# Patient Record
Sex: FEMALE | Race: WHITE | NOT HISPANIC OR LATINO | Employment: OTHER | URBAN - METROPOLITAN AREA
[De-identification: names, ages, dates, MRNs, and addresses within clinical notes are randomized per-mention and may not be internally consistent; named-entity substitution may affect disease eponyms.]

---

## 2019-01-28 ENCOUNTER — TRANSFERRED RECORDS (OUTPATIENT)
Dept: HEALTH INFORMATION MANAGEMENT | Facility: CLINIC | Age: 74
End: 2019-01-28

## 2019-01-28 LAB
CREAT SERPL-MCNC: 1.58 MG/DL (ref 0.6–1.3)
GLUCOSE SERPL-MCNC: 101 MG/DL (ref 70–99)
POTASSIUM SERPL-SCNC: 4.2 MMOL/L (ref 3.5–5.1)

## 2019-02-02 ENCOUNTER — APPOINTMENT (OUTPATIENT)
Dept: NUCLEAR MEDICINE | Facility: CLINIC | Age: 74
DRG: 202 | End: 2019-02-02
Payer: MEDICARE

## 2019-02-02 ENCOUNTER — HOSPITAL ENCOUNTER (INPATIENT)
Facility: CLINIC | Age: 74
LOS: 2 days | Discharge: HOME | DRG: 202 | End: 2019-02-04
Attending: NURSE PRACTITIONER | Admitting: INTERNAL MEDICINE
Payer: MEDICARE

## 2019-02-02 ENCOUNTER — APPOINTMENT (OUTPATIENT)
Dept: ULTRASOUND IMAGING | Facility: CLINIC | Age: 74
DRG: 202 | End: 2019-02-02
Payer: MEDICARE

## 2019-02-02 ENCOUNTER — APPOINTMENT (OUTPATIENT)
Dept: GENERAL RADIOLOGY | Facility: CLINIC | Age: 74
DRG: 202 | End: 2019-02-02
Payer: MEDICARE

## 2019-02-02 ENCOUNTER — TRANSFERRED RECORDS (OUTPATIENT)
Dept: HEALTH INFORMATION MANAGEMENT | Facility: CLINIC | Age: 74
End: 2019-02-02

## 2019-02-02 DIAGNOSIS — N17.9 AKI (ACUTE KIDNEY INJURY) (H): ICD-10-CM

## 2019-02-02 DIAGNOSIS — R06.02 SOB (SHORTNESS OF BREATH): ICD-10-CM

## 2019-02-02 DIAGNOSIS — J96.01 ACUTE RESPIRATORY FAILURE WITH HYPOXIA (H): Primary | ICD-10-CM

## 2019-02-02 PROBLEM — I26.99 PULMONARY EMBOLISM (H): Status: ACTIVE | Noted: 2019-02-02

## 2019-02-02 LAB
ANION GAP SERPL CALCULATED.3IONS-SCNC: 9 MMOL/L (ref 3–14)
BUN SERPL-MCNC: 36 MG/DL (ref 7–30)
CALCIUM SERPL-MCNC: 9.2 MG/DL (ref 8.5–10.1)
CHLORIDE SERPL-SCNC: 100 MMOL/L (ref 94–109)
CO2 SERPL-SCNC: 31 MMOL/L (ref 20–32)
CREAT SERPL-MCNC: 2.65 MG/DL (ref 0.52–1.04)
D DIMER PPP FEU-MCNC: 1.2 UG/ML FEU (ref 0–0.5)
ERYTHROCYTE [DISTWIDTH] IN BLOOD BY AUTOMATED COUNT: 14.5 % (ref 10–15)
GFR SERPL CREATININE-BSD FRML MDRD: 17 ML/MIN/{1.73_M2}
GLUCOSE SERPL-MCNC: 105 MG/DL (ref 70–99)
HCT VFR BLD AUTO: 43.2 % (ref 35–47)
HGB BLD-MCNC: 14.2 G/DL (ref 11.7–15.7)
MCH RBC QN AUTO: 32.3 PG (ref 26.5–33)
MCHC RBC AUTO-ENTMCNC: 32.9 G/DL (ref 31.5–36.5)
MCV RBC AUTO: 98 FL (ref 78–100)
NT-PROBNP SERPL-MCNC: 97 PG/ML (ref 0–900)
PLATELET # BLD AUTO: 191 10E9/L (ref 150–450)
POTASSIUM SERPL-SCNC: 3.2 MMOL/L (ref 3.4–5.3)
RBC # BLD AUTO: 4.4 10E12/L (ref 3.8–5.2)
SODIUM SERPL-SCNC: 140 MMOL/L (ref 133–144)
TROPONIN I SERPL-MCNC: <0.015 UG/L (ref 0–0.04)
WBC # BLD AUTO: 7.3 10E9/L (ref 4–11)

## 2019-02-02 PROCEDURE — 99223 1ST HOSP IP/OBS HIGH 75: CPT | Mod: AI | Performed by: INTERNAL MEDICINE

## 2019-02-02 PROCEDURE — 96374 THER/PROPH/DIAG INJ IV PUSH: CPT

## 2019-02-02 PROCEDURE — 25000125 ZZHC RX 250

## 2019-02-02 PROCEDURE — 34300033 ZZH RX 343: Performed by: NURSE PRACTITIONER

## 2019-02-02 PROCEDURE — 80048 BASIC METABOLIC PNL TOTAL CA: CPT | Performed by: NURSE PRACTITIONER

## 2019-02-02 PROCEDURE — A9567 TECHNETIUM TC-99M AEROSOL: HCPCS | Performed by: NURSE PRACTITIONER

## 2019-02-02 PROCEDURE — 25000128 H RX IP 250 OP 636: Performed by: NURSE PRACTITIONER

## 2019-02-02 PROCEDURE — 93970 EXTREMITY STUDY: CPT

## 2019-02-02 PROCEDURE — 71046 X-RAY EXAM CHEST 2 VIEWS: CPT

## 2019-02-02 PROCEDURE — 12000000 ZZH R&B MED SURG/OB

## 2019-02-02 PROCEDURE — 83880 ASSAY OF NATRIURETIC PEPTIDE: CPT | Performed by: NURSE PRACTITIONER

## 2019-02-02 PROCEDURE — 85379 FIBRIN DEGRADATION QUANT: CPT | Performed by: NURSE PRACTITIONER

## 2019-02-02 PROCEDURE — 99285 EMERGENCY DEPT VISIT HI MDM: CPT | Mod: 25

## 2019-02-02 PROCEDURE — 27210210 NM LUNG SCAN VENTILATION AND PERFUSION

## 2019-02-02 PROCEDURE — 93005 ELECTROCARDIOGRAM TRACING: CPT

## 2019-02-02 PROCEDURE — 25000128 H RX IP 250 OP 636: Performed by: INTERNAL MEDICINE

## 2019-02-02 PROCEDURE — 85027 COMPLETE CBC AUTOMATED: CPT | Performed by: NURSE PRACTITIONER

## 2019-02-02 PROCEDURE — A9270 NON-COVERED ITEM OR SERVICE: HCPCS | Mod: GY | Performed by: INTERNAL MEDICINE

## 2019-02-02 PROCEDURE — A9540 TC99M MAA: HCPCS | Performed by: NURSE PRACTITIONER

## 2019-02-02 PROCEDURE — 25000132 ZZH RX MED GY IP 250 OP 250 PS 637: Mod: GY | Performed by: INTERNAL MEDICINE

## 2019-02-02 PROCEDURE — 94640 AIRWAY INHALATION TREATMENT: CPT

## 2019-02-02 PROCEDURE — 84484 ASSAY OF TROPONIN QUANT: CPT | Performed by: NURSE PRACTITIONER

## 2019-02-02 RX ORDER — NALOXONE HYDROCHLORIDE 0.4 MG/ML
.1-.4 INJECTION, SOLUTION INTRAMUSCULAR; INTRAVENOUS; SUBCUTANEOUS
Status: DISCONTINUED | OUTPATIENT
Start: 2019-02-02 | End: 2019-02-04 | Stop reason: HOSPADM

## 2019-02-02 RX ORDER — AMOXICILLIN 250 MG
2 CAPSULE ORAL 2 TIMES DAILY PRN
Status: DISCONTINUED | OUTPATIENT
Start: 2019-02-02 | End: 2019-02-04 | Stop reason: HOSPADM

## 2019-02-02 RX ORDER — POTASSIUM CHLORIDE 7.45 MG/ML
10 INJECTION INTRAVENOUS
Status: DISCONTINUED | OUTPATIENT
Start: 2019-02-02 | End: 2019-02-04 | Stop reason: HOSPADM

## 2019-02-02 RX ORDER — NAPROXEN SODIUM 220 MG
220 TABLET ORAL EVERY MORNING
Status: ON HOLD | COMMUNITY
End: 2019-02-04

## 2019-02-02 RX ORDER — POTASSIUM CL/LIDO/0.9 % NACL 10MEQ/0.1L
10 INTRAVENOUS SOLUTION, PIGGYBACK (ML) INTRAVENOUS
Status: DISCONTINUED | OUTPATIENT
Start: 2019-02-02 | End: 2019-02-04 | Stop reason: HOSPADM

## 2019-02-02 RX ORDER — METOLAZONE 2.5 MG/1
2.5 TABLET ORAL EVERY OTHER DAY
Status: ON HOLD | COMMUNITY
End: 2019-02-04

## 2019-02-02 RX ORDER — IPRATROPIUM BROMIDE AND ALBUTEROL SULFATE 2.5; .5 MG/3ML; MG/3ML
SOLUTION RESPIRATORY (INHALATION)
Status: COMPLETED
Start: 2019-02-02 | End: 2019-02-02

## 2019-02-02 RX ORDER — AMLODIPINE BESYLATE 10 MG/1
10 TABLET ORAL EVERY EVENING
Status: ON HOLD | COMMUNITY
End: 2019-07-06

## 2019-02-02 RX ORDER — METOPROLOL TARTRATE 50 MG
50 TABLET ORAL 2 TIMES DAILY
Status: DISCONTINUED | OUTPATIENT
Start: 2019-02-02 | End: 2019-02-04 | Stop reason: HOSPADM

## 2019-02-02 RX ORDER — AMLODIPINE BESYLATE 10 MG/1
10 TABLET ORAL EVERY EVENING
Status: DISCONTINUED | OUTPATIENT
Start: 2019-02-02 | End: 2019-02-04 | Stop reason: HOSPADM

## 2019-02-02 RX ORDER — SODIUM CHLORIDE 9 MG/ML
INJECTION, SOLUTION INTRAVENOUS CONTINUOUS
Status: DISCONTINUED | OUTPATIENT
Start: 2019-02-02 | End: 2019-02-03

## 2019-02-02 RX ORDER — PREDNISONE 5 MG/1
5 TABLET ORAL EVERY MORNING
Status: DISCONTINUED | OUTPATIENT
Start: 2019-02-03 | End: 2019-02-04 | Stop reason: HOSPADM

## 2019-02-02 RX ORDER — LOSARTAN POTASSIUM 100 MG/1
100 TABLET ORAL DAILY
Status: ON HOLD | COMMUNITY
End: 2019-02-04

## 2019-02-02 RX ORDER — POTASSIUM CHLORIDE 29.8 MG/ML
20 INJECTION INTRAVENOUS
Status: DISCONTINUED | OUTPATIENT
Start: 2019-02-02 | End: 2019-02-04 | Stop reason: HOSPADM

## 2019-02-02 RX ORDER — AMOXICILLIN 250 MG
1 CAPSULE ORAL 2 TIMES DAILY PRN
Status: DISCONTINUED | OUTPATIENT
Start: 2019-02-02 | End: 2019-02-04 | Stop reason: HOSPADM

## 2019-02-02 RX ORDER — RIBOFLAVIN (VITAMIN B2) 100 MG
500 TABLET ORAL EVERY MORNING
COMMUNITY
End: 2020-01-01 | Stop reason: DRUGHIGH

## 2019-02-02 RX ORDER — UBIDECARENONE 75 MG
CAPSULE ORAL
COMMUNITY
End: 2019-02-02

## 2019-02-02 RX ORDER — BISACODYL 10 MG
10 SUPPOSITORY, RECTAL RECTAL DAILY PRN
Status: DISCONTINUED | OUTPATIENT
Start: 2019-02-02 | End: 2019-02-04 | Stop reason: HOSPADM

## 2019-02-02 RX ORDER — HYDRALAZINE HYDROCHLORIDE 20 MG/ML
10 INJECTION INTRAMUSCULAR; INTRAVENOUS EVERY 4 HOURS PRN
Status: DISCONTINUED | OUTPATIENT
Start: 2019-02-02 | End: 2019-02-04 | Stop reason: HOSPADM

## 2019-02-02 RX ORDER — POTASSIUM CHLORIDE 1.5 G/1.58G
20-40 POWDER, FOR SOLUTION ORAL
Status: DISCONTINUED | OUTPATIENT
Start: 2019-02-02 | End: 2019-02-04 | Stop reason: HOSPADM

## 2019-02-02 RX ORDER — PROCHLORPERAZINE 25 MG
12.5 SUPPOSITORY, RECTAL RECTAL EVERY 12 HOURS PRN
Status: DISCONTINUED | OUTPATIENT
Start: 2019-02-02 | End: 2019-02-04 | Stop reason: HOSPADM

## 2019-02-02 RX ORDER — POTASSIUM CHLORIDE 1500 MG/1
20-40 TABLET, EXTENDED RELEASE ORAL
Status: DISCONTINUED | OUTPATIENT
Start: 2019-02-02 | End: 2019-02-04 | Stop reason: HOSPADM

## 2019-02-02 RX ORDER — POTASSIUM GLUCONATE 595(99)MG
1 TABLET, EXTENDED RELEASE ORAL EVERY EVENING
COMMUNITY
End: 2020-01-01

## 2019-02-02 RX ORDER — ALBUTEROL SULFATE 90 UG/1
2 AEROSOL, METERED RESPIRATORY (INHALATION) EVERY 6 HOURS PRN
COMMUNITY

## 2019-02-02 RX ORDER — ALBUTEROL SULFATE 0.83 MG/ML
2.5 SOLUTION RESPIRATORY (INHALATION) EVERY 4 HOURS PRN
Status: DISCONTINUED | OUTPATIENT
Start: 2019-02-02 | End: 2019-02-04 | Stop reason: HOSPADM

## 2019-02-02 RX ORDER — SIMVASTATIN 20 MG
20 TABLET ORAL AT BEDTIME
Status: DISCONTINUED | OUTPATIENT
Start: 2019-02-02 | End: 2019-02-04 | Stop reason: HOSPADM

## 2019-02-02 RX ORDER — TRAZODONE HYDROCHLORIDE 100 MG/1
100 TABLET ORAL AT BEDTIME
Status: DISCONTINUED | OUTPATIENT
Start: 2019-02-02 | End: 2019-02-04 | Stop reason: HOSPADM

## 2019-02-02 RX ORDER — SIMVASTATIN 20 MG
20 TABLET ORAL AT BEDTIME
COMMUNITY

## 2019-02-02 RX ORDER — ONDANSETRON 4 MG/1
4 TABLET, ORALLY DISINTEGRATING ORAL EVERY 6 HOURS PRN
Status: DISCONTINUED | OUTPATIENT
Start: 2019-02-02 | End: 2019-02-04 | Stop reason: HOSPADM

## 2019-02-02 RX ORDER — PROCHLORPERAZINE MALEATE 5 MG
5 TABLET ORAL EVERY 6 HOURS PRN
Status: DISCONTINUED | OUTPATIENT
Start: 2019-02-02 | End: 2019-02-04 | Stop reason: HOSPADM

## 2019-02-02 RX ORDER — ONDANSETRON 2 MG/ML
4 INJECTION INTRAMUSCULAR; INTRAVENOUS EVERY 6 HOURS PRN
Status: DISCONTINUED | OUTPATIENT
Start: 2019-02-02 | End: 2019-02-04 | Stop reason: HOSPADM

## 2019-02-02 RX ORDER — ALBUTEROL SULFATE 90 UG/1
2 AEROSOL, METERED RESPIRATORY (INHALATION) EVERY 6 HOURS PRN
Status: DISCONTINUED | OUTPATIENT
Start: 2019-02-02 | End: 2019-02-04 | Stop reason: HOSPADM

## 2019-02-02 RX ORDER — FUROSEMIDE 20 MG
60 TABLET ORAL 2 TIMES DAILY
Status: ON HOLD | COMMUNITY
End: 2019-02-04

## 2019-02-02 RX ADMIN — METOPROLOL TARTRATE 50 MG: 50 TABLET ORAL at 20:19

## 2019-02-02 RX ADMIN — Medication 3.3 MILLICURIE: at 16:02

## 2019-02-02 RX ADMIN — TRAZODONE HYDROCHLORIDE 100 MG: 100 TABLET ORAL at 22:19

## 2019-02-02 RX ADMIN — IPRATROPIUM BROMIDE AND ALBUTEROL SULFATE 3 ML: .5; 3 SOLUTION RESPIRATORY (INHALATION) at 16:53

## 2019-02-02 RX ADMIN — POTASSIUM CHLORIDE 20 MEQ: 1500 TABLET, EXTENDED RELEASE ORAL at 22:20

## 2019-02-02 RX ADMIN — SIMVASTATIN 20 MG: 20 TABLET, FILM COATED ORAL at 22:19

## 2019-02-02 RX ADMIN — ALBUTEROL SULFATE 2 PUFF: 90 AEROSOL, METERED RESPIRATORY (INHALATION) at 22:46

## 2019-02-02 RX ADMIN — SODIUM CHLORIDE: 9 INJECTION, SOLUTION INTRAVENOUS at 19:54

## 2019-02-02 RX ADMIN — Medication 6000 UNITS: at 18:15

## 2019-02-02 RX ADMIN — KIT FOR THE PREPARATION OF TECHNETIUM TC 99M PENTETATE 70 MILLICURIE: 20 INJECTION, POWDER, LYOPHILIZED, FOR SOLUTION INTRAVENOUS; RESPIRATORY (INHALATION) at 16:01

## 2019-02-02 RX ADMIN — POTASSIUM CHLORIDE 40 MEQ: 1500 TABLET, EXTENDED RELEASE ORAL at 19:58

## 2019-02-02 RX ADMIN — HEPARIN SODIUM 18 UNITS/KG/HR: 10000 INJECTION, SOLUTION INTRAVENOUS at 19:44

## 2019-02-02 RX ADMIN — ASPIRIN 325 MG: 325 TABLET, DELAYED RELEASE ORAL at 20:19

## 2019-02-02 RX ADMIN — HEPARIN SODIUM 1350 UNITS/HR: 10000 INJECTION, SOLUTION INTRAVENOUS at 18:16

## 2019-02-02 ASSESSMENT — ENCOUNTER SYMPTOMS
SHORTNESS OF BREATH: 1
ABDOMINAL PAIN: 0
FEVER: 0
CHILLS: 0
WEAKNESS: 1
BACK PAIN: 1
COUGH: 1
MYALGIAS: 1
NAUSEA: 0

## 2019-02-02 ASSESSMENT — ACTIVITIES OF DAILY LIVING (ADL)
TRANSFERRING: 0-->INDEPENDENT
COGNITION: 0 - NO COGNITION ISSUES REPORTED
RETIRED_COMMUNICATION: 0-->UNDERSTANDS/COMMUNICATES WITHOUT DIFFICULTY
ADLS_ACUITY_SCORE: 21
SWALLOWING: 0-->SWALLOWS FOODS/LIQUIDS WITHOUT DIFFICULTY
BATHING: 0-->INDEPENDENT
AMBULATION: 1-->ASSISTIVE EQUIPMENT
DRESS: 0-->INDEPENDENT
RETIRED_EATING: 0-->INDEPENDENT
FALL_HISTORY_WITHIN_LAST_SIX_MONTHS: NO
TOILETING: 0-->INDEPENDENT

## 2019-02-02 ASSESSMENT — MIFFLIN-ST. JEOR: SCORE: 1517.4

## 2019-02-02 NOTE — PHARMACY-ADMISSION MEDICATION HISTORY
Admission medication history interview status for this patient is complete. See Psychiatric admission navigator for allergy information, prior to admission medications and immunization status.     Medication history interview source(s):Patient and Family  Medication history resources (including written lists, pill bottles, clinic record):cellphone list    Changes made to PTA medication list:  Added: metolazone, incruse ellipita, b complex vitamin, aleve PM, potassium gluconate, prednisone, vitamin C, calcium with D, plavix, MVI, lasix    Deleted: ditropan  Changed: ventolin MDI, ASA, lopressor, aleve    Actions taken by pharmacist (provider contacted, etc):None     Additional medication history information:None    Medication reconciliation/reorder completed by provider prior to medication history? No    For patients on insulin therapy: no (Yes/No)   Lantus/levemir/NPH/Mix 70/30 dose: ___ in AM/PM or twice daily   Sliding scale Novolog Y/N   If Yes, do you have a baseline novolog pre-meal dose: ______units with meals   Patients eat three meals a day: Y/N ---  How many episodes of hypoglycemia (low blood glucose) do you have weekly: ---   How many missed doses do you have a week: ---  How many times do you check your blood glucose per day: ---  Any Barriers to therapy: cost of medications/comfortable with giving injections (if applicable)/ comfortable and confident with current diabetes regimen ---      Prior to Admission medications    Medication Sig Last Dose Taking? Auth Provider   albuterol (PROAIR HFA/PROVENTIL HFA/VENTOLIN HFA) 108 (90 Base) MCG/ACT inhaler Inhale 2 puffs into the lungs every 6 hours as needed  2/1/2019 at Unknown time Yes Reported, Patient   amLODIPine (NORVASC) 10 MG tablet Take 10 mg by mouth every evening  2/1/2019 at Unknown time Yes Reported, Patient   aspirin (ASA) 325 MG EC tablet Take 325 mg by mouth every evening  2/1/2019 at Unknown time Yes Reported, Patient   B Complex-C (VITAMIN B COMPLEX  W/VITAMIN C) TABS tablet Take 1 tablet by mouth daily 2/2/2019 at am Yes Unknown, Entered By History   Calcium-Vitamin D 600-200 MG-UNIT TABS Take 1 tablet by mouth every evening  2/1/2019 at Unknown time Yes Reported, Patient   Clopidogrel Bisulfate (PLAVIX PO) Take 75 mg by mouth every morning  2/2/2019 at am Yes Reported, Patient   FLUOXETINE HCL PO Take 20 mg by mouth daily 2/2/2019 at am Yes Reported, Patient   furosemide (LASIX) 20 MG tablet Take 60 mg by mouth 2 times daily 2/2/2019 at am Yes Unknown, Entered By History   losartan (COZAAR) 100 MG tablet Take 100 mg by mouth daily 2/2/2019 at am Yes Reported, Patient   metolazone (ZAROXOLYN) 2.5 MG tablet Take 2.5 mg by mouth every other day 2/2/2019 at am Yes Unknown, Entered By History   METOPROLOL TARTRATE PO Take 50 mg by mouth 2 times daily  2/2/2019 at am Yes Reported, Patient   Multiple Vitamins-Minerals (MULTIVITAL PO) Take 1 tablet by mouth every morning  2/2/2019 at am Yes Reported, Patient   Naproxen Sod-Diphenhydramine (ALEVE PM) 220-25 MG TABS Take 2 tablets by mouth At Bedtime 2/1/2019 at Unknown time Yes Unknown, Entered By History   naproxen sodium (ANAPROX) 220 MG tablet Take 220 mg by mouth every morning  2/2/2019 at am Yes Reported, Patient   Potassium Gluconate 595 MG TBCR Take 1 tablet by mouth every evening  2/1/2019 at Unknown time Yes Reported, Patient   PREDNISONE PO Take 5 mg by mouth every morning  2/2/2019 at am Yes Reported, Patient   simvastatin (ZOCOR) 20 MG tablet Take 20 mg by mouth At Bedtime 2/1/2019 at Unknown time Yes Reported, Patient   TRAZODONE HCL PO Take 100 mg by mouth At Bedtime 2/1/2019 at Unknown time Yes Reported, Patient   umeclidinium (INCRUSE ELLIPTA) 62.5 MCG/INH inhaler Inhale 1 puff into the lungs daily 2/1/2019 at Unknown time Yes Unknown, Entered By History   vitamin C (ASCORBIC ACID) 100 MG tablet Take 500 mg by mouth every morning  2/2/2019 at am Yes Reported, Patient

## 2019-02-02 NOTE — ED NOTES
[facility]  ED Nurse Handoff Report    Ladonna Sheriff is a 73 year old female   ED Chief complaint: Shortness of Breath  . ED Diagnosis:   Final diagnoses:   SOB (shortness of breath)   GARRY (acute kidney injury) (H)     Allergies:   Allergies   Allergen Reactions     Prevacid [Lansoprazole]        Code Status: Full Code  Activity level - Baseline/Home:  Independent. Activity Level - Current:   Stand with Assist. Lift room needed: No. Bariatric: No   Needed: No   Isolation: No. Infection: Not Applicable.     Vital Signs:   Vitals:    02/02/19 1315 02/02/19 1330 02/02/19 1400 02/02/19 1515   BP: 102/69 102/67 (!) 117/92 103/61   Pulse: 65 63  65   Resp:       Temp:       TempSrc:       SpO2: 94% 91% 92% 93%   Weight:       Height:           Cardiac Rhythm:  ,    SR  Pain level:    Patient confused: No. Patient Falls Risk: Yes.   Elimination Status: has not voided yet   Patient Report / Focused Assessment:    Respiratory Respiratory - Respiratory WDL: cough; all (Pt comes in with increasing SOB with activity and pain across shoulders. Pt recently dx with CHF.) Rhythm/Pattern, Respiratory: shortness of breath     Tests Performed / Abnormal Results:    Lung vent and perf (NM)   Final Result   IMPRESSION: Indeterminate probability for pulmonary embolus. Numerous   bilateral matched defects.          MICHELE FONTANA MD      XR Chest 2 Views   Final Result   IMPRESSION: No acute cardiopulmonary disease.      PARISA HUERTA MD        Labs Ordered and Resulted from Time of ED Arrival Up to the Time of Departure from the ED   D DIMER QUANTITATIVE - Abnormal; Notable for the following components:       Result Value    D Dimer 1.2 (*)     All other components within normal limits   BASIC METABOLIC PANEL - Abnormal; Notable for the following components:    Potassium 3.2 (*)     Glucose 105 (*)     Urea Nitrogen 36 (*)     Creatinine 2.65 (*)     GFR Estimate 17 (*)     GFR Estimate If Black 20 (*)     All  other components within normal limits   NT PROBNP INPATIENT   CBC WITH PLATELETS   TROPONIN I   MAY SALINE LOCK IV   PLATELETS MONITORED PER HEPARIN TREATMENT PROTOCOL (FOR MEANINGFUL USE   MEASURE WEIGHT   NOTIFY PHYSICIAN   NOTIFY PHYSICIAN       Treatments provided: PIV, LABS, XRAY, VQSCAN, HEPARIN, BP, PULSE OX AND CARDIAC MONITORING  Family Comments: AT BEDSIDE  OBS brochure/video discussed/provided to patient:  No  ED Medications:   Medications   heparin infusion 25,000 units in 0.45% NaCl 250 mL (0 Units/hr Intravenous ED Infusing on Admission/transfer 2/2/19 1823)   technetium pertechnetate with albumin (Tc99m MAA) radioisotope injection 3 millicurie (3.3 millicuries Intravenous Given 2/2/19 1602)   technetium pentetate Tc99m (DTPA) inhaled radioisotope 65 millicurie (70 millicuries Inhalation Given 2/2/19 1601)   ipratropium - albuterol 0.5 mg/2.5 mg/3 mL (DUONEB) 0.5-2.5 (3) MG/3ML neb solution (3 mLs  Given 2/2/19 1653)   heparin Loading Dose bolus dose from infusion pump 6,100 Units (6,000 Units Intravenous Given 2/2/19 1815)     Drips infusing:  No  For the majority of the shift, the patient's behavior Green. Interventions performed were NONE.  Severe Sepsis OR Septic Shock Diagnosis Present: No    ED Nurse Name/Phone Number: Nikkie Antonio RN [phone]  5:37 PM    RECEIVING UNIT ED HANDOFF REVIEW    Above ED Nurse Handoff Report was reviewed: Yes  Reviewed by: Cynthia Gleason on February 2, 2019 at 6:08 PM

## 2019-02-02 NOTE — ED PROVIDER NOTES
History     Chief Complaint:  Shortness of breath     HPI   Ladonna Sheriff is a 73 year old female who presents with shortness of breath. The patient reports that a couple of days ago she started to experience shortness of breath and discomfort in shoulders. She reports that she experiences weakness when she attempts to exert herself. She also has had a cough. She also notes that five days ago her cardiologist told her that she has CHF. The patient is on Plavix for a stroke that she experienced in 2005.The patient denies any chest pain, recent travel, or recent injuries.    Allergies:  Lansoprazole      Medications:    Plavix  Fluoxetine  Hyzaar  Metoprolol  Oxybutynin  Prednisone  Trazodone     Past Medical History:    Cerebral infarction   Hypertension   Lupus   Optic neuritis   Sjogren's syndrome   Uncomplicated asthma     Past Surgical History:    Cholecystectomy  Hysterectomy  Tubal Ligation    Family History:    History reviewed. No pertinent family history.     Social History:  Smoking Status: Former Smoker  Alcohol Use: Yes  Patient presents with .  Marital Status:          Review of Systems   Constitutional: Negative for chills and fever.   Respiratory: Positive for cough and shortness of breath.    Cardiovascular: Negative for chest pain and leg swelling.   Gastrointestinal: Negative for abdominal pain and nausea.   Musculoskeletal: Positive for back pain (shoulder area) and myalgias.   Neurological: Positive for weakness.   All other systems reviewed and are negative.      Physical Exam   First Vitals:  Patient Vitals for the past 24 hrs:   BP Temp Temp src Pulse Heart Rate Resp SpO2 Height Weight   02/02/19 1515 103/61 -- -- 65 -- -- 93 % -- --   02/02/19 1400 (!) 117/92 -- -- -- -- -- 92 % -- --   02/02/19 1330 102/67 -- -- 63 -- -- 91 % -- --   02/02/19 1315 102/69 -- -- 65 -- -- 94 % -- --   02/02/19 1310 107/67 -- -- 67 -- -- 92 % -- --   02/02/19 1309 107/67 -- -- 67 -- -- 92 % -- --  "  02/02/19 1258 -- -- -- -- -- -- -- 1.702 m (5' 7\") 98 kg (216 lb)   02/02/19 1244 111/76 97.9  F (36.6  C) Oral 68 68 15 94 % -- --     Physical Exam  General: Alert, No obvious discomfort, well kept  Eyes: PERRL, conjunctivae pink no scleral icterus or conjunctival injection  ENT:   Moist mucus membranes, posterior oropharynx clear without erythema or exudates, No lymphadenopathy, Normal voice  Resp:  Lungs clear to auscultation bilaterally, no crackles/rubs/wheezes. Good air movement  CV:  Normal rate and rhythm, no murmurs/rubs/gallops  GI:  Abdomen soft and non-distended.  Normoactive BS.  No tenderness, guarding or rebound, No masses  Skin:  Warm, dry.  No rashes or petechiae  Musculoskeletal: No peripheral edema or calf tenderness, Normal gross ROM   Neuro: Alert and oriented to person/place/time, normal sensation  Psychiatric: Normal affect, cooperative, good eye contact    Emergency Department Course   ECG:  ECG (13:17:14):  Rate 64 bpm. IL interval 170. QRS duration 152. QT/QTc 492/507. P-R-T axes 64 -48 88. Normal sinus rhythm. Left axis deviation. Left bundle branch block. Abnormal ECG. Interpreted at 1328 by Aren Carson APRN.    Imaging:  Radiographic findings were communicated with the patient who voiced understanding of the findings.  CT Chest Pulmonary Embolism w Contrast  No acute cardiopulmonary disease.  As read by Radiology.    Lung vent and perf (NM)  Indeterminate probability for pulmonary embolus. Numerous  bilateral matched defects.  As read by Radiology.    Laboratory:  BNP: 97  CBC: WNL (WBC 7.3, HGB 14.2, )  D Dimer Quantitative: 1.2 (H)  BMP: Potassium 3.2 (L), Glucose 105 (H), BUN 36 (H), GFR 17 (L) o/w  WNL (Creatinine 2.65)  Troponin l: <0.015    Interventions:  1601 Technetium pertechnetate with albumin 3.3 millicurie injection  1602 Technetium penetrate 70 millicurie inhalation  1653 Duoneb 3 mL Nebulization    Emergency Department Course:  Past medical records, " nursing notes, and vitals reviewed.  1244: I performed an exam of the patient and obtained history, as documented above.    IV inserted and blood drawn.    The patient was sent for a CT scan and nuclear medicine lung scan while in the emergency department, findings above.     1718: I discussed the case with Dr. Michael regarding the patient.    Findings and plan explained to the Patient who consents to admission.     1658: Discussed the patient with Dr. Michael, who will admit the patient to a observation bed for further monitoring, evaluation, and treatment.     Impression & Plan      Medical Decision Making:  Ladonna Sheriff is a 73 year old female who presents today for evaluation of shortness of breath.  She stated over the last couple of days she has had increasing shortness of breath and noticed pain in her back.  She states that last Monday she was diagnosed with CHF and started on a second thiazide diuretic as well as had her Lasix increased.  Today she presented to [facility] urgent care and was sent here for further evaluation.  Her examination shows no physical signs of CHF.  X-ray and BNP are also negative for CHF.  Her laboratory studies are significant for an elevated d-dimer at 1.2.  Also has significant change in creatinine since Monday.  Previous creatinine was 1.5 today it is 2.6 and GFR is down to 17.  This appears to even be a change from this morning's visit to the urgent care.  She has a negative troponin and nonacute EKG.  Due to her kidney function I did obtain a VQ scan which showed indeterminate probability for pulmonary embolus.  Patient is on Plavix and due to her kidney function I cannot start Lovenox nor perform CT PE study therefore she is started on heparin drip as I can't rule out PE at this time.  I believe that her kidney function elevation is most likely related to dehydration due to the increase in her diuretics.  Again I have seen no evidence of CHF on her studies.  Her previous  BNP when diagnosed with CHF was 68.  Plan will be to admit patient for hydration and probable PE study.  She does not have any signs or symptoms of DVT I doubt that is the cause of her d-dimer at this time.  I did speak to the hospitalist who will admit patient to his service.      Diagnosis:    ICD-10-CM    1. SOB (shortness of breath) R06.02    2. GARRY (acute kidney injury) (H) N17.9        Disposition:  Admitted to observation    Discharge Medications:     Medication List      There are no discharge medications for this visit.         Lisa Flores  2/2/2019   [facility] EMERGENCY DEPARTMENT  I, Lisa Flores, am serving as a scribe at 12:44 PM on 2/2/2019 to document services personally performed by Aren Carson APRN based on my observations and the provider's statements to me.        Aren Carson APRN CNP  02/02/19 [id number]

## 2019-02-03 ENCOUNTER — APPOINTMENT (OUTPATIENT)
Dept: CARDIOLOGY | Facility: CLINIC | Age: 74
DRG: 202 | End: 2019-02-03
Payer: MEDICARE

## 2019-02-03 LAB
ANION GAP SERPL CALCULATED.3IONS-SCNC: 7 MMOL/L (ref 3–14)
BUN SERPL-MCNC: 34 MG/DL (ref 7–30)
CALCIUM SERPL-MCNC: 8.4 MG/DL (ref 8.5–10.1)
CHLORIDE SERPL-SCNC: 107 MMOL/L (ref 94–109)
CO2 SERPL-SCNC: 29 MMOL/L (ref 20–32)
CREAT SERPL-MCNC: 1.75 MG/DL (ref 0.52–1.04)
GFR SERPL CREATININE-BSD FRML MDRD: 28 ML/MIN/{1.73_M2}
GLUCOSE SERPL-MCNC: 86 MG/DL (ref 70–99)
LMWH PPP CHRO-ACNC: 0.54 IU/ML
LMWH PPP CHRO-ACNC: 0.73 IU/ML
LMWH PPP CHRO-ACNC: 1.17 IU/ML
POTASSIUM SERPL-SCNC: 3.5 MMOL/L (ref 3.4–5.3)
POTASSIUM SERPL-SCNC: 3.8 MMOL/L (ref 3.4–5.3)
SODIUM SERPL-SCNC: 143 MMOL/L (ref 133–144)

## 2019-02-03 PROCEDURE — 93306 TTE W/DOPPLER COMPLETE: CPT | Mod: 26 | Performed by: INTERNAL MEDICINE

## 2019-02-03 PROCEDURE — 85520 HEPARIN ASSAY: CPT | Performed by: INTERNAL MEDICINE

## 2019-02-03 PROCEDURE — 25000132 ZZH RX MED GY IP 250 OP 250 PS 637: Mod: GY | Performed by: INTERNAL MEDICINE

## 2019-02-03 PROCEDURE — 36415 COLL VENOUS BLD VENIPUNCTURE: CPT | Performed by: INTERNAL MEDICINE

## 2019-02-03 PROCEDURE — 85520 HEPARIN ASSAY: CPT | Performed by: HOSPITALIST

## 2019-02-03 PROCEDURE — 40000264 ECHOCARDIOGRAM COMPLETE

## 2019-02-03 PROCEDURE — 94640 AIRWAY INHALATION TREATMENT: CPT | Mod: 76

## 2019-02-03 PROCEDURE — A9270 NON-COVERED ITEM OR SERVICE: HCPCS | Mod: GY | Performed by: INTERNAL MEDICINE

## 2019-02-03 PROCEDURE — 94640 AIRWAY INHALATION TREATMENT: CPT

## 2019-02-03 PROCEDURE — 25000128 H RX IP 250 OP 636: Performed by: INTERNAL MEDICINE

## 2019-02-03 PROCEDURE — 36415 COLL VENOUS BLD VENIPUNCTURE: CPT | Performed by: HOSPITALIST

## 2019-02-03 PROCEDURE — 99233 SBSQ HOSP IP/OBS HIGH 50: CPT | Performed by: HOSPITALIST

## 2019-02-03 PROCEDURE — 12000000 ZZH R&B MED SURG/OB

## 2019-02-03 PROCEDURE — 25500064 ZZH RX 255 OP 636: Performed by: INTERNAL MEDICINE

## 2019-02-03 PROCEDURE — 25000125 ZZHC RX 250: Performed by: INTERNAL MEDICINE

## 2019-02-03 PROCEDURE — 40000274 ZZH STATISTIC RCP CONSULT EA 30 MIN

## 2019-02-03 PROCEDURE — 80048 BASIC METABOLIC PNL TOTAL CA: CPT | Performed by: INTERNAL MEDICINE

## 2019-02-03 PROCEDURE — 84132 ASSAY OF SERUM POTASSIUM: CPT | Performed by: INTERNAL MEDICINE

## 2019-02-03 PROCEDURE — 40000275 ZZH STATISTIC RCP TIME EA 10 MIN

## 2019-02-03 RX ADMIN — PREDNISONE 5 MG: 5 TABLET ORAL at 08:49

## 2019-02-03 RX ADMIN — TRAZODONE HYDROCHLORIDE 100 MG: 100 TABLET ORAL at 22:42

## 2019-02-03 RX ADMIN — ASPIRIN 325 MG: 325 TABLET, DELAYED RELEASE ORAL at 21:07

## 2019-02-03 RX ADMIN — AMLODIPINE BESYLATE 10 MG: 10 TABLET ORAL at 21:07

## 2019-02-03 RX ADMIN — HEPARIN SODIUM 1200 UNITS/HR: 10000 INJECTION, SOLUTION INTRAVENOUS at 03:22

## 2019-02-03 RX ADMIN — FLUOXETINE 20 MG: 20 CAPSULE ORAL at 08:49

## 2019-02-03 RX ADMIN — HEPARIN SODIUM 1100 UNITS/HR: 10000 INJECTION, SOLUTION INTRAVENOUS at 11:31

## 2019-02-03 RX ADMIN — ALBUTEROL SULFATE 2 PUFF: 90 AEROSOL, METERED RESPIRATORY (INHALATION) at 15:15

## 2019-02-03 RX ADMIN — METOPROLOL TARTRATE 50 MG: 50 TABLET ORAL at 08:49

## 2019-02-03 RX ADMIN — METOPROLOL TARTRATE 50 MG: 50 TABLET ORAL at 21:07

## 2019-02-03 RX ADMIN — SIMVASTATIN 20 MG: 20 TABLET, FILM COATED ORAL at 22:42

## 2019-02-03 RX ADMIN — HUMAN ALBUMIN MICROSPHERES AND PERFLUTREN 3 ML: 10; .22 INJECTION, SOLUTION INTRAVENOUS at 09:00

## 2019-02-03 RX ADMIN — ALBUTEROL SULFATE 2 PUFF: 90 AEROSOL, METERED RESPIRATORY (INHALATION) at 20:25

## 2019-02-03 RX ADMIN — ALBUTEROL SULFATE 2 PUFF: 90 AEROSOL, METERED RESPIRATORY (INHALATION) at 06:55

## 2019-02-03 ASSESSMENT — ACTIVITIES OF DAILY LIVING (ADL)
ADLS_ACUITY_SCORE: 17
ADLS_ACUITY_SCORE: 17
ADLS_ACUITY_SCORE: 16
ADLS_ACUITY_SCORE: 16
ADLS_ACUITY_SCORE: 17
ADLS_ACUITY_SCORE: 16

## 2019-02-03 ASSESSMENT — MIFFLIN-ST. JEOR: SCORE: 1515.59

## 2019-02-03 NOTE — PLAN OF CARE
Vitals stable. 02 96% on room air. LS clear on all bilateral lung fields. Denied pain during this shift. A&OX4. Assist of 1 with gait belt for transferring. 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 Voids spontaneously without difficulty. K 3.2, replaced. 2grams Na diet. Heparin infusing for possible PE. NS infusing at 75ML/hr.

## 2019-02-03 NOTE — PLAN OF CARE
Vss afebrile. 94% ra  02: weaned off 02.   Tele:sr with bbb and prolong qt  LS: clear  GI: bs+ tolerating 2gm na  : voiding without problem  Skin: bruised  Activity: up with sba of 1.  Pain: denies pain  Plan: hep gtt infusing at 1100units/hr, hep 10a level at 1730. Echo completed this morning. Pt has limited vision unchanged.

## 2019-02-03 NOTE — H&P
[facility]  History and Physical   Hospitalist Service    Rick Michael MD    Ladonna Sheriff MRN# [id number]   YOB: 1945 Age: 73 year old      Date of Admission:  2/2/2019           Assessment and Plan:   Ladonna Sheriff is an 83-year-old female with history of previous stroke, TIA, lupus, Sjogren's syndrome, temporal arteritis, optic neuritis with vision impairment, chronic daily low dosed prednisone (5 mg daily), hypertension, hypercholesterolemia, diastolic and possibly systolic congestive heart failure, recently diagnosed asthma and/or COPD, previous hysterectomy, and previous tubal ligation.  She presented to the emergency department today for evaluation of shortness of breath.  She describes 8 months of progressive shortness of breath.  She had some evaluation last spring through [facility] which included echocardiogram and nuclear stress test.  Echocardiogram last April showed ejection fraction of 45%, moderate left ventricular hypertrophy, and stage II diastolic congestive heart failure.  Note that echocardiogram from October 2017 showed ejection fraction of 55-60%.  Nuclear stress test in May of last year showed ejection fraction 53% but no signs of ischemia.  She has been managed with Lasix 40 mg by mouth twice daily.  Her shortness of breath has worsened in the last several weeks.  A week or so ago she saw Dr. Karol Currie of [place] [facility].  She was diagnosed with asthma or COPD with pulmonary function tests.  She had been using her albuterol inhaler several times daily.  She was prescribed an Incruse Ellipta inhaler which she has been taking for a week or so now.  She saw cardiology 3 days ago.  She was diagnosed with worsened congestive heart failure.  It does not appear that chest x-ray, BNP, her echocardiogram was obtained.  Her Lasix dose was increased from 40 mg twice daily to 60 mg twice daily.  She was also started on metolazone 2.5 mg every other  day (she has taken two doses of this on Thursday and this morning).  She has continued to have shortness of breath.  She has continued to use her rescue inhaler several times a day with some temporary relief.  Earlier today she had tightness in her shoulders with worsened shortness of breath.  She came to the emergency department for evaluation.  She denies weight gain or peripheral edema.  She denies chest pain.  She has had a little bit of cough but no fever. She had duoneb here with improvement of shortness of breath and resolution of tightness in shoulders. Emergency department evaluation showed hypoxia with oxygen saturations room air.  Other vital signs were unremarkable.  Basic metabolic panel showed potassium 3.2, BUN of 36, and creatinine of 2.65 (her baseline creatinine seems to be more like 1.4).  CBC was unremarkable. Troponin was undetectable.  BNP was normal at 97.  D-dimer was elevated at 1.2.  EKG showed left bundle branch block but no acute ischemia.  Chest x-ray showed no acute process.  VQ scan was obtained and showed several mismatches and was interpreted as intermediate probability for pulmonary embolism.  Jessica was started on heparin drip for possible pulmonary embolism.  I was asked to admit her for further evaluation and treatment.    Problem list:    1.  Acute on chronic shortness of breath with acute, hypoxic respiratory failure.  Her chronic shortness of breath seems to be multifactorial and probably due to COPD and/or asthma which has been untreated until recently and diastolic congestive heart failure.  With findings today, chronic pulmonary embolism should also be considered.  The cause of acute shortness of breath is not clear.  Certainly, pulmonary embolism needs to be considered.  Her risk factor for this seems to be connective tissue and autoimmune disorder. Consider also acute exacerbation of COPD and/or asthma which has been untreated until recently.  Acute exacerbation of  congestive heart failure seems less likely with normal BNP and chest x-ray showing no signs of pulmonary edema.  Ladonna will be admitted as an inpatient.  She will be given oxygen as needed.  I will continue her recently started Incruse Ellipta inhaler.  I will have albuterol nebs available every 4 hours as needed.  With acute renal failure (see below) I will hold several of her congestive heart failure medications (Lasix, metolazone, and Cozaar).  I will continue her prior to admission metoprolol.  I will continue heparin drip for now.  I will obtain bilateral lower extremity Doppler ultrasounds to evaluate for DVT.  If DVT is found this would corroborate diagnosis of PE and make indication for continued anticoagulation a bit more clear.  I am also going to obtain echocardiogram to assess cardiac function and evaluate for signs of right-sided heart strain.  Similarly, signs of right-sided heart strain might corroborate diagnosis of PE.  Marsha will be gently hydrated as discussed below.  If diagnosis remains elusive and her renal function improves CT scan of chest with PE protocol could be considered.    2.  Acute renal failure.  I suspect that this is due to dehydration from diuresis.  Hold prior to admission Lasix, metolazone, and Cozaar.  Hydrate gently with normal saline at 75 cc/h.  Repeat basic metabolic panel for the next 2 mornings, at least.    3.  COPD and/or asthma.  At the time of my exam there does not appear to be an acute exacerbation.  I will continue her recently started Incruse Ellipta inhaler.  I will have albuterol nebs available every 4 hours as needed.  If she develops more significant wheezing I would consider increasing her steroid (prior to admission she has been taking 5 mg of prednisone daily for temporal arteritis and lupus).    4.  Chronic diastolic and systolic congestive heart failure.  Previous echocardiogram showed ejection fraction of 45% and stage II diastolic congestive heart  failure.  She does not appear to have decompensated heart failure at this time.  In the setting of progressive shortness of breath I will obtain echocardiogram tomorrow.  If there is worsening of congestive heart failure consider cardiology consult (Ladonna has not had coronary angiography).      5.  Hypertension.  Continue prior to admission metoprolol and amlodipine.  I am holding prior to admission Cozaar, Lasix, metolazone.  As needed IV hydralazine will be available for use as needed.    6.  History of lupus, Sjogren's syndrome, and temporal arteritis.  She takes prednisone 5 mg daily.  This will be continued.    7.  Previous TIA and stroke.  With heparin drip I will hold prior to admission Plavix.  Continue prior to admission aspirin.    8.  Hypercholesterolemia.  Continue prior to admission Zocor.    Full code  Heparin will cover DVT prophylaxis  Disposition: Admit as inpatient has at least 2 nights of hospitalization are expected.           Code Status:   Full Code         Primary Care Physician:   Joceline Ty [phone]         Chief Complaint:   Shortness of breath and bilateral posterior shoulder tightness    History is obtained from aLdonna, her son, Aren Carson PA, and the medical record.         History of Present Illness:   Ladonna Sheriff is an 83-year-old female with history of previous stroke, TIA, lupus, Sjogren's syndrome, temporal arteritis, optic neuritis with vision impairment, chronic daily low dosed prednisone (5 mg daily), hypertension, hypercholesterolemia, diastolic and possibly systolic congestive heart failure, recently diagnosed asthma and/or COPD, previous hysterectomy, and previous tubal ligation.  She presented to the emergency department today for evaluation of shortness of breath.  She describes 8 months of progressive shortness of breath.  She had some evaluation last spring through [facility] which included echocardiogram and nuclear stress test.   Echocardiogram last April showed ejection fraction of 45%, moderate left ventricular hypertrophy, and stage II diastolic congestive heart failure.  Note that echocardiogram from October 2017 showed ejection fraction of 55-60%.  Nuclear stress test in May of last year showed ejection fraction 53% but no signs of ischemia.  She has been managed with Lasix 40 mg by mouth twice daily.  Her shortness of breath has worsened in the last several weeks.  A week or so ago she saw Dr. Karol Currie of [place] [facility].  She was diagnosed with asthma or COPD with pulmonary function tests.  She had been using her albuterol inhaler several times daily.  She was prescribed an Incruse Ellipta inhaler which she has been taking for a week or so now.  She saw cardiology 3 days ago.  She was diagnosed with worsened congestive heart failure.  It does not appear that chest x-ray, BNP, her echocardiogram was obtained.  Her Lasix dose was increased from 40 mg twice daily to 60 mg twice daily.  She was also started on metolazone 2.5 mg every other day (she has taken two doses of this on Thursday and this morning).  She has continued to have shortness of breath.  She has continued to use her rescue inhaler several times a day with some temporary relief.  Earlier today she had tightness in her shoulders with worsened shortness of breath.  She came to the emergency department for evaluation.  She denies weight gain or peripheral edema.  She denies chest pain.  She has had a little bit of cough but no fever. She had duoneb here with improvement of shortness of breath and resolution of tightness in shoulders. Emergency department evaluation showed hypoxia with oxygen saturations room air.  Other vital signs were unremarkable.  Basic metabolic panel showed potassium 3.2, BUN of 36, and creatinine of 2.65 (her baseline creatinine seems to be more like 1.4).  CBC was unremarkable. Troponin was undetectable.  BNP was normal at 97.  D-dimer was elevated  at 1.2.  EKG showed left bundle branch block but no acute ischemia.  Chest x-ray showed no acute process.  VQ scan was obtained and showed several mismatches and was interpreted as intermediate probability for pulmonary embolism.  Jessica was started on heparin drip for possible pulmonary embolism.  I was asked to admit her for further evaluation and treatment.           Past Medical History:     Patient Active Problem List   Diagnosis     Optic neuritis     Acute respiratory failure with hypoxia (H)     Pulmonary embolism (H)      Past Medical History:   Diagnosis Date     Cerebral infarction (H)      CHF (congestive heart failure) (H)      Hypertension      Lupus      Lupus      Optic neuritis      Optic neuritis      Sjogren's syndrome (H)      Sjogren's syndrome (H)      Temporal arteritis (H)      TIA (transient ischemic attack)      Uncomplicated asthma              Past Surgical History:     Past Surgical History:   Procedure Laterality Date     CHOLECYSTECTOMY       GYN SURGERY      hysterectomy     GYN SURGERY      tubal ligation            Home Medications:     Prior to Admission medications    Medication Sig Last Dose Taking? Auth Provider   albuterol (PROAIR HFA/PROVENTIL HFA/VENTOLIN HFA) 108 (90 Base) MCG/ACT inhaler Inhale 2 puffs into the lungs every 6 hours as needed  2/1/2019 at Unknown time Yes Reported, Patient   amLODIPine (NORVASC) 10 MG tablet Take 10 mg by mouth every evening  2/1/2019 at Unknown time Yes Reported, Patient   aspirin (ASA) 325 MG EC tablet Take 325 mg by mouth every evening  2/1/2019 at Unknown time Yes Reported, Patient   B Complex-C (VITAMIN B COMPLEX W/VITAMIN C) TABS tablet Take 1 tablet by mouth daily 2/2/2019 at am Yes Unknown, Entered By History   Calcium-Vitamin D 600-200 MG-UNIT TABS Take 1 tablet by mouth every evening  2/1/2019 at Unknown time Yes Reported, Patient   Clopidogrel Bisulfate (PLAVIX PO) Take 75 mg by mouth every morning  2/2/2019 at am Yes Reported,  Patient   FLUOXETINE HCL PO Take 20 mg by mouth daily 2/2/2019 at am Yes Reported, Patient   furosemide (LASIX) 20 MG tablet Take 60 mg by mouth 2 times daily 2/2/2019 at am Yes Unknown, Entered By History   losartan (COZAAR) 100 MG tablet Take 100 mg by mouth daily 2/2/2019 at am Yes Reported, Patient   metolazone (ZAROXOLYN) 2.5 MG tablet Take 2.5 mg by mouth every other day 2/2/2019 at am Yes Unknown, Entered By History   METOPROLOL TARTRATE PO Take 50 mg by mouth 2 times daily  2/2/2019 at am Yes Reported, Patient   Multiple Vitamins-Minerals (MULTIVITAL PO) Take 1 tablet by mouth every morning  2/2/2019 at am Yes Reported, Patient   Naproxen Sod-Diphenhydramine (ALEVE PM) 220-25 MG TABS Take 2 tablets by mouth At Bedtime 2/1/2019 at Unknown time Yes Unknown, Entered By History   naproxen sodium (ANAPROX) 220 MG tablet Take 220 mg by mouth every morning  2/2/2019 at am Yes Reported, Patient   Potassium Gluconate 595 MG TBCR Take 1 tablet by mouth every evening  2/1/2019 at Unknown time Yes Reported, Patient   PREDNISONE PO Take 5 mg by mouth every morning  2/2/2019 at am Yes Reported, Patient   simvastatin (ZOCOR) 20 MG tablet Take 20 mg by mouth At Bedtime 2/1/2019 at Unknown time Yes Reported, Patient   TRAZODONE HCL PO Take 100 mg by mouth At Bedtime 2/1/2019 at Unknown time Yes Reported, Patient   umeclidinium (INCRUSE ELLIPTA) 62.5 MCG/INH inhaler Inhale 1 puff into the lungs daily 2/1/2019 at Unknown time Yes Unknown, Entered By History   vitamin C (ASCORBIC ACID) 100 MG tablet Take 500 mg by mouth every morning  2/2/2019 at am Yes Reported, Patient            Allergies:     Allergies   Allergen Reactions     Prevacid [Lansoprazole]             Social History:     Social History     Tobacco Use     Smoking status: Former Smoker   Substance Use Topics     Alcohol use: Yes     Alcohol/week: 4.2 oz     Types: 7 Shots of liquor per week             Family History:   No thrombotic disease           Review of  "Systems:   The 10 point Review of Systems is negative other than as noted in the HPI.           Physical Exam:   Blood pressure 103/61, pulse 65, temperature 97.9  F (36.6  C), temperature source Oral, resp. rate 15, height 1.702 m (5' 7\"), weight 98 kg (216 lb), SpO2 93 %.  216 lbs 0 oz      GENERAL: Pleasant and cooperative. No acute distress.  EYES: Pupils equal and round. No scleral erythema or icterus.  Vision impaired.  ENT: External ears are normal without deformity. Posterior oropharynx is without erythem, swelling, or exudate.  NECK: Supple. No masses or swelling. No tenderness. Thyroid is normal without mass or tenderness.  CHEST: Clear to auscultation. Normal breath sounds. No retractions.   CV: Regular rate and rhythm. No JVD. Pulses normal.  ABDOMEN: Bowel sounds present. No tenderness. No masses or hernia.  EXTREMETIES: No clubbing, cyanosis, or ischemia.  No peripheral edema.  SKIN: Warm and dry to touch. No wounds or rashes.  NEUROLOGIC: Strength and sensation are normal. Deep tendon reflexes are normal. Cranial nerves are normal.             Data:   All new lab and imaging data was reviewed.     Results for orders placed or performed during the hospital encounter of 02/02/19 (from the past 24 hour(s))   BNP   Result Value Ref Range    N-Terminal Pro BNP Inpatient 97 0 - 900 pg/mL   CBC (platelets, no diff)   Result Value Ref Range    WBC 7.3 4.0 - 11.0 10e9/L    RBC Count 4.40 3.8 - 5.2 10e12/L    Hemoglobin 14.2 11.7 - 15.7 g/dL    Hematocrit 43.2 35.0 - 47.0 %    MCV 98 78 - 100 fl    MCH 32.3 26.5 - 33.0 pg    MCHC 32.9 31.5 - 36.5 g/dL    RDW 14.5 10.0 - 15.0 %    Platelet Count 191 150 - 450 10e9/L   D dimer quantitative   Result Value Ref Range    D Dimer 1.2 (H) 0.0 - 0.50 ug/ml FEU   Basic metabolic panel   Result Value Ref Range    Sodium 140 133 - 144 mmol/L    Potassium 3.2 (L) 3.4 - 5.3 mmol/L    Chloride 100 94 - 109 mmol/L    Carbon Dioxide 31 20 - 32 mmol/L    Anion Gap 9 3 - 14 " mmol/L    Glucose 105 (H) 70 - 99 mg/dL    Urea Nitrogen 36 (H) 7 - 30 mg/dL    Creatinine 2.65 (H) 0.52 - 1.04 mg/dL    GFR Estimate 17 (L) >60 mL/min/[1.73_m2]    GFR Estimate If Black 20 (L) >60 mL/min/[1.73_m2]    Calcium 9.2 8.5 - 10.1 mg/dL   Troponin I   Result Value Ref Range    Troponin I ES <0.015 0.000 - 0.045 ug/L   EKG 12-lead, tracing only   Result Value Ref Range    Interpretation ECG Click View Image link to view waveform and result    XR Chest 2 Views    Narrative    CHEST TWO VIEWS  2/2/2019 2:44 PM     HISTORY: Shortness of breath.    COMPARISON: None.      Impression    IMPRESSION: No acute cardiopulmonary disease.    PARISA HUERTA MD   Lung vent and perf (NM)    Narrative    NUCLEAR MEDICINE LUNG SCAN VENTILATION AND PERFUSION  2/2/2019 4:09 PM      HISTORY: Pulmonary embolism suspected, intermediate prob, positive  D-dimer. Shortness of breath.    TECHNIQUE: 3.3 mCi Tc 99m MAA and 70 mCi DTPA aerosol  COMPARISON: 2/2/2019 chest x-ray    FINDINGS: Numerous bilateral matched ventilation and perfusion  defects. There are no unmatched perfusion defects and no focal chest  x-ray abnormality. Exam is indeterminate probability for pulmonary  embolus.      Impression    IMPRESSION: Indeterminate probability for pulmonary embolus. Numerous  bilateral matched defects.       MICHELE FONTANA MD

## 2019-02-03 NOTE — PROVIDER NOTIFICATION
02/03/19 0216   Significant Event   Significant Event Other (see comments)  (HepXA of 1.17)   Lab called with the above value. Bedside RN, Liss Delatorre, verbally informed of result by this RN. Liss contacted PharmD who is to enter new orders.  Kati Walter, BSN, RN  Medical/Telemetry - 3

## 2019-02-03 NOTE — PROVIDER NOTIFICATION
Spoke with pharmacist regarding hep10a 1.17, advised to stop drip for 1 hour and restart at new rate.

## 2019-02-03 NOTE — PROVIDER NOTIFICATION
Pt had nose bleed. Stopped with in 3 minutes. Appears to be not much blood/ FYI page to Dr Wall. Heparin infusing at 1100 units /hr.

## 2019-02-03 NOTE — PROGRESS NOTES
[facility]    Hospitalist Progress Note  Name: Ladonna Sheriff    MRN: [id number]  Provider:  Tanmay Wall DO MPH  Date of Service: 02/03/2019    Summary of Stay: Ladonna Sheriff is an 83-year-old female with history of previous stroke, TIA, lupus, Sjogren's syndrome, temporal arteritis, optic neuritis with vision impairment, chronic daily low dosed prednisone (5 mg daily), hypertension, hypercholesterolemia, diastolic and possibly systolic congestive heart failure, recently diagnosed asthma and/or COPD, previous hysterectomy, and previous tubal ligation.  She presented to the emergency department for evaluation of shortness of breath.  She describes 8 months of progressive shortness of breath.  She had some evaluation last spring through [facility] which included echocardiogram and nuclear stress test.  Echocardiogram last April showed ejection fraction of 45%, moderate left ventricular hypertrophy, and stage II diastolic congestive heart failure.  Note that echocardiogram from October 2017 showed ejection fraction of 55-60%.  Nuclear stress test in May of last year showed ejection fraction 53% but no signs of ischemia.  She has been managed with Lasix 40 mg by mouth twice daily.  Her shortness of breath has worsened in the last several weeks.  A week or so ago she saw Dr. Karol Currie of [place] [facility].  She was diagnosed with asthma or COPD with pulmonary function tests.  She had been using her albuterol inhaler several times daily.  She was prescribed an Incruse Ellipta inhaler which she has been taking for a week or so now.  She saw cardiology 3 days ago.  She was diagnosed with worsened congestive heart failure.  It does not appear that chest x-ray, BNP, her echocardiogram was obtained.  Her Lasix dose was increased from 40 mg twice daily to 60 mg twice daily.  She was also started on metolazone 2.5 mg every other day (she has taken two doses of this on Thursday and this morning).  She  has continued to have shortness of breath.  She has continued to use her rescue inhaler several times a day with some temporary relief.  Earlier today she had tightness in her shoulders with worsened shortness of breath.  She came to the emergency department for evaluation.  She denies weight gain or peripheral edema.  She denies chest pain.  She has had a little bit of cough but no fever. She had duoneb here with improvement of shortness of breath and resolution of tightness in shoulders. Emergency department evaluation showed hypoxia with oxygen saturations room air.  Other vital signs were unremarkable.  Basic metabolic panel showed potassium 3.2, BUN of 36, and creatinine of 2.65 (her baseline creatinine seems to be more like 1.4).  CBC was unremarkable. Troponin was undetectable.  BNP was normal at 97.  D-dimer was elevated at 1.2.  EKG showed left bundle branch block but no acute ischemia.  Chest x-ray showed no acute process.  VQ scan was obtained and showed several mismatches and was interpreted as intermediate probability for pulmonary embolism.  Jessica was started on heparin drip for possible pulmonary embolism.       Problem list:     1.  Acute on chronic shortness of breath with acute, hypoxic respiratory failure.  Somewhat unclear process.  Need to consider obstructive airway disease given recent findings and PFTs.  Continue her newly started long-acting inhaler.  Decompensated congestive heart failure seems unlikely given no pulmonary edema on chest x-ray and normal BNP.  Given intermediate probability on VQ scan pulmonary embolism seems certainly possible and will empirically anticoagulate with IV heparin for the time being.  Risk factors would be her connective tissue diseases.  Hesitant to perform CT with contrast given renal failure.  Lower extremity ultrasounds are negative.  Check echocardiogram to evaluate for possible congestive heart failure as well as check for RV strain which could be  related to PE.  Regardless, her shortness of breath is much better today and she is not hypoxic.  Could also have interstitial lung disease is related to her autoimmune diseases but no abnormality seen on chest x-ray.  Could perform a noncontrast CT at some point to evaluate further for this.     2.  Acute renal failure.  I suspect that this is due to dehydration from diuresis.  Hold prior to admission Lasix, metolazone, and Cozaar.  Will stop IV fluids given improved renal function and good oral intake.  Repeat basic metabolic panel tomorrow.     3.  COPD and/or asthma.  At the time of my exam there does not appear to be an acute exacerbation.  I will continue her recently started inhaler.  I will have albuterol nebs available every 4 hours as needed.  If she develops more significant wheezing I would consider increasing her steroid (prior to admission she has been taking 5 mg of prednisone daily for temporal arteritis and lupus).     4.  Chronic diastolic and systolic congestive heart failure.  Previous echocardiogram showed ejection fraction of 45% and stage II diastolic congestive heart failure.  She does not appear to have decompensated heart failure at this time.  In the setting of progressive shortness of breath I will obtain echocardiogram.  If there is worsening of congestive heart failure consider cardiology consult (Ladonna has not had coronary angiography).       5.  Hypertension.  Continue prior to admission metoprolol and amlodipine.  I am holding prior to admission Cozaar, Lasix, metolazone given her renal function.  As needed IV hydralazine will be available for use as needed.  Pressure is reasonable.     6.  History of lupus, Sjogren's syndrome, and temporal arteritis.  She takes prednisone 5 mg daily.  This will be continued.  I recommended she follow-up with rheumatology as an outpatient.     7.  Previous TIA and stroke.  With heparin drip I will hold prior to admission Plavix for now.  Continue  prior to admission aspirin.     8.  Hypercholesterolemia.  Continue prior to admission Zocor.    DVT Prophylaxis: Heparin drip  Code Status: Full Code  Disposition: Expected discharge in 1-2 days to home. Goals prior to discharge include monitor respiratory status, follow-up TTE, determine AC plans.   Incidental Findings: None.  Family updated today: No     Interval History   Assumed care from previous hospitalist. The history was fully reviewed.  The patient reports doing well. No chest pain or shortness of breath. No nausea, vomiting, diarrhea, constipation. No fevers. No other specific complaints identified.     -Data reviewed today: I personally reviewed all new labs and imaging results over the last 24 hours.     Physical Exam   Temp: 97.3  F (36.3  C) Temp src: Oral BP: 104/55 Pulse: 72 Heart Rate: 70 Resp: 16 SpO2: 94 % O2 Device: None (Room air) Oxygen Delivery: 2 LPM  Vitals:    02/02/19 1258 02/03/19 0418   Weight: 98 kg (216 lb) 97.8 kg (215 lb 9.6 oz)     Vital Signs with Ranges  Temp:  [96.3  F (35.7  C)-97.9  F (36.6  C)] 97.3  F (36.3  C)  Pulse:  [63-75] 72  Heart Rate:  [58-70] 70  Resp:  [15-16] 16  BP: ()/(52-92) 104/55  SpO2:  [91 %-96 %] 94 %  I/O last 3 completed shifts:  In: 266 [I.V.:266]  Out: 100 [Urine:100]    GENERAL: No apparent distress. Awake, alert, and fully oriented.  HEENT: Normocephalic, atraumatic. Extraocular movements intact.  CARDIOVASCULAR: Regular rate and rhythm with SANTOS 3/6 LUSB. No S3.  PULMONARY: Clear bilaterally.  GASTROINTESTINAL: Soft, non-tender, non-distended. Bowel sounds normoactive.   EXTREMITIES: No cyanosis or clubbing. No edema.  NEUROLOGICAL: CN 2-12 grossly intact, no focal neurological deficits.  DERMATOLOGICAL: No rash, ulcer, bruising, nor jaundice.     Medications     HEParin 1,200 Units/hr (02/03/19 0322)     - MEDICATION INSTRUCTIONS -         amLODIPine  10 mg Oral QPM     aspirin  325 mg Oral QPM     FLUoxetine  20 mg Oral Daily     metoprolol  tartrate  50 mg Oral BID     predniSONE  5 mg Oral QAM     simvastatin  20 mg Oral At Bedtime     traZODone  100 mg Oral At Bedtime     umeclidinium  1 puff Inhalation QPM     Data     Laboratory:  Recent Labs   Lab 02/02/19  1308   WBC 7.3   HGB 14.2   HCT 43.2   MCV 98        Recent Labs   Lab 02/03/19  0621 02/03/19  0135 02/02/19  1308     --  140   POTASSIUM 3.5 3.8 3.2*   CHLORIDE 107  --  100   CO2 29  --  31   ANIONGAP 7  --  9   GLC 86  --  105*   BUN 34*  --  36*   CR 1.75*  --  2.65*   GFRESTIMATED 28*  --  17*   GFRESTBLACK 33*  --  20*   GANESH 8.4*  --  9.2     No results for input(s): CULT in the last 168 hours.    Imaging:  Recent Results (from the past 24 hour(s))   XR Chest 2 Views    Narrative    CHEST TWO VIEWS  2/2/2019 2:44 PM     HISTORY: Shortness of breath.    COMPARISON: None.      Impression    IMPRESSION: No acute cardiopulmonary disease.    PARISA HUERTA MD   Lung vent and perf (NM)    Narrative    NUCLEAR MEDICINE LUNG SCAN VENTILATION AND PERFUSION  2/2/2019 4:09 PM      HISTORY: Pulmonary embolism suspected, intermediate prob, positive  D-dimer. Shortness of breath.    TECHNIQUE: 3.3 mCi Tc 99m MAA and 70 mCi DTPA aerosol  COMPARISON: 2/2/2019 chest x-ray    FINDINGS: Numerous bilateral matched ventilation and perfusion  defects. There are no unmatched perfusion defects and no focal chest  x-ray abnormality. Exam is indeterminate probability for pulmonary  embolus.      Impression    IMPRESSION: Indeterminate probability for pulmonary embolus. Numerous  bilateral matched defects.       MICHELE FONTANA MD   US Lower Extremity Venous Duplex Bilateral    Narrative    ULTRASOUND VENOUS BILATERAL LOWER EXTREMITIES WITH DOPPLER   2/2/2019  11:15 PM     HISTORY: Possible pulmonary embolus.    COMPARISON: None.    TECHNIQUE: Spectral waveform and color Doppler evaluation were  performed.    FINDINGS: Normal compressibility of bilateral common femoral, femoral,  popliteal, posterior  tibial, peroneal and greater saphenous veins.  Unremarkable Doppler waveform evaluation of the bilateral common  femoral, femoral and popliteal veins.      Impression    IMPRESSION: No evidence of thrombus in the major veins of bilateral  lower extremities.    MD Tanmay LBOOM DO [facility] Hospitalist  [address] [place] [address]  Pager: [phone]  02/03/2019

## 2019-02-03 NOTE — PLAN OF CARE
Please see flowsheets for detailed vital signs and assessments.   Neuro: A&O  Vital Signs: stable  Pain: denies  O2: mid 90s 2L NC  Tele: SR BBB  Lungs: WDL  GI: WDL  : voiding w/o difficulty    Skin: bruised  Activity: assist 1  IVF: NS 75 ml/hour, heparin drip 12 ml/hour  Notable labs: Cr 2.65, K 3.5  Consults: na  Plan: heparin gtt, echo this AM  Discharge: >2 days    Heart Failure Care Pathway  GOALS TO BE MET BEFORE DISCHARGE:    1. Decrease congestion and/or edema with diuretic therapy to achieve near      optimal volume status.            Dyspnea improved:  No, please explain: pt DAMON             Edema improved:     Yes        Net I/O and Weights since admission:          01/04 1500 - 02/03 1459  In: 266 [I.V.:266]  Out: 100 [Urine:100]  Net: 166            Vitals:    02/02/19 1258 02/03/19 0418   Weight: 98 kg (216 lb) 97.8 kg (215 lb 9.6 oz)       2.  O2 sats > 92% on RA or at prior home O2 therapy level.          Current oxygenation status:       SpO2: 96 %         O2 Device: Nasal cannula,  Oxygen Delivery: 2 LPM         Able to wean O2 this shift to keep sats > 92%:  No, please explain: pt on 2L NC to maintain sats        Does patient use Home O2?      3.  Tolerates ambulation and mobility near baseline: Yes        How many times did the patient ambulate with nursing staff this shift? 2    Please review the Heart Failure Care Pathway for additional HF goal outcomes.    iLss Delatorre RN  2/3/2019

## 2019-02-04 ENCOUNTER — APPOINTMENT (OUTPATIENT)
Dept: CT IMAGING | Facility: CLINIC | Age: 74
DRG: 202 | End: 2019-02-04
Payer: MEDICARE

## 2019-02-04 VITALS
SYSTOLIC BLOOD PRESSURE: 134 MMHG | TEMPERATURE: 98.8 F | DIASTOLIC BLOOD PRESSURE: 70 MMHG | RESPIRATION RATE: 18 BRPM | BODY MASS INDEX: 33.78 KG/M2 | HEIGHT: 67 IN | OXYGEN SATURATION: 92 % | HEART RATE: 73 BPM | WEIGHT: 215.2 LBS

## 2019-02-04 LAB
ANION GAP SERPL CALCULATED.3IONS-SCNC: 3 MMOL/L (ref 3–14)
BUN SERPL-MCNC: 21 MG/DL (ref 7–30)
CALCIUM SERPL-MCNC: 8.5 MG/DL (ref 8.5–10.1)
CHLORIDE SERPL-SCNC: 109 MMOL/L (ref 94–109)
CO2 SERPL-SCNC: 30 MMOL/L (ref 20–32)
CREAT SERPL-MCNC: 1.13 MG/DL (ref 0.52–1.04)
GFR SERPL CREATININE-BSD FRML MDRD: 48 ML/MIN/{1.73_M2}
GLUCOSE SERPL-MCNC: 86 MG/DL (ref 70–99)
INTERPRETATION ECG - MUSE: NORMAL
LMWH PPP CHRO-ACNC: 0.63 IU/ML
POTASSIUM SERPL-SCNC: 4 MMOL/L (ref 3.4–5.3)
SODIUM SERPL-SCNC: 142 MMOL/L (ref 133–144)

## 2019-02-04 PROCEDURE — 25000128 H RX IP 250 OP 636: Performed by: INTERNAL MEDICINE

## 2019-02-04 PROCEDURE — 99239 HOSP IP/OBS DSCHRG MGMT >30: CPT | Performed by: HOSPITALIST

## 2019-02-04 PROCEDURE — 36415 COLL VENOUS BLD VENIPUNCTURE: CPT | Performed by: INTERNAL MEDICINE

## 2019-02-04 PROCEDURE — 85520 HEPARIN ASSAY: CPT | Performed by: INTERNAL MEDICINE

## 2019-02-04 PROCEDURE — 80048 BASIC METABOLIC PNL TOTAL CA: CPT | Performed by: INTERNAL MEDICINE

## 2019-02-04 PROCEDURE — 71260 CT THORAX DX C+: CPT

## 2019-02-04 PROCEDURE — 40000275 ZZH STATISTIC RCP TIME EA 10 MIN

## 2019-02-04 PROCEDURE — 25000125 ZZHC RX 250: Performed by: INTERNAL MEDICINE

## 2019-02-04 PROCEDURE — A9270 NON-COVERED ITEM OR SERVICE: HCPCS | Mod: GY | Performed by: INTERNAL MEDICINE

## 2019-02-04 PROCEDURE — 25000128 H RX IP 250 OP 636: Performed by: HOSPITALIST

## 2019-02-04 PROCEDURE — 94640 AIRWAY INHALATION TREATMENT: CPT

## 2019-02-04 PROCEDURE — 25000132 ZZH RX MED GY IP 250 OP 250 PS 637: Mod: GY | Performed by: INTERNAL MEDICINE

## 2019-02-04 RX ORDER — IOPAMIDOL 755 MG/ML
500 INJECTION, SOLUTION INTRAVASCULAR ONCE
Status: COMPLETED | OUTPATIENT
Start: 2019-02-04 | End: 2019-02-04

## 2019-02-04 RX ORDER — FUROSEMIDE 20 MG
40 TABLET ORAL 2 TIMES DAILY
Qty: 120 TABLET | Refills: 0 | Status: SHIPPED | OUTPATIENT
Start: 2019-02-04 | End: 2019-06-19

## 2019-02-04 RX ORDER — SODIUM CHLORIDE 9 MG/ML
INJECTION, SOLUTION INTRAVENOUS CONTINUOUS
Status: DISCONTINUED | OUTPATIENT
Start: 2019-02-04 | End: 2019-02-04

## 2019-02-04 RX ADMIN — METOPROLOL TARTRATE 50 MG: 50 TABLET ORAL at 08:03

## 2019-02-04 RX ADMIN — SODIUM CHLORIDE: 9 INJECTION, SOLUTION INTRAVENOUS at 09:46

## 2019-02-04 RX ADMIN — FLUOXETINE 20 MG: 20 CAPSULE ORAL at 08:03

## 2019-02-04 RX ADMIN — ALBUTEROL SULFATE 2 PUFF: 90 AEROSOL, METERED RESPIRATORY (INHALATION) at 11:16

## 2019-02-04 RX ADMIN — SODIUM CHLORIDE 90 ML: 9 INJECTION, SOLUTION INTRAVENOUS at 10:17

## 2019-02-04 RX ADMIN — IOPAMIDOL 76 ML: 755 INJECTION, SOLUTION INTRAVENOUS at 10:27

## 2019-02-04 RX ADMIN — SODIUM CHLORIDE 90 ML: 9 INJECTION, SOLUTION INTRAVENOUS at 10:25

## 2019-02-04 RX ADMIN — PREDNISONE 5 MG: 5 TABLET ORAL at 08:03

## 2019-02-04 ASSESSMENT — ACTIVITIES OF DAILY LIVING (ADL)
ADLS_ACUITY_SCORE: 16

## 2019-02-04 ASSESSMENT — MIFFLIN-ST. JEOR: SCORE: 1513.77

## 2019-02-04 NOTE — PLAN OF CARE
Tele- SR with BBB.  Hep 11mL/hr.  Pt denies pain and states SOB is much better.  VSS.  Anticipated discharge 1-2 days.

## 2019-02-04 NOTE — DISCHARGE SUMMARY
Hospitalist Discharge Summary  [facility]    Ladonna Sheriff MRN# [id number]   YOB: 1945 Age: 73 year old     Date of Admission:  2/2/2019  Date of Discharge:  2/4/2019  Admitting Physician:  Rick Michael MD  Discharge Physician:  Tanmay Wall  Discharging Service:  Hospitalist     Primary Provider: Joceline Ty          Discharge Diagnosis:   Acute on chronic shortness of breath, suspect poorly managed reactive airway disease  Acute kidney injury, resolved  Chronic kidney disease, stage II  Recent diagnosis of COPD/asthma  Mild to moderate aortic stenosis  Hypertension  Cerebrovascular disease with history of TIA and stroke  Hyperlipidemia  History of lupus, Sjogren's syndrome, and temporal arteritis             Discharge Disposition:   Discharged to home           Allergies:   Allergies   Allergen Reactions     Prevacid [Lansoprazole]               Discharge Medications:   Current Discharge Medication List      CONTINUE these medications which have CHANGED    Details   furosemide (LASIX) 20 MG tablet Take 2 tablets (40 mg) by mouth 2 times daily  Qty: 120 tablet, Refills: 0    Associated Diagnoses: Acute respiratory failure with hypoxia (H)         CONTINUE these medications which have NOT CHANGED    Details   albuterol (PROAIR HFA/PROVENTIL HFA/VENTOLIN HFA) 108 (90 Base) MCG/ACT inhaler Inhale 2 puffs into the lungs every 6 hours as needed       amLODIPine (NORVASC) 10 MG tablet Take 10 mg by mouth every evening       aspirin (ASA) 325 MG EC tablet Take 325 mg by mouth every evening       B Complex-C (VITAMIN B COMPLEX W/VITAMIN C) TABS tablet Take 1 tablet by mouth daily      Calcium-Vitamin D 600-200 MG-UNIT TABS Take 1 tablet by mouth every evening       Clopidogrel Bisulfate (PLAVIX PO) Take 75 mg by mouth every morning       FLUOXETINE HCL PO Take 20 mg by mouth daily      METOPROLOL TARTRATE PO Take 50 mg by mouth 2 times daily       Multiple Vitamins-Minerals  "(MULTIVITAL PO) Take 1 tablet by mouth every morning       Potassium Gluconate 595 MG TBCR Take 1 tablet by mouth every evening       PREDNISONE PO Take 5 mg by mouth every morning       simvastatin (ZOCOR) 20 MG tablet Take 20 mg by mouth At Bedtime      TRAZODONE HCL PO Take 100 mg by mouth At Bedtime      umeclidinium (INCRUSE ELLIPTA) 62.5 MCG/INH inhaler Inhale 1 puff into the lungs daily      vitamin C (ASCORBIC ACID) 100 MG tablet Take 500 mg by mouth every morning          STOP taking these medications       losartan (COZAAR) 100 MG tablet Comments:   Reason for Stopping:         metolazone (ZAROXOLYN) 2.5 MG tablet Comments:   Reason for Stopping:         Naproxen Sod-Diphenhydramine (ALEVE PM) 220-25 MG TABS Comments:   Reason for Stopping:         naproxen sodium (ANAPROX) 220 MG tablet Comments:   Reason for Stopping:                      Condition on Discharge:   Discharge condition: Stable   Discharge vitals: Blood pressure 128/70, pulse 73, temperature 98.8  F (37.1  C), temperature source Oral, resp. rate 18, height 1.702 m (5' 7\"), weight 97.6 kg (215 lb 3.2 oz), SpO2 92 %.   Code status on discharge: Full Code      BASIC PHYSICAL EXAMINATION:  GENERAL: No apparent distress.  CARDIOVASCULAR: Regular rate and rhythm without murmurs.  PULMONARY: Clear to auscultation bilaterally.   GASTROINTESTINAL: Abdomen soft, non-tender.  EXTREMITIES: No edema, pulses intact.  NEUROLOGIC: No focal deficits.            History of Illness:   See detailed admission note for full details.               Procedures excluding imaging which is summarized below:   Please see details in the electronic medical record.           Consultations:   PHARMACY TO DOSE HEPARIN  PHARMACY LIAISON FOR MEDICATION COVERAGE CONSULT          Significant Results:   Results for orders placed or performed during the hospital encounter of 02/02/19   Lung vent and perf (NM)    Narrative    NUCLEAR MEDICINE LUNG SCAN VENTILATION AND PERFUSION  " 2/2/2019 4:09 PM      HISTORY: Pulmonary embolism suspected, intermediate prob, positive  D-dimer. Shortness of breath.    TECHNIQUE: 3.3 mCi Tc 99m MAA and 70 mCi DTPA aerosol  COMPARISON: 2/2/2019 chest x-ray    FINDINGS: Numerous bilateral matched ventilation and perfusion  defects. There are no unmatched perfusion defects and no focal chest  x-ray abnormality. Exam is indeterminate probability for pulmonary  embolus.      Impression    IMPRESSION: Indeterminate probability for pulmonary embolus. Numerous  bilateral matched defects.       MICHELE FONTANA MD   XR Chest 2 Views    Narrative    CHEST TWO VIEWS  2/2/2019 2:44 PM     HISTORY: Shortness of breath.    COMPARISON: None.      Impression    IMPRESSION: No acute cardiopulmonary disease.    PARISA HUERTA MD   US Lower Extremity Venous Duplex Bilateral    Narrative    ULTRASOUND VENOUS BILATERAL LOWER EXTREMITIES WITH DOPPLER   2/2/2019  11:15 PM     HISTORY: Possible pulmonary embolus.    COMPARISON: None.    TECHNIQUE: Spectral waveform and color Doppler evaluation were  performed.    FINDINGS: Normal compressibility of bilateral common femoral, femoral,  popliteal, posterior tibial, peroneal and greater saphenous veins.  Unremarkable Doppler waveform evaluation of the bilateral common  femoral, femoral and popliteal veins.      Impression    IMPRESSION: No evidence of thrombus in the major veins of bilateral  lower extremities.    MICHAEL HONG MD   CT Chest Pulmonary Embolism w Contrast    Narrative    CT CHEST PULMONARY EMBOLISM WITH CONTRAST  2/4/2019 10:30 AM     HISTORY:  Dyspnea, chronic, initial exam.    TECHNIQUE:  76 mL Isovue-370. Radiation dose for this scan was reduced  using automated exposure control, adjustment of the mA and/or kV  according to patient size, or iterative reconstruction technique.    COMPARISON: None.    FINDINGS:  No evidence of pulmonary embolism. No acute thoracic aortic  abnormality. No enlarged thoracic or axillary lymph  nodes. No  pneumothorax. No pleural or pericardial effusion.    There is a left upper lobe pulmonary nodule that measures 7 mm (series  8, image 60). There is a right middle lobe pulmonary nodule that  measures 4 mm (series 8, image 127). There is a 3 mm nodule in the  right lower lobe (series 8, image 199). Other pulmonary nodules  measuring 2 mm or less are present in both lungs, best visualized on  maximum intensity projection images. Visualized bones and upper  abdomen unremarkable.      Impression    IMPRESSION:  1. No evidence of acute chest abnormality. Specifically, no evidence  of pulmonary embolism.  2. Scattered bilateral noncalcified pulmonary nodules, largest of  which measures 7 mm.  Recommendations for an incidental lung nodule = or > 6mm to 8mm:    Low risk patients: Initial follow-up CT at 6-12 months, then  consider CT at 18-24 months if no change.    High risk patients: Initial follow-up CT at 6-12 months, then CT at  18-24 months if no change.    *Low Risk: Minimal or absent history of smoking or other known risk  factors.  *Nonsolid (ground-glass) or partly solid nodules may require longer  follow-up to exclude indolent adenocarcinoma.  *Recommendations based on Guidelines for the Management of Incidental  Pulmonary Nodules Detected at CT: From the Fleischner Society 2017,  Radiology 2017.    EZIO GAMBINO MD       Transthoracic Echocardiogram Results:  No results found for this or any previous visit (from the past 4320 hour(s)).             Pending Results:   Unresulted Labs Ordered in the Past 30 Days of this Admission     No orders found from 12/4/2018 to 2/3/2019.                      Discharge Instructions and Follow-Up:   Discharge instructions and follow-up:   Discharge Procedure Orders   Follow-up and recommended labs and tests    Order Comments: Follow up with primary care provider, Joceline Ty, within 7 days to evaluate medication change and for hospital follow- up.  The  following labs/tests are recommended: BMP.  I would check BP daily.  Also check weight daily in the morning and records these values to shows your PCP.  Hold your losartan for now as blood pressure is on the lower side.  This may need to be reinstated pending her blood pressure values moving forward.  Please eliminate metolazone from your medication regimen.  I would decrease her Lasix dose to 40 mg twice daily.  Continue to follow-up with cardiology and more importantly pulmonology.  Continue the inhalers that were recently prescribed.  Incidental pulmonary nodules were seen on the CT scan, these will require surveillance.  This can be coordinated through your primary care physician or pulmonology.     Activity   Order Comments: Your activity upon discharge: activity as tolerated     Order Specific Question Answer Comments   Is discharge order? Yes      Full Code     Order Specific Question Answer Comments   Code status determined by: Discussion with patient/legal decision maker      Diet   Order Comments: Follow this diet upon discharge: Orders Placed This Encounter      Combination Diet Regular Diet Adult; 2 gm NA Diet     Order Specific Question Answer Comments   Is discharge order? Yes              Hospital Course:   Ladonna Sheriff is an 83-year-old female with history of previous stroke, TIA, lupus, Sjogren's syndrome, temporal arteritis, optic neuritis with vision impairment, chronic daily low dosed prednisone (5 mg daily), hypertension, hypercholesterolemia, diastolic and possibly systolic congestive heart failure, recently diagnosed asthma and/or COPD, previous hysterectomy, and previous tubal ligation.  She presented to the emergency department for evaluation of shortness of breath.      She describes 8 months of progressive shortness of breath.  She had some evaluation last spring through [facility] which included echocardiogram and nuclear stress test.  Echocardiogram last April showed  ejection fraction of 45%, moderate left ventricular hypertrophy, and stage II diastolic congestive heart failure.  Note that echocardiogram from October 2017 showed ejection fraction of 55-60%.  Nuclear stress test in May of last year showed ejection fraction 53% but no signs of ischemia.  She has been managed with Lasix 40 mg by mouth twice daily.  Her shortness of breath has worsened in the last several weeks.  A week or so ago she saw Dr. Karol Currie of [place] [facility].  She was diagnosed with asthma or COPD with pulmonary function tests.  She had been using her albuterol inhaler several times daily.  She was prescribed a Incurse Ellipta inhaler which she has been taking for a week or so now.  She saw cardiology 3 days ago.  She was diagnosed with worsened congestive heart failure.  It does not appear that chest x-ray, BNP, her echocardiogram was obtained.  Her Lasix dose was increased from 40 mg twice daily to 60 mg twice daily.  She was also started on metolazone 2.5 mg every other day (she has taken two doses of this on Thursday and this morning).  She has continued to have shortness of breath.  She has continued to use her rescue inhaler several times a day with some temporary relief.      She came to the emergency department for evaluation.  She denies weight gain or peripheral edema.  She denies chest pain.  She has had a little bit of cough but no fever. She had duoneb here with improvement of shortness of breath and resolution of tightness in shoulders. Emergency department evaluation showed hypoxia with oxygen saturations room air.  Other vital signs were unremarkable.  Basic metabolic panel showed potassium 3.2, BUN of 36, and creatinine of 2.65 (her baseline creatinine seems to be more like 1.4).  CBC was unremarkable. Troponin was undetectable.  BNP was normal at 97.  D-dimer was elevated at 1.2.  EKG showed left bundle branch block but no acute ischemia.  Chest x-ray showed no acute process.  VQ scan  was obtained and showed several mismatches and was interpreted as intermediate probability for pulmonary embolism.  Jessica was started on heparin drip for possible pulmonary embolism and admitted.    Bilateral lower extremity venous ultrasounds were negative for thrombosis.  She was given IV fluids and her renal function normalized.  CT scan with contrast was negative for pulmonary embolism today but did show some incidental pulmonary nodules which will require surveillance follow-up imaging.  An echocardiogram was performed showing mild to moderate aortic stenosis but preserved ejection fraction and no right heart enlargement or strain.  Her heparin has been discontinued as a result.  She reports significant improvement in her breathing.  I do not suspect that she has decompensated congestive heart failure.  I recommended elimination of the metolazone and decrease back to 40 mg twice daily of her Lasix given that it likely caused acute kidney injury.  I recommended that she hold her losartan for now until follow-up with her PCP.  She agreed to check her weight and blood pressure daily.  I recommended close follow-up with pulmonology to further address her long-standing complaints of shortness of breath as well as to organize surveillance imaging of these pulmonary nodules.  I have also recommended she follow-up with rheumatology with regards to her lupus, Sjogren's syndrome, temporal arteritis, and any other potential connective tissue diseases.  Patient feels well today and appears suitable for discharge.    The patient was seen, examined, and counseled on this day. The hospitalization and plan of care was reviewed with the patient extensively. All questions were addressed and the patient agreed to follow-up as noted above.      Total time spent in face to face contact with the patient and coordinating discharge was:  45 Minutes    Tanmay Wall DO, MPH  [facility]  [address] [place]  [address]  Pager: [phone]  02/04/2019

## 2019-02-04 NOTE — DISCHARGE SUMMARY
AVS reviewed with pt. All questions answered. Pt denies any further questions or concerns. PIV removed, no complications. Telemetry monitor removed. All belongings returned. Pt escorted to front door by [facility] staff.

## 2019-02-04 NOTE — PLAN OF CARE
A&OX4. Vitals stable. 02 92% on room air. LS clear on posterior and anterior lungs bilaterally. Denied Pain during this shift. Assist of 1 with gait belt for transferring. Voids spontaneously without difficulties. BM X1. 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 Heparin level 0.54. Continue hep 1100 units /hr. Heparin 10a. Hep goal 0.3-0.7. Low salt diet. I S education given, repeated demonstration X6 @ 1200 ML. Tolerated the procedure well. Cough and deep breathing performed. Continue POC and monitoring.

## 2019-02-04 NOTE — PROGRESS NOTES
"                                                            [facility]    Date:2/3/19    Admission Dx: acute hypoxic respiratory failure. Probably pulmonary emoblism    Pulmonary History: Asthma, COPD, CHF    Home Nebulizer/MDI Use: Albuterol MDI Q6 prn, Incruse as stated by pt, currently using her home med, not documented previously by pharmacy.    Home Oxygen: none    Acuity Level (RCAT flow sheet): 4    Aerosol Therapy initiated: Albuterol Q4 prn, Albuterol Q6 prn    Pulmonary Hygiene initiated: coughing techniques    Volume Expansion initiated: incentive spirometry    Current Oxygen Requirements: RA    Current SpO2:94    Re-evaluation date: 2/6/19    Patient Education: Will instruct and educate pt on the benefits and indication of nebulizer's as well as the importance of deep breathing and coughing techniques.    Madeleine Correa, RT on 2/3/2019 at 9:49 PM      See \"RT Assessments\" flow sheet for patient assessment scoring and Acuity Level Details.             "

## 2019-06-19 ENCOUNTER — APPOINTMENT (OUTPATIENT)
Dept: MRI IMAGING | Facility: CLINIC | Age: 74
End: 2019-06-19
Attending: EMERGENCY MEDICINE
Payer: MEDICARE

## 2019-06-19 ENCOUNTER — HOSPITAL ENCOUNTER (EMERGENCY)
Facility: CLINIC | Age: 74
Discharge: ANOTHER HOSPITAL | End: 2019-06-20
Attending: EMERGENCY MEDICINE | Admitting: EMERGENCY MEDICINE
Payer: MEDICARE

## 2019-06-19 VITALS
OXYGEN SATURATION: 95 % | SYSTOLIC BLOOD PRESSURE: 117 MMHG | TEMPERATURE: 98 F | HEART RATE: 63 BPM | RESPIRATION RATE: 18 BRPM | DIASTOLIC BLOOD PRESSURE: 70 MMHG

## 2019-06-19 DIAGNOSIS — G37.3 TRANSVERSE MYELITIS (H): ICD-10-CM

## 2019-06-19 DIAGNOSIS — R20.0 NUMBNESS: ICD-10-CM

## 2019-06-19 DIAGNOSIS — M62.81 GENERALIZED MUSCLE WEAKNESS: ICD-10-CM

## 2019-06-19 DIAGNOSIS — N39.0 URINARY TRACT INFECTION WITHOUT HEMATURIA, SITE UNSPECIFIED: ICD-10-CM

## 2019-06-19 LAB
ALBUMIN UR-MCNC: NEGATIVE MG/DL
ANION GAP SERPL CALCULATED.3IONS-SCNC: 7 MMOL/L (ref 3–14)
APPEARANCE UR: CLEAR
BACTERIA #/AREA URNS HPF: ABNORMAL /HPF
BASOPHILS # BLD AUTO: 0 10E9/L (ref 0–0.2)
BASOPHILS NFR BLD AUTO: 0.2 %
BILIRUB UR QL STRIP: NEGATIVE
BUN SERPL-MCNC: 18 MG/DL (ref 7–30)
CALCIUM SERPL-MCNC: 9.3 MG/DL (ref 8.5–10.1)
CHLORIDE SERPL-SCNC: 103 MMOL/L (ref 94–109)
CO2 SERPL-SCNC: 28 MMOL/L (ref 20–32)
COLOR UR AUTO: YELLOW
CREAT SERPL-MCNC: 0.98 MG/DL (ref 0.52–1.04)
DIFFERENTIAL METHOD BLD: ABNORMAL
EOSINOPHIL # BLD AUTO: 0.1 10E9/L (ref 0–0.7)
EOSINOPHIL NFR BLD AUTO: 1.5 %
ERYTHROCYTE [DISTWIDTH] IN BLOOD BY AUTOMATED COUNT: 13.2 % (ref 10–15)
GFR SERPL CREATININE-BSD FRML MDRD: 57 ML/MIN/{1.73_M2}
GLUCOSE SERPL-MCNC: 85 MG/DL (ref 70–99)
GLUCOSE UR STRIP-MCNC: NEGATIVE MG/DL
HCT VFR BLD AUTO: 43.8 % (ref 35–47)
HGB BLD-MCNC: 14.1 G/DL (ref 11.7–15.7)
HGB UR QL STRIP: NEGATIVE
IMM GRANULOCYTES # BLD: 0 10E9/L (ref 0–0.4)
IMM GRANULOCYTES NFR BLD: 0.3 %
KETONES UR STRIP-MCNC: NEGATIVE MG/DL
LEUKOCYTE ESTERASE UR QL STRIP: ABNORMAL
LYMPHOCYTES # BLD AUTO: 1.4 10E9/L (ref 0.8–5.3)
LYMPHOCYTES NFR BLD AUTO: 24 %
MAGNESIUM SERPL-MCNC: 2 MG/DL (ref 1.6–2.3)
MCH RBC QN AUTO: 32.6 PG (ref 26.5–33)
MCHC RBC AUTO-ENTMCNC: 32.2 G/DL (ref 31.5–36.5)
MCV RBC AUTO: 101 FL (ref 78–100)
MONOCYTES # BLD AUTO: 0.5 10E9/L (ref 0–1.3)
MONOCYTES NFR BLD AUTO: 8.2 %
MUCOUS THREADS #/AREA URNS LPF: PRESENT /LPF
NEUTROPHILS # BLD AUTO: 3.9 10E9/L (ref 1.6–8.3)
NEUTROPHILS NFR BLD AUTO: 65.8 %
NITRATE UR QL: POSITIVE
NRBC # BLD AUTO: 0 10*3/UL
NRBC BLD AUTO-RTO: 0 /100
PH UR STRIP: 6 PH (ref 5–7)
PLATELET # BLD AUTO: 192 10E9/L (ref 150–450)
POTASSIUM SERPL-SCNC: 3.8 MMOL/L (ref 3.4–5.3)
RBC # BLD AUTO: 4.33 10E12/L (ref 3.8–5.2)
RBC #/AREA URNS AUTO: 1 /HPF (ref 0–2)
SODIUM SERPL-SCNC: 138 MMOL/L (ref 133–144)
SOURCE: ABNORMAL
SP GR UR STRIP: 1.01 (ref 1–1.03)
UROBILINOGEN UR STRIP-MCNC: NORMAL MG/DL (ref 0–2)
WBC # BLD AUTO: 6 10E9/L (ref 4–11)
WBC #/AREA URNS AUTO: 19 /HPF (ref 0–5)

## 2019-06-19 PROCEDURE — 25500064 ZZH RX 255 OP 636: Performed by: EMERGENCY MEDICINE

## 2019-06-19 PROCEDURE — 99285 EMERGENCY DEPT VISIT HI MDM: CPT | Mod: 25

## 2019-06-19 PROCEDURE — 87186 SC STD MICRODIL/AGAR DIL: CPT | Performed by: EMERGENCY MEDICINE

## 2019-06-19 PROCEDURE — A9585 GADOBUTROL INJECTION: HCPCS | Performed by: EMERGENCY MEDICINE

## 2019-06-19 PROCEDURE — 96365 THER/PROPH/DIAG IV INF INIT: CPT | Mod: 59

## 2019-06-19 PROCEDURE — 25000128 H RX IP 250 OP 636: Performed by: EMERGENCY MEDICINE

## 2019-06-19 PROCEDURE — 80048 BASIC METABOLIC PNL TOTAL CA: CPT | Performed by: EMERGENCY MEDICINE

## 2019-06-19 PROCEDURE — 25800030 ZZH RX IP 258 OP 636: Performed by: EMERGENCY MEDICINE

## 2019-06-19 PROCEDURE — 83735 ASSAY OF MAGNESIUM: CPT | Performed by: EMERGENCY MEDICINE

## 2019-06-19 PROCEDURE — 96368 THER/DIAG CONCURRENT INF: CPT

## 2019-06-19 PROCEDURE — 81001 URINALYSIS AUTO W/SCOPE: CPT | Performed by: EMERGENCY MEDICINE

## 2019-06-19 PROCEDURE — 87088 URINE BACTERIA CULTURE: CPT | Performed by: EMERGENCY MEDICINE

## 2019-06-19 PROCEDURE — 87086 URINE CULTURE/COLONY COUNT: CPT | Performed by: EMERGENCY MEDICINE

## 2019-06-19 PROCEDURE — 72157 MRI CHEST SPINE W/O & W/DYE: CPT

## 2019-06-19 PROCEDURE — 85025 COMPLETE CBC W/AUTO DIFF WBC: CPT | Performed by: EMERGENCY MEDICINE

## 2019-06-19 PROCEDURE — 96366 THER/PROPH/DIAG IV INF ADDON: CPT

## 2019-06-19 PROCEDURE — 72156 MRI NECK SPINE W/O & W/DYE: CPT

## 2019-06-19 RX ORDER — GADOBUTROL 604.72 MG/ML
10 INJECTION INTRAVENOUS ONCE
Status: COMPLETED | OUTPATIENT
Start: 2019-06-19 | End: 2019-06-19

## 2019-06-19 RX ORDER — FUROSEMIDE 20 MG
20 TABLET ORAL DAILY
COMMUNITY

## 2019-06-19 RX ORDER — CEFTRIAXONE 1 G/1
1 INJECTION, POWDER, FOR SOLUTION INTRAMUSCULAR; INTRAVENOUS ONCE
Status: COMPLETED | OUTPATIENT
Start: 2019-06-19 | End: 2019-06-19

## 2019-06-19 RX ADMIN — SODIUM CHLORIDE 1000 MG: 9 INJECTION, SOLUTION INTRAVENOUS at 22:28

## 2019-06-19 RX ADMIN — CEFTRIAXONE SODIUM 1 G: 1 INJECTION, POWDER, FOR SOLUTION INTRAMUSCULAR; INTRAVENOUS at 22:10

## 2019-06-19 RX ADMIN — GADOBUTROL 10 ML: 604.72 INJECTION INTRAVENOUS at 20:43

## 2019-06-19 ASSESSMENT — ENCOUNTER SYMPTOMS
FEVER: 0
ROS GI COMMENTS: DENIES BOWEL INCONTINENCE
BACK PAIN: 1
HEADACHES: 0
CHILLS: 0
NECK PAIN: 0
WEAKNESS: 1
NUMBNESS: 1
SHORTNESS OF BREATH: 0

## 2019-06-19 NOTE — ED PROVIDER NOTES
History     Chief Complaint:  Numbness    HPI   Ladonna Sheriff is a 74 year old female with a history of lupus and Sjogren's syndrome with subsequent optic neuritis with right-sided vision loss who presents with numbness. Starting around 6/3/19, the patient states she has been dealing with some bilateral numbness from her trunk to her toes. She reports it first started with her right leg, but has progressively worsened and has seen both her primary care physician as well as neurologist Dr. Robbi La of [facility] yesterday during which she required assistance with ambulation, so they scheduled thoracic and lumbar spine MRIs for 6/28. However, today, the patient reports her right foot has been very heavy and she has to concentrate in order to pick it up and walk appropriately, so she called her primary care physician who recommended she come to the ED. Here, the patient states she has also been experiencing low thoracic midline back pain since the onset of her symptoms earlier this month. She describes the sensation around her trunk as though a vice is wrapped around her ribcage. The patient's son states he has noticed difficulty with her motor skills in her lower extremities over the past few days, though they are most pronounced today. The patient denies any overt loss of bowel or bladder, but notes she does not experience the urge to have a bowel movement until immediately before she has to go secondary to the numbness in her bottom. She denies headaches, new vision changes, neck pain, chest pain, shortness of breath, fevers, chills, or other acute symptoms.    Allergies:  Pravastatin  Prevacid    Medications:    Albuterol inhaler  Amlodipine  Aspirin 325 mg  Plavix  Fluoxetine  Furosemide  Metoprolol  Prednisone  Simvastatin  Trazodone  Incruse ellipta inhaler  Losartan  Metolazone    Past Medical History:    Congestive heart failure  Hypertension  Lupus  Optic neuritis  Sjogren's  syndrome  Transient ischemic attack  Uncomplicated asthma  Pulmonary embolism    Past Surgical History:    Cholecystectomy  Hysterectomy  Tubal ligation    Family History:    History reviewed. No pertinent family history.     Social History:  Smoking status: Former smoker  Alcohol use: Yes  Drug use: No  PCP: Joceline Ty  Presents to the ED with her sons  Marital Status:  [5]     Review of Systems   Constitutional: Negative for chills and fever.   Eyes: Negative for visual disturbance.   Respiratory: Negative for shortness of breath.    Cardiovascular: Negative for chest pain.   Gastrointestinal:        Denies bowel incontinence   Genitourinary: Negative for enuresis.   Musculoskeletal: Positive for back pain. Negative for neck pain.   Neurological: Positive for weakness and numbness. Negative for headaches.   All other systems reviewed and are negative.    Physical Exam     Patient Vitals for the past 24 hrs:   BP Temp Pulse Heart Rate Resp SpO2   06/19/19 2115 117/70 -- -- -- -- --   06/19/19 1930 144/79 -- 63 -- -- 95 %   06/19/19 1900 142/67 -- 64 -- -- 93 %   06/19/19 1804 130/90 98  F (36.7  C) 70 70 18 97 %     Physical Exam  General: Resting on the bed.  Head: No obvious trauma to head.  Ears, Nose, Throat:  External ears normal.  Nose normal.  No pharyngeal erythema, swelling or exudate.  Midline uvula.    Eyes:  Conjunctivae clear.  Pupils are equal, round, and reactive.   Neck: Normal range of motion.  Neck supple. no nuchal rigditiy  CV: Regular rate and rhythm.  No murmurs.      Respiratory: Effort normal and breath sounds normal.  No wheezing or crackles.   Gastrointestinal: Soft.  No distension. There is no tenderness.    Neuro: Alert. CN II-XII grossly intact, no pronator drift, normal finger-nose-finger.  Gross muscle strength intact of the proximal and distal bilateral upper and lower extremities except marginal weakness in RLE with plantar flexion and hip extension (hip secondary to  chronic hip pain). Right-sided upward Babinski.  Left-sided downward Babinski.  Poor rectal tone. normal speech.  Sensation intact to light touch in all 4 extremities, reports a tingling sensation from the lower rib cage downward bilaterally with associated change in light touch.  2+ patellar and Achilles reflexes.  Right sided foot drop with gait.  Skin: Skin is warm and dry.  No rash noted.     Emergency Department Course   Imaging:  Radiographic findings were communicated with the patient and family who voiced understanding of the findings.    MR Cervical Spine w/o & w contrast  1. No spinal cord abnormality to indicate transverse myelitis.  2. Degenerative changes.   As read by Radiology.    MR Thoracic Spine w/o & w contrast  Focal gadolinium enhancing lesion from T4 through T5 in  the spinal cord on the right side with edema extending from T3-T4 down  to lower T5. This is most likely transverse myelitis, with  differential diagnosis including a spinal cord astrocytoma.  As read by Radiology.    Laboratory:  CBC: WNL (WBC 6.0, HGB 14.1, )  BMP: GFR 57 (L), o/w WNL (Creatinine 0.98)  Magnesium: 2.0  UA: Positive nitrites, moderate leukocyte esterase, WBC 19 (H), moderate bacteria, mucous present, o/w negative  Urine culture: In process    Interventions:  2210: 1 g Ceftriaxone IV  2228: 1000 mg Solu-medrol IV    Emergency Department Course:  Past medical records, nursing notes, and vitals reviewed.  1825: I performed an exam of the patient and obtained history, as documented above.  IV inserted and blood drawn.    1851: I spoke to Dr. Rocky Merchant on-call for neurology who recommended ordering a cervical spine MRI and thoracic spine MRI, findings above.    2132: I spoke to Dr. Rocky Merchant on-call for neurology regarding her imaging findings.     2139: I rechecked the patient. Findings and plan explained to the patient who consents to admission.     2153: I discussed the patient's case with Dr. Arevalo  of the hospitalist service, who recommended transferring the patient to the [facility] as she may need more neurological care than what is available here.    2305: I spoke to Dr. Olivarez of the hospitalist service at the [facility] who recommended contacting Dr. Vasquez Macias of the neurology service to accept the patient.    2315: Patient will be transferred to the [facility] via EMS. Discussed the case with Dr. Vasquez Macias as above, who will admit the patient to a monitored bed for further monitoring, evaluation, and treatment.     2320: I rechecked the patient and answered her family's questions prior to transfer.    Impression & Plan    Medical Decision Makin-year-old female with history of optic neuritis, Sjogren's disease, lupus presents with numbness.  Vital signs are reassuring.  Broad differential was pursued including but not limited to spinal cord pathology, transverse myelitis, tumor, mass, stroke, intracranial hemorrhage, etc.  Overall patient is well-appearing nontoxic.  She describes is very clear viselike sensation from her lower ribs downward.  She endorses numbness bilaterally.  She denies any upper extremity injuries or numbness or weakness.  No headache or vision changes, vision at baseline.  Patient has baseline visual deficits from her optic neuritis.  Clinically her symptomatology does not appear consistent with a stroke.  Most concerned about a possible transverse myelitis.  Discussed with neurology who recommends cervical and thoracic MRI.  Thoracic MRI does show edema and changes consistent with transverse myelitis.  Cervical MRI was otherwise unremarkable.  CBC shows no leukocytosis or anemia.  BMP shows no acute electrolyte metabolic or renal dysfunction.  Magnesium levels normal.  UA does look consistent with a possible UTI therefore antibiotics were administered with IV ceftriaxone.  Discussed again with neurology following MRI results and  recommended 1000 mg of Solu-Medrol.  This was ordered in the emergency department.  Clinically suspect autoimmune related transverse myelitis.  Patient has no fevers or chills, no evidence of meningitis.  No indication for emergent LP at this time.  MRI findings seem most consistent with patient's symptoms.  Discussed with hospitalist at [facility] who felt that patient may need more advanced neurology services.  Patient was transferred to the [facility].  Discussed with neurology at the [facility] who felt this was an appropriate transfer and accepted the transfer.  Patient was transferred to the emergency [place] in stable condition.    Critical Care time:  none    Diagnosis:    ICD-10-CM   1. Transverse myelitis (H) G37.3   2. Numbness R20.0   3. Urinary tract infection without hematuria, site unspecified N39.0   4. Generalized muscle weakness M62.81     Disposition: Transferred and admitted to the [facility]    Florence Prather  6/19/2019   [facility] EMERGENCY DEPARTMENT    Florence THORNTON, am serving as a scribe at 6:25 PM on 6/19/2019 to document services personally performed by Bernie Boykin MD based on my observations and the provider's statements to me.      Bernie Boykin MD  06/20/19 0011

## 2019-06-20 ENCOUNTER — APPOINTMENT (OUTPATIENT)
Dept: MRI IMAGING | Facility: CLINIC | Age: 74
DRG: 059 | End: 2019-06-20
Attending: STUDENT IN AN ORGANIZED HEALTH CARE EDUCATION/TRAINING PROGRAM
Payer: MEDICARE

## 2019-06-20 ENCOUNTER — HOSPITAL ENCOUNTER (INPATIENT)
Facility: CLINIC | Age: 74
LOS: 16 days | Discharge: NURSING, REHAB OR LONG-TERM CARE | DRG: 059 | End: 2019-07-06
Attending: PSYCHIATRY & NEUROLOGY | Admitting: PSYCHIATRY & NEUROLOGY
Payer: MEDICARE

## 2019-06-20 ENCOUNTER — APPOINTMENT (OUTPATIENT)
Dept: PHYSICAL THERAPY | Facility: CLINIC | Age: 74
DRG: 059 | End: 2019-06-20
Attending: INTERNAL MEDICINE
Payer: MEDICARE

## 2019-06-20 DIAGNOSIS — G37.3 TRANSVERSE MYELITIS (H): ICD-10-CM

## 2019-06-20 DIAGNOSIS — G36.0 NEUROMYELITIS OPTICA (H): Primary | ICD-10-CM

## 2019-06-20 LAB
ALBUMIN SERPL-MCNC: 3.3 G/DL (ref 3.4–5)
ALBUMIN UR-MCNC: NEGATIVE MG/DL
ALP SERPL-CCNC: 71 U/L (ref 40–150)
ALT SERPL W P-5'-P-CCNC: 40 U/L (ref 0–50)
ANION GAP SERPL CALCULATED.3IONS-SCNC: 6 MMOL/L (ref 3–14)
APPEARANCE CSF: CLEAR
APPEARANCE UR: CLEAR
AST SERPL W P-5'-P-CCNC: 22 U/L (ref 0–45)
BILIRUB SERPL-MCNC: 0.4 MG/DL (ref 0.2–1.3)
BILIRUB UR QL STRIP: NEGATIVE
BUN SERPL-MCNC: 14 MG/DL (ref 7–30)
CALCIUM SERPL-MCNC: 8.7 MG/DL (ref 8.5–10.1)
CHLORIDE SERPL-SCNC: 109 MMOL/L (ref 94–109)
CO2 SERPL-SCNC: 26 MMOL/L (ref 20–32)
COLOR CSF: COLORLESS
COLOR UR AUTO: ABNORMAL
CREAT SERPL-MCNC: 0.82 MG/DL (ref 0.52–1.04)
CRP SERPL-MCNC: <2.9 MG/L (ref 0–8)
ERYTHROCYTE [DISTWIDTH] IN BLOOD BY AUTOMATED COUNT: 13.3 % (ref 10–15)
ERYTHROCYTE [SEDIMENTATION RATE] IN BLOOD BY WESTERGREN METHOD: 17 MM/H (ref 0–30)
EV RNA SPEC QL NAA+PROBE: NEGATIVE
GFR SERPL CREATININE-BSD FRML MDRD: 70 ML/MIN/{1.73_M2}
GLUCOSE BLDC GLUCOMTR-MCNC: 109 MG/DL (ref 70–99)
GLUCOSE BLDC GLUCOMTR-MCNC: 141 MG/DL (ref 70–99)
GLUCOSE BLDC GLUCOMTR-MCNC: 146 MG/DL (ref 70–99)
GLUCOSE BLDC GLUCOMTR-MCNC: 196 MG/DL (ref 70–99)
GLUCOSE CSF-MCNC: 72 MG/DL (ref 40–70)
GLUCOSE SERPL-MCNC: 165 MG/DL (ref 70–99)
GLUCOSE UR STRIP-MCNC: NEGATIVE MG/DL
GRAM STN SPEC: NORMAL
HBA1C MFR BLD: 5.1 % (ref 0–5.6)
HCT VFR BLD AUTO: 42.9 % (ref 35–47)
HGB BLD-MCNC: 13.6 G/DL (ref 11.7–15.7)
HGB UR QL STRIP: NEGATIVE
INTERPRETATION ECG - MUSE: NORMAL
KETONES UR STRIP-MCNC: NEGATIVE MG/DL
LEUKOCYTE ESTERASE UR QL STRIP: ABNORMAL
Lab: NORMAL
MAGNESIUM SERPL-MCNC: 2 MG/DL (ref 1.6–2.3)
MCH RBC QN AUTO: 31.9 PG (ref 26.5–33)
MCHC RBC AUTO-ENTMCNC: 31.7 G/DL (ref 31.5–36.5)
MCV RBC AUTO: 101 FL (ref 78–100)
MRSA DNA SPEC QL NAA+PROBE: NEGATIVE
NITRATE UR QL: POSITIVE
PH UR STRIP: 6.5 PH (ref 5–7)
PLATELET # BLD AUTO: 173 10E9/L (ref 150–450)
POTASSIUM SERPL-SCNC: 3.7 MMOL/L (ref 3.4–5.3)
PROCALCITONIN SERPL-MCNC: <0.05 NG/ML
PROT CSF-MCNC: 63 MG/DL (ref 15–60)
PROT SERPL-MCNC: 8 G/DL (ref 6.8–8.8)
RBC # BLD AUTO: 4.26 10E12/L (ref 3.8–5.2)
RBC # CSF MANUAL: 296 /UL (ref 0–2)
RBC #/AREA URNS AUTO: <1 /HPF (ref 0–2)
SODIUM SERPL-SCNC: 140 MMOL/L (ref 133–144)
SOURCE: ABNORMAL
SP GR UR STRIP: 1.01 (ref 1–1.03)
SPECIMEN SOURCE: NORMAL
TUBE # CSF: 4 #
UROBILINOGEN UR STRIP-MCNC: NORMAL MG/DL (ref 0–2)
WBC # BLD AUTO: 5.4 10E9/L (ref 4–11)
WBC # CSF MANUAL: 0 /UL (ref 0–5)
WBC #/AREA URNS AUTO: 8 /HPF (ref 0–5)

## 2019-06-20 PROCEDURE — 86038 ANTINUCLEAR ANTIBODIES: CPT | Performed by: STUDENT IN AN ORGANIZED HEALTH CARE EDUCATION/TRAINING PROGRAM

## 2019-06-20 PROCEDURE — 84145 PROCALCITONIN (PCT): CPT | Performed by: INTERNAL MEDICINE

## 2019-06-20 PROCEDURE — 83036 HEMOGLOBIN GLYCOSYLATED A1C: CPT | Performed by: INTERNAL MEDICINE

## 2019-06-20 PROCEDURE — A9270 NON-COVERED ITEM OR SERVICE: HCPCS | Mod: GY | Performed by: INTERNAL MEDICINE

## 2019-06-20 PROCEDURE — 87798 DETECT AGENT NOS DNA AMP: CPT | Performed by: STUDENT IN AN ORGANIZED HEALTH CARE EDUCATION/TRAINING PROGRAM

## 2019-06-20 PROCEDURE — 84157 ASSAY OF PROTEIN OTHER: CPT | Performed by: STUDENT IN AN ORGANIZED HEALTH CARE EDUCATION/TRAINING PROGRAM

## 2019-06-20 PROCEDURE — 82042 OTHER SOURCE ALBUMIN QUAN EA: CPT | Performed by: STUDENT IN AN ORGANIZED HEALTH CARE EDUCATION/TRAINING PROGRAM

## 2019-06-20 PROCEDURE — 93010 ELECTROCARDIOGRAM REPORT: CPT | Performed by: INTERNAL MEDICINE

## 2019-06-20 PROCEDURE — 36415 COLL VENOUS BLD VENIPUNCTURE: CPT | Performed by: PSYCHIATRY & NEUROLOGY

## 2019-06-20 PROCEDURE — 80053 COMPREHEN METABOLIC PANEL: CPT | Performed by: INTERNAL MEDICINE

## 2019-06-20 PROCEDURE — 87498 ENTEROVIRUS PROBE&REVRS TRNS: CPT | Performed by: STUDENT IN AN ORGANIZED HEALTH CARE EDUCATION/TRAINING PROGRAM

## 2019-06-20 PROCEDURE — 25000131 ZZH RX MED GY IP 250 OP 636 PS 637: Mod: GY | Performed by: INTERNAL MEDICINE

## 2019-06-20 PROCEDURE — 25000128 H RX IP 250 OP 636: Performed by: STUDENT IN AN ORGANIZED HEALTH CARE EDUCATION/TRAINING PROGRAM

## 2019-06-20 PROCEDURE — 97162 PT EVAL MOD COMPLEX 30 MIN: CPT | Mod: GP

## 2019-06-20 PROCEDURE — 81001 URINALYSIS AUTO W/SCOPE: CPT | Performed by: INTERNAL MEDICINE

## 2019-06-20 PROCEDURE — 82042 OTHER SOURCE ALBUMIN QUAN EA: CPT | Performed by: PSYCHIATRY & NEUROLOGY

## 2019-06-20 PROCEDURE — 25000132 ZZH RX MED GY IP 250 OP 250 PS 637: Mod: GY | Performed by: STUDENT IN AN ORGANIZED HEALTH CARE EDUCATION/TRAINING PROGRAM

## 2019-06-20 PROCEDURE — 82945 GLUCOSE OTHER FLUID: CPT | Performed by: STUDENT IN AN ORGANIZED HEALTH CARE EDUCATION/TRAINING PROGRAM

## 2019-06-20 PROCEDURE — 82784 ASSAY IGA/IGD/IGG/IGM EACH: CPT | Performed by: STUDENT IN AN ORGANIZED HEALTH CARE EDUCATION/TRAINING PROGRAM

## 2019-06-20 PROCEDURE — 83916 OLIGOCLONAL BANDS: CPT | Performed by: STUDENT IN AN ORGANIZED HEALTH CARE EDUCATION/TRAINING PROGRAM

## 2019-06-20 PROCEDURE — 86160 COMPLEMENT ANTIGEN: CPT | Performed by: STUDENT IN AN ORGANIZED HEALTH CARE EDUCATION/TRAINING PROGRAM

## 2019-06-20 PROCEDURE — 82040 ASSAY OF SERUM ALBUMIN: CPT | Performed by: STUDENT IN AN ORGANIZED HEALTH CARE EDUCATION/TRAINING PROGRAM

## 2019-06-20 PROCEDURE — 97110 THERAPEUTIC EXERCISES: CPT | Mod: GP

## 2019-06-20 PROCEDURE — 87088 URINE BACTERIA CULTURE: CPT | Performed by: PSYCHIATRY & NEUROLOGY

## 2019-06-20 PROCEDURE — 86788 WEST NILE VIRUS AB IGM: CPT | Performed by: STUDENT IN AN ORGANIZED HEALTH CARE EDUCATION/TRAINING PROGRAM

## 2019-06-20 PROCEDURE — 86789 WEST NILE VIRUS ANTIBODY: CPT | Performed by: STUDENT IN AN ORGANIZED HEALTH CARE EDUCATION/TRAINING PROGRAM

## 2019-06-20 PROCEDURE — 87086 URINE CULTURE/COLONY COUNT: CPT | Performed by: PSYCHIATRY & NEUROLOGY

## 2019-06-20 PROCEDURE — 85652 RBC SED RATE AUTOMATED: CPT | Performed by: STUDENT IN AN ORGANIZED HEALTH CARE EDUCATION/TRAINING PROGRAM

## 2019-06-20 PROCEDURE — 82040 ASSAY OF SERUM ALBUMIN: CPT | Performed by: PSYCHIATRY & NEUROLOGY

## 2019-06-20 PROCEDURE — 83916 OLIGOCLONAL BANDS: CPT | Performed by: PSYCHIATRY & NEUROLOGY

## 2019-06-20 PROCEDURE — 87641 MR-STAPH DNA AMP PROBE: CPT | Performed by: INTERNAL MEDICINE

## 2019-06-20 PROCEDURE — 97530 THERAPEUTIC ACTIVITIES: CPT | Mod: GP

## 2019-06-20 PROCEDURE — 009U3ZX DRAINAGE OF SPINAL CANAL, PERCUTANEOUS APPROACH, DIAGNOSTIC: ICD-10-PCS | Performed by: PSYCHIATRY & NEUROLOGY

## 2019-06-20 PROCEDURE — 25000128 H RX IP 250 OP 636: Performed by: PSYCHIATRY & NEUROLOGY

## 2019-06-20 PROCEDURE — 12000001 ZZH R&B MED SURG/OB UMMC

## 2019-06-20 PROCEDURE — 36415 COLL VENOUS BLD VENIPUNCTURE: CPT | Performed by: STUDENT IN AN ORGANIZED HEALTH CARE EDUCATION/TRAINING PROGRAM

## 2019-06-20 PROCEDURE — 87070 CULTURE OTHR SPECIMN AEROBIC: CPT | Performed by: STUDENT IN AN ORGANIZED HEALTH CARE EDUCATION/TRAINING PROGRAM

## 2019-06-20 PROCEDURE — 25500064 ZZH RX 255 OP 636: Performed by: PSYCHIATRY & NEUROLOGY

## 2019-06-20 PROCEDURE — 85027 COMPLETE CBC AUTOMATED: CPT | Performed by: INTERNAL MEDICINE

## 2019-06-20 PROCEDURE — 82784 ASSAY IGA/IGD/IGG/IGM EACH: CPT | Performed by: PSYCHIATRY & NEUROLOGY

## 2019-06-20 PROCEDURE — 87640 STAPH A DNA AMP PROBE: CPT | Performed by: INTERNAL MEDICINE

## 2019-06-20 PROCEDURE — 36415 COLL VENOUS BLD VENIPUNCTURE: CPT | Performed by: INTERNAL MEDICINE

## 2019-06-20 PROCEDURE — 87205 SMEAR GRAM STAIN: CPT | Performed by: STUDENT IN AN ORGANIZED HEALTH CARE EDUCATION/TRAINING PROGRAM

## 2019-06-20 PROCEDURE — 25000132 ZZH RX MED GY IP 250 OP 250 PS 637: Mod: GY | Performed by: INTERNAL MEDICINE

## 2019-06-20 PROCEDURE — 25800030 ZZH RX IP 258 OP 636: Performed by: STUDENT IN AN ORGANIZED HEALTH CARE EDUCATION/TRAINING PROGRAM

## 2019-06-20 PROCEDURE — 87015 SPECIMEN INFECT AGNT CONCNTJ: CPT | Performed by: STUDENT IN AN ORGANIZED HEALTH CARE EDUCATION/TRAINING PROGRAM

## 2019-06-20 PROCEDURE — 83735 ASSAY OF MAGNESIUM: CPT | Performed by: INTERNAL MEDICINE

## 2019-06-20 PROCEDURE — 86140 C-REACTIVE PROTEIN: CPT | Performed by: INTERNAL MEDICINE

## 2019-06-20 PROCEDURE — 89050 BODY FLUID CELL COUNT: CPT | Performed by: STUDENT IN AN ORGANIZED HEALTH CARE EDUCATION/TRAINING PROGRAM

## 2019-06-20 PROCEDURE — 82164 ANGIOTENSIN I ENZYME TEST: CPT | Performed by: STUDENT IN AN ORGANIZED HEALTH CARE EDUCATION/TRAINING PROGRAM

## 2019-06-20 PROCEDURE — A9585 GADOBUTROL INJECTION: HCPCS | Performed by: PSYCHIATRY & NEUROLOGY

## 2019-06-20 PROCEDURE — 70553 MRI BRAIN STEM W/O & W/DYE: CPT

## 2019-06-20 PROCEDURE — 40000141 ZZH STATISTIC PERIPHERAL IV START W/O US GUIDANCE

## 2019-06-20 PROCEDURE — 87186 SC STD MICRODIL/AGAR DIL: CPT | Performed by: PSYCHIATRY & NEUROLOGY

## 2019-06-20 PROCEDURE — 00000146 ZZHCL STATISTIC GLUCOSE BY METER IP

## 2019-06-20 RX ORDER — POTASSIUM CHLORIDE 29.8 MG/ML
20 INJECTION INTRAVENOUS
Status: DISCONTINUED | OUTPATIENT
Start: 2019-06-20 | End: 2019-07-06 | Stop reason: HOSPADM

## 2019-06-20 RX ORDER — POTASSIUM CHLORIDE 7.45 MG/ML
10 INJECTION INTRAVENOUS
Status: DISCONTINUED | OUTPATIENT
Start: 2019-06-20 | End: 2019-07-06 | Stop reason: HOSPADM

## 2019-06-20 RX ORDER — NICOTINE POLACRILEX 4 MG
15-30 LOZENGE BUCCAL
Status: DISCONTINUED | OUTPATIENT
Start: 2019-06-20 | End: 2019-06-23

## 2019-06-20 RX ORDER — AMLODIPINE BESYLATE 10 MG/1
10 TABLET ORAL EVERY EVENING
Status: DISCONTINUED | OUTPATIENT
Start: 2019-06-20 | End: 2019-07-01

## 2019-06-20 RX ORDER — LORAZEPAM 2 MG/ML
1 INJECTION INTRAMUSCULAR ONCE
Status: DISCONTINUED | OUTPATIENT
Start: 2019-06-20 | End: 2019-06-20

## 2019-06-20 RX ORDER — GADOBUTROL 604.72 MG/ML
10 INJECTION INTRAVENOUS ONCE
Status: COMPLETED | OUTPATIENT
Start: 2019-06-20 | End: 2019-06-20

## 2019-06-20 RX ORDER — HYDROMORPHONE HYDROCHLORIDE 1 MG/ML
0.3 INJECTION, SOLUTION INTRAMUSCULAR; INTRAVENOUS; SUBCUTANEOUS ONCE
Status: COMPLETED | OUTPATIENT
Start: 2019-06-20 | End: 2019-06-20

## 2019-06-20 RX ORDER — GADOBUTROL 604.72 MG/ML
7.5 INJECTION INTRAVENOUS ONCE
Status: DISCONTINUED | OUTPATIENT
Start: 2019-06-20 | End: 2019-06-21

## 2019-06-20 RX ORDER — CLOPIDOGREL BISULFATE 75 MG/1
75 TABLET ORAL EVERY MORNING
Status: DISCONTINUED | OUTPATIENT
Start: 2019-06-20 | End: 2019-07-06 | Stop reason: HOSPADM

## 2019-06-20 RX ORDER — POTASSIUM CHLORIDE 750 MG/1
20-40 TABLET, EXTENDED RELEASE ORAL
Status: DISCONTINUED | OUTPATIENT
Start: 2019-06-20 | End: 2019-07-06 | Stop reason: HOSPADM

## 2019-06-20 RX ORDER — TRAZODONE HYDROCHLORIDE 100 MG/1
100 TABLET ORAL AT BEDTIME
Status: DISCONTINUED | OUTPATIENT
Start: 2019-06-20 | End: 2019-07-06 | Stop reason: HOSPADM

## 2019-06-20 RX ORDER — ACETAMINOPHEN 325 MG/1
650 TABLET ORAL EVERY 4 HOURS PRN
Status: DISCONTINUED | OUTPATIENT
Start: 2019-06-20 | End: 2019-07-06 | Stop reason: HOSPADM

## 2019-06-20 RX ORDER — POLYETHYLENE GLYCOL 3350 17 G/17G
17 POWDER, FOR SOLUTION ORAL DAILY PRN
Status: DISCONTINUED | OUTPATIENT
Start: 2019-06-20 | End: 2019-07-06 | Stop reason: HOSPADM

## 2019-06-20 RX ORDER — ACETAMINOPHEN 650 MG/1
650 SUPPOSITORY RECTAL EVERY 4 HOURS PRN
Status: DISCONTINUED | OUTPATIENT
Start: 2019-06-20 | End: 2019-07-06 | Stop reason: HOSPADM

## 2019-06-20 RX ORDER — DEXTROSE MONOHYDRATE 25 G/50ML
25-50 INJECTION, SOLUTION INTRAVENOUS
Status: DISCONTINUED | OUTPATIENT
Start: 2019-06-20 | End: 2019-06-23

## 2019-06-20 RX ORDER — AMOXICILLIN 250 MG
1 CAPSULE ORAL 2 TIMES DAILY PRN
Status: DISCONTINUED | OUTPATIENT
Start: 2019-06-20 | End: 2019-07-06 | Stop reason: HOSPADM

## 2019-06-20 RX ORDER — FUROSEMIDE 20 MG
20 TABLET ORAL 3 TIMES DAILY
Status: DISCONTINUED | OUTPATIENT
Start: 2019-06-20 | End: 2019-07-06 | Stop reason: HOSPADM

## 2019-06-20 RX ORDER — AMOXICILLIN 250 MG
2 CAPSULE ORAL 2 TIMES DAILY PRN
Status: DISCONTINUED | OUTPATIENT
Start: 2019-06-20 | End: 2019-07-06 | Stop reason: HOSPADM

## 2019-06-20 RX ORDER — SIMVASTATIN 20 MG
20 TABLET ORAL AT BEDTIME
Status: DISCONTINUED | OUTPATIENT
Start: 2019-06-20 | End: 2019-07-06 | Stop reason: HOSPADM

## 2019-06-20 RX ORDER — MAGNESIUM SULFATE HEPTAHYDRATE 40 MG/ML
4 INJECTION, SOLUTION INTRAVENOUS EVERY 4 HOURS PRN
Status: DISCONTINUED | OUTPATIENT
Start: 2019-06-20 | End: 2019-07-06 | Stop reason: HOSPADM

## 2019-06-20 RX ORDER — PREDNISONE 5 MG/1
5 TABLET ORAL EVERY MORNING
Status: DISCONTINUED | OUTPATIENT
Start: 2019-06-20 | End: 2019-06-21

## 2019-06-20 RX ORDER — POTASSIUM CL/LIDO/0.9 % NACL 10MEQ/0.1L
10 INTRAVENOUS SOLUTION, PIGGYBACK (ML) INTRAVENOUS
Status: DISCONTINUED | OUTPATIENT
Start: 2019-06-20 | End: 2019-07-06 | Stop reason: HOSPADM

## 2019-06-20 RX ORDER — POTASSIUM CHLORIDE 1.5 G/1.58G
20-40 POWDER, FOR SOLUTION ORAL
Status: DISCONTINUED | OUTPATIENT
Start: 2019-06-20 | End: 2019-07-06 | Stop reason: HOSPADM

## 2019-06-20 RX ORDER — ONDANSETRON 2 MG/ML
4 INJECTION INTRAMUSCULAR; INTRAVENOUS EVERY 6 HOURS PRN
Status: DISCONTINUED | OUTPATIENT
Start: 2019-06-20 | End: 2019-07-06 | Stop reason: HOSPADM

## 2019-06-20 RX ORDER — METOPROLOL TARTRATE 50 MG
50 TABLET ORAL 2 TIMES DAILY
Status: DISCONTINUED | OUTPATIENT
Start: 2019-06-20 | End: 2019-07-06 | Stop reason: HOSPADM

## 2019-06-20 RX ORDER — ALBUTEROL SULFATE 90 UG/1
2 AEROSOL, METERED RESPIRATORY (INHALATION) EVERY 4 HOURS PRN
Status: DISCONTINUED | OUTPATIENT
Start: 2019-06-20 | End: 2019-07-06 | Stop reason: HOSPADM

## 2019-06-20 RX ORDER — NALOXONE HYDROCHLORIDE 0.4 MG/ML
.1-.4 INJECTION, SOLUTION INTRAMUSCULAR; INTRAVENOUS; SUBCUTANEOUS
Status: DISCONTINUED | OUTPATIENT
Start: 2019-06-20 | End: 2019-07-06 | Stop reason: HOSPADM

## 2019-06-20 RX ORDER — ONDANSETRON 4 MG/1
4 TABLET, ORALLY DISINTEGRATING ORAL EVERY 6 HOURS PRN
Status: DISCONTINUED | OUTPATIENT
Start: 2019-06-20 | End: 2019-07-06 | Stop reason: HOSPADM

## 2019-06-20 RX ADMIN — POTASSIUM CHLORIDE 20 MEQ: 10 TABLET, EXTENDED RELEASE ORAL at 13:09

## 2019-06-20 RX ADMIN — FLUOXETINE 20 MG: 20 CAPSULE ORAL at 08:24

## 2019-06-20 RX ADMIN — Medication 10 MG: at 21:35

## 2019-06-20 RX ADMIN — AMLODIPINE BESYLATE 10 MG: 10 TABLET ORAL at 19:21

## 2019-06-20 RX ADMIN — SENNOSIDES AND DOCUSATE SODIUM 2 TABLET: 8.6; 5 TABLET ORAL at 08:30

## 2019-06-20 RX ADMIN — INSULIN ASPART 2 UNITS: 100 INJECTION, SOLUTION INTRAVENOUS; SUBCUTANEOUS at 12:32

## 2019-06-20 RX ADMIN — GADOBUTROL 10 ML: 604.72 INJECTION INTRAVENOUS at 12:08

## 2019-06-20 RX ADMIN — POLYETHYLENE GLYCOL 3350 17 G: 17 POWDER, FOR SOLUTION ORAL at 08:30

## 2019-06-20 RX ADMIN — SIMVASTATIN 20 MG: 20 TABLET, FILM COATED ORAL at 21:35

## 2019-06-20 RX ADMIN — INSULIN ASPART 1 UNITS: 100 INJECTION, SOLUTION INTRAVENOUS; SUBCUTANEOUS at 08:46

## 2019-06-20 RX ADMIN — FUROSEMIDE 20 MG: 20 TABLET ORAL at 19:21

## 2019-06-20 RX ADMIN — PREDNISONE 5 MG: 5 TABLET ORAL at 08:24

## 2019-06-20 RX ADMIN — RANITIDINE 150 MG: 150 TABLET ORAL at 19:21

## 2019-06-20 RX ADMIN — METOPROLOL TARTRATE 50 MG: 50 TABLET ORAL at 19:21

## 2019-06-20 RX ADMIN — TRAZODONE HYDROCHLORIDE 100 MG: 100 TABLET ORAL at 21:35

## 2019-06-20 RX ADMIN — CLOPIDOGREL BISULFATE 75 MG: 75 TABLET, FILM COATED ORAL at 08:24

## 2019-06-20 RX ADMIN — RANITIDINE 150 MG: 150 TABLET ORAL at 08:24

## 2019-06-20 RX ADMIN — HYDROMORPHONE HYDROCHLORIDE 0.3 MG: 1 INJECTION, SOLUTION INTRAMUSCULAR; INTRAVENOUS; SUBCUTANEOUS at 17:30

## 2019-06-20 RX ADMIN — FUROSEMIDE 20 MG: 20 TABLET ORAL at 15:46

## 2019-06-20 RX ADMIN — FUROSEMIDE 20 MG: 20 TABLET ORAL at 08:24

## 2019-06-20 RX ADMIN — UMECLIDINIUM 1 PUFF: 62.5 AEROSOL, POWDER ORAL at 08:23

## 2019-06-20 RX ADMIN — ASPIRIN 325 MG: 325 TABLET, DELAYED RELEASE ORAL at 19:21

## 2019-06-20 RX ADMIN — SODIUM CHLORIDE 1000 MG: 9 INJECTION, SOLUTION INTRAVENOUS at 21:20

## 2019-06-20 RX ADMIN — METOPROLOL TARTRATE 50 MG: 50 TABLET ORAL at 08:24

## 2019-06-20 ASSESSMENT — ACTIVITIES OF DAILY LIVING (ADL)
ADLS_ACUITY_SCORE: 20
ADLS_ACUITY_SCORE: 19
ADLS_ACUITY_SCORE: 17
WHICH_OF_THE_ABOVE_FUNCTIONAL_RISKS_HAD_A_RECENT_ONSET_OR_CHANGE?: AMBULATION;TRANSFERRING
ADLS_ACUITY_SCORE: 17
ADLS_ACUITY_SCORE: 16

## 2019-06-20 NOTE — ED NOTES
[facility]  ED Nurse Handoff Report    Ladonna Sheriff is a 74 year old female   ED Chief complaint: Numbness  . ED Diagnosis:   Final diagnoses:   Transverse myelitis (H)   Numbness   Urinary tract infection without hematuria, site unspecified   Generalized muscle weakness     Allergies:   Allergies   Allergen Reactions     Prevacid [Lansoprazole]        Code Status: Full Code  Activity level - Baseline/Home:  Independent. Activity Level - Current:   Independent. Lift room needed: No. Bariatric: No   Needed: No   Isolation: No. Infection: Not Applicable.     Vital Signs:   Vitals:    06/19/19 1804 06/19/19 1900 06/19/19 1930 06/19/19 2115   BP: 130/90 142/67 144/79 117/70   Pulse: 70 64 63    Resp: 18      Temp: 98  F (36.7  C)      SpO2: 97% 93% 95%        Cardiac Rhythm:  ,      Pain level:    Patient confused: No. Patient Falls Risk: Yes.   Elimination Status: Has voided   Patient Report - Initial Complaint: Ladonna Sheriff is a 74 year old female with a history of lupus and Sjogren's syndrome with subsequent optic neuritis with right-sided vision loss who presents with numbness. Starting around 6/3/19, the patient states she has been dealing with some bilateral numbness from her trunk to her toes. She reports it first started with her right leg, but has progressively worsened and has seen both her primary care physician as well as neurologist Dr. Robbi La of [facility] yesterday during which she required assistance with ambulation, so they scheduled thoracic and lumbar spine MRIs for 6/28. However, today, the patient reports her right foot has been very heavy and she has to concentrate in order to pick it up and walk appropriately, so she called her primary care physician who recommended she come to the ED. Here, the patient states she has also been experiencing low thoracic midline back pain since the onset of her symptoms earlier this month. She describes the sensation around  her trunk as though a vice is wrapped around her ribcage. The patient's son states he has noticed difficulty with her motor skills in her lower extremities over the past few days, though they are most pronounced today. The patient denies any overt loss of bowel or bladder, but notes she does not experience the urge to have a bowel movement until immediately before she has to go. She denies headaches, new vision changes, neck pain, chest pain, shortness of breath, fevers, chills, or other acute symptoms.   . Focused Assessment: BLE numbness  Tests Performed:   MR Thoracic Spine w/o & w Contrast   Final Result   IMPRESSION:  Focal gadolinium enhancing lesion from T4 through T5 in   the spinal cord on the right side with edema extending from T3-T4 down   to lower T5. This is most likely transverse myelitis, with   differential diagnosis including a spinal cord astrocytoma.      JALEN BLANK MD      MR Cervical Spine w/o & w Contrast   Final Result   IMPRESSION:     1. No spinal cord abnormality to indicate transverse myelitis.   2. Degenerative changes.        JALEN BLANK MD        *. Abnormal Results:   Labs Ordered and Resulted from Time of ED Arrival Up to the Time of Departure from the ED   CBC WITH PLATELETS DIFFERENTIAL - Abnormal; Notable for the following components:       Result Value     (*)     All other components within normal limits   BASIC METABOLIC PANEL - Abnormal; Notable for the following components:    GFR Estimate 57 (*)     All other components within normal limits   ROUTINE UA WITH MICROSCOPIC - Abnormal; Notable for the following components:    Nitrite Urine Positive (*)     Leukocyte Esterase Urine Moderate (*)     WBC Urine 19 (*)     Bacteria Urine Moderate (*)     Mucous Urine Present (*)     All other components within normal limits   MAGNESIUM   URINE CULTURE AEROBIC BACTERIAL     .   Treatments provided: see EMAR  Family Comments: na  OBS brochure/video discussed/provided to  patient:  Yes  ED Medications:   Medications   methylPREDNISolone sodium succinate (solu-MEDROL) 1,000 mg in sodium chloride 0.9 % 250 mL intermittent infusion (has no administration in time range)   cefTRIAXone (ROCEPHIN) 1 g vial to attach to  mL bag for ADULTS or NS 50 mL bag for PEDS (has no administration in time range)   gadobutrol (GADAVIST) injection 10 mL (10 mLs Intravenous Given 6/19/19 2043)     Drips infusing:  No  For the majority of the shift, the patient's behavior Green. Interventions performed were NA.     Severe Sepsis OR Septic Shock Diagnosis Present: No      ED Nurse Name/Phone Number: Aramis Barry,   9:54 PM

## 2019-06-20 NOTE — CONSULTS
RHEUMATOLOGY CONSULT NOTE - FELLOW    Ladonna Sheriff MRN# [id number]   Age: 74 year old YOB: 1945     Date of Admission:  6/20/2019  Reason for consult: Complex rheum history, now presenting with transverse myelitis     Assessment and Plan:   Discussion: Mr.s Sheriff is a 74 year old female with a slightly unclear history of discoid lupus, optic neuritis, possible Sjogren's and temporal arteritis who presents with progressive neurologic deficits and loss of proprioception on exam and has been found to have imaging finding concerning for transverse myelitis and demyelinating process.      We are consulted due to concern that these findings are sequelae of her prior rheumatologic diagnosis.  On review of the case it is not entirely clear what workup proceeded her prior diagnosis.  She reports a history of discoid lupus which is generally a limited cutaneous subtype, although it can progress to SLE.  Her diagnosis of temporal arteritis is unusual without biopsy and it sounds like she may have suggested it to her former rheumatologist herself.  We do not see any evidence for Sjogren's on exam and this would be a mild case per her described history.  However we do note that transverse myelitis can be seen with Sjogren's.  No evidence to suggest behcet's from history or exam.      With her history of optic neuritis and now transverse myelitis would consider a broad ddx, although we have lower suspicion for rheumatologic process.  Particular concern for chronic relapsing inflammatory optic neuropathy [CRION]/ NMO spectrum disorder.  Could also consider SLE, vasculitis, and sarcoidosis (although ct chest from this year without hilar adenopathy).  Infection and neoplasm seems less likely given chronic course of her optic neuritis, but cannot rule out superimposed process.  Would initiate rheum workup as below and appreciate planned LP per neurology.       Problem list:  # Possible demyelinating process, concern  for transverse myelitis   # hx? sjogren's, optic neuritis, temporal arteritis, discoid lupus    # UTI  # Macrocytosis     Recommendations:  -- Please obtain, CRP/ESR, SUSAN, C3/4, CRP/ESR, LINWOOD panel, antiphospholipid antibodies, ds-DNA  -- Could consider B12/MMA, RPR, AQP4 AB testing  -- LP per neurology   -- Thank you for the interesting consult, rheumatology will continue to follow     The patient was seen and staffed with Dr. Taylor.     Bernabe [facility]  Internal Medicine, PGY-1      I saw this patient with the medical resident. I agree with the findings and recommendations.    Go Taylor M.D.  Staff Rheumatologist,  Health  Pager [phone]           Chief Complaint:   Leg weakness and sensory changes         History of Present Illness:     The patient presents worsening lower extremity weakness and sensory changes.  She has had trouble walking for the past month and has been using a cane.  She has also noted progressive sensory changes that started in the legs and spread to her upper abdomen, as well worsening incontinence to bowel and bladder.      Prior history:  1983: Diagnosed with discoid lupus by skin Bx.  Skin lesions at that time were worse with sun exposure.  She may have been treated with plaquenil for this (she knows she was on it for year)     1988 (roughly): began having episodes of visual loss that resolved, and she now suspects were due to optic neuritis.  These episodes included eye pain, and her visual deficits started in the periphery and progressed centrally.      1993: Optic neuritis diagnosed for the first time, treated with steroids.  She was told she did not have MS at that time.    Late 90s: Clinical diagnosis of temporal arteritis.  Severe left temple headaches.  Does not remember jaw pain.  Sister in law sent her an article about this and she brought it up with her rheumatologist at the time.  No Bx done.  Has been on 5mg prednisone daily since.  This was prescribed by  "her Rhuematologist in [place].  Dr. Daryl Parikh    ROS: notable for mild chronic dry mouth and eyes (uses eye drops frequently and generally has cup of water with her.  She no longer has significant headaches.  She denies parotid swelling.  Oral/mucosal ulcerations, lymphadenopathy, fevers/chills, myalgia/arthralgias, rash (with exception of small crusting lesions (one on forehead and the other on her posterior right neck).  No abd pain.  Changes in bowel/bladder function as above.  Chronic mild lower ext edema.      Prior smoker.  No personal history of cancer  FH as below  Daily EtOH use  Has had blood clot in the past  No history of miscarriage     She does report a recent URI in April that took several weeks to resolve.    Interestingly her son has recently been diagnosed with \"inflamation of the spine\" after being evaluated for left leg weakness (he is currently being treated with steroids for this).      Labs/imaging:   Normal WBC  Mild macrocytosis  UA w/ nitrites/LE, UC ecoli    MRI:   Brain:  greater than 20 foci of T2-hyperintensity  within the cerebral white matter which is suspicious for  demyelination. Enhancing focus within the left parieto-occipital  region is suspicious for active demyelination in the absence of known  malignancy. No less likely, the differential for white matter lesions  includes sequela of infectious or inflammatory insults, and  vasculopathy     Spine:  Focal gadolinium enhancing lesion from T4 through T5 in  the spinal cord on the right side with edema extending from T3-T4 down  to lower T5. This is most likely transverse myelitis, with  differential diagnosis including a spinal cord astrocytoma.            Past Medical History:     Past Medical History:   Diagnosis Date     Cerebral infarction (H)      CHF (congestive heart failure) (H)      Hypertension      Lupus      Lupus      Optic neuritis      Optic neuritis      Sjogren's syndrome (H)      Sjogren's syndrome (H)      " Temporal arteritis (H)      TIA (transient ischemic attack)      Uncomplicated asthma           Past Surgical History:     Past Surgical History:   Procedure Laterality Date     CHOLECYSTECTOMY       GYN SURGERY      hysterectomy     GYN SURGERY      tubal ligation          Social History:     Social History     Socioeconomic History     Marital status:      Spouse name: Not on file     Number of children: Not on file     Years of education: Not on file     Highest education level: Not on file   Occupational History     Not on file   Social Needs     Financial resource strain: Not on file     Food insecurity:     Worry: Not on file     Inability: Not on file     Transportation needs:     Medical: Not on file     Non-medical: Not on file   Tobacco Use     Smoking status: Former Smoker   Substance and Sexual Activity     Alcohol use: Yes     Alcohol/week: 4.2 oz     Types: 7 Shots of liquor per week     Drug use: No     Sexual activity: Not on file   Lifestyle     Physical activity:     Days per week: Not on file     Minutes per session: Not on file     Stress: Not on file   Relationships     Social connections:     Talks on phone: Not on file     Gets together: Not on file     Attends Jewish service: Not on file     Active member of club or organization: Not on file     Attends meetings of clubs or organizations: Not on file     Relationship status: Not on file     Intimate partner violence:     Fear of current or ex partner: Not on file     Emotionally abused: Not on file     Physically abused: Not on file     Forced sexual activity: Not on file   Other Topics Concern     Not on file   Social History Narrative     Not on file          Family History:   No family history on file.          Immunizations:     Most Recent Immunizations   Administered Date(s) Administered     Influenza (High Dose) 3 valent vaccine 10/21/2015     Influenza Vaccine, 3 YRS +, IM (QUADRIVALENT W/PRESERVATIVES) 10/17/2016     Pneumo  Conj 13-V (2010&after) 07/08/2014     Pneumococcal 23 valent 07/07/2017     TDAP Vaccine (Adacel) 04/25/2013     Zoster vaccine, live 04/25/2013             Allergies:     Allergies   Allergen Reactions     Prevacid [Lansoprazole] Rash             Medications:     Medications Prior to Admission   Medication Sig Dispense Refill Last Dose     albuterol (PROAIR HFA/PROVENTIL HFA/VENTOLIN HFA) 108 (90 Base) MCG/ACT inhaler Inhale 2 puffs into the lungs every 6 hours as needed    6/19/2019 at Unknown time     amLODIPine (NORVASC) 10 MG tablet Take 10 mg by mouth every evening    6/18/2019 at pm     aspirin (ASA) 325 MG EC tablet Take 325 mg by mouth every evening    6/18/2019 at Unknown time     B Complex-C (VITAMIN B COMPLEX W/VITAMIN C) TABS tablet Take 1 tablet by mouth daily   6/19/2019 at am     Calcium-Vitamin D 600-200 MG-UNIT TABS Take 1 tablet by mouth every evening    6/18/2019 at pm     Clopidogrel Bisulfate (PLAVIX PO) Take 75 mg by mouth every morning    6/19/2019 at am     FLUOXETINE HCL PO Take 20 mg by mouth daily   6/19/2019 at am     furosemide (LASIX) 20 MG tablet Take 20 mg by mouth 3 times daily   6/19/2019 at am     METOPROLOL TARTRATE PO Take 50 mg by mouth 2 times daily    6/19/2019 at am     Multiple Vitamins-Minerals (MULTIVITAL PO) Take 1 tablet by mouth every morning    6/19/2019 at am     Potassium Gluconate 595 MG TBCR Take 1 tablet by mouth every evening    6/18/2019 at pm     PREDNISONE PO Take 5 mg by mouth every morning    6/19/2019 at am     simvastatin (ZOCOR) 20 MG tablet Take 20 mg by mouth At Bedtime   6/18/2019 at pm     TRAZODONE HCL PO Take 100 mg by mouth At Bedtime   6/18/2019 at pm     umeclidinium (INCRUSE ELLIPTA) 62.5 MCG/INH inhaler Inhale 1 puff into the lungs daily   6/19/2019 at am     vitamin C (ASCORBIC ACID) 100 MG tablet Take 500 mg by mouth every morning    6/19/2019 at am       Current Facility-Administered Medications   Medication     acetaminophen (TYLENOL)  tablet 650 mg    Or     acetaminophen (TYLENOL) solution 650 mg     acetaminophen (TYLENOL) Suppository 650 mg     albuterol (PROAIR HFA/PROVENTIL HFA/VENTOLIN HFA) 108 (90 Base) MCG/ACT inhaler 2 puff     amLODIPine (NORVASC) tablet 10 mg     aspirin (ASA) EC tablet 325 mg     clopidogrel (PLAVIX) tablet 75 mg     glucose gel 15-30 g    Or     dextrose 50 % injection 25-50 mL    Or     glucagon injection 1 mg     enoxaparin (LOVENOX) injection 40 mg     FLUoxetine (PROzac) capsule 20 mg     furosemide (LASIX) tablet 20 mg     gadobutrol (GADAVIST) injection 7.5 mL     insulin aspart (NovoLOG) inj (RAPID ACTING)     insulin aspart (NovoLOG) inj (RAPID ACTING)     magnesium sulfate 2 g in NS intermittent infusion ([facility]ar[facility] or [facility] Cmpd)     magnesium sulfate 4 g in 100 mL sterile water (premade)     metoprolol tartrate (LOPRESSOR) tablet 50 mg     naloxone (NARCAN) injection 0.1-0.4 mg     ondansetron (ZOFRAN-ODT) ODT tab 4 mg    Or     ondansetron (ZOFRAN) injection 4 mg     polyethylene glycol (MIRALAX/GLYCOLAX) Packet 17 g     potassium chloride (KLOR-CON) Packet 20-40 mEq     potassium chloride 10 mEq in 100 mL intermittent infusion with 10 mg lidocaine     potassium chloride 10 mEq in 100 mL sterile water intermittent infusion (premix)     potassium chloride 20 mEq in 50 mL intermittent infusion     potassium chloride ER (K-DUR/KLOR-CON M) CR tablet 20-40 mEq     predniSONE (DELTASONE) tablet 5 mg     ranitidine (ZANTAC) tablet 150 mg     senna-docusate (SENOKOT-S/PERICOLACE) 8.6-50 MG per tablet 1 tablet    Or     senna-docusate (SENOKOT-S/PERICOLACE) 8.6-50 MG per tablet 2 tablet     simvastatin (ZOCOR) tablet 20 mg     traZODone (DESYREL) tablet 100 mg     umeclidinium (INCRUSE ELLIPTA) 62.5 MCG/INH inhaler 1 puff            Review of Systems:   Complete 12 point ROS completed and negative unless mentioned in HPI.          Physical Exam:   /84   Pulse 85   Temp 98.4  F (36.9  C) (Oral)   Resp 20    Wt 97 kg (213 lb 13.5 oz)   SpO2 98%   BMI 33.49 kg/m      General: Pleasant elderly female in NAD  HEENT: Dysconjugate at rest, conjugate gave with fixation, no nystagmus, PERRL, no alex gun pupillary defect.  No oral lesions, no parotid swelling.  Normal salivary pooling.    Lymph: No LAD  CV: RR, NMRG  Lung: Normal work of breathing on RA  Abdomen: Soft, NT, ND  Neuro: Alert and oriented. Dysconjugate at rest, but EOMI, no other cranial nerve deficits appreciated.  Strength intact and symmetrical throughout.  Sensation intact to light touch throughout, proprioception absent at the feet.  Brisk  Patellar reflexes.  No clonus.  No fasciculations.   Skin/Hair: 1cm raised crusting lesion mid forehead and right posterior neck   Ext: WWP, pitting edema at the ankles          Data:   Labs and imaging reviewed.     Bernabe Loya MD  Internal Medicine

## 2019-06-20 NOTE — PROGRESS NOTES
SPIRITUAL HEALTH SERVICES  SPIRITUAL ASSESSMENT Progress Note  [facility] 6A     REFERRAL SOURCE: patient    Ladonna and her middle son and I talked about her belief in God and heaven and fear about her future health. We suspended our talk because of a medical appointment.    PLAN: Follow as on my floor.    MAURICIO Lara   Intern  Pager [phone]

## 2019-06-20 NOTE — H&P
[facility]  Neurology History and Physical    Patient Name: Ladonna Sheriff  MRN: [id number]  Date of Admission: 6/20/2019  Date of Service: June 20, 2019  Primary care provider: Joceline Ty    Chief Complaint: numbness below the chest    HPI:  Ladonna Sheriff is a 74 year old female w/ history of previous stroke, TIA, lupus, Sjogren's syndrome, temporal arteritis, optic neuritis with vision impairment in right eye, chronic daily low dosed prednisone (5 mg daily), hypertension, hypercholesterolemia, diastolic and possibly systolic congestive heart failure, recently diagnosed asthma and/or COPD, previous hysterectomy, and previous tubal ligation.    Starting around 6/3/19, the patient states she has been dealing with some bilateral numbness from her trunk to her toes. She reports it first started with her right leg, but has progressively worsened and has seen both her primary care physician as well as neurologist Dr. Robbi La of [facility], [place] 6/18 during which she required assistance with ambulation, so they scheduled thoracic and lumbar spine MRIs for 6/28.     However, 6/19, the patient reports her right foot has been very heavy and she has to concentrate in order to pick it up and walk appropriately, so she called her primary care physician who recommended she come to the ED. There, the patient stated she has also been experiencing low thoracic midline back pain since the onset of her symptoms earlier this month. She describes the sensation around her trunk as though a vice is wrapped around her ribcage. The patient also notes he has noticed difficulty with her ADL skills in requiring LE over the past few days, though they are most pronounced 6/19. The patient denies any overt loss of bowel or bladder, but notes she does not experience the urge to have a bowel movement until immediately before she has to go secondary to the numbness in her bottom. She denies headaches, new  vision changes, neck pain, chest pain, shortness of breath, fevers, chills, or other acute symptoms.    Of note however patient had a URI which was significant enough that patient sought out pulmonology assistance for a 2-3 week cough/cold.  This self-resolved prior to the start of these neuorlogic symptoms.    History is obtained from Ladonna  and the medical record.  Assessment and Plan:  74 year old female w/ history of previous stroke, TIA, lupus, Sjogren's syndrome, temporal arteritis, optic neuritis with vision impairment in right eye, chronic daily low dosed prednisone (5 mg daily), hypertension, hypercholesterolemia, diastolic and possibly systolic congestive heart failure, recently diagnosed asthma and/or COPD, previous hysterectomy, and previous tubal ligation.  Presenting out of concern for TM based on OSH MRI at level of T5.     Overall patient is well-appearing nontoxic.  She describes is very clear viselike sensation from her lower ribs downward.  She endorses numbness bilaterally.  She denies any upper extremity injuries or numbness or weakness.  No headache or vision changes, vision at baseline.  Patient has baseline visual deficits from her optic neuritis dating back to 1993 related to sjogren/lupus last flare 2018 summer in left eye.  Clinically her symptomatology does not appear consistent with a stroke.  Most concerned about a possible transverse myelitis.  Discussed with neurology who recommends cervical and thoracic MRI.  Thoracic MRI does show edema and changes consistent with transverse myelitis.  Cervical MRI was otherwise unremarkable.  CBC shows no leukocytosis or anemia.  BMP shows no acute electrolyte metabolic or renal dysfunction.  Magnesium levels normal.  UA does look consistent with a possible UTI therefore antibiotics were administered with IV ceftriaxone x 1.   1000 mg of Solu-Medrol was given at OSH.     Clinically suspect  transverse myelitis.  Patient has no fevers or chills, no  evidence of meningitis.  No indication for emergent LP at this time although this can be further considered.  Of note however patient had a URI which was significant enough that patient sought out pulmonology assistance for a 2-3 week cough/cold.  This self-resolved prior to the start of these neuorlogic symptoms.    Patient's rheum history should be taken into differential and also possibility of neoplasm    ACUTE PROBLEMS  #Numbness T5 level subjective>objective  #No loss of bowel/bladder function but risk  #Concerning MRI findings for TM at level matching patient's reported numbness  #Prior URI, resolved  -Daily solumedrol 500mg BID (to be ordered and timed by day team, given 1g once this evening)  -Consider LP in AM  -Consider other antibodies given history of ON  -Consider rheumatology consult  -PT/OT (ordered)  -PMR (not yet ordered)      #Concern for UTI  -Repeat UA and follow, ceftriaxone x1 given at OSH    CHRONIC PROBLEMS    #COPD and/or asthma.    At the time of my exam there does not appear to be an acute exacerbation.  I will continue her recently started Incruse Ellipta inhaler.  I will have albuterol nebs available every 4 hours as needed.  If she develops more significant wheezing I would consider increasing her steroid (prior to admission she has been taking 5 mg of prednisone daily for temporal arteritis and lupus).     #Chronic diastolic and systolic congestive heart failure.    -Previous echocardiogram showed ejection fraction of 45% and stage II diastolic congestive heart failure.  She does not appear to have decompensated heart failure at this time.  In the setting of progressive shortness of breath I would have low threshold to work this up with  Echocardiogram     #Hypertension.    Continue prior to admission metoprolol and amlodipine,  Lasix.       #History of lupus, Sjogren's syndrome, and temporal arteritis.    She takes prednisone 5 mg daily.  This will be continued.     #Previous TIA and  stroke.    -asa and plavix continued    #Hypercholesterolemia.    Continue prior to admission Zocor.    Activity: 2x assist  Diet: adat  IVF: TKO  DVT ppx: lovenox 30  Bowel ppx: ranitidine  Lines: PIV  Code: Full  Dispo: It's not clear that this patient requires ICU status, can be transferred by day team    Patient was seen and discussed with Dr. Orellana.    Gianluca Frederick MD/PhD  [facility] Neurology  [phone]      ROS:  A 10-point ROS was performed, negative except as per HPI.    Physical Examination:  Vitals: Temp: 98.1  F (36.7  C) Temp src: Oral BP: (!) 160/92   Heart Rate: 77 Resp: 16 SpO2: 96 % O2 Device: None (Room air)    GENERAL: Pleasant and cooperative. No acute distress.  EYES: Pupils equal and round. No scleral erythema or icterus.  Vision impaired.  ENT: External ears are normal without deformity. Posterior oropharynx is without erythem, swelling, or exudate.  NECK: Supple. No masses or swelling. No tenderness. Thyroid is normal without mass or tenderness.  CHEST: Clear to auscultation. Normal breath sounds. No retractions.   CV: Regular rate and rhythm. No JVD. Pulses normal.  ABDOMEN: Bowel sounds present. No tenderness. No masses or hernia.  EXTREMETIES: No clubbing, cyanosis, or ischemia.  No peripheral edema.  SKIN: Warm and dry to touch. No wounds or rashes.  .Neuro:   Mental status: alert and oriented to person, place, and time; language is fluent with intact comprehension and naming   Cranial nerves: right pupil 4mm sluggishly reactive to light left pupil 3-4mm and briskly reactive, right eye exotropia, diminished right eye visual acuity, fundi within normal limits, no facial asymmetry or ptosis, facial sensation intact and symmetric, hearing intact to conversation, tongue and uvula are midline, no hypophonia, no dysarthria  .  Alert. CN II-XII grossly intact, no pronator drift, normal finger-nose-finger.  Gross muscle strength intact of the proximal and distal bilateral  upper and lower extremities except marginal weakness in RLE with plantar flexion and hip extension (hip secondary to chronic hip pain). Upward toe right side    Left-sided downward toe.  Poor rectal tone.     Sensation intact to light touch in all 4 extremities, reports a tingling sensation from the lower rib cage downward bilaterally with associated change in light touch however not pinprick or temperature or propioception.  2+ patellar and 1+ bilateral Achilles reflexes.  Right sided foot drop with gait.    Investigations:  Recent Results (from the past 24 hour(s))   MR Cervical Spine w/o & w Contrast    Narrative    MRI CERVICAL SPINE WITHOUT AND WITH CONTRAST 6/19/2019 9:01 PM     HISTORY: Bilateral lower back pain and leg numbness since 6/3/2019.  History of lupus and Sjogren's syndrome with subsequent optic neuritis  and right-sided vision loss. Right foot feels heavy, difficulty  picking it up.    TECHNIQUE: Multiplanar, multisequence MRI of the cervical spine  without and with 10 mL Gadavist.    COMPARISON: None.    FINDINGS: The cervical spinal cord and visualized portions of the  thoracic spinal cord appear normal.  The visualized portions of the  brainstem and cerebellum appear normal.   No abnormal gadolinium  enhancement is seen in the thecal sac or spinal cord.    Alignment: Normal.    Craniocervical Junction and C1-C2:  Normal.    C2-C3:  Normal disc, facet joints, spinal canal and neural foramina.    C3-C4:  Normal disc, facet joints, spinal canal and neural foramina.    C4-C5:  Mild degenerative disc disease, mild bilateral degenerative  facet arthropathy, otherwise normal.    C5-C6:  Mild degenerative disc disease, otherwise normal.    C6-C7:  Mild degenerative disc disease, otherwise normal.    C7-T1: Normal disc, facet joints, spinal canal and neural foramina.    T1-T2:  Normal disc, facet joints, spinal canal and neural foramina.     T2-T3:  Normal disc, facet joints, spinal canal and neural  foramina.    Paraspinous Soft Tissues:  Normal as visualized.    Bone Marrow:  Normal signal intensity.      Impression    IMPRESSION:    1. No spinal cord abnormality to indicate transverse myelitis.  2. Degenerative changes.      JALEN BLANK MD   MR Thoracic Spine w/o & w Contrast    Narrative    MRI THORACIC SPINE WITHOUT AND WITH CONTRAST  6/19/2019 9:01 PM     HISTORY: Mid-back/T-spine pain since 6/3/2019. Concern for transverse  myelitis, numbness rib cage down.    TECHNIQUE: Multiplanar multisequence MRI of the thoracic spine without  and with 10 mL Gadavist.    COMPARISON: None.    FINDINGS: There is an expansile lesion in the spinal cord from T4  through mid T5 showing slight T1 hypointensity, T2 hyperintensity and  diffuse gadolinium enhancement along the right side of the cord over a  3.0 cm length by 0.5 x 0.3 cm. Above and below this there is mild  spinal cord edema. No other similar lesions are seen elsewhere.     Schmorl's nodes are seen in the superior endplates of T2 and T3. The  other discs appear normal.  All the neural foramina and facet joints  appear normal.  The paraspinous soft tissues appear normal.      Impression    IMPRESSION:  Focal gadolinium enhancing lesion from T4 through T5 in  the spinal cord on the right side with edema extending from T3-T4 down  to lower T5. This is most likely transverse myelitis, with  differential diagnosis including a spinal cord astrocytoma.    JALEN BLANK MD       Results for orders placed or performed during the hospital encounter of 06/19/19 (from the past 24 hour(s))   CBC with platelets differential   Result Value Ref Range    WBC 6.0 4.0 - 11.0 10e9/L    RBC Count 4.33 3.8 - 5.2 10e12/L    Hemoglobin 14.1 11.7 - 15.7 g/dL    Hematocrit 43.8 35.0 - 47.0 %     (H) 78 - 100 fl    MCH 32.6 26.5 - 33.0 pg    MCHC 32.2 31.5 - 36.5 g/dL    RDW 13.2 10.0 - 15.0 %    Platelet Count 192 150 - 450 10e9/L    Diff Method Automated Method     %  Neutrophils 65.8 %    % Lymphocytes 24.0 %    % Monocytes 8.2 %    % Eosinophils 1.5 %    % Basophils 0.2 %    % Immature Granulocytes 0.3 %    Nucleated RBCs 0 0 /100    Absolute Neutrophil 3.9 1.6 - 8.3 10e9/L    Absolute Lymphocytes 1.4 0.8 - 5.3 10e9/L    Absolute Monocytes 0.5 0.0 - 1.3 10e9/L    Absolute Eosinophils 0.1 0.0 - 0.7 10e9/L    Absolute Basophils 0.0 0.0 - 0.2 10e9/L    Abs Immature Granulocytes 0.0 0 - 0.4 10e9/L    Absolute Nucleated RBC 0.0    Basic metabolic panel   Result Value Ref Range    Sodium 138 133 - 144 mmol/L    Potassium 3.8 3.4 - 5.3 mmol/L    Chloride 103 94 - 109 mmol/L    Carbon Dioxide 28 20 - 32 mmol/L    Anion Gap 7 3 - 14 mmol/L    Glucose 85 70 - 99 mg/dL    Urea Nitrogen 18 7 - 30 mg/dL    Creatinine 0.98 0.52 - 1.04 mg/dL    GFR Estimate 57 (L) >60 mL/min/[1.73_m2]    GFR Estimate If Black 66 >60 mL/min/[1.73_m2]    Calcium 9.3 8.5 - 10.1 mg/dL   Magnesium   Result Value Ref Range    Magnesium 2.0 1.6 - 2.3 mg/dL   UA with Microscopic   Result Value Ref Range    Color Urine Yellow     Appearance Urine Clear     Glucose Urine Negative NEG^Negative mg/dL    Bilirubin Urine Negative NEG^Negative    Ketones Urine Negative NEG^Negative mg/dL    Specific Gravity Urine 1.013 1.003 - 1.035    Blood Urine Negative NEG^Negative    pH Urine 6.0 5.0 - 7.0 pH    Protein Albumin Urine Negative NEG^Negative mg/dL    Urobilinogen mg/dL Normal 0.0 - 2.0 mg/dL    Nitrite Urine Positive (A) NEG^Negative    Leukocyte Esterase Urine Moderate (A) NEG^Negative    Source Midstream Urine     WBC Urine 19 (H) 0 - 5 /HPF    RBC Urine 1 0 - 2 /HPF    Bacteria Urine Moderate (A) NEG^Negative /HPF    Mucous Urine Present (A) NEG^Negative /LPF   Urine Culture   Result Value Ref Range    Specimen Description Midstream Urine     Special Requests Specimen received in preservative     Culture Micro PENDING    MR Cervical Spine w/o & w Contrast    Narrative    MRI CERVICAL SPINE WITHOUT AND WITH CONTRAST  6/19/2019 9:01 PM     HISTORY: Bilateral lower back pain and leg numbness since 6/3/2019.  History of lupus and Sjogren's syndrome with subsequent optic neuritis  and right-sided vision loss. Right foot feels heavy, difficulty  picking it up.    TECHNIQUE: Multiplanar, multisequence MRI of the cervical spine  without and with 10 mL Gadavist.    COMPARISON: None.    FINDINGS: The cervical spinal cord and visualized portions of the  thoracic spinal cord appear normal.  The visualized portions of the  brainstem and cerebellum appear normal.   No abnormal gadolinium  enhancement is seen in the thecal sac or spinal cord.    Alignment: Normal.    Craniocervical Junction and C1-C2:  Normal.    C2-C3:  Normal disc, facet joints, spinal canal and neural foramina.    C3-C4:  Normal disc, facet joints, spinal canal and neural foramina.    C4-C5:  Mild degenerative disc disease, mild bilateral degenerative  facet arthropathy, otherwise normal.    C5-C6:  Mild degenerative disc disease, otherwise normal.    C6-C7:  Mild degenerative disc disease, otherwise normal.    C7-T1: Normal disc, facet joints, spinal canal and neural foramina.    T1-T2:  Normal disc, facet joints, spinal canal and neural foramina.     T2-T3:  Normal disc, facet joints, spinal canal and neural foramina.    Paraspinous Soft Tissues:  Normal as visualized.    Bone Marrow:  Normal signal intensity.      Impression    IMPRESSION:    1. No spinal cord abnormality to indicate transverse myelitis.  2. Degenerative changes.      JALEN BLANK MD   MR Thoracic Spine w/o & w Contrast    Narrative    MRI THORACIC SPINE WITHOUT AND WITH CONTRAST  6/19/2019 9:01 PM     HISTORY: Mid-back/T-spine pain since 6/3/2019. Concern for transverse  myelitis, numbness rib cage down.    TECHNIQUE: Multiplanar multisequence MRI of the thoracic spine without  and with 10 mL Gadavist.    COMPARISON: None.    FINDINGS: There is an expansile lesion in the spinal cord from T4  through  mid T5 showing slight T1 hypointensity, T2 hyperintensity and  diffuse gadolinium enhancement along the right side of the cord over a  3.0 cm length by 0.5 x 0.3 cm. Above and below this there is mild  spinal cord edema. No other similar lesions are seen elsewhere.     Schmorl's nodes are seen in the superior endplates of T2 and T3. The  other discs appear normal.  All the neural foramina and facet joints  appear normal.  The paraspinous soft tissues appear normal.      Impression    IMPRESSION:  Focal gadolinium enhancing lesion from T4 through T5 in  the spinal cord on the right side with edema extending from T3-T4 down  to lower T5. This is most likely transverse myelitis, with  differential diagnosis including a spinal cord astrocytoma.    JALEN BLANK MD       PMH:  Patient Active Problem List   Diagnosis     Optic neuritis     Acute respiratory failure with hypoxia (H)     Pulmonary embolism (H)       PSH:  Past Surgical History:   Procedure Laterality Date     CHOLECYSTECTOMY       GYN SURGERY      hysterectomy     GYN SURGERY      tubal ligation       Allergies:  Allergies   Allergen Reactions     Prevacid [Lansoprazole] Rash       Medications:    Current Facility-Administered Medications:      glucose gel 15-30 g, 15-30 g, Oral, Q15 Min PRN **OR** dextrose 50 % injection 25-50 mL, 25-50 mL, Intravenous, Q15 Min PRN **OR** glucagon injection 1 mg, 1 mg, Subcutaneous, Q15 Min PRN, Gianluca Frederick MD     insulin aspart (NovoLOG) inj (RAPID ACTING), 1-7 Units, Subcutaneous, TID AC, Gianluca Frederick MD     insulin aspart (NovoLOG) inj (RAPID ACTING), 1-5 Units, Subcutaneous, At Bedtime, Gianluca Frederick MD     magnesium sulfate 2 g in NS intermittent infusion (PharMEDium or FV Cmpd), 2 g, Intravenous, Daily PRN, Gianluca Frederick MD     magnesium sulfate 4 g in 100 mL sterile water (premade), 4 g, Intravenous, Q4H PRN, Gianluca Frederick MD     potassium chloride (KLOR-CON) Packet 20-40  mEq, 20-40 mEq, Oral or Feeding Tube, Q2H PRN, Gianluca Frederick MD     potassium chloride 10 mEq in 100 mL intermittent infusion with 10 mg lidocaine, 10 mEq, Intravenous, Q1H PRN, Gianluca Frederick MD     potassium chloride 10 mEq in 100 mL sterile water intermittent infusion (premix), 10 mEq, Intravenous, Q1H PRN, Gianluca Frederick MD     potassium chloride 20 mEq in 50 mL intermittent infusion, 20 mEq, Intravenous, Q1H PRN, Gianluca Frederick MD     potassium chloride ER (K-DUR/KLOR-CON M) CR tablet 20-40 mEq, 20-40 mEq, Oral, Q2H PRN, Gianluca Frederick MD     ranitidine (ZANTAC) tablet 150 mg, 150 mg, Oral, BID, Gianluca Frederick MD    Social History:  Social History     Tobacco Use     Smoking status: Former Smoker   Substance Use Topics     Alcohol use: Yes     Alcohol/week: 4.2 oz     Types: 7 Shots of liquor per week       Family History:  No neurologic diseases reported    This note was written with the assistance of the Dragon voice-dictation technology software. The final document, although reviewed, may contain errors. For corrections, please contact the office.

## 2019-06-20 NOTE — PLAN OF CARE
Pt arrived from  @ approx 1330, admitted for numbness from breast-line down to feet, work-up for possible transverse myelitis. 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 PIV SL, regular diet, grier catheter in place and needs to be pulled, up AO2 w/GB/walker and pivot, had BM this morning per 4A RN, denies pain, has mepilex on coccyx for preventative. Plan for LP later this evening, Rheumatology consulted for hx for Lupus. Continue to monitor per MD orders.

## 2019-06-20 NOTE — PLAN OF CARE
PT 4A: Up with CGA to min A x 2 with FWW and gait belt. Transfer towards patient's left side as able. PT following daily.     Discharge Planner PT   Patient plan for discharge: Home - open to rehab if needed pending insurance coverage; pt concerned about financial support  Current status: Evaluation complete and treatment indicated. Engaged pt in bed mobility at [facility] with HOB elevated, sit <> stand from elevated EOB with FWW at CGA x 2, stepping transfer EOB > recliner with FWW at CGA to min A with poor LE sequencing, and seated B LE strengthening exercises to be completed IND.   Barriers to return to prior living situation: medical status, stairs (16) to enter home, lives alone (son lives on floor above her), tub shower  Recommendations for discharge: ARU  Rationale for recommendations: Pt is well below her IND baseline for mobility. She has strong family support, is highly motivated, and will tolerate at least 3 hours of therapy/day. She has needs for at least 2 disciplines as well. She is limited by impairments to strength, balance, posture, coordination, and endurance.        Entered by: Alysia Monae 06/20/2019 11:29 AM

## 2019-06-20 NOTE — PROGRESS NOTES
"  [facility] Neurology     Patient Name:  Ladonna Sheriff  MRN:  [id number]    :  1945  Date of Service:  2019  Primary care provider:  Joceline Ty      Summary:   Ladonna Sheriff is a 74 year old female w/ history of previous stroke, TIA, discoid lupus, Sjogren's syndrome, temporal arteritis, optic neuritis with vision impairment in right eye, chronic daily low dosed prednisone (5 mg daily), hypertension, hypercholesterolemia, diastolic and possibly systolic congestive heart failure, recently diagnosed asthma and/or COPD, previous hysterectomy, and previous tubal ligation. She presented  with 2.5 weeks of  progressive ascending numbness up to around T7/T8 and R foot weakness.     Additional Pertintent History:   Ms. Sheriff reports she first began experiencing R foot numbness on 6/3 which progressively spread to her L extremity and ascended up to her abdomen (~T5 when she points). Started to progressively have difficulty walking, but was able to ambulate with occasional use of a cane or walker and was able to stand from sitting. She reports at first it was difficult to walk d/t mild weakness of RLE and balance problems, but yesterday for the first time she was unable to walk at all without assistance d/t \"dragging\" of her R foot. She was able to stand up from sitting yesterday. Her more severe difficulty with gait is what brought her to the ED yesterday. Additionally endorses a tingling sensation from T5 downwards. Denies any symptoms in UE, LE pain, LLE weakness, difficulty swallowing or speaking, new vision changes, bowel or bladder incontinence, fevers, chills, or illness this month. Does endorse constipation and difficulty feeling her bowel movements until right before defecation, but these symptoms have been going on for years and she does not think it has changed within the past month, except that she now as numbness around her buttocks and " "perineum. She reports she did have a \"cold\" at the end of April that lasted ~3 weeks; she was never seen by a health care professional for this. Also reports her son began experiencing similar sx (LLE ascending numbness and weakness up to his mid-back) in December 2018 and was recently prescribed a large dose of a daily steroid. She is unsure if he was ill within the months leading up to his symptoms and reports he is otherwise healthy. The only thing they have in common is that they live in the same apartment building (diff apt rooms) and both have two cats.     She first was diagnosed with Lupus in 1983 (living outside of [facility]) d/t sx of fatigue and has never had a lupus flare she is aware of. She was on Plaquenil for a few years after her diagnosis, but stopped taking this >15 years ago (unsure of exact date). She has not seen a Rheumatologist in >15 years (unsure of date). Reports she thinks she was also diagnosed with Sjogrens, temporal arteritis, and optic neuritis in the early 1990s; symptomatic in eyes, but no hx of specific Sjogrens symptoms that she is aware of.     Subjective:   Today, she reports she has not had any headaches or back pain since her LP yesterday. She feels like her RLE feels different this AM than the day prior; it feels \"more numb\" from her toes up to her hip. She notes when she tried to use the restroom this morning she felt like her R leg was dragging more and that she felt a \"popping pressure\" in her hip when she sat down, but no pain. Her numbness/weakness otherwise feels similar to yesterday. Denies any new bowel/bladder concerns, constipation, UE sensory or motor symptoms, or vision changes.     ROS  A 10-point ROS was performed as per HPI, all other negative.     PMH  Past Medical History:   Diagnosis Date     Cerebral infarction (H)      CHF (congestive heart failure) (H)      Hypertension      Lupus (H)      Lupus (H)      Optic neuritis      Optic neuritis      Sjogren's " syndrome (H)      Sjogren's syndrome (H)      Temporal arteritis (H)      TIA (transient ischemic attack)      Uncomplicated asthma      Past Surgical History:   Procedure Laterality Date     CHOLECYSTECTOMY       GYN SURGERY      hysterectomy     GYN SURGERY      tubal ligation     Medications   Medications Prior to Admission   Medication Sig Dispense Refill Last Dose     albuterol (PROAIR HFA/PROVENTIL HFA/VENTOLIN HFA) 108 (90 Base) MCG/ACT inhaler Inhale 2 puffs into the lungs every 6 hours as needed    6/19/2019 at Unknown time     amLODIPine (NORVASC) 10 MG tablet Take 10 mg by mouth every evening    6/18/2019 at pm     aspirin (ASA) 325 MG EC tablet Take 325 mg by mouth every evening    6/18/2019 at Unknown time     B Complex-C (VITAMIN B COMPLEX W/VITAMIN C) TABS tablet Take 1 tablet by mouth daily   6/19/2019 at am     Calcium-Vitamin D 600-200 MG-UNIT TABS Take 1 tablet by mouth every evening    6/18/2019 at pm     Clopidogrel Bisulfate (PLAVIX PO) Take 75 mg by mouth every morning    6/19/2019 at am     FLUOXETINE HCL PO Take 20 mg by mouth daily   6/19/2019 at am     furosemide (LASIX) 20 MG tablet Take 20 mg by mouth 3 times daily   6/19/2019 at am     METOPROLOL TARTRATE PO Take 50 mg by mouth 2 times daily    6/19/2019 at am     Multiple Vitamins-Minerals (MULTIVITAL PO) Take 1 tablet by mouth every morning    6/19/2019 at am     Potassium Gluconate 595 MG TBCR Take 1 tablet by mouth every evening    6/18/2019 at pm     PREDNISONE PO Take 5 mg by mouth every morning    6/19/2019 at am     simvastatin (ZOCOR) 20 MG tablet Take 20 mg by mouth At Bedtime   6/18/2019 at pm     TRAZODONE HCL PO Take 100 mg by mouth At Bedtime   6/18/2019 at pm     umeclidinium (INCRUSE ELLIPTA) 62.5 MCG/INH inhaler Inhale 1 puff into the lungs daily   6/19/2019 at am     vitamin C (ASCORBIC ACID) 100 MG tablet Take 500 mg by mouth every morning    6/19/2019 at am     Allergies  Allergies   Allergen Reactions     Prevacid  [Lansoprazole] Rash     Social History  Social History     Tobacco Use     Smoking status: Former Smoker   Substance Use Topics     Alcohol use: Yes     Alcohol/week: 4.2 oz     Types: 7 Shots of liquor per week     Family History    Family History   Problem Relation Age of Onset     Asthma Mother      Lupus Sister      Bone Cancer Brother      Kidney Cancer Sister      Physical Examination   Vitals: /63 (BP Location: Right arm)   Pulse 85   Temp 97.2  F (36.2  C) (Oral)   Resp 16   Wt 97 kg (213 lb 13.5 oz)   SpO2 95%   BMI 33.49 kg/m     General: Adult patient, lying in bed, NAD  HEENT: NC/AT, no icterus, op pink and moist, R eye exotropia  Chest: non-labored on RA  Abdomen: S/NT/ND   Extremities: Warm, no edema  Skin: No rash or lesion   Psych: Mood pleasant, affect congruent, talkative   Neuro:  Mental Status: Fully alert, attentive, and oriented to person, place, and time. Speech clear and fluent with intact comprehension and repetition.   Cranial Nerves: Vision absent in R eye, crescent-shaped visual field defect in L eye in LUQ otherwise normal. R eye sluggishly reactive to light, L eye briskly reactive to light, no apd. Dysconjugate gaze (R eye abducted @ midline gaze), but EOMI with normal smooth pursuit, able to bury sclera in all cardinal directions but diminished ability for convergence in R eye. Facial sensation intact/symmetric. Facial movements symmetric. Hearing not formally tested but intact to conversation. No dysarthria. Shoulder shrug strong bilaterally. Tongue protrusion midline.  Motor: No abnormal movements. Normal tone throughout. Strength 5/5 in b/l biceps, triceps, wrist extension, wrist flexion, finger extension, plantar flexion.  5 strength L hip flexion, 5- in R hip flexion.5 L knee flexion/extension, 5- R knee flexion/extension. 5- strength R dorsiflexion, 5 L dorsiflexion. 5- R dorsiflexion of R  Hallux, 5/5 in R  Reflexes: 2+ and symmetric in brachioradialis, biceps,  "triceps, patella (R>L). B/l abductor cross reactivity, R>L. 1+ achilles bilaterally. Toes up-going bilaterally, but R>L.   Sensory: Intact/symmetric to light touch in UE. Symmetric but diminished light tough (endorses \"numbness\") in bilateral LE up to ~T7/T8 (same on sides and ventral abdomen). Proprioception absent in R hallux, but present in L hallux and bilateral ankles.    Coordination: FNF & HS intact without dysmetria, but some difficulties with movement of RLE d/t hx of prior knee injury + proprioception.  Station/Gait: Deferred     Investigations   MRI Cervical Spine 6/19/19  \"FINDINGS: The cervical spinal cord and visualized portions of the  thoracic spinal cord appear normal.  The visualized portions of the  brainstem and cerebellum appear normal.   No abnormal gadolinium  enhancement is seen in the thecal sac or spinal cord.     Alignment: Normal.  Craniocervical Junction and C1-C2:  Normal.  C2-C3:  Normal disc, facet joints, spinal canal and neural foramina.  C3-C4:  Normal disc, facet joints, spinal canal and neural foramina.  C4-C5:  Mild degenerative disc disease, mild bilateral degenerative  facet arthropathy, otherwise normal.  C5-C6:  Mild degenerative disc disease, otherwise normal.  C6-C7:  Mild degenerative disc disease, otherwise normal.  C7-T1: Normal disc, facet joints, spinal canal and neural foramina.  T1-T2:  Normal disc, facet joints, spinal canal and neural foramina.   T2-T3:  Normal disc, facet joints, spinal canal and neural foramina.  Paraspinous Soft Tissues:  Normal as visualized.  Bone Marrow:  Normal signal intensity.                                              IMPRESSION:    1. No spinal cord abnormality to indicate transverse myelitis.  2. Degenerative changes. \"    MRI Thoracic Spine 6/19/19  \"FINDINGS: There is an expansile lesion in the spinal cord from T4 through mid T5 showing slight T1 hypointensity, T2 hyperintensity and diffuse gadolinium enhancement along the right " "side of the cord over a 3.0 cm length by 0.5 x 0.3 cm. Above and below this there is mild spinal cord edema. No other similar lesions are seen elsewhere.      Schmorl's nodes are seen in the superior endplates of T2 and T3. The other discs appear normal.  All the neural foramina and facet joints appear normal.  The paraspinous soft tissues appear normal.                                                       IMPRESSION:    Focal gadolinium enhancing lesion from T4 through T5 in the spinal cord on the right side with edema extending from T3-T4 down to lower T5. This is most likely transverse myelitis, with differential diagnosis including a spinal cord astrocytoma.\"    MRI Brain w/o and w/ contrast 6/20/19  \"Findings: The images demonstrate no mass lesions, midline shift, or abnormal intraaxial or extraaxial fluid collections. There are, however, greater than 20 foci of T2-hyperintensity within the cerebral white matter that raise the question of underlying demyelinating disease. Specifically, there are lesions within the periventricular,  subcortical and pericallosal white matter as well as within the cerebellar hemispheres. The T1-weighted images do not show any areas of severe hypointensity. There is no significant cerebral atrophy. Following the administration of intravenous contrast, there is a single focus of punctate contrast enhancement within the left parieto-occipital region with associated surrounding T2 hyperintense signal (series 12, image 13)..      Ventricles are proportionate to the cerebral sulci. Weighted images reveal no abnormal reduced diffusion. Susceptibility weighted imaging reveals no intracranial hemorrhage. Flow voids within the major intracranial vessels are present. Paranasal sinuses and mastoid air cells are relatively clear. There is left-sided pseudophakia..                                                                  Impression:   The study demonstrates greater than 20 foci of " "T2-hyperintensity within the cerebral white matter which is suspicious for demyelination. Enhancing focus within the left parieto-occipital region is suspicious for active demyelination in the absence of known malignancy. No less likely, the differential for white matter lesions includes sequela of infectious or inflammatory insults, and vasculopathy \"     Ref. Range 6/20/2019 05:08   Sodium Latest Ref Range: 133 - 144 mmol/L 140   Potassium Latest Ref Range: 3.4 - 5.3 mmol/L 3.7   Chloride Latest Ref Range: 94 - 109 mmol/L 109   Carbon Dioxide Latest Ref Range: 20 - 32 mmol/L 26   Urea Nitrogen Latest Ref Range: 7 - 30 mg/dL 14   Creatinine Latest Ref Range: 0.52 - 1.04 mg/dL 0.82   GFR Estimate Latest Ref Range: >60 mL/min/1.73_m2 70   GFR Estimate If Black Latest Ref Range: >60 mL/min/1.73_m2 81   Calcium Latest Ref Range: 8.5 - 10.1 mg/dL 8.7   Anion Gap Latest Ref Range: 3 - 14 mmol/L 6   Magnesium Latest Ref Range: 1.6 - 2.3 mg/dL 2.0   Albumin Latest Ref Range: 3.4 - 5.0 g/dL 3.3 (L)   Protein Total Latest Ref Range: 6.8 - 8.8 g/dL 8.0   Bilirubin Total Latest Ref Range: 0.2 - 1.3 mg/dL 0.4   Alkaline Phosphatase Latest Ref Range: 40 - 150 U/L 71   ALT Latest Ref Range: 0 - 50 U/L 40   AST Latest Ref Range: 0 - 45 U/L 22   Hemoglobin A1C Latest Ref Range: 0 - 5.6 % 5.1   CRP Inflammation Latest Ref Range: 0.0 - 8.0 mg/L <2.9   Procalcitonin Latest Units: ng/ml <0.05     SUSAN Ab IgG by IFA w/ Reflex: pending   C3: 110  C4: 16  ESR: 17 (ref range 0-30)  Angiotensin converting enzyme: pending     B12: 396  Vit D: 40     Ref. Range 6/20/2019 05:08   WBC Latest Ref Range: 4.0 - 11.0 10e9/L 5.4   Hemoglobin Latest Ref Range: 11.7 - 15.7 g/dL 13.6   Hematocrit Latest Ref Range: 35.0 - 47.0 % 42.9   Platelet Count Latest Ref Range: 150 - 450 10e9/L 173   RBC Count Latest Ref Range: 3.8 - 5.2 10e12/L 4.26   MCV Latest Ref Range: 78 - 100 fl 101 (H)   MCH Latest Ref Range: 26.5 - 33.0 pg 31.9   MCHC Latest Ref Range: " 31.5 - 36.5 g/dL 31.7   RDW Latest Ref Range: 10.0 - 15.0 % 13.3      Ref. Range 6/20/2019 03:43   Color Urine Unknown Light Yellow   Appearance Urine Unknown Clear   Glucose Urine Latest Ref Range: NEG^Negative mg/dL Negative   Bilirubin Urine Latest Ref Range: NEG^Negative  Negative   Ketones Urine Latest Ref Range: NEG^Negative mg/dL Negative   Specific Gravity Urine Latest Ref Range: 1.003 - 1.035  1.012   pH Urine Latest Ref Range: 5.0 - 7.0 pH 6.5   Protein Albumin Urine Latest Ref Range: NEG^Negative mg/dL Negative   Urobilinogen mg/dL Latest Ref Range: 0.0 - 2.0 mg/dL Normal   Nitrite Urine Latest Ref Range: NEG^Negative  Positive (A)   Blood Urine Latest Ref Range: NEG^Negative  Negative   Leukocyte Esterase Urine Latest Ref Range: NEG^Negative  Small (A)   Source Unknown Catheterized Urine   WBC Urine Latest Ref Range: 0 - 5 /HPF 8 (H)   RBC Urine Latest Ref Range: 0 - 2 /HPF <1     Culture Micro Abnormal  06/20/2019  3:43    10,000 to 50,000 colonies/mL Escherichia coli   Susceptibility testing in progress      WNV: pending     6/20/19 CSF  Gram stain: No organisms or WBCs seen  Culture aerobic: pending   Enterovirus PCR: negative  Varicella zoster: negative    WBC 0   Glucose 72  Protein 63  Oligoclonal banding: pending     [place] Send-Out, CNS demyelinating disease evaluation, LSMISC, ARMISC or MMMISC: pending      Impression  Ladonna Sheriff is a 74 year old female w/ history of previous stroke, TIA, discoid lupus, Sjogren's syndrome, temporal arteritis, optic neuritis with vision impairment in right eye, chronic daily low dosed prednisone (5 mg daily), hypertension, hypercholesterolemia, diastolic and possibly systolic congestive heart failure, recently diagnosed asthma and/or COPD, previous hysterectomy, and previous tubal ligation. She presented 6/19 with 2.5 weeks of progressive ascending numbness up to around T7/T8 and R foot weakness.     Physical exam with diminished sensation in BLE up  "to around T7/T8, but not completely absent. Proprioception impaired in R hallux with bilateral positive Babinski, b/l abductor cross reactivity (R>L), and diminished reflexes in bilateral achilles. Cervical MRI unremarkable, but thoracic MRI w/ T4-T5 enhancing lesions w/ surrounding edema and head MRI with multiple T2 hyperintensities & one small enhancing focus. Her symptoms and workup so far are concerning for a demyelinating disorder. 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 Etiology also possibly of other autoimmune origin, either related to her prior autoimmune disease or disparate - Although lupus/sjogren's-associated TM is rare and she has discoid subtype of lupus. LP was obtained in the evening of 6/21/19 with many studies still pending. So far, CSF with elevated protein, glucose, and RBCs, but absent WBCs. Enterovirus negative, but other infectious workup still pending. Other immunological workup also pending. C3, C4, ESR, and CRP wnl, with SUSAN pending. Today since she is reporting subjective RLE progression of numbness, it is in her best interest to initiate PLEX to slow progression.     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    - Initiate nitrofurantion 100 mg BID x5 days    CHRONIC PROBLEMS     #COPD and/or asthma.    - Continue her recently started Incruse Ellipta inhaler  - Albuterol nebs available q4h prn     #Chronic diastolic and systolic congestive heart failure.    Previous echocardiogram showed ejection fraction of 45% and stage II diastolic congestive heart failure. She does not appear to have decompensated heart failure at this time.  In the setting of progressive shortness of breath would have low threshold to work this up with echocardiogram.  #Hypertension.    - Continue PTA metoprolol, amlodipine, Lasix.    - daily weights     #History of discoid lupus, Sjogren's syndrome, and temporal arteritis.    - solumedrol 1g daily, continue PTA prednisone 5 mg daily     #Previous TIA and stroke.    - Continue PTA ASA and plavix      #Hypercholesterolemia.    - Continue PTA Zocor     Activity: Up with assist  Diet: ADA,: reg   DVT ppx: lovenox currently being held   Bowel ppx: ranitidine  Lines: PIV  Code: Full  Dispo: Pending definitive diagnosis and treatment plan      Thank you for involving Neurology in the care of Ladonna Sheriff.  Please do not hesitate to call with questions/concerns (consult pager [phone]).      Zenobia Bañuelos, MS4     Patient was seen and discussed with Dr. Macias.    Resident/Fellow Attestation   I, Juan Luis Cho, was present with the medical student who participated in the service and in the documentation of the note.  I have verified the history and personally performed the physical exam and medical decision making.  I agree with the assessment and plan of care as documented in the note.      Juan Luis Cho MD  PGY2  Date of Service (when I saw the patient): 06/21/19

## 2019-06-20 NOTE — PLAN OF CARE
"Transfer  Transferred to: 6A  Via:wheelchair  Reason for transfer:Pt appropriate for 6A   Family: Aware of transfer  Belongings:Sent with pt  Chart:Sent with pt  Medications: Meds from bin sent with pt  Report called to: JOHN Kincaid @ 1300, pt up at 1330 via wheelchair.     D/I/A:    HEENT:  Glasses at bedside. 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   Neuro/Mental: A&O. Afebrile. No c/o pain. WNL neuro except for strength and pain perception in trunk under nipple line and B/L LE.50% able to discriminate in RLE to sharp/dull touch and 75% on LLE. Numbness and tingling B/L LE, but R>L. Pt describes it as \"feeling like a blanket overtop\" during sharp/dull sensation testing. Able to move +2 assist with staff/walker to commode/wheelchair.   Resp: LS clear. Oxygen: RA.   CV:Sinus in 80s.Rare ectopy. BP WNL. See FS.Pulses: +2 radial/+2 pedal pulses.   Lines: PIV: 18, saline locked  GI/: BS+, Flatus+, BM+, soft. Palmer intact & draining for possible retention/neurogenic bladder.   Skin: Intact. Preventative sacral foam dressing to sacral-coccyx area.   Other/Labs: Up to chair x 1. Family at bedside.     P: Continue to monitor. Contact Neurocrit with changes  "

## 2019-06-20 NOTE — PROCEDURES
Procedure/Surgery Information   [facility] [place]     Procedure Note  Date of Service (when I performed the procedure): 06/20/2019    Procedure: Lumbar puncture    Indication:  Sedation is required to allow for Lumbar Puncture    Consent obtained from patient after discussing the risks, benefits and alternatives.    PO Intake:  Appropriately NPO for procedure    Objective:  Vitals were reviewed  Physical Exam    I have reviewed the lab findings, diagnostic data, medications, and the plan for procedure. I have determined this patient to be an appropriate candidate for the planned procedure and have reassessed the patient IMMEDIATELY PRIOR to ocedure.  Prior to the start of the procedure and with procedural staff participation, I verbally confirmed the patient s identity using two indicators, relevant allergies, that the procedure was appropriate and matched the consent or emergent situation, and that the correct equipment/implants were available. Immediately prior to starting the procedure I conducted the Time Out with the procedural staff and re-confirmed the patient s name, procedure, and site/side. (The Joint Commission universal protocol was followed.)  Yes    Lumbar puncture  Date/Time: 6/20/2019 6:11 PM  Performed by: Vasquez Macias MD  Authorized by: Vasquez Macias MD   Consent: Verbal consent obtained. Written consent obtained.  Consent given by: patient  Patient understanding: patient states understanding of the procedure being performed  Patient consent: the patient's understanding of the procedure matches consent given  Procedure consent: procedure consent matches procedure scheduled  Relevant documents: relevant documents present and verified  Test results: test results available and properly labeled  Site marked: the operative site was marked  Imaging studies: imaging studies available  Required items: required blood products, implants, devices, and  "special equipment available  Patient identity confirmed: verbally with patient, arm band and hospital-assigned identification number  Time out: Immediately prior to procedure a \"time out\" was called to verify the correct patient, procedure, equipment, support staff and site/side marked as required.  Indications: evaluation for infection (tranverse myeliytis)  Anesthesia: local infiltration and see MAR for details    Anesthesia:  Local Anesthetic: lidocaine 1% without epinephrine  Anesthetic total: 4 mL    Sedation:  Patient sedated: no    Preparation: Patient was prepped and draped in the usual sterile fashion.  Lumbar space: L4-L5 interspace  Patient's position: left lateral decubitus  Needle gauge: 18  Needle type: spinal needle - Quincke tip  Needle length: 2.5 in  Number of attempts: 1  Opening pressure: 20 cm H2O  Fluid appearance: clear  Tubes of fluid: 4  Total volume: 15 ml  Post-procedure: site cleaned, pressure dressing applied and adhesive bandage applied  Patient tolerance: Patient tolerated the procedure well with no immediate complications            Sedatives: Diluadid        Patient tolerance: Patient tolerated the procedure well with no immediate complications.    Time of sedation in minutes:  20 minutes from beginning to end of physician one to one monitoring.    Performed by: Vasquez Macias  Authorized by: Vasquez Macias  "

## 2019-06-20 NOTE — PHARMACY-ADMISSION MEDICATION HISTORY
Admission medication history interview status for this patient is complete. See [facility] admission navigator for allergy information, prior to admission medications and immunization status.     Medication history interview source(s):Patient  Medication history resources (including written lists, pill bottles, clinic record):Patient own list  Primary pharmacy:[facility] mail order and [facility]    Changes made to PTA medication list:  Added: Furosemide  Deleted: -  Changed: -    Actions taken by pharmacist (provider contacted, etc):None     Additional medication history information:None    Medication reconciliation/reorder completed by provider prior to medication history? No    Do you take OTC medications (eg tylenol, ibuprofen, fish oil, eye/ear drops, etc)? Y    Prior to Admission medications    Medication Sig Last Dose Taking? Auth Provider   albuterol (PROAIR HFA/PROVENTIL HFA/VENTOLIN HFA) 108 (90 Base) MCG/ACT inhaler Inhale 2 puffs into the lungs every 6 hours as needed  6/19/2019 at Unknown time Yes Reported, Patient   amLODIPine (NORVASC) 10 MG tablet Take 10 mg by mouth every evening  6/18/2019 at pm Yes Reported, Patient   aspirin (ASA) 325 MG EC tablet Take 325 mg by mouth every evening  6/18/2019 at Unknown time Yes Reported, Patient   B Complex-C (VITAMIN B COMPLEX W/VITAMIN C) TABS tablet Take 1 tablet by mouth daily 6/19/2019 at am Yes Unknown, Entered By History   Calcium-Vitamin D 600-200 MG-UNIT TABS Take 1 tablet by mouth every evening  6/18/2019 at pm Yes Reported, Patient   Clopidogrel Bisulfate (PLAVIX PO) Take 75 mg by mouth every morning  6/19/2019 at am Yes Reported, Patient   FLUOXETINE HCL PO Take 20 mg by mouth daily 6/19/2019 at am Yes Reported, Patient   furosemide (LASIX) 20 MG tablet Take 20 mg by mouth 3 times daily 6/19/2019 at am Yes Unknown, Entered By History   METOPROLOL TARTRATE PO Take 50 mg by mouth 2 times daily  6/19/2019 at am Yes Reported, Patient   Multiple  Vitamins-Minerals (MULTIVITAL PO) Take 1 tablet by mouth every morning  6/19/2019 at am Yes Reported, Patient   Potassium Gluconate 595 MG TBCR Take 1 tablet by mouth every evening  6/18/2019 at pm Yes Reported, Patient   PREDNISONE PO Take 5 mg by mouth every morning  6/19/2019 at am Yes Reported, Patient   simvastatin (ZOCOR) 20 MG tablet Take 20 mg by mouth At Bedtime 6/18/2019 at pm Yes Reported, Patient   TRAZODONE HCL PO Take 100 mg by mouth At Bedtime 6/18/2019 at pm Yes Reported, Patient   umeclidinium (INCRUSE ELLIPTA) 62.5 MCG/INH inhaler Inhale 1 puff into the lungs daily 6/19/2019 at am Yes Unknown, Entered By History   vitamin C (ASCORBIC ACID) 100 MG tablet Take 500 mg by mouth every morning  6/19/2019 at am Yes Reported, Patient

## 2019-06-20 NOTE — PLAN OF CARE
D/I: Patient admitted to unit 4A Surgical/Neuro ICU with below the chest numbness & tingling and BLE weakness and possible transverse myelitis  /74   Pulse 85   Temp 98.3  F (36.8  C) (Oral)   Resp 16   Wt 97 kg (213 lb 13.5 oz)   SpO2 92%   BMI 33.49 kg/m    Neuro- A & O x4.  BUEs no neuro deficit present.  Follow commands. ZHENG.  Numbness and tingling below chest.  BLE weakness, R weaker than L. Patient denies headaches, new vision changes (history of optic neuritis of R eye), and absent sensation in LEs.   CV- HR 80s, sinus rhythm with LBBB,  SBP <160.    Respiratory- room air, O2 sats >90%  GI-Clear liquid diet  - grier placed to prevent retention.  Good UOP.   Skin- Intact  Pain- Denies  See flowsheets for further interventions and assessments.   A: Stable  P: Continue to monitor pt closely. Notify MD of significant changes. Continue daily solumedrol and possible LP today.  Transfer to  when bed is available.

## 2019-06-21 ENCOUNTER — APPOINTMENT (OUTPATIENT)
Dept: OCCUPATIONAL THERAPY | Facility: CLINIC | Age: 74
DRG: 059 | End: 2019-06-21
Attending: INTERNAL MEDICINE
Payer: MEDICARE

## 2019-06-21 ENCOUNTER — APPOINTMENT (OUTPATIENT)
Dept: INTERVENTIONAL RADIOLOGY/VASCULAR | Facility: CLINIC | Age: 74
DRG: 059 | End: 2019-06-21
Attending: NURSE PRACTITIONER
Payer: MEDICARE

## 2019-06-21 LAB
ANA SER QL IF: NEGATIVE
BACTERIA SPEC CULT: ABNORMAL
BACTERIA SPEC CULT: ABNORMAL
C3 SERPL-MCNC: 110 MG/DL (ref 76–169)
C4 SERPL-MCNC: 16 MG/DL (ref 15–50)
DEPRECATED CALCIDIOL+CALCIFEROL SERPL-MC: 40 UG/L (ref 20–75)
GLUCOSE BLDC GLUCOMTR-MCNC: 107 MG/DL (ref 70–99)
GLUCOSE BLDC GLUCOMTR-MCNC: 146 MG/DL (ref 70–99)
GLUCOSE BLDC GLUCOMTR-MCNC: 146 MG/DL (ref 70–99)
GLUCOSE BLDC GLUCOMTR-MCNC: 155 MG/DL (ref 70–99)
GLUCOSE BLDC GLUCOMTR-MCNC: 156 MG/DL (ref 70–99)
GLUCOSE BLDC GLUCOMTR-MCNC: 161 MG/DL (ref 70–99)
Lab: ABNORMAL
Lab: ABNORMAL
MISCELLANEOUS TEST: NORMAL
SPECIMEN SOURCE: ABNORMAL
SPECIMEN SOURCE: ABNORMAL
SPECIMEN TYPE: NORMAL
VARICELLA ZOSTER DNA PCR COMMENT: NORMAL
VIT B12 SERPL-MCNC: 396 PG/ML (ref 193–986)
VZV DNA SPEC QL NAA+PROBE: NORMAL

## 2019-06-21 PROCEDURE — 76937 US GUIDE VASCULAR ACCESS: CPT

## 2019-06-21 PROCEDURE — 25000128 H RX IP 250 OP 636: Performed by: PHYSICIAN ASSISTANT

## 2019-06-21 PROCEDURE — C1769 GUIDE WIRE: HCPCS

## 2019-06-21 PROCEDURE — 25000132 ZZH RX MED GY IP 250 OP 250 PS 637: Mod: GY | Performed by: INTERNAL MEDICINE

## 2019-06-21 PROCEDURE — 36556 INSERT NON-TUNNEL CV CATH: CPT

## 2019-06-21 PROCEDURE — 25000132 ZZH RX MED GY IP 250 OP 250 PS 637: Mod: GY | Performed by: STUDENT IN AN ORGANIZED HEALTH CARE EDUCATION/TRAINING PROGRAM

## 2019-06-21 PROCEDURE — 86255 FLUORESCENT ANTIBODY SCREEN: CPT

## 2019-06-21 PROCEDURE — 25800030 ZZH RX IP 258 OP 636: Performed by: STUDENT IN AN ORGANIZED HEALTH CARE EDUCATION/TRAINING PROGRAM

## 2019-06-21 PROCEDURE — 25000131 ZZH RX MED GY IP 250 OP 636 PS 637: Mod: GY | Performed by: INTERNAL MEDICINE

## 2019-06-21 PROCEDURE — 86235 NUCLEAR ANTIGEN ANTIBODY: CPT | Performed by: STUDENT IN AN ORGANIZED HEALTH CARE EDUCATION/TRAINING PROGRAM

## 2019-06-21 PROCEDURE — 27210738 ZZH ACCESSORY CR2

## 2019-06-21 PROCEDURE — 00000167 ZZHCL STATISTIC INR NC: Performed by: STUDENT IN AN ORGANIZED HEALTH CARE EDUCATION/TRAINING PROGRAM

## 2019-06-21 PROCEDURE — 00000146 ZZHCL STATISTIC GLUCOSE BY METER IP

## 2019-06-21 PROCEDURE — 85730 THROMBOPLASTIN TIME PARTIAL: CPT | Performed by: STUDENT IN AN ORGANIZED HEALTH CARE EDUCATION/TRAINING PROGRAM

## 2019-06-21 PROCEDURE — 97165 OT EVAL LOW COMPLEX 30 MIN: CPT | Mod: GO

## 2019-06-21 PROCEDURE — 00000401 ZZHCL STATISTIC THROMBIN TIME NC: Performed by: STUDENT IN AN ORGANIZED HEALTH CARE EDUCATION/TRAINING PROGRAM

## 2019-06-21 PROCEDURE — 36415 COLL VENOUS BLD VENIPUNCTURE: CPT | Performed by: STUDENT IN AN ORGANIZED HEALTH CARE EDUCATION/TRAINING PROGRAM

## 2019-06-21 PROCEDURE — 02H633Z INSERTION OF INFUSION DEVICE INTO RIGHT ATRIUM, PERCUTANEOUS APPROACH: ICD-10-PCS | Performed by: PHYSICIAN ASSISTANT

## 2019-06-21 PROCEDURE — 27210904 ZZH KIT CR6

## 2019-06-21 PROCEDURE — 82607 VITAMIN B-12: CPT | Performed by: STUDENT IN AN ORGANIZED HEALTH CARE EDUCATION/TRAINING PROGRAM

## 2019-06-21 PROCEDURE — 85613 RUSSELL VIPER VENOM DILUTED: CPT | Performed by: STUDENT IN AN ORGANIZED HEALTH CARE EDUCATION/TRAINING PROGRAM

## 2019-06-21 PROCEDURE — 86225 DNA ANTIBODY NATIVE: CPT | Performed by: STUDENT IN AN ORGANIZED HEALTH CARE EDUCATION/TRAINING PROGRAM

## 2019-06-21 PROCEDURE — 83921 ORGANIC ACID SINGLE QUANT: CPT | Performed by: STUDENT IN AN ORGANIZED HEALTH CARE EDUCATION/TRAINING PROGRAM

## 2019-06-21 PROCEDURE — 86255 FLUORESCENT ANTIBODY SCREEN: CPT | Performed by: PSYCHIATRY & NEUROLOGY

## 2019-06-21 PROCEDURE — 27210732 ZZH ACCESSORY CR1

## 2019-06-21 PROCEDURE — C1752 CATH,HEMODIALYSIS,SHORT-TERM: HCPCS

## 2019-06-21 PROCEDURE — 36415 COLL VENOUS BLD VENIPUNCTURE: CPT | Performed by: PSYCHIATRY & NEUROLOGY

## 2019-06-21 PROCEDURE — 86256 FLUORESCENT ANTIBODY TITER: CPT | Performed by: PSYCHIATRY & NEUROLOGY

## 2019-06-21 PROCEDURE — 12000001 ZZH R&B MED SURG/OB UMMC

## 2019-06-21 PROCEDURE — 27210908 ZZH NEEDLE CR4

## 2019-06-21 PROCEDURE — 82306 VITAMIN D 25 HYDROXY: CPT | Performed by: STUDENT IN AN ORGANIZED HEALTH CARE EDUCATION/TRAINING PROGRAM

## 2019-06-21 PROCEDURE — A9270 NON-COVERED ITEM OR SERVICE: HCPCS | Mod: GY | Performed by: INTERNAL MEDICINE

## 2019-06-21 PROCEDURE — 25000125 ZZHC RX 250: Performed by: PHYSICIAN ASSISTANT

## 2019-06-21 PROCEDURE — 97535 SELF CARE MNGMENT TRAINING: CPT | Mod: GO

## 2019-06-21 PROCEDURE — 84999 UNLISTED CHEMISTRY PROCEDURE: CPT | Performed by: PSYCHIATRY & NEUROLOGY

## 2019-06-21 PROCEDURE — 84999 UNLISTED CHEMISTRY PROCEDURE: CPT

## 2019-06-21 PROCEDURE — 25000128 H RX IP 250 OP 636: Performed by: STUDENT IN AN ORGANIZED HEALTH CARE EDUCATION/TRAINING PROGRAM

## 2019-06-21 RX ORDER — HEPARIN SODIUM (PORCINE) LOCK FLUSH IV SOLN 100 UNIT/ML 100 UNIT/ML
3 SOLUTION INTRAVENOUS
Status: DISCONTINUED | OUTPATIENT
Start: 2019-06-21 | End: 2019-06-21 | Stop reason: HOSPADM

## 2019-06-21 RX ORDER — LIDOCAINE HYDROCHLORIDE 10 MG/ML
1-30 INJECTION, SOLUTION EPIDURAL; INFILTRATION; INTRACAUDAL; PERINEURAL
Status: COMPLETED | OUTPATIENT
Start: 2019-06-21 | End: 2019-06-21

## 2019-06-21 RX ORDER — HEPARIN SODIUM (PORCINE) LOCK FLUSH IV SOLN 100 UNIT/ML 100 UNIT/ML
3 SOLUTION INTRAVENOUS
Status: DISCONTINUED | OUTPATIENT
Start: 2019-06-21 | End: 2019-07-06 | Stop reason: HOSPADM

## 2019-06-21 RX ORDER — PREDNISONE 5 MG/1
5 TABLET ORAL EVERY MORNING
COMMUNITY
End: 2020-01-01

## 2019-06-21 RX ORDER — CLOPIDOGREL BISULFATE 75 MG/1
75 TABLET ORAL EVERY MORNING
Status: ON HOLD | COMMUNITY
End: 2020-01-01

## 2019-06-21 RX ORDER — TRAZODONE HYDROCHLORIDE 100 MG/1
100 TABLET ORAL AT BEDTIME
COMMUNITY

## 2019-06-21 RX ORDER — METOPROLOL TARTRATE 50 MG
50 TABLET ORAL 2 TIMES DAILY
Status: ON HOLD | COMMUNITY
End: 2020-01-01

## 2019-06-21 RX ORDER — DIPHENHYDRAMINE HYDROCHLORIDE 50 MG/ML
50 INJECTION INTRAMUSCULAR; INTRAVENOUS
Status: CANCELLED | OUTPATIENT
Start: 2019-06-21

## 2019-06-21 RX ORDER — FLUOXETINE 40 MG/1
40 CAPSULE ORAL DAILY
COMMUNITY

## 2019-06-21 RX ORDER — NITROFURANTOIN 25; 75 MG/1; MG/1
100 CAPSULE ORAL EVERY 12 HOURS SCHEDULED
Status: COMPLETED | OUTPATIENT
Start: 2019-06-21 | End: 2019-06-25

## 2019-06-21 RX ORDER — HEPARIN SODIUM (PORCINE) LOCK FLUSH IV SOLN 100 UNIT/ML 100 UNIT/ML
3 SOLUTION INTRAVENOUS EVERY 24 HOURS
Status: DISCONTINUED | OUTPATIENT
Start: 2019-06-21 | End: 2019-06-21 | Stop reason: HOSPADM

## 2019-06-21 RX ORDER — HEPARIN SODIUM (PORCINE) LOCK FLUSH IV SOLN 100 UNIT/ML 100 UNIT/ML
3 SOLUTION INTRAVENOUS EVERY 24 HOURS
Status: DISCONTINUED | OUTPATIENT
Start: 2019-06-21 | End: 2019-07-06 | Stop reason: HOSPADM

## 2019-06-21 RX ADMIN — INSULIN ASPART 1 UNITS: 100 INJECTION, SOLUTION INTRAVENOUS; SUBCUTANEOUS at 09:58

## 2019-06-21 RX ADMIN — INSULIN ASPART 1 UNITS: 100 INJECTION, SOLUTION INTRAVENOUS; SUBCUTANEOUS at 12:28

## 2019-06-21 RX ADMIN — ENOXAPARIN SODIUM 40 MG: 40 INJECTION SUBCUTANEOUS at 20:13

## 2019-06-21 RX ADMIN — RANITIDINE 150 MG: 150 TABLET ORAL at 20:13

## 2019-06-21 RX ADMIN — CLOPIDOGREL BISULFATE 75 MG: 75 TABLET, FILM COATED ORAL at 09:10

## 2019-06-21 RX ADMIN — FUROSEMIDE 20 MG: 20 TABLET ORAL at 15:38

## 2019-06-21 RX ADMIN — NITROFURANTOIN (MONOHYDRATE/MACROCRYSTALS) 100 MG: 75; 25 CAPSULE ORAL at 12:28

## 2019-06-21 RX ADMIN — NITROFURANTOIN (MONOHYDRATE/MACROCRYSTALS) 100 MG: 75; 25 CAPSULE ORAL at 21:35

## 2019-06-21 RX ADMIN — PREDNISONE 5 MG: 5 TABLET ORAL at 09:10

## 2019-06-21 RX ADMIN — METOPROLOL TARTRATE 50 MG: 50 TABLET ORAL at 20:13

## 2019-06-21 RX ADMIN — Medication 10 MG: at 21:35

## 2019-06-21 RX ADMIN — ALBUTEROL SULFATE 2 PUFF: 90 AEROSOL, METERED RESPIRATORY (INHALATION) at 09:14

## 2019-06-21 RX ADMIN — AMLODIPINE BESYLATE 10 MG: 10 TABLET ORAL at 20:13

## 2019-06-21 RX ADMIN — HEPARIN 2 ML: 100 SYRINGE at 14:49

## 2019-06-21 RX ADMIN — TRAZODONE HYDROCHLORIDE 100 MG: 100 TABLET ORAL at 21:35

## 2019-06-21 RX ADMIN — LIDOCAINE HYDROCHLORIDE 4 ML: 10 INJECTION, SOLUTION EPIDURAL; INFILTRATION; INTRACAUDAL; PERINEURAL at 14:48

## 2019-06-21 RX ADMIN — HEPARIN 2 ML: 100 SYRINGE at 14:50

## 2019-06-21 RX ADMIN — SODIUM CHLORIDE 1000 MG: 9 INJECTION, SOLUTION INTRAVENOUS at 15:38

## 2019-06-21 RX ADMIN — ASPIRIN 325 MG: 325 TABLET, DELAYED RELEASE ORAL at 20:13

## 2019-06-21 RX ADMIN — UMECLIDINIUM 1 PUFF: 62.5 AEROSOL, POWDER ORAL at 09:59

## 2019-06-21 RX ADMIN — ALBUTEROL SULFATE 2 PUFF: 90 AEROSOL, METERED RESPIRATORY (INHALATION) at 00:58

## 2019-06-21 RX ADMIN — FLUOXETINE 20 MG: 20 CAPSULE ORAL at 09:10

## 2019-06-21 RX ADMIN — FUROSEMIDE 20 MG: 20 TABLET ORAL at 09:10

## 2019-06-21 RX ADMIN — FUROSEMIDE 20 MG: 20 TABLET ORAL at 21:35

## 2019-06-21 RX ADMIN — METOPROLOL TARTRATE 50 MG: 50 TABLET ORAL at 09:10

## 2019-06-21 RX ADMIN — SIMVASTATIN 20 MG: 20 TABLET, FILM COATED ORAL at 21:35

## 2019-06-21 ASSESSMENT — ACTIVITIES OF DAILY LIVING (ADL)
ADLS_ACUITY_SCORE: 15
ADLS_ACUITY_SCORE: 15
ADLS_ACUITY_SCORE: 17
PREVIOUS_RESPONSIBILITIES: MEAL PREP;HOUSEKEEPING;LAUNDRY
ADLS_ACUITY_SCORE: 15
ADLS_ACUITY_SCORE: 17
ADLS_ACUITY_SCORE: 17

## 2019-06-21 NOTE — PROGRESS NOTES
06/20/19 1000   Quick Adds   Type of Visit Initial PT Evaluation      Language English   Living Environment   Lives With alone   Living Arrangements apartment   Home Accessibility stairs to enter home   Number of Stairs, Main Entrance other (see comments)  (16)   Stair Railings, Main Entrance railings on both sides of stairs   Transportation Anticipated family or friend will provide   Living Environment Comment Pt lives in 2nd floor apartment alone with no elevator access. One of her son's lives on the third floor and checks on patient daily. She has a tub shower with no grab bars. [employer] kitchen. Pt blind -- R eye totally blind, L peripheral gone. Pt does not drive. She has a standard flat queen bed. She has two cats.    Self-Care   Usual Activity Tolerance good   Current Activity Tolerance moderate   Regular Exercise No   Equipment Currently Used at Home cane, straight   Activity/Exercise/Self-Care Comment Pt is usually IND for ADLs and IADLs with exception of driving. She denies regular exercise.    Functional Level Prior   Ambulation 0-->independent   Transferring 0-->independent   Toileting 0-->independent   Bathing 0-->independent   Communication 0-->understands/communicates without difficulty   Swallowing 0-->swallows foods/liquids without difficulty   Cognition 0 - no cognition issues reported   Fall history within last six months no   Which of the above functional risks had a recent onset or change? ambulation;transferring;toileting;bathing   Prior Functional Level Comment Pt leaves apartment 1-2x/week. Pt is usually IND in all ADLs and IADLs. Has groceries delivered. Access to [employer]. She is usually IND for mobility but has started to use a cane and holding onto surfaces in her home to ambulate due to LE weakness and poor coordination.    General Information   Onset of Illness/Injury or Date of Surgery - Date 06/20/19  (date of PT orders)   Referring Physician Gianluca Frederick MD    Patient/Family Goals Statement To return home   Pertinent History of Current Problem (include personal factors and/or comorbidities that impact the POC) Ladonna Sheriff is a 74 year old female with a history of lupus and Sjogren's syndrome with subsequent optic neuritis with right-sided vision loss who presents with numbness. Starting around 6/3/19, the patient states she has been dealing with some bilateral numbness from her trunk to her toes.  - per ED note   Precautions/Limitations fall precautions   General Observations Pt sitting up in bed and agreeable to therapy. RN ok'd session and present intermittently.    General Info Comments Activity: up with assist   Cognitive Status Examination   Orientation orientation to person, place and time   Level of Consciousness alert   Follows Commands and Answers Questions 100% of the time;able to follow multistep instructions   Personal Safety and Judgment intact   Memory intact   Pain Assessment   Patient Currently in Pain   (none during time of eval)   Integumentary/Edema   Integumentary/Edema Comments Mild B LE edema - non pitting   Posture    Posture Forward head position;Protracted shoulders   Range of Motion (ROM)   ROM Comment B UE/LE WFL   Strength   Strength Comments Hip flex 4/5 B, knee ext 4/5 B, knee flex 4/5 B, ankle DF 3+/5 on R and 4/5 on L.    Bed Mobility   Bed Mobility Comments CGA with HOB elevated and use of railing.    Transfer Skills   Transfer Comments Sit <> stand at min A with FWW   Gait   Gait Comments Pt taking small steps EOB > recliner with FWW at CGA to min A with poor sequencing of limbs and poor balance   Balance   Balance Comments Sitting static normal. Sitting dynamic fair. Standing static fair. Standing dynamic poor.    Sensory Examination   Sensory Perception Comments Pt reporting diminished sensation from chest and below.    Coordination   Coordination Comments Pt with poor proprioceptive sense of LEs   Muscle Tone   Muscle Tone no  "deficits were identified   General Therapy Interventions   Planned Therapy Interventions ADL retraining;balance training;bed mobility training;gait training;neuromuscular re-education;strengthening;transfer training;risk factor education;home program guidelines;progressive activity/exercise   Clinical Impression   Criteria for Skilled Therapeutic Intervention yes, treatment indicated   PT Diagnosis Impaired functional mobility   Influenced by the following impairments Impaired strength, balance, posture, activity tolerance, coordination   Functional limitations due to impairments Impaired mobility, limiting return to community at PLOF   Clinical Presentation Evolving/Changing   Clinical Presentation Rationale PMhx, current medical management, PLOF, current mobility, home set up, social support   Clinical Decision Making (Complexity) Moderate complexity   Therapy Frequency Daily   Predicted Duration of Therapy Intervention (days/wks) 1.5 weeks   Anticipated Equipment Needs at Discharge tub bench;front wheeled walker   Anticipated Discharge Disposition Acute Rehabilitation Facility   Risk & Benefits of therapy have been explained Yes   Patient, Family & other staff in agreement with plan of care Yes   [facility] TM \"6 Clicks\"   2016, Trustees of [facility], under license to [employer].  All rights reserved.   6 Clicks Short Forms Basic Mobility Inpatient Short Form   [facility]  \"6 Clicks\" V.2 Basic Mobility Inpatient Short Form   1. Turning from your back to your side while in a flat bed without using bedrails? 4 - None   2. Moving from lying on your back to sitting on the side of a flat bed without using bedrails? 3 - A Little   3. Moving to and from a bed to a chair (including a wheelchair)? 3 - A Little   4. Standing up from a chair using your arms (e.g., wheelchair, or bedside chair)? 3 - A Little   5. To walk in hospital room? 3 - A Little   6. Climbing 3-5 steps with a " railing? 2 - A Lot   Basic Mobility Raw Score (Score out of 24.Lower scores equate to lower levels of function) 18   Total Evaluation Time   Total Evaluation Time (Minutes) 8

## 2019-06-21 NOTE — H&P
[facility]  Neurology History and Physical     Patient Name: Ladonna Sheriff  MRN: [id number]  Date of Admission: 6/20/2019  Date of Service: June 20, 2019  Primary care provider: Joceline Ty     Chief Complaint: numbness below the chest     HPI:  Ldaonna Sheriff is a 74 year old female w/ history of previous stroke, TIA, lupus, Sjogren's syndrome, temporal arteritis, optic neuritis with vision impairment in right eye, chronic daily low dosed prednisone (5 mg daily), hypertension, hypercholesterolemia, diastolic and possibly systolic congestive heart failure, recently diagnosed asthma and/or COPD, previous hysterectomy, and previous tubal ligation.     Starting around 6/3/19, the patient states she has been dealing with some bilateral numbness from her trunk to her toes. She reports it first started with her right leg, but has progressively worsened and has seen both her primary care physician as well as neurologist Dr. Robbi La of [facility] 6/18 during which she required assistance with ambulation, so they scheduled thoracic and lumbar spine MRIs for 6/28.      However, 6/19, the patient reports her right foot has been very heavy and she has to concentrate in order to pick it up and walk appropriately, so she called her primary care physician who recommended she come to the ED. There, the patient stated she has also been experiencing low thoracic midline back pain since the onset of her symptoms earlier this month. She describes the sensation around her trunk as though a vice is wrapped around her ribcage. The patient also notes he has noticed difficulty with her ADL skills in requiring LE over the past few days, though they are most pronounced 6/19. The patient denies any overt loss of bowel or bladder, but notes she does not experience the urge to have a bowel movement until immediately before she has to go secondary to the numbness in her bottom. She denies headaches,  new vision changes, neck pain, chest pain, shortness of breath, fevers, chills, or other acute symptoms.     Of note however patient had a URI which was significant enough that patient sought out pulmonology assistance for a 2-3 week cough/cold.  This self-resolved prior to the start of these neuorlogic symptoms.     History is obtained from Ladonna  and the medical record.  Assessment and Plan:  74 year old female w/ history of previous stroke, TIA, lupus, Sjogren's syndrome, temporal arteritis, optic neuritis with vision impairment in right eye, chronic daily low dosed prednisone (5 mg daily), hypertension, hypercholesterolemia, diastolic and possibly systolic congestive heart failure, recently diagnosed asthma and/or COPD, previous hysterectomy, and previous tubal ligation.  Presenting out of concern for TM based on OSH MRI at level of T5.      Overall patient is well-appearing nontoxic.  She describes is very clear viselike sensation from her lower ribs downward.  She endorses numbness bilaterally.  She denies any upper extremity injuries or numbness or weakness.  No headache or vision changes, vision at baseline.  Patient has baseline visual deficits from her optic neuritis dating back to 1993 related to sjogren/lupus last flare 2018 summer in left eye.  Clinically her symptomatology does not appear consistent with a stroke.  Most concerned about a possible transverse myelitis.  Discussed with neurology who recommends cervical and thoracic MRI.  Thoracic MRI does show edema and changes consistent with transverse myelitis.  Cervical MRI was otherwise unremarkable.  CBC shows no leukocytosis or anemia.  BMP shows no acute electrolyte metabolic or renal dysfunction.  Magnesium levels normal.  UA does look consistent with a possible UTI therefore antibiotics were administered with IV ceftriaxone x 1.   1000 mg of Solu-Medrol was given at OSH.     Clinically suspect  transverse myelitis.  Patient has no fevers or  chills, no evidence of meningitis.  No indication for emergent LP at this time although this can be further considered.  Of note however patient had a URI which was significant enough that patient sought out pulmonology assistance for a 2-3 week cough/cold.  This self-resolved prior to the start of these neuorlogic symptoms.     Patient's rheum history should be taken into differential and also possibility of neoplasm     ACUTE PROBLEMS  #Numbness T5 level subjective>objective  #No loss of bowel/bladder function but risk  #Concerning MRI findings for TM at level matching patient's reported numbness  #Prior URI, resolved  -Daily solumedrol 500mg BID (to be ordered and timed by day team, given 1g once this evening)  -Consider LP in AM  -Consider other antibodies given history of ON  -Consider rheumatology consult  -PT/OT (ordered)  -PMR (not yet ordered)        #Concern for UTI  -Repeat UA and follow, ceftriaxone x1 given at OSH     CHRONIC PROBLEMS     #COPD and/or asthma.    At the time of my exam there does not appear to be an acute exacerbation.  I will continue her recently started Incruse Ellipta inhaler.  I will have albuterol nebs available every 4 hours as needed.  If she develops more significant wheezing I would consider increasing her steroid (prior to admission she has been taking 5 mg of prednisone daily for temporal arteritis and lupus).     #Chronic diastolic and systolic congestive heart failure.    -Previous echocardiogram showed ejection fraction of 45% and stage II diastolic congestive heart failure.  She does not appear to have decompensated heart failure at this time.  In the setting of progressive shortness of breath I would have low threshold to work this up with  Echocardiogram     #Hypertension.    Continue prior to admission metoprolol and amlodipine,  Lasix.       #History of lupus, Sjogren's syndrome, and temporal arteritis.    She takes prednisone 5 mg daily.  This will be  continued.     #Previous TIA and stroke.    -asa and plavix continued     #Hypercholesterolemia.    Continue prior to admission Zocor.     Activity: 2x assist  Diet: adat  IVF: TKO  DVT ppx: lovenox 30  Bowel ppx: ranitidine  Lines: PIV  Code: Full  Dispo: It's not clear that this patient requires ICU status, can be transferred by day team     Patient was seen and discussed with Dr. Orellana.     Gianluca Frederick MD/PhD  [facility] Neurology  [phone]        ROS:  A 10-point ROS was performed, negative except as per HPI.     Physical Examination:  Vitals: Temp: 98.1  F (36.7  C) Temp src: Oral BP: (!) 160/92   Heart Rate: 77 Resp: 16 SpO2: 96 % O2 Device: None (Room air)    GENERAL: Pleasant and cooperative. No acute distress.  EYES: Pupils equal and round. No scleral erythema or icterus.  Vision impaired.  ENT: External ears are normal without deformity. Posterior oropharynx is without erythem, swelling, or exudate.  NECK: Supple. No masses or swelling. No tenderness. Thyroid is normal without mass or tenderness.  CHEST: Clear to auscultation. Normal breath sounds. No retractions.   CV: Regular rate and rhythm. No JVD. Pulses normal.  ABDOMEN: Bowel sounds present. No tenderness. No masses or hernia.  EXTREMETIES: No clubbing, cyanosis, or ischemia.  No peripheral edema.  SKIN: Warm and dry to touch. No wounds or rashes.  .Neuro:              Mental status: alert and oriented to person, place, and time; language is fluent with intact comprehension and naming              Cranial nerves: right pupil 4mm sluggishly reactive to light left pupil 3-4mm and briskly reactive, right eye exotropia, diminished right eye visual acuity, fundi within normal limits, no facial asymmetry or ptosis, facial sensation intact and symmetric, hearing intact to conversation, tongue and uvula are midline, no hypophonia, no dysarthria  .  Alert. CN II-XII grossly intact, no pronator drift, normal finger-nose-finger.   Gross muscle strength intact of the proximal and distal bilateral upper and lower extremities except marginal weakness in RLE with plantar flexion and hip extension (hip secondary to chronic hip pain). Upward toe right side    Left-sided downward toe.  Poor rectal tone.     Sensation intact to light touch in all 4 extremities, reports a tingling sensation from the lower rib cage downward bilaterally with associated change in light touch however not pinprick or temperature or propioception.  2+ patellar and 1+ bilateral Achilles reflexes.  Right sided foot drop with gait.     Investigations:      Recent Results (from the past 24 hour(s))   MR Cervical Spine w/o & w Contrast     Narrative     MRI CERVICAL SPINE WITHOUT AND WITH CONTRAST 6/19/2019 9:01 PM      HISTORY: Bilateral lower back pain and leg numbness since 6/3/2019.  History of lupus and Sjogren's syndrome with subsequent optic neuritis  and right-sided vision loss. Right foot feels heavy, difficulty  picking it up.     TECHNIQUE: Multiplanar, multisequence MRI of the cervical spine  without and with 10 mL Gadavist.     COMPARISON: None.     FINDINGS: The cervical spinal cord and visualized portions of the  thoracic spinal cord appear normal.  The visualized portions of the  brainstem and cerebellum appear normal.   No abnormal gadolinium  enhancement is seen in the thecal sac or spinal cord.     Alignment: Normal.     Craniocervical Junction and C1-C2:  Normal.     C2-C3:  Normal disc, facet joints, spinal canal and neural foramina.     C3-C4:  Normal disc, facet joints, spinal canal and neural foramina.     C4-C5:  Mild degenerative disc disease, mild bilateral degenerative  facet arthropathy, otherwise normal.     C5-C6:  Mild degenerative disc disease, otherwise normal.     C6-C7:  Mild degenerative disc disease, otherwise normal.     C7-T1: Normal disc, facet joints, spinal canal and neural foramina.     T1-T2:  Normal disc, facet joints, spinal canal  and neural foramina.      T2-T3:  Normal disc, facet joints, spinal canal and neural foramina.     Paraspinous Soft Tissues:  Normal as visualized.     Bone Marrow:  Normal signal intensity.        Impression     IMPRESSION:    1. No spinal cord abnormality to indicate transverse myelitis.  2. Degenerative changes.       JALEN BLANK MD   MR Thoracic Spine w/o & w Contrast     Narrative     MRI THORACIC SPINE WITHOUT AND WITH CONTRAST  6/19/2019 9:01 PM      HISTORY: Mid-back/T-spine pain since 6/3/2019. Concern for transverse  myelitis, numbness rib cage down.     TECHNIQUE: Multiplanar multisequence MRI of the thoracic spine without  and with 10 mL Gadavist.     COMPARISON: None.     FINDINGS: There is an expansile lesion in the spinal cord from T4  through mid T5 showing slight T1 hypointensity, T2 hyperintensity and  diffuse gadolinium enhancement along the right side of the cord over a  3.0 cm length by 0.5 x 0.3 cm. Above and below this there is mild  spinal cord edema. No other similar lesions are seen elsewhere.      Schmorl's nodes are seen in the superior endplates of T2 and T3. The  other discs appear normal.  All the neural foramina and facet joints  appear normal.  The paraspinous soft tissues appear normal.        Impression     IMPRESSION:  Focal gadolinium enhancing lesion from T4 through T5 in  the spinal cord on the right side with edema extending from T3-T4 down  to lower T5. This is most likely transverse myelitis, with  differential diagnosis including a spinal cord astrocytoma.     JALEN BLANK MD               Results for orders placed or performed during the hospital encounter of 06/19/19 (from the past 24 hour(s))   CBC with platelets differential   Result Value Ref Range     WBC 6.0 4.0 - 11.0 10e9/L     RBC Count 4.33 3.8 - 5.2 10e12/L     Hemoglobin 14.1 11.7 - 15.7 g/dL     Hematocrit 43.8 35.0 - 47.0 %      (H) 78 - 100 fl     MCH 32.6 26.5 - 33.0 pg     MCHC 32.2 31.5 - 36.5  g/dL     RDW 13.2 10.0 - 15.0 %     Platelet Count 192 150 - 450 10e9/L     Diff Method Automated Method       % Neutrophils 65.8 %     % Lymphocytes 24.0 %     % Monocytes 8.2 %     % Eosinophils 1.5 %     % Basophils 0.2 %     % Immature Granulocytes 0.3 %     Nucleated RBCs 0 0 /100     Absolute Neutrophil 3.9 1.6 - 8.3 10e9/L     Absolute Lymphocytes 1.4 0.8 - 5.3 10e9/L     Absolute Monocytes 0.5 0.0 - 1.3 10e9/L     Absolute Eosinophils 0.1 0.0 - 0.7 10e9/L     Absolute Basophils 0.0 0.0 - 0.2 10e9/L     Abs Immature Granulocytes 0.0 0 - 0.4 10e9/L     Absolute Nucleated RBC 0.0     Basic metabolic panel   Result Value Ref Range     Sodium 138 133 - 144 mmol/L     Potassium 3.8 3.4 - 5.3 mmol/L     Chloride 103 94 - 109 mmol/L     Carbon Dioxide 28 20 - 32 mmol/L     Anion Gap 7 3 - 14 mmol/L     Glucose 85 70 - 99 mg/dL     Urea Nitrogen 18 7 - 30 mg/dL     Creatinine 0.98 0.52 - 1.04 mg/dL     GFR Estimate 57 (L) >60 mL/min/[1.73_m2]     GFR Estimate If Black 66 >60 mL/min/[1.73_m2]     Calcium 9.3 8.5 - 10.1 mg/dL   Magnesium   Result Value Ref Range     Magnesium 2.0 1.6 - 2.3 mg/dL   UA with Microscopic   Result Value Ref Range     Color Urine Yellow       Appearance Urine Clear       Glucose Urine Negative NEG^Negative mg/dL     Bilirubin Urine Negative NEG^Negative     Ketones Urine Negative NEG^Negative mg/dL     Specific Gravity Urine 1.013 1.003 - 1.035     Blood Urine Negative NEG^Negative     pH Urine 6.0 5.0 - 7.0 pH     Protein Albumin Urine Negative NEG^Negative mg/dL     Urobilinogen mg/dL Normal 0.0 - 2.0 mg/dL     Nitrite Urine Positive (A) NEG^Negative     Leukocyte Esterase Urine Moderate (A) NEG^Negative     Source Midstream Urine       WBC Urine 19 (H) 0 - 5 /HPF     RBC Urine 1 0 - 2 /HPF     Bacteria Urine Moderate (A) NEG^Negative /HPF     Mucous Urine Present (A) NEG^Negative /LPF   Urine Culture   Result Value Ref Range     Specimen Description Midstream Urine       Special Requests  Specimen received in preservative       Culture Micro PENDING     MR Cervical Spine w/o & w Contrast     Narrative     MRI CERVICAL SPINE WITHOUT AND WITH CONTRAST 6/19/2019 9:01 PM      HISTORY: Bilateral lower back pain and leg numbness since 6/3/2019.  History of lupus and Sjogren's syndrome with subsequent optic neuritis  and right-sided vision loss. Right foot feels heavy, difficulty  picking it up.     TECHNIQUE: Multiplanar, multisequence MRI of the cervical spine  without and with 10 mL Gadavist.     COMPARISON: None.     FINDINGS: The cervical spinal cord and visualized portions of the  thoracic spinal cord appear normal.  The visualized portions of the  brainstem and cerebellum appear normal.   No abnormal gadolinium  enhancement is seen in the thecal sac or spinal cord.     Alignment: Normal.     Craniocervical Junction and C1-C2:  Normal.     C2-C3:  Normal disc, facet joints, spinal canal and neural foramina.     C3-C4:  Normal disc, facet joints, spinal canal and neural foramina.     C4-C5:  Mild degenerative disc disease, mild bilateral degenerative  facet arthropathy, otherwise normal.     C5-C6:  Mild degenerative disc disease, otherwise normal.     C6-C7:  Mild degenerative disc disease, otherwise normal.     C7-T1: Normal disc, facet joints, spinal canal and neural foramina.     T1-T2:  Normal disc, facet joints, spinal canal and neural foramina.      T2-T3:  Normal disc, facet joints, spinal canal and neural foramina.     Paraspinous Soft Tissues:  Normal as visualized.     Bone Marrow:  Normal signal intensity.        Impression     IMPRESSION:    1. No spinal cord abnormality to indicate transverse myelitis.  2. Degenerative changes.       JALEN BLANK MD   MR Thoracic Spine w/o & w Contrast     Narrative     MRI THORACIC SPINE WITHOUT AND WITH CONTRAST  6/19/2019 9:01 PM      HISTORY: Mid-back/T-spine pain since 6/3/2019. Concern for transverse  myelitis, numbness rib cage  down.     TECHNIQUE: Multiplanar multisequence MRI of the thoracic spine without  and with 10 mL Gadavist.     COMPARISON: None.     FINDINGS: There is an expansile lesion in the spinal cord from T4  through mid T5 showing slight T1 hypointensity, T2 hyperintensity and  diffuse gadolinium enhancement along the right side of the cord over a  3.0 cm length by 0.5 x 0.3 cm. Above and below this there is mild  spinal cord edema. No other similar lesions are seen elsewhere.      Schmorl's nodes are seen in the superior endplates of T2 and T3. The  other discs appear normal.  All the neural foramina and facet joints  appear normal.  The paraspinous soft tissues appear normal.        Impression     IMPRESSION:  Focal gadolinium enhancing lesion from T4 through T5 in  the spinal cord on the right side with edema extending from T3-T4 down  to lower T5. This is most likely transverse myelitis, with  differential diagnosis including a spinal cord astrocytoma.     JALEN BLANK MD         PMH:  Patient Active Problem List   Diagnosis     Optic neuritis     Acute respiratory failure with hypoxia (H)     Pulmonary embolism (H)         PSH:        Past Surgical History:   Procedure Laterality Date     CHOLECYSTECTOMY         GYN SURGERY         hysterectomy     GYN SURGERY         tubal ligation         Allergies:       Allergies   Allergen Reactions     Prevacid [Lansoprazole] Rash         Medications:     Current Facility-Administered Medications:      glucose gel 15-30 g, 15-30 g, Oral, Q15 Min PRN **OR** dextrose 50 % injection 25-50 mL, 25-50 mL, Intravenous, Q15 Min PRN **OR** glucagon injection 1 mg, 1 mg, Subcutaneous, Q15 Min PRN, Gianluca Frederick MD     insulin aspart (NovoLOG) inj (RAPID ACTING), 1-7 Units, Subcutaneous, TID AC, Gianluca Frederick MD     insulin aspart (NovoLOG) inj (RAPID ACTING), 1-5 Units, Subcutaneous, At Bedtime, Gianluca Frederick MD     magnesium sulfate 2 g in NS intermittent  infusion (PharMEDium or FV Cmpd), 2 g, Intravenous, Daily PRN, Gianluca Frederick MD     magnesium sulfate 4 g in 100 mL sterile water (premade), 4 g, Intravenous, Q4H PRN, Gianluca Frederick MD     potassium chloride (KLOR-CON) Packet 20-40 mEq, 20-40 mEq, Oral or Feeding Tube, Q2H PRN, Gianluca Frederick MD     potassium chloride 10 mEq in 100 mL intermittent infusion with 10 mg lidocaine, 10 mEq, Intravenous, Q1H PRN, Gianluca Frederick MD     potassium chloride 10 mEq in 100 mL sterile water intermittent infusion (premix), 10 mEq, Intravenous, Q1H PRN, Gianluca Frederick MD     potassium chloride 20 mEq in 50 mL intermittent infusion, 20 mEq, Intravenous, Q1H PRN, Gianluca Frederick MD     potassium chloride ER (K-DUR/KLOR-CON M) CR tablet 20-40 mEq, 20-40 mEq, Oral, Q2H PRN, Gianluca Frederick MD     ranitidine (ZANTAC) tablet 150 mg, 150 mg, Oral, BID, Gianluca Frederick MD     Social History:  Social History            Tobacco Use     Smoking status: Former Smoker   Substance Use Topics     Alcohol use: Yes       Alcohol/week: 4.2 oz       Types: 7 Shots of liquor per week        Family History:  No neurologic diseases reported     This note was written with the assistance of the Dragon voice-dictation technology software. The final document, although reviewed, may contain errors. For corrections, please contact the office.          Addendum:  75yo F w/ hx of previous stroke, TIA, discoid lupus, sjogren's syndrome, reported temporal arteritis, optic neuritis w/ vision impairment in R eye on chronic low dose prednisone who presents as transfer  For b/l numbness @ T5 and MRI concerning transverse myelopathy at the corresponding level. MRI performed this morning, showing multiple T2 hyperintensities, one enhancing tiny focus. Broad differential given pmh. Given her historical diagnoses of multiple autoimmune disorders, rheumatology was consulted, appreciate recommendations /  guidance.Concerning specifically for demyelinating disorder; although meets Reece's criteria for MS, the lesions and the patient's age would be atypical.  Of most concern would be an NMO-MAG related disorder that would be more consistent with her clinical picture. Plan to follow up LP that was performed this afternoon and continue high dose steroids.    Patient seen and discussed with Dr. Macias.    Juan Luis Cho MD on 6/20/2019 at 8:39 PM

## 2019-06-21 NOTE — IR NOTE
Patient Name: Ladonna Sheriff  Medical Record Number: [id number]  Today's Date: 6/21/2019    Procedure: non-tunneled central venous catheter placement for PLEX  Proceduralist: Krishna oMlina,     Procedure start time: 1435  Puncture time: 1437  Procedure end time:     Report given to: JOHN Luna 6A  : n/a    Other Notes: Pt arrived to IR room 5 from 6A. Consent reviewed. Pt denies any questions or concerns regarding procedure. Pt positioned supine and monitored per protocol. Pt tolerated procedure without any noted complications. Line heparinized. Returned to 6A.

## 2019-06-21 NOTE — PLAN OF CARE
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 PIV SL, regular diet, voiding spont, no BM this shift but passing gas and active bowel sounds, up AO2 w/GB and pivot. Pt on Macrobid for UTI, pt left unit @ 1400 for central line placement in preparation for PLEX treatment. Pt denies pain, no BA/CA on d/t pt not demonstrating impulsivity/compliant with safety needs. Denies pain, better spirits than yesterday afternoon, no family present but pt on phone speaking with sons and sister. Continue to monitor per MD orders.
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
## 2019-06-21 NOTE — CONSULTS
Patient is on IR schedule 6/21/2019 for a image guided placement of large bore, double lumen, non-tunneled CVC.   No NPO required.  Consent will be done prior to procedure.     Please contact the IR charge RN at [phone] for estimated time of procedure.     Case discussed with Dr. Hameed from IR and Dr. Cho. Pt requires non-tunneled CVC for PLEX due to progressive ascending numbness up to T7/T8 in the setting of lupus, sjogren's, temporal arteritis and optic neuritis. Plan for PLEX today after line placement with IR.     Emily Sullivan, BERNARDO, APRN  Interventional Radiology  Pager: [phone]

## 2019-06-21 NOTE — PLAN OF CARE
Status: Transverse myelitis, progressive numbness.   Vitals: VSS  Neuros: Numbness from about T5 level and below. Tingling in BLE as well. R eye with optic neuritis, blind at baseline, dysconjugate as well.   IV: PIV infusing methylprednisolone currently.   Resp/trach: WNL  Diet: Reg. Good appetite  Bowel status: BM today.  : Palmer removed.   Skin: Bruising from Ivs to upper extremities.  Pain: Denied pain.  Activity: Up with 2, Gb, walker, pivot to commode.  Social: Family members in room for most of evening.  Plan: Lumbar puncture completed today. Continue to monitor for worsening symptoms and follow POC.

## 2019-06-21 NOTE — CONSULTS
Transfusion Medicine Consultation    Ladonna Sheriff [id number]   YOB: 1945 Age: 74 year old   Date of Consult: 6/21/2019      Reason for consult: Therapeutic Plasma Exchange (TPE)            Assessment and Plan:   74 year old female is seen for consultation for initiation of TPE.  The plan is to exchange every other day for a total of  5 procedures.  The patient is scheduled for placement of a catheter that will be used for the procedure.    Please do not start or continue ace-inhibitors throughout the duration of a therapeutic plasma exchange series.     Please notify the apheresis physician of any upcoming procedures, surgeries, or biopsies as therapeutic plasma exchange will affect coagulation factors. We will use albumin for the initial procedures, and monitor fibrinogen and coag metrics, with consideration to switch to some portion of plasma if needed for coagulation.            Chief Complaint:   Transfusion medicine consultation.         History of Present Illness:   Ladonna Sheriff is a 74 year old female who is seen for consultation for Therapeutic Plasma Exchange for progressive ascending numbness and weakness.  She notes initial onset of right foot numbness a few weeks ago, with progressive difficulty in walking. She was admitted 6/19/19 for further management. Due to progression of symptoms, a consult to transfusion medicine for a series of therapeutic plasma exchange was made.     She has a past medical history of lupus, Sjogren's, and optic neuritis with partial vision impairment.    She currently notes she feels numbness up to her mid back, but is otherwise currently well. She denies any numbness or tingling in her upper extremities or chest.    The patient denies any back pain that would prevent her from tolerating the procedure and she has no allergies to latex. The procedure, risks/benefits were discussed with the patient and all of her questions were addressed at that time.              Past Medical History:     Past Medical History:   Diagnosis Date     Cerebral infarction (H)      CHF (congestive heart failure) (H)      Hypertension      Lupus (H)      Lupus (H)      Optic neuritis      Optic neuritis      Sjogren's syndrome (H)      Sjogren's syndrome (H)      Temporal arteritis (H)      TIA (transient ischemic attack)      Uncomplicated asthma              Past Surgical History:     Past Surgical History:   Procedure Laterality Date     CHOLECYSTECTOMY       GYN SURGERY      hysterectomy     GYN SURGERY      tubal ligation              Social History:     Social History     Tobacco Use     Smoking status: Former Smoker   Substance Use Topics     Alcohol use: Yes     Alcohol/week: 4.2 oz     Types: 7 Shots of liquor per week            Allergies:     Allergies   Allergen Reactions     Prevacid [Lansoprazole] Rash             Medications:     Current Facility-Administered Medications   Medication     acetaminophen (TYLENOL) tablet 650 mg    Or     acetaminophen (TYLENOL) solution 650 mg     acetaminophen (TYLENOL) Suppository 650 mg     albuterol (PROAIR HFA/PROVENTIL HFA/VENTOLIN HFA) 108 (90 Base) MCG/ACT inhaler 2 puff     amLODIPine (NORVASC) tablet 10 mg     aspirin (ASA) EC tablet 325 mg     clopidogrel (PLAVIX) tablet 75 mg     glucose gel 15-30 g    Or     dextrose 50 % injection 25-50 mL    Or     glucagon injection 1 mg     FLUoxetine (PROzac) capsule 20 mg     furosemide (LASIX) tablet 20 mg     gadobutrol (GADAVIST) injection 7.5 mL     insulin aspart (NovoLOG) inj (RAPID ACTING)     insulin aspart (NovoLOG) inj (RAPID ACTING)     magnesium sulfate 2 g in NS intermittent infusion ([facility]ar[facility]ium or [facility] Cmpd)     magnesium sulfate 4 g in 100 mL sterile water (premade)     melatonin tablet 10 mg     methylPREDNISolone sodium succinate (solu-MEDROL) 1,000 mg in sodium chloride 0.9 % 250 mL intermittent infusion     metoprolol tartrate (LOPRESSOR) tablet 50 mg     naloxone (NARCAN) injection  0.1-0.4 mg     nitroFURantoin macrocrystal-monohydrate (MACROBID) capsule 100 mg     ondansetron (ZOFRAN-ODT) ODT tab 4 mg    Or     ondansetron (ZOFRAN) injection 4 mg     polyethylene glycol (MIRALAX/GLYCOLAX) Packet 17 g     potassium chloride (KLOR-CON) Packet 20-40 mEq     potassium chloride 10 mEq in 100 mL intermittent infusion with 10 mg lidocaine     potassium chloride 10 mEq in 100 mL sterile water intermittent infusion (premix)     potassium chloride 20 mEq in 50 mL intermittent infusion     potassium chloride ER (K-DUR/KLOR-CON M) CR tablet 20-40 mEq     ranitidine (ZANTAC) tablet 150 mg     senna-docusate (SENOKOT-S/PERICOLACE) 8.6-50 MG per tablet 1 tablet    Or     senna-docusate (SENOKOT-S/PERICOLACE) 8.6-50 MG per tablet 2 tablet     simvastatin (ZOCOR) tablet 20 mg     traZODone (DESYREL) tablet 100 mg     umeclidinium (INCRUSE ELLIPTA) 62.5 MCG/INH inhaler 1 puff            Vital Signs:   /63 (BP Location: Right arm)   Pulse 85   Temp 97.2  F (36.2  C) (Oral)   Resp 16   Wt 97 kg (213 lb 13.5 oz)   SpO2 95%   BMI 33.49 kg/m                Data:     Results for orders placed or performed during the hospital encounter of 06/20/19 (from the past 24 hour(s))   Complement C3   Result Value Ref Range    Complement C3 110 76 - 169 mg/dL   Complement C4   Result Value Ref Range    Complement C4 16 15 - 50 mg/dL   Erythrocyte sedimentation rate auto   Result Value Ref Range    Sed Rate 17 0 - 30 mm/h   Glucose by meter   Result Value Ref Range    Glucose 109 (H) 70 - 99 mg/dL   Glucose CSF:   Result Value Ref Range    Glucose CSF 72 (H) 40 - 70 mg/dL   Protein total CSF:   Result Value Ref Range    Protein Total CSF 63 (H) 15 - 60 mg/dL   Cell count with differential CSF:   Result Value Ref Range    WBC CSF 0 0 - 5 /uL    RBC  (H) 0 - 2 /uL    Tube Number 4 #    Color CSF Colorless CLRL^Colorless    Appearance CSF Clear CLER^Clear   Gram stain CSF Tube 2   Result Value Ref Range     "Specimen Description Cerebrospinal fluid     Special Requests TUBE 2     Gram Stain No organisms seen     Gram Stain No WBC's seen     Gram Stain       Quantification of host cells and microbiological organisms was done on a cytocentrifuged   preparation.     CSF Culture Aerobic Bacterial Tube 2   Result Value Ref Range    Specimen Description Cerebrospinal fluid     Special Requests TUBE 2     Culture Micro Culture negative monitoring continues    Enterovirus PCR CSF   Result Value Ref Range    Specimen Description Cerebrospinal fluid     Enterovirus CSF PCR Negative NEG^Negative   Lumbar puncture    Narrative    Vasquez Macias MD     6/20/2019  6:15 PM  Lumbar puncture  Date/Time: 6/20/2019 6:11 PM  Performed by: Vasquez Macias MD  Authorized by: Vasquez Macias MD   Consent: Verbal consent obtained. Written consent obtained.  Consent given by: patient  Patient understanding: patient states understanding of the procedure being   performed  Patient consent: the patient's understanding of the procedure matches   consent given  Procedure consent: procedure consent matches procedure scheduled  Relevant documents: relevant documents present and verified  Test results: test results available and properly labeled  Site marked: the operative site was marked  Imaging studies: imaging studies available  Required items: required blood products, implants, devices, and special   equipment available  Patient identity confirmed: verbally with patient, arm band and   hospital-assigned identification number  Time out: Immediately prior to procedure a \"time out\" was called to verify   the correct patient, procedure, equipment, support staff and site/side   marked as required.  Indications: evaluation for infection (tranverse myeliytis)  Anesthesia: local infiltration and see MAR for details    Anesthesia:  Local Anesthetic: lidocaine 1% without epinephrine  Anesthetic total: 4 mL    Sedation:  Patient " sedated: no    Preparation: Patient was prepped and draped in the usual sterile fashion.  Lumbar space: L4-L5 interspace  Patient's position: left lateral decubitus  Needle gauge: 18  Needle type: spinal needle - Quincke tip  Needle length: 2.5 in  Number of attempts: 1  Opening pressure: 20 cm H2O  Fluid appearance: clear  Tubes of fluid: 4  Total volume: 15 ml  Post-procedure: site cleaned, pressure dressing applied and adhesive   bandage applied  Patient tolerance: Patient tolerated the procedure well with no immediate   complications     Glucose by meter   Result Value Ref Range    Glucose 146 (H) 70 - 99 mg/dL   Glucose by meter   Result Value Ref Range    Glucose 155 (H) 70 - 99 mg/dL   [facility] Send out. Test ID: [id number] Reporting Name: CNS Demyelinating Disease Interp, S: Laboratory Miscellaneous Order   Result Value Ref Range    Miscellaneous Test         Specimen Received, Reordered and sent to Performing laboratory - Report to follow upon   completion.     [facility] Miscellaneous Test   Result Value Ref Range    Result PENDING     Test Name CNS Demyelinating Disease Evaluation     Send Outs Misc Test Code [email or web address]     Send Outs Misc Test Specimen Serum    Glucose by meter   Result Value Ref Range    Glucose 156 (H) 70 - 99 mg/dL   Glucose by meter   Result Value Ref Range    Glucose 146 (H) 70 - 99 mg/dL   Interventional Radiology Adult/Peds IP Consult: Patient to be seen: Routine within 24 hours; Call back #: [phone]; needing nontunneled catheter for PLEX therapy; Requesting provider? Attending physician; Name: Emily Drew, KYLE CNP     6/21/2019 11:53 AM  Patient is on IR schedule 6/21/2019 for a image guided placement   of large bore, double lumen, non-tunneled CVC.   No NPO required.  Consent will be done prior to procedure.     Please contact the IR charge RN at [phone] for estimated time of   procedure.     Case discussed with Dr. Hameed from IR and Dr. Cho. Pt    requires non-tunneled CVC for PLEX due to progressive ascending   numbness up to T7/T8 in the setting of lupus, sjogren's, temporal   arteritis and optic neuritis. Plan for PLEX today after line   placement with ELSA Sullivan, DNP, APRN  Interventional Radiology  Pager: [phone]     Glucose by meter   Result Value Ref Range    Glucose 146 (H) 70 - 99 mg/dL         Sridhar Hood DO  Blood Bank and Transfusion Medicine Fellow  Transfusion Medicine Service  Pager [phone]  Attending Attestation    I have reviewed, discussed, & agree with the Fellow's Note    Briefly, this is 74 year old female with h/o stroke, TIA, discoid lupus, Sjogren's syndrome, temporal arteritis, on prednisone, optic neuritis with impairment of the R eye, hypertension, hypercholesterolemia,CHF , & recently diagnosed asthma and/or COPD.   She presented 6/19 with 2.5 weeks of  progressive ascending numbness up to around T7/T8 and R foot weakness   Differential Dx at this juncture is felt by neuro to still be broad including:  Neuromyelitis optica spectrum disorders/ anti-myelin oligodendrocyte glycoprotein antibodies (NMOSD/MOG spectrum).  Transverese Myelitis  MRI of Spinal cord:impression was most likely transverse myelitis,  H/o discoid subtype of lupus, but lupus/Sjogren's-associated TM is rare. LP performed  with many studies still pending.    3. Also Vasculitis, sarcoidosis, MS, infection, neoplasm or possibly related to her prior autoimmune disease.   While they continue her work-up  we were consulted to begin  PLEX's to slow progression(subjective RLE progression of numbness). 1st procedure scheduled for tomorrow morning    Dallas Sargent MD   Division of Transfusion Medicine   Department [place] [address]   Pager: [phone] or [phone]

## 2019-06-21 NOTE — PROGRESS NOTES
Social Work: Assessment with Discharge Plan    Patient Name:  Olga Sheriff  :  1945  Age:  74 year old  MRN:  [id number]  Risk/Complexity Score:  Filed Complexity Screen Score: 5  Completed assessment with:  Pt, chart review    Presenting Information   Reason for Referral:  Discharge plan  Date of Intake:  2019  Referral Source:  Physician  Decision Maker:  Patient  Alternate Decision Maker:  Pt's eldest son Sridhar  Health Care Directive:  Will bring in copy  Living Situation:  Apartment. She lives in her own apartment up a flight of 16 stairs. Her son Sridhar lives 1 floor above her and is able to help out with her needs.  Previous Functional Status:  Independent  Patient and family understanding of hospitalization:  Restorative  Cultural/Language/Spiritual Considerations:  Sabianist, appreciative of  involvement.  Adjustment to Illness:  Appropriate    Physical Health  Reason for Admission:    1. Transverse myelitis (H)      Services Needed/Recommended:  ARU    Mental Health/Chemical Dependency  Diagnosis:  Pt reports none  Support/Services in Place:  N/A  Services Needed/Recommended:  N/A    Support System  Significant relationship at present time:  Yon Waller  Family of origin is available for support:  Yes  Other support available:  Pt's 3 sons are her greatest support and she feels that they take good care of her.  Gaps in support system:  None  Patient is caregiver to:  None     Provider Information   Primary Care Physician:  Joceline Ty   [phone]   [facility]  [address] [facility]     :  N/A    Financial   Income Source:  Not discussed  Financial Concerns:  None  Insurance:    Payor/Plan Subscriber Name Rel Member # Group #   MEDICARE - MEDICARE OLGA SHERIFF  [id number]       ATTN CLAIMS, [address]   COMMERCIAL - [facility] OLGA SHERIFF  [id number]       [facility] CLAIM DIV, [address]       Discharge Plan   Patient  and family discharge goal:  Pt open to rehab  Provided education on discharge plan:  YES  Patient agreeable to discharge plan:  YES  A list of Medicare Certified Facilities was provided to the patient and/or family to encourage patient choice. Patient's choices for facility are:  Pt asks to be close to [place]. SW explained that ARU choice is limited and she will most likely be in the [facility]. Pt says she does not want [facility] or [facility]. She has the list to review and did not want to make a choice today. She's hoping for input from her children.  Will NH provide Skilled rehabilitation or complex medical:  YES  General information regarding anticipated insurance coverage and possible out of pocket cost was discussed. Patient and patient's family are aware patient may incur the cost of transportation to the facility, pending insurance payment: YES  Barriers to discharge:  Medical readiness, bed availability    Discharge Recommendations   Anticipated Disposition:  Facility:  TBD  Transportation Needs:  Other:  TBD  Name of Transportation Company and Phone:  N/A    Additional comments   Pt presents as A+Ox3, pleasant and cooperative, affect and speech WNL, linear. She lists a strong support system and is open to going to rehab to increase strength. Pt's understanding is that she will be in the hospital for at least several more days and she will consider facility preferences with input from family. SW will continue to remain available for discharge planning, other resources and support PRN.    CALDERON Concepcion, [facility]   Pager: [phone]

## 2019-06-21 NOTE — PLAN OF CARE
OT/6A:  Discharge Planner OT   Patient plan for discharge: Open to rehab if indicated, though concerned about insurance coverage.   Current status: OT evaluation completed and treatment initiated. Patient requiring Min Ax2 to complete SPT from bedside commode. Max A clothing management. Poor coordination and diminished proprioception in RLE, with need for verbal cues and education on compensatory strategies. SBA to don socks using figure four technique, though effortful. Tolerating sitting at EOB for ~20 minutes with SBA during extended education regarding role of OT and care planning/goals. Briefly introduced to energy conservation strategies.   Barriers to return to prior living situation: Medical Status, LE Strength/Coordination/Proprioception, Baseline Vision Deficits, Endurance, Lives Alone, 16 stairs to apartment  Recommendations for discharge: Currently ARU vs TCU, pending medical course.   Rationale for recommendations: Patient previously independent with ADL tasks and light IADL tasks. Has excellent family support from sons and is motivated to participate. Anticipate patient would be able to tolerate 3 hours of therapy per day.        Entered by: Tahmina Aguilera 06/21/2019 12:40 PM

## 2019-06-21 NOTE — PHARMACY-ADMISSION MEDICATION HISTORY
Admission medication history interview status for the 6/20/2019 admission is complete. See Epic admission navigator for allergy information, pharmacy, prior to admission medications and immunization status.     Medication history interview sources:  medication history note by [place] RPh on 6/19Suzy    Changes made to PTA medication list (reason)  Added: None  Deleted: None  Changed:   -Reentered Plavix order to show dose strength  -Reentered Prozac order to show dose strength  -Reentered metoprolol order to show dose strength  -Reentered prednisone order to show dose strength  -Reentered trazodone order to show dose strength    Additional medication history information (including reliability of information, actions taken by pharmacist): None      Prior to Admission medications    Medication Sig Last Dose Taking? Auth Provider   albuterol (PROAIR HFA/PROVENTIL HFA/VENTOLIN HFA) 108 (90 Base) MCG/ACT inhaler Inhale 2 puffs into the lungs every 6 hours as needed  6/21/2019 at Unknown time Yes Reported, Patient   amLODIPine (NORVASC) 10 MG tablet Take 10 mg by mouth every evening  6/21/2019 at Unknown time Yes Reported, Patient   aspirin (ASA) 325 MG EC tablet Take 325 mg by mouth every evening  6/21/2019 at Unknown time Yes Reported, Patient   B Complex-C (VITAMIN B COMPLEX W/VITAMIN C) TABS tablet Take 1 tablet by mouth daily 6/21/2019 at Unknown time Yes Unknown, Entered By History   Calcium-Vitamin D 600-200 MG-UNIT TABS Take 1 tablet by mouth every evening  6/21/2019 at Unknown time Yes Reported, Patient   clopidogrel (PLAVIX) 75 MG tablet Take 75 mg by mouth every morning 6/21/2019 at Unknown time Yes Unknown, Entered By History   FLUoxetine (PROZAC) 20 MG capsule Take 20 mg by mouth daily 6/21/2019 at Unknown time Yes Unknown, Entered By History   furosemide (LASIX) 20 MG tablet Take 20 mg by mouth 3 times daily 6/21/2019 at Unknown time Yes Unknown, Entered By History   metoprolol tartrate  (LOPRESSOR) 50 MG tablet Take 50 mg by mouth 2 times daily 6/21/2019 at Unknown time Yes Unknown, Entered By History   Multiple Vitamins-Minerals (MULTIVITAL PO) Take 1 tablet by mouth every morning  6/21/2019 at Unknown time Yes Reported, Patient   Potassium Gluconate 595 MG TBCR Take 1 tablet by mouth every evening  6/20/2019 at Unknown time Yes Reported, Patient   predniSONE (DELTASONE) 5 MG tablet Take 5 mg by mouth every morning 6/21/2019 at Unknown time Yes Unknown, Entered By History   simvastatin (ZOCOR) 20 MG tablet Take 20 mg by mouth At Bedtime 6/20/2019 at Unknown time Yes Reported, Patient   traZODone (DESYREL) 100 MG tablet Take 100 mg by mouth At Bedtime 6/20/2019 at Unknown time Yes Unknown, Entered By History   umeclidinium (INCRUSE ELLIPTA) 62.5 MCG/INH inhaler Inhale 1 puff into the lungs daily 6/21/2019 at Unknown time Yes Unknown, Entered By History   vitamin C (ASCORBIC ACID) 100 MG tablet Take 500 mg by mouth every morning  6/21/2019 at Unknown time Yes Reported, Patient         Medication history completed by:     Jaylen Mendoza, PharmD, BCPS  June 21, 2019

## 2019-06-21 NOTE — PROCEDURES
Interventional Radiology Brief Post Procedure Note    Procedure: IR CVC NON TUNNEL PLACEMENT    Proceduralist: Krishna Molina PA-C    Assistant: None    Time Out: Prior to the start of the procedure and with procedural staff participation, I verbally confirmed the patient s identity using two indicators, relevant allergies, that the procedure was appropriate and matched the consent or emergent situation, and that the correct equipment/implants were available. Immediately prior to starting the procedure I conducted the Time Out with the procedural staff and re-confirmed the patient s name, procedure, and site/side. (The Joint Commission universal protocol was followed.)  Yes    Medications   Medication Event Details Admin User Admin Time   lidocaine (PF) (XYLOCAINE) 1 % injection 1-30 mL Medication Given Dose: 4 mL; Route: Subcutaneous Jami Vincent RN 6/21/2019  2:48 PM   heparin 100 UNIT/ML injection 3 mL Medication Given Dose: 2 mL; Route: Intracatheter; Scheduled Time:  2:45 PM Jami Vincent RN 6/21/2019  2:49 PM   heparin 100 UNIT/ML injection 3 mL Medication Given Dose: 2 mL; Route: Intracatheter Jami Vincent RN 6/21/2019  2:50 PM       Sedation: None. Local Anesthestic used    Findings: Completed image-guided placement of 12 Estonian, 16 cm double lumen non-tunneled central venous catheter via right IJ. Aspirates and flushes freely, heparin locked and ready for immediate use. Tip in high right atrium.    Estimated Blood Loss: Minimal    Fluoroscopy Time: 0.5 minute(s)    SPECIMENS: None    Complications: 1. None     Condition: Stable    Plan: Follow-up per primary team.     Comments: See dictated procedure note for full details.    Krishna Molina PA-C

## 2019-06-21 NOTE — PROGRESS NOTES
"SPIRITUAL HEALTH SERVICES  [facility] 6A  ON-CALL VISIT     REFERRAL SOURCE: Brief on-call visit with pt per referral from unit  JACK Lara, who had attempted visit yesterday.     Pt very pleasant, said \"yesterday when the  came by I was pretty down, having a pity party, but today I'm doing better.\" Pt shared regarding her recent symptoms and plan of care at this time.      PLAN: will inform unit  of this visit and pt's desire for continued  support.     [place]) Farzana Leo M.Div., [facility]  Staff   Pager [phone]                                                                                          "

## 2019-06-22 ENCOUNTER — APPOINTMENT (OUTPATIENT)
Dept: OCCUPATIONAL THERAPY | Facility: CLINIC | Age: 74
DRG: 059 | End: 2019-06-22
Attending: PSYCHIATRY & NEUROLOGY
Payer: MEDICARE

## 2019-06-22 LAB
ABO + RH BLD: NORMAL
ABO + RH BLD: NORMAL
ACE SERPL-CCNC: 24 U/L (ref 9–67)
BASOPHILS # BLD AUTO: 0 10E9/L (ref 0–0.2)
BASOPHILS NFR BLD AUTO: 0 %
BLD GP AB SCN SERPL QL: NORMAL
BLD PROD DISPENSED VOL BPU: 1250 ML
BLD PROD TYP BPU: NORMAL
BLD UNIT ID BPU: 0
BLOOD BANK CMNT PATIENT-IMP: NORMAL
BLOOD PRODUCT CODE: NORMAL
BPU ID: NORMAL
CA-I SERPL ISE-MCNC: 4.4 MG/DL (ref 4.4–5.2)
CARDIOLIPIN ANTIBODY IGG: <1.6 GPL-U/ML (ref 0–19.9)
CARDIOLIPIN ANTIBODY IGM: 0.3 MPL-U/ML (ref 0–19.9)
DIFFERENTIAL METHOD BLD: ABNORMAL
ENA RNP IGG SER IA-ACNC: <0.2 AI (ref 0–0.9)
ENA SCL70 IGG SER IA-ACNC: 1 AI (ref 0–0.9)
ENA SM IGG SER-ACNC: 0.4 AI (ref 0–0.9)
ENA SS-A IGG SER IA-ACNC: >8 AI (ref 0–0.9)
ENA SS-B IGG SER IA-ACNC: >8 AI (ref 0–0.9)
EOSINOPHIL # BLD AUTO: 0 10E9/L (ref 0–0.7)
EOSINOPHIL NFR BLD AUTO: 0 %
ERYTHROCYTE [DISTWIDTH] IN BLOOD BY AUTOMATED COUNT: 13.5 % (ref 10–15)
FIBRINOGEN PPP-MCNC: 372 MG/DL (ref 200–420)
GLUCOSE BLDC GLUCOMTR-MCNC: 102 MG/DL (ref 70–99)
GLUCOSE BLDC GLUCOMTR-MCNC: 108 MG/DL (ref 70–99)
GLUCOSE BLDC GLUCOMTR-MCNC: 121 MG/DL (ref 70–99)
GLUCOSE BLDC GLUCOMTR-MCNC: 137 MG/DL (ref 70–99)
GLUCOSE BLDC GLUCOMTR-MCNC: 198 MG/DL (ref 70–99)
HCT VFR BLD AUTO: 41.7 % (ref 35–47)
HGB BLD-MCNC: 13.5 G/DL (ref 11.7–15.7)
IMM GRANULOCYTES # BLD: 0 10E9/L (ref 0–0.4)
IMM GRANULOCYTES NFR BLD: 0.4 %
INR PPP: 1.13 (ref 0.86–1.14)
LYMPHOCYTES # BLD AUTO: 0.5 10E9/L (ref 0.8–5.3)
LYMPHOCYTES NFR BLD AUTO: 5.1 %
MCH RBC QN AUTO: 32.2 PG (ref 26.5–33)
MCHC RBC AUTO-ENTMCNC: 32.4 G/DL (ref 31.5–36.5)
MCV RBC AUTO: 100 FL (ref 78–100)
MONOCYTES # BLD AUTO: 0.3 10E9/L (ref 0–1.3)
MONOCYTES NFR BLD AUTO: 3 %
NEUTROPHILS # BLD AUTO: 8.6 10E9/L (ref 1.6–8.3)
NEUTROPHILS NFR BLD AUTO: 91.5 %
NRBC # BLD AUTO: 0 10*3/UL
NRBC BLD AUTO-RTO: 0 /100
NUM BPU REQUESTED: 4
PLATELET # BLD AUTO: 168 10E9/L (ref 150–450)
RBC # BLD AUTO: 4.19 10E12/L (ref 3.8–5.2)
SPECIMEN EXP DATE BLD: NORMAL
TRANSFUSION STATUS PATIENT QL: NORMAL
WBC # BLD AUTO: 9.4 10E9/L (ref 4–11)

## 2019-06-22 PROCEDURE — 25000128 H RX IP 250 OP 636: Performed by: PATHOLOGY

## 2019-06-22 PROCEDURE — 85384 FIBRINOGEN ACTIVITY: CPT | Performed by: PATHOLOGY

## 2019-06-22 PROCEDURE — 86900 BLOOD TYPING SEROLOGIC ABO: CPT | Performed by: PATHOLOGY

## 2019-06-22 PROCEDURE — 86146 BETA-2 GLYCOPROTEIN ANTIBODY: CPT | Performed by: STUDENT IN AN ORGANIZED HEALTH CARE EDUCATION/TRAINING PROGRAM

## 2019-06-22 PROCEDURE — 86850 RBC ANTIBODY SCREEN: CPT | Performed by: PATHOLOGY

## 2019-06-22 PROCEDURE — 36514 APHERESIS PLASMA: CPT

## 2019-06-22 PROCEDURE — 97535 SELF CARE MNGMENT TRAINING: CPT | Mod: GO

## 2019-06-22 PROCEDURE — 36415 COLL VENOUS BLD VENIPUNCTURE: CPT | Performed by: STUDENT IN AN ORGANIZED HEALTH CARE EDUCATION/TRAINING PROGRAM

## 2019-06-22 PROCEDURE — 25000132 ZZH RX MED GY IP 250 OP 250 PS 637: Mod: GY | Performed by: STUDENT IN AN ORGANIZED HEALTH CARE EDUCATION/TRAINING PROGRAM

## 2019-06-22 PROCEDURE — 85025 COMPLETE CBC W/AUTO DIFF WBC: CPT | Performed by: PATHOLOGY

## 2019-06-22 PROCEDURE — 40000344 ZZHCL STATISTIC THAWING COMPONENT: Performed by: PATHOLOGY

## 2019-06-22 PROCEDURE — 00000146 ZZHCL STATISTIC GLUCOSE BY METER IP

## 2019-06-22 PROCEDURE — 25000125 ZZHC RX 250: Performed by: PATHOLOGY

## 2019-06-22 PROCEDURE — P9041 ALBUMIN (HUMAN),5%, 50ML: HCPCS | Performed by: PATHOLOGY

## 2019-06-22 PROCEDURE — 40000556 ZZH STATISTIC PERIPHERAL IV START W US GUIDANCE

## 2019-06-22 PROCEDURE — 25800030 ZZH RX IP 258 OP 636: Performed by: STUDENT IN AN ORGANIZED HEALTH CARE EDUCATION/TRAINING PROGRAM

## 2019-06-22 PROCEDURE — 86147 CARDIOLIPIN ANTIBODY EA IG: CPT | Performed by: STUDENT IN AN ORGANIZED HEALTH CARE EDUCATION/TRAINING PROGRAM

## 2019-06-22 PROCEDURE — 82330 ASSAY OF CALCIUM: CPT | Performed by: PATHOLOGY

## 2019-06-22 PROCEDURE — 25000128 H RX IP 250 OP 636: Performed by: STUDENT IN AN ORGANIZED HEALTH CARE EDUCATION/TRAINING PROGRAM

## 2019-06-22 PROCEDURE — 86901 BLOOD TYPING SEROLOGIC RH(D): CPT | Performed by: PATHOLOGY

## 2019-06-22 PROCEDURE — 25000132 ZZH RX MED GY IP 250 OP 250 PS 637: Mod: GY | Performed by: INTERNAL MEDICINE

## 2019-06-22 PROCEDURE — 85610 PROTHROMBIN TIME: CPT | Performed by: PATHOLOGY

## 2019-06-22 PROCEDURE — 12000001 ZZH R&B MED SURG/OB UMMC

## 2019-06-22 PROCEDURE — P9059 PLASMA, FRZ BETWEEN 8-24HOUR: HCPCS | Performed by: PATHOLOGY

## 2019-06-22 RX ORDER — CALCIUM GLUCONATE 100 MG/ML
AMPUL (ML) INTRAVENOUS
Status: COMPLETED | OUTPATIENT
Start: 2019-06-22 | End: 2019-06-22

## 2019-06-22 RX ORDER — ALBUMIN HUMAN 25 %
2750 INTRAVENOUS SOLUTION INTRAVENOUS
Status: DISCONTINUED | OUTPATIENT
Start: 2019-06-22 | End: 2019-06-22

## 2019-06-22 RX ORDER — HEPARIN SODIUM (PORCINE) LOCK FLUSH IV SOLN 100 UNIT/ML 100 UNIT/ML
3 SOLUTION INTRAVENOUS ONCE
Status: COMPLETED | OUTPATIENT
Start: 2019-06-22 | End: 2019-06-22

## 2019-06-22 RX ORDER — DIPHENHYDRAMINE HYDROCHLORIDE 50 MG/ML
50 INJECTION INTRAMUSCULAR; INTRAVENOUS
Status: CANCELLED | OUTPATIENT
Start: 2019-06-22

## 2019-06-22 RX ORDER — ALBUMIN HUMAN 25 %
1500 INTRAVENOUS SOLUTION INTRAVENOUS
Status: COMPLETED | OUTPATIENT
Start: 2019-06-22 | End: 2019-06-22

## 2019-06-22 RX ADMIN — RANITIDINE 150 MG: 150 TABLET ORAL at 08:28

## 2019-06-22 RX ADMIN — ENOXAPARIN SODIUM 40 MG: 40 INJECTION SUBCUTANEOUS at 19:29

## 2019-06-22 RX ADMIN — METOPROLOL TARTRATE 50 MG: 50 TABLET ORAL at 19:27

## 2019-06-22 RX ADMIN — TRAZODONE HYDROCHLORIDE 100 MG: 100 TABLET ORAL at 21:24

## 2019-06-22 RX ADMIN — NITROFURANTOIN (MONOHYDRATE/MACROCRYSTALS) 100 MG: 75; 25 CAPSULE ORAL at 08:29

## 2019-06-22 RX ADMIN — ANTICOAGULANT CITRATE DEXTROSE SOLUTION FORMULA A 687 ML: 12.25; 11; 3.65 SOLUTION INTRAVENOUS at 09:52

## 2019-06-22 RX ADMIN — CLOPIDOGREL BISULFATE 75 MG: 75 TABLET, FILM COATED ORAL at 08:29

## 2019-06-22 RX ADMIN — METOPROLOL TARTRATE 50 MG: 50 TABLET ORAL at 08:29

## 2019-06-22 RX ADMIN — Medication 1 TABLET: at 13:30

## 2019-06-22 RX ADMIN — FLUOXETINE 20 MG: 20 CAPSULE ORAL at 08:28

## 2019-06-22 RX ADMIN — ALBUMIN HUMAN 1500 ML: 0.05 INJECTION, SOLUTION INTRAVENOUS at 09:52

## 2019-06-22 RX ADMIN — Medication 3 ML: at 11:30

## 2019-06-22 RX ADMIN — Medication 1 TABLET: at 18:14

## 2019-06-22 RX ADMIN — RANITIDINE 150 MG: 150 TABLET ORAL at 19:27

## 2019-06-22 RX ADMIN — SIMVASTATIN 20 MG: 20 TABLET, FILM COATED ORAL at 21:24

## 2019-06-22 RX ADMIN — AMLODIPINE BESYLATE 10 MG: 10 TABLET ORAL at 19:27

## 2019-06-22 RX ADMIN — FUROSEMIDE 20 MG: 20 TABLET ORAL at 19:27

## 2019-06-22 RX ADMIN — NITROFURANTOIN (MONOHYDRATE/MACROCRYSTALS) 100 MG: 75; 25 CAPSULE ORAL at 19:27

## 2019-06-22 RX ADMIN — UMECLIDINIUM 1 PUFF: 62.5 AEROSOL, POWDER ORAL at 08:32

## 2019-06-22 RX ADMIN — ASPIRIN 325 MG: 325 TABLET, DELAYED RELEASE ORAL at 19:27

## 2019-06-22 RX ADMIN — Medication 10 MG: at 21:24

## 2019-06-22 RX ADMIN — ALBUTEROL SULFATE 2 PUFF: 90 AEROSOL, METERED RESPIRATORY (INHALATION) at 08:31

## 2019-06-22 RX ADMIN — CALCIUM GLUCONATE 3 G: 94 INJECTION, SOLUTION INTRAVENOUS at 09:52

## 2019-06-22 RX ADMIN — FUROSEMIDE 20 MG: 20 TABLET ORAL at 13:30

## 2019-06-22 RX ADMIN — SODIUM CHLORIDE 1000 MG: 9 INJECTION, SOLUTION INTRAVENOUS at 15:06

## 2019-06-22 RX ADMIN — FUROSEMIDE 20 MG: 20 TABLET ORAL at 08:28

## 2019-06-22 ASSESSMENT — ACTIVITIES OF DAILY LIVING (ADL)
ADLS_ACUITY_SCORE: 15

## 2019-06-22 NOTE — PLAN OF CARE
Status: Pt on 6a for transverse myelitis workup.   Vitals: /70 (BP Location: Right arm)   Pulse 60   Temp 97.5  F (36.4  C) (Oral)   Resp 16   Wt 97 kg (213 lb 13.5 oz)   SpO2 98%   BMI 33.49 kg/m    Neuros: A&Ox4. BLE 4/5, L stronger than R. Numbness from beneath breasts to feet, R field cut, R dysconjugate gaze. R pupil sluggish  IV: PIV SL. R internal jugular Hep Locked.   Resp/trach: On RA.   Diet: Reg diet, tolerating well.   Bowel status: No BM this shift.   : Voids spontaneously at bedside commode.   Skin: Bruising.   Pain: Denies.   Activity: Up w/ 2/GB/pivot.   Social: Family visited this shift.   Plan: PLEX 1/5 at 0800 tomorrow. Continue to monitor and follow POC.

## 2019-06-22 NOTE — PROGRESS NOTES
[facility] Neurology     Patient Name:  Ladonna Sheriff  MRN:  [id number]    :  1945  Date of Service:  2019  Primary care provider:  Joceline Ty      Summary:   Ladonna Sheriff is a 74 year old female w/ history of previous stroke, TIA, discoid lupus, Sjogren's syndrome, temporal arteritis, optic neuritis with vision impairment in right eye, chronic daily low dosed prednisone (5 mg daily), hypertension, hypercholesterolemia, diastolic and possibly systolic congestive heart failure, recently diagnosed asthma and/or COPD, previous hysterectomy, and previous tubal ligation. She presented  with 2.5 weeks of  progressive ascending numbness up to around T7/T8 and R foot weakness.     Interval:   PLEX catheter placed yesterday. Tolerating first round of PLEX. No significant change in symptoms.    ROS  A 10-point ROS was performed as per HPI, all other negative.   Physical Examination   Vitals: /70 (BP Location: Right arm)   Pulse 60   Temp 97.5  F (36.4  C) (Oral)   Resp 16   Wt 97 kg (213 lb 13.5 oz)   SpO2 98%   BMI 33.49 kg/m    General: Adult patient, lying in bed, NAD  HEENT: NC/AT, no icterus, op pink and moist, R eye exotropia  Chest: non-labored on RA  Abdomen: S/NT/ND   Extremities: Warm, no edema  Skin: No rash or lesion   Psych: Mood pleasant, affect congruent, talkative   Neuro:  Mental Status: Fully alert, attentive, and oriented to person, place, and time. Speech clear and fluent with intact comprehension and repetition.   Cranial Nerves: Vision absent in R eye, crescent-shaped visual field defect in L peripheral eye field. R eye sluggishly reactive to light, L eye briskly reactive to light, no apd. Dysconjugate gaze (R eye abducted @ midline gaze), R eye remains abducted with upward + downward gaze, able to bury both sclera w/ lateral gaze, diminished ability for convergence in R eye. Facial sensation intact/symmetric. Facial  "movements symmetric. Hearing not formally tested but intact to conversation. No dysarthria. Shoulder shrug strong bilaterally. Tongue protrusion midline.  Motor: No abnormal movements. Normal tone throughout. Strength 5/5 in b/l biceps, triceps, wrist extension, wrist flexion, finger extension, plantar flexion.          RIGHT LEFT   Deltoids 5 5   Biceps 5 5   Triceps 5 5   Wrist extension 5 5   Wrist Flexion 5 5   Finger Extension 5 5   Finger Flexion 5 5    5 5   Hip flexion 5- 5   Knee Flexion 4+ 5   Knee extension 5- 5   Dorsiflexion 5- 5   Plantar flexion 5, asym/weaker than R 5     Reflexes: 2+ and symmetric in brachioradialis, biceps, triceps, patella (R>L. Subtle B/l abductor cross reactivity, R>L. 1+ achilles on L, trace on R. Toes up-going bilaterally, but R>L. Negative baum.  Sensory: Intact/symmetric to light touch, pinprick, vibratory sense (20s b/l) in BUE. Diminished light tough (endorses \"numbness\") in bilateral LE (R more numb than L) up to ~T5/T6 (same on sides and ventral abdomen / not length dependent on thorax). Decreased pinprick sensation up to same level (describes \"dull sharp, not normal\"). Proprioception absent in R hallux, but present in L hallux and bilateral ankles + thumb.  Vibratory sensation at hallux-ankle-knee is 3-5-10 on R, 5-8-12 on L.  Coordination: FNF & HS intact without dysmetria, but difficulties with movement of RLE d/t hx of prior knee injury + proprioception/weakness.  Station/Gait: Deferred     Investigations   MRI Cervical Spine 6/19/19                                            IMPRESSION:    1. No spinal cord abnormality to indicate transverse myelitis.  2. Degenerative changes. \"    MRI Thoracic Spine 6/19/19  IMPRESSION:    Focal gadolinium enhancing lesion from T4 through T5 in the spinal cord on the right side with edema extending from T3-T4 down to lower T5. This is most likely transverse myelitis, with differential diagnosis including a spinal cord " "astrocytoma.\"            Vitamin D - 40  Vitamin B12 -396    CSF  - entero negative  - VZV negative  - Protein 63  - Glucose 72  - ACE 24  - SUSAN negative  - ESR 17 / CRP <2.9  - C4 16  - C3 110  - Hgb A1c 5.1                                                   MRI Brain w/o and w/ contrast 6/20/19                                                                  Impression:   The study demonstrates greater than 20 foci of T2-hyperintensity within the cerebral white matter which is suspicious for demyelination. Enhancing focus within the left parieto-occipital region is suspicious for active demyelination in the absence of known malignancy. No less likely, the differential for white matter lesions includes sequela of infectious or inflammatory insults, and vasculopathy \"    SUSAN Ab IgG by IFA w/ Reflex: pending   C3: 110  C4: 16  ESR: 17 (ref range 0-30)  Angiotensin converting enzyme: pending     B12: 396  Vit D: 40    WNV: pending     6/20/19 CSF  Gram stain: No organisms or WBCs seen  Culture aerobic: pending   Enterovirus PCR: negative  Varicella zoster: negative    WBC 0   Glucose 72  Protein 63  Oligoclonal banding: pending     [place] Send-Out, CNS demyelinating disease evaluation, LSMISC, ARMISC or MMMISC: pending      Impression  Ladonna Sheriff is a 74 year old female w/ history of previous stroke, TIA, discoid lupus, Sjogren's syndrome, temporal arteritis, optic neuritis with vision impairment in right eye, chronic daily low dosed prednisone (5 mg daily), hypertension, hypercholesterolemia, diastolic and possibly systolic congestive heart failure, recently diagnosed asthma and/or COPD, previous hysterectomy, and previous tubal ligation. She presented 6/19 with 2.5 weeks of progressive ascending numbness up to around T7/T8 and R foot weakness.     Physical exam with diminished sensation in BLE up to around T7/T8, but not completely absent. Proprioception impaired in R hallux with bilateral positive Babinski, " b/l abductor cross reactivity (R>L), and diminished reflexes in bilateral achilles. Subtle weakness distally in RLE. Cervical MRI unremarkable, but thoracic MRI w/ T4-T5 enhancing lesions w/ surrounding edema and head MRI with multiple T2 hyperintensities & one small enhancing focus. Her symptoms and workup so far are concerning for a demyelinating disorder. Differential includes autoimmune/MS, vasculitis, sarcoidosis, viral infection, rheumatological manifestation and neoplasm, but at this point, patient's presentation is most likely related to NMOSD/MOG spectrum.  Lupus/sjogren's-associated TM is rare and she has discoid subtype of lupus. LP was obtained in the evening of 6/21/19 with many studies still pending. So far, CSF with elevated protein, glucose, and RBCs, but absent WBCs. Enterovirus negative, but other infectious workup still pending. Other immunological workup also pending. C3, C4, ESR, and CRP wnl, with SUSAN pending. Initiated PLEX 2/22/19 due to high suspicion of NMO/MOG with stable-worsening symptoms.      PLAN:    ACUTE PROBLEMS    #T4-5 focal enhancing lesion, transverse myelopathyProgressive b/l numbness up to T5-6 &  RLE weakness x3 weeks  #Remote Hx discoid lupus, sjogren's, temporal arteritis, & optic neuritis   Highest on the differential given LP results, imaging and patient demographic would be NMO/MOG d/o.   - PLEX, 1/5 completed 2/22/19  [ ] Full CSF infectious/immunological workup pending   [ ] Antiphospholipid Ab, ds-DNA   - SSA/SSB IgG + Scleroderma ?Ab Scl-70 Positive  - Continue 1000 mg methylprednisolone daily x 5   - Vit D-Ca, ranitidine  - Rheumatology following, appreciate recs    #E coli UTI, asymptomatic   Received 1g Ceftriaxone on 6/19 with repeat UA and cx on 6/20 with 10,000-50,000 organisms of E coli  - Initiate nitrofurantion 100 mg BID x5 days    #Mild macrocytosis: B12 low normal  - MMA pending    CHRONIC PROBLEMS     #COPD and/or asthma.    - Continue her recently  started Incruse Ellipta inhaler  - Albuterol nebs available q4h prn     #Chronic diastolic and systolic congestive heart failure.    Previous echocardiogram showed ejection fraction of 45% and stage II diastolic congestive heart failure. She does not appear to have decompensated heart failure at this time.  In the setting of progressive shortness of breath would have low threshold to work this up with echocardiogram.  #Hypertension.    - Continue PTA metoprolol, amlodipine, Lasix.    - hold on ACE-I in setting of PLEX  - daily weights   - should have Cardiology follow up at discharge    #History of discoid lupus, Sjogren's syndrome, and temporal arteritis.    - solumedrol 1g daily, continue PTA prednisone 5 mg daily     #Previous TIA and stroke.    - Continue PTA ASA and plavix      #Hypercholesterolemia.    - Continue PTA Zocor     Activity: Up with assist  Diet: ADA,: reg   DVT ppx: lovenox    Bowel ppx: ranitidine  Lines: PIV  Code: Full  Dispo: Pending definitive diagnosis and treatment plan/coimpletion of PLEX  - PT + OT consulted, appreciate recs       Juan Luis Cho MD  PGY2

## 2019-06-22 NOTE — PLAN OF CARE
Discharge Planner OT   Patient plan for discharge: rehab  Current status: Min/mod A with FWW for 5ft ambulation in room and pivot to commode. Poor/limited control of RLE with pivot transfer, improved with forward ambulation but continues to be limited by lack of awareness/sensation to R LE.   Barriers to return to prior living situation: weakness, level of assist with transfers/ADLS  Recommendations for discharge: ARU  Rationale for recommendations: Pt significantly below baseline, motivated to work with therapies, able to tolerate 3+ hours per day and has multidisciplinary needs.        Entered by: Madhavi Aggarwal 06/22/2019 2:57 PM

## 2019-06-22 NOTE — PLAN OF CARE
Status: Pt on 6a for work up r/t progressive ascending numbness.  Vitals: /69   Pulse 60   Temp 96.9  F (36.1  C) (Oral)   Resp 16   Wt 97 kg (213 lb 13.5 oz)   SpO2 97%   BMI 33.49 kg/m    Neuros: A/Ox4, R eye vision loss baseline with R field cut, R dysconjugate gaze, R pupil sluggish. Numbness from bottom of ribcage to feet bilaterally. RLE 3/5 LLE 4/5  IV: PIV SL, RIJ hep locked  Resp/trach: Ls clear. RA  Diet: Regular diet  Bowel status: No BM this shift, BS+  : Voiding spontaneously via bedside commode   Skin: Bruising but intact, mepilex on coccyx   Pain: Denies  Activity: Ax2 GB walker-pivot   Plan: PLEX treatment 1/5 today at 0800. Continue to monitor and assess.

## 2019-06-22 NOTE — PROCEDURES
Laboratory Medicine and Pathology  Transfusion Medicine - Apheresis Procedure Note    Ladonna Sheriff MRN# [id number]   YOB: 1945 Age: 74 year old   Date of Procedure: 2019    Procedure:PLEX  Reason for Procedure:Possible Autoimmune Neuropathy         Assessment and Plan:   Ladonna Sheriff is a 74 year old female  with h/o stroke, TIA, discoid lupus, Sjogren's syndrome, temporal arteritis, on prednisone, optic neuritis with impairment of the R eye, hypertension, hypercholesterolemia,CHF , & recently diagnosed asthma and/or COPD.     She presented  with 2.5 weeks of  progressive ascending numbness up to around T7/T8 and R foot weakness     Differential Dx at this juncture is felt by neuro to still be broad includin. Neuromyelitis optica spectrum disorders/ anti-myelin oligodendrocyte glycoprotein antibodies (NMOSD/MOG spectrum).  2. Transverese Myelitis  1. MRI of Spinal cord:impression was most likely transverse myelitis,  2. H/o discoid subtype of lupus, but lupus/Sjogren's-associated TM is rare. LP performed  with many studies still pending.    3.  Also Vasculitis, sarcoidosis, MS, infection, neoplasm or possibly related to her prior autoimmune disease.   While they continue her work-up  we were consulted to begin  PLEX's to slow progression(subjective RLE progression of numbness).     She had her 1st procedure ( of scheduled 5 every other day) today and tolerated it well. Next one scheduled for     Please do not start or continue ace-inhibitors throughout the duration of a therapeutic plasma exchange series. Please notify the apheresis physician of any upcoming procedures, surgeries, or biopsies as therapeutic plasma exchange will affect coagulation factors.     Attestation:   NORBERTO Sargent was available by pager during the entire procedure. I have reviewed the chart and discussed the patient and current procedure with the apheresis nursing staff.          Past Medical History:     Past Medical History:   Diagnosis Date     Cerebral infarction (H)      CHF (congestive heart failure) (H)      Hypertension      Lupus (H)      Lupus (H)      Optic neuritis      Optic neuritis      Sjogren's syndrome (H)      Sjogren's syndrome (H)      Temporal arteritis (H)      TIA (transient ischemic attack)      Uncomplicated asthma              Past Surgical History:     Past Surgical History:   Procedure Laterality Date     CHOLECYSTECTOMY       GYN SURGERY      hysterectomy     GYN SURGERY      tubal ligation     IR CVC NON TUNNEL PLACEMENT  6/21/2019              Social History:     Social History     Tobacco Use     Smoking status: Former Smoker   Substance Use Topics     Alcohol use: Yes     Alcohol/week: 4.2 oz     Types: 7 Shots of liquor per week            Allergies:     Allergies   Allergen Reactions     Prevacid [Lansoprazole] Rash             Medications:     Current Facility-Administered Medications   Medication     acetaminophen (TYLENOL) tablet 650 mg    Or     acetaminophen (TYLENOL) solution 650 mg     acetaminophen (TYLENOL) Suppository 650 mg     albuterol (PROAIR HFA/PROVENTIL HFA/VENTOLIN HFA) 108 (90 Base) MCG/ACT inhaler 2 puff     amLODIPine (NORVASC) tablet 10 mg     aspirin (ASA) EC tablet 325 mg     calcium carbonate 600 mg-vitamin D 400 units (CALTRATE) per tablet 1 tablet     clopidogrel (PLAVIX) tablet 75 mg     glucose gel 15-30 g    Or     dextrose 50 % injection 25-50 mL    Or     glucagon injection 1 mg     enoxaparin (LOVENOX) injection 40 mg     FLUoxetine (PROzac) capsule 20 mg     furosemide (LASIX) tablet 20 mg     heparin 100 UNIT/ML injection 3 mL     heparin 100 UNIT/ML injection 3 mL     insulin aspart (NovoLOG) inj (RAPID ACTING)     insulin aspart (NovoLOG) inj (RAPID ACTING)     magnesium sulfate 2 g in NS intermittent infusion (PharMEDium or FV Cmpd)     magnesium sulfate 4 g in 100 mL sterile water (premade)     melatonin tablet 10 mg      methylPREDNISolone sodium succinate (solu-MEDROL) 1,000 mg in sodium chloride 0.9 % 250 mL intermittent infusion     metoprolol tartrate (LOPRESSOR) tablet 50 mg     naloxone (NARCAN) injection 0.1-0.4 mg     nitroFURantoin macrocrystal-monohydrate (MACROBID) capsule 100 mg     ondansetron (ZOFRAN-ODT) ODT tab 4 mg    Or     ondansetron (ZOFRAN) injection 4 mg     polyethylene glycol (MIRALAX/GLYCOLAX) Packet 17 g     potassium chloride (KLOR-CON) Packet 20-40 mEq     potassium chloride 10 mEq in 100 mL intermittent infusion with 10 mg lidocaine     potassium chloride 10 mEq in 100 mL sterile water intermittent infusion (premix)     potassium chloride 20 mEq in 50 mL intermittent infusion     potassium chloride ER (K-DUR/KLOR-CON M) CR tablet 20-40 mEq     ranitidine (ZANTAC) tablet 150 mg     senna-docusate (SENOKOT-S/PERICOLACE) 8.6-50 MG per tablet 1 tablet    Or     senna-docusate (SENOKOT-S/PERICOLACE) 8.6-50 MG per tablet 2 tablet     simvastatin (ZOCOR) tablet 20 mg     sodium chloride (PF) 0.9% PF flush 10 mL     sodium chloride (PF) 0.9% PF flush 10 mL     traZODone (DESYREL) tablet 100 mg     umeclidinium (INCRUSE ELLIPTA) 62.5 MCG/INH inhaler 1 puff                Abbreviated Physical Exam:   /71   Pulse 52   Temp 96.1  F (35.6  C) (Oral)   Resp 18   Wt 97 kg (213 lb 13.5 oz)   SpO2 98%   BMI 33.49 kg/m    Patient Alert & Oriented and in No Acute Distress         Laboratory Data:     INR  Recent Labs   Lab 06/22/19  0952   INR 1.13       Fibrinogen   Date Value Ref Range Status   06/22/2019 372 200 - 420 mg/dL Final       Results for orders placed or performed during the hospital encounter of 06/20/19 (from the past 24 hour(s))   IR CVC Non Tunnel Placement    Narrative    PRE-PROCEDURE DIAGNOSIS: Transverse myelitis    POST-PROCEDURE DIAGNOSIS: Same    PROCEDURE: Non-tunneled central venous catheter placement    Impression    IMPRESSION: Completed image-guided placement of 12 Fr. 16 cm  double  lumen non tunneled central venous catheter via right IJ. Aspirates and  flushes freely, heparin locked and ready for immediate use. No  immediate complication.    ----------    CLINICAL HISTORY: Patient requires central venous access for therapy.  A non-tunneled central venous catheter placement is requested.    PERFORMED BY: Krishna Molina PA-C    CONSENT: Written informed consent was obtained and is documented in  the patient record.    MEDICATIONS:No intravenous sedation was administered. A 10:1 volume  mixture of 1% lidocaine without epinephrine buffered with 8.4%  bicarbonate solution was used for local anesthesia. The catheter was  heparin locked upon completion of placement.    NURSING: The patient was placed on continuous vital signs monitoring.  Vital signs were monitored by nursing staff under my supervision.    FLUOROSCOPY TIME: 0.5 minutes    DESCRIPTION: The neck and upper chest were prepped and draped in the  usual sterile fashion.      Under ultrasound guidance, the internal jugular vein was identified  and the overlying skin was anesthetized. Skin dermatotomy was made.  With ultrasound guidance, internal jugular venipuncture was made with  a 19 gauge thin-wall needle. A permanent copy of the image documenting  a patent vein was entered is in the patient record.    Needle was exchanged over guidewire for a 5 Zimbabwean dilator under  fluoroscopic guidance. Length to right atrium was measured with a  guidewire. Guidewire was removed and a 0.035 Bentson wire was advanced  under fluoroscopy into the inferior vena cava. A dual lumen  non-tunneled central venous access Mahurkar brand catheter was  selected and flushed with normal saline. The 5 Zimbabwean dilator was then  removed, and serial dilatation with 8, then 10 Zimbabwean dilators was  performed over the wire. The Mahurkar catheter was then advanced over  the wire, and under fluoroscopic visualization, its tip was placed at  the right atrium. Both  catheter lumens adequately aspirated and  flushed. Each lumen was heparin locked. A 2-0 nylon catheter retaining  suture and sterile dressing was then applied.    COMPLICATIONS: No immediate concerns, the patient remained stable  throughout the procedure and tolerated it well.    ESTIMATED BLOOD LOSS: Minimal    SPECIMENS: None    DENNIS HOLLINGSWORTH PA-C   Glucose by meter   Result Value Ref Range    Glucose 107 (H) 70 - 99 mg/dL   Glucose by meter   Result Value Ref Range    Glucose 161 (H) 70 - 99 mg/dL   Glucose by meter   Result Value Ref Range    Glucose 121 (H) 70 - 99 mg/dL   Cardiolipin Phoebe IgG and IgM   Result Value Ref Range    Cardiolipin Antibody IgG <1.6 0.0 - 19.9 GPL-U/mL    Cardiolipin Antibody IgM 0.3 0.0 - 19.9 MPL-U/mL   ABO/Rh type and screen   Result Value Ref Range    ABO O     RH(D) Pos     Antibody Screen Neg     Test Valid Only At          [facility]    Specimen Expires 06/25/2019    Glucose by meter   Result Value Ref Range    Glucose 137 (H) 70 - 99 mg/dL   Plasma prepare order mLs   Result Value Ref Range    Blood Component Type Plasma     Units Ordered 4     Transfuse mLs ordered 1,250 mL   Blood component   Result Value Ref Range    Unit Number [id number]     Blood Component Type Plasma, Thawed     Division Number 00     Status of Unit Released to care unit 06/22/2019 1022     Blood Product Code P1625C46     Unit Status ISS    Blood component   Result Value Ref Range    Unit Number [id number]     Blood Component Type Plasma, Thawed     Division Number 00     Status of Unit Released to care unit 06/22/2019 1022     Blood Product Code N8186T02     Unit Status ISS    Blood component   Result Value Ref Range    Unit Number [id number]     Blood Component Type Plasma, Thawed     Division Number 00     Status of Unit Released to care unit 06/22/2019 1022     Blood Product Code E7177F27     Unit Status ISS    Blood component   Result Value Ref  Range    Unit Number [id number]     Blood Component Type Plasma, Thawed     Division Number 00     Status of Unit Released to care unit 06/22/2019 1022     Blood Product Code H5401S98     Unit Status ISS    CBC with platelets differential   Result Value Ref Range    WBC 9.4 4.0 - 11.0 10e9/L    RBC Count 4.19 3.8 - 5.2 10e12/L    Hemoglobin 13.5 11.7 - 15.7 g/dL    Hematocrit 41.7 35.0 - 47.0 %     78 - 100 fl    MCH 32.2 26.5 - 33.0 pg    MCHC 32.4 31.5 - 36.5 g/dL    RDW 13.5 10.0 - 15.0 %    Platelet Count 168 150 - 450 10e9/L    Diff Method Automated Method     % Neutrophils 91.5 %    % Lymphocytes 5.1 %    % Monocytes 3.0 %    % Eosinophils 0.0 %    % Basophils 0.0 %    % Immature Granulocytes 0.4 %    Nucleated RBCs 0 0 /100    Absolute Neutrophil 8.6 (H) 1.6 - 8.3 10e9/L    Absolute Lymphocytes 0.5 (L) 0.8 - 5.3 10e9/L    Absolute Monocytes 0.3 0.0 - 1.3 10e9/L    Absolute Eosinophils 0.0 0.0 - 0.7 10e9/L    Absolute Basophils 0.0 0.0 - 0.2 10e9/L    Abs Immature Granulocytes 0.0 0 - 0.4 10e9/L    Absolute Nucleated RBC 0.0    INR   Result Value Ref Range    INR 1.13 0.86 - 1.14   Fibrinogen activity   Result Value Ref Range    Fibrinogen 372 200 - 420 mg/dL   Calcium ionized   Result Value Ref Range    Calcium Ionized 4.4 4.4 - 5.2 mg/dL   Glucose by meter   Result Value Ref Range    Glucose 108 (H) 70 - 99 mg/dL            Procedure Summary:   A single volume plasma exchange was performed with 2/3 Albumin & last 1/3 was Plasma in light of recent procedure The Non-Tunneled RIJ CVC was used for access. ACD-A was for anticoagulation. To offset the effects of the citrate, calcium gluconate was given in the return line. The patient's vital signs were stable throughout. The patient tolerated the procedure well.    Attestation:   NORBERTO Sargent was available by pager during the entire procedure. I have reviewed the chart and discussed the patient and current procedure with the apheresis nursing  staff.    Dallas Sargent MD   Division of Transfusion Medicine   Department [place] [address]   Pager: [phone] or [phone]

## 2019-06-22 NOTE — PLAN OF CARE
Status: Pt on 6A for workup r/t progressive ascending numbness 1/5 PLEX completed today.   Vitals: VSS on RA  Neuros: Neuro's unchanged.  A/Ox4, R eye vision loss baseline with R field cut, R dysconjugate gaze, R pupil sluggish. Numbness from bottom of ribcage to feet bilaterally. RLE -4/5 LLE 4/5  IV: PIV SL, RIJ hep locked  Resp/trach: LS clear, RA  Diet: Regular diet. Needs assistance with ordering.   Bowel status: No BM this shift, BS+  : Voiding spontaneously via bedside commode   Skin: Bruising throughout, Mepilex on coccyx  Pain: Denies   Activity: Up with heave assist of 2/GB/walker-pivot  Social: Family visited this morning  Plan: PLEX treatment completed today 1/5. Next PLEX 6/24/19. Continue to monitor and follow POC.

## 2019-06-22 NOTE — DISCHARGE INSTRUCTIONS
Apheresis Blood Donor Center Post Instructions  You may feel tired after your procedure today.   Please call your doctor if you have:  bleeding that doesn t stop, fever, pain where a needle or tube (catheter) was placed, seizures, trouble breathing, red urine, nausea or vomiting, other health concerns.     If your symptoms are severe, call 911.  You have a Central Venous Catheter:  Notify your doctor if you have had a fever, chills, shaking  or redness, warmth, swelling, drainage at the exit-site.  This could be a sign of infection.    The Apheresis/Blood Donor Center is open Monday-Friday 7:30 a.m. to 5 p.m.  The phone number is [phone].  A Transfusion Medicine physician can be reached after 5:00 p.m. weekdays and on weekends /Holidays by calling [phone], and asking for the physician on call.      Plasma exchange:  You received albumin as part of your treatment (this is the most common), some of your clotting factors have been removed.  You body will replace these factors, but you could be at a slight risk for bleeding.  Please inform us if you have had any bleeding or a recent invasive procedure, such as a biopsy or surgery.    Certain medications that lower your blood pressure (ace inhibitors) such as Lisinopril are contraindicated while you are receiving plasma exchange.  Please inform us if you have started taking this medication during your plasma exchange series.

## 2019-06-23 LAB
ALB CSF/SERPL: 6.6 RATIO (ref 0–9)
ALBUMIN CSF-MCNC: 23 MG/DL (ref 0–35)
ALBUMIN SERPL-MCNC: 3480 MG/DL (ref 3500–5200)
CREAT SERPL-MCNC: 0.86 MG/DL (ref 0.52–1.04)
GFR SERPL CREATININE-BSD FRML MDRD: 67 ML/MIN/{1.73_M2}
GLUCOSE BLDC GLUCOMTR-MCNC: 111 MG/DL (ref 70–99)
GLUCOSE BLDC GLUCOMTR-MCNC: 144 MG/DL (ref 70–99)
GLUCOSE BLDC GLUCOMTR-MCNC: 178 MG/DL (ref 70–99)
GLUCOSE BLDC GLUCOMTR-MCNC: 222 MG/DL (ref 70–99)
IGG CSF-MCNC: 15.3 MG/DL (ref 0–6)
IGG SERPL-MCNC: 1680 MG/DL (ref 768–1632)
IGG SYNTH RATE SER+CSF CALC-MRATE: 45.3 MG/D
IGG/ALB CLEAR SER+CSF-RTO: 1.38 RATIO (ref 0.28–0.66)
IGG/ALB CSF: 0.67 RATIO (ref 0.09–0.25)
OLIGOCLONAL BANDS CSF ELPH-IMP: ABNORMAL
OLIGOCLONAL BANDS CSF ELPH-IMP: POSITIVE
OLIGOCLONAL BANDS CSF IEF: 12 BANDS (ref 0–1)
PLATELET # BLD AUTO: 172 10E9/L (ref 150–450)

## 2019-06-23 PROCEDURE — 25800030 ZZH RX IP 258 OP 636: Performed by: STUDENT IN AN ORGANIZED HEALTH CARE EDUCATION/TRAINING PROGRAM

## 2019-06-23 PROCEDURE — 25000128 H RX IP 250 OP 636: Performed by: STUDENT IN AN ORGANIZED HEALTH CARE EDUCATION/TRAINING PROGRAM

## 2019-06-23 PROCEDURE — 36415 COLL VENOUS BLD VENIPUNCTURE: CPT | Performed by: INTERNAL MEDICINE

## 2019-06-23 PROCEDURE — 25000132 ZZH RX MED GY IP 250 OP 250 PS 637: Mod: GY | Performed by: INTERNAL MEDICINE

## 2019-06-23 PROCEDURE — 85049 AUTOMATED PLATELET COUNT: CPT | Performed by: INTERNAL MEDICINE

## 2019-06-23 PROCEDURE — 00000146 ZZHCL STATISTIC GLUCOSE BY METER IP

## 2019-06-23 PROCEDURE — 82565 ASSAY OF CREATININE: CPT | Performed by: INTERNAL MEDICINE

## 2019-06-23 PROCEDURE — 12000001 ZZH R&B MED SURG/OB UMMC

## 2019-06-23 PROCEDURE — 25000132 ZZH RX MED GY IP 250 OP 250 PS 637: Mod: GY | Performed by: STUDENT IN AN ORGANIZED HEALTH CARE EDUCATION/TRAINING PROGRAM

## 2019-06-23 PROCEDURE — 25000128 H RX IP 250 OP 636: Performed by: PHYSICIAN ASSISTANT

## 2019-06-23 RX ORDER — NICOTINE POLACRILEX 4 MG
15-30 LOZENGE BUCCAL
Status: DISCONTINUED | OUTPATIENT
Start: 2019-06-23 | End: 2019-06-23

## 2019-06-23 RX ORDER — DEXTROSE MONOHYDRATE 25 G/50ML
25-50 INJECTION, SOLUTION INTRAVENOUS
Status: DISCONTINUED | OUTPATIENT
Start: 2019-06-23 | End: 2019-07-06 | Stop reason: HOSPADM

## 2019-06-23 RX ORDER — NICOTINE POLACRILEX 4 MG
15-30 LOZENGE BUCCAL
Status: DISCONTINUED | OUTPATIENT
Start: 2019-06-23 | End: 2019-07-06 | Stop reason: HOSPADM

## 2019-06-23 RX ORDER — DEXTROSE MONOHYDRATE 25 G/50ML
25-50 INJECTION, SOLUTION INTRAVENOUS
Status: DISCONTINUED | OUTPATIENT
Start: 2019-06-23 | End: 2019-06-23

## 2019-06-23 RX ADMIN — NITROFURANTOIN (MONOHYDRATE/MACROCRYSTALS) 100 MG: 75; 25 CAPSULE ORAL at 19:00

## 2019-06-23 RX ADMIN — FUROSEMIDE 20 MG: 20 TABLET ORAL at 19:00

## 2019-06-23 RX ADMIN — UMECLIDINIUM 1 PUFF: 62.5 AEROSOL, POWDER ORAL at 08:36

## 2019-06-23 RX ADMIN — NITROFURANTOIN (MONOHYDRATE/MACROCRYSTALS) 100 MG: 75; 25 CAPSULE ORAL at 08:34

## 2019-06-23 RX ADMIN — CLOPIDOGREL BISULFATE 75 MG: 75 TABLET, FILM COATED ORAL at 08:34

## 2019-06-23 RX ADMIN — RANITIDINE 150 MG: 150 TABLET ORAL at 08:34

## 2019-06-23 RX ADMIN — ASPIRIN 325 MG: 325 TABLET, DELAYED RELEASE ORAL at 19:00

## 2019-06-23 RX ADMIN — Medication 1 TABLET: at 08:34

## 2019-06-23 RX ADMIN — ENOXAPARIN SODIUM 40 MG: 40 INJECTION SUBCUTANEOUS at 19:00

## 2019-06-23 RX ADMIN — AMLODIPINE BESYLATE 10 MG: 10 TABLET ORAL at 19:00

## 2019-06-23 RX ADMIN — Medication 10 MG: at 22:16

## 2019-06-23 RX ADMIN — FUROSEMIDE 20 MG: 20 TABLET ORAL at 13:34

## 2019-06-23 RX ADMIN — FLUOXETINE 20 MG: 20 CAPSULE ORAL at 08:33

## 2019-06-23 RX ADMIN — SODIUM CHLORIDE 1000 MG: 9 INJECTION, SOLUTION INTRAVENOUS at 12:12

## 2019-06-23 RX ADMIN — Medication 3 ML: at 15:47

## 2019-06-23 RX ADMIN — POLYETHYLENE GLYCOL 3350 17 G: 17 POWDER, FOR SOLUTION ORAL at 08:53

## 2019-06-23 RX ADMIN — Medication 1 TABLET: at 17:41

## 2019-06-23 RX ADMIN — METOPROLOL TARTRATE 50 MG: 50 TABLET ORAL at 08:35

## 2019-06-23 RX ADMIN — SENNOSIDES AND DOCUSATE SODIUM 2 TABLET: 8.6; 5 TABLET ORAL at 15:47

## 2019-06-23 RX ADMIN — SIMVASTATIN 20 MG: 20 TABLET, FILM COATED ORAL at 22:16

## 2019-06-23 RX ADMIN — FUROSEMIDE 20 MG: 20 TABLET ORAL at 08:34

## 2019-06-23 RX ADMIN — TRAZODONE HYDROCHLORIDE 100 MG: 100 TABLET ORAL at 22:16

## 2019-06-23 RX ADMIN — METOPROLOL TARTRATE 50 MG: 50 TABLET ORAL at 19:00

## 2019-06-23 RX ADMIN — INSULIN ASPART 1 UNITS: 100 INJECTION, SOLUTION INTRAVENOUS; SUBCUTANEOUS at 08:39

## 2019-06-23 ASSESSMENT — ACTIVITIES OF DAILY LIVING (ADL)
ADLS_ACUITY_SCORE: 15

## 2019-06-23 NOTE — PLAN OF CARE
Status: Pt on 6a for progressive LE weakness, possible transverse myelitis. PLEX 1/5 completed today  Vitals: /76 (BP Location: Right arm)   Pulse 56   Temp 96.8  F (36  C) (Oral)   Resp 16   Wt 97 kg (213 lb 13.5 oz)   SpO2 98%   BMI 33.49 kg/m    Neuros: A&Ox4. BLE 4/5, L stronger than R. Numbness from beneath breasts to feet, R field cut, R dysconjugate gaze. R pupil sluggish  IV: PIV SL. R internal jugular Hep Locked by PLEX nurse.  Resp/trach: On RA.   Diet: Reg diet, tolerating well.   Bowel status: No BM this shift.   : Voids spontaneously at bedside commode.   Skin: Bruising.   Pain: Denies.   Activity: Up w/ 2/GB/pivot.   Social: Family visited this shift.   Plan: PLEX 2/5 on 6/24. Continue to monitor and follow POC.

## 2019-06-23 NOTE — PROGRESS NOTES
[facility] Neurology     Patient Name:  Ladonna Sheriff  MRN:  [id number]    :  1945  Date of Service:  2019  Primary care provider:  Joceline Ty      Summary:   Ladonna Sheriff is a 74 year old female w/ history of previous stroke, TIA, discoid lupus, Sjogren's syndrome, temporal arteritis, optic neuritis with vision impairment in right eye, chronic daily low dosed prednisone (5 mg daily), hypertension, hypercholesterolemia, diastolic and possibly systolic congestive heart failure, recently diagnosed asthma and/or COPD, previous hysterectomy, and previous tubal ligation. She presented  with 2.5 weeks of  progressive ascending numbness up to around T7/T8 and R foot weakness.     Subjective:   Tolerated PLEX 1/5 well yesterday. No new complaints. Thinks numbness is neither better nor worse.    Physical Examination   Vitals: /75 (BP Location: Right arm)   Pulse 56   Temp 97  F (36.1  C) (Oral)   Resp 16   Wt 97 kg (213 lb 13.5 oz)   SpO2 96%   BMI 33.49 kg/m    General: Adult patient, lying in bed, NAD  HEENT: NC/AT, no icterus, op pink and moist, R eye exotropia  Chest: non-labored on RA  Abdomen: S/NT/ND   Extremities: Warm, no edema  Skin: No rash or lesion   Psych: Mood pleasant, affect congruent, talkative   Neuro:  Mental Status: Fully alert, attentive, and oriented to person, place, and time. Speech clear and fluent with intact comprehension and repetition.   Cranial Nerves: Vision absent in R eye, crescent-shaped visual field defect in L eye in LUQ otherwise normal. R eye sluggishly reactive to light, L eye briskly reactive to light, no apd. Dysconjugate gaze (R eye abducted @ midline gaze), but EOMI with normal smooth pursuit, able to bury sclera in all cardinal directions but diminished ability for convergence in R eye. Facial sensation intact/symmetric. Facial movements symmetric. Hearing not formally tested but intact to  "conversation. No dysarthria. Shoulder shrug strong bilaterally. Tongue protrusion midline.  Motor: No abnormal movements. Normal tone throughout. Strength 5/5 in b/l biceps, triceps, wrist extension, wrist flexion, finger extension, plantar flexion.  5 strength L hip flexion, 5- in R hip flexion. 5 L knee flexion/extension, 5- R knee flexion/extension. 5- strength R dorsiflexion, 5 L dorsiflexion. 5- R dorsiflexion of R  Hallux, 5/5 in R  Reflexes: 2+ and symmetric in brachioradialis, biceps, triceps, patella (R>L). B/l abductor cross reactivity, R>L. 1+ achilles bilaterally. Toes up-going bilaterally, but R>L.   Sensory: Intact/symmetric to light touch in UE. Symmetric but diminished light tough (endorses \"numbness\") in bilateral LE up to ~T7/T8 (same on sides and ventral abdomen). Proprioception absent in R hallux, but present in L hallux and bilateral ankles.    Coordination: FNF without ataxia or dysmetria  Station/Gait: Not tested    Investigations   MRI Cervical Spine 6/19/19  IMPRESSION:    1. No spinal cord abnormality to indicate transverse myelitis.  2. Degenerative changes. \"    MRI Thoracic Spine 6/19/19                                  IMPRESSION:    Focal gadolinium enhancing lesion from T4 through T5 in the spinal cord on the right side with edema extending from T3-T4 down to lower T5. This is most likely transverse myelitis, with differential diagnosis including a spinal cord astrocytoma.\"    MRI Brain w/o and w/ contrast 6/20/19                                                Impression:   The study demonstrates greater than 20 foci of T2-hyperintensity within the cerebral white matter which is suspicious for demyelination. Enhancing focus within the left parieto-occipital region is suspicious for active demyelination in the absence of known malignancy. No less likely, the differential for white matter lesions includes sequela of infectious or inflammatory insults, and vasculopathy \"    SUSAN Ab IgG by " IFA w/ Reflex: negative  C3: 110  C4: 16  ESR: 17 (0-30)  Angiotensin converting enzyme: 24 (9-67)  DsDNA: pending  [facility] panel:  RNP negative    Macias 0.4 (0-0.9)    SSA >8.0 (0-0.9)    SSB >8.0 (0-0.9)    Scleroderma Ab Scl-70 1.0 (0-0.9)  B12: 396  MMA pending  Vit D: 40  Lupus anticoagulant panel pending  Anticardiolipin Ab negative  Beta 2 glycoprotein Abs pending    Culture Micro Abnormal  06/20/2019  3:43    10,000 to 50,000 colonies/mL Escherichia coli   Susceptibility testing in progress      6/20/19 CSF  Gram stain: No organisms or WBCs seen  Culture aerobic: negative, monitoring continues  Enterovirus PCR: negative  VZV: negative  WNV: pending    WBC 0   Glucose 72  Protein 63  Oligoclonal banding: pending   [place] Send-Out, CNS demyelinating disease evaluation: pending    Assessment  Ladonna Sheriff is a 74 year old female w/ history of previous stroke, TIA, discoid lupus, Sjogren's syndrome, temporal arteritis, optic neuritis with residual vision impairment in the R eye, chronic daily low dose prednisone (5 mg), hypertension, hypercholesterolemia, diastolic and possibly systolic congestive heart failure, recently diagnosed asthma and/or COPD, previous hysterectomy, and previous tubal ligation. She presented 6/19 with 2.5 weeks of progressive ascending numbness up to around T7/T8 and R foot weakness.     Physical exam with diminished sensation in BLE up to around T7/T8, but not completely absent. Proprioception impaired in R hallux with bilateral positive Babinski, b/l abductor cross reactivity (R>L), and diminished reflexes in bilateral achilles. Cervical MRI unremarkable, but thoracic MRI w/ T4-T5 enhancing lesions w/ surrounding edema and head MRI with multiple T2 hyperintensities & one small enhancing focus. Her symptoms and workup so far are concerning for a demyelinating disorder. Differential is still broad including autoimmune, vasculitis, sarcoidosis, MS, infection, and neoplasm, but at  this point is most likely NMOSD/MOG spectrum. Etiology also possibly of other autoimmune origin, either related to her prior autoimmune disease or disparate - Although lupus/sjogren's-associated TM is rare and she has discoid subtype of lupus, and Sjogren's is not particularly active. CSF obtained 6/21 demonstrated elevated protein, glucose, and RBCs, but absent WBCs. Enterovirus, VZV, gram stain and preliminary aerobic culture negative, but other infectious workup still pending. Other immunologic workup also pending. C3, C4, ESR, CRP, SUSAN wnl. Since she was reporting subjective RLE progression of numbness, it was in her best interest to initiate PLEX to arrest/slow progression.     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 Abs pending.  #Hx discoid lupus, sjogren's, temporal arteritis, & optic neuritis   - Continue PLEX x5 runs (, 24, 26, 28, 30)  - Continue 1000 mg methylprednisolone daily x5 days (, 23, 24, 25), working on shifting administration to AM  - Full CSF infectious/immunological workup pending  - Holding lovenox   - Continue PTA 5 mg prednisone daily   - Rheumatology following     #E coli UTI, asymptomatic   Received 1g Ceftriaxone on 6/19 with repeat UA and cx on 6/20 with 10,000-50,000 organisms of E coli; 6/20 susceptibility testing still in progress.    - Nitrofurantoin 100 mg BID x5 days    CHRONIC PROBLEMS     #COPD and/or asthma.    - Continue her recently started Incruse Ellipta inhaler  - Albuterol nebs available q4h prn     #Chronic diastolic and systolic congestive heart failure.    Previous echocardiogram showed ejection fraction of 45% and stage II diastolic congestive heart failure. She does not appear to have decompensated heart failure at this time.  In the setting of progressive shortness of breath would have low threshold to work this up  with echocardiogram.  #Hypertension.    - Continue PTA metoprolol, amlodipine, Lasix.    - daily weights     #History of discoid lupus, Sjogren's syndrome, and temporal arteritis.    - solumedrol 1g daily x5 days, when finished, continue PTA prednisone 5 mg daily     #Previous TIA and stroke.    - Continue PTA  mg and plavix      #Hypercholesterolemia.    - Continue PTA Zocor     Activity: Up with assist  Diet: Regular adult diet   DVT ppx: lovenox currently being held   Bowel ppx: ranitidine  Lines: PIV  Code: Full  Dispo: Pending further diagnostic testing and completion of therapy plan, PT/OT guided.    The patient was seen and discussed with the attending, Dr. Macias.    Liss Vaughn MD  Neurology PGY-3  [phone]

## 2019-06-23 NOTE — PLAN OF CARE
Status: Pt on 6a for work up r/t progressive ascending numbness.  Vitals: /72 (BP Location: Right arm)   Pulse 56   Temp 96.1  F (35.6  C) (Oral)   Resp 16   Wt 97 kg (213 lb 13.5 oz)   SpO2 97%   BMI 33.49 kg/m    Neuros: A/Ox4, R eye blind baseline with R field cut, R dysconjugate gaze, R pupil sluggish. Numbness from underneath breasts to feet. Lowers 4/5 Uppers 5/5  IV: PIV SL, RIJ hep locked  Resp/trach: LS clear. RA  Diet: Regular diet  Bowel status: No BM this shift, BS+  : Voiding spontaneously via bedside commode     Skin: Bruising throughout, Mepilex on coccyx   Pain: Denies  Activity: Ax2 GB walker  Plan: Next PLEX (2/5) treatment on Monday 6/24. Continue to monitor and assess.

## 2019-06-23 NOTE — PLAN OF CARE
Status: Pt on 6a for progressive LE weakness, possible transverse myelitis. Next  (2/5) PLEX treatment Monday 6/24   Vitals: VSS on RA  Neuros: A&Ox4. LLE 4+/5 RLE 4-/5, L stronger than R. Numbness from beneath breasts to feet, R field cut, R dysconjugate gaze. R pupil sluggish. Pt stated right eye was more blurry than normal this AM. 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 Upper 5/5  IV: PIV SL. R internal jugular Hep Locked  Resp/trach: LS clear, RA  Diet: Regular diet. Fair intake today.   Bowel status: No BM this shift. PRN Miralax given   : Voids spontaneously at bedside commode  Skin: Generalized bruising throughout. Mepilex on coccyx.   Pain: Denies pain  Activity: Up with 2/GB/pivot. Up in chair for majority of shift.   Social: Family visited this shift. . Pt was emotional today.   Plan: PLEX 2/5 on 6/24. Blood glucose/Insulin orders changed see active orders. No glucose check at 1400 d/t patient not eating lunch.  Continue to monitor and follow POC.

## 2019-06-24 ENCOUNTER — APPOINTMENT (OUTPATIENT)
Dept: OCCUPATIONAL THERAPY | Facility: CLINIC | Age: 74
DRG: 059 | End: 2019-06-24
Attending: PSYCHIATRY & NEUROLOGY
Payer: MEDICARE

## 2019-06-24 LAB
BASOPHILS # BLD AUTO: 0 10E9/L (ref 0–0.2)
BASOPHILS NFR BLD AUTO: 0.1 %
DIFFERENTIAL METHOD BLD: ABNORMAL
EOSINOPHIL # BLD AUTO: 0 10E9/L (ref 0–0.7)
EOSINOPHIL NFR BLD AUTO: 0 %
ERYTHROCYTE [DISTWIDTH] IN BLOOD BY AUTOMATED COUNT: 13.1 % (ref 10–15)
FIBRINOGEN PPP-MCNC: 205 MG/DL (ref 200–420)
GLUCOSE BLDC GLUCOMTR-MCNC: 130 MG/DL (ref 70–99)
GLUCOSE BLDC GLUCOMTR-MCNC: 136 MG/DL (ref 70–99)
GLUCOSE BLDC GLUCOMTR-MCNC: 161 MG/DL (ref 70–99)
GLUCOSE BLDC GLUCOMTR-MCNC: 172 MG/DL (ref 70–99)
GLUCOSE BLDC GLUCOMTR-MCNC: 89 MG/DL (ref 70–99)
HCT VFR BLD AUTO: 41.8 % (ref 35–47)
HGB BLD-MCNC: 13.5 G/DL (ref 11.7–15.7)
IMM GRANULOCYTES # BLD: 0.1 10E9/L (ref 0–0.4)
IMM GRANULOCYTES NFR BLD: 1 %
INR PPP: 1.18 (ref 0.86–1.14)
LYMPHOCYTES # BLD AUTO: 0.5 10E9/L (ref 0.8–5.3)
LYMPHOCYTES NFR BLD AUTO: 6.7 %
MCH RBC QN AUTO: 32.1 PG (ref 26.5–33)
MCHC RBC AUTO-ENTMCNC: 32.3 G/DL (ref 31.5–36.5)
MCV RBC AUTO: 100 FL (ref 78–100)
MONOCYTES # BLD AUTO: 0.4 10E9/L (ref 0–1.3)
MONOCYTES NFR BLD AUTO: 4.7 %
NEUTROPHILS # BLD AUTO: 6.9 10E9/L (ref 1.6–8.3)
NEUTROPHILS NFR BLD AUTO: 87.5 %
NRBC # BLD AUTO: 0 10*3/UL
NRBC BLD AUTO-RTO: 0 /100
PLATELET # BLD AUTO: 158 10E9/L (ref 150–450)
RBC # BLD AUTO: 4.2 10E12/L (ref 3.8–5.2)
WBC # BLD AUTO: 7.9 10E9/L (ref 4–11)

## 2019-06-24 PROCEDURE — 12000001 ZZH R&B MED SURG/OB UMMC

## 2019-06-24 PROCEDURE — 85610 PROTHROMBIN TIME: CPT | Performed by: PATHOLOGY

## 2019-06-24 PROCEDURE — 97530 THERAPEUTIC ACTIVITIES: CPT | Mod: GO

## 2019-06-24 PROCEDURE — 85025 COMPLETE CBC W/AUTO DIFF WBC: CPT | Performed by: PATHOLOGY

## 2019-06-24 PROCEDURE — 00000146 ZZHCL STATISTIC GLUCOSE BY METER IP

## 2019-06-24 PROCEDURE — 85384 FIBRINOGEN ACTIVITY: CPT | Performed by: PATHOLOGY

## 2019-06-24 PROCEDURE — 25000132 ZZH RX MED GY IP 250 OP 250 PS 637: Mod: GY | Performed by: INTERNAL MEDICINE

## 2019-06-24 PROCEDURE — 25000128 H RX IP 250 OP 636: Performed by: STUDENT IN AN ORGANIZED HEALTH CARE EDUCATION/TRAINING PROGRAM

## 2019-06-24 PROCEDURE — 25000125 ZZHC RX 250: Performed by: PATHOLOGY

## 2019-06-24 PROCEDURE — 97110 THERAPEUTIC EXERCISES: CPT | Mod: GO

## 2019-06-24 PROCEDURE — 25800030 ZZH RX IP 258 OP 636: Performed by: STUDENT IN AN ORGANIZED HEALTH CARE EDUCATION/TRAINING PROGRAM

## 2019-06-24 PROCEDURE — 97535 SELF CARE MNGMENT TRAINING: CPT | Mod: GO

## 2019-06-24 PROCEDURE — 25000128 H RX IP 250 OP 636: Performed by: PATHOLOGY

## 2019-06-24 PROCEDURE — P9041 ALBUMIN (HUMAN),5%, 50ML: HCPCS | Performed by: PATHOLOGY

## 2019-06-24 PROCEDURE — 36514 APHERESIS PLASMA: CPT

## 2019-06-24 PROCEDURE — 25000132 ZZH RX MED GY IP 250 OP 250 PS 637: Mod: GY | Performed by: STUDENT IN AN ORGANIZED HEALTH CARE EDUCATION/TRAINING PROGRAM

## 2019-06-24 RX ORDER — HEPARIN SODIUM (PORCINE) LOCK FLUSH IV SOLN 100 UNIT/ML 100 UNIT/ML
3 SOLUTION INTRAVENOUS ONCE
Status: COMPLETED | OUTPATIENT
Start: 2019-06-24 | End: 2019-06-24

## 2019-06-24 RX ORDER — ALBUMIN HUMAN 25 %
3000 INTRAVENOUS SOLUTION INTRAVENOUS
Status: COMPLETED | OUTPATIENT
Start: 2019-06-24 | End: 2019-06-24

## 2019-06-24 RX ORDER — CALCIUM GLUCONATE 100 MG/ML
AMPUL (ML) INTRAVENOUS
Status: COMPLETED | OUTPATIENT
Start: 2019-06-24 | End: 2019-06-24

## 2019-06-24 RX ADMIN — RANITIDINE 150 MG: 150 TABLET ORAL at 19:46

## 2019-06-24 RX ADMIN — ENOXAPARIN SODIUM 40 MG: 40 INJECTION SUBCUTANEOUS at 19:46

## 2019-06-24 RX ADMIN — Medication 1 TABLET: at 18:17

## 2019-06-24 RX ADMIN — NITROFURANTOIN (MONOHYDRATE/MACROCRYSTALS) 100 MG: 75; 25 CAPSULE ORAL at 19:46

## 2019-06-24 RX ADMIN — AMLODIPINE BESYLATE 10 MG: 10 TABLET ORAL at 19:46

## 2019-06-24 RX ADMIN — Medication 1 TABLET: at 07:50

## 2019-06-24 RX ADMIN — METOPROLOL TARTRATE 50 MG: 50 TABLET ORAL at 07:49

## 2019-06-24 RX ADMIN — FLUOXETINE 20 MG: 20 CAPSULE ORAL at 07:49

## 2019-06-24 RX ADMIN — FUROSEMIDE 20 MG: 20 TABLET ORAL at 07:50

## 2019-06-24 RX ADMIN — POLYETHYLENE GLYCOL 3350 17 G: 17 POWDER, FOR SOLUTION ORAL at 07:50

## 2019-06-24 RX ADMIN — NITROFURANTOIN (MONOHYDRATE/MACROCRYSTALS) 100 MG: 75; 25 CAPSULE ORAL at 07:50

## 2019-06-24 RX ADMIN — SENNOSIDES AND DOCUSATE SODIUM 2 TABLET: 8.6; 5 TABLET ORAL at 16:43

## 2019-06-24 RX ADMIN — Medication 3 ML: at 11:00

## 2019-06-24 RX ADMIN — Medication 10 MG: at 22:37

## 2019-06-24 RX ADMIN — CALCIUM GLUCONATE 3 G: 94 INJECTION, SOLUTION INTRAVENOUS at 09:18

## 2019-06-24 RX ADMIN — ALBUMIN HUMAN 3000 ML: 0.05 INJECTION, SOLUTION INTRAVENOUS at 09:18

## 2019-06-24 RX ADMIN — RANITIDINE 150 MG: 150 TABLET ORAL at 07:50

## 2019-06-24 RX ADMIN — ASPIRIN 325 MG: 325 TABLET, DELAYED RELEASE ORAL at 19:46

## 2019-06-24 RX ADMIN — ANTICOAGULANT CITRATE DEXTROSE SOLUTION FORMULA A 755 ML: 12.25; 11; 3.65 SOLUTION INTRAVENOUS at 09:18

## 2019-06-24 RX ADMIN — TRAZODONE HYDROCHLORIDE 100 MG: 100 TABLET ORAL at 22:37

## 2019-06-24 RX ADMIN — FUROSEMIDE 20 MG: 20 TABLET ORAL at 19:46

## 2019-06-24 RX ADMIN — CLOPIDOGREL BISULFATE 75 MG: 75 TABLET, FILM COATED ORAL at 07:49

## 2019-06-24 RX ADMIN — UMECLIDINIUM 1 PUFF: 62.5 AEROSOL, POWDER ORAL at 07:50

## 2019-06-24 RX ADMIN — METOPROLOL TARTRATE 50 MG: 50 TABLET ORAL at 19:46

## 2019-06-24 RX ADMIN — SODIUM CHLORIDE 1000 MG: 9 INJECTION, SOLUTION INTRAVENOUS at 13:27

## 2019-06-24 RX ADMIN — SIMVASTATIN 20 MG: 20 TABLET, FILM COATED ORAL at 22:37

## 2019-06-24 RX ADMIN — FUROSEMIDE 20 MG: 20 TABLET ORAL at 13:20

## 2019-06-24 ASSESSMENT — ACTIVITIES OF DAILY LIVING (ADL)
ADLS_ACUITY_SCORE: 15

## 2019-06-24 NOTE — PLAN OF CARE
Status: Pt on 6a for progressive LE weakness, possible transverse myelitis.   Vitals: /75 (BP Location: Right arm)   Pulse 56   Temp 97.3  F (36.3  C) (Oral)   Resp 16   Wt 92.7 kg (204 lb 4.8 oz)   SpO2 96%   BMI 32.00 kg/m    Neuros: A&Ox4. BLE 4/5, L stronger than R. Numbness from beneath breasts to feet, L field cut, R dysconjugate gaze. R pupil sluggish. Blind R eye, sees shadows/light. Blurry vision L eye.   IV: PIV SL. R internal jugular Hep Locked.  Resp/trach: On RA.   Diet: Reg diet, tolerating well.   Bowel status: No BM this shift. Bowel meds given.   : Voids spontaneously at bedside commode.   Skin: Bruising.   Pain: Denies.   Activity: Up w/ 2/GB/pivot.   Social: No visitors this shift.   Plan: PLEX 2/5 on 6/24. Continue to monitor and follow POC.

## 2019-06-24 NOTE — PLAN OF CARE
OT 6A  Discharge Planner OT   Patient plan for discharge: ARU  Current status: SBA supine to EOB with HOB elevated. CGA/min A sit<>stand x6 reps throughout session with FWW. Pt took ~5 steps to bedside chair with FWW and min A. Pt completed g/h while standing with FWW and min A. Pt completed 2 bouts of marching in place with FWW and min A. Pt completed UE ROM/strengthening exercises while standing with FWW and min A  Barriers to return to prior living situation: fatigue, deconditioning, acute medical needs  Recommendations for discharge: ARU  Rationale for recommendations: Pt is below baseline and would benefit from continued skilled therapy to increase activity tolerance and independence with ADLs. Pt is motivated, has a good support system and is making good progress with therapy. Pt is able to tolerate 3 hours of therapy per day.        Entered by: Alida Baltazar 06/24/2019 3:37 PM

## 2019-06-24 NOTE — PLAN OF CARE
Status: Pt on 6a for work up r/t progressive ascending numbness; possible transverse myelitis   Vitals:  /55 (BP Location: Right arm)   Pulse 56   Temp 98.5  F (36.9  C) (Oral)   Resp 14   Wt 92.7 kg (204 lb 4.8 oz)   SpO2 95%   BMI 32.00 kg/m    Neuros: A/Ox4, R eye blind baseline, R dysconjugate gaze, R pupil sluggish. Numbness from underneath breasts to feet. Lowers 4/5 Uppers 5/5  IV: PIV SL, RIJ hep locked  Resp/trach: LS clear. RA  Diet: Regular diet  Bowel status: No BM this shift, constipation, please give Miralax this morning   : Voiding spontaneously via bedside commode     Skin: Bruising throughout  Pain: Denies  Activity: Ax2 GB walker  Plan:  PLEX (2/5) today at 0800. Continue to monitor and assess.

## 2019-06-24 NOTE — PLAN OF CARE
6A PT - Cancel. Upon attempt in AM, pt receiving PLEX treatment. Will attempt to see in the PM after treatment if pt is agreeable and PT's schedule permits.

## 2019-06-24 NOTE — PROGRESS NOTES
RHEUMATOLOGY PROGRESS NOTE     Ladonna Sheriff MRN# [id number]   Age: 74 year old YOB: 1945     Date of Admission: 6/20/2019  Date of initial consult: 6/20/2019    Assessment and Plan:   Summary: Ladonna Sheriff is a 74 year old female with PMHx of DLE, optic neuritis, Sjogren's who presents with progressive neurologic deficits and imaging consistent with transverse myelitis and demyelinating process. Patient has finished a 5-day pulse of methylpredisolone and has completed 2 of 5 treatments of PLEX. Patient has remained stable.    Interval discussion:   Patient described a lip biopsy that confirmed Sjogren's, however this records has been unavailable. Repeat serological testing with elevated anti SSA and SSB. However, SUSAN was normal, which is very unusual in what very clearly appears to be Sjogren's. We suspect a possible false negative SUSAN by IFA and recommend to assess via an alternative enzyme immunoassay.. 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 CNS demyelinating labs are pending. If however, these labs are unremarkable, the thought of a primary Sjogren's causing CNS myelitis is possible and would alter treatment course. It is however reassuring that the patient has remained stable and perhaps with even a slight improvement in proprioception loss noted on initial exam. This would argue against SLE or perhaps even Sjogren's as the primary cause. A further paraneoplastic workup may also be warranted if above workup remains unremarkable.    Problem list:  # Possible demyelinating process 2/2 ? MOG-opathy vs NMOSD  # Discoid Lupus Erythematosus  # Sjogren's Syndrome  # UTI  # Macrocytosis     Recommendations:  -- Obtain Enzyme Immunoassay to assess for SUSAN ([place] you [place] [facility] [place]  -- Agree with continuing current course of corticoids and PLEX as directed by primary team for diagnosis of MOG-positive vs NMOSD-associated transverse  myelitis.  -- Rheumatology will continue to follow.    The patient was seen and staffed with Dr. Go Taylor.     [facility]  Internal Medicine  P:[phone]    I saw this patient with the medical resident. I agree with the findings and recommendations. SSA and SSB reactivity in the absence of SUSAN is difficult to understand. However, in the context of ongoing sicca symptoms and reported positive lip biopsy, LINWOOD panel results are compatible with a diagnosis of Sjogren's syndrome. If NMOSD or MOG-associated pathology are not supported by serologies, and if patient does not improve, would consider cytotoxic therapy for myelitis associated with autoimmune exocrinopathy.    Go Taylor M.D.  Staff Rheumatologist,  Health  Pager [phone]      Subjective/24 hour events:   Patient with day 5 of 5 high-dose steroids. Also completed her 2nd PLEX and she tolerating it well. She overall feels about the same, but her spirits are improved.            Medications:     Current Facility-Administered Medications   Medication     acetaminophen (TYLENOL) tablet 650 mg    Or     acetaminophen (TYLENOL) solution 650 mg     acetaminophen (TYLENOL) Suppository 650 mg     albuterol (PROAIR HFA/PROVENTIL HFA/VENTOLIN HFA) 108 (90 Base) MCG/ACT inhaler 2 puff     amLODIPine (NORVASC) tablet 10 mg     aspirin (ASA) EC tablet 325 mg     calcium carbonate 600 mg-vitamin D 400 units (CALTRATE) per tablet 1 tablet     clopidogrel (PLAVIX) tablet 75 mg     glucose gel 15-30 g    Or     dextrose 50 % injection 25-50 mL     enoxaparin (LOVENOX) injection 40 mg     FLUoxetine (PROzac) capsule 20 mg     furosemide (LASIX) tablet 20 mg     glucagon injection 1 mg     heparin 100 UNIT/ML injection 3 mL     heparin 100 UNIT/ML injection 3 mL     insulin aspart (NovoLOG) inj (RAPID ACTING)     insulin aspart (NovoLOG) inj (RAPID ACTING)     magnesium sulfate 2 g in NS intermittent infusion (PharMEDium or [facility] Cmpd)     magnesium sulfate 4 g in  100 mL sterile water (premade)     melatonin tablet 10 mg     methylPREDNISolone sodium succinate (solu-MEDROL) 1,000 mg in sodium chloride 0.9 % 250 mL intermittent infusion     metoprolol tartrate (LOPRESSOR) tablet 50 mg     naloxone (NARCAN) injection 0.1-0.4 mg     nitroFURantoin macrocrystal-monohydrate (MACROBID) capsule 100 mg     ondansetron (ZOFRAN-ODT) ODT tab 4 mg    Or     ondansetron (ZOFRAN) injection 4 mg     polyethylene glycol (MIRALAX/GLYCOLAX) Packet 17 g     potassium chloride (KLOR-CON) Packet 20-40 mEq     potassium chloride 10 mEq in 100 mL intermittent infusion with 10 mg lidocaine     potassium chloride 10 mEq in 100 mL sterile water intermittent infusion (premix)     potassium chloride 20 mEq in 50 mL intermittent infusion     potassium chloride ER (K-DUR/KLOR-CON M) CR tablet 20-40 mEq     ranitidine (ZANTAC) tablet 150 mg     senna-docusate (SENOKOT-S/PERICOLACE) 8.6-50 MG per tablet 1 tablet    Or     senna-docusate (SENOKOT-S/PERICOLACE) 8.6-50 MG per tablet 2 tablet     simvastatin (ZOCOR) tablet 20 mg     sodium chloride (PF) 0.9% PF flush 10 mL     sodium chloride (PF) 0.9% PF flush 10 mL     traZODone (DESYREL) tablet 100 mg     umeclidinium (INCRUSE ELLIPTA) 62.5 MCG/INH inhaler 1 puff            Review of Systems:   Complete 8 point ROS completed and negative unless mentioned in subjective.          Physical Exam:   /70 (BP Location: Right arm)   Pulse 54   Temp 97.1  F (36.2  C) (Oral)   Resp 16   Wt 92.7 kg (204 lb 4.8 oz)   SpO2 96%   BMI 32.00 kg/m      General: Pleasant elderly female in NAD, sitting in chair  HEENT: Dysconjugate at rest, conjugate gave with fixation, no nystagmus, PERRL. Slighly decreased anterior salivary pooling.    CV: RR, No m/r/g.  Lung: Normal work of breathing on RA  Abdomen: Soft, NT, ND  Neuro: Alert and oriented. Sensation intact to light touch throughout, proprioception absent on the R foot, but now improved on the L hallux.    Ext:  WWP          Data:   Labs and imaging reviewed.

## 2019-06-24 NOTE — PROGRESS NOTES
[facility], [place]   Neurology Daily Note  6/24/2019    Summary: Ladonna Sheriff is a 75yo female with PMH notable for previous stroke/TIA, discoid lupus, Sjogren's syndrome, temporal arteritis, optic neuritis with vision impairment in right eye, chronic daily prednisone (5 mg daily), CHF, recently diagnosed obstructive lung disease who presented 6/19 with 2-3 weeks of progressive numbness at mid-chest region and below.     Subjective: No change in symptoms since first PLEX run on 6/22. Looking forward to second PLEX run later this morning. No new complaints.    Objective:    Vitals: /68   Pulse 56   Temp 96.8  F (36  C) (Oral)   Resp 14   Wt 92.7 kg (204 lb 4.8 oz)   SpO2 98%   BMI 32.00 kg/m    General: adult female patient, lying in bed, NAD  HEENT: NC/AT, no icterus  Chest: breathing appears nonlabored (on RA)  Extremities: warm, no edema noted  Skin: no rash or lesions noted  Psych: mood pleasant, affect congruent  Neuro:  Mental status: awake, alert, attentive  Language: language is fluent and coherent  Cranial nerves: vision absent R eye, peripheral upper visual field defect L eye, disconjugate gaze (R eye abducted at baseline) with EOM otherwise intact, facial sensation intact, face symmetric, no dysarthria  Motor: normal tone, no abnormal movements, 5/5 strength in 4/4 extremities  Reflexes: 2+ and symmetric biceps/patella, 1+ and symmetric achilles, no clonus, toes up-going bilaterally  Sensory: intact to light touch, decreased vibratory sense/proprioception bilateral great toes (R>L) but intact at ankle level  Coordination: FTN and HS without ataxia or dysmetria, rapid alternating movements intact  Gait: deferred at this time    Pertinent Investigations:      MRI Cervical Spine 6/19/19  Impression:    1. No spinal cord abnormality to indicate transverse myelitis.  2. Degenerative changes.     MRI Thoracic Spine 6/19/19                                  Impression:     Focal gadolinium enhancing lesion from T4 through T5 in the spinal cord on the right side with edema extending from T3-T4 down to lower T5. This is most likely transverse myelitis, with differential diagnosis including a spinal cord astrocytoma.     MRI Brain w/o and w/ contrast 19           Impression:   The study demonstrates greater than 20 foci of T2-hyperintensity within the cerebral white matter which is suspicious for demyelination. Enhancing focus within the left parieto-occipital region is suspicious for active demyelination in the absence of known malignancy. No less likely, the differential for white matter lesions includes sequela of infectious or inflammatory insults, and vasculopathy.     LABS  SUSAN Ab IgG by IFA w/ Reflex: negative  C3: 110  C4: 16  ESR: 17 (0-30)  Angiotensin converting enzyme: 24 (9-67)  DsDNA: pending  LINWOOD panel: RNP negative    Macias 0.4 (0-0.9)    SSA >8.0 (0-0.9)    SSB >8.0 (0-0.9)    Scleroderma Ab Scl-70 1.0 (0-0.9)  B12: 396  MMA: pending  Vit D: 40  Lupus anticoagulant panel: pending  Anticardiolipin Ab: negative  Beta 2 glycoprotein Abs: pending    19 CSF  Gram stain: no organisms or WBCs seen  Culture aerobic: NGTD  Enterovirus PCR: negative  VZV: negative  WNV: pending  RBC: 296  WBC: 0   Glucose: 72  Protein: 63  Oligoclonal bandin oligoclonal bands, 15.3 oligoclonal IgG   [facility] send-out, CNS demyelinating disease evaluation: pending    Assessment:   73yo female with PMH notable for previous stroke/TIA, discoid lupus, Sjogren's syndrome, temporal arteritis, optic neuritis with vision impairment in right eye, chronic daily prednisone (5 mg daily), CHF, recently diagnosed obstructive lung disease who presented  with 2-3 weeks of progressive numbness at mid-chest region and below. Cervical MRI  unremarkable, but thoracic MRI with T4-T5 enhancing lesion w/ surrounding edema and head MRI with multiple T2 hyperintensities and one small enhancing focus.  CSF obtained 6/21 demonstrated elevated protein, glucose, and RBCs, but absent WBCs. CSF infectious workup negative thus far. Oligoclonal bands positive. SSA/SSB/Scl-70 positive, other AI labs negative or pending. Demyelinating panel pending. History and workup thus far are concerning for a demyelinating disorder. Differential is still broad including autoimmune, neoplasm, infection, vasculitis. Given patient's significant AI history, highest suspicion is for autoimmune inflammatory process (including NMOSD/MOG spectrum). Will continue with PLEX therapy at this time and assess for response. If no response with PLEX and demyelinating panel comes back normal, could consider a more extensive neoplastic workup at that time but low suspicion given time course of symptoms and ~25 yr hx of intermittent optic neuritis.    Plan:     ACUTE PROBLEMS    Progressive bilateral weakness/numbness  Hx of discoid lupus, Sjogren's, temporal arteritis, optic neuritis  -Continue with PLEX x5 runs (6/22, 6/24, 6/26, 6/28, 6/30)  -Continue with methylprednisolone 1000mg x5 days (6/21-6/25)--> when finished, continue PTA prednisone 5 mg daily  -Follow pending labs  -Continue with PT  -Rheum following    E coli UTI, asymptomatic  -Continue with Nitrofurantoin 100mg BID x5 days (final dose will be 6/26)    CHRONIC PROBLEMS    Obstructive lung disease    -Continue PTA Incruse Ellipta inhaler  -Albuterol nebs q4h prn     CHF    Previous echocardiogram showed ejection fraction of 45% and stage II diastolic congestive heart failure. She does not appear to have decompensated heart failure at this time. In the setting of progressive shortness of breath would have low threshold to work this up with echocardiogram.    Hx of discoid lupus, Sjogren's syndrome, temporal arteritis, optic neuritis  -Methylprednisolone 1000mg x5 days --> when finished, continue PTA prednisone 5 mg daily    Hx of stroke/TIA  -Continue PTA ASA and  clopidogrel    Hypertension   -Continue PTA metoprolol, amlodipine, lasix  -Daily weights      Hypercholesterolemia  -Continue PTA statin    Activity: up with assist  Diet: regular adult diet   DVT ppx: lovenox currently being held   Bowel ppx: ranitidine, senna prn  Lines: PIV, non-tunneled RIJ CVC  Code: full  Dispo: pending further diagnostic testing and completion of therapy plan      Patient seen and discussed with Dr. Spangler, attending.    Carmenza Donato MD  Internal Medicine, PGY-1

## 2019-06-24 NOTE — PLAN OF CARE
Status: Pt on 6A with progressive lower extremity weakness, possible transverse myelitis.  Vitals: VSS.  Neuros: A&O x4. R blindness baseline, R dysconjugate gaze with sluggish pupil, slight droop of left eyelid. Pt reports numbness and tingling from under breasts down to toes. LLE stronger than RLE, RLE 3-4/5.  IV: PIV SL, RIJ hep locked. Pt received PLEX at 9 am.   Resp/trach: Clear LS.  Diet: Pt tolerating regular diet. Help with ordering meals.   Bowel status: Faint bowel sounds. No BM this shift. Gave miralax at 0750 and senna at 1643.   : Voiding spontaneously via bedside commode.  Skin: General bruising throughout. Mepilex on coccyx.  Pain: Pt denies pain.   Activity: Up with assist of 2; pivot, GB and walker.  Social: No visitors this shift.  Plan: PLEX 3/5 on 6/26. Continue to monitor and follow POC.

## 2019-06-24 NOTE — PROCEDURES
Laboratory Medicine and Pathology  Transfusion Medicine - Apheresis Procedure Note    Ladonna Sheriff MRN# [id number]   YOB: 1945 Age: 74 year old   Date of Procedure: 2019    Procedure:PLEX  Reason for Procedure:Possible Autoimmune Neuropathy         Assessment and Plan:   Ladonna Sheriff is a 74 year old female  with h/o stroke, TIA, discoid lupus, Sjogren's syndrome, temporal arteritis, on prednisone, optic neuritis with impairment of the R eye, hypertension, hypercholesterolemia,CHF , & recently diagnosed asthma and/or COPD.     She presented  with 2.5 weeks of  progressive ascending numbness up to around T7/T8 and R foot weakness     Differential Dx at this juncture is felt by neuro to still be broad includin. Neuromyelitis optica spectrum disorders/ anti-myelin oligodendrocyte glycoprotein antibodies (NMOSD/MOG spectrum).  2. Transverese Myelitis  1. MRI of Spinal cord:impression was most likely transverse myelitis,  2. H/o discoid subtype of lupus, but lupus/Sjogren's-associated TM is rare. LP performed  with many studies still pending.    3.  Also Vasculitis, sarcoidosis, MS, infection, neoplasm or possibly related to her prior autoimmune disease.   While they continue her work-up  we were consulted to begin  PLEX's to slow progression(subjective RLE progression of numbness).     She had her 2nd procedure ( of scheduled 5 every other day) today and tolerated it well. Next one scheduled for .    Please do not start or continue ace-inhibitors throughout the duration of a therapeutic plasma exchange series. Please notify the apheresis physician of any upcoming procedures, surgeries, or biopsies as therapeutic plasma exchange will affect coagulation factors.           Past Medical History:     Past Medical History:   Diagnosis Date     Cerebral infarction (H)      CHF (congestive heart failure) (H)      Hypertension      Lupus (H)      Lupus (H)       Optic neuritis      Optic neuritis      Sjogren's syndrome (H)      Sjogren's syndrome (H)      Temporal arteritis (H)      TIA (transient ischemic attack)      Uncomplicated asthma              Past Surgical History:     Past Surgical History:   Procedure Laterality Date     CHOLECYSTECTOMY       GYN SURGERY      hysterectomy     GYN SURGERY      tubal ligation     IR CVC NON TUNNEL PLACEMENT  6/21/2019              Social History:     Social History     Tobacco Use     Smoking status: Former Smoker   Substance Use Topics     Alcohol use: Yes     Alcohol/week: 4.2 oz     Types: 7 Shots of liquor per week            Allergies:     Allergies   Allergen Reactions     Prevacid [Lansoprazole] Rash             Medications:     Current Facility-Administered Medications   Medication     acetaminophen (TYLENOL) tablet 650 mg    Or     acetaminophen (TYLENOL) solution 650 mg     acetaminophen (TYLENOL) Suppository 650 mg     albuterol (PROAIR HFA/PROVENTIL HFA/VENTOLIN HFA) 108 (90 Base) MCG/ACT inhaler 2 puff     amLODIPine (NORVASC) tablet 10 mg     aspirin (ASA) EC tablet 325 mg     calcium carbonate 600 mg-vitamin D 400 units (CALTRATE) per tablet 1 tablet     clopidogrel (PLAVIX) tablet 75 mg     glucose gel 15-30 g    Or     dextrose 50 % injection 25-50 mL     enoxaparin (LOVENOX) injection 40 mg     FLUoxetine (PROzac) capsule 20 mg     furosemide (LASIX) tablet 20 mg     glucagon injection 1 mg     heparin 100 UNIT/ML injection 3 mL     heparin 100 UNIT/ML injection 3 mL     insulin aspart (NovoLOG) inj (RAPID ACTING)     insulin aspart (NovoLOG) inj (RAPID ACTING)     magnesium sulfate 2 g in NS intermittent infusion (PharMEDium or [facility] Cmpd)     magnesium sulfate 4 g in 100 mL sterile water (premade)     melatonin tablet 10 mg     methylPREDNISolone sodium succinate (solu-MEDROL) 1,000 mg in sodium chloride 0.9 % 250 mL intermittent infusion     metoprolol tartrate (LOPRESSOR) tablet 50 mg     naloxone (NARCAN)  injection 0.1-0.4 mg     nitroFURantoin macrocrystal-monohydrate (MACROBID) capsule 100 mg     ondansetron (ZOFRAN-ODT) ODT tab 4 mg    Or     ondansetron (ZOFRAN) injection 4 mg     polyethylene glycol (MIRALAX/GLYCOLAX) Packet 17 g     potassium chloride (KLOR-CON) Packet 20-40 mEq     potassium chloride 10 mEq in 100 mL intermittent infusion with 10 mg lidocaine     potassium chloride 10 mEq in 100 mL sterile water intermittent infusion (premix)     potassium chloride 20 mEq in 50 mL intermittent infusion     potassium chloride ER (K-DUR/KLOR-CON M) CR tablet 20-40 mEq     ranitidine (ZANTAC) tablet 150 mg     senna-docusate (SENOKOT-S/PERICOLACE) 8.6-50 MG per tablet 1 tablet    Or     senna-docusate (SENOKOT-S/PERICOLACE) 8.6-50 MG per tablet 2 tablet     simvastatin (ZOCOR) tablet 20 mg     sodium chloride (PF) 0.9% PF flush 10 mL     sodium chloride (PF) 0.9% PF flush 10 mL     traZODone (DESYREL) tablet 100 mg     umeclidinium (INCRUSE ELLIPTA) 62.5 MCG/INH inhaler 1 puff                Abbreviated Physical Exam:   /68   Pulse 54   Temp 96.6  F (35.9  C) (Oral)   Resp 20   Wt 92.7 kg (204 lb 4.8 oz)   SpO2 98%   BMI 32.00 kg/m    Patient Alert & Oriented and in No Acute Distress         Laboratory Data:     INR  Recent Labs   Lab 06/24/19  0918 06/22/19  0952   INR 1.18* 1.13       Fibrinogen   Date Value Ref Range Status   06/24/2019 205 200 - 420 mg/dL Final       Results for orders placed or performed during the hospital encounter of 06/20/19 (from the past 24 hour(s))   Glucose by meter   Result Value Ref Range    Glucose 144 (H) 70 - 99 mg/dL   Glucose by meter   Result Value Ref Range    Glucose 222 (H) 70 - 99 mg/dL   Glucose by meter   Result Value Ref Range    Glucose 136 (H) 70 - 99 mg/dL   INR   Result Value Ref Range    INR 1.18 (H) 0.86 - 1.14   Fibrinogen activity   Result Value Ref Range    Fibrinogen 205 200 - 420 mg/dL   CBC with platelets differential   Result Value Ref Range     WBC 7.9 4.0 - 11.0 10e9/L    RBC Count 4.20 3.8 - 5.2 10e12/L    Hemoglobin 13.5 11.7 - 15.7 g/dL    Hematocrit 41.8 35.0 - 47.0 %     78 - 100 fl    MCH 32.1 26.5 - 33.0 pg    MCHC 32.3 31.5 - 36.5 g/dL    RDW 13.1 10.0 - 15.0 %    Platelet Count 158 150 - 450 10e9/L    Diff Method Automated Method     % Neutrophils 87.5 %    % Lymphocytes 6.7 %    % Monocytes 4.7 %    % Eosinophils 0.0 %    % Basophils 0.1 %    % Immature Granulocytes 1.0 %    Nucleated RBCs 0 0 /100    Absolute Neutrophil 6.9 1.6 - 8.3 10e9/L    Absolute Lymphocytes 0.5 (L) 0.8 - 5.3 10e9/L    Absolute Monocytes 0.4 0.0 - 1.3 10e9/L    Absolute Eosinophils 0.0 0.0 - 0.7 10e9/L    Absolute Basophils 0.0 0.0 - 0.2 10e9/L    Abs Immature Granulocytes 0.1 0 - 0.4 10e9/L    Absolute Nucleated RBC 0.0    Glucose by meter   Result Value Ref Range    Glucose 172 (H) 70 - 99 mg/dL   Glucose by meter   Result Value Ref Range    Glucose 89 70 - 99 mg/dL            Procedure Summary:   A single volume plasma exchange was performed with  Albumin.The Non-Tunneled RIJ CVC was used for access. ACD-A was for anticoagulation. To offset the effects of the citrate, calcium gluconate was given in the return line. The patient's vital signs were stable throughout. The patient tolerated the procedure well.    Attestation: The patient was directly seen and evaluated by Kymberly shah M.D..    Kymberly Gordon M.D.    Transfusion Medicine  Laboratory Medicine & Pathology  Pager: [phone]

## 2019-06-24 NOTE — PROGRESS NOTES
Social Work Services Progress Note    Hospital Day: 5  Date of Initial Social Work Evaluation:  6/21/19  Collaborated with:  Patient    Data:  Pt is receiving plex treatments until through 6/30/19.  ARU is recommended upon discharge from acute hospitalization.      Intervention:  SW met with pt to obtain her order of preference for acute rehab locations.  Per discussion, pt preferences include (listed in order of preference): [facility], [facility] and [facility] at [place].    Assessment:  Pt is agreeable to acute rehab placement.    Plan:    Anticipated Disposition:  Acute Rehab placement    Barriers to d/c plan:  Pt is receiving plex treatments.    Follow Up:  SW will make referrals to ARU's on 6/27/19.  SW will continue to follow for discharge planning.    CAMELIA Marcial  Social Work, 6A  Phone:  [phone]  Pager:  [phone]  6/24/2019

## 2019-06-25 ENCOUNTER — APPOINTMENT (OUTPATIENT)
Dept: PHYSICAL THERAPY | Facility: CLINIC | Age: 74
DRG: 059 | End: 2019-06-25
Attending: PSYCHIATRY & NEUROLOGY
Payer: MEDICARE

## 2019-06-25 ENCOUNTER — APPOINTMENT (OUTPATIENT)
Dept: OCCUPATIONAL THERAPY | Facility: CLINIC | Age: 74
DRG: 059 | End: 2019-06-25
Attending: PSYCHIATRY & NEUROLOGY
Payer: MEDICARE

## 2019-06-25 LAB
BACTERIA SPEC CULT: NO GROWTH
GLUCOSE BLDC GLUCOMTR-MCNC: 108 MG/DL (ref 70–99)
GLUCOSE BLDC GLUCOMTR-MCNC: 135 MG/DL (ref 70–99)
GLUCOSE BLDC GLUCOMTR-MCNC: 212 MG/DL (ref 70–99)
GLUCOSE BLDC GLUCOMTR-MCNC: 218 MG/DL (ref 70–99)
LA PPP-IMP: NEGATIVE
Lab: NORMAL
METHYLMALONATE SERPL-SCNC: 0.24 UMOL/L (ref 0–0.4)
MISCELLANEOUS TEST: NORMAL
SPECIMEN SOURCE: NORMAL

## 2019-06-25 PROCEDURE — 97530 THERAPEUTIC ACTIVITIES: CPT | Mod: GO

## 2019-06-25 PROCEDURE — 86039 ANTINUCLEAR ANTIBODIES (ANA): CPT | Performed by: PSYCHIATRY & NEUROLOGY

## 2019-06-25 PROCEDURE — 84999 UNLISTED CHEMISTRY PROCEDURE: CPT | Performed by: PSYCHIATRY & NEUROLOGY

## 2019-06-25 PROCEDURE — 25000132 ZZH RX MED GY IP 250 OP 250 PS 637: Mod: GY | Performed by: INTERNAL MEDICINE

## 2019-06-25 PROCEDURE — 86481 TB AG RESPONSE T-CELL SUSP: CPT | Performed by: STUDENT IN AN ORGANIZED HEALTH CARE EDUCATION/TRAINING PROGRAM

## 2019-06-25 PROCEDURE — 12000001 ZZH R&B MED SURG/OB UMMC

## 2019-06-25 PROCEDURE — 25000128 H RX IP 250 OP 636: Performed by: STUDENT IN AN ORGANIZED HEALTH CARE EDUCATION/TRAINING PROGRAM

## 2019-06-25 PROCEDURE — 36415 COLL VENOUS BLD VENIPUNCTURE: CPT | Performed by: STUDENT IN AN ORGANIZED HEALTH CARE EDUCATION/TRAINING PROGRAM

## 2019-06-25 PROCEDURE — 00000146 ZZHCL STATISTIC GLUCOSE BY METER IP

## 2019-06-25 PROCEDURE — 36415 COLL VENOUS BLD VENIPUNCTURE: CPT | Performed by: INTERNAL MEDICINE

## 2019-06-25 PROCEDURE — 97535 SELF CARE MNGMENT TRAINING: CPT | Mod: GO

## 2019-06-25 PROCEDURE — 97112 NEUROMUSCULAR REEDUCATION: CPT | Mod: GP

## 2019-06-25 PROCEDURE — 25000132 ZZH RX MED GY IP 250 OP 250 PS 637: Mod: GY | Performed by: STUDENT IN AN ORGANIZED HEALTH CARE EDUCATION/TRAINING PROGRAM

## 2019-06-25 PROCEDURE — 25800030 ZZH RX IP 258 OP 636: Performed by: STUDENT IN AN ORGANIZED HEALTH CARE EDUCATION/TRAINING PROGRAM

## 2019-06-25 PROCEDURE — 25000128 H RX IP 250 OP 636: Performed by: PHYSICIAN ASSISTANT

## 2019-06-25 PROCEDURE — 84999 UNLISTED CHEMISTRY PROCEDURE: CPT | Performed by: INTERNAL MEDICINE

## 2019-06-25 PROCEDURE — 86038 ANTINUCLEAR ANTIBODIES: CPT | Performed by: INTERNAL MEDICINE

## 2019-06-25 PROCEDURE — 97116 GAIT TRAINING THERAPY: CPT | Mod: GP

## 2019-06-25 PROCEDURE — 97110 THERAPEUTIC EXERCISES: CPT | Mod: GO

## 2019-06-25 PROCEDURE — 97530 THERAPEUTIC ACTIVITIES: CPT | Mod: GP

## 2019-06-25 RX ORDER — DIPHENHYDRAMINE HYDROCHLORIDE 50 MG/ML
50 INJECTION INTRAMUSCULAR; INTRAVENOUS
Status: CANCELLED | OUTPATIENT
Start: 2019-06-25

## 2019-06-25 RX ADMIN — Medication 1 TABLET: at 17:19

## 2019-06-25 RX ADMIN — ASPIRIN 325 MG: 325 TABLET, DELAYED RELEASE ORAL at 20:36

## 2019-06-25 RX ADMIN — UMECLIDINIUM 1 PUFF: 62.5 AEROSOL, POWDER ORAL at 07:54

## 2019-06-25 RX ADMIN — NITROFURANTOIN (MONOHYDRATE/MACROCRYSTALS) 100 MG: 75; 25 CAPSULE ORAL at 07:54

## 2019-06-25 RX ADMIN — FUROSEMIDE 20 MG: 20 TABLET ORAL at 20:36

## 2019-06-25 RX ADMIN — RANITIDINE 150 MG: 150 TABLET ORAL at 07:54

## 2019-06-25 RX ADMIN — Medication 3 ML: at 15:32

## 2019-06-25 RX ADMIN — RANITIDINE 150 MG: 150 TABLET ORAL at 20:36

## 2019-06-25 RX ADMIN — METOPROLOL TARTRATE 50 MG: 50 TABLET ORAL at 07:54

## 2019-06-25 RX ADMIN — FUROSEMIDE 20 MG: 20 TABLET ORAL at 13:42

## 2019-06-25 RX ADMIN — SIMVASTATIN 20 MG: 20 TABLET, FILM COATED ORAL at 22:25

## 2019-06-25 RX ADMIN — TRAZODONE HYDROCHLORIDE 100 MG: 100 TABLET ORAL at 22:25

## 2019-06-25 RX ADMIN — Medication 10 MG: at 22:25

## 2019-06-25 RX ADMIN — Medication 1 TABLET: at 07:54

## 2019-06-25 RX ADMIN — ENOXAPARIN SODIUM 40 MG: 40 INJECTION SUBCUTANEOUS at 20:36

## 2019-06-25 RX ADMIN — SODIUM CHLORIDE 1000 MG: 9 INJECTION, SOLUTION INTRAVENOUS at 13:42

## 2019-06-25 RX ADMIN — NITROFURANTOIN (MONOHYDRATE/MACROCRYSTALS) 100 MG: 75; 25 CAPSULE ORAL at 20:35

## 2019-06-25 RX ADMIN — FLUOXETINE 20 MG: 20 CAPSULE ORAL at 07:54

## 2019-06-25 RX ADMIN — CLOPIDOGREL BISULFATE 75 MG: 75 TABLET, FILM COATED ORAL at 07:54

## 2019-06-25 RX ADMIN — AMLODIPINE BESYLATE 10 MG: 10 TABLET ORAL at 20:36

## 2019-06-25 RX ADMIN — POLYETHYLENE GLYCOL 3350 17 G: 17 POWDER, FOR SOLUTION ORAL at 07:58

## 2019-06-25 RX ADMIN — FUROSEMIDE 20 MG: 20 TABLET ORAL at 07:54

## 2019-06-25 RX ADMIN — METOPROLOL TARTRATE 50 MG: 50 TABLET ORAL at 20:36

## 2019-06-25 RX ADMIN — Medication 3 ML: at 15:31

## 2019-06-25 ASSESSMENT — ACTIVITIES OF DAILY LIVING (ADL)
ADLS_ACUITY_SCORE: 15

## 2019-06-25 NOTE — PLAN OF CARE
Status: Pt on 6A with progressive lower extremity weakness, possible transverse myelitis.  Vitals: VSS.  Neuros: A&O x4. R blindness baseline, R dysconjugate gaze with sluggish pupil, slight droop of left eyelid. Pt reports numbness and tingling from under breasts down to toes. LLE stronger than RLE, RLE 4/5.  IV: PIV SL, RIJ hep locked. Pt received IV methylprednisolone 5/5.  Resp/trach: Clear LS.  Diet: Pt tolerating regular diet. Help with ordering meals.   Bowel status: BS+. 2 BMs this shift. Gave miralax at 0758.  : Voiding spontaneously via bedside commode.   Skin: General bruising throughout. Mepilex on coccyx.  Pain: Pt denies pain.   Activity: Up with assist of 2; gregory, JD and walker.  Social: No visitors this shift.  Plan: PLEX 3/5 on 6/26. Continue to monitor and follow POC.

## 2019-06-25 NOTE — PLAN OF CARE
Status: Pt on 6A with progressive ascending numbness, possible transverse myelitis.  Vitals: VSS on RA.  Neuros: Pt is A&Ox4. R eye blind (baseline from optic neuritis). R pupil sluggish. R eye dysconjugate. Slight L eyelid droop/ptosis. Numbness and tingling from under breasts down to toes. RLE slightly weaker and ataxic  IV: PIV SL, RIJ hep locked.   Resp/trach: Clear LS.  Diet: Pt tolerating regular diet. Help with ordering meals.   Bowel status: Given PRN Miralax this AM. BM x3 this shift  Skin: General bruising throughout. Preventative mepilex on coccyx.  Pain: Pt denies pain.   Activity: Up with assist of 1-2, GB and walker. Pivots to commode but did ambulate in halls with PT and ambulated to bathroom with OT  Social: No visitors this shift.  Plan: Finished 5/5 of Methylprednisone. 3/5 PLEX  on 6/26. Continue to monitor and follow POC.

## 2019-06-25 NOTE — PROGRESS NOTES
[facility], [place]   Neurology Daily Note  6/25/2019    Summary: Ladonna Sheriff is a 75 yo female with PMH notable for previous stroke/TIA, discoid lupus, Sjogren's syndrome, temporal arteritis, optic neuritis with vision impairment in right eye, chronic daily prednisone (5 mg daily), CHF, recently diagnosed obstructive lung disease who presented 6/19 with 2-3 weeks of progressive numbness at mid-chest region and below.     Subjective: No change in symptoms since first PLEX run on 6/22. Looking forward to third PLEX run as well as final steroid dose. Complains of constipation, has not had BM in 3 days. Patient remains optimistic, motivated. Goal with OT is to be able to walk to the bathroom.     Objective:    Vitals: /68 (BP Location: Right arm)   Pulse 58   Temp 95.5  F (35.3  C) (Axillary)   Resp 16   Wt 98.7 kg (217 lb 9.5 oz)   SpO2 98%   BMI 34.08 kg/m    General: adult female patient, lying in bed, NAD  HEENT: NC/AT, no icterus  Chest: breathing nonlabored on RA  Extremities: warm, no edema noted  Skin: no rash or lesions noted  Psych: mood pleasant, affect congruent  Neuro:  Mental status: awake, alert, attentive  Language: fluent and coherent  Cranial nerves: vision absent R eye, peripheral visual field defect L eye, disconjugate gaze (R eye abducted at baseline) with EOM otherwise intact, facial sensation intact, face symmetric, no dysarthria  Motor: normal tone, no abnormal movements, 5/5 strength in 4/4 extremities  Reflexes: 2+ and symmetric biceps/patella, 1+ and symmetric achilles, no clonus, toes up-going bilaterally  Sensory: intact to light touch, absent vibratory sense/proprioception bilateral great toes (R>L) but intact at ankle level  Coordination: FNF without ataxia or dysmetria, rapid alternating movements intact  Gait: deferred at this time    Pertinent Investigations:      MRI Cervical Spine 6/19/19  Impression:    1. No spinal cord abnormality to  indicate transverse myelitis.  2. Degenerative changes.     MRI Thoracic Spine 19                                  Impression:    Focal gadolinium enhancing lesion from T4 through T5 in the spinal cord on the right side with edema extending from T3-T4 down to lower T5. This is most likely transverse myelitis, with differential diagnosis including a spinal cord astrocytoma.     MRI Brain w/o and w/ contrast 19           Impression:   The study demonstrates greater than 20 foci of T2-hyperintensity within the cerebral white matter which is suspicious for demyelination. Enhancing focus within the left parieto-occipital region is suspicious for active demyelination in the absence of known malignancy. No less likely, the differential for white matter lesions includes sequela of infectious or inflammatory insults, and vasculopathy.     LABS  SUSAN Ab IgG by IFA w/ Reflex: negative  C3: 110  C4: 16  ESR: 17 (0-30)  Angiotensin converting enzyme: 24 (9-67)  DsDNA: pending  LINWOOD panel: RNP negative    Macias 0.4 (0-0.9)    SSA >8.0 (0-0.9)    SSB >8.0 (0-0.9)    Scleroderma Ab Scl-70 1.0 (0-0.9)  B12: 396  MMA: pending  Vit D: 40  Lupus anticoagulant panel: pending  Anticardiolipin Ab: negative  Beta 2 glycoprotein Abs: pending    19 CSF  Gram stain: no organisms or WBCs seen  Culture aerobic: NGTD  Enterovirus PCR: negative  VZV: negative  WNV: pending  RBC: 296  WBC: 0   Glucose: 72  Protein: 63  Oligoclonal bandin oligoclonal bands, 15.3 oligoclonal IgG   [place] send-out, CNS demyelinating disease evaluation: pending    Assessment:   75yo female with PMH notable for previous stroke/TIA, discoid lupus, Sjogren's syndrome, temporal arteritis, optic neuritis with vision impairment in right eye, chronic daily prednisone (5 mg daily), CHF, recently diagnosed obstructive lung disease who presented  with 2-3 weeks of progressive numbness at mid-chest region and below. Cervical MRI  unremarkable, but  thoracic MRI with T4-T5 enhancing lesion w/ surrounding edema and head MRI with multiple T2 hyperintensities and one small enhancing focus. CSF obtained 6/21 demonstrated elevated protein, glucose, and RBCs, but absent WBCs. CSF infectious workup negative thus far. Oligoclonal bands positive. SSA/SSB/Scl-70 positive, other AI labs negative or pending. Demyelinating panel pending. History and workup thus far are concerning for a demyelinating disorder. Differential is still broad including autoimmune, neoplasm, infection, vasculitis. Given patient's significant AI history, highest suspicion is for autoimmune inflammatory process (including NMOSD/MOG spectrum). Will continue with PLEX therapy at this time and assess for response. If no response with PLEX and demyelinating panel comes back normal, could consider a more extensive neoplastic workup at that time but low suspicion given time course of symptoms and ~25 yr hx of intermittent optic neuritis.      Plan:     ACUTE PROBLEMS    Progressive bilateral weakness/numbness  Hx of discoid lupus, Sjogren's, temporal arteritis, optic neuritis  -Continue with PLEX x5 runs (6/22, 6/24, 6/26, 6/28, 6/30)  -Continue with methylprednisolone 1000mg x5 days (6/21-6/25)--> when finished, continue PTA prednisone 5 mg daily  -Follow pending labs  -Continue with PT/OT  -Rheum following  -Quantiferon Gold, HBV surface Ag and core Ab (in case immunosuppressive therapy is indicated based on antibody results, still pending)    E coli UTI, asymptomatic  -Continue with Nitrofurantoin 100mg BID x5 days (final dose will be 6/26)    CHRONIC PROBLEMS    Obstructive lung disease    -Continue PTA Incruse Ellipta inhaler  -Albuterol nebs q4h prn     CHF    Previous echocardiogram showed ejection fraction of 45% and stage II diastolic congestive heart failure. She does not appear to have decompensated heart failure at this time. In the setting of progressive shortness of breath would have low  threshold to work this up with echocardiogram.    Hx of discoid lupus, Sjogren's syndrome, temporal arteritis, optic neuritis  -Methylprednisolone 1000mg x 5 days (day 5/5 on 6/25)--> when finished, continue PTA prednisone 5 mg daily    Hx of stroke/TIA  -Continue PTA ASA and clopidogrel    Hypertension   -Continue PTA metoprolol, amlodipine, lasix  -Daily weights      Hypercholesterolemia  -Continue PTA statin    Activity: up with assist  Diet: regular adult diet   DVT ppx: lovenox currently being held   Bowel ppx: ranitidine, senna prn  Lines: PIV, non-tunneled RIJ CVC  Code: full  Dispo: pending further diagnostic testing and completion of therapy plan      Patient seen and discussed with attending Dr. Spangler.    Alee Ndiaye MD  Neurology PGY2  Pager: [phone]

## 2019-06-25 NOTE — PLAN OF CARE
Status: Pt on 6A with progressive BLE weakness, possibly transverse myelitis  VS: /64 (BP Location: Right arm)   Pulse 58   Temp 97.1  F (36.2  C) (Oral)   Resp 16   Wt 98.7 kg (217 lb 9.5 oz)   SpO2 98%   BMI 34.08 kg/m    Neuros: Numbness and tingling from just below breast to toes. RLE 4/5, otherwise strengths 5/5 throughout. Baseline blindness in R eye with dysconjugate gaze and sluggish pupil, and decreased vision in L eye (both 2/2 optic neuritis dx PTA)  GI: Tolerates regular diet. Continues to c/o constipation, would like additional bowel interventions this morning.   : Voiding spontaneously via bedside commode.   IV: PIV SL. R internal jugular HL.   Activity: Up with A2, gb, walker to pivot to commode.   Pain: Denies pain this shift.   Respiratory: LS clear, equal throughout.   Drains/lines/skin: General bruising throughout. Preventative mepilex to coccyx.   Labs/Tests: BGs WNL. Routine labs to be drawn this AM.   New this shift: No notable events this shift. Pt slept well this shift.   Social: No visitors at bedside this shift.   Plan of care: Plan for last dose of IV methypred today, and PLEX 3/5 Wednesday. Continue to monitor and follow current POC.

## 2019-06-25 NOTE — PLAN OF CARE
Discharge Planner PT  6A  Patient plan for discharge: ARU  Current status: Pt highly motivated. Progressed gait endurance and pattern with ~50 ft x 2 of gait with standard (non wheels) walker + close WC follow, CGA - min A, cues for walker navigation/safety. Decreased stance time through R LE, step to walking pattern with R hyperextension. At times pt demos too large of a step length, too narrow of a SWETHA, and does not place all four pegs of walker on ground which causes increased balance unsteadiness. Progressed transfers with 1 x EOB > recliner, and 4 x STSs from EOB with 1-2 B UE support, mod A with 1 UE support to stand and min A for B UE support. Cues for SWETHA. Promoted increased standing balance with 4 x 30 second bouts of  standing with no UE support - HH. Limited by fatigue and LE weakness.    Nursing: A x 1-2, walker + GB, WC follow    Barriers to return to prior living situation: medical status, level of A, current mobility status, balance, weakness  Recommendations for discharge: ARU  Rationale for recommendations: Pt would be a great candidate for an ARU as she is highly motivated, has mulit-disciplinary needs, and would tolerate 3 hours of extensive therapy.         Entered by: Jennifer Carrion 06/25/2019 11:12 AM

## 2019-06-25 NOTE — PLAN OF CARE
OT 6A  Discharge Planner OT   Patient plan for discharge: ARU  Current status: Min A sit<>stand x6 reps throughout session with FWW. Pt ambulated to and from the bathroom with FWW and CGA/to occasional min A. Min A for a toilet transfer. Mod A for greg cares. CGA for g/h while standing at the sink. Pt completed dynamic reaching while standing with FWW and MIn A/to occasional mod A required  Barriers to return to prior living situation: fatigue, weakness, deconditioning, acute medical needs  Recommendations for discharge: ARU  Rationale for recommendations: pt is below baseline and would benefit from continued skilled therapy to increase activity tolerance and independence with ADLs. Pt is motivated, has a good support system and is making good progress with therapy. Pt is able to tolerate 3 hours of therapy per day       Entered by: Alida Baltazar 06/25/2019 2:48 PM

## 2019-06-26 ENCOUNTER — APPOINTMENT (OUTPATIENT)
Dept: PHYSICAL THERAPY | Facility: CLINIC | Age: 74
DRG: 059 | End: 2019-06-26
Attending: PSYCHIATRY & NEUROLOGY
Payer: MEDICARE

## 2019-06-26 LAB
B2 GLYCOPROT1 IGG SERPL IA-ACNC: 1.3 U/ML
B2 GLYCOPROT1 IGM SERPL IA-ACNC: 1.8 U/ML
BASOPHILS # BLD AUTO: 0 10E9/L (ref 0–0.2)
BASOPHILS NFR BLD AUTO: 0.2 %
CREAT SERPL-MCNC: 0.77 MG/DL (ref 0.52–1.04)
DIFFERENTIAL METHOD BLD: ABNORMAL
DSDNA AB SER-ACNC: 1 IU/ML
EOSINOPHIL # BLD AUTO: 0 10E9/L (ref 0–0.7)
EOSINOPHIL NFR BLD AUTO: 0 %
ERYTHROCYTE [DISTWIDTH] IN BLOOD BY AUTOMATED COUNT: 13.3 % (ref 10–15)
FIBRINOGEN PPP-MCNC: 163 MG/DL (ref 200–420)
GAMMA INTERFERON BACKGROUND BLD IA-ACNC: 0.03 IU/ML
GFR SERPL CREATININE-BSD FRML MDRD: 76 ML/MIN/{1.73_M2}
GLUCOSE BLDC GLUCOMTR-MCNC: 109 MG/DL (ref 70–99)
GLUCOSE BLDC GLUCOMTR-MCNC: 122 MG/DL (ref 70–99)
GLUCOSE BLDC GLUCOMTR-MCNC: 132 MG/DL (ref 70–99)
GLUCOSE BLDC GLUCOMTR-MCNC: 133 MG/DL (ref 70–99)
GLUCOSE BLDC GLUCOMTR-MCNC: 84 MG/DL (ref 70–99)
HBV CORE AB SERPL QL IA: NONREACTIVE
HBV SURFACE AG SERPL QL IA: NONREACTIVE
HCT VFR BLD AUTO: 42.2 % (ref 35–47)
HGB BLD-MCNC: 13.8 G/DL (ref 11.7–15.7)
IMM GRANULOCYTES # BLD: 0.1 10E9/L (ref 0–0.4)
IMM GRANULOCYTES NFR BLD: 0.6 %
INR PPP: 1.18 (ref 0.86–1.14)
LYMPHOCYTES # BLD AUTO: 1 10E9/L (ref 0.8–5.3)
LYMPHOCYTES NFR BLD AUTO: 7.8 %
M TB IFN-G BLD-IMP: NEGATIVE
M TB IFN-G CD4+ BCKGRND COR BLD-ACNC: >10 IU/ML
MCH RBC QN AUTO: 32.2 PG (ref 26.5–33)
MCHC RBC AUTO-ENTMCNC: 32.7 G/DL (ref 31.5–36.5)
MCV RBC AUTO: 98 FL (ref 78–100)
MITOGEN IGNF BCKGRD COR BLD-ACNC: 0 IU/ML
MITOGEN IGNF BCKGRD COR BLD-ACNC: 0 IU/ML
MONOCYTES # BLD AUTO: 1.1 10E9/L (ref 0–1.3)
MONOCYTES NFR BLD AUTO: 8.4 %
NEUTROPHILS # BLD AUTO: 11 10E9/L (ref 1.6–8.3)
NEUTROPHILS NFR BLD AUTO: 83 %
NRBC # BLD AUTO: 0 10*3/UL
NRBC BLD AUTO-RTO: 0 /100
PLATELET # BLD AUTO: 174 10E9/L (ref 150–450)
RBC # BLD AUTO: 4.29 10E12/L (ref 3.8–5.2)
RESULT: ABNORMAL
SEND OUTS MISC TEST CODE: ABNORMAL
SEND OUTS MISC TEST SPECIMEN: ABNORMAL
TEST NAME: ABNORMAL
WBC # BLD AUTO: 13.3 10E9/L (ref 4–11)
WNV IGG CSF-ACNC: 0.21 IV
WNV IGM CSF-ACNC: 0 IV

## 2019-06-26 PROCEDURE — 25000128 H RX IP 250 OP 636: Performed by: STUDENT IN AN ORGANIZED HEALTH CARE EDUCATION/TRAINING PROGRAM

## 2019-06-26 PROCEDURE — 86704 HEP B CORE ANTIBODY TOTAL: CPT | Performed by: INTERNAL MEDICINE

## 2019-06-26 PROCEDURE — 82565 ASSAY OF CREATININE: CPT | Performed by: INTERNAL MEDICINE

## 2019-06-26 PROCEDURE — 25000132 ZZH RX MED GY IP 250 OP 250 PS 637: Mod: GY | Performed by: STUDENT IN AN ORGANIZED HEALTH CARE EDUCATION/TRAINING PROGRAM

## 2019-06-26 PROCEDURE — 36415 COLL VENOUS BLD VENIPUNCTURE: CPT | Performed by: INTERNAL MEDICINE

## 2019-06-26 PROCEDURE — 25000132 ZZH RX MED GY IP 250 OP 250 PS 637: Mod: GY | Performed by: INTERNAL MEDICINE

## 2019-06-26 PROCEDURE — 85384 FIBRINOGEN ACTIVITY: CPT | Performed by: STUDENT IN AN ORGANIZED HEALTH CARE EDUCATION/TRAINING PROGRAM

## 2019-06-26 PROCEDURE — G0499 HEPB SCREEN HIGH RISK INDIV: HCPCS | Performed by: INTERNAL MEDICINE

## 2019-06-26 PROCEDURE — 85025 COMPLETE CBC W/AUTO DIFF WBC: CPT | Performed by: STUDENT IN AN ORGANIZED HEALTH CARE EDUCATION/TRAINING PROGRAM

## 2019-06-26 PROCEDURE — 12000001 ZZH R&B MED SURG/OB UMMC

## 2019-06-26 PROCEDURE — 00000146 ZZHCL STATISTIC GLUCOSE BY METER IP

## 2019-06-26 PROCEDURE — 97530 THERAPEUTIC ACTIVITIES: CPT | Mod: GP

## 2019-06-26 PROCEDURE — 36514 APHERESIS PLASMA: CPT

## 2019-06-26 PROCEDURE — P9041 ALBUMIN (HUMAN),5%, 50ML: HCPCS | Performed by: STUDENT IN AN ORGANIZED HEALTH CARE EDUCATION/TRAINING PROGRAM

## 2019-06-26 PROCEDURE — 25000131 ZZH RX MED GY IP 250 OP 636 PS 637: Mod: GY | Performed by: STUDENT IN AN ORGANIZED HEALTH CARE EDUCATION/TRAINING PROGRAM

## 2019-06-26 PROCEDURE — 97116 GAIT TRAINING THERAPY: CPT | Mod: GP

## 2019-06-26 PROCEDURE — 25000125 ZZHC RX 250: Performed by: STUDENT IN AN ORGANIZED HEALTH CARE EDUCATION/TRAINING PROGRAM

## 2019-06-26 PROCEDURE — 85610 PROTHROMBIN TIME: CPT | Performed by: STUDENT IN AN ORGANIZED HEALTH CARE EDUCATION/TRAINING PROGRAM

## 2019-06-26 RX ORDER — HEPARIN SODIUM (PORCINE) LOCK FLUSH IV SOLN 100 UNIT/ML 100 UNIT/ML
3 SOLUTION INTRAVENOUS ONCE
Status: COMPLETED | OUTPATIENT
Start: 2019-06-26 | End: 2019-06-26

## 2019-06-26 RX ORDER — ALBUMIN HUMAN 25 %
3000 INTRAVENOUS SOLUTION INTRAVENOUS
Status: COMPLETED | OUTPATIENT
Start: 2019-06-26 | End: 2019-06-26

## 2019-06-26 RX ORDER — PREDNISONE 5 MG/1
5 TABLET ORAL EVERY MORNING
Status: DISCONTINUED | OUTPATIENT
Start: 2019-06-26 | End: 2019-07-06 | Stop reason: HOSPADM

## 2019-06-26 RX ORDER — CALCIUM GLUCONATE 100 MG/ML
AMPUL (ML) INTRAVENOUS
Status: COMPLETED | OUTPATIENT
Start: 2019-06-26 | End: 2019-06-26

## 2019-06-26 RX ORDER — GABAPENTIN 300 MG/1
300 CAPSULE ORAL AT BEDTIME
Status: DISCONTINUED | OUTPATIENT
Start: 2019-06-26 | End: 2019-06-28

## 2019-06-26 RX ADMIN — RANITIDINE 150 MG: 150 TABLET ORAL at 19:47

## 2019-06-26 RX ADMIN — CALCIUM GLUCONATE 3 G: 98 INJECTION, SOLUTION INTRAVENOUS at 13:10

## 2019-06-26 RX ADMIN — Medication 1 TABLET: at 19:47

## 2019-06-26 RX ADMIN — ALBUMIN HUMAN 3000 ML: 0.05 INJECTION, SOLUTION INTRAVENOUS at 13:10

## 2019-06-26 RX ADMIN — Medication 10 MG: at 21:19

## 2019-06-26 RX ADMIN — FUROSEMIDE 20 MG: 20 TABLET ORAL at 15:16

## 2019-06-26 RX ADMIN — Medication 3 ML: at 14:54

## 2019-06-26 RX ADMIN — ASPIRIN 325 MG: 325 TABLET, DELAYED RELEASE ORAL at 19:48

## 2019-06-26 RX ADMIN — UMECLIDINIUM 1 PUFF: 62.5 AEROSOL, POWDER ORAL at 08:21

## 2019-06-26 RX ADMIN — FUROSEMIDE 20 MG: 20 TABLET ORAL at 19:48

## 2019-06-26 RX ADMIN — METOPROLOL TARTRATE 50 MG: 50 TABLET ORAL at 19:47

## 2019-06-26 RX ADMIN — AMLODIPINE BESYLATE 10 MG: 10 TABLET ORAL at 19:48

## 2019-06-26 RX ADMIN — RANITIDINE 150 MG: 150 TABLET ORAL at 08:21

## 2019-06-26 RX ADMIN — TRAZODONE HYDROCHLORIDE 100 MG: 100 TABLET ORAL at 21:19

## 2019-06-26 RX ADMIN — PREDNISONE 5 MG: 5 TABLET ORAL at 12:27

## 2019-06-26 RX ADMIN — ANTICOAGULANT CITRATE DEXTROSE SOLUTION FORMULA A 740 ML: 12.25; 11; 3.65 SOLUTION INTRAVENOUS at 13:10

## 2019-06-26 RX ADMIN — Medication 1 TABLET: at 08:21

## 2019-06-26 RX ADMIN — METOPROLOL TARTRATE 50 MG: 50 TABLET ORAL at 08:21

## 2019-06-26 RX ADMIN — FUROSEMIDE 20 MG: 20 TABLET ORAL at 08:21

## 2019-06-26 RX ADMIN — SIMVASTATIN 20 MG: 20 TABLET, FILM COATED ORAL at 21:19

## 2019-06-26 RX ADMIN — GABAPENTIN 300 MG: 300 CAPSULE ORAL at 21:19

## 2019-06-26 RX ADMIN — CLOPIDOGREL BISULFATE 75 MG: 75 TABLET, FILM COATED ORAL at 08:21

## 2019-06-26 RX ADMIN — ENOXAPARIN SODIUM 40 MG: 40 INJECTION SUBCUTANEOUS at 19:48

## 2019-06-26 RX ADMIN — FLUOXETINE 20 MG: 20 CAPSULE ORAL at 08:21

## 2019-06-26 ASSESSMENT — ACTIVITIES OF DAILY LIVING (ADL)
ADLS_ACUITY_SCORE: 15
ADLS_ACUITY_SCORE: 15
ADLS_ACUITY_SCORE: 16
ADLS_ACUITY_SCORE: 15

## 2019-06-26 NOTE — PROGRESS NOTES
[facility], [place]   Neurology Daily Note  6/25/2019    Summary: Ladonna Sheriff is a 75 yo female with PMH notable for previous stroke/TIA, discoid lupus, Sjogren's syndrome, temporal arteritis, optic neuritis with vision impairment in right eye, chronic daily prednisone (5 mg daily), CHF, recently diagnosed obstructive lung disease who presented 6/19 with 2-3 weeks of progressive numbness at mid-chest region and below.     Subjective: Walked down hallway yesterday with OT. No change in symptoms, still feels weak in RLE and numb below mid-chest. Patient is hopeful that symptoms will improve after third PLEX run.     Objective:    Vitals: /69 (BP Location: Left arm)   Pulse 58   Temp 96  F (35.6  C) (Oral)   Resp 20   Wt 98.7 kg (217 lb 9.5 oz)   SpO2 97%   BMI 34.08 kg/m    General: adult female patient, lying in bed, NAD  HEENT: NC/AT, no icterus  Chest: breathing nonlabored on RA  Extremities: warm, no edema noted  Skin: no rash or lesions noted  Psych: mood pleasant, affect congruent  Neuro:  Mental status: awake, alert, attentive  Language: fluent and coherent  Cranial nerves: vision absent R eye, peripheral visual field defect L eye, disconjugate gaze (R eye abducted at baseline) with EOM otherwise intact, facial sensation intact, face symmetric, no dysarthria  Motor: normal tone, no abnormal movements, strength 5/5 throughout LLE, 4-/5 in RLE flexors, 4/5 in RLE extensors.  Reflexes: 2+ and symmetric biceps/patella, 1+ and symmetric achilles, no clonus, toes up-going bilaterally  Sensory: intact to light touch, absent vibratory sense/proprioception bilateral great toes (R>L) but intact at ankle level  Coordination: FNF without ataxia or dysmetria, rapid alternating movements intact  Gait: deferred at this time    Pertinent Investigations:      MRI Cervical Spine 6/19/19  Impression:    1. No spinal cord abnormality to indicate transverse myelitis.  2. Degenerative  changes.     MRI Thoracic Spine 19                                  Impression:    Focal gadolinium enhancing lesion from T4 through T5 in the spinal cord on the right side with edema extending from T3-T4 down to lower T5. This is most likely transverse myelitis, with differential diagnosis including a spinal cord astrocytoma.     MRI Brain w/o and w/ contrast 19           Impression:   The study demonstrates greater than 20 foci of T2-hyperintensity within the cerebral white matter which is suspicious for demyelination. Enhancing focus within the left parieto-occipital region is suspicious for active demyelination in the absence of known malignancy. No less likely, the differential for white matter lesions includes sequela of infectious or inflammatory insults, and vasculopathy.    LABS  SUSAN Ab IgG by IFA w/ Reflex: negative  C3: 110  C4: 16  ESR: 17 (0-30)  Angiotensin converting enzyme: 24 (9-67)  DsDNA: pending  [facility] panel: RNP negative    [place] 0.4 (0-0.9)    SSA >8.0 (0-0.9)    SSB >8.0 (0-0.9)    Scleroderma Ab Scl-70 1.0 (0-0.9)  B12: 396  MMA: pending  Vit D: 40  Lupus anticoagulant panel: pending  Anticardiolipin Ab: negative  Beta 2 glycoprotein Abs: pending  Quantiferon Gold: pending  HB surface Ag: negative  HB core Ab: negative    19 CSF  Gram stain: no organisms or WBCs seen  Culture aerobic: NGTD  Enterovirus PCR: negative  VZV: negative  WNV: pending  RBC: 296  WBC: 0   Glucose: 72  Protein: 63  Oligoclonal bandin oligoclonal bands, 15.3 oligoclonal IgG   [place] send-out, CNS demyelinating disease evaluation: pending    Assessment:   75yo female with PMH notable for previous stroke/TIA, discoid lupus, Sjogren's syndrome, temporal arteritis, optic neuritis with vision impairment in right eye, chronic daily prednisone (5 mg daily), CHF, recently diagnosed obstructive lung disease who presented  with 2-3 weeks of progressive numbness at mid-chest region and below. Cervical MRI   unremarkable, but thoracic MRI with T4-T5 enhancing lesion w/ surrounding edema and head MRI with multiple T2 hyperintensities and one small enhancing focus. CSF obtained 6/21 demonstrated elevated protein, glucose, and RBCs, but absent WBCs. CSF infectious workup negative thus far. Oligoclonal bands positive. SSA/SSB/Scl-70 positive, other AI labs negative or pending. Demyelinating panel pending. History and workup thus far are concerning for a demyelinating disorder. Differential is still broad including autoimmune, neoplasm, infection, vasculitis. Given patient's significant AI history, highest suspicion is for autoimmune inflammatory process (including NMOSD/MOG spectrum). Will continue with PLEX therapy at this time and assess for response. If no response with PLEX and demyelinating panel comes back normal, could consider a more extensive neoplastic workup at that time but low suspicion given time course of symptoms and ~25 yr hx of intermittent optic neuritis.    Plan:     ACUTE PROBLEMS    Progressive bilateral weakness/numbness  Hx of discoid lupus, Sjogren's, temporal arteritis, optic neuritis  -Continue with PLEX x5 runs (6/22, 6/24, 6/26, 6/28, 6/30)  -s/p methylprednisolone 1000mg x5 days (6/21-6/25)  -Follow pending labs  -Continue with PT/OT  -Rheum following  -Quantiferon Gold, HBV surface Ag and core Ab (in case immunosuppressive therapy is indicated based on antibody results, still pending)    E coli UTI, asymptomatic  -Continue with Nitrofurantoin 100mg BID x5 days (final dose will be 6/26)    CHRONIC PROBLEMS    Obstructive lung disease    -Continue PTA Incruse Ellipta inhaler  -Albuterol nebs q4h prn     CHF    Previous echocardiogram showed ejection fraction of 45% and stage II diastolic congestive heart failure. She does not appear to have decompensated heart failure at this time. In the setting of progressive shortness of breath would have low threshold to work this up  with echocardiogram.    Hx of discoid lupus, Sjogren's syndrome, temporal arteritis, optic neuritis  -completed 5-day course of methylprednisolone 1000mg  -resume PTA prednisone 5 mg daily    Hx of stroke/TIA  -Continue PTA ASA and clopidogrel    Hypertension   -Continue PTA metoprolol, amlodipine, lasix  -Daily weights      Hypercholesterolemia  -Continue PTA statin    Activity: up with assist  Diet: regular adult diet   DVT ppx: lovenox currently being held   Bowel ppx: ranitidine, senna prn  Lines: PIV, non-tunneled RIJ CVC  Code: full  Dispo: ARU early next week, SW to make referrals      Patient seen and discussed with attending Dr. Spangler.    Alee Ndiaye MD  Neurology PGY2  Pager: [phone]

## 2019-06-26 NOTE — PLAN OF CARE
Discharge Planner PT   Patient plan for discharge: not discussed   Current status: Pt performs numerous STSs with FWW and min A, requiring 2 attempts to achieve upright. Performed pre gait drills, weight shifting and stepping in place. Proceeded to ambulate 2 x ~15' with FWW and min A. Experienced LOB x 1 requiring inc A to prevent fall. Difficulty with clearance of R LE when ambulating but focus on heel strike to dec toe drag. Performed sit>supine with SBA.   Barriers to return to prior living situation: medical needs, current mobility, level of A   Recommendations for discharge: ARU   Rationale for recommendations: Pt is below baseline, would benefit from ongoing therapies, has interdisciplinary needs, is motivated to work with therapies and anticipate she would tolerate extended therapy times.        Entered by: Stephanie Brewer 06/26/2019 12:28 PM

## 2019-06-26 NOTE — PLAN OF CARE
Status: Admitted for progressive BLE weakness, possibly transverse myelitis  VS: VSS on RA  Neuros: A&Ox4. RLE 4/5 w/absent proprioception, otherwise 5/5 TO. Blind in R eye w/dysconjugate gaze and sluggish pupil, blurry vision in L eye at baseline (Hx of optic neuritis) Numbness/tingling from below breast to toes  GI: Tolerating regular diet. BM x1 today  : Voiding w/out difficulty  IV: PIV SL. R apheresis cather Hep Locked  Activity: Ax2 w/gb and walker; Pivoting to commode, walked to BR x1. Worked w/PT. Up in chair most of AM  Pain: Denies  Skin: Protective mepilex on coccyx  Labs/Tests: PLEX completed today  Social: No visitors this shift  Plan of care: Next PLEX treatment scheduled for Friday 6/28. Continue to encourage activity as tolerated

## 2019-06-26 NOTE — PROCEDURES
Laboratory Medicine and Pathology  Transfusion Medicine - Apheresis Procedure Note    Ladonna Sheriff MRN# [id number]   YOB: 1945 Age: 74 year old   Date of Procedure: 2019    Procedure:PLEX  Reason for Procedure:Possible Autoimmune Neuropathy         Assessment and Plan:   Ladonna Sheriff is a 74 year old female  with h/o stroke, TIA, discoid lupus, Sjogren's syndrome, temporal arteritis, on prednisone, optic neuritis with impairment of the R eye, hypertension, hypercholesterolemia,CHF , & recently diagnosed asthma and/or COPD.     She presented  with 2.5 weeks of  progressive ascending numbness up to around T7/T8 and R foot weakness     Differential Dx at this juncture is felt by neuro to still be broad includin. Neuromyelitis optica spectrum disorders/ anti-myelin oligodendrocyte glycoprotein antibodies (NMOSD/MOG spectrum).  2. Transverese Myelitis  1. MRI of Spinal cord:impression was most likely transverse myelitis,  2. H/o discoid subtype of lupus, but lupus/Sjogren's-associated TM is rare. LP performed  with many studies still pending.    3.  Also Vasculitis, sarcoidosis, MS, infection, neoplasm or possibly related to her prior autoimmune disease.   While they continue her work-up  we were consulted to begin  PLEX's to slow progression(subjective RLE progression of numbness).     She had her 3rd procedure ( of scheduled 5 every other day) today and tolerated it well. Next one scheduled for .    Please do not start or continue ace-inhibitors throughout the duration of a therapeutic plasma exchange series. Please notify the apheresis physician of any upcoming procedures, surgeries, or biopsies as therapeutic plasma exchange will affect coagulation factors.           Past Medical History:     Past Medical History:   Diagnosis Date     Cerebral infarction (H)      CHF (congestive heart failure) (H)      Hypertension      Lupus (H)      Lupus (H)       Optic neuritis      Optic neuritis      Sjogren's syndrome (H)      Sjogren's syndrome (H)      Temporal arteritis (H)      TIA (transient ischemic attack)      Uncomplicated asthma              Past Surgical History:     Past Surgical History:   Procedure Laterality Date     CHOLECYSTECTOMY       GYN SURGERY      hysterectomy     GYN SURGERY      tubal ligation     IR CVC NON TUNNEL PLACEMENT  6/21/2019              Social History:     Social History     Tobacco Use     Smoking status: Former Smoker   Substance Use Topics     Alcohol use: Yes     Alcohol/week: 4.2 oz     Types: 7 Shots of liquor per week            Allergies:     Allergies   Allergen Reactions     Prevacid [Lansoprazole] Rash             Medications:     Current Facility-Administered Medications   Medication     acetaminophen (TYLENOL) tablet 650 mg    Or     acetaminophen (TYLENOL) solution 650 mg     acetaminophen (TYLENOL) Suppository 650 mg     albuterol (PROAIR HFA/PROVENTIL HFA/VENTOLIN HFA) 108 (90 Base) MCG/ACT inhaler 2 puff     amLODIPine (NORVASC) tablet 10 mg     aspirin (ASA) EC tablet 325 mg     calcium carbonate 600 mg-vitamin D 400 units (CALTRATE) per tablet 1 tablet     clopidogrel (PLAVIX) tablet 75 mg     glucose gel 15-30 g    Or     dextrose 50 % injection 25-50 mL     enoxaparin (LOVENOX) injection 40 mg     FLUoxetine (PROzac) capsule 20 mg     furosemide (LASIX) tablet 20 mg     glucagon injection 1 mg     heparin 100 UNIT/ML injection 3 mL     heparin 100 UNIT/ML injection 3 mL     insulin aspart (NovoLOG) inj (RAPID ACTING)     insulin aspart (NovoLOG) inj (RAPID ACTING)     magnesium sulfate 2 g in NS intermittent infusion (PharMEDium or FV Cmpd)     magnesium sulfate 4 g in 100 mL sterile water (premade)     melatonin tablet 10 mg     metoprolol tartrate (LOPRESSOR) tablet 50 mg     naloxone (NARCAN) injection 0.1-0.4 mg     ondansetron (ZOFRAN-ODT) ODT tab 4 mg    Or     ondansetron (ZOFRAN) injection 4 mg      polyethylene glycol (MIRALAX/GLYCOLAX) Packet 17 g     potassium chloride (KLOR-CON) Packet 20-40 mEq     potassium chloride 10 mEq in 100 mL intermittent infusion with 10 mg lidocaine     potassium chloride 10 mEq in 100 mL sterile water intermittent infusion (premix)     potassium chloride 20 mEq in 50 mL intermittent infusion     potassium chloride ER (K-DUR/KLOR-CON M) CR tablet 20-40 mEq     predniSONE (DELTASONE) tablet 5 mg     ranitidine (ZANTAC) tablet 150 mg     senna-docusate (SENOKOT-S/PERICOLACE) 8.6-50 MG per tablet 1 tablet    Or     senna-docusate (SENOKOT-S/PERICOLACE) 8.6-50 MG per tablet 2 tablet     simvastatin (ZOCOR) tablet 20 mg     sodium chloride (PF) 0.9% PF flush 10 mL     sodium chloride (PF) 0.9% PF flush 10 mL     traZODone (DESYREL) tablet 100 mg     umeclidinium (INCRUSE ELLIPTA) 62.5 MCG/INH inhaler 1 puff                Abbreviated Physical Exam:   /74   Pulse 60   Temp 96.4  F (35.8  C) (Oral)   Resp 18   Wt 98.7 kg (217 lb 9.5 oz)   SpO2 97%   BMI 34.08 kg/m    Patient Alert & Oriented and in No Acute Distress         Laboratory Data:     INR  Recent Labs   Lab 06/26/19  1310 06/24/19  0918 06/22/19  0952   INR 1.18* 1.18* 1.13       Fibrinogen   Date Value Ref Range Status   06/26/2019 163 (L) 200 - 420 mg/dL Final       Results for orders placed or performed during the hospital encounter of 06/20/19 (from the past 24 hour(s))   Glucose by meter   Result Value Ref Range    Glucose 108 (H) 70 - 99 mg/dL   Glucose by meter   Result Value Ref Range    Glucose 212 (H) 70 - 99 mg/dL   Creatinine   Result Value Ref Range    Creatinine 0.77 0.52 - 1.04 mg/dL    GFR Estimate 76 >60 mL/min/[1.73_m2]    GFR Estimate If Black 88 >60 mL/min/[1.73_m2]   Hepatitis B core antibody   Result Value Ref Range    Hepatitis B Core Phoebe Nonreactive NR^Nonreactive   Hepatitis B surface antigen   Result Value Ref Range    Hep B Surface Agn Nonreactive NR^Nonreactive   Glucose by meter    Result Value Ref Range    Glucose 132 (H) 70 - 99 mg/dL   Glucose by meter   Result Value Ref Range    Glucose 133 (H) 70 - 99 mg/dL   CBC with platelets differential   Result Value Ref Range    WBC 13.3 (H) 4.0 - 11.0 10e9/L    RBC Count 4.29 3.8 - 5.2 10e12/L    Hemoglobin 13.8 11.7 - 15.7 g/dL    Hematocrit 42.2 35.0 - 47.0 %    MCV 98 78 - 100 fl    MCH 32.2 26.5 - 33.0 pg    MCHC 32.7 31.5 - 36.5 g/dL    RDW 13.3 10.0 - 15.0 %    Platelet Count 174 150 - 450 10e9/L    Diff Method Automated Method     % Neutrophils 83.0 %    % Lymphocytes 7.8 %    % Monocytes 8.4 %    % Eosinophils 0.0 %    % Basophils 0.2 %    % Immature Granulocytes 0.6 %    Nucleated RBCs 0 0 /100    Absolute Neutrophil 11.0 (H) 1.6 - 8.3 10e9/L    Absolute Lymphocytes 1.0 0.8 - 5.3 10e9/L    Absolute Monocytes 1.1 0.0 - 1.3 10e9/L    Absolute Eosinophils 0.0 0.0 - 0.7 10e9/L    Absolute Basophils 0.0 0.0 - 0.2 10e9/L    Abs Immature Granulocytes 0.1 0 - 0.4 10e9/L    Absolute Nucleated RBC 0.0    INR   Result Value Ref Range    INR 1.18 (H) 0.86 - 1.14   Fibrinogen activity   Result Value Ref Range    Fibrinogen 163 (L) 200 - 420 mg/dL            Procedure Summary:   A single volume plasma exchange was performed with  Albumin.The Non-Tunneled RIJ CVC was used for access. ACD-A was for anticoagulation. To offset the effects of the citrate, calcium gluconate was given in the return line. The patient's vital signs were stable throughout. The patient tolerated the procedure well.    Attestation: The patient was directly seen and evaluated by Kymberly shah M.D..    Kymberly Gordon M.D.    Transfusion Medicine  Laboratory Medicine & Pathology  Pager: [phone]

## 2019-06-26 NOTE — PLAN OF CARE
Status: Pt on 6A with progressive BLE weakness, possibly transverse myelitis  VS: /64 (BP Location: Right arm)   Pulse 58   Temp 97.1  F (36.2  C) (Oral)   Resp 16   Wt 98.7 kg (217 lb 9.5 oz)   SpO2 98%   BMI 34.08 kg/m    Neuros: Numbness and tingling from just below breast to toes. RLE 4/5, otherwise strengths 5/5 throughout. Baseline blindness in R eye with dysconjugate gaze and sluggish pupil, and decreased vision in L eye (both 2/2 optic neuritis dx PTA)  GI: Tolerates regular diet. LBM yesterday.  : Voiding spontaneously via bedside commode.   IV: PIV SL. R internal jugular HL.   Activity: Up with A2, gb, walker to pivot to commode.   Pain: Denies pain this shift.   Respiratory: LS clear, equal throughout.   Drains/lines/skin: General bruising throughout. Preventative mepilex to coccyx.   Labs/Tests: BGs WNL. Routine labs to be drawn this AM.   New this shift: No notable events this shift. Pt slept well.  Social: No visitors at bedside this shift.   Plan of care: Plan for PLEX 3/5 today. Continue to monitor and follow current POC.

## 2019-06-26 NOTE — PROGRESS NOTES
SPIRITUAL HEALTH SERVICES  SPIRITUAL ASSESSMENT Progress Note  [facility] 6A     PRIMARY FOCUS:     Emotional/spiritual/[employer] distress    REFERRAL SOURCE: Follow up visit    ILLNESS CIRCUMSTANCES:   Reviewed documentation. Reflective conversation shared with Ladonna which integrated elements of illness and family narratives.     DISTRESS:     Emotional/Spiritual/Existential Distress - Ladonna wants to have a conversation with Israel about her belief in God and afterlife.    SPIRITUAL/Buddhism COPING:     Mormonism/Vicki - Gnosticist     Spiritual Practice(s) - Prayer     Emotional/Relational/Existential Connections - Close relationship 3 sons    GOALS OF CARE:    Goals of Care - Talk with children about her spirituality.    Meaning/Sense-Making - Ladonna has a strong relationship with her 3 sons Bandar, Israel, and Sridhar. She wants to talk with Israel about her spirituality and what God means for her in her life.    PLAN: I will be on-call Sunday and she may contact me.    MAURICIO Lara   Intern  Pager [phone]

## 2019-06-27 ENCOUNTER — APPOINTMENT (OUTPATIENT)
Dept: OCCUPATIONAL THERAPY | Facility: CLINIC | Age: 74
DRG: 059 | End: 2019-06-27
Attending: PSYCHIATRY & NEUROLOGY
Payer: MEDICARE

## 2019-06-27 ENCOUNTER — APPOINTMENT (OUTPATIENT)
Dept: PHYSICAL THERAPY | Facility: CLINIC | Age: 74
DRG: 059 | End: 2019-06-27
Attending: PSYCHIATRY & NEUROLOGY
Payer: MEDICARE

## 2019-06-27 LAB
GLUCOSE BLDC GLUCOMTR-MCNC: 104 MG/DL (ref 70–99)
GLUCOSE BLDC GLUCOMTR-MCNC: 111 MG/DL (ref 70–99)
GLUCOSE BLDC GLUCOMTR-MCNC: 127 MG/DL (ref 70–99)
GLUCOSE BLDC GLUCOMTR-MCNC: 83 MG/DL (ref 70–99)
LACTATE BLD-SCNC: 0.9 MMOL/L (ref 0.7–2)

## 2019-06-27 PROCEDURE — 97530 THERAPEUTIC ACTIVITIES: CPT | Mod: GP

## 2019-06-27 PROCEDURE — 83605 ASSAY OF LACTIC ACID: CPT

## 2019-06-27 PROCEDURE — 00000146 ZZHCL STATISTIC GLUCOSE BY METER IP

## 2019-06-27 PROCEDURE — 97112 NEUROMUSCULAR REEDUCATION: CPT | Mod: GP

## 2019-06-27 PROCEDURE — 12000001 ZZH R&B MED SURG/OB UMMC

## 2019-06-27 PROCEDURE — 25000128 H RX IP 250 OP 636: Performed by: PHYSICIAN ASSISTANT

## 2019-06-27 PROCEDURE — 97535 SELF CARE MNGMENT TRAINING: CPT | Mod: GO | Performed by: OCCUPATIONAL THERAPIST

## 2019-06-27 PROCEDURE — 25000132 ZZH RX MED GY IP 250 OP 250 PS 637: Mod: GY | Performed by: INTERNAL MEDICINE

## 2019-06-27 PROCEDURE — 25000131 ZZH RX MED GY IP 250 OP 636 PS 637: Mod: GY | Performed by: STUDENT IN AN ORGANIZED HEALTH CARE EDUCATION/TRAINING PROGRAM

## 2019-06-27 PROCEDURE — 25000128 H RX IP 250 OP 636: Performed by: STUDENT IN AN ORGANIZED HEALTH CARE EDUCATION/TRAINING PROGRAM

## 2019-06-27 PROCEDURE — 25000132 ZZH RX MED GY IP 250 OP 250 PS 637: Mod: GY | Performed by: STUDENT IN AN ORGANIZED HEALTH CARE EDUCATION/TRAINING PROGRAM

## 2019-06-27 PROCEDURE — 40000141 ZZH STATISTIC PERIPHERAL IV START W/O US GUIDANCE

## 2019-06-27 PROCEDURE — 97116 GAIT TRAINING THERAPY: CPT | Mod: GP

## 2019-06-27 RX ORDER — DIPHENHYDRAMINE HYDROCHLORIDE 50 MG/ML
50 INJECTION INTRAMUSCULAR; INTRAVENOUS
Status: CANCELLED | OUTPATIENT
Start: 2019-06-27

## 2019-06-27 RX ADMIN — UMECLIDINIUM 1 PUFF: 62.5 AEROSOL, POWDER ORAL at 08:39

## 2019-06-27 RX ADMIN — SIMVASTATIN 20 MG: 20 TABLET, FILM COATED ORAL at 22:45

## 2019-06-27 RX ADMIN — FLUOXETINE 20 MG: 20 CAPSULE ORAL at 08:39

## 2019-06-27 RX ADMIN — PREDNISONE 5 MG: 5 TABLET ORAL at 08:39

## 2019-06-27 RX ADMIN — FUROSEMIDE 20 MG: 20 TABLET ORAL at 14:08

## 2019-06-27 RX ADMIN — METOPROLOL TARTRATE 50 MG: 50 TABLET ORAL at 08:39

## 2019-06-27 RX ADMIN — METOPROLOL TARTRATE 50 MG: 50 TABLET ORAL at 20:30

## 2019-06-27 RX ADMIN — Medication 1 TABLET: at 18:10

## 2019-06-27 RX ADMIN — CLOPIDOGREL BISULFATE 75 MG: 75 TABLET, FILM COATED ORAL at 08:39

## 2019-06-27 RX ADMIN — FUROSEMIDE 20 MG: 20 TABLET ORAL at 20:31

## 2019-06-27 RX ADMIN — ENOXAPARIN SODIUM 40 MG: 40 INJECTION SUBCUTANEOUS at 20:31

## 2019-06-27 RX ADMIN — TRAZODONE HYDROCHLORIDE 100 MG: 100 TABLET ORAL at 22:45

## 2019-06-27 RX ADMIN — Medication 3 ML: at 15:50

## 2019-06-27 RX ADMIN — ASPIRIN 325 MG: 325 TABLET, DELAYED RELEASE ORAL at 20:31

## 2019-06-27 RX ADMIN — Medication 1 TABLET: at 08:39

## 2019-06-27 RX ADMIN — GABAPENTIN 300 MG: 300 CAPSULE ORAL at 22:45

## 2019-06-27 RX ADMIN — Medication 10 MG: at 22:45

## 2019-06-27 RX ADMIN — AMLODIPINE BESYLATE 10 MG: 10 TABLET ORAL at 20:31

## 2019-06-27 RX ADMIN — RANITIDINE 150 MG: 150 TABLET ORAL at 20:30

## 2019-06-27 RX ADMIN — FUROSEMIDE 20 MG: 20 TABLET ORAL at 08:39

## 2019-06-27 RX ADMIN — RANITIDINE 150 MG: 150 TABLET ORAL at 08:39

## 2019-06-27 ASSESSMENT — ACTIVITIES OF DAILY LIVING (ADL)
ADLS_ACUITY_SCORE: 14
ADLS_ACUITY_SCORE: 16

## 2019-06-27 NOTE — PLAN OF CARE
Status: Admitted for progressive BLE weakness, possibly transverse myelitis.   VS: VSS on RA. Softer BP at end of shift but still within parameters. Murmur noted on cardiac assessment.   Neuros: A&Ox4. RLE 4/5 w/absent proprioception, otherwise 5/5 TO. Blind in R eye w/dysconjugate gaze and sluggish pupil, blurry vision in L eye at baseline (optic neuritis). Numbness/tingling from below breast to toes, bottom of feet very sensitive.   GI: Tolerating regular diet. BM x1 today.   : Voiding w/out difficulty.  IV: PIV SL. R apheresis catheter hep locked.   Activity: Ax2 w/gb and walker; pivoting to commode, walked to BR x1. Worked w/PT.   Pain: Denies  Skin: Protective Mepilex on coccyx, some bruising throughout.   Labs/Tests: PLEX completed today.   Social: No visitors this shift.   Plan of care: Next PLEX treatment scheduled for Friday 6/28. Continue to encourage activity as tolerated.

## 2019-06-27 NOTE — PLAN OF CARE
OT/6A:  Discharge Planner OT   Patient plan for discharge: rehab  Current status: Pt CGA ambulate into bathroom with walker to complete standing ADLs. Pt requiring extra time for rest, balance and advancement of RLE.   Barriers to return to prior living situation: medical status, balance, ADL I, mobility  Recommendations for discharge: ARU  Rationale for recommendations: Pt motivated and has good tolerance for 3hrs of therapy. Pt below baseline for ADL I, mobility, and transfers        Entered by: Juana Adkins 06/27/2019 11:59 AM

## 2019-06-27 NOTE — PLAN OF CARE
Status: Pt on 6a for progressive BLE numbness; possible transverse myelitis   Vitals:  VSS  Neuros: A/Ox4, R eye blind baseline, R dysconjugate gaze, R pupil sluggish. Numbness from underneath breasts to feet. RLE 4/5  IV: PIV SL, RIJ hep locked  Resp/trach: LS clear. RA  Diet: Regular diet  Bowel status: No BM this shift  : Voiding spontaneously via bedside commode     Skin: Some bruising throughout, mepilex on coccyx  Pain: Denies  Activity: Ax2 GB walker  Plan: Next PLEX treatment Friday 6/28. Continue to monitor and assess.

## 2019-06-27 NOTE — PLAN OF CARE
Status: Admitted for progressive BLE weakness, possibly transverse myelitis  VS: VSS on RA  Neuros: A&Ox4. RLE 4/5 w/absent proprioception, otherwise 5/5 TO. Blind in R eye w/dysconjugate gaze and sluggish pupil, blurry vision in L eye at baseline (Hx of optic neuritis) Numbness/tingling from below breast to toes, per pt, sensation improving  GI: Tolerating regular diet. BM x2 today, soft  : Voiding w/out difficulty  IV: PIV SL. R apheresis cather Hep Locked  Activity: Ax2 w/gb and walker; walking to BR. Worked w/PT/OT. Up in chair most of afternoon  Pain: Denies  Skin: Protective mepilex on coccyx  Social: Son at bedside, supportive  Plan of care: Next PLEX treatment scheduled for tomorrow (6/28). Continue to encourage activity as tolerated

## 2019-06-27 NOTE — PROGRESS NOTES
Social Work Services Progress Note    Hospital Day: 8  Date of Initial Social Work Evaluation:  6/21/19  Collaborated with:  [facility] Admissions Coordinator's, Dr. Ndiaye, patient    Data:  SW is following for discharge planning.  Pt is receiving plex treatments through 6/30/19.  Per Dr. Ndiaye, readiness for discharge would be anticipated on 7/1/19.  [facility] is recommended upon discharge from [facility].    Intervention:  KOBY phoned Admissions at [facility] (left message referring pt) and Emilee Santos at [facility] ([place]) and referred pt.  SW faxed assessment materials to both facility's.  SW met with pt and updated.  Pt states that she has changed her order of preference.  Pt states that her order of preference is as follows:  1.  [facility]  2.  [facility]  3.  [place].    Assessment:  Pt voices understanding of the recommended discharge plan and agreement with the recommended discharge plan.    Plan:    Anticipated Disposition:  Acute rehab placement    Barriers to d/c plan:  Pt is receiving plex treatments until 6/30/19.    Follow Up:  SW will continue to follow.    CAMELIA Marcial  Social Work, 6A  Phone:  [phone]  Pager:  [phone]  6/27/2019

## 2019-06-27 NOTE — PROGRESS NOTES
[facility], [place]   Neurology Daily Note  6/25/2019    Summary: Ladonna Sheriff is a 73 yo female with PMH notable for previous stroke/TIA, discoid lupus, Sjogren's syndrome, temporal arteritis, optic neuritis with vision impairment in right eye, chronic daily prednisone (5 mg daily), CHF, recently diagnosed obstructive lung disease who presented 6/19 with 2-3 weeks of progressive numbness at mid-chest region and below.     Subjective: Patient feels symptoms have improved slightly. She notes that she has always had some tingling in her feet (even before onset of current symptoms) that seems worse at night and sometimes interferes with sleep. The trial of low-dose gabapentin at bedtime last night notably relieved this. Agreeable to increasing dosage.     Objective:    Vitals: /77   Pulse 59   Temp 98  F (36.7  C) (Oral)   Resp 16   Wt 99 kg (218 lb 4.1 oz)   SpO2 98%   BMI 34.18 kg/m    General: adult female patient, lying in bed, NAD  HEENT: NC/AT, no icterus  Chest: breathing nonlabored on RA  Extremities: warm, no edema noted  Skin: no rash or lesions noted   Psych: mood pleasant, affect congruent  Neuro:   Mental status: awake, alert, attentive  Language: fluent and coherent  Cranial nerves: vision absent R eye, peripheral visual field defect L eye, disconjugate gaze (R eye abducted at baseline) with EOM otherwise intact, facial sensation intact, face symmetric, no dysarthria  Motor: normal tone, no abnormal movements, strength 5/5 throughout LLE, 4/5 in RLE flexors, 4+/5 in RLE extensors.  Reflexes: 2+ and symmetric biceps/patella, 1+ and symmetric achilles, no clonus, toes mute bilaterally.  Sensory: intact to light touch,  absent vibratory sense in RLE, minimal vibratory sense in LLE. Loss of proprioception bilateral great toes.  Coordination: FNF without ataxia or dysmetria, rapid alternating movements intact  Gait: deferred    Pertinent Investigations:      MRI Cervical  Spine 19  Impression:    1. No spinal cord abnormality to indicate transverse myelitis.  2. Degenerative changes.     MRI Thoracic Spine 19                                  Impression:    Focal gadolinium enhancing lesion from T4 through T5 in the spinal cord on the right side with edema extending from T3-T4 down to lower T5. This is most likely transverse myelitis, with differential diagnosis including a spinal cord astrocytoma.     MRI Brain w/o and w/ contrast 19           Impression:   The study demonstrates greater than 20 foci of T2-hyperintensity within the cerebral white matter which is suspicious for demyelination. Enhancing focus within the left parieto-occipital region is suspicious for active demyelination in the absence of known malignancy. No less likely, the differential for white matter lesions includes sequela of infectious or inflammatory insults, and vasculopathy.    LABS  SUSAN Ab IgG by IFA w/ Reflex: negative  C3: 110  C4: 16  ESR: 17 (0-30)  Angiotensin converting enzyme: 24 (9-67)  DsDNA: negative  LINWOOD panel: RNP negative    Macias 0.4 (0-0.9)    SSA >8.0 (0-0.9)    SSB >8.0 (0-0.9)    Scleroderma Ab Scl-70 1.0 (0-0.9)  B12: 396  MMA: negative  Vit D: 40  Lupus anticoagulant panel: negative  Anticardiolipin Ab: negative  Beta 2 glycoprotein Abs: negative  Quantiferon Gold: negative  HB surface Ag: negative  HB core Ab: negative    19 CSF  Gram stain: no organisms or WBCs seen  Culture aerobic: NGTD  Enterovirus PCR: negative  VZV: negative  WNV: negative  RBC: 296  WBC: 0   Glucose: 72  Protein: 63  Oligoclonal bandin oligoclonal bands, 15.3 oligoclonal IgG   Leighton send-out, CNS demyelinating disease evaluation: pending    Assessment:   75yo female with PMH notable for previous stroke/TIA, discoid lupus, Sjogren's syndrome, temporal arteritis, optic neuritis with vision impairment in right eye, chronic daily prednisone (5 mg daily), CHF, recently diagnosed obstructive  lung disease who presented 6/19 with 2-3 weeks of progressive numbness at mid-chest region and below. Cervical MRI 6/19 unremarkable, but thoracic MRI with T4-T5 enhancing lesion w/ surrounding edema and head MRI with multiple T2 hyperintensities and one small enhancing focus. CSF obtained 6/21 demonstrated elevated protein, glucose, and RBCs, but absent WBCs. CSF infectious workup negative thus far. Oligoclonal bands positive. SSA/SSB/Scl-70 positive, other AI labs negative or pending. Demyelinating panel pending. History and workup thus far are concerning for a demyelinating disorder. Differential is still broad including autoimmune, neoplasm, infection, vasculitis. Given patient's significant AI history, highest suspicion is for autoimmune inflammatory process (including NMOSD/MOG spectrum). Will continue with PLEX therapy at this time and assess for response. If no response with PLEX and demyelinating panel comes back normal, could consider a more extensive neoplastic workup at that time but low suspicion given time course of symptoms and ~25 yr hx of intermittent optic neuritis.    Plan:     ACUTE PROBLEMS    Progressive bilateral weakness/numbness  Hx of discoid lupus, Sjogren's, temporal arteritis, optic neuritis  - Continue with PLEX x5 runs (6/22, 6/24, 6/26, 6/28, 6/30)  - s/p methylprednisolone 1000mg x5 days (6/21-6/25)  - Follow pending labs  - Continue with PT/OT  - Rheum following  - gabapentin 300 mg PO TID    E coli UTI, asymptomatic  - Completed 5-day course of nitrofurantoin on 6/26    CHRONIC PROBLEMS    Obstructive lung disease    - Continue PTA Incruse Ellipta inhaler  - Albuterol nebs q4h prn     CHF    Previous echocardiogram showed ejection fraction of 45% and stage II diastolic congestive heart failure. She does not appear to have decompensated heart failure at this time. In the setting of progressive shortness of breath would have low threshold to work this up with echocardiogram.    Hx of  discoid lupus, Sjogren's syndrome, temporal arteritis, optic neuritis  - completed 5-day course of methylprednisolone 1000mg  - resume PTA prednisone 5 mg daily    Hx of stroke/TIA  - Continue PTA ASA and clopidogrel    Hypertension   - Continue PTA metoprolol, amlodipine, lasix  - Daily weights      Hypercholesterolemia  - Continue PTA statin    Activity: up with assist  Diet: regular adult diet   DVT ppx: Lovenox  Bowel ppx: ranitidine, senna prn  Lines: PIV, non-tunneled RIJ CVC  Code: full  Dispo: ARU early next week      Patient seen and discussed with attending Dr. Spangler.    Alee Ndiaye MD  Neurology PGY2  Pager: [phone]

## 2019-06-27 NOTE — PROGRESS NOTES
CLINICAL NUTRITION SERVICES - BRIEF NOTE      Reviewed nutrition risk factors due to LOS. Pt is tolerating diet, eating well per nursing documentation; 100 %PO per flowsheets. No nutrition issues identified at this time. RD will follow via rounds at this time, unless consulted.      Estela Pop RD, LD, [facility]  Neuro ICU  Pager: [phone]

## 2019-06-27 NOTE — PLAN OF CARE
Discharge Planner PT  6A  Patient plan for discharge: ARU  Current status: Maximiliano supine <> EOB. SBA-CGA when pt scoots to edge of surface for STSs/transfers with use of FWW otherwise, requires an additional attempt and min A. Assistance needed to don white tennis shoes that Son brought today. Progressed gait endurance and pattern for ~ 80 ft + ~ 50 ft with FWW up to Min A needed to correct LOBs 2/2 increased step length, decreased R foot clearance/heel strike, and decreased SWETHA (intermittently crosses R foot into a tandem walking pattern). WC follow provided by son.  Decreased balance, and poor gait pattern demo'd on turns with physical A needed for walker navigation. Engaged in standing toe tapping exercise with cones in front and to B sides, heavy B UE support on FWW - cued for slight R knee bent when performing exercise with L LE.     Nursing: A x 1-2 + GB with FWW for short distances    Barriers to return to prior living situation: medical status, current mob status, level of A/supervision, balance, weakness  Recommendations for discharge: ARU  Rationale for recommendations: Pt would be a great candidate for an ARU as she is highly motivated, has mulit-disciplinary needs, and would tolerate 3 hours of extensive therapy.       Entered by: Jennifer Carrion 06/27/2019 4:32 PM

## 2019-06-28 LAB
BASOPHILS # BLD AUTO: 0 10E9/L (ref 0–0.2)
BASOPHILS NFR BLD AUTO: 0.1 %
DIFFERENTIAL METHOD BLD: ABNORMAL
EOSINOPHIL # BLD AUTO: 0.2 10E9/L (ref 0–0.7)
EOSINOPHIL NFR BLD AUTO: 1.6 %
ERYTHROCYTE [DISTWIDTH] IN BLOOD BY AUTOMATED COUNT: 13.7 % (ref 10–15)
FIBRINOGEN PPP-MCNC: 263 MG/DL (ref 200–420)
GLUCOSE BLDC GLUCOMTR-MCNC: 108 MG/DL (ref 70–99)
GLUCOSE BLDC GLUCOMTR-MCNC: 75 MG/DL (ref 70–99)
GLUCOSE BLDC GLUCOMTR-MCNC: 88 MG/DL (ref 70–99)
GLUCOSE BLDC GLUCOMTR-MCNC: 97 MG/DL (ref 70–99)
GLUCOSE BLDC GLUCOMTR-MCNC: 99 MG/DL (ref 70–99)
HCT VFR BLD AUTO: 42.4 % (ref 35–47)
HGB BLD-MCNC: 13.5 G/DL (ref 11.7–15.7)
IMM GRANULOCYTES # BLD: 0.1 10E9/L (ref 0–0.4)
IMM GRANULOCYTES NFR BLD: 0.5 %
INR PPP: 1.08 (ref 0.86–1.14)
LYMPHOCYTES # BLD AUTO: 1.6 10E9/L (ref 0.8–5.3)
LYMPHOCYTES NFR BLD AUTO: 16.6 %
MCH RBC QN AUTO: 32.5 PG (ref 26.5–33)
MCHC RBC AUTO-ENTMCNC: 31.8 G/DL (ref 31.5–36.5)
MCV RBC AUTO: 102 FL (ref 78–100)
MONOCYTES # BLD AUTO: 0.6 10E9/L (ref 0–1.3)
MONOCYTES NFR BLD AUTO: 6.7 %
NEUTROPHILS # BLD AUTO: 7 10E9/L (ref 1.6–8.3)
NEUTROPHILS NFR BLD AUTO: 74.5 %
NRBC # BLD AUTO: 0 10*3/UL
NRBC BLD AUTO-RTO: 0 /100
PLATELET # BLD AUTO: 151 10E9/L (ref 150–450)
RBC # BLD AUTO: 4.16 10E12/L (ref 3.8–5.2)
WBC # BLD AUTO: 9.4 10E9/L (ref 4–11)

## 2019-06-28 PROCEDURE — 25000128 H RX IP 250 OP 636: Performed by: STUDENT IN AN ORGANIZED HEALTH CARE EDUCATION/TRAINING PROGRAM

## 2019-06-28 PROCEDURE — 25000125 ZZHC RX 250: Performed by: STUDENT IN AN ORGANIZED HEALTH CARE EDUCATION/TRAINING PROGRAM

## 2019-06-28 PROCEDURE — 12000001 ZZH R&B MED SURG/OB UMMC

## 2019-06-28 PROCEDURE — 36514 APHERESIS PLASMA: CPT

## 2019-06-28 PROCEDURE — 25000132 ZZH RX MED GY IP 250 OP 250 PS 637: Mod: GY | Performed by: INTERNAL MEDICINE

## 2019-06-28 PROCEDURE — 25000131 ZZH RX MED GY IP 250 OP 636 PS 637: Mod: GY | Performed by: STUDENT IN AN ORGANIZED HEALTH CARE EDUCATION/TRAINING PROGRAM

## 2019-06-28 PROCEDURE — 25000132 ZZH RX MED GY IP 250 OP 250 PS 637: Mod: GY | Performed by: STUDENT IN AN ORGANIZED HEALTH CARE EDUCATION/TRAINING PROGRAM

## 2019-06-28 PROCEDURE — 00000146 ZZHCL STATISTIC GLUCOSE BY METER IP

## 2019-06-28 PROCEDURE — P9041 ALBUMIN (HUMAN),5%, 50ML: HCPCS | Performed by: STUDENT IN AN ORGANIZED HEALTH CARE EDUCATION/TRAINING PROGRAM

## 2019-06-28 PROCEDURE — 85384 FIBRINOGEN ACTIVITY: CPT | Performed by: STUDENT IN AN ORGANIZED HEALTH CARE EDUCATION/TRAINING PROGRAM

## 2019-06-28 PROCEDURE — 85025 COMPLETE CBC W/AUTO DIFF WBC: CPT | Performed by: STUDENT IN AN ORGANIZED HEALTH CARE EDUCATION/TRAINING PROGRAM

## 2019-06-28 PROCEDURE — 85610 PROTHROMBIN TIME: CPT | Performed by: STUDENT IN AN ORGANIZED HEALTH CARE EDUCATION/TRAINING PROGRAM

## 2019-06-28 RX ORDER — DIPHENHYDRAMINE HYDROCHLORIDE 50 MG/ML
50 INJECTION INTRAMUSCULAR; INTRAVENOUS
Status: CANCELLED | OUTPATIENT
Start: 2019-06-28

## 2019-06-28 RX ORDER — HEPARIN SODIUM (PORCINE) LOCK FLUSH IV SOLN 100 UNIT/ML 100 UNIT/ML
3 SOLUTION INTRAVENOUS ONCE
Status: COMPLETED | OUTPATIENT
Start: 2019-06-28 | End: 2019-06-28

## 2019-06-28 RX ORDER — ALBUMIN HUMAN 25 %
3000 INTRAVENOUS SOLUTION INTRAVENOUS
Status: COMPLETED | OUTPATIENT
Start: 2019-06-28 | End: 2019-06-28

## 2019-06-28 RX ORDER — GABAPENTIN 300 MG/1
300 CAPSULE ORAL 3 TIMES DAILY
Status: DISCONTINUED | OUTPATIENT
Start: 2019-06-28 | End: 2019-06-29

## 2019-06-28 RX ORDER — CALCIUM GLUCONATE 100 MG/ML
AMPUL (ML) INTRAVENOUS
Status: COMPLETED | OUTPATIENT
Start: 2019-06-28 | End: 2019-06-28

## 2019-06-28 RX ADMIN — PREDNISONE 5 MG: 5 TABLET ORAL at 07:51

## 2019-06-28 RX ADMIN — Medication 3 ML: at 11:00

## 2019-06-28 RX ADMIN — FUROSEMIDE 20 MG: 20 TABLET ORAL at 13:39

## 2019-06-28 RX ADMIN — CALCIUM GLUCONATE 3 G: 98 INJECTION, SOLUTION INTRAVENOUS at 09:10

## 2019-06-28 RX ADMIN — ENOXAPARIN SODIUM 40 MG: 40 INJECTION SUBCUTANEOUS at 20:06

## 2019-06-28 RX ADMIN — Medication 10 MG: at 22:56

## 2019-06-28 RX ADMIN — ALBUMIN HUMAN 3000 ML: 0.05 INJECTION, SOLUTION INTRAVENOUS at 09:10

## 2019-06-28 RX ADMIN — FUROSEMIDE 20 MG: 20 TABLET ORAL at 20:06

## 2019-06-28 RX ADMIN — TRAZODONE HYDROCHLORIDE 100 MG: 100 TABLET ORAL at 22:56

## 2019-06-28 RX ADMIN — CLOPIDOGREL BISULFATE 75 MG: 75 TABLET, FILM COATED ORAL at 07:50

## 2019-06-28 RX ADMIN — SIMVASTATIN 20 MG: 20 TABLET, FILM COATED ORAL at 22:56

## 2019-06-28 RX ADMIN — FUROSEMIDE 20 MG: 20 TABLET ORAL at 07:50

## 2019-06-28 RX ADMIN — Medication 1 TABLET: at 07:49

## 2019-06-28 RX ADMIN — RANITIDINE 150 MG: 150 TABLET ORAL at 07:49

## 2019-06-28 RX ADMIN — RANITIDINE 150 MG: 150 TABLET ORAL at 20:06

## 2019-06-28 RX ADMIN — FLUOXETINE 20 MG: 20 CAPSULE ORAL at 07:51

## 2019-06-28 RX ADMIN — METOPROLOL TARTRATE 50 MG: 50 TABLET ORAL at 07:50

## 2019-06-28 RX ADMIN — GABAPENTIN 300 MG: 300 CAPSULE ORAL at 16:48

## 2019-06-28 RX ADMIN — ALBUTEROL SULFATE 2 PUFF: 90 AEROSOL, METERED RESPIRATORY (INHALATION) at 08:05

## 2019-06-28 RX ADMIN — ASPIRIN 325 MG: 325 TABLET, DELAYED RELEASE ORAL at 20:06

## 2019-06-28 RX ADMIN — Medication 1 TABLET: at 20:06

## 2019-06-28 RX ADMIN — GABAPENTIN 300 MG: 300 CAPSULE ORAL at 20:06

## 2019-06-28 RX ADMIN — METOPROLOL TARTRATE 50 MG: 50 TABLET ORAL at 20:06

## 2019-06-28 RX ADMIN — UMECLIDINIUM 1 PUFF: 62.5 AEROSOL, POWDER ORAL at 13:40

## 2019-06-28 RX ADMIN — ANTICOAGULANT CITRATE DEXTROSE SOLUTION FORMULA A 697 ML: 12.25; 11; 3.65 SOLUTION INTRAVENOUS at 09:10

## 2019-06-28 RX ADMIN — AMLODIPINE BESYLATE 10 MG: 10 TABLET ORAL at 20:06

## 2019-06-28 ASSESSMENT — ACTIVITIES OF DAILY LIVING (ADL)
ADLS_ACUITY_SCORE: 15
ADLS_ACUITY_SCORE: 16
ADLS_ACUITY_SCORE: 15
ADLS_ACUITY_SCORE: 16
ADLS_ACUITY_SCORE: 15
ADLS_ACUITY_SCORE: 14

## 2019-06-28 ASSESSMENT — VISUAL ACUITY: OU: BLURRED VISION

## 2019-06-28 NOTE — CONSULTS
PM&R Consultation.    Patient seen and evaluated. Full report to follow.    Briefly, Ladonna Sheriff is a 74 year old female  with h/o stroke, TIA, discoid lupus, Sjogren's syndrome, temporal arteritis, on prednisone, optic neuritis with impairment of the R eye, hypertension, hypercholesterolemia, CHF, & recently diagnosed asthma and/or COPD.      She presented  with 2.5 weeks of  progressive ascending numbness up to around T7/T8 and R foot weakness      Differential Dx at this juncture is felt by neuro to still be broad includin. Neuromyelitis optica spectrum disorders/ anti-myelin oligodendrocyte glycoprotein antibodies (NMOSD/MOG spectrum).  2. Transverese Myelitis  1. MRI of Spinal cord:impression was most likely transverse myelitis,  2. H/o discoid subtype of lupus, but lupus/Sjogren's-associated TM is rare. LP performed  with many studies still pending.    3.  Also Vasculitis, sarcoidosis, MS, infection, neoplasm or possibly related to her prior autoimmune disease.     While Neurology continues her work-up  Lab Medicine and Pathololgy was consulted to begin  PLEX's to slow progression(subjective RLE progression of numbness).     On examination the patient does have a transverse myelitis.      OT reports:  Patient plan for discharge: rehab  Current status: Pt CGA ambulate into bathroom with walker to complete standing ADLs. Pt requiring extra time for rest, balance and advancement of RLE.   Barriers to return to prior living situation: medical status, balance, ADL I, mobility  Recommendations for discharge: ARU  Rationale for recommendations: Pt motivated and has good tolerance for 3hrs of therapy. Pt below baseline for ADL I, mobility, and transfers     PT reports:  Barriers to return to prior living situation: medical status, current mob status, level of A/supervision, balance, weakness  Recommendations for discharge: ARU  Rationale for recommendations: Pt would be a great candidate for an ARU as she is  highly motivated, has mulit-disciplinary needs, and would tolerate 3 hours of extensive therapy.    Assessment/Recommendation:   Patient has sigificant deficits in mobility and ADLs due to a transverse myelitis. The cause for this is unknown - see differential diagnoses above.    The patieint is an excellent candidate fore for Acute Rehab, wherever she choses to go for rehabilitation. She wishes to have inpatient rehab close to her children. Her preferences are: [facility] in [facility], [facility] and the [facility] in that order.    SYDNEY Ly MD PhD

## 2019-06-28 NOTE — PROCEDURES
Laboratory Medicine and Pathology  Transfusion Medicine - Apheresis Procedure Note    Ladonna Sheriff MRN# [id number]   YOB: 1945 Age: 74 year old   Date of Procedure: 2019    Procedure:PLEX  Reason for Procedure:Possible Autoimmune Neuropathy         Assessment and Plan:   Ladonna Sheriff is a 74 year old female  with h/o stroke, TIA, discoid lupus, Sjogren's syndrome, temporal arteritis, on prednisone, optic neuritis with impairment of the R eye, hypertension, hypercholesterolemia,CHF , & recently diagnosed asthma and/or COPD.     She presented  with 2.5 weeks of  progressive ascending numbness up to around T7/T8 and R foot weakness     Differential Dx at this juncture is felt by neuro to still be broad includin. Neuromyelitis optica spectrum disorders/ anti-myelin oligodendrocyte glycoprotein antibodies (NMOSD/MOG spectrum).  2. Transverese Myelitis  1. MRI of Spinal cord:impression was most likely transverse myelitis,  2. H/o discoid subtype of lupus, but lupus/Sjogren's-associated TM is rare. LP performed  with many studies still pending.    3.  Also Vasculitis, sarcoidosis, MS, infection, neoplasm or possibly related to her prior autoimmune disease.   While they continue her work-up  we were consulted to begin  PLEX's to slow progression(subjective RLE progression of numbness).     She had her 4th procedure ( of scheduled 5 every other day) today and tolerated it well. Next one scheduled for  (2019).    Please do not start or continue ace-inhibitors throughout the duration of a therapeutic plasma exchange series. Please notify the apheresis physician of any upcoming procedures, surgeries, or biopsies as therapeutic plasma exchange will affect coagulation factors.           Past Medical History:     Past Medical History:   Diagnosis Date     Cerebral infarction (H)      CHF (congestive heart failure) (H)      Hypertension      Lupus (H)      Lupus (H)       Optic neuritis      Optic neuritis      Sjogren's syndrome (H)      Sjogren's syndrome (H)      Temporal arteritis (H)      TIA (transient ischemic attack)      Uncomplicated asthma              Past Surgical History:     Past Surgical History:   Procedure Laterality Date     CHOLECYSTECTOMY       GYN SURGERY      hysterectomy     GYN SURGERY      tubal ligation     IR CVC NON TUNNEL PLACEMENT  6/21/2019              Social History:     Social History     Tobacco Use     Smoking status: Former Smoker   Substance Use Topics     Alcohol use: Yes     Alcohol/week: 4.2 oz     Types: 7 Shots of liquor per week            Allergies:     Allergies   Allergen Reactions     Prevacid [Lansoprazole] Rash             Medications:     Current Facility-Administered Medications   Medication     acetaminophen (TYLENOL) tablet 650 mg    Or     acetaminophen (TYLENOL) solution 650 mg     acetaminophen (TYLENOL) Suppository 650 mg     albuterol (PROAIR HFA/PROVENTIL HFA/VENTOLIN HFA) 108 (90 Base) MCG/ACT inhaler 2 puff     amLODIPine (NORVASC) tablet 10 mg     aspirin (ASA) EC tablet 325 mg     calcium carbonate 600 mg-vitamin D 400 units (CALTRATE) per tablet 1 tablet     clopidogrel (PLAVIX) tablet 75 mg     glucose gel 15-30 g    Or     dextrose 50 % injection 25-50 mL     enoxaparin (LOVENOX) injection 40 mg     FLUoxetine (PROzac) capsule 20 mg     furosemide (LASIX) tablet 20 mg     gabapentin (NEURONTIN) capsule 300 mg     glucagon injection 1 mg     heparin 100 UNIT/ML injection 3 mL     heparin 100 UNIT/ML injection 3 mL     insulin aspart (NovoLOG) inj (RAPID ACTING)     insulin aspart (NovoLOG) inj (RAPID ACTING)     magnesium sulfate 2 g in NS intermittent infusion (PharMEDium or FV Cmpd)     magnesium sulfate 4 g in 100 mL sterile water (premade)     melatonin tablet 10 mg     metoprolol tartrate (LOPRESSOR) tablet 50 mg     naloxone (NARCAN) injection 0.1-0.4 mg     ondansetron (ZOFRAN-ODT) ODT tab 4 mg    Or      ondansetron (ZOFRAN) injection 4 mg     polyethylene glycol (MIRALAX/GLYCOLAX) Packet 17 g     potassium chloride (KLOR-CON) Packet 20-40 mEq     potassium chloride 10 mEq in 100 mL intermittent infusion with 10 mg lidocaine     potassium chloride 10 mEq in 100 mL sterile water intermittent infusion (premix)     potassium chloride 20 mEq in 50 mL intermittent infusion     potassium chloride ER (K-DUR/KLOR-CON M) CR tablet 20-40 mEq     predniSONE (DELTASONE) tablet 5 mg     ranitidine (ZANTAC) tablet 150 mg     senna-docusate (SENOKOT-S/PERICOLACE) 8.6-50 MG per tablet 1 tablet    Or     senna-docusate (SENOKOT-S/PERICOLACE) 8.6-50 MG per tablet 2 tablet     simvastatin (ZOCOR) tablet 20 mg     sodium chloride (PF) 0.9% PF flush 10 mL     sodium chloride (PF) 0.9% PF flush 10 mL     traZODone (DESYREL) tablet 100 mg     umeclidinium (INCRUSE ELLIPTA) 62.5 MCG/INH inhaler 1 puff                Abbreviated Physical Exam:   BP 99/57   Pulse 56   Temp 97  F (36.1  C) (Oral)   Resp 18   Wt 99 kg (218 lb 4.1 oz)   SpO2 96%   BMI 34.18 kg/m    Patient Alert & Oriented and in No Acute Distress         Laboratory Data:     INR  Recent Labs   Lab 06/28/19  0915 06/26/19  1310 06/24/19  0918 06/22/19  0952   INR 1.08 1.18* 1.18* 1.13       Fibrinogen   Date Value Ref Range Status   06/28/2019 263 200 - 420 mg/dL Final       Results for orders placed or performed during the hospital encounter of 06/20/19 (from the past 24 hour(s))   Glucose by meter   Result Value Ref Range    Glucose 111 (H) 70 - 99 mg/dL   Glucose by meter   Result Value Ref Range    Glucose 104 (H) 70 - 99 mg/dL   Lactic acid whole blood   Result Value Ref Range    Lactic Acid 0.9 0.7 - 2.0 mmol/L   Glucose by meter   Result Value Ref Range    Glucose 88 70 - 99 mg/dL   Glucose by meter   Result Value Ref Range    Glucose 97 70 - 99 mg/dL   CBC with platelets differential   Result Value Ref Range    WBC 9.4 4.0 - 11.0 10e9/L    RBC Count 4.16 3.8 - 5.2  10e12/L    Hemoglobin 13.5 11.7 - 15.7 g/dL    Hematocrit 42.4 35.0 - 47.0 %     (H) 78 - 100 fl    MCH 32.5 26.5 - 33.0 pg    MCHC 31.8 31.5 - 36.5 g/dL    RDW 13.7 10.0 - 15.0 %    Platelet Count 151 150 - 450 10e9/L    Diff Method Automated Method     % Neutrophils 74.5 %    % Lymphocytes 16.6 %    % Monocytes 6.7 %    % Eosinophils 1.6 %    % Basophils 0.1 %    % Immature Granulocytes 0.5 %    Nucleated RBCs 0 0 /100    Absolute Neutrophil 7.0 1.6 - 8.3 10e9/L    Absolute Lymphocytes 1.6 0.8 - 5.3 10e9/L    Absolute Monocytes 0.6 0.0 - 1.3 10e9/L    Absolute Eosinophils 0.2 0.0 - 0.7 10e9/L    Absolute Basophils 0.0 0.0 - 0.2 10e9/L    Abs Immature Granulocytes 0.1 0 - 0.4 10e9/L    Absolute Nucleated RBC 0.0    INR   Result Value Ref Range    INR 1.08 0.86 - 1.14   Fibrinogen activity   Result Value Ref Range    Fibrinogen 263 200 - 420 mg/dL            Procedure Summary:   A single volume plasma exchange was performed with  Albumin.The Non-Tunneled RIJ CVC was used for access. ACD-A was for anticoagulation. To offset the effects of the citrate, calcium gluconate was given in the return line. The patient's vital signs were stable throughout. The patient tolerated the procedure well.    Attestation: The patient was directly seen and evaluated by Kymberly shah M.D..    Kymberly Gordon M.D.    Transfusion Medicine  Laboratory Medicine & Pathology  Pager: [phone]

## 2019-06-28 NOTE — PLAN OF CARE
Status: Admitted for progressive BLE weakness, possibly transverse myelitis  VS: VSS on RA  Neuros: A&Ox4. RLE 4/5 w/absent proprioception, otherwise 5/5 TO. 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  GI: Tolerating regular diet.   : Voiding w/out difficultyft  IV: PIV SL. R apheresis cather Hep Locked  Activity: Ax2 w/gb and walker; walking to BR.  Skin: Protective mepilex on coccyx  Plan of care: Next PLEX treatment scheduled for tomorrow (6/28).

## 2019-06-28 NOTE — PROGRESS NOTES
Social Work Services Progress Note    Hospital Day: 9  Date of Initial Social Work Evaluation:  6/21/19  Collaborated with:  ARU Admissions Coordinator's    Data:  KOBY is following for discharge planning.  Discharge is anticipated on Monday.    Intervention:  KOBY received a call from  Admissions at [facility] (Milly).  Milly states that a clarification of pt's diagnosis would be helpful as it impacts the treatment plan and prognosis.  Milly states that they will re-eval on Monday to ensure that pt remains ARU appropriate.  Milly has requested a PMR eval.  KOBY received a call from Admissions (Ailyn) at [facility] also requesting a PMR eval.  KOBY text Dr. Spangler and requested PMR eval orders.     Assessment:  Pt is agreeable to rehab placement    Plan:    Anticipated Disposition:  Acute rehab placement    Barriers to d/c plan:  Pt is receiving plex treatments through 6/30/19    Follow Up:  KOBY will continue to follow.    CAMELIA Marcial  Social Work, 6A  Phone:  [phone]  Pager:  [phone]  6/28/2019

## 2019-06-28 NOTE — PROGRESS NOTES
[facility], [place]   Neurology Daily Note  6/28/2019    Summary: Ladonna Sheriff is a 75 yo female with PMH notable for previous stroke/TIA, discoid lupus, Sjogren's syndrome, temporal arteritis, optic neuritis with vision impairment in right eye, chronic daily prednisone (5 mg daily), CHF, recently diagnosed obstructive lung disease who presented 6/19 with 2-3 weeks of progressive numbness at mid-chest region and below.     Subjective: Patient feels slightly better today. Numbness over trunk is becoming resensitized. Continues to work with PT. Is hopeful that she'll continue to improve.     Objective:    Vitals: BP 98/60   Pulse 60   Temp 97.6  F (36.4  C) (Oral)   Resp 18   Wt 99 kg (218 lb 4.1 oz)   SpO2 96%   BMI 34.18 kg/m    General: adult female patient, lying in bed, NAD  HEENT: NC/AT, no icterus  Chest: breathing nonlabored on RA  Extremities: warm, no edema noted  Skin: no rash or lesions noted   Psych: mood pleasant, affect congruent  Neuro:   Mental status: awake, alert, attentive   Language: fluent and coherent  Cranial nerves: vision absent R eye, peripheral visual field defect L eye, disconjugate gaze (R eye abducted at baseline) with EOM otherwise intact, facial sensation intact, face symmetric, no dysarthria  Motor: normal tone, no abnormal movements, strength 5/5 throughout LLE, 4+/5 in RLE flexors, 4+/5 in RLE extensors.   Reflexes: 2+ and symmetric biceps/patella, 1+ and symmetric achilles, no clonus, toes mute bilaterally.  Sensory: intact to light touch, vibratory sense present but diminished in distal BLE. Loss of proprioception in right great toe.  Coordination: FNF without ataxia or dysmetria, rapid alternating movements intact  Gait: deferred    Pertinent Investigations:      MRI Cervical Spine 6/19/19  Impression:    1. No spinal cord abnormality to indicate transverse myelitis.  2. Degenerative changes.     MRI Thoracic Spine  19                                  Impression:    Focal gadolinium enhancing lesion from T4 through T5 in the spinal cord on the right side with edema extending from T3-T4 down to lower T5. This is most likely transverse myelitis, with differential diagnosis including a spinal cord astrocytoma.     MRI Brain w/o and w/ contrast 19           Impression:   The study demonstrates greater than 20 foci of T2-hyperintensity within the cerebral white matter which is suspicious for demyelination. Enhancing focus within the left parieto-occipital region is suspicious for active demyelination in the absence of known malignancy. No less likely, the differential for white matter lesions includes sequela of infectious or inflammatory insults, and vasculopathy.    LABS  SUSAN Ab IgG by IFA w/ Reflex: negative  C3: 110  C4: 16  ESR: 17 (0-30)  Angiotensin converting enzyme: 24 (9-67)  DsDNA: negative  LINWOOD panel: RNP negative    Macias 0.4 (0-0.9)    SSA >8.0 (0-0.9)    SSB >8.0 (0-0.9)    Scleroderma Ab Scl-70 1.0 (0-0.9)  B12: 396  MMA: negative  Vit D: 40  Lupus anticoagulant panel: negative  Anticardiolipin Ab: negative  Beta 2 glycoprotein Abs: negative  Quantiferon Gold: negative  HB surface Ag: negative  HB core Ab: negative    19 CSF  Gram stain: no organisms or WBCs seen  Culture aerobic: NGTD  Enterovirus PCR: negative  VZV: negative  WNV: negative  RBC: 296  WBC: 0   Glucose: 72  Protein: 63  Oligoclonal bandin oligoclonal bands, 15.3 oligoclonal IgG   [place] send-out, CNS demyelinating disease evaluation: pending    Assessment:   73yo female with PMH notable for previous stroke/TIA, discoid lupus, Sjogren's syndrome, temporal arteritis, optic neuritis with vision impairment in right eye, chronic daily prednisone (5 mg daily), CHF, recently diagnosed obstructive lung disease who presented  with 2-3 weeks of progressive numbness at mid-chest region and below. Cervical MRI  unremarkable, but  thoracic MRI with T4-T5 enhancing lesion w/ surrounding edema and head MRI with multiple T2 hyperintensities and one small enhancing focus. CSF obtained 6/21 demonstrated elevated protein, glucose, and RBCs, but absent WBCs. CSF infectious workup negative thus far. Oligoclonal bands positive. SSA/SSB/Scl-70 positive, other AI labs negative or pending. Demyelinating panel pending. History and workup thus far are concerning for a demyelinating disorder. Differential is still broad including autoimmune, neoplasm, infection, vasculitis. Given patient's significant AI history, highest suspicion is for autoimmune inflammatory process (including NMOSD/MOG spectrum). Will continue with PLEX therapy at this time and assess for response. If no response with PLEX and demyelinating panel comes back normal, could consider a more extensive neoplastic workup at that time but low suspicion given time course of symptoms and ~25 yr hx of intermittent optic neuritis.    Plan:     ACUTE PROBLEMS    Progressive bilateral weakness/numbness  Hx of discoid lupus, Sjogren's, temporal arteritis, optic neuritis  - Continue with PLEX x5 runs (6/22, 6/24, 6/26, 6/28, 6/30)  - completed 5-day course of high-dose methylprednisolone on 6/25  - AQP4 and MOG antibodies ([place] send-out labs) results expected 7/1 or 7/2  - Continue with PT/OT  - Rheum following  - gabapentin 300 mg PO TID    E coli UTI, asymptomatic  - Completed 5-day course of nitrofurantoin on 6/26    CHRONIC PROBLEMS    Obstructive lung disease    - Continue PTA Incruse Ellipta inhaler  - Albuterol nebs q4h prn     CHF    Previous echocardiogram showed ejection fraction of 45% and stage II diastolic congestive heart failure. She does not appear to have decompensated heart failure at this time. In the setting of progressive shortness of breath would have low threshold to work this up with echocardiogram.    Hx of discoid lupus, Sjogren's syndrome, temporal arteritis, optic  neuritis  - completed 5-day course of methylprednisolone 1000mg  - resume PTA prednisone 5 mg daily    Hx of stroke/TIA  - Continue PTA ASA and clopidogrel    Hypertension   - Continue PTA metoprolol, amlodipine, lasix  - Daily weights      Hypercholesterolemia  - Continue PTA statin    Activity: up with assist  Diet: regular adult diet   DVT ppx: Lovenox  Bowel ppx: ranitidine, senna prn  Lines: PIV, non-tunneled RIJ CVC  Code: full  Dispo: ARU early next week      Patient seen and discussed with attending Dr. Spangler.    Alee Ndiaye MD  Neurology PGY2  Pager: [phone]

## 2019-06-28 NOTE — PLAN OF CARE
Shift: 0700 - 1530  VS: Temp: 97  F (36.1  C) Temp src: Oral BP: 99/57(therapeutic plasma exchange complete at the bedside) Pulse: 56 Heart Rate: 66 Resp: 18 SpO2: 96 % O2 Device: None (Room air)    Pain: Denies pain.   Neuro: A&Ox4. Blind in right eye and blurry vision in left eye with field cut. N&T from below breasts to toes.   Cardiac:   Soft BP's OVSS.   Respiratory: Lung sounds clear on RA.   GI/Diet/Appetite: Regular diet with good appetite. LBM 6/27.   :  Voiding w/o difficulty.  LDA's: PIV to LUE and CVC to right internal jugular. CVC is for apheresis, heparin locked. PIV is SL.  Skin: Barrier cream to buttock.   Activity: Ax2 w/GB & Walker.   Tests/Procedures: Apheresis completed this AM.   Pertinent Labs/Lab Collection:      Plan: Continue w/POC.

## 2019-06-28 NOTE — PROGRESS NOTES
Rehab Admissions:  I met with Ladonna today in her room to discuss [facility] program, therapy requirements for that level of care and average length of stay (10-12 days). Ladonna discussed her prior level of functioning. She does not use a device in the home, but uses a cane outside the home as her vision is poor. She has two flights of stairs to get up to her apartment which is normally not difficult for her. Her son lives on the 3rd floor of her apartment and would be able to assist as needed, and also his partner would be able to provide support as needed for her as well.      Thank you for the referral, we will continue to follow this patient for post acute placement.     Determination of admission is based upon the patient's need for an intensive, interdisciplinary approach to rehabilitation, their ability to progress, their ability to tolerate intensive therapies, their need for daily physician supervision, their need for twenty four hour nursing assistance, and their ability and willingness to participate in such a program.    Sofía Spaulding CM  Rehab Liaison/  [facility]  6/28/2019    12:18 PM

## 2019-06-28 NOTE — PLAN OF CARE
Admitted for progressive BLE weakness, possibly transverse myelitis. VSS on RA ex/ slightly hypotensive, MD aware. A&Ox4. 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 eye blurry vision baseline, RLE 4/5 all other extremities 5/5, N/T from below breast to toes (slightly improved per pt.). Tolerating regular diet. Voids spontaneously without difficulty. No BM this shift. Up A2/GB/Walker. R. DL IJ, HL. L. PIV, SL. Had no c/o pain. Preventitive mepilex on coccyx. Plan for PLEX 4/5 today and ARU early next week. Continue to monitor and follow POC.

## 2019-06-28 NOTE — PROGRESS NOTES
Social Work Services Progress Note    Hospital Day: 9  Date of Initial Social Work Evaluation:  6/21/19  Collaborated with:  ARU Admissions Coordinators    Data:  SW is following for discharge planning.   OT/Physical Therapy are recommending ARU placement.    Intervention:  KOBY phoned Admissions at [facility] ([place]) to confirm receipt of referral faxed on 6/27/19.  [place] states that she did not receive the faxed referral. Thus, KOBY re-faxed the referral this am.  KOBY phoned Admissions at [facility] ([place]). [place] confirms receipt of 6/27/19 faxed referral. [place] requested 6/27/19 OT and Physical Therapy progress notes. KOBY faxed this requested information.    Assessment:  Pt is agreeable to rehab placement.    Plan:    Anticipated Disposition:  Acute rehab placement as recommended by OT/Physical Therapy    Barriers to d/c plan:  Pt receiving plex treatments until 6/30/19.    Follow Up:  KOBY will continue to follow.    CAMELIA Marcial  Social Work, 6A  Phone:  [phone]  Pager:  [phone]  6/28/2019

## 2019-06-28 NOTE — PROGRESS NOTES
Shift: 0700 - 1530  VS: Temp: 97.4  F Temp src: Oral BP: 124/58 Pulse: 56 Heart Rate: 60 Resp: 16 SpO2: 97 % O2 Device: None (Room air)     Pain: Denies pain.   Neuro: A&Ox4. Blind in right eye and blurry vision in left eye. Baseline for pt. N&T from below breasts to toes although this is improving with time. RLE 4/5. All others are 5/5.   Cardiac: Strong heart sounds. [place] detected - pt has had this her whole life.  Respiratory: Lung sounds clear on RA.   GI/Diet/Appetite: Regular diet with good appetite. LBM 6/27.   :  Voiding w/o difficulty. Voided once during shift.  IV: PIV SL. R apheresis catheter hep locked.   Skin: Barrier cream to buttock. Protective mepilex on coccyx.   Activity: Ax2 w/GB & walker. Wanted to sit in chair after using the restroom.  Tests/Procedures: Apheresis completed this AM. Pt tolerated it well and took nap after.       Plan: Continue w/ POC. Final PLEX treatment scheduled for 6/30.

## 2019-06-29 LAB
CREAT SERPL-MCNC: 0.85 MG/DL (ref 0.52–1.04)
GFR SERPL CREATININE-BSD FRML MDRD: 68 ML/MIN/{1.73_M2}
GLUCOSE BLDC GLUCOMTR-MCNC: 105 MG/DL (ref 70–99)
GLUCOSE BLDC GLUCOMTR-MCNC: 124 MG/DL (ref 70–99)
GLUCOSE BLDC GLUCOMTR-MCNC: 127 MG/DL (ref 70–99)
GLUCOSE BLDC GLUCOMTR-MCNC: 96 MG/DL (ref 70–99)
GLUCOSE BLDC GLUCOMTR-MCNC: 98 MG/DL (ref 70–99)

## 2019-06-29 PROCEDURE — 25000128 H RX IP 250 OP 636: Performed by: PHYSICIAN ASSISTANT

## 2019-06-29 PROCEDURE — 25000132 ZZH RX MED GY IP 250 OP 250 PS 637: Mod: GY | Performed by: INTERNAL MEDICINE

## 2019-06-29 PROCEDURE — 00000146 ZZHCL STATISTIC GLUCOSE BY METER IP

## 2019-06-29 PROCEDURE — 36415 COLL VENOUS BLD VENIPUNCTURE: CPT | Performed by: PSYCHIATRY & NEUROLOGY

## 2019-06-29 PROCEDURE — 82565 ASSAY OF CREATININE: CPT | Performed by: PSYCHIATRY & NEUROLOGY

## 2019-06-29 PROCEDURE — 25000132 ZZH RX MED GY IP 250 OP 250 PS 637: Mod: GY | Performed by: PSYCHIATRY & NEUROLOGY

## 2019-06-29 PROCEDURE — 25000128 H RX IP 250 OP 636: Performed by: STUDENT IN AN ORGANIZED HEALTH CARE EDUCATION/TRAINING PROGRAM

## 2019-06-29 PROCEDURE — 25000131 ZZH RX MED GY IP 250 OP 636 PS 637: Mod: GY | Performed by: STUDENT IN AN ORGANIZED HEALTH CARE EDUCATION/TRAINING PROGRAM

## 2019-06-29 PROCEDURE — 25000132 ZZH RX MED GY IP 250 OP 250 PS 637: Mod: GY | Performed by: STUDENT IN AN ORGANIZED HEALTH CARE EDUCATION/TRAINING PROGRAM

## 2019-06-29 PROCEDURE — 85049 AUTOMATED PLATELET COUNT: CPT | Performed by: PSYCHIATRY & NEUROLOGY

## 2019-06-29 PROCEDURE — 12000001 ZZH R&B MED SURG/OB UMMC

## 2019-06-29 RX ORDER — GABAPENTIN 300 MG/1
300 CAPSULE ORAL 2 TIMES DAILY
Status: DISCONTINUED | OUTPATIENT
Start: 2019-06-29 | End: 2019-07-06 | Stop reason: HOSPADM

## 2019-06-29 RX ORDER — GABAPENTIN 300 MG/1
600 CAPSULE ORAL AT BEDTIME
Status: DISCONTINUED | OUTPATIENT
Start: 2019-06-29 | End: 2019-07-06 | Stop reason: HOSPADM

## 2019-06-29 RX ADMIN — GABAPENTIN 300 MG: 300 CAPSULE ORAL at 14:25

## 2019-06-29 RX ADMIN — Medication 3 ML: at 17:12

## 2019-06-29 RX ADMIN — TRAZODONE HYDROCHLORIDE 100 MG: 100 TABLET ORAL at 22:12

## 2019-06-29 RX ADMIN — SIMVASTATIN 20 MG: 20 TABLET, FILM COATED ORAL at 22:12

## 2019-06-29 RX ADMIN — PREDNISONE 5 MG: 5 TABLET ORAL at 08:04

## 2019-06-29 RX ADMIN — METOPROLOL TARTRATE 50 MG: 50 TABLET ORAL at 08:04

## 2019-06-29 RX ADMIN — Medication 3 ML: at 17:11

## 2019-06-29 RX ADMIN — GABAPENTIN 600 MG: 300 CAPSULE ORAL at 22:12

## 2019-06-29 RX ADMIN — Medication 1 TABLET: at 08:03

## 2019-06-29 RX ADMIN — ENOXAPARIN SODIUM 40 MG: 40 INJECTION SUBCUTANEOUS at 20:47

## 2019-06-29 RX ADMIN — FUROSEMIDE 20 MG: 20 TABLET ORAL at 14:25

## 2019-06-29 RX ADMIN — ASPIRIN 325 MG: 325 TABLET, DELAYED RELEASE ORAL at 20:47

## 2019-06-29 RX ADMIN — RANITIDINE 150 MG: 150 TABLET ORAL at 08:03

## 2019-06-29 RX ADMIN — FUROSEMIDE 20 MG: 20 TABLET ORAL at 17:56

## 2019-06-29 RX ADMIN — FLUOXETINE 20 MG: 20 CAPSULE ORAL at 08:03

## 2019-06-29 RX ADMIN — Medication 1 TABLET: at 17:56

## 2019-06-29 RX ADMIN — CLOPIDOGREL BISULFATE 75 MG: 75 TABLET, FILM COATED ORAL at 08:03

## 2019-06-29 RX ADMIN — FUROSEMIDE 20 MG: 20 TABLET ORAL at 08:04

## 2019-06-29 RX ADMIN — RANITIDINE 150 MG: 150 TABLET ORAL at 20:47

## 2019-06-29 RX ADMIN — AMLODIPINE BESYLATE 10 MG: 10 TABLET ORAL at 20:47

## 2019-06-29 RX ADMIN — Medication 10 MG: at 22:12

## 2019-06-29 RX ADMIN — GABAPENTIN 300 MG: 300 CAPSULE ORAL at 08:03

## 2019-06-29 RX ADMIN — UMECLIDINIUM 1 PUFF: 62.5 AEROSOL, POWDER ORAL at 08:09

## 2019-06-29 RX ADMIN — METOPROLOL TARTRATE 50 MG: 50 TABLET ORAL at 20:47

## 2019-06-29 ASSESSMENT — ACTIVITIES OF DAILY LIVING (ADL)
ADLS_ACUITY_SCORE: 16
ADLS_ACUITY_SCORE: 15
ADLS_ACUITY_SCORE: 16

## 2019-06-29 NOTE — PLAN OF CARE
Status: Admitted for 2-3 weeks of progressive numbness at mid-chest region and below w/ BLE weakness, work up for transverse myelitis.   VS: VSS on RA  Neuros: Oriented x4. RLE 4/5 w/ absent proprioception, otherwise 5/5 TO. 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 Numbness from below breast to toes, sensation improving per pt  GI: Reg, tolerating well.   : Voiding spont.   IV: PIV SL. R apheresis catheter HL   Activity: Up with 1, GB, walker.  R foot weakness & drags if she does not look out when ambulating   Pain: Denies  Skin: Mepilex on coccyx for prevention.   Social: No family overnight.   Plan of care: Last PLEX treatment scheduled for Sunday. Anticipated discharge on Monday to rehab

## 2019-06-29 NOTE — PROGRESS NOTES
[facility], [place]   Neurology Daily Note  6/29/2019    Summary: Ladonna Sheriff is a 73 yo female with PMH notable for previous stroke/TIA, discoid lupus, Sjogren's syndrome, temporal arteritis, optic neuritis with vision impairment in right eye, chronic daily prednisone (5 mg daily), CHF, recently diagnosed obstructive lung disease who presented 6/19 with 2-3 weeks of progressive numbness at mid-chest region and below.     Subjective:     No overnight events. Patient feeling better today and is hopeful about going to rehab and improving. Seen by PMR yesterday, recommending ARU. Plex day 5 planned for tomorrow.    Objective:    Vitals: /52 (BP Location: Right arm)   Pulse 69   Temp 95.2  F (35.1  C) (Oral)   Resp 16   Wt 99 kg (218 lb 4.1 oz)   SpO2 97%   BMI 34.18 kg/m    General: adult female patient, lying in bed, NAD  HEENT: NC/AT, no icterus  Chest: breathing nonlabored on RA  Extremities: warm, no edema noted  Skin: no rash or lesions noted   Psych: mood pleasant, affect congruent  Neuro:   Mental status: awake, alert, attentive   Language: fluent and coherent  Cranial nerves: vision absent R eye, peripheral visual field defect L eye, disconjugate gaze (R eye abducted at baseline) with EOM otherwise intact, facial sensation intact, face symmetric, no dysarthria  Motor: normal tone, no abnormal movements, strength 5/5 throughout LLE, 4+/5 in RLE flexors, 4+/5 in RLE extensors.   Reflexes: 2+ and symmetric biceps/patella, 1+ and symmetric achilles, no clonus, toes mute bilaterally.  Sensory: intact to light touch, vibratory sense present but diminished in distal BLE. Loss of proprioception in right great toe.  Coordination: FNF without ataxia or dysmetria, rapid alternating movements intact  Gait: deferred    Pertinent Investigations:      MRI Cervical Spine 6/19/19  Impression:    1. No spinal cord abnormality to indicate transverse myelitis.  2. Degenerative  changes.     MRI Thoracic Spine 19                                  Impression:    Focal gadolinium enhancing lesion from T4 through T5 in the spinal cord on the right side with edema extending from T3-T4 down to lower T5. This is most likely transverse myelitis, with differential diagnosis including a spinal cord astrocytoma.     MRI Brain w/o and w/ contrast 19           Impression:   The study demonstrates greater than 20 foci of T2-hyperintensity within the cerebral white matter which is suspicious for demyelination. Enhancing focus within the left parieto-occipital region is suspicious for active demyelination in the absence of known malignancy. No less likely, the differential for white matter lesions includes sequela of infectious or inflammatory insults, and vasculopathy.    LABS  SUSAN Ab IgG by IFA w/ Reflex: negative  C3: 110  C4: 16  ESR: 17 (0-30)  Angiotensin converting enzyme: 24 (9-67)  DsDNA: negative  [facility]OD panel: RNP negative    Macias 0.4 (0-0.9)    SSA >8.0 (0-0.9)    SSB >8.0 (0-0.9)    Scleroderma Ab Scl-70 1.0 (0-0.9)  B12: 396  MMA: negative  Vit D: 40  Lupus anticoagulant panel: negative  Anticardiolipin Ab: negative  Beta 2 glycoprotein Abs: negative  Quantiferon Gold: negative  HB surface Ag: negative  HB core Ab: negative    19 CSF  Gram stain: no organisms or WBCs seen  Culture aerobic: NGTD  Enterovirus PCR: negative  VZV: negative  WNV: negative  RBC: 296  WBC: 0   Glucose: 72  Protein: 63  Oligoclonal bandin oligoclonal bands, 15.3 oligoclonal IgG   [place] send-out, CNS demyelinating disease evaluation: pending    Assessment:   73yo female with PMH notable for previous stroke/TIA, discoid lupus, Sjogren's syndrome, temporal arteritis, optic neuritis with vision impairment in right eye, chronic daily prednisone (5 mg daily), CHF, recently diagnosed obstructive lung disease who presented  with 2-3 weeks of progressive numbness at mid-chest region and below. Cervical  MRI 6/19 unremarkable, but thoracic MRI with T4-T5 enhancing lesion w/ surrounding edema and head MRI with multiple T2 hyperintensities and one small enhancing focus. CSF obtained 6/21 demonstrated elevated protein, glucose, and RBCs, but absent WBCs. CSF infectious workup negative thus far. Oligoclonal bands positive. SSA/SSB/Scl-70 positive, other AI labs negative or pending. Demyelinating panel pending. History and workup thus far are concerning for a demyelinating disorder. Differential is still broad including autoimmune, neoplasm, infection, vasculitis. Given patient's significant AI history, highest suspicion is for autoimmune inflammatory process (including NMOSD/MOG spectrum). Will continue with PLEX therapy at this time and assess for response. If no response with PLEX and demyelinating panel comes back normal, could consider a more extensive neoplastic workup at that time but low suspicion given time course of symptoms and ~25 yr hx of intermittent optic neuritis.    Plan:     ACUTE PROBLEMS    Progressive bilateral weakness/numbness  Hx of discoid lupus, Sjogren's, temporal arteritis, optic neuritis  - Continue with PLEX x5 runs (6/22, 6/24, 6/26, 6/28, 6/30)  - completed 5-day course of high-dose methylprednisolone on 6/25  - AQP4 and MOG antibodies ([place] send-out labs) results expected 7/1 or 7/2  - Continue with PT/OT  - Rheum following  - Changed gabapentin to 300 mg PO BID & 600 mg qhs    E coli UTI, asymptomatic  - Completed 5-day course of nitrofurantoin on 6/26    CHRONIC PROBLEMS    Obstructive lung disease    - Continue PTA Incruse Ellipta inhaler  - Albuterol nebs q4h prn     CHF    Previous echocardiogram showed ejection fraction of 45% and stage II diastolic congestive heart failure. She does not appear to have decompensated heart failure at this time. In the setting of progressive shortness of breath would have low threshold to work this up with echocardiogram.    Hx of discoid lupus,  Sjogren's syndrome, temporal arteritis, optic neuritis  - completed 5-day course of methylprednisolone 1000mg  - resume PTA prednisone 5 mg daily    Hx of stroke/TIA  - Continue PTA ASA and clopidogrel    Hypertension   - Continue PTA metoprolol, amlodipine, lasix  - Daily weights      Hypercholesterolemia  - Continue PTA statin    Activity: up with assist  Diet: regular adult diet   DVT ppx: Lovenox  Bowel ppx: ranitidine, senna prn  Lines: PIV, non-tunneled RIJ CVC  Code: full  Dispo: ARU early next week      Patient seen and discussed with attending Dr. Spangler.    Maryjane Damon MD  Neurology PGY2  Pager: [phone]

## 2019-06-29 NOTE — PLAN OF CARE
Alert and oriented x4.  PLEX 4/5 completed today.  BLE strength improving. Tolerating regular diet and oral fluids. Voiding and had bm.  Lasst PLEX Sunday.  Plan ARU.

## 2019-06-29 NOTE — PLAN OF CARE
Shift: 5075-2778    Status: Admitted for 2-3 weeks of progressive numbness at mid-chest region and below w/ BLE weakness. Likely transverse myelitis  VS: VSS on RA  Neuros: A&O x4. RLE 4/5 otherwise 5/5 for other extremities. 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 Numbness from below breast to toes, sensation improving.  GI: Regular diet. Ate 100% breakfast and lunch. Last BM 06/28/19  : Voiding w/o difficulty. Not aware if she voided this shift.  IV: PIV SL. R apheresis catheter has been Hep Locked.   Activity: Up w/ A1, GB, & walker. R foot weakness & drags if she does not look out or focus when ambulating   Pain: Denies  Skin: Protective mepilex on coccyx intact  Social: No family this shift  Plan of care: Last PLEX treatment scheduled for tomorrow 6/30. Anticipated discharge on Monday to rehab

## 2019-06-29 NOTE — PLAN OF CARE
Status: Admitted for 2-3 weeks of progressive numbness at mid-chest region and below w/ BLE weakness.  possibly transverse myelitis  VS: VSS on RA  Neuros: A&O x4. RLE 4/5 w/ absent proprioception, otherwise 5/5 TO. 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 Numbness from below breast to toes, sensation improving  GI: Regular diet. Last BM 06/28/19  : Voiding w/o difficulty  IV: PIV SL. R apheresis catheter has been Hep Locked.   Activity: Up w/ A1, GB, & walker. R foot weakness & drags if she does not look out when ambulating   Pain: Denies  Skin: Protective mepilex on coccyx intact  Social: No family overnight  Plan of care: Last PLEX treatment scheduled for Sunday. Anticipated discharge on Monday to rehab

## 2019-06-30 ENCOUNTER — APPOINTMENT (OUTPATIENT)
Dept: OCCUPATIONAL THERAPY | Facility: CLINIC | Age: 74
DRG: 059 | End: 2019-06-30
Attending: PSYCHIATRY & NEUROLOGY
Payer: MEDICARE

## 2019-06-30 LAB
BASOPHILS # BLD AUTO: 0 10E9/L (ref 0–0.2)
BASOPHILS NFR BLD AUTO: 0.1 %
DIFFERENTIAL METHOD BLD: ABNORMAL
EOSINOPHIL # BLD AUTO: 0.2 10E9/L (ref 0–0.7)
EOSINOPHIL NFR BLD AUTO: 1.8 %
ERYTHROCYTE [DISTWIDTH] IN BLOOD BY AUTOMATED COUNT: 14.1 % (ref 10–15)
FIBRINOGEN PPP-MCNC: 345 MG/DL (ref 200–420)
GLUCOSE BLDC GLUCOMTR-MCNC: 119 MG/DL (ref 70–99)
GLUCOSE BLDC GLUCOMTR-MCNC: 130 MG/DL (ref 70–99)
GLUCOSE BLDC GLUCOMTR-MCNC: 143 MG/DL (ref 70–99)
GLUCOSE BLDC GLUCOMTR-MCNC: 93 MG/DL (ref 70–99)
GLUCOSE BLDC GLUCOMTR-MCNC: 98 MG/DL (ref 70–99)
HCT VFR BLD AUTO: 37.4 % (ref 35–47)
HGB BLD-MCNC: 11.9 G/DL (ref 11.7–15.7)
IMM GRANULOCYTES # BLD: 0.1 10E9/L (ref 0–0.4)
IMM GRANULOCYTES NFR BLD: 0.6 %
INR PPP: 1.12 (ref 0.86–1.14)
LYMPHOCYTES # BLD AUTO: 1.5 10E9/L (ref 0.8–5.3)
LYMPHOCYTES NFR BLD AUTO: 15.6 %
MCH RBC QN AUTO: 32.2 PG (ref 26.5–33)
MCHC RBC AUTO-ENTMCNC: 31.8 G/DL (ref 31.5–36.5)
MCV RBC AUTO: 101 FL (ref 78–100)
MONOCYTES # BLD AUTO: 0.7 10E9/L (ref 0–1.3)
MONOCYTES NFR BLD AUTO: 6.6 %
NEUTROPHILS # BLD AUTO: 7.4 10E9/L (ref 1.6–8.3)
NEUTROPHILS NFR BLD AUTO: 75.3 %
NRBC # BLD AUTO: 0 10*3/UL
NRBC BLD AUTO-RTO: 0 /100
PLATELET # BLD AUTO: 141 10E9/L (ref 150–450)
RBC # BLD AUTO: 3.7 10E12/L (ref 3.8–5.2)
WBC # BLD AUTO: 9.9 10E9/L (ref 4–11)

## 2019-06-30 PROCEDURE — 25000125 ZZHC RX 250: Performed by: STUDENT IN AN ORGANIZED HEALTH CARE EDUCATION/TRAINING PROGRAM

## 2019-06-30 PROCEDURE — 85025 COMPLETE CBC W/AUTO DIFF WBC: CPT | Performed by: STUDENT IN AN ORGANIZED HEALTH CARE EDUCATION/TRAINING PROGRAM

## 2019-06-30 PROCEDURE — 25000131 ZZH RX MED GY IP 250 OP 636 PS 637: Mod: GY | Performed by: STUDENT IN AN ORGANIZED HEALTH CARE EDUCATION/TRAINING PROGRAM

## 2019-06-30 PROCEDURE — 25000132 ZZH RX MED GY IP 250 OP 250 PS 637: Mod: GY | Performed by: INTERNAL MEDICINE

## 2019-06-30 PROCEDURE — 85610 PROTHROMBIN TIME: CPT | Performed by: STUDENT IN AN ORGANIZED HEALTH CARE EDUCATION/TRAINING PROGRAM

## 2019-06-30 PROCEDURE — 25000132 ZZH RX MED GY IP 250 OP 250 PS 637: Mod: GY | Performed by: PSYCHIATRY & NEUROLOGY

## 2019-06-30 PROCEDURE — 85384 FIBRINOGEN ACTIVITY: CPT | Performed by: STUDENT IN AN ORGANIZED HEALTH CARE EDUCATION/TRAINING PROGRAM

## 2019-06-30 PROCEDURE — 12000001 ZZH R&B MED SURG/OB UMMC

## 2019-06-30 PROCEDURE — 25000132 ZZH RX MED GY IP 250 OP 250 PS 637: Mod: GY | Performed by: STUDENT IN AN ORGANIZED HEALTH CARE EDUCATION/TRAINING PROGRAM

## 2019-06-30 PROCEDURE — 25000128 H RX IP 250 OP 636: Performed by: STUDENT IN AN ORGANIZED HEALTH CARE EDUCATION/TRAINING PROGRAM

## 2019-06-30 PROCEDURE — P9041 ALBUMIN (HUMAN),5%, 50ML: HCPCS | Performed by: STUDENT IN AN ORGANIZED HEALTH CARE EDUCATION/TRAINING PROGRAM

## 2019-06-30 PROCEDURE — 97535 SELF CARE MNGMENT TRAINING: CPT | Mod: GO | Performed by: OCCUPATIONAL THERAPIST

## 2019-06-30 PROCEDURE — 00000146 ZZHCL STATISTIC GLUCOSE BY METER IP

## 2019-06-30 RX ORDER — HEPARIN SODIUM (PORCINE) LOCK FLUSH IV SOLN 100 UNIT/ML 100 UNIT/ML
3 SOLUTION INTRAVENOUS ONCE
Status: COMPLETED | OUTPATIENT
Start: 2019-06-30 | End: 2019-06-30

## 2019-06-30 RX ORDER — CALCIUM GLUCONATE 100 MG/ML
AMPUL (ML) INTRAVENOUS
Status: COMPLETED | OUTPATIENT
Start: 2019-06-30 | End: 2019-06-30

## 2019-06-30 RX ORDER — ALBUMIN HUMAN 25 %
3000 INTRAVENOUS SOLUTION INTRAVENOUS
Status: COMPLETED | OUTPATIENT
Start: 2019-06-30 | End: 2019-06-30

## 2019-06-30 RX ADMIN — ASPIRIN 325 MG: 325 TABLET, DELAYED RELEASE ORAL at 20:56

## 2019-06-30 RX ADMIN — ANTICOAGULANT CITRATE DEXTROSE SOLUTION FORMULA A 673 ML: 12.25; 11; 3.65 SOLUTION INTRAVENOUS at 09:15

## 2019-06-30 RX ADMIN — UMECLIDINIUM 1 PUFF: 62.5 AEROSOL, POWDER ORAL at 08:00

## 2019-06-30 RX ADMIN — CLOPIDOGREL BISULFATE 75 MG: 75 TABLET, FILM COATED ORAL at 07:56

## 2019-06-30 RX ADMIN — FLUOXETINE 20 MG: 20 CAPSULE ORAL at 07:57

## 2019-06-30 RX ADMIN — SIMVASTATIN 20 MG: 20 TABLET, FILM COATED ORAL at 22:42

## 2019-06-30 RX ADMIN — ENOXAPARIN SODIUM 40 MG: 40 INJECTION SUBCUTANEOUS at 20:55

## 2019-06-30 RX ADMIN — GABAPENTIN 600 MG: 300 CAPSULE ORAL at 22:42

## 2019-06-30 RX ADMIN — GABAPENTIN 300 MG: 300 CAPSULE ORAL at 07:56

## 2019-06-30 RX ADMIN — METOPROLOL TARTRATE 50 MG: 50 TABLET ORAL at 08:00

## 2019-06-30 RX ADMIN — RANITIDINE 150 MG: 150 TABLET ORAL at 07:55

## 2019-06-30 RX ADMIN — Medication 3 ML: at 10:35

## 2019-06-30 RX ADMIN — AMLODIPINE BESYLATE 10 MG: 10 TABLET ORAL at 20:55

## 2019-06-30 RX ADMIN — Medication 10 MG: at 22:42

## 2019-06-30 RX ADMIN — PREDNISONE 5 MG: 5 TABLET ORAL at 07:56

## 2019-06-30 RX ADMIN — Medication 1 TABLET: at 20:55

## 2019-06-30 RX ADMIN — RANITIDINE 150 MG: 150 TABLET ORAL at 20:56

## 2019-06-30 RX ADMIN — METOPROLOL TARTRATE 50 MG: 50 TABLET ORAL at 20:55

## 2019-06-30 RX ADMIN — Medication 1 TABLET: at 07:55

## 2019-06-30 RX ADMIN — TRAZODONE HYDROCHLORIDE 100 MG: 100 TABLET ORAL at 22:42

## 2019-06-30 RX ADMIN — GABAPENTIN 300 MG: 300 CAPSULE ORAL at 15:44

## 2019-06-30 RX ADMIN — ALBUMIN HUMAN 3000 ML: 0.05 INJECTION, SOLUTION INTRAVENOUS at 09:14

## 2019-06-30 RX ADMIN — CALCIUM GLUCONATE 3 G: 94 INJECTION, SOLUTION INTRAVENOUS at 09:15

## 2019-06-30 RX ADMIN — FUROSEMIDE 20 MG: 20 TABLET ORAL at 15:44

## 2019-06-30 ASSESSMENT — ACTIVITIES OF DAILY LIVING (ADL)
ADLS_ACUITY_SCORE: 16
ADLS_ACUITY_SCORE: 15
ADLS_ACUITY_SCORE: 16
ADLS_ACUITY_SCORE: 15
ADLS_ACUITY_SCORE: 16
ADLS_ACUITY_SCORE: 15

## 2019-06-30 NOTE — PLAN OF CARE
Status: Admitted for 2-3 weeks of progressive numbness at mid-chest region & below w/ BLE weakness, work up for transverse myelitis.   VS: VSS on RA  Neuros: Oriented x4. RLE 4/5 w/ absent proprioception, otherwise 5/5 TO. 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 Numbness from below breast to toes, sensation improving per pt  GI: Regular  : Voiding spont.   IV: PIV SL. R neck apheresis catheter HL   Activity: Up with A1, GB, & walker.  R foot weakness & drags if she does not look out when ambulating   Pain: Denies  Skin: Mepilex on coccyx for prevention intact  Social: No family overnight.   Plan of care: Last PLEX treatment today. Anticipated discharge on Monday to rehab

## 2019-06-30 NOTE — PLAN OF CARE
Discharge Planner OT   Patient plan for discharge: ARU  Current status: Pt motivated and great participant in therapy. Pt completes LB dressing long sitting in bed independently, bed mobility with SBA. Facilitated functional mobility and ADL tasks to challenge balance, strength and provide training for increased independence. Min A for ambulation and sit<>stands with fww. Min A with standing balance for standing g/h and UB ADLs at sink, SBA for LB ADLs when seated.   Barriers to return to prior living situation: Medical status, LE strength and sensation, falls risk, balance  Recommendations for discharge: ARU  Rationale for recommendations: Pt highly motivated and will benefit from intensive multidisciplinary therapy to increase independence with ADLs and functional mobility. Pt able to tolerate 3 hours of therapy/daily.        Entered by: Kiana Martinez 06/30/2019 2:25 PM

## 2019-06-30 NOTE — PROCEDURES
Laboratory Medicine and Pathology  Transfusion Medicine - Apheresis Procedure    Ladonna Sheriff MRN# [id number]   YOB: 1945 Age: 74 year old        Reason for consult: Transverse myelitis           Assessment and Plan:   The patient is a 74 year old female with transverse myelitis. She underwent therapeutic plasma exchange (TPE) #5 of planned 5. Tolerated the procedure well with only some mild citrate related symptoms.  This is the last scheduled procedure.  Patient has a non-tunneled catheter that will need to be removed, per arrangements by primary team. Platelet count lower today at 141x10^9/L from baseline of 192x10^9/L on 6/19/2019. Patient is on enoxaparin and heparin flushes. Recommend checking level tomorrow and if further decreased, obtain HIT testing.          Chief Complaint:   Numbness and weakness in lower extremities          History of Present Illness:   The patient is a 74 year old female with history of multiple autoimmune disorders on chronic steroids. On approximately 6/3/2019 started having some numbness in her lower extremities, first in right and then bilateral. This progressed to affecting her gate and lower extremity weakness. She also developed lack of sensation with bowel movements. MRI on 6/19/2019 demonstrated an enhancing lesion from T4-T5 with edema T3-T5 - consistent with transverse myelitis.  A series of TPE was requested and the first procedure was performed on 6/22/2019. Sh had a non-tunneled right internal jugular catheter placed on 6/21/2019. She has been tolerating the procedures well. Strength and numbness is improving in lower extremities, though right remains weaker then left. Is not incontinent of urine. Does not appreciate a bowel movement until it is about to happen, but is not incontinent. No new numbness or weakness above mid chest or in upper extremities. Has residual visual loss from previous optic neuritis. Is not having fever, cough,  shortness of breath, nausea, vomiting.         Past Medical History:     Past Medical History:   Diagnosis Date     Cerebral infarction (H)      CHF (congestive heart failure) (H)      Hypertension      Lupus (H)      Optic neuritis      Sjogren's syndrome (H)      Temporal arteritis (H)      TIA (transient ischemic attack)      Uncomplicated asthma           Past Surgical History:     Past Surgical History:   Procedure Laterality Date     CHOLECYSTECTOMY       GYN SURGERY      hysterectomy     GYN SURGERY      tubal ligation     IR CVC NON TUNNEL PLACEMENT  6/21/2019          Social History:   , 3 grown children and a son in-law, lives in an apartment, one son lives in the same building, does need to walk up stairs to get into apartment, has 2 cats           Allergies:     Allergies   Allergen Reactions     Prevacid [Lansoprazole] Rash           Medications:     Current Facility-Administered Medications   Medication     acetaminophen (TYLENOL) tablet 650 mg    Or     acetaminophen (TYLENOL) solution 650 mg     acetaminophen (TYLENOL) Suppository 650 mg     albuterol (PROAIR HFA/PROVENTIL HFA/VENTOLIN HFA) 108 (90 Base) MCG/ACT inhaler 2 puff     amLODIPine (NORVASC) tablet 10 mg     aspirin (ASA) EC tablet 325 mg     calcium carbonate 600 mg-vitamin D 400 units (CALTRATE) per tablet 1 tablet     clopidogrel (PLAVIX) tablet 75 mg     glucose gel 15-30 g    Or     dextrose 50 % injection 25-50 mL     enoxaparin (LOVENOX) injection 40 mg     FLUoxetine (PROzac) capsule 20 mg     furosemide (LASIX) tablet 20 mg     gabapentin (NEURONTIN) capsule 300 mg     gabapentin (NEURONTIN) capsule 600 mg     glucagon injection 1 mg     heparin 100 UNIT/ML injection 3 mL     heparin 100 UNIT/ML injection 3 mL     heparin 100 UNIT/ML injection 3 mL     heparin 100 UNIT/ML injection 3 mL     insulin aspart (NovoLOG) inj (RAPID ACTING)     insulin aspart (NovoLOG) inj (RAPID ACTING)     magnesium sulfate 2 g in NS intermittent  infusion (PharMEDium or FV Cmpd)     magnesium sulfate 4 g in 100 mL sterile water (premade)     melatonin tablet 10 mg     metoprolol tartrate (LOPRESSOR) tablet 50 mg     naloxone (NARCAN) injection 0.1-0.4 mg     ondansetron (ZOFRAN-ODT) ODT tab 4 mg    Or     ondansetron (ZOFRAN) injection 4 mg     polyethylene glycol (MIRALAX/GLYCOLAX) Packet 17 g     potassium chloride (KLOR-CON) Packet 20-40 mEq     potassium chloride 10 mEq in 100 mL intermittent infusion with 10 mg lidocaine     potassium chloride 10 mEq in 100 mL sterile water intermittent infusion (premix)     potassium chloride 20 mEq in 50 mL intermittent infusion     potassium chloride ER (K-DUR/KLOR-CON M) CR tablet 20-40 mEq     predniSONE (DELTASONE) tablet 5 mg     ranitidine (ZANTAC) tablet 150 mg     senna-docusate (SENOKOT-S/PERICOLACE) 8.6-50 MG per tablet 1 tablet    Or     senna-docusate (SENOKOT-S/PERICOLACE) 8.6-50 MG per tablet 2 tablet     simvastatin (ZOCOR) tablet 20 mg     sodium chloride (PF) 0.9% PF flush 10 mL     sodium chloride (PF) 0.9% PF flush 10 mL     sodium chloride (PF) 0.9% PF flush 10 mL     sodium chloride (PF) 0.9% PF flush 10 mL     traZODone (DESYREL) tablet 100 mg     umeclidinium (INCRUSE ELLIPTA) 62.5 MCG/INH inhaler 1 puff          Review of Systems:   See above         Exam:   /57 P 67 T 98 RR 16 O2 sat 94%  Sleeping, but easily awakens, no apparent distress  Breathing appears comfortable on room air  Right internal jugular catheter with clean dry dressing  Moves all extremities, strength 5-left, 4-5 on right lower extremities, answers/asks questions appropriately  No lower extremity edema            Data:     Results for orders placed or performed during the hospital encounter of 06/20/19 (from the past 24 hour(s))   Glucose by meter   Result Value Ref Range    Glucose 127 (H) 70 - 99 mg/dL   Glucose by meter   Result Value Ref Range    Glucose 105 (H) 70 - 99 mg/dL   Glucose by meter   Result Value Ref  Range    Glucose 124 (H) 70 - 99 mg/dL   Glucose by meter   Result Value Ref Range    Glucose 119 (H) 70 - 99 mg/dL   Glucose by meter   Result Value Ref Range    Glucose 98 70 - 99 mg/dL   CBC with platelets differential   Result Value Ref Range    WBC 9.9 4.0 - 11.0 10e9/L    RBC Count 3.70 (L) 3.8 - 5.2 10e12/L    Hemoglobin 11.9 11.7 - 15.7 g/dL    Hematocrit 37.4 35.0 - 47.0 %     (H) 78 - 100 fl    MCH 32.2 26.5 - 33.0 pg    MCHC 31.8 31.5 - 36.5 g/dL    RDW 14.1 10.0 - 15.0 %    Platelet Count 141 (L) 150 - 450 10e9/L    Diff Method Automated Method     % Neutrophils 75.3 %    % Lymphocytes 15.6 %    % Monocytes 6.6 %    % Eosinophils 1.8 %    % Basophils 0.1 %    % Immature Granulocytes 0.6 %    Nucleated RBCs 0 0 /100    Absolute Neutrophil 7.4 1.6 - 8.3 10e9/L    Absolute Lymphocytes 1.5 0.8 - 5.3 10e9/L    Absolute Monocytes 0.7 0.0 - 1.3 10e9/L    Absolute Eosinophils 0.2 0.0 - 0.7 10e9/L    Absolute Basophils 0.0 0.0 - 0.2 10e9/L    Abs Immature Granulocytes 0.1 0 - 0.4 10e9/L    Absolute Nucleated RBC 0.0    INR   Result Value Ref Range    INR 1.12 0.86 - 1.14   Fibrinogen activity   Result Value Ref Range    Fibrinogen 345 200 - 420 mg/dL          Procedure Summary:   A single plasma volume plasma exchange was performed with a Spectra Optia cell separator with 5% albumin for replacement fluid.  The right internal jugular catheter was used for access.  ACD-A was used for anticoagulation.  To offset the effects of the citrate, calcium gluconate was given in the return line.  She experienced mild paresthesias in left hand mid procedure that responded will to calcium gluconate. The patient's vital signs were stable throughout.  The patient tolerated the procedure well.    ATTESTATION STATEMENT:  This patient has been seen and evaluated by me directly, Mercedes Franks MD, PhD.    Mercedes Franks M.D., Ph.D.  Attending Physician  Division of Transfusion Medicine  Department of Laboratory  [place] [address]  Pager [phone]

## 2019-06-30 NOTE — PLAN OF CARE
Status: Admitted for 2-3 weeks of progressive numbness at mid-chest region & below w/ BLE weakness, work up for transverse myelitis. 5/5 PLEX completed today.   Vitals: VSS on RA  Neuros: Oriented x4. RLE 4/5 w/ absent proprioception, otherwise 5/5 TO. 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 Numbness from under breast to toes, sensation improving per pt  IV: PIV SL. R neck apheresis catheter HL   Resp/trach: LS clear, RA  Diet: Regular diet, tolerating well  Bowel status: BS+, No BM this shift  : Voiding spontaneous without difficulty   Skin: Mepilex on coccyx for prevention intact. General bruising throughout   Pain: Denies pain  Activity: Up walking to bathroom.  A little unsteady. Up with 1/GB/walker  Social: No visitors today  Plan: Completed last PLEX this shift. Anticipated discharge to rehab on Monday.

## 2019-06-30 NOTE — PROGRESS NOTES
"[facility], [place]   Neurology Progress Note  6/30/2019    Subjective:  No acute events overnight    Objective:    Vitals: BP 93/51 (BP Location: Right arm)   Pulse 69   Temp 98.3  F (36.8  C) (Oral)   Resp 16   Wt 99 kg (218 lb 4.1 oz)   SpO2 92%   BMI 34.18 kg/m    General: Lying in bed and in NAD  HEENT: NC/AT, no icterus  Chest:  No respiratory distress  Extremities: warm, no edema   Skin: no rash or lesions   Neuro:   Mental status: awake, alert, oriented to person, place, and time. No evidence of aphasia  Cranial nerves: vision absent R eye, peripheral visual field defect L eye, disconjugate gaze (R eye abducted at baseline) with EOM otherwise intact, facial sensation intact to light touch, facial movements symmetric, no dysarthria  Motor: no abnormal movements, strength 5/5 throughout.   Reflexes: no clonus, no baum  Sensory: intact to light touch, vibratory sense present but diminished in distal BLE (3-4 seconds at toe, ankle, and knee bilaterally) . Loss of proprioception in right great toe.  Coordination: FNF without ataxia or dysmetria  Gait: deferred     Pertinent Investigations:       MRI Cervical Spine 6/19/19  Impression:    \"1. No spinal cord abnormality to indicate transverse myelitis.  2. Degenerative changes.\"     MRI Thoracic Spine 6/19/19                                  Impression:    \"Focal gadolinium enhancing lesion from T4 through T5 in the spinal cord on the right side with edema extending from T3-T4 down to lower T5. This is most likely transverse myelitis, with differential diagnosis including a spinal cord astrocytoma.\"     MRI Brain w/o and w/ contrast 6/20/19           Impression:   \"The study demonstrates greater than 20 foci of T2-hyperintensity within the cerebral white matter which is suspicious for demyelination. Enhancing focus within the left parieto-occipital region is suspicious for active demyelination in the absence of known " "malignancy. No less likely, the differential for white matter lesions includes sequela of infectious or inflammatory insults, and vasculopathy.\"     LABS  SUSAN Ab IgG by IFA w/ Reflex: negative  C3: 110  C4: 16  ESR: 17 (0-30)  Angiotensin converting enzyme: 24 (9-67)  DsDNA: negative  LINWOOD panel:     RNP negative                        Macias 0.4 (0-0.9)                        SSA >8.0 (0-0.9)                        SSB >8.0 (0-0.9)                        Scleroderma Ab Scl-70 1.0 (0-0.9)  B12: 396  MMA: negative  Vit D: 40  Lupus anticoagulant panel: negative  Anticardiolipin Ab: negative  Beta 2 glycoprotein Abs: negative  Quantiferon Gold: negative  HB surface Ag: negative  HB core Ab: negative     19 CSF  Gram stain: no organisms or WBCs seen  Culture aerobic: NGTD  Enterovirus PCR: negative  VZV: negative  WNV: negative  RBC: 296  WBC: 0   Glucose: 72  Protein: 63  Oligoclonal bandin oligoclonal bands, 15.3 oligoclonal IgG   [place] send-out, CNS demyelinating disease evaluation: pending     Assessment:   75yo female with PMH notable for previous stroke/TIA, discoid lupus, Sjogren's syndrome, temporal arteritis, optic neuritis with vision impairment in right eye, chronic daily prednisone (5 mg daily), CHF, recently diagnosed obstructive lung disease who presented  with 2-3 weeks of progressive numbness at mid-chest region and below. Cervical MRI  unremarkable, but thoracic MRI with T4-T5 enhancing lesion w/ surrounding edema and head MRI with multiple T2 hyperintensities and one small enhancing focus. CSF obtained  demonstrated elevated protein, glucose, and RBCs, but absent WBCs. CSF infectious workup negative thus far. Oligoclonal bands positive. SSA/SSB/Scl-70 positive, other AI labs negative or pending. Demyelinating panel pending. History and workup thus far are concerning for a demyelinating disorder. Differential is still broad including autoimmune, neoplasm, infection, vasculitis. Given " patient's significant AI history, highest suspicion is for autoimmune inflammatory process (including NMOSD/MOG spectrum). Will continue with PLEX therapy and await results of demyelinating panel which should result early this week to help guide long term management.     Plan:      ACUTE PROBLEMS     #Thoracic myelopathy  #Progressive bilateral weakness/numbness  Hx of discoid lupus, Sjogren's, temporal arteritis, optic neuritis  - Continue with PLEX x5 runs (6/22, 6/24, 6/26, 6/28, 6/30)  - completed 5-day course of high-dose methylprednisolone on 6/25  - AQP4 and MOG antibodies ([place] send-out labs) results expected 7/1 or 7/2  - If AQP4 positive will start rituximab and if MOG positive will start mycophenolate  - Continue with PT/OT  - Rheum following  - Changed gabapentin to 300 mg PO BID & 600 mg qhs     E coli UTI, asymptomatic  - Completed 5-day course of nitrofurantoin on 6/26     CHRONIC PROBLEMS     Obstructive lung disease    - Continue PTA Incruse Ellipta inhaler  - Albuterol nebs q4h prn     Hx of discoid lupus, Sjogren's syndrome, temporal arteritis, optic neuritis  - completed 5-day course of methylprednisolone 1000mg  - resume PTA prednisone 5 mg daily     Hx of stroke/TIA  - Continue PTA ASA and clopidogrel     Hypertension   - Continue PTA metoprolol, amlodipine, lasix  - Daily weights      Hypercholesterolemia  - Continue PTA statin     Activity: up with assist  Diet: regular diet   DVT ppx: Lovenox  Bowel ppx: ranitidine, senna prn  Lines: PIV, non-tunneled RIJ CVC  Code: full  Dispo: ARU early next week     Patient seen and discussed with attending Dr. Spangler.    Tanmay Rivera MD  Neurology PGY4  [phone]

## 2019-06-30 NOTE — PLAN OF CARE
Neuros unchanged.  Up with one,gait belt and walker.  Drags right leg with walking.  Tolerating regular diet and oral fluids.  Voiding and had bm.  PLEX 5/5 tomorrow.  Plan ARU.

## 2019-07-01 ENCOUNTER — APPOINTMENT (OUTPATIENT)
Dept: PHYSICAL THERAPY | Facility: CLINIC | Age: 74
DRG: 059 | End: 2019-07-01
Attending: PSYCHIATRY & NEUROLOGY
Payer: MEDICARE

## 2019-07-01 ENCOUNTER — APPOINTMENT (OUTPATIENT)
Dept: OCCUPATIONAL THERAPY | Facility: CLINIC | Age: 74
DRG: 059 | End: 2019-07-01
Attending: PSYCHIATRY & NEUROLOGY
Payer: MEDICARE

## 2019-07-01 LAB — GLUCOSE BLDC GLUCOMTR-MCNC: 93 MG/DL (ref 70–99)

## 2019-07-01 PROCEDURE — 25000132 ZZH RX MED GY IP 250 OP 250 PS 637: Mod: GY | Performed by: INTERNAL MEDICINE

## 2019-07-01 PROCEDURE — 97112 NEUROMUSCULAR REEDUCATION: CPT | Mod: GP

## 2019-07-01 PROCEDURE — 97530 THERAPEUTIC ACTIVITIES: CPT | Mod: GP

## 2019-07-01 PROCEDURE — 25000128 H RX IP 250 OP 636: Performed by: STUDENT IN AN ORGANIZED HEALTH CARE EDUCATION/TRAINING PROGRAM

## 2019-07-01 PROCEDURE — 25000132 ZZH RX MED GY IP 250 OP 250 PS 637: Mod: GY | Performed by: STUDENT IN AN ORGANIZED HEALTH CARE EDUCATION/TRAINING PROGRAM

## 2019-07-01 PROCEDURE — 25000132 ZZH RX MED GY IP 250 OP 250 PS 637: Mod: GY | Performed by: PSYCHIATRY & NEUROLOGY

## 2019-07-01 PROCEDURE — 25000131 ZZH RX MED GY IP 250 OP 636 PS 637: Mod: GY | Performed by: STUDENT IN AN ORGANIZED HEALTH CARE EDUCATION/TRAINING PROGRAM

## 2019-07-01 PROCEDURE — 97116 GAIT TRAINING THERAPY: CPT | Mod: GP

## 2019-07-01 PROCEDURE — 97535 SELF CARE MNGMENT TRAINING: CPT | Mod: GO

## 2019-07-01 PROCEDURE — 12000001 ZZH R&B MED SURG/OB UMMC

## 2019-07-01 PROCEDURE — 36514 APHERESIS PLASMA: CPT

## 2019-07-01 PROCEDURE — 25000128 H RX IP 250 OP 636: Performed by: PHYSICIAN ASSISTANT

## 2019-07-01 PROCEDURE — 00000146 ZZHCL STATISTIC GLUCOSE BY METER IP

## 2019-07-01 PROCEDURE — 97110 THERAPEUTIC EXERCISES: CPT | Mod: GO

## 2019-07-01 RX ADMIN — RANITIDINE 150 MG: 150 TABLET ORAL at 08:45

## 2019-07-01 RX ADMIN — FLUOXETINE 20 MG: 20 CAPSULE ORAL at 08:45

## 2019-07-01 RX ADMIN — ASPIRIN 325 MG: 325 TABLET, DELAYED RELEASE ORAL at 21:41

## 2019-07-01 RX ADMIN — PREDNISONE 5 MG: 5 TABLET ORAL at 08:44

## 2019-07-01 RX ADMIN — METOPROLOL TARTRATE 50 MG: 50 TABLET ORAL at 21:42

## 2019-07-01 RX ADMIN — Medication 10 MG: at 21:41

## 2019-07-01 RX ADMIN — FUROSEMIDE 20 MG: 20 TABLET ORAL at 13:23

## 2019-07-01 RX ADMIN — Medication 3 ML: at 17:58

## 2019-07-01 RX ADMIN — GABAPENTIN 300 MG: 300 CAPSULE ORAL at 08:45

## 2019-07-01 RX ADMIN — Medication 1 TABLET: at 08:45

## 2019-07-01 RX ADMIN — CLOPIDOGREL BISULFATE 75 MG: 75 TABLET, FILM COATED ORAL at 08:44

## 2019-07-01 RX ADMIN — ENOXAPARIN SODIUM 40 MG: 40 INJECTION SUBCUTANEOUS at 21:42

## 2019-07-01 RX ADMIN — RANITIDINE 150 MG: 150 TABLET ORAL at 21:41

## 2019-07-01 RX ADMIN — GABAPENTIN 300 MG: 300 CAPSULE ORAL at 13:22

## 2019-07-01 RX ADMIN — Medication 1 TABLET: at 17:58

## 2019-07-01 RX ADMIN — SIMVASTATIN 20 MG: 20 TABLET, FILM COATED ORAL at 21:42

## 2019-07-01 RX ADMIN — UMECLIDINIUM 1 PUFF: 62.5 AEROSOL, POWDER ORAL at 08:44

## 2019-07-01 RX ADMIN — GABAPENTIN 600 MG: 300 CAPSULE ORAL at 21:41

## 2019-07-01 RX ADMIN — FUROSEMIDE 20 MG: 20 TABLET ORAL at 21:42

## 2019-07-01 RX ADMIN — METOPROLOL TARTRATE 50 MG: 50 TABLET ORAL at 13:23

## 2019-07-01 RX ADMIN — TRAZODONE HYDROCHLORIDE 100 MG: 100 TABLET ORAL at 21:41

## 2019-07-01 ASSESSMENT — ACTIVITIES OF DAILY LIVING (ADL)
ADLS_ACUITY_SCORE: 15

## 2019-07-01 NOTE — PLAN OF CARE
OT 6A  Discharge Planner OT   Patient plan for discharge: ARU  Current status: SBA supine<>EOB with HOB elevated. CGA/min A sit<>stand x4 reps throughout session with FWW. Pt ambulated to and from the bathroom with FWW and CGA. Pt completed g/h while standing with CGA. Pt had 1 LOB that required min A to correct. Pt completed UE ergometer while seated. Pt completing BUE ROM/strengthening exercises while standing with FWW  Barriers to return to prior living situation: fatigue, weakness, deconditioning, acute medical needs  Recommendations for discharge: ARU  Rationale for recommendations: Pt is below baseline and would benefit from continued skilled therapy to increase activity tolerance and independence with ADLs. Pt is motivated, has a good support system and is making good progress with therapy. Pt is able to tolerate 3 hours of therapy per day       Entered by: Alida Baltazar 07/01/2019 3:57 PM

## 2019-07-01 NOTE — PLAN OF CARE
Status: Admitted with 2-3 week of progressive numbness from chest down. It has improved and has numbness now from waist down since PLEX treatments.  Vitals: BP was 94/51 this AM. BP meds held and notified MD's about BP dropping into the 90's every day. MD's stated to give lasix and Metoprolol and they DC'd Amlodipine.  Neuros: R LE 4/5 others 5/5. Blind R eye. L eye vision cloudy. R dysconjugate gaze.  IV: R neck PLEX catheter and PIV SL'd.  Diet: Regular.  Bowel status: Smear BM. Barrier cream applied to redness of adriana cleft.  : Voiding spont.   Skin: Bruises  Pain: Denied  Activity: Up with one assist and walker  Plan: discharge to rehab soon.

## 2019-07-01 NOTE — PROGRESS NOTES
"[facility], [place]   Neurology Progress Note  7/1/2019    Subjective:  BP dropped to 94/51, improved after getting up and walking around. Patient feels \"better,\" numbness continues to resolve. Has continued walking to bathroom and with PT. Notes a sensation like stiffness in the right hip/buttock, wonders if more walking will help.     Objective:    Vitals: /55   Pulse 61   Temp 97.8  F (36.6  C) (Oral)   Resp 16   Wt 99 kg (218 lb 4.1 oz)   SpO2 93%   BMI 34.18 kg/m    General: Lying in bed and in NAD  HEENT: NC/AT, no icterus  Chest:  No respiratory distress  Extremities: warm, no edema   Skin: no rash or lesions   Neuro:   Mental status: awake, alert, oriented to person, place, and time. No evidence of aphasia  Cranial nerves: vision absent R eye, peripheral visual field defect L eye, disconjugate gaze (R eye abducted at baseline) with EOM otherwise intact, facial sensation intact to light touch, facial movements symmetric, no dysarthria  Motor: no abnormal movements, strength 5/5 throughout.   Reflexes: 1+/symmetric patellar, Achilles reflexes. No clonus, toes mute.   Sensory: intact to light touch, vibratory sense present but diminished in distal BLE. Loss of proprioception in right great toe.  Coordination: FNF without ataxia or dysmetria  Gait: deferred     Pertinent Investigations:       MRI Cervical Spine 6/19/19  Impression:    1. No spinal cord abnormality to indicate transverse myelitis.  2. Degenerative changes.     MRI Thoracic Spine 6/19/19                                  Impression:    Focal gadolinium enhancing lesion from T4 through T5 in the spinal cord on the right side with edema extending from T3-T4 down to lower T5. This is most likely transverse myelitis, with differential diagnosis including a spinal cord astrocytoma.     MRI Brain w/o and w/ contrast 6/20/19           Impression:   The study demonstrates greater than 20 foci of T2-hyperintensity " within the cerebral white matter which is suspicious for demyelination. Enhancing focus within the left parieto-occipital region is suspicious for active demyelination in the absence of known malignancy. No less likely, the differential for white matter lesions includes sequela of infectious or inflammatory insults, and vasculopathy.     LABS  SUSAN Ab IgG by IFA w/ Reflex: negative  C3: 110  C4: 16  ESR: 17 (0-30)  Angiotensin converting enzyme: 24 (9-67)  DsDNA: negative  LINWOOD panel:     RNP negative                        Macias 0.4 (0-0.9)                        SSA >8.0 (0-0.9)                        SSB >8.0 (0-0.9)                        Scleroderma Ab Scl-70 1.0 (0-0.9)  B12: 396  MMA: negative  Vit D: 40  Lupus anticoagulant panel: negative  Anticardiolipin Ab: negative  Beta 2 glycoprotein Abs: negative  Quantiferon Gold: negative  HB surface Ag: negative  HB core Ab: negative     19 CSF  Gram stain: no organisms or WBCs seen  Culture aerobic: NGTD  Enterovirus PCR: negative  VZV: negative  WNV: negative  RBC: 296  WBC: 0   Glucose: 72  Protein: 63  Oligoclonal bandin oligoclonal bands, 15.3 oligoclonal IgG   [place] send-out, CNS demyelinating disease evaluation: pending     Assessment:   73yo female with PMH notable for previous stroke/TIA, discoid lupus, Sjogren's syndrome, temporal arteritis, optic neuritis with vision impairment in right eye on chronic daily prednisone, CHF, recently diagnosed obstructive lung disease who presented  with 2-3 weeks of progressive numbness at mid-chest region and below. Cervical MRI  unremarkable, but thoracic MRI with T4-T5 enhancing lesion w/ surrounding edema and head MRI with multiple T2 hyperintensities and one small enhancing focus. CSF obtained  demonstrated elevated protein, glucose, and RBCs, but absent WBCs. CSF infectious workup negative thus far. Oligoclonal bands positive. SSA/SSB/Scl-70 positive, other AI labs negative or pending.  Demyelinating panel pending. History and workup thus far are concerning for a demyelinating disorder. Differential is still broad including autoimmune, neoplasm, infection, vasculitis. Given patient's significant AI history, highest suspicion is for autoimmune inflammatory process (including NMOSD/MOG spectrum). Will continue with PLEX therapy and await results of demyelinating panel which should result early this week to help guide long term management.     Plan:      #Thoracic myelopathy  #Progressive bilateral weakness/numbness  #Hx of discoid lupus, Sjogren's, temporal arteritis, optic neuritis  - Completed PLEX x 5 runs (6/22, 6/24, 6/26, 6/28, 6/30)  - completed 5-day course of high-dose methylprednisolone on 6/25  - AQP4 and MOG antibodies ([place] send-out labs); called [place] lab today to follow up, results expected by 7/3  - If AQP4 positive will start rituximab and if MOG positive will start mycophenolate  - Continue with PT/OT  - Rheum following  - Changed gabapentin to 300 mg PO BID & 600 mg qhs     #E coli UTI, asymptomatic  - Completed 5-day course of nitrofurantoin on 6/26     #Obstructive lung disease    - Continue PTA Incruse Ellipta inhaler  - Albuterol nebs q4h prn     #Hx of discoid lupus, Sjogren's syndrome, temporal arteritis, optic neuritis  - completed 5-day course of methylprednisolone 1000mg  - resumed PTA prednisone 5 mg PO daily     #Hx of stroke/TIA  - Continue PTA ASA and clopidogrel     #Hypertension  #CHF  Patient takes amlodipine, metoprolol, and Lasix PTA. Noted to be hypotensive, hold amlodipine preferentially (B-blocker and diuretic needed for heart failure).    - Continue PTA metoprolol and Lasix    - hold PTA amlodipine d/t low BP  - Daily weights      #Hypercholesterolemia  - Continue PTA statin     Activity: up with assist  Diet: regular diet   DVT ppx: Lovenox  Bowel ppx: ranitidine, senna prn  Lines: PIV, non-tunneled RIJ CVC  Code: FULL  Dispo: ARU, placement  pending     Patient seen and discussed with attending Dr. Alba.    Alee Ndiaye MD  Neurology PGY2  Pager: [phone]

## 2019-07-01 NOTE — PLAN OF CARE
Alert and oriented x4.  Right leg weak, drags foot slightly.  Last PLEX done this morning.  Plan Line removal tomorrow.  ARU placement.

## 2019-07-01 NOTE — PLAN OF CARE
Discharge Planner PT  6A  Patient plan for discharge: ARU  Current status: 4 x STSs from various surface, CGA-[place] with FWW. Ambulated ~125 ft x 2, FWW, CGA-[place], WC follow (though pt did not need) with turn x 2. Increased step length, decreased R foot clearance/heel strike, and decreased SWETHA (though improved awareness compared to previous session). 2 x pt unable to coordinate DF for R foot clearance where PT needed to correct with VCs and mod A. Promoted increased stability during dynamic activity with standing heel and toe tapping exercise with FWW, decreased dysmetria and B knee buckling.    Nursing: Ambulation to/from bathroom with FWW, A x 1.     Barriers to return to prior living situation: medical status, current mob status, level of A, balance, weakness  Recommendations for discharge: ARU  Rationale for recommendations: Pt would be a great candidate for an ARU as she is highly motivated, has mulit-disciplinary needs, and would tolerate 3 hours of extensive therapy.       Entered by: Jennifer Carrion 07/01/2019 10:37 AM

## 2019-07-01 NOTE — PLAN OF CARE
Status: Admitted for 2-3 weeks of progressive numbness at mid-chest region & below w/ BLE weakness, work up for transverse myelitis.   Vitals: VSS on RA  Neuros: Oriented x4. RLE 4/5 w/ absent proprioception, otherwise 5/5 TO. 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 Numbness from under waist to toes, sensation improving per pt  IV: PIV SL. Right neck apheresis catheter HL   Resp/trach: LS clear  Diet: Regular diet,  Bowel status: BS+, No BM this shift  : Voiding spontaneous without difficulty   Skin: Mepilex on coccyx for prevention intact. General bruising throughout   Pain: Denies pain  Activity: Up with 1/GB/walker  Social: No visitors   Plan: Anticipated discharge to day to rehab if bed is available.

## 2019-07-01 NOTE — PROGRESS NOTES
"Social Work Services Progress Note    Hospital Day: 12  Date of Initial Social Work Evaluation:  6/21/19  Collaborated with:  Admissions at [facility] (Ailyn), patient, Dr. Ndiaye    Data:   SW is following for discharge planning. Acute rehab placement is recommended.    Intervention:  KOBY spoke with Dr. Ndiaye.  Pt completed plex treatments on 6/30/19 and pt's line was removed today.  Per Dr. Ndiaye, MD's are awaiting antibody results and depending on the results, pt may need to be treated with Rituximab or cellcept.  Discharge is not anticipated today.  KOBY received a voice mail message from Admissions (Ailyn) at [facility].  Ailyn stated that pt is \"provisionally accepted by Dr. Phipps ([facility]).\"  SW phoned [facility] and left a message updating. KOBY faxed weekend progress notes to Ailyn.  SW updated pt.    Assessment:  Pt voices understanding of the discharge plan and agreement with the discharge plan.    Plan:    Anticipated Disposition:  Acute rehab placement    Barriers to d/c plan:  Awaiting antibody results    Follow Up:  KOBY will continue to follow.    CAMELIA Marcial  Social Work, 6A  Phone:  [phone]  Pager:  [phone]  7/1/2019        "

## 2019-07-02 ENCOUNTER — APPOINTMENT (OUTPATIENT)
Dept: INTERVENTIONAL RADIOLOGY/VASCULAR | Facility: CLINIC | Age: 74
DRG: 059 | End: 2019-07-02
Attending: NURSE PRACTITIONER
Payer: MEDICARE

## 2019-07-02 ENCOUNTER — APPOINTMENT (OUTPATIENT)
Dept: GENERAL RADIOLOGY | Facility: CLINIC | Age: 74
DRG: 059 | End: 2019-07-02
Attending: STUDENT IN AN ORGANIZED HEALTH CARE EDUCATION/TRAINING PROGRAM
Payer: MEDICARE

## 2019-07-02 LAB
CREAT SERPL-MCNC: 0.88 MG/DL (ref 0.52–1.04)
GFR SERPL CREATININE-BSD FRML MDRD: 65 ML/MIN/{1.73_M2}
PLATELET # BLD AUTO: 139 10E9/L (ref 150–450)

## 2019-07-02 PROCEDURE — 25000131 ZZH RX MED GY IP 250 OP 636 PS 637: Mod: GY | Performed by: STUDENT IN AN ORGANIZED HEALTH CARE EDUCATION/TRAINING PROGRAM

## 2019-07-02 PROCEDURE — 36415 COLL VENOUS BLD VENIPUNCTURE: CPT | Performed by: PSYCHIATRY & NEUROLOGY

## 2019-07-02 PROCEDURE — 82565 ASSAY OF CREATININE: CPT | Performed by: PSYCHIATRY & NEUROLOGY

## 2019-07-02 PROCEDURE — 85049 AUTOMATED PLATELET COUNT: CPT | Performed by: PSYCHIATRY & NEUROLOGY

## 2019-07-02 PROCEDURE — 73502 X-RAY EXAM HIP UNI 2-3 VIEWS: CPT

## 2019-07-02 PROCEDURE — 40000333 IR FOLLOW UP VISIT INPATIENT

## 2019-07-02 PROCEDURE — 25000128 H RX IP 250 OP 636: Performed by: STUDENT IN AN ORGANIZED HEALTH CARE EDUCATION/TRAINING PROGRAM

## 2019-07-02 PROCEDURE — 25000132 ZZH RX MED GY IP 250 OP 250 PS 637: Mod: GY | Performed by: PSYCHIATRY & NEUROLOGY

## 2019-07-02 PROCEDURE — 25000132 ZZH RX MED GY IP 250 OP 250 PS 637: Mod: GY | Performed by: INTERNAL MEDICINE

## 2019-07-02 PROCEDURE — 02PYX3Z REMOVAL OF INFUSION DEVICE FROM GREAT VESSEL, EXTERNAL APPROACH: ICD-10-PCS | Performed by: PHYSICIAN ASSISTANT

## 2019-07-02 PROCEDURE — 73560 X-RAY EXAM OF KNEE 1 OR 2: CPT | Mod: RT

## 2019-07-02 PROCEDURE — 12000001 ZZH R&B MED SURG/OB UMMC

## 2019-07-02 PROCEDURE — 25000132 ZZH RX MED GY IP 250 OP 250 PS 637: Mod: GY | Performed by: STUDENT IN AN ORGANIZED HEALTH CARE EDUCATION/TRAINING PROGRAM

## 2019-07-02 RX ORDER — TROLAMINE SALICYLATE 10 G/100G
CREAM TOPICAL 2 TIMES DAILY PRN
Status: DISCONTINUED | OUTPATIENT
Start: 2019-07-02 | End: 2019-07-06 | Stop reason: HOSPADM

## 2019-07-02 RX ADMIN — FUROSEMIDE 20 MG: 20 TABLET ORAL at 14:27

## 2019-07-02 RX ADMIN — CLOPIDOGREL BISULFATE 75 MG: 75 TABLET, FILM COATED ORAL at 07:55

## 2019-07-02 RX ADMIN — RANITIDINE 150 MG: 150 TABLET ORAL at 07:55

## 2019-07-02 RX ADMIN — Medication 10 MG: at 22:16

## 2019-07-02 RX ADMIN — FUROSEMIDE 20 MG: 20 TABLET ORAL at 20:01

## 2019-07-02 RX ADMIN — ENOXAPARIN SODIUM 40 MG: 40 INJECTION SUBCUTANEOUS at 20:01

## 2019-07-02 RX ADMIN — UMECLIDINIUM 1 PUFF: 62.5 AEROSOL, POWDER ORAL at 07:54

## 2019-07-02 RX ADMIN — GABAPENTIN 300 MG: 300 CAPSULE ORAL at 14:27

## 2019-07-02 RX ADMIN — TRAZODONE HYDROCHLORIDE 100 MG: 100 TABLET ORAL at 22:16

## 2019-07-02 RX ADMIN — FLUOXETINE 20 MG: 20 CAPSULE ORAL at 07:55

## 2019-07-02 RX ADMIN — Medication 1 TABLET: at 07:55

## 2019-07-02 RX ADMIN — PREDNISONE 5 MG: 5 TABLET ORAL at 07:55

## 2019-07-02 RX ADMIN — SIMVASTATIN 20 MG: 20 TABLET, FILM COATED ORAL at 22:17

## 2019-07-02 RX ADMIN — FUROSEMIDE 20 MG: 20 TABLET ORAL at 07:55

## 2019-07-02 RX ADMIN — RANITIDINE 150 MG: 150 TABLET ORAL at 20:01

## 2019-07-02 RX ADMIN — GABAPENTIN 300 MG: 300 CAPSULE ORAL at 07:55

## 2019-07-02 RX ADMIN — Medication 1 TABLET: at 18:24

## 2019-07-02 RX ADMIN — GABAPENTIN 600 MG: 300 CAPSULE ORAL at 22:17

## 2019-07-02 RX ADMIN — METOPROLOL TARTRATE 50 MG: 50 TABLET ORAL at 07:55

## 2019-07-02 RX ADMIN — METOPROLOL TARTRATE 50 MG: 50 TABLET ORAL at 20:01

## 2019-07-02 RX ADMIN — ASPIRIN 325 MG: 325 TABLET, DELAYED RELEASE ORAL at 20:01

## 2019-07-02 RX ADMIN — TROLAMINE SALICYLATE: 10 CREAM TOPICAL at 22:17

## 2019-07-02 ASSESSMENT — ACTIVITIES OF DAILY LIVING (ADL)
ADLS_ACUITY_SCORE: 17
ADLS_ACUITY_SCORE: 15
ADLS_ACUITY_SCORE: 17

## 2019-07-02 ASSESSMENT — VISUAL ACUITY: OU: BLURRED VISION

## 2019-07-02 NOTE — PLAN OF CARE
Patient fell last evening. VSS. Neuros unchanged; numbness to chest/abd area improving. R hip and R knee xray done today. R Derik removed by IR at bedside today. Voiding spnt. BM today. Up A1-2+gb+walker. CHO diet. Continue with POC.

## 2019-07-02 NOTE — PLAN OF CARE
"Status: Pt here with 2-3 weeks progressive numbness. PLEX completed 5/5 on Sunday.   Vitals: VSS on RA   Neuros: Numbness BLE, improving per pt. RLE 4/5. Blind R eye. L eye blurry vision. R dysconjugate gaze.   IV: PIV SL, aphersis catheter hep locked. Discussed with MD to get catheter out tomorrow in IR.   Resp: WDL   Diet: Mod CHO diet, tolerating well.   Bowel status: No BM this shift.   : Voiding spont.   Skin: Bruising throughout.   Pain: Denies.   Activity: Up with 1, GB, walker. Calls appropriately.   Social: No family at bedside this shift.   Plan: Plan for apheresis cathter to come out tomorrow. Rehab when medically ready.     Addendum: Pt fell approx 2300, she \"wanted to be independent and wipe herself\" after toileting and leaned a bit forward and slid from toilet. Pt states she does not feel she hurt anything but MD paged and will assess.     "

## 2019-07-02 NOTE — PROGRESS NOTES
Social Work Services Progress Note    Hospital Day: 13  Date of Initial Social Work Evaluation:  6/21/19  Collaborated with:  Dr. Ndiaye, [facility] Admissions Coordinator's    Data:  SW is following for discharge planning.  SW received a voice mail from Admissions (Ally) at [facility], indicating that pt looks ARU appropriate.      Intervention:  SW spoke with Dr. Ndiaye.  Per Dr. Ndiaye, MD's continue to await antibody results and depending on the results, pt may need to be treated with Rituximab or cellcept.  Dr. Ndiaye indicates that antibody results may not come back until tomorrow.  KOBY phoned Admissions at [facility] ([facility]ilyn) and at [facility] ([facility]ly) and left messages updating.    Assessment:  Pt is agreeable to acute rehab placement.  Both [facility] and [facility] acute rehab programs have indicated that pt is [facility] appropriate.  Acceptance to either facility will be dependent upon bed availability when discharge date is known.    Plan:    Anticipated Disposition:  Acute rehab placement    Barriers to d/c plan:  Awaiting antibody's    Follow Up:  KOBY will continue to follow.    CAMELIA Marcial  Social Work, 6A  Phone:  [phone]  Pager:  [phone]  7/2/2019

## 2019-07-02 NOTE — PLAN OF CARE
Pt. presented 6/19 with 2-3 weeks of progressive numbness at mid-chest region and below. VSS on RA. A&Ox4. Neuros include: Blind in R. eye with dysconjugate gaze, L. eye blurry vision baseline, RLE 4/5 all other extremities 5/5, N/T from below breast to toes (slightly improved in chest region per pt.). Tolerating mod CHO diet. Voids spontaneously without difficulty via bathroom/commode depending on LE strength. No BM this shift. Pt. had fall yesterday evening and slipped off of toilet so now up A2/GB/Walker for safety; noted to have increased difficulty moving RLE with transfer to commode. R. DL IJ, HL. L. PIV, SL. Had no c/o pain. Bruising present t/o. Discussed with MD to get apheresis catheter removed today in IR. Plans for ARU when medically ready. Continue to monitor and follow POC.

## 2019-07-02 NOTE — PROCEDURES
Non-tunneled central venous catheter placed 6/21/2019 for apheresis removed at bedside without difficulty.    Larry Boss PA-C  Interventional Radiology  [phone]     There are no preventive care reminders to display for this patient.

## 2019-07-02 NOTE — CONSULTS
Patient is on IR schedule 7/2/2019 for a removal of a right IJ non-tunneled CVC.    No NPO required.    Please contact the IR charge RN at [phone] for estimated time of procedure.     Case discussed with Dr. Velasco from IR and Dr. Ndiaye. Line was placed 6/21 for PLEX. It is no longer needed and team is requesting removal.    Emily Sullivan DNP, APRN  Interventional Radiology  Pager: [phone]

## 2019-07-02 NOTE — PROGRESS NOTES
Overnight event note    I was alerted to that patient fell to the floor from the toilet.  Prior to this event, she had appropriately paged for assistance to move from her bed to the bathroom.  The nursing assistant then gave Ms. Sheriff privacy and instructed the patient to alert her when she was ready to clean herself and get off of the toilet.  However the patient instead attempted to clean herself and in the process she slipped off of the right side of the toilet and landed onto her right hip and then called for help.  The nursing assistant found the patient on the floor with her left leg extended and her right leg with knee flexed seated on the floor.  The patient denies hitting her head and does not feel she injured any part of her body.  However, she does describe that she has quite severe loss of sensation below the waist at her baseline so it would be difficult to assess for a change in her musculoskeletal exam.    Ms Sheriff was assessed at bedside and she was not complaining of any new pain.  She had no scalp tenderness, no midline tenderness of her neck or back, full range of movement of her neck, no neck stiffness.  Full strength of her extremities, and no change to her cranial nerve exam (no vision out of right eye, disconjugate gaze).  Legs are equal in length without any abnormal rotation, there is no pain with range of movement of the ankles, knees, or hips.  There is no sense of instability of the joints with loading pressure or displacement.  No obvious bruises or abrasions.    Given that Ms. Sheriff cannot properly sense pain in her extremities even including her hips, I did feel it would be recommended to get an x-ray of her right hip and the right knee which bore most of the weight of her fall, to assess for any acute fracture that might warrant orthopedic intervention.    Chris Marcial PGY3   Neurology Resident

## 2019-07-02 NOTE — PLAN OF CARE
OT 6A. Cancel. Awaiting xray results this AM and pt eating lunch upon PM attempt. Unable to return later in the PM due to scheduling demands. Will reschedule per POC.

## 2019-07-02 NOTE — PROGRESS NOTES
[facility], [place]   Neurology Progress Note  7/2/2019    Subjective: Patient had a fall overnight from the toilet after shifting to her right side and then losing her balance. She fell onto her right side. She states that she did not hurt herself and feels well this morning. XR right hip and knee ordered to rule out acte injury, since patient does have ongoing sensory loss to the right side. She notes that well before onset of subacute progressive weakness/numbness, she has had longstanding numbness of right middle three toes which she has always attributed to poor circulation.    Objective:    Vitals: /53   Pulse 74   Temp 96.7  F (35.9  C) (Oral)   Resp 16   Wt 99 kg (218 lb 4.1 oz)   SpO2 97%   BMI 34.18 kg/m    General: Lying in bed and in NAD  HEENT: NC/AT, no icterus  Chest:  No respiratory distress  Extremities: warm, no edema   Skin: no rash or lesions   Neuro:   Mental status: awake, alert, oriented to person, place, and time. No evidence of aphasia  Cranial nerves: vision absent R eye, peripheral visual field defect L eye, disconjugate gaze (R eye abducted at baseline) with EOM otherwise intact, facial sensation intact to light touch, facial movements symmetric, no dysarthria  Motor: no abnormal movements, strength 5/5 throughout LLE, 4+/5 throughout RLE.  Reflexes: 1+/symmetric patellar, Achilles reflexes. No clonus, toes mute.   Sensory: intact to light touch, vibratory sense present but diminished in distal BLE. Loss of proprioception in right great toe.  Coordination: FNF without ataxia or dysmetria  Gait: deferred     Pertinent Investigations:       MRI Cervical Spine 6/19/19  Impression:    1. No spinal cord abnormality to indicate transverse myelitis.  2. Degenerative changes.     MRI Thoracic Spine 6/19/19                                  Impression:    Focal gadolinium enhancing lesion from T4 through T5 in the spinal cord on the right side with edema  extending from T3-T4 down to lower T5. This is most likely transverse myelitis, with differential diagnosis including a spinal cord astrocytoma.     MRI Brain w/o and w/ contrast 19           Impression:   The study demonstrates greater than 20 foci of T2-hyperintensity within the cerebral white matter which is suspicious for demyelination. Enhancing focus within the left parieto-occipital region is suspicious for active demyelination in the absence of known malignancy. No less likely, the differential for white matter lesions includes sequela of infectious or inflammatory insults, and vasculopathy.     LABS  SUSAN Ab IgG by IFA w/ Reflex: negative  C3: 110  C4: 16  ESR: 17 (0-30)  Angiotensin converting enzyme: 24 (9-67)  DsDNA: negative  LINWOOD panel:     RNP negative                        Macias 0.4 (0-0.9)                        SSA >8.0 (0-0.9)                        SSB >8.0 (0-0.9)                        Scleroderma Ab Scl-70 1.0 (0-0.9)  B12: 396  MMA: negative  Vit D: 40  Lupus anticoagulant panel: negative  Anticardiolipin Ab: negative  Beta 2 glycoprotein Abs: negative  Quantiferon Gold: negative  HB surface Ag: negative  HB core Ab: negative     19 CSF  Gram stain: no organisms or WBCs seen  Culture aerobic: NGTD  Enterovirus PCR: negative  VZV: negative  WNV: negative  RBC: 296  WBC: 0   Glucose: 72  Protein: 63  Oligoclonal bandin oligoclonal bands, 15.3 oligoclonal IgG   [facility] send-out, CNS demyelinating disease evaluation: pending     XR HIP RIGHT 2-3 VIEWS  2019                                                    IMPRESSION: No displaced fractures in the right hip on plain  radiographs. If there is high clinical suspicion for a nondisplaced  fracture or patient is unable to bear weight, MRI is more sensitive in  excluding a nondisplaced fracture.    XR KNEE RIGHT 1/2 VIEWS  2019  IMPRESSION: No acute bone abnormality in the right knee.      Assessment:   75yo female with PMH notable  for previous stroke/TIA, discoid lupus, Sjogren's syndrome, temporal arteritis, optic neuritis with vision impairment in right eye on chronic daily prednisone, CHF, recently diagnosed obstructive lung disease who presented 6/19 with 2-3 weeks of progressive numbness at mid-chest region and below. Cervical MRI 6/19 unremarkable, but thoracic MRI with T4-T5 enhancing lesion w/ surrounding edema and head MRI with multiple T2 hyperintensities and one small enhancing focus. CSF obtained 6/21 demonstrated elevated protein, glucose, and RBCs, but absent WBCs. CSF infectious workup negative thus far. Oligoclonal bands positive. SSA/SSB/Scl-70 positive, other AI labs negative or pending. Demyelinating panel pending. History and workup thus far are concerning for a demyelinating disorder. Differential is still broad including autoimmune, neoplasm, infection, vasculitis. Given patient's significant AI history, highest suspicion is for autoimmune inflammatory process (including NMOSD/MOG spectrum). PLEX therapy completed. Will await results of demyelinating panel which should result early this week to help guide long term management.     Plan:      #Thoracic myelopathy  #Progressive bilateral weakness/numbness  #Hx of discoid lupus, Sjogren's, temporal arteritis, optic neuritis  - Completed PLEX x 5 runs (6/22, 6/24, 6/26, 6/28, 6/30)  - completed 5-day course of high-dose methylprednisolone on 6/25  - AQP4 and MOG antibodies ([facility] send-out labs); called [facility] lab today to follow up, results expected by 7/3  - If AQP4 positive will start rituximab and if MOG positive will start mycophenolate  - Continue with PT/OT  - Rheum following  - Changed gabapentin to 300 mg PO BID & 600 mg qhs     #E coli UTI, asymptomatic  - Completed 5-day course of nitrofurantoin on 6/26     #Obstructive lung disease    - Continue PTA Incruse Ellipta inhaler  - Albuterol nebs q4h prn     #Hx of discoid lupus, Sjogren's syndrome, temporal arteritis,  optic neuritis  - completed 5-day course of methylprednisolone 1000mg  - resumed PTA prednisone 5 mg PO daily     #Hx of stroke/TIA  - Continue PTA ASA and clopidogrel     #Hypertension  #CHF  Patient takes amlodipine, metoprolol, and Lasix PTA. Noted to be hypotensive, hold amlodipine preferentially (B-blocker and diuretic needed for heart failure). Improved to 120-130s systolic after discontinuing amlodipine.   - Continue PTA metoprolol and Lasix    - hold PTA amlodipine d/t low BP  - Daily weights      #Hypercholesterolemia  - Continue PTA statin     Activity: up with assist  Diet: regular diet   DVT ppx: Lovenox  Bowel ppx: ranitidine, senna prn  Lines: PIV  Code: FULL  Dispo: ARU, placement pending     Patient seen and discussed with attending Dr. Alba.    Alee Ndiaye MD  Neurology PGY2  Pager: [phone]

## 2019-07-03 ENCOUNTER — APPOINTMENT (OUTPATIENT)
Dept: PHYSICAL THERAPY | Facility: CLINIC | Age: 74
DRG: 059 | End: 2019-07-03
Attending: PSYCHIATRY & NEUROLOGY
Payer: MEDICARE

## 2019-07-03 LAB
LAB SCANNED RESULT: ABNORMAL
RESULT: NORMAL
SEND OUTS MISC TEST CODE: NORMAL
SEND OUTS MISC TEST SPECIMEN: NORMAL
TEST NAME: NORMAL

## 2019-07-03 PROCEDURE — 25000132 ZZH RX MED GY IP 250 OP 250 PS 637: Mod: GY | Performed by: STUDENT IN AN ORGANIZED HEALTH CARE EDUCATION/TRAINING PROGRAM

## 2019-07-03 PROCEDURE — 97110 THERAPEUTIC EXERCISES: CPT | Mod: GP

## 2019-07-03 PROCEDURE — 97112 NEUROMUSCULAR REEDUCATION: CPT | Mod: GP

## 2019-07-03 PROCEDURE — 97530 THERAPEUTIC ACTIVITIES: CPT | Mod: GP

## 2019-07-03 PROCEDURE — 25000132 ZZH RX MED GY IP 250 OP 250 PS 637: Mod: GY | Performed by: PSYCHIATRY & NEUROLOGY

## 2019-07-03 PROCEDURE — 25000128 H RX IP 250 OP 636: Performed by: STUDENT IN AN ORGANIZED HEALTH CARE EDUCATION/TRAINING PROGRAM

## 2019-07-03 PROCEDURE — 25000132 ZZH RX MED GY IP 250 OP 250 PS 637: Mod: GY | Performed by: INTERNAL MEDICINE

## 2019-07-03 PROCEDURE — 12000001 ZZH R&B MED SURG/OB UMMC

## 2019-07-03 PROCEDURE — 25000131 ZZH RX MED GY IP 250 OP 636 PS 637: Mod: GY | Performed by: STUDENT IN AN ORGANIZED HEALTH CARE EDUCATION/TRAINING PROGRAM

## 2019-07-03 PROCEDURE — 97116 GAIT TRAINING THERAPY: CPT | Mod: GP

## 2019-07-03 RX ADMIN — TROLAMINE SALICYLATE: 10 CREAM TOPICAL at 23:25

## 2019-07-03 RX ADMIN — FUROSEMIDE 20 MG: 20 TABLET ORAL at 13:44

## 2019-07-03 RX ADMIN — SIMVASTATIN 20 MG: 20 TABLET, FILM COATED ORAL at 23:25

## 2019-07-03 RX ADMIN — METOPROLOL TARTRATE 50 MG: 50 TABLET ORAL at 08:22

## 2019-07-03 RX ADMIN — Medication 1 TABLET: at 08:22

## 2019-07-03 RX ADMIN — ENOXAPARIN SODIUM 40 MG: 40 INJECTION SUBCUTANEOUS at 20:20

## 2019-07-03 RX ADMIN — GABAPENTIN 300 MG: 300 CAPSULE ORAL at 13:44

## 2019-07-03 RX ADMIN — GABAPENTIN 600 MG: 300 CAPSULE ORAL at 23:25

## 2019-07-03 RX ADMIN — TROLAMINE SALICYLATE: 10 CREAM TOPICAL at 03:10

## 2019-07-03 RX ADMIN — CLOPIDOGREL BISULFATE 75 MG: 75 TABLET, FILM COATED ORAL at 08:22

## 2019-07-03 RX ADMIN — RANITIDINE 150 MG: 150 TABLET ORAL at 20:20

## 2019-07-03 RX ADMIN — Medication 1 TABLET: at 17:30

## 2019-07-03 RX ADMIN — Medication 10 MG: at 23:24

## 2019-07-03 RX ADMIN — FUROSEMIDE 20 MG: 20 TABLET ORAL at 08:22

## 2019-07-03 RX ADMIN — PREDNISONE 5 MG: 5 TABLET ORAL at 08:23

## 2019-07-03 RX ADMIN — FLUOXETINE 20 MG: 20 CAPSULE ORAL at 08:23

## 2019-07-03 RX ADMIN — FUROSEMIDE 20 MG: 20 TABLET ORAL at 20:20

## 2019-07-03 RX ADMIN — GABAPENTIN 300 MG: 300 CAPSULE ORAL at 08:22

## 2019-07-03 RX ADMIN — UMECLIDINIUM 1 PUFF: 62.5 AEROSOL, POWDER ORAL at 08:22

## 2019-07-03 RX ADMIN — RANITIDINE 150 MG: 150 TABLET ORAL at 08:22

## 2019-07-03 RX ADMIN — POLYETHYLENE GLYCOL 3350 17 G: 17 POWDER, FOR SOLUTION ORAL at 15:13

## 2019-07-03 RX ADMIN — TRAZODONE HYDROCHLORIDE 100 MG: 100 TABLET ORAL at 23:25

## 2019-07-03 RX ADMIN — METOPROLOL TARTRATE 50 MG: 50 TABLET ORAL at 20:20

## 2019-07-03 RX ADMIN — ASPIRIN 325 MG: 325 TABLET, DELAYED RELEASE ORAL at 20:20

## 2019-07-03 ASSESSMENT — ACTIVITIES OF DAILY LIVING (ADL)
ADLS_ACUITY_SCORE: 17
ADLS_ACUITY_SCORE: 16
ADLS_ACUITY_SCORE: 17
ADLS_ACUITY_SCORE: 17

## 2019-07-03 NOTE — PROGRESS NOTES
Social Work Services Progress Note    Hospital Day: 14  Date of Initial Social Work Evaluation:  6/21/19  Collaborated with:  Admissions at [facility] and Emilee Santos at [place] [facility].      Data:  SW is following for acute rehab placement.  Per Dr. Catherine, antibody results are still pending, thus, pt is not medically ready for discharge.      Intervention:  SW phoned [facility] Admissions and left a message updating. SW phoned Emilee Santos at [place] [facility] Admissions and updated Ally.    Assessment:  Pt is agreeable to acute rehab placement.    Plan:    Anticipated Disposition:  Acute rehab placement    Barriers to d/c plan:  Awaiting return of Antibody    Follow Up:  SW will continue to follow for discharge planning.    CAMELIA Marcial  Social Work, 6A  Phone:  [phone]  Pager:  [phone]  7/3/2019

## 2019-07-03 NOTE — PROGRESS NOTES
Care Coordinator Progress Note    Admission Date/Time:  2019  Attending MD:  Dr Pj Alba    Data  Informed by HITESH Arhcer Nurse Manager, pt had questions about insurance coverage for hospital stay.     Patient was admitted for:   Transverse myelitis     Coordination of Care and Referrals    Chart reviewed. Noted pt's insurance is Medicare & AARP. Pt's Medicare card shows she has Medicare Part A & A (card scanned under Media tab on 19). Inpatient Status.    This afternoon I introduced myself to pt. Pt alert, resting in bed. Pt inquiring about coverage for hospital stay and is worried how much she may have to pay. Pt said she only has Social Security income and about $10,000.   Reviewed with pt she has Medicare Part A & B and an AARP supplement. Informed pt per the Medicare web site, Medicare Part A provides hospital coverage. I printed off information from the Medicare web site on what Medicare Part A covers for an Inpatient hospital stay and gave to pt. Pt said her eyesight is not good and she will give to her son to look at.   Informed pt after she is discharged her insurance companies will be billed. At this time we don't have information for her on what her actual cost will be.   I gave pt the phone number for [employer]; for Lyubov Dowling, ANDREW Financial Counselor and the  Financial Counseling Dept. I said the Financial Counselor may be able to tell her in general more about coverage. Encouraged her to call if questions or concerns. Pt said she will give the information to her son.   [employer], phone:  [phone]    ANDREW Santos Financial Counselor  Phone:  [phone]  Financial Counseling Dept:  [phone]    Pt said her  , they lived in [place], [place]. She moved back to [place] to be closer to her family. Pt said when her  had heart surgery Medicare & AARP paid everything, she didn't see a bill.   Pt expressed appreciation for information.      Assessment  Pt on a limited income and limited financial resources; concerned about insurance coverage for hospital stay and out of pocket costs.     Plan  Pt or son to contact [employer] or  Financial Counselor if further questions.           Evangelina Suggs RN Care Coordinator  Unit [address], [facility]

## 2019-07-03 NOTE — PLAN OF CARE
Status: Pt with progressive numbness from chest down.   Vitals: VSS.  Neuros: R LE 4/5, 5/5 rest of extremities. Blind R eye with sluggish pupil. L eye vision cloudy, L field cut. R dysconjugate gaze. N/T from breasts down, R leg with absent proprioception.   IV: PIV SL'd.   Diet: Regular.  Bowel status: One BM this shift.   : Voiding spontaneously.   Skin: Bruises throughout, new one large bruise on L abdomen (provider notified)- pt receiving Lovenox. R neck PLEX catheter removed this morning, covered with dressing, CDI.   Pain: Denied this shift.    Activity: Up with one assist and walker.   Plan: Plan to discharge to ARU soon. Continue to monitor and follow POC.

## 2019-07-03 NOTE — PLAN OF CARE
Status: Patient admitted with progressive weakness over 2-3 weeks  Vitals: VSS  Neuros: A&Ox4, numbness and tingling from below breast line to toes (improving slightly per patient), R eye with dysconjugate gaze, blind in R eye  IV: PIV saline locked  Resp/trach: Lung sounds clear  Diet: Regular  Bowel status: Med BM after Miralax  : Voiding spontaneously  Skin: Intact  Pain: Denies  Activity: Up with A1, walker, gait belt  Social: Son at bedside this evening, supportive with cares  Plan: Plan for discharge to ARU, continue to monitor and follow POC

## 2019-07-03 NOTE — PLAN OF CARE
Pt here w/increased weakness from chest down, vss, neuros include: a/o x4, RLE 4/5, N/T from chest down. PIV SL, regular diet, up AO1 w/GB and walker, denies pain throughout shift. Pt voiding spont, had small BM this afternoon, worked w/therapies w/good tolerance and has been up in chair x2. Continue to monitor per MD orders.

## 2019-07-03 NOTE — PLAN OF CARE
/54 (BP Location: Left arm)   Pulse 74   Temp 97.2  F (36.2  C) (Oral)   Resp 16   Wt 99 kg (218 lb 4.1 oz)   SpO2 94%   BMI 34.18 kg/m    Status: Pt with progressive numbness from chest down.  VS: VSS  Neuro: R LE 4/5, 5/5 rest of extremities. Blind R eye with sluggish pupil. L eye vision cloudy, L field cut. R dysconjugate gaze at times. N/T from breasts down, R leg with absent proprioception.  Respiratory: LS CTA  GI: Regular, BM yesterday  : voids spontaneously without difficulty  IV: PIV SL  Skin: Bruises throughout, new one large bruise on L abdomen (provider notified)- pt receiving Lovenox. R neck PLEX catheter yesterday, covered with dressing, CDI.   Pain: denies  Activity: Up with one assist and walker. Pivot to commode x1 over night  Drains:  Social:   See flow sheets for further details and assessments.  Plan of Care: continue to monitor, notify MD of significant changes.

## 2019-07-03 NOTE — PLAN OF CARE
Discharge Planner PT   Patient plan for discharge: rehab   Current status: To promote improvement in functional strength, pt performed seated therex. She also performed seated and standing coordination drills for R LE - standing with FWW and CGA for support. Pt ambulated 3 x ~15-25' with FWW and CGA-min A. Displays R knee hyper extension, unsteady, slight steppage gait in order to obtain adequate foot clearance.   Barriers to return to prior living situation: medical needs, current mobility, level of A, falls risk   Recommendations for discharge: ARU   Rationale for recommendations: Pt would benefit from ongoing therapy to safely progress balance, coordination and functional strength in order to promote safe progress of overall functional mobility. She is motivated to work with therapy, she has interdisciplinary needs, anticipate she would tolerate extended therapy times and has supportive family.        Entered by: Stephanie Brewer 07/03/2019 9:52 AM

## 2019-07-03 NOTE — PROGRESS NOTES
"CLINICAL NUTRITION SERVICES - ASSESSMENT NOTE     Nutrition Prescription    RECOMMENDATIONS FOR MDs/PROVIDERS TO ORDER:  - Continued encouragement of adequate PO     Malnutrition Status:    - Unable to complete    Recommendations already ordered by Registered Dietitian (RD):  - none currently     Future/Additional Recommendations:  - monitor PO adequacy      REASON FOR ASSESSMENT  Ladonna Sheriff is a/an 74 year old female assessed by the dietitian for LOS    Medical History: Pt history of previous stroke, TIA, lupus, Sjogren's syndrome, temporal arteritis, optic neuritis with vision impairment in right eye, chronic daily low dosed prednisone (5 mg daily), hypertension, hypercholesterolemia, diastolic and possibly systolic congestive heart failure, recently diagnosed asthma and/or COPD.    NUTRITION HISTORY  Assess as able. Pt busy with cares x 2     CURRENT NUTRITION ORDERS  Diet: Regular  Intake/Tolerance: 100% PO per flowsheets    LABS  Labs reviewed    MEDICATIONS  Medications reviewed  Caltrate   Lasix  Prednisone   Zocor    ANTHROPOMETRICS  Height: 0 cm ([170cm[)   Ht Readings from Last 2 Encounters:   02/02/19 1.702 m (5' 7\")   07/17/16 1.702 m (5' 7\")   67\"   Most Recent Weight: (99kg)    IBW: 61 kg  BMI: Obesity Grade I BMI 30-34.9  Weight History:   Wt Readings from Last 9 Encounters:   02/04/19 97.6 kg (215 lb 3.2 oz)   07/19/16 96.4 kg (212 lb 8 oz)   07/17/16 90.7 kg (200 lb)     Dosing Weight: 71 kg (adjusted weight using current weight of 99 kg and IBW of 61 kg)     ASSESSED NUTRITION NEEDS  Estimated Energy Needs: 7330-8650 kcals/day (25 - 30 kcals/kg)  Justification: Maintenance  Estimated Protein Needs: 71- 85+ grams protein/day (1.0 - 1.2+ grams of pro/kg)  Justification: Maintenance  Estimated Fluid Needs: 3851-7004 mL/day (1 mL/kcal)   Justification: Maintenance and Per provider pending fluid status    PHYSICAL FINDINGS  See malnutrition section below.     MALNUTRITION  % Intake: Unable to assess; " currently tolerating 100% PO per flowsheets  % Weight Loss: None noted  Subcutaneous Fat Loss: Unable to assess  Muscle Loss: Unable to assess  Fluid Accumulation/Edema: Unable to assess  Malnutrition Diagnosis: Unable to determine due to unable to complete all parameters of NFPA. Pt with others cares x 2    NUTRITION DIAGNOSIS  Predicted inadequate nutrient intake related to hospital LOS and potential for menu fatigue     INTERVENTIONS  Implementation  Nutrition Education: Unable to complete due to pt with other cares    No nutrition interventions at this time. Will continue to monitor for adequate PO.      Goals  Patient to consume % of nutritionally adequate meal trays TID, or the equivalent with supplements/snacks.     Monitoring/Evaluation  Progress toward goals will be monitored and evaluated per protocol.     Estela Pop RD, LD, [facility]  Neuro ICU  Pager: [phone]

## 2019-07-04 LAB
ALBUMIN SERPL-MCNC: 3.5 G/DL (ref 3.4–5)
ALP SERPL-CCNC: 50 U/L (ref 40–150)
ALT SERPL W P-5'-P-CCNC: 41 U/L (ref 0–50)
ANION GAP SERPL CALCULATED.3IONS-SCNC: 8 MMOL/L (ref 3–14)
AST SERPL W P-5'-P-CCNC: 24 U/L (ref 0–45)
BASOPHILS # BLD AUTO: 0 10E9/L (ref 0–0.2)
BASOPHILS NFR BLD AUTO: 0.3 %
BILIRUB SERPL-MCNC: 0.5 MG/DL (ref 0.2–1.3)
BUN SERPL-MCNC: 15 MG/DL (ref 7–30)
CALCIUM SERPL-MCNC: 8.7 MG/DL (ref 8.5–10.1)
CHLORIDE SERPL-SCNC: 106 MMOL/L (ref 94–109)
CO2 SERPL-SCNC: 29 MMOL/L (ref 20–32)
CREAT SERPL-MCNC: 0.97 MG/DL (ref 0.52–1.04)
DIFFERENTIAL METHOD BLD: ABNORMAL
EOSINOPHIL # BLD AUTO: 0.1 10E9/L (ref 0–0.7)
EOSINOPHIL NFR BLD AUTO: 1.7 %
ERYTHROCYTE [DISTWIDTH] IN BLOOD BY AUTOMATED COUNT: 14.4 % (ref 10–15)
GFR SERPL CREATININE-BSD FRML MDRD: 58 ML/MIN/{1.73_M2}
GLUCOSE SERPL-MCNC: 99 MG/DL (ref 70–99)
HCT VFR BLD AUTO: 38.8 % (ref 35–47)
HGB BLD-MCNC: 11.9 G/DL (ref 11.7–15.7)
IMM GRANULOCYTES # BLD: 0 10E9/L (ref 0–0.4)
IMM GRANULOCYTES NFR BLD: 0.4 %
LYMPHOCYTES # BLD AUTO: 1.3 10E9/L (ref 0.8–5.3)
LYMPHOCYTES NFR BLD AUTO: 18.4 %
MCH RBC QN AUTO: 32.4 PG (ref 26.5–33)
MCHC RBC AUTO-ENTMCNC: 30.7 G/DL (ref 31.5–36.5)
MCV RBC AUTO: 106 FL (ref 78–100)
MONOCYTES # BLD AUTO: 0.8 10E9/L (ref 0–1.3)
MONOCYTES NFR BLD AUTO: 11 %
NEUTROPHILS # BLD AUTO: 4.8 10E9/L (ref 1.6–8.3)
NEUTROPHILS NFR BLD AUTO: 68.2 %
NRBC # BLD AUTO: 0 10*3/UL
NRBC BLD AUTO-RTO: 0 /100
PLATELET # BLD AUTO: 145 10E9/L (ref 150–450)
POTASSIUM SERPL-SCNC: 3.6 MMOL/L (ref 3.4–5.3)
PROT SERPL-MCNC: 6.2 G/DL (ref 6.8–8.8)
RBC # BLD AUTO: 3.67 10E12/L (ref 3.8–5.2)
SODIUM SERPL-SCNC: 143 MMOL/L (ref 133–144)
WBC # BLD AUTO: 7 10E9/L (ref 4–11)

## 2019-07-04 PROCEDURE — 25000132 ZZH RX MED GY IP 250 OP 250 PS 637: Mod: GY | Performed by: STUDENT IN AN ORGANIZED HEALTH CARE EDUCATION/TRAINING PROGRAM

## 2019-07-04 PROCEDURE — 25000128 H RX IP 250 OP 636: Performed by: PSYCHIATRY & NEUROLOGY

## 2019-07-04 PROCEDURE — 93005 ELECTROCARDIOGRAM TRACING: CPT

## 2019-07-04 PROCEDURE — 25000128 H RX IP 250 OP 636: Performed by: STUDENT IN AN ORGANIZED HEALTH CARE EDUCATION/TRAINING PROGRAM

## 2019-07-04 PROCEDURE — 36415 COLL VENOUS BLD VENIPUNCTURE: CPT | Performed by: PSYCHIATRY & NEUROLOGY

## 2019-07-04 PROCEDURE — 85025 COMPLETE CBC W/AUTO DIFF WBC: CPT | Performed by: PSYCHIATRY & NEUROLOGY

## 2019-07-04 PROCEDURE — 25800030 ZZH RX IP 258 OP 636: Performed by: PSYCHIATRY & NEUROLOGY

## 2019-07-04 PROCEDURE — 25000131 ZZH RX MED GY IP 250 OP 636 PS 637: Mod: GY | Performed by: STUDENT IN AN ORGANIZED HEALTH CARE EDUCATION/TRAINING PROGRAM

## 2019-07-04 PROCEDURE — 25000132 ZZH RX MED GY IP 250 OP 250 PS 637: Mod: GY | Performed by: INTERNAL MEDICINE

## 2019-07-04 PROCEDURE — 25000132 ZZH RX MED GY IP 250 OP 250 PS 637: Mod: GY | Performed by: PSYCHIATRY & NEUROLOGY

## 2019-07-04 PROCEDURE — 93010 ELECTROCARDIOGRAM REPORT: CPT | Performed by: INTERNAL MEDICINE

## 2019-07-04 PROCEDURE — 12000001 ZZH R&B MED SURG/OB UMMC

## 2019-07-04 PROCEDURE — 40000141 ZZH STATISTIC PERIPHERAL IV START W/O US GUIDANCE

## 2019-07-04 PROCEDURE — 80053 COMPREHEN METABOLIC PANEL: CPT | Performed by: PSYCHIATRY & NEUROLOGY

## 2019-07-04 RX ORDER — SODIUM CHLORIDE 9 MG/ML
INJECTION, SOLUTION INTRAVENOUS CONTINUOUS PRN
Status: DISCONTINUED | OUTPATIENT
Start: 2019-07-04 | End: 2019-07-05

## 2019-07-04 RX ORDER — ALBUTEROL SULFATE 0.83 MG/ML
2.5 SOLUTION RESPIRATORY (INHALATION)
Status: DISCONTINUED | OUTPATIENT
Start: 2019-07-04 | End: 2019-07-05

## 2019-07-04 RX ORDER — ALBUTEROL SULFATE 90 UG/1
2 AEROSOL, METERED RESPIRATORY (INHALATION)
Status: DISCONTINUED | OUTPATIENT
Start: 2019-07-04 | End: 2019-07-05

## 2019-07-04 RX ORDER — METHYLPREDNISOLONE SODIUM SUCCINATE 125 MG/2ML
125 INJECTION, POWDER, LYOPHILIZED, FOR SOLUTION INTRAMUSCULAR; INTRAVENOUS
Status: DISCONTINUED | OUTPATIENT
Start: 2019-07-04 | End: 2019-07-05

## 2019-07-04 RX ORDER — ACETAMINOPHEN 325 MG/1
650 TABLET ORAL ONCE
Status: COMPLETED | OUTPATIENT
Start: 2019-07-04 | End: 2019-07-04

## 2019-07-04 RX ORDER — DIPHENHYDRAMINE HCL 25 MG
50 CAPSULE ORAL ONCE
Status: COMPLETED | OUTPATIENT
Start: 2019-07-04 | End: 2019-07-04

## 2019-07-04 RX ORDER — DIPHENHYDRAMINE HYDROCHLORIDE 50 MG/ML
50 INJECTION INTRAMUSCULAR; INTRAVENOUS
Status: DISCONTINUED | OUTPATIENT
Start: 2019-07-04 | End: 2019-07-05

## 2019-07-04 RX ORDER — MEPERIDINE HYDROCHLORIDE 25 MG/ML
25 INJECTION INTRAMUSCULAR; INTRAVENOUS; SUBCUTANEOUS EVERY 30 MIN PRN
Status: DISCONTINUED | OUTPATIENT
Start: 2019-07-04 | End: 2019-07-05

## 2019-07-04 RX ADMIN — ASPIRIN 325 MG: 325 TABLET, DELAYED RELEASE ORAL at 21:41

## 2019-07-04 RX ADMIN — FUROSEMIDE 20 MG: 20 TABLET ORAL at 08:51

## 2019-07-04 RX ADMIN — RANITIDINE 150 MG: 150 TABLET ORAL at 08:51

## 2019-07-04 RX ADMIN — CLOPIDOGREL BISULFATE 75 MG: 75 TABLET, FILM COATED ORAL at 08:51

## 2019-07-04 RX ADMIN — METOPROLOL TARTRATE 50 MG: 50 TABLET ORAL at 21:41

## 2019-07-04 RX ADMIN — SIMVASTATIN 20 MG: 20 TABLET, FILM COATED ORAL at 21:42

## 2019-07-04 RX ADMIN — GABAPENTIN 300 MG: 300 CAPSULE ORAL at 08:51

## 2019-07-04 RX ADMIN — Medication 1 TABLET: at 21:41

## 2019-07-04 RX ADMIN — DIPHENHYDRAMINE HYDROCHLORIDE 50 MG: 25 CAPSULE ORAL at 13:30

## 2019-07-04 RX ADMIN — PREDNISONE 5 MG: 5 TABLET ORAL at 08:51

## 2019-07-04 RX ADMIN — TROLAMINE SALICYLATE: 10 CREAM TOPICAL at 12:32

## 2019-07-04 RX ADMIN — FUROSEMIDE 20 MG: 20 TABLET ORAL at 13:30

## 2019-07-04 RX ADMIN — ACETAMINOPHEN 650 MG: 325 TABLET, FILM COATED ORAL at 13:30

## 2019-07-04 RX ADMIN — TRAZODONE HYDROCHLORIDE 100 MG: 100 TABLET ORAL at 21:42

## 2019-07-04 RX ADMIN — ALBUTEROL SULFATE 2 PUFF: 90 AEROSOL, METERED RESPIRATORY (INHALATION) at 16:31

## 2019-07-04 RX ADMIN — ENOXAPARIN SODIUM 40 MG: 40 INJECTION SUBCUTANEOUS at 21:42

## 2019-07-04 RX ADMIN — GABAPENTIN 600 MG: 300 CAPSULE ORAL at 21:41

## 2019-07-04 RX ADMIN — GABAPENTIN 300 MG: 300 CAPSULE ORAL at 13:30

## 2019-07-04 RX ADMIN — RITUXIMAB 1000 MG: 10 INJECTION, SOLUTION INTRAVENOUS at 14:11

## 2019-07-04 RX ADMIN — TROLAMINE SALICYLATE: 10 CREAM TOPICAL at 21:43

## 2019-07-04 RX ADMIN — UMECLIDINIUM 1 PUFF: 62.5 AEROSOL, POWDER ORAL at 08:51

## 2019-07-04 RX ADMIN — FUROSEMIDE 20 MG: 20 TABLET ORAL at 21:41

## 2019-07-04 RX ADMIN — FLUOXETINE 20 MG: 20 CAPSULE ORAL at 08:51

## 2019-07-04 RX ADMIN — RANITIDINE 150 MG: 150 TABLET ORAL at 21:41

## 2019-07-04 RX ADMIN — Medication 10 MG: at 21:42

## 2019-07-04 RX ADMIN — Medication 1 TABLET: at 08:51

## 2019-07-04 RX ADMIN — METOPROLOL TARTRATE 50 MG: 50 TABLET ORAL at 08:51

## 2019-07-04 ASSESSMENT — ACTIVITIES OF DAILY LIVING (ADL)
ADLS_ACUITY_SCORE: 15
ADLS_ACUITY_SCORE: 16

## 2019-07-04 NOTE — PLAN OF CARE
Started pt on rituximab at 1411, at approx 1625 and with med running at 200 ml/hr pt started reporting increasing SOB and difficulty breathing de-sating to 90-91% on RA when previously was sating between 94-98% on RA. Pt remained vitally stable for BP and HR, and afebrile status. RN stopped infusion, called MD and EKG was garnered per Dr. Ndiaye's order. MD quickly came to assess pt and writer and LS were determined to be clear by MD and writer. Approx 15 min after stopping infusion, pt stated and displayed ease of breathing again with albuterol administration. MD called writer and instructed to restart infusion at 150 ml/hr, advance to 175 ml/hr without reoccurring symptoms, and to leave med running at 175 ml/hr for remainder of administration. Pt remains on 1 lpm of O2 with sats of 93-95%, clear lung sounds, and denying SOB.

## 2019-07-04 NOTE — PLAN OF CARE
Status: Patient here with increased weakness from chest down and numbness and tingling. Completed 1 round of PLEX and awaiting lab results from [place] to help diagnose.  Neuros: A&Ox4. Neuros intact except: numbness and tingling from below breast line to toes (improving slightly per patient), right eye dysconjugate gaze and blind in the right eye.  IV: PIV SL  Resp/trach: LSCTA  Diet: Regular  Bowel status: Last BM yesterday  : Voiding spontaneously via commode  Skin: Intact except scabbed darnell on left side of neck from where CVC was for PLEX Tx  Pain: Denies  Activity: Up with Ax1, walker and gait belt  Social: No visitors this shift.  Plan: Plan for discharge to ARU once medically stable. Will continue to monitor and follow POC.

## 2019-07-04 NOTE — PROGRESS NOTES
[facility], [place]   Neurology Progress Note  7/3/2019    Subjective: No acute events overnight. Patient expresses a desire to remain optimistic despite prolonged hospital stay. This writer contacted [place] [facility] again today as well as [place]'s extension for send-out labs, who reported that AQP4 and MOG results are now expected back between 7/8 and 7/10.     Objective:    Vitals: /55 (BP Location: Right arm)   Pulse 74   Temp 97.8  F (36.6  C) (Oral)   Resp 14   Wt 99 kg (218 lb 4.1 oz)   SpO2 93%   BMI 34.18 kg/m    General: Sitting up in chair in NAD  HEENT: NC/AT, no icterus  Chest:  No respiratory distress  Extremities: warm, no edema   Skin: no rash or lesions   Neuro:   Mental status: awake, alert, oriented to person, place, and time.  Cranial nerves: vision absent R eye, peripheral visual field defect L eye, disconjugate gaze (R eye abducted at baseline) with EOM otherwise intact, facial movements symmetric, no dysarthria.  Motor: no abnormal movements, strength 5/5 throughout LLE, 4+/5 throughout RLE.  Reflexes: 1+/symmetric patellar, Achilles reflexes. No clonus, toes mute.   Sensory: intact to light touch, vibratory sense present but diminished in distal BLE. Loss of proprioception in right great toe.  Coordination: FNF without ataxia or dysmetria.  Gait: deferred     Pertinent Investigations:       MRI Cervical Spine 6/19/19  Impression:    1. No spinal cord abnormality to indicate transverse myelitis.  2. Degenerative changes.     MRI Thoracic Spine 6/19/19                                  Impression:    Focal gadolinium enhancing lesion from T4 through T5 in the spinal cord on the right side with edema extending from T3-T4 down to lower T5. This is most likely transverse myelitis, with differential diagnosis including a spinal cord astrocytoma.     MRI Brain w/o and w/ contrast 6/20/19           Impression:   The study demonstrates greater than 20 foci of  T2-hyperintensity within the cerebral white matter which is suspicious for demyelination. Enhancing focus within the left parieto-occipital region is suspicious for active demyelination in the absence of known malignancy. No less likely, the differential for white matter lesions includes sequela of infectious or inflammatory insults, and vasculopathy.     LABS  SUSAN Ab IgG by IFA w/ Reflex: negative  C3: 110  C4: 16  ESR: 17 (0-30)  Angiotensin converting enzyme: 24 (9-67)  DsDNA: negative  LINWOOD panel:     RNP negative                        Macias 0.4 (0-0.9)                        SSA >8.0 (0-0.9)                        SSB >8.0 (0-0.9)                        Scleroderma Ab Scl-70 1.0 (0-0.9)  B12: 396  MMA: negative  Vit D: 40  Lupus anticoagulant panel: negative  Anticardiolipin Ab: negative  Beta 2 glycoprotein Abs: negative  Quantiferon Gold: negative  HB surface Ag: negative  HB core Ab: negative     19 CSF  Gram stain: no organisms or WBCs seen  Culture aerobic: NGTD  Enterovirus PCR: negative  VZV: negative  WNV: negative  RBC: 296  WBC: 0   Glucose: 72  Protein: 63  Oligoclonal bandin oligoclonal bands, 15.3 oligoclonal IgG   [place] send-out, CNS demyelinating disease evaluation: pending      Assessment:   75yo female with PMH notable for previous stroke/TIA, discoid lupus, Sjogren's syndrome, temporal arteritis, optic neuritis with vision impairment in right eye on chronic daily prednisone, CHF, recently diagnosed obstructive lung disease who presented  with 2-3 weeks of progressive numbness at mid-chest region and below. Cervical MRI  unremarkable, but thoracic MRI with T4-T5 enhancing lesion w/ surrounding edema and head MRI with multiple T2 hyperintensities and one small enhancing focus. CSF obtained  demonstrated elevated protein, glucose, and RBCs, but absent WBCs. CSF infectious workup negative thus far. Oligoclonal bands positive. SSA/SSB/Scl-70 positive, other AI labs negative or  pending. Demyelinating panel pending. History and workup thus far are concerning for a demyelinating disorder. Differential is still broad including autoimmune, neoplasm, infection, vasculitis. Given patient's significant AI history, highest suspicion is for autoimmune inflammatory process (including NMOSD/MOG spectrum). PLEX therapy completed. Will await results of demyelinating panel to help guide long term management.     Plan:      #Thoracic myelopathy  #Progressive bilateral weakness/numbness  #Hx of discoid lupus, Sjogren's, temporal arteritis, optic neuritis  - Completed PLEX x 5 runs (6/22, 6/24, 6/26, 6/28, 6/30)  - completed 5-day course of high-dose methylprednisolone on 6/25  - AQP4 and MOG antibodies ([place] send-out labs); called today to follow up results, now expected by 7/8-7/10   - Plan has been to start rituximab if AQP4 positive, mycophenolate if MOG positive   - consider starting empiric treatment with mycophenolate, follow up with Dr. Macias  - Continue with PT/OT  - Rheum following  - gabapentin 300 mg PO BID & 600 mg qhs     #E coli UTI, asymptomatic  - Completed 5-day course of nitrofurantoin on 6/26     #Obstructive lung disease    - Continue PTA Incruse Ellipta inhaler  - Albuterol nebs q4h prn     #Hx of discoid lupus, Sjogren's syndrome, temporal arteritis, optic neuritis  - completed 5-day course of methylprednisolone 1000mg  - resumed PTA prednisone 5 mg PO daily     #Hx of stroke/TIA  - Continue PTA ASA and clopidogrel     #Hypertension  #CHF  Patient takes amlodipine, metoprolol, and Lasix PTA. Noted to be hypotensive, hold amlodipine preferentially (B-blocker and diuretic needed for heart failure). Improved to 120-130s systolic after discontinuing amlodipine.   - Continue PTA metoprolol and Lasix    - hold PTA amlodipine d/t low BP  - Daily weights      #Hypercholesterolemia  - Continue PTA statin     Activity: up with assist  Diet: regular diet   DVT ppx: Lovenox  Bowel  ppx: ranitidine, senna prn  Lines: PIV  Code: FULL  Dispo: ARU, placement pending     Patient seen and discussed with attending Dr. Alba.    Alee Ndiaye MD  Neurology PGY2  Pager: [phone]

## 2019-07-04 NOTE — PROGRESS NOTES
Brief Progress Note      At approximately 1630, patient began to experience shortness of breath. She was receiving rituximab infusion, which had been started at 1411 at a rate of 50 mg/hr. Patient had been premedicated with acetaminophen and diphenhydramine per protocol. Symptom onset occurred shortly after infusion rate was increased to 200 mg/hr. The infusion was stopped, patient was put on 2L O2 to maintain sats of 96-98%.     On my examination, patient states that she was already starting to feel better. Denies swelling or itching of tongue or throat. No flushing, hives, or pallor were noted. Slight increased work of breathing on NC. Maintained O2 sat of 96% when NC turned off. Lungs clear to auscultation bilaterally with no wheezing noted. HR 68, 3/6 systolic murmur appreciated which has been noted in past exams over years per chart review. Stat EKG was obtained showing NSR. Albuterol 2 puffs administered. Permission given to RN to resume infusion at 150 mg/hr. Infusion should be stopped if patient develops new or worsening symptoms.     Alee Ndiaye MD  Neurology PGY-2

## 2019-07-04 NOTE — PLAN OF CARE
Status: Pt being monitored for neurological changes  Vitals: VSS  Neuros: R eye dys. Gaze ,blind in R eye, blurry in L eye, numbness from breasts down but improving per pt  IV: currently infusing rituximab at 175 ml/hr ok'd by Neuro MD  Resp/trach: on RA currently with sats between 92-95%, no labored breathing or SOB reported by pt  Diet: tolerated mod carb diet without difficulty  Bowel status: No BM today, passing flatus  : voiding without difficulty, continent  Skin: bruises on arms bilaterally from old PIV sites. Scabbed R neck site from old internal jugular  Pain: denied pain entire shift  Activity: up with 1, GB, walker to pivot, near fall caught by writer this afternoon, pt reminded to go slower when ambulating.  Social: no visitors today  Plan: ARU ready tomorrow

## 2019-07-04 NOTE — PROGRESS NOTES
[facility], [place]   Neurology Progress Note  7/4/2019    Subjective: No acute events overnight. AQP4 came back positive, patient made aware of this result and the associated diagnosis of neuromyelitis optica. Patient explains that she is not surprised, as she has been researching the condition online ever since she was informed of the likelihood that this was causing her symptoms. She expresses relief at having found a definitive diagnosis.     Objective:    Vitals: /61   Pulse 62   Temp 96.2  F (35.7  C) (Oral)   Resp 16   Wt 99 kg (218 lb 4.1 oz)   SpO2 96%   BMI 34.18 kg/m    General: Sitting up in chair in NAD  HEENT: NC/AT, no icterus  Chest:  No respiratory distress  Extremities: warm, no edema   Skin: no rash or lesions   Neuro:   Mental status: awake, alert, oriented to person, place, and time.  Cranial nerves: vision absent R eye, peripheral visual field defect L eye, disconjugate gaze (R eye abducted at baseline) with EOM otherwise intact, facial movements symmetric, no dysarthria.  Motor: no abnormal movements, strength 5/5 throughout LLE, 4+/5 throughout RLE.  Reflexes: 2+/symmetric patellar, Achilles reflexes. No clonus, toes mute.   Sensory: intact to light touch, vibratory sense present but diminished in distal BLE. Loss of proprioception in right great toe.  Coordination: FNF without ataxia or dysmetria.  Gait: deferred     Pertinent Investigations:       MRI Cervical Spine 6/19/19  Impression:    1. No spinal cord abnormality to indicate transverse myelitis.  2. Degenerative changes.     MRI Thoracic Spine 6/19/19                                  Impression:    Focal gadolinium enhancing lesion from T4 through T5 in the spinal cord on the right side with edema extending from T3-T4 down to lower T5. This is most likely transverse myelitis, with differential diagnosis including a spinal cord astrocytoma.     MRI Brain w/o and w/ contrast  19           Impression:   The study demonstrates greater than 20 foci of T2-hyperintensity within the cerebral white matter which is suspicious for demyelination. Enhancing focus within the left parieto-occipital region is suspicious for active demyelination in the absence of known malignancy. No less likely, the differential for white matter lesions includes sequela of infectious or inflammatory insults, and vasculopathy.     LABS  AQP4: POSITIVE  MOG: negative  SUSAN Ab IgG by IFA w/ Reflex: negative  C3: 110  C4: 16  ESR: 17 (0-30)  Angiotensin converting enzyme: 24 (9-67)  DsDNA: negative  LINWOOD panel:     RNP negative                        Macias 0.4 (0-0.9)                        SSA >8.0 (0-0.9)                        SSB >8.0 (0-0.9)                        Scleroderma Ab Scl-70 1.0 (0-0.9)  B12: 396  MMA: negative  Vit D: 40  Lupus anticoagulant panel: negative  Anticardiolipin Ab: negative  Beta 2 glycoprotein Abs: negative  Quantiferon Gold: negative  HB surface Ag: negative  HB core Ab: negative     19 CSF  Gram stain: no organisms or WBCs seen  Culture aerobic: NGTD  Enterovirus PCR: negative  VZV: negative  WNV: negative  RBC: 296  WBC: 0   Glucose: 72  Protein: 63  Oligoclonal bandin oligoclonal bands, 15.3 oligoclonal IgG       Assessment:   73yo female with PMH notable for previous stroke/TIA, discoid lupus, Sjogren's syndrome, temporal arteritis, optic neuritis with vision impairment in right eye on chronic daily prednisone, CHF, recently diagnosed obstructive lung disease who presented  with 2-3 weeks of progressive numbness at mid-chest region and below. 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      Plan:      #Neuromyelitis optica  #Progressive bilateral weakness/numbness  #H/o discoid lupus, Sjogren's, temporal arteritis, recurrent optic neuritis  AQP4 antibody positive diagnostic of neuromyelitis optica. Will begin Rituximab and follow up with  Dr. Macias, will likely need repeat infusion in 2 weeks or after discharge from ARU.   - Rituximab 1,000 mg IV x 1 dose today  - Follow up with Dr. Macias in 2 weeks  - Completed PLEX x 5 runs (6/22, 6/24, 6/26, 6/28, 6/30)  - completed 5-day course of high-dose methylprednisolone on 6/25  - Continue PT/OT  - Rheum following  - gabapentin 300 mg PO BID & 600 mg qhs     #E coli UTI, asymptomatic  - Completed 5-day course of nitrofurantoin on 6/26     #Obstructive lung disease    - Continue PTA Incruse Ellipta inhaler  - Albuterol nebs q4h prn     #Hx of discoid lupus, Sjogren's syndrome, temporal arteritis, optic neuritis  - completed 5-day course of methylprednisolone 1000mg  - resumed PTA prednisone 5 mg PO daily     #Hx of stroke/TIA  - Continue PTA ASA and clopidogrel     #Hypertension  #CHF  Patient takes amlodipine, metoprolol, and Lasix PTA. Noted to be hypotensive, hold amlodipine preferentially (B-blocker and diuretic needed for heart failure). Improved to 120s systolic after discontinuing amlodipine.   - Continue PTA metoprolol and Lasix    - hold PTA amlodipine d/t low BP  - Daily weights      #Hypercholesterolemia  - Continue PTA statin     Activity: up with assist  Diet: regular diet   DVT ppx: Lovenox  Bowel ppx: ranitidine, senna prn  Lines: PIV  Code: FULL  Dispo: ARU, placement pending     Patient seen and discussed with attending Dr. Alba.    Alee Ndiaye MD  Neurology PGY2  Pager: [phone]

## 2019-07-05 ENCOUNTER — APPOINTMENT (OUTPATIENT)
Dept: PHYSICAL THERAPY | Facility: CLINIC | Age: 74
DRG: 059 | End: 2019-07-05
Attending: PSYCHIATRY & NEUROLOGY
Payer: MEDICARE

## 2019-07-05 ENCOUNTER — APPOINTMENT (OUTPATIENT)
Dept: OCCUPATIONAL THERAPY | Facility: CLINIC | Age: 74
DRG: 059 | End: 2019-07-05
Attending: PSYCHIATRY & NEUROLOGY
Payer: MEDICARE

## 2019-07-05 LAB
CREAT SERPL-MCNC: 0.87 MG/DL (ref 0.52–1.04)
GFR SERPL CREATININE-BSD FRML MDRD: 65 ML/MIN/{1.73_M2}
INTERPRETATION ECG - MUSE: NORMAL
PLATELET # BLD AUTO: 165 10E9/L (ref 150–450)

## 2019-07-05 PROCEDURE — 25000132 ZZH RX MED GY IP 250 OP 250 PS 637: Mod: GY | Performed by: INTERNAL MEDICINE

## 2019-07-05 PROCEDURE — 82565 ASSAY OF CREATININE: CPT | Performed by: PSYCHIATRY & NEUROLOGY

## 2019-07-05 PROCEDURE — 25000131 ZZH RX MED GY IP 250 OP 636 PS 637: Mod: GY | Performed by: STUDENT IN AN ORGANIZED HEALTH CARE EDUCATION/TRAINING PROGRAM

## 2019-07-05 PROCEDURE — 97116 GAIT TRAINING THERAPY: CPT | Mod: GP

## 2019-07-05 PROCEDURE — 97112 NEUROMUSCULAR REEDUCATION: CPT | Mod: GP

## 2019-07-05 PROCEDURE — 25000132 ZZH RX MED GY IP 250 OP 250 PS 637: Mod: GY | Performed by: PSYCHIATRY & NEUROLOGY

## 2019-07-05 PROCEDURE — 85049 AUTOMATED PLATELET COUNT: CPT | Performed by: PSYCHIATRY & NEUROLOGY

## 2019-07-05 PROCEDURE — 36415 COLL VENOUS BLD VENIPUNCTURE: CPT | Performed by: PSYCHIATRY & NEUROLOGY

## 2019-07-05 PROCEDURE — 25000132 ZZH RX MED GY IP 250 OP 250 PS 637: Mod: GY | Performed by: STUDENT IN AN ORGANIZED HEALTH CARE EDUCATION/TRAINING PROGRAM

## 2019-07-05 PROCEDURE — 97530 THERAPEUTIC ACTIVITIES: CPT | Mod: GO

## 2019-07-05 PROCEDURE — 12000001 ZZH R&B MED SURG/OB UMMC

## 2019-07-05 PROCEDURE — 97535 SELF CARE MNGMENT TRAINING: CPT | Mod: GO

## 2019-07-05 PROCEDURE — 25000128 H RX IP 250 OP 636: Performed by: STUDENT IN AN ORGANIZED HEALTH CARE EDUCATION/TRAINING PROGRAM

## 2019-07-05 RX ADMIN — Medication 10 MG: at 21:52

## 2019-07-05 RX ADMIN — RANITIDINE 150 MG: 150 TABLET ORAL at 09:11

## 2019-07-05 RX ADMIN — GABAPENTIN 300 MG: 300 CAPSULE ORAL at 13:39

## 2019-07-05 RX ADMIN — SIMVASTATIN 20 MG: 20 TABLET, FILM COATED ORAL at 21:54

## 2019-07-05 RX ADMIN — METOPROLOL TARTRATE 50 MG: 50 TABLET ORAL at 21:53

## 2019-07-05 RX ADMIN — Medication 1 TABLET: at 17:06

## 2019-07-05 RX ADMIN — ACETAMINOPHEN 650 MG: 325 TABLET, FILM COATED ORAL at 16:54

## 2019-07-05 RX ADMIN — Medication 1 TABLET: at 09:11

## 2019-07-05 RX ADMIN — FUROSEMIDE 20 MG: 20 TABLET ORAL at 09:11

## 2019-07-05 RX ADMIN — ASPIRIN 325 MG: 325 TABLET, DELAYED RELEASE ORAL at 21:55

## 2019-07-05 RX ADMIN — ENOXAPARIN SODIUM 40 MG: 40 INJECTION SUBCUTANEOUS at 21:51

## 2019-07-05 RX ADMIN — FLUOXETINE 20 MG: 20 CAPSULE ORAL at 09:10

## 2019-07-05 RX ADMIN — UMECLIDINIUM 1 PUFF: 62.5 AEROSOL, POWDER ORAL at 09:12

## 2019-07-05 RX ADMIN — PREDNISONE 5 MG: 5 TABLET ORAL at 09:10

## 2019-07-05 RX ADMIN — TRAZODONE HYDROCHLORIDE 100 MG: 100 TABLET ORAL at 21:53

## 2019-07-05 RX ADMIN — CLOPIDOGREL BISULFATE 75 MG: 75 TABLET, FILM COATED ORAL at 09:10

## 2019-07-05 RX ADMIN — FUROSEMIDE 20 MG: 20 TABLET ORAL at 21:55

## 2019-07-05 RX ADMIN — GABAPENTIN 300 MG: 300 CAPSULE ORAL at 09:11

## 2019-07-05 RX ADMIN — GABAPENTIN 600 MG: 300 CAPSULE ORAL at 21:54

## 2019-07-05 RX ADMIN — METOPROLOL TARTRATE 50 MG: 50 TABLET ORAL at 09:10

## 2019-07-05 RX ADMIN — RANITIDINE 150 MG: 150 TABLET ORAL at 21:54

## 2019-07-05 RX ADMIN — FUROSEMIDE 20 MG: 20 TABLET ORAL at 13:39

## 2019-07-05 ASSESSMENT — VISUAL ACUITY
OU: BLURRED VISION

## 2019-07-05 ASSESSMENT — ACTIVITIES OF DAILY LIVING (ADL)
ADLS_ACUITY_SCORE: 15

## 2019-07-05 NOTE — PROGRESS NOTES
[facility], [place]   Neurology Progress Note  7/5/2019    Subjective: Patient became acutely SOB during rituximab administration yesterday evening, resolved with brief 2L O2 NC, albuterol, and slowing rate of infusion. This morning feels well, reports improved strength and sensation today.     Objective:    Vitals: /59 (BP Location: Right arm)   Pulse 72   Temp 97.5  F (36.4  C) (Oral)   Resp 16   Wt 99 kg (218 lb 4.1 oz)   SpO2 92%   BMI 34.18 kg/m    General: Sitting up in chair in NAD  HEENT: NC/AT, no icterus  Chest:  No respiratory distress  Extremities: warm, no edema   Skin: no rash or lesions   Neuro:   Mental status: awake, alert, oriented to person, place, and time.  Cranial nerves: vision absent R eye, peripheral visual field defect L eye, disconjugate gaze (R eye abducted at baseline) with EOM otherwise intact, facial movements symmetric, no dysarthria.  Motor: no abnormal movements, strength 5/5 throughout BLE.  Reflexes: 2+/symmetric patellar, Achilles reflexes. No clonus, toes mute.   Sensory: intact to light touch and vibratory sense in bilateral feet and ankles. Loss of proprioception in right great toe.  Coordination: FNF without ataxia or dysmetria.  Gait: deferred     Pertinent Investigations:      Creatinine   Date Value Ref Range Status   07/05/2019 0.87 0.52 - 1.04 mg/dL Final     Platelet Count   Date Value Ref Range Status   07/05/2019 165 150 - 450 10e9/L Final       Assessment:   73yo female with PMH notable for previous stroke/TIA, discoid lupus, Sjogren's syndrome, temporal arteritis, optic neuritis with vision impairment in right eye on chronic daily prednisone, CHF, recently diagnosed obstructive lung disease who presented 6/19 with 2-3 weeks of progressive numbness at mid-chest region and below. Autoimmune workup positive for AQP4 antibodies, diagnostic of neuromyelitis optica.      Plan:      #Neuromyelitis optica  #Progressive bilateral  weakness/numbness  #H/o discoid lupus, Sjogren's, temporal arteritis, recurrent optic neuritis  AQP4 antibody positive diagnostic of neuromyelitis optica. Started Rituximab (7/4), will need repeat infusion in ~2 weeks (after discharge from ARU) per Dr. Macias.  - s/p Rituximab 1,000 mg IV x 1 dose (7/4)  - Follow up with Dr. Macias in 2 weeks  - Completed PLEX x 5 runs (6/22, 6/24, 6/26, 6/28, 6/30)  - completed 5-day course of high-dose methylprednisolone on 6/25  - Continue PT/OT  - Rheum following  - gabapentin 300 mg PO BID & 600 mg qhs     #E coli UTI, asymptomatic  - Completed 5-day course of nitrofurantoin on 6/26     #Obstructive lung disease    - Continue PTA Incruse Ellipta inhaler  - Albuterol nebs q4h prn     #Hx of discoid lupus, Sjogren's syndrome, temporal arteritis, optic neuritis  - continue PTA prednisone 5 mg PO daily     #Hx of stroke/TIA  - Continue PTA ASA and clopidogrel     #Hypertension  #CHF  Patient takes amlodipine, metoprolol, and Lasix PTA. Noted to be hypotensive, hold amlodipine preferentially (B-blocker and diuretic needed for heart failure). Improved to 120s systolic after discontinuing amlodipine.   - Continue PTA metoprolol and Lasix    - hold PTA amlodipine d/t low BP  - Daily weights      #Hypercholesterolemia  - Continue PTA statin     Activity: up with assist  Diet: regular diet   DVT ppx: Lovenox  Bowel ppx: ranitidine, senna prn  Lines: PIV  Code: FULL  Dispo: ARU, likely discharge tomorrow     Patient seen and discussed with attending Dr. Alba.    Alee Ndiaye MD  Neurology PGY2  Pager: [phone]

## 2019-07-05 NOTE — PLAN OF CARE
OT 6A:  Discharge Planner OT   Patient plan for discharge: rehab  Current status: Pt completed full shower with set-up, min assist.  Pt instructed in shower transfer technique to sit on chair first and then lift LEs over ledge as pt unable to lift LE in standing.  Pt executes transfer with min A.  Pt stands for g/h following shower with SBA.  Pt able to complete LB dressing with set-up and increased time.  Barriers to return to prior living situation: medical status, activity tolerance, 16 stairs, lives alone, needing A for ADL/IADL and mobility  Recommendations for discharge: ARU  Rationale for recommendations: Pt is below baseline in functional mobility and ADL/IADL and is not safe to return home at current status.  Pt would benefit from continued OT daily and has multidisciplinary needs that make her appropriate for an ARU setting.  Pt is motivated and able to tolerate 3 hours of therapy each day.        Entered by: Yola Mccann 07/05/2019 3:49 PM

## 2019-07-05 NOTE — PROGRESS NOTES
Social Work Services Progress Note    Hospital Day: 16  Date of Initial Social Work Evaluation:  6/21/19  Collaborated with:  Dr. Ndiaye    Data:  SW is following for discharge planning.  ARU is recommended.      Intervention:  SW attended rounds.  Per Dr. Ndiaye, pt is medically ready for discharge.  Pt received a 1x dose of Rituximab on 7/4/19 and will require another in 2 weeks. SW has informed Dr. Ndiaye that pt cannot receive Rituximab during her rehab stay. Dr. Ndiaye voices understanding.  SW phoned [facility] ARU Admissions ([facility]ilyn) and informed of readiness for discharge. SW faxed current progress notes to Ailyn. Ailyn states that she will review with her MD and get back to this SW in regards to whether or not they can accept pt for admit.      Assessment:  Per Dr. Ndiaye, pt is medically ready for discharge.    Plan:    Anticipated Disposition: Acute rehab placement.    Barriers to d/c plan:  Await decision from [facility] ARU    Follow Up:  SW will continue to follow.    CAMELIA Marcial  Social Work, 6A  Phone:  [phone]  Pager:  [phone]  7/5/2019

## 2019-07-05 NOTE — DISCHARGE SUMMARY
[facility]  NEUROLOGY DISCHARGE SUMMARY    Patient Name:  Ladonna Sheriff  MRN:  [id number]      :  1945      Date of Admission:  2019  Date of Discharge:  2019  Admitting Physician:  Vasquez Macias MD  Discharge Physician:  Pj Alba MD  Primary Care Provider:   Joceline Ty  Discharge Disposition:   Discharged to rehabilitation facility    Admission Diagnoses:  Acute progressive bilateral lower extremity weakness  Recurrent optic neuritis    Discharge Diagnoses:    Neuromyelitis optica    Brief History of Illness:   74 year old female with PMHx of TIA, discoid lupus, Sjogren's syndrome, temporal arteritis, recurrent optic neuritis on chronic low-dose prednisone, HTN, HLD, diastolic and possibly systolic congestive heart failure 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     Hospital Course:  MRI cervical spine unremarkable, MRI thoracic spine notable for T4-T5 enhancing lesion w/ surrounding edema. MR Brain revealed many T2 hyperintensities with one small enhancing focus. Patient underwent lumbar puncture on . CSF contained elevated protein, glucose, and RBCs, with no WBCs. CSF infectious workup negative. 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 Per Dr. Macias, patient was started on rituximab. Quantiferon Gold, HB core Ab and HB surface antigen checked and negative prior to starting immunosuppressive agent. Patient received fist infusion on  which she tolerated well with no major complications. She did become short of breath shortly after infusion rate was increased to 200 mg/hr. The infusion was momentarily stopped, patient given 2L O2 by nasal cannula and albuterol 2 puffs. Symptoms resolved and infusion  "was resumed and completed at a slower rate (150 mg/hr -->175 mg/hr) without further issue. Patient noted increased strength the following morning, expressed readiness to proceed with intensive rehab.     Pertinent Investigations:    Labs  SUSAN, dsDNA, lupus anticoagulant, anticardiolipin, beta 2 glycoprotein negative.   ESR, ACE, C3, and C4 wnl.   Vit B12, MMA, Vit D wnl.   Hep B and Quantiferon Gold negative.     LINWOOD panel:      RNP negative   Macias 0.4 (0-0.9)   SSA >8.0 (0-0.9)   SSB >8.0 (0-0.9)   Scleroderma Ab Scl-70 1.0 (0-0.9)     CSF Studies:   Gram stain: no organisms or WBCs seen   Culture aerobic: NGTD   Enterovirus PCR: negative   VZV: negative   WNV: negative   RBC: 296   WBC: 0    Glucose: 72   Protein: 63   Oligoclonal bandin oligoclonal bands, 15.3 oligoclonal IgG    MOG: negative   AQP4: POSITIVE     Imaging  MR Cervical Spine  2019  \"IMPRESSION:    1. No spinal cord abnormality to indicate transverse myelitis.  2. Degenerative changes.\"     MR Cervical Spine  2019  \"IMPRESSION:  Focal gadolinium enhancing lesion from T4 through T5 in  the spinal cord on the right side with edema extending from T3-T4 down  to lower T5. This is most likely transverse myelitis, with  differential diagnosis including a spinal cord astrocytoma.\"    MR Brain w/ and w/o contrast  2019  \"Impression:   The study demonstrates greater than 20 foci of T2-hyperintensity  within the cerebral white matter which is suspicious for  demyelination. Enhancing focus within the left parieto-occipital  region is suspicious for active demyelination in the absence of known  malignancy. No less likely, the differential for white matter lesions  includes sequela of infectious or inflammatory insults, and  Vasculopathy.\"     Consultations:    Rheumatology  Physical Therapy    Recommendations and Follow-up:  1. Discharge to acute rehabilitation, continue with Physical Therapy.  2. Repeat rituximab infusion after discharge " from rehabilitation facility (to be ordered by Dr. Macias).  3. Follow up with Dr[facility] Macias at [facility] in 2 weeks.    Discharge physical examination:   /68 (BP Location: Right arm)   Pulse 60   Temp 95.9  F (35.5  C) (Oral)   Resp 16   Wt 99 kg (218 lb 4.1 oz)   SpO2 96%   BMI 34.18 kg/m    General: Sitting up in bed  HEENT: NC/AT, no icterus  Chest:  No respiratory distress  Extremities: warm, no edema   Skin: no rash or lesions  Psych: Pleasant affect, optimistic   Neuro:   Mental status: awake, alert, oriented to person, place, and time.  Cranial nerves: vision absent R eye, peripheral visual field defect L eye, disconjugate gaze (R eye abducted at baseline) with EOM otherwise intact, facial movements symmetric, no dysarthria.  Motor: no abnormal movements, strength 5/5 throughout BLE.  Reflexes: 2+/symmetric patellar, Achilles reflexes. No clonus, toes mute.   Sensory: intact to light touch and vibratory sense in bilateral feet and ankles. Loss of proprioception in right great toe.  Coordination: FNF without ataxia or dysmetria.  Gait: deferred  Discharge Medications:  Current Discharge Medication List      START taking these medications    Details   !! gabapentin (NEURONTIN) 300 MG capsule Take 1 capsule (300 mg) by mouth 2 times daily For neuropathic pain    Associated Diagnoses: Neuromyelitis optica (H)      !! gabapentin (NEURONTIN) 300 MG capsule Take 2 capsules (600 mg) by mouth At Bedtime For neuropathic pain    Associated Diagnoses: Neuromyelitis optica (H)      trolamine salicylate (ASPERCREME) 10 % external cream Apply topically 2 times daily as needed for moderate pain    Associated Diagnoses: Neuromyelitis optica (H)       !! - Potential duplicate medications found. Please discuss with provider.      CONTINUE these medications which have NOT CHANGED    Details   albuterol (PROAIR HFA/PROVENTIL HFA/VENTOLIN HFA) 108 (90 Base) MCG/ACT inhaler Inhale 2 puffs into the lungs every 6 hours as needed        aspirin (ASA) 325 MG EC tablet Take 325 mg by mouth every evening       B Complex-C (VITAMIN B COMPLEX W/VITAMIN C) TABS tablet Take 1 tablet by mouth daily      Calcium-Vitamin D 600-200 MG-UNIT TABS Take 1 tablet by mouth every evening       clopidogrel (PLAVIX) 75 MG tablet Take 75 mg by mouth every morning      FLUoxetine (PROZAC) 20 MG capsule Take 20 mg by mouth daily      furosemide (LASIX) 20 MG tablet Take 20 mg by mouth 3 times daily      metoprolol tartrate (LOPRESSOR) 50 MG tablet Take 50 mg by mouth 2 times daily      Multiple Vitamins-Minerals (MULTIVITAL PO) Take 1 tablet by mouth every morning       Potassium Gluconate 595 MG TBCR Take 1 tablet by mouth every evening       predniSONE (DELTASONE) 5 MG tablet Take 5 mg by mouth every morning      simvastatin (ZOCOR) 20 MG tablet Take 20 mg by mouth At Bedtime      traZODone (DESYREL) 100 MG tablet Take 100 mg by mouth At Bedtime      umeclidinium (INCRUSE ELLIPTA) 62.5 MCG/INH inhaler Inhale 1 puff into the lungs daily      vitamin C (ASCORBIC ACID) 100 MG tablet Take 500 mg by mouth every morning          STOP taking these medications       amLODIPine (NORVASC) 10 MG tablet Comments:   Reason for Stopping:               Discharge follow up and instructions:     Neurology Adult Referral      General info for SNF    Length of Stay Estimate: Short Term Care: Estimated # of Days <30  Condition at Discharge: Improving  Level of care:skilled   Rehabilitation Potential: Good  Admission H&P remains valid and up-to-date: Yes  Recent Chemotherapy: Rituximab Date: 7/4/2019                      Use Nursing Home Standing Orders: N/A     Mantoux instructions    Give two-step Mantoux (PPD) Per Facility Policy Yes     Activity - Up with nursing assistance     Additional Discharge Instructions    Patient will receive her second rituximab infusion after discharge from rehab facility. Will be ordered by Dr. Larry Macias ([facility]).     Reason for your hospital  stay    You were hospitalized for evaluation and treatment of progressive bilateral lower extremity weakness and diagnosed with neuromyelitis optica.     Follow Up ([facility])    Follow up with Dr. Larry Macias at [facility] after discharge from rehab facility (~2 weeks) for hospital follow-up and to schedule repeat rituximab infusion. The following labs/tests are recommended: CBC.    Appointments on [place] and/or [facility] (with [facility] or [facility] provider or service). Call [phone] if you haven't heard regarding these appointments within 7 days of discharge.     Full Code     Physical Therapy Adult Consult    Evaluate and treat as clinically indicated.    Reason:  Bilateral lower extremity weakness R>L     Occupational Therapy Adult Consult    Evaluate and treat as clinically indicated.    Reason:  Bilateral lower extremity weakness     Advance Diet as Tolerated    Follow this diet upon discharge: Orders Placed This Encounter      Consistent Carbohydrate Diet 8709-8811 Calories: Moderate Consistent CHO (4-6 CHO units/meal)       Patient seen and discussed with attending Dr. Alba.    Alee Ndiaye MD  Neurology PGY-2  [phone]

## 2019-07-05 NOTE — PLAN OF CARE
Status: Pt admitted for progressive weakness. S/p PLEX treatment and dose 1 of Rituximab. Newly diagnosed neuromyelitis optica   Vitals: VSS on RA  Neuros: AO4, strength RLE 4/5. Complains of numbness/tingling from buttocks to toes bilaterally. R eye blind, L eye with blurry/cloudy vision and dysconjugate.   IV: PIV SL  Resp/trach: LS clear bilaterally   Diet: Regular diet, tolerating well  Bowel status: Last BM 7/4  : Voiding spontaneously   Skin: Bruising throughout   Pain: Denies  Activity: Up to commode with assist of 1, GB and walker   Social: No visitors overnight   Plan: Medically ready for ARU, awaiting placement. Continue to monitor and follow POC

## 2019-07-05 NOTE — PLAN OF CARE
Discharge Planner PT   Patient plan for discharge: ARU  Current status: pt remains very eager and motivated for therapy. She ambulates 3x100' with FWW CGA/Florence with cues for R LE coordination. CGA/Florence sit<>stand to FWW, SBA bed mobility. Pt also participates in seated R LE coordination exercises.  Barriers to return to prior living situation: stairs, impaired balance/coordination, high falls risk  Recommendations for discharge: ARU  Rationale for recommendations: pt well below baseline mobility. Would benefit from intense multidisciplinary rehab to address significant balance, strength, coordination, and activity tolerance deficits to progress toward PLOF. She is very eager and motivated, would tolerate 3 hours therapy daily and has good prognosis to significantly improve mobility with ARU stay, continues to make gains in therapy in hospital.       Entered by: Ally Ko 07/05/2019 11:51 AM

## 2019-07-05 NOTE — PROGRESS NOTES
Social Work Services Progress Note    Hospital Day: 16  Date of Initial Social Work Evaluation:  6/21/19  Collaborated with:  Dr. Ndiaye,  patient    Data:  SW is following for discharge planning.  ARU is recommended.  Pt is medically ready for discharge.    Intervention:  SW placed followed up calls to [facility] ([facility] at 12:04pm and 1:58pm, requesting that Ailyn call in regards to whether or not they can accept pt for admission.   SW has not received a response.  KOBY updated Dr. Ndiaye. SW met with pt and updated.  SW explained that per Neurology, pt is medically ready for discharge.  SW explained that we are not able to delay discharge until the preferred facility is able to take.  SW explained that neither [facility] will take pt this late in the day and neither accept weekend admits.  [facility].  Pt is receptive to placement at [facility]. SW phoned Admissions at [facility] ([facility]) and they will review for weekend admit.  KOBY updated Dr. Ndiaye.    Assessment: Pt is receptive to ARU placement at [place] due to the lack of response from [facility] today.    Plan:    Anticipated Disposition: Acute Rehab    Barriers to d/c plan:  [facility] Admissions will review pt and get back to this SW in regards to if they can accept pt for admit this weekend.    Follow Up:  KOBY will continue to follow.    CAMELIA Marcial  Social Work, 6A  Phone:  [phone]  Pager:  [phone]  7/5/2019

## 2019-07-06 ENCOUNTER — APPOINTMENT (OUTPATIENT)
Dept: PHYSICAL THERAPY | Facility: CLINIC | Age: 74
DRG: 059 | End: 2019-07-06
Attending: PSYCHIATRY & NEUROLOGY
Payer: MEDICARE

## 2019-07-06 ENCOUNTER — HOSPITAL ENCOUNTER (INPATIENT)
Facility: CLINIC | Age: 74
LOS: 10 days | Discharge: HOME HEALTH | DRG: 949 | End: 2019-07-16
Attending: PHYSICAL MEDICINE & REHABILITATION | Admitting: PHYSICAL MEDICINE & REHABILITATION
Payer: MEDICARE

## 2019-07-06 VITALS
DIASTOLIC BLOOD PRESSURE: 68 MMHG | WEIGHT: 218.26 LBS | TEMPERATURE: 95.9 F | RESPIRATION RATE: 16 BRPM | SYSTOLIC BLOOD PRESSURE: 126 MMHG | HEART RATE: 60 BPM | OXYGEN SATURATION: 96 % | BODY MASS INDEX: 34.18 KG/M2

## 2019-07-06 DIAGNOSIS — Z78.9 IMPAIRED MOBILITY AND ACTIVITIES OF DAILY LIVING: Primary | ICD-10-CM

## 2019-07-06 DIAGNOSIS — G36.0 NEUROMYELITIS OPTICA (H): ICD-10-CM

## 2019-07-06 DIAGNOSIS — Z74.09 IMPAIRED MOBILITY AND ACTIVITIES OF DAILY LIVING: Primary | ICD-10-CM

## 2019-07-06 PROCEDURE — 25000132 ZZH RX MED GY IP 250 OP 250 PS 637: Mod: GY | Performed by: PHYSICAL MEDICINE & REHABILITATION

## 2019-07-06 PROCEDURE — 25000131 ZZH RX MED GY IP 250 OP 636 PS 637: Mod: GY | Performed by: STUDENT IN AN ORGANIZED HEALTH CARE EDUCATION/TRAINING PROGRAM

## 2019-07-06 PROCEDURE — 97110 THERAPEUTIC EXERCISES: CPT | Mod: GP | Performed by: REHABILITATION PRACTITIONER

## 2019-07-06 PROCEDURE — 25000132 ZZH RX MED GY IP 250 OP 250 PS 637: Mod: GY | Performed by: STUDENT IN AN ORGANIZED HEALTH CARE EDUCATION/TRAINING PROGRAM

## 2019-07-06 PROCEDURE — 25000132 ZZH RX MED GY IP 250 OP 250 PS 637: Mod: GY | Performed by: INTERNAL MEDICINE

## 2019-07-06 PROCEDURE — 97530 THERAPEUTIC ACTIVITIES: CPT | Mod: GP | Performed by: REHABILITATION PRACTITIONER

## 2019-07-06 PROCEDURE — 25000128 H RX IP 250 OP 636: Performed by: STUDENT IN AN ORGANIZED HEALTH CARE EDUCATION/TRAINING PROGRAM

## 2019-07-06 PROCEDURE — 12800006 ZZH R&B REHAB

## 2019-07-06 PROCEDURE — 97167 OT EVAL HIGH COMPLEX 60 MIN: CPT | Mod: GO | Performed by: OCCUPATIONAL THERAPIST

## 2019-07-06 PROCEDURE — 25000132 ZZH RX MED GY IP 250 OP 250 PS 637: Mod: GY | Performed by: PSYCHIATRY & NEUROLOGY

## 2019-07-06 PROCEDURE — 97116 GAIT TRAINING THERAPY: CPT | Mod: GP | Performed by: REHABILITATION PRACTITIONER

## 2019-07-06 RX ORDER — PREDNISONE 5 MG/1
5 TABLET ORAL EVERY MORNING
Status: DISCONTINUED | OUTPATIENT
Start: 2019-07-07 | End: 2019-07-16 | Stop reason: HOSPADM

## 2019-07-06 RX ORDER — ASCORBIC ACID 500 MG
500 TABLET ORAL EVERY MORNING
Status: DISCONTINUED | OUTPATIENT
Start: 2019-07-07 | End: 2019-07-16 | Stop reason: HOSPADM

## 2019-07-06 RX ORDER — SIMVASTATIN 20 MG
20 TABLET ORAL AT BEDTIME
Status: DISCONTINUED | OUTPATIENT
Start: 2019-07-06 | End: 2019-07-16 | Stop reason: HOSPADM

## 2019-07-06 RX ORDER — TROLAMINE SALICYLATE 10 G/100G
CREAM TOPICAL 4 TIMES DAILY PRN
Status: DISCONTINUED | OUTPATIENT
Start: 2019-07-06 | End: 2019-07-16 | Stop reason: HOSPADM

## 2019-07-06 RX ORDER — PLANT STANOL ESTER 450 MG
2.5 TABLET ORAL DAILY
Status: DISCONTINUED | OUTPATIENT
Start: 2019-07-06 | End: 2019-07-16 | Stop reason: HOSPADM

## 2019-07-06 RX ORDER — POLYETHYLENE GLYCOL 3350 17 G/17G
17 POWDER, FOR SOLUTION ORAL DAILY PRN
Status: DISCONTINUED | OUTPATIENT
Start: 2019-07-06 | End: 2019-07-16 | Stop reason: HOSPADM

## 2019-07-06 RX ORDER — METOPROLOL TARTRATE 50 MG
50 TABLET ORAL 2 TIMES DAILY
Status: DISCONTINUED | OUTPATIENT
Start: 2019-07-06 | End: 2019-07-16 | Stop reason: HOSPADM

## 2019-07-06 RX ORDER — GABAPENTIN 300 MG/1
600 CAPSULE ORAL AT BEDTIME
Status: ON HOLD | DISCHARGE
Start: 2019-07-06 | End: 2019-07-16

## 2019-07-06 RX ORDER — GABAPENTIN 300 MG/1
600 CAPSULE ORAL AT BEDTIME
Status: DISCONTINUED | OUTPATIENT
Start: 2019-07-06 | End: 2019-07-16 | Stop reason: HOSPADM

## 2019-07-06 RX ORDER — GABAPENTIN 300 MG/1
300 CAPSULE ORAL DAILY
Status: DISCONTINUED | OUTPATIENT
Start: 2019-07-07 | End: 2019-07-08

## 2019-07-06 RX ORDER — POTASSIUM GLUCONATE 595(99)MG
1 TABLET, EXTENDED RELEASE ORAL EVERY EVENING
Status: DISCONTINUED | OUTPATIENT
Start: 2019-07-06 | End: 2019-07-06

## 2019-07-06 RX ORDER — AMOXICILLIN 250 MG
1-4 CAPSULE ORAL 2 TIMES DAILY PRN
Status: DISCONTINUED | OUTPATIENT
Start: 2019-07-06 | End: 2019-07-16 | Stop reason: HOSPADM

## 2019-07-06 RX ORDER — TROLAMINE SALICYLATE 10 G/100G
CREAM TOPICAL 2 TIMES DAILY PRN
DISCHARGE
Start: 2019-07-06

## 2019-07-06 RX ORDER — FUROSEMIDE 20 MG
20 TABLET ORAL 3 TIMES DAILY
Status: DISCONTINUED | OUTPATIENT
Start: 2019-07-06 | End: 2019-07-16 | Stop reason: HOSPADM

## 2019-07-06 RX ORDER — ALBUTEROL SULFATE 90 UG/1
1 AEROSOL, METERED RESPIRATORY (INHALATION) EVERY 6 HOURS PRN
Status: DISCONTINUED | OUTPATIENT
Start: 2019-07-06 | End: 2019-07-16 | Stop reason: HOSPADM

## 2019-07-06 RX ORDER — TRAZODONE HYDROCHLORIDE 50 MG/1
100 TABLET, FILM COATED ORAL AT BEDTIME
Status: DISCONTINUED | OUTPATIENT
Start: 2019-07-06 | End: 2019-07-16 | Stop reason: HOSPADM

## 2019-07-06 RX ORDER — GABAPENTIN 300 MG/1
300 CAPSULE ORAL 2 TIMES DAILY
Status: ON HOLD | DISCHARGE
Start: 2019-07-06 | End: 2019-07-16

## 2019-07-06 RX ORDER — CLOPIDOGREL BISULFATE 75 MG/1
75 TABLET ORAL EVERY MORNING
Status: DISCONTINUED | OUTPATIENT
Start: 2019-07-07 | End: 2019-07-16 | Stop reason: HOSPADM

## 2019-07-06 RX ORDER — ACETAMINOPHEN 325 MG/1
325-975 TABLET ORAL EVERY 6 HOURS PRN
Status: DISCONTINUED | OUTPATIENT
Start: 2019-07-06 | End: 2019-07-16 | Stop reason: HOSPADM

## 2019-07-06 RX ADMIN — GABAPENTIN 600 MG: 300 CAPSULE ORAL at 20:29

## 2019-07-06 RX ADMIN — TROLAMINE SALICYLATE: 10 CREAM TOPICAL at 23:22

## 2019-07-06 RX ADMIN — Medication 1 TABLET: at 09:02

## 2019-07-06 RX ADMIN — METOPROLOL TARTRATE 50 MG: 50 TABLET ORAL at 09:01

## 2019-07-06 RX ADMIN — GABAPENTIN 300 MG: 300 CAPSULE ORAL at 09:01

## 2019-07-06 RX ADMIN — OYSTER SHELL CALCIUM WITH VITAMIN D 1 TABLET: 500; 200 TABLET, FILM COATED ORAL at 20:29

## 2019-07-06 RX ADMIN — ACETAMINOPHEN 650 MG: 325 TABLET, FILM COATED ORAL at 00:33

## 2019-07-06 RX ADMIN — ASPIRIN 325 MG: 325 TABLET, DELAYED RELEASE ORAL at 20:28

## 2019-07-06 RX ADMIN — FLUOXETINE 20 MG: 20 CAPSULE ORAL at 09:02

## 2019-07-06 RX ADMIN — ACETAMINOPHEN 650 MG: 325 SOLUTION ORAL at 06:40

## 2019-07-06 RX ADMIN — SIMVASTATIN 20 MG: 20 TABLET, FILM COATED ORAL at 20:30

## 2019-07-06 RX ADMIN — PREDNISONE 5 MG: 5 TABLET ORAL at 09:02

## 2019-07-06 RX ADMIN — METOPROLOL TARTRATE 50 MG: 50 TABLET ORAL at 20:29

## 2019-07-06 RX ADMIN — CLOPIDOGREL BISULFATE 75 MG: 75 TABLET, FILM COATED ORAL at 09:01

## 2019-07-06 RX ADMIN — TRAZODONE HYDROCHLORIDE 100 MG: 50 TABLET ORAL at 20:29

## 2019-07-06 RX ADMIN — Medication 2.5 MEQ: at 13:19

## 2019-07-06 RX ADMIN — RANITIDINE 150 MG: 150 TABLET ORAL at 20:29

## 2019-07-06 RX ADMIN — FUROSEMIDE 20 MG: 20 TABLET ORAL at 20:30

## 2019-07-06 RX ADMIN — RANITIDINE 150 MG: 150 TABLET ORAL at 09:02

## 2019-07-06 RX ADMIN — FUROSEMIDE 20 MG: 20 TABLET ORAL at 13:19

## 2019-07-06 RX ADMIN — ENOXAPARIN SODIUM 40 MG: 40 INJECTION SUBCUTANEOUS at 20:28

## 2019-07-06 RX ADMIN — FUROSEMIDE 20 MG: 20 TABLET ORAL at 09:02

## 2019-07-06 ASSESSMENT — ACTIVITIES OF DAILY LIVING (ADL)
TRANSFERRING: 0-->INDEPENDENT
ADLS_ACUITY_SCORE: 15
ADLS_ACUITY_SCORE: 15
SWALLOWING: 0-->SWALLOWS FOODS/LIQUIDS WITHOUT DIFFICULTY
PREVIOUS_RESPONSIBILITIES: MEAL PREP;HOUSEKEEPING;LAUNDRY;MEDICATION MANAGEMENT;FINANCES
NUMBER_OF_TIMES_PATIENT_HAS_FALLEN_WITHIN_LAST_SIX_MONTHS: 2
AMBULATION: 0-->INDEPENDENT
FALL_HISTORY_WITHIN_LAST_SIX_MONTHS: YES
WHICH_OF_THE_ABOVE_FUNCTIONAL_RISKS_HAD_A_RECENT_ONSET_OR_CHANGE?: AMBULATION;TRANSFERRING;TOILETING;BATHING;DRESSING;FALL HISTORY
TOILETING: 0-->INDEPENDENT
RETIRED_COMMUNICATION: 0-->UNDERSTANDS/COMMUNICATES WITHOUT DIFFICULTY
ADLS_ACUITY_SCORE: 15
BATHING: 0-->INDEPENDENT
RETIRED_EATING: 0-->INDEPENDENT
DRESS: 0-->INDEPENDENT
COGNITION: 0 - NO COGNITION ISSUES REPORTED

## 2019-07-06 ASSESSMENT — MIFFLIN-ST. JEOR: SCORE: 1496.07

## 2019-07-06 ASSESSMENT — VISUAL ACUITY
OU: BLURRED VISION
OU: BLURRED VISION

## 2019-07-06 NOTE — PLAN OF CARE
Occupational Therapy Discharge Summary    Reason for therapy discharge:    Discharged to acute rehabilitation facility.    Progress towards therapy goal(s). [facility] for goal details.  Goals partially met.  Barriers to achieving goals:   discharge from facility.    Therapy recommendation(s):    Continued therapy is recommended.  Rationale/Recommendations:  Pt progressing well toward goals, mod A to SBA for ADL.  Pt would benefit from continued therapy in ARU setting to maximize safety and I with ADL/IADL.       Problem: OT General Care Plan  Goal: OT Frequency  Outcome: Completed  Goal: Hygiene/Grooming (OT)  Description  Hygiene/Grooming (OT)  Outcome: Adequate for Discharge  Note:   CGA to SBA while standing with FWW  Goal: Lower Body Dressing (OT)  Description  Lower Body Dressing (OT)  Outcome: Adequate for Discharge  Note:   Set-up and SBA from seated EOB  Goal: Lower Body Bathing (OT)  Description  Lower Body Bathing (OT)  Outcome: Adequate for Discharge  Note:   Max A to wash LEs and feet  Goal: Toilet Transfer/Toileting (OT)  Description  Toilet Transfer/Toileting (OT)  Outcome: Adequate for Discharge  Note:   OT: min A to CGA  Goal: Meal Preparation (OT)  Description  Meal Preparation (OT)  Outcome: Adequate for Discharge  Goal: OT Goal 1  Description  OT Goal 1  Outcome: Adequate for Discharge

## 2019-07-06 NOTE — PLAN OF CARE
Discharge Planner OT   Patient plan for discharge: Home with HH vs OP  Current status: Min A SPT using fww, CGA short distance mobility using fww. ADL assessment will be completed in future sessions. Performance limited by BLE weakness, BLE sensory deficits, and impaired balance. Pt has baseline visual deficits (blind R eye and missing peripheral vision in L eye)  Barriers to return to prior living situation: Home set up (16 stairs to access 2nd level apartment) and current level of assist.  Recommendations for discharge: 10-14 days to address goals in POC.       Entered by: Eleanor Hope 07/06/2019 2:44 PM

## 2019-07-06 NOTE — PLAN OF CARE
FOCUS/GOAL  Bowel management, Bladder management and Medical management    ASSESSMENT, INTERVENTIONS AND CONTINUING PLAN FOR GOAL:  Patient arrived to unit at 1110 via w/c, transported by [employer] from . 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 Bilateral L/E weakness, has hard time moving right L/E, reported that overextended her right during therapy session on 7/5. CGA with gait belt and walker. Continent of bladder and bowel, walked to the bathroom, had large soft, formed BM, needed assistance with toilet hygiene. Denies pain or discomfort. General orientation provided with all questions answered. Fall precautions in place.

## 2019-07-06 NOTE — PROGRESS NOTES
Social Work Services Discharge Note      Patient Name:  Ladonna Sheriff     Anticipated Discharge Date:  07/06/2019    Discharge Disposition:   ARU:  [facility]    Following MD:  Unknown        Additional Services/Equipment Arranged:  [facility] via wheelchair today at 1100am.      Patient / Family response to discharge plan:  Pt is aware and accepting of plan.     Persons notified of above discharge plan:  Provider, patient, and RN    Staff Discharge Instructions:  Please fax discharge orders and signed hard scripts for any controlled substances.  Please print a packet and send with patient.       Medicare Notice of Rights provided to the patient/family:  YES    TRICE Powell  Weekend     0800 - 1600 Saturday and Sunday    4A, 4C, 4E, 5A, and 5B  - [phone]    6A, 6B, 6C, and 6D - [phone]    5C, 7A, 7B, 7C, and 7D - [phone]    1600 - 0000 Saturday and Sunday    [phone]

## 2019-07-06 NOTE — H&P
Post Admission Physician Evaluation:    I have compared Ladonna Sheriff's condition on admission to acute rehabilitation to that outlined in the preadmission screen. History and physical exam performed by me. No significant differences are identified and the patient remains appropriate for an inpatient rehabilitation facility level of care to manage medical issues and address functional impairments due to neuromyelitis optica.    Comorbid medical conditions being managed: Discoid Lupus, Sjogren's, temporal arteritis, recurrent optic neuritis, knee pain, peripheral neuropathy, COPD, HLD.     Prior functional level: Ambulates with use of a straight cane. Independent with ADLs. Receives grocery delivery, uses [employer] for transportation.     Present function:   OT- Pt executes transfer with min A.  Pt stands for g/h following shower with SBA.  Pt able to complete LB dressing with set-up and increased time.  PT- Ambulates 50 ft with WW and GCA to min A    Anticipated rehabilitation course: Anticipate she will progress to modified independent with mobility and ADLs for home discharge with family support.     Will benefit from intensive rehabilitation includin minutes each of PT and OT  Rehabilitation nursing  Close management by physiatry    Prognosis: Good, anticipate she will meet goals in timeframe of 12-14 days for safe home discharge plan.     Estimated length of stay: 12-14 days.

## 2019-07-06 NOTE — PROGRESS NOTES
[facility]. Pt transported via wheelchair by [facility]. All belongings sent with patient. A/Ox4. Numbness bilaterally to thighs to toes. RLE weakness.

## 2019-07-06 NOTE — PROGRESS NOTES
19 1300   Living Arrangements   Lives With alone   Living Arrangements apartment   Home Safety   Patient Feels Safe Living in Home? yes   Values Beliefs and Spiritual Care   F: Vicki: Do you have a connection with a vicki community, Evangelical, or Adventist group? yes   The name of the vicki community, Evangelical, or Adventist group is:   (Spiritism)   Final Resources   Equipment Currently Used at Home cane, straight     Social Work: Initial Assessment with Discharge Plan    Patient Name: Ladonna Sheriff  : 1945  Age: 74 year old  MRN: [id number]  Completed assessment with: patient and son (Sridhar)  Admitted to ARU: 19    Presenting Information   Date of SW assessment: 19  Health Care Directive: none  Primary Health Care Agent: next-of-kin (adult sons) would be surrogate decision-maker if necessary  Secondary Health Care Agent: n/a  Living Situation: lives alone in 2nd floor apartment with 16 stairs   Son Israel (lives in same bldg on 3rd floor)  Previous Functional Status: independent  DME available: sec  Patient and family understanding of hospitalization: myelitis  Cultural/Language/Spiritual Considerations: speaks English, Spiritism (ok with  visits)    Physical Health  Reason for admission: neuromyelitis optica, h/o CVA, lupus, Sjogren's, low vision, CHF, recent diagnosis of COPD    Provider Information   Primary Care Physician: Joceline Ty    Mental Health/Chemical Dependency:   Diagnosis: h/o depression (prozac), insomnia (trazodone)   Alcohol/Tobacco/Narcotics: no problems  Support/Services in Place: n/a  Services Needed/Recommended: health psychologist available if interested during stay  Sexuality/Intimacy: no concerns    Support System  Marital Status:  since   Family support: son-Israel (lives in [facility]), son-Sridhar ([place])  Other support available: son-Bandar ([place], [place])    Community Resources  Current in home services: none  Previous services:  none    Financial/Employment/Education  Employment Status: retired  Income Source: Social Security prison  Education: high school  Financial Concerns: none  Insurance: Medicare/AARP    Discharge Plan   Patient and family discharge goal: return home with assistance from sons and therapy services  Provided education on discharge plan: home care/therapy and outpatient therapy discussed  Patient agreeable to discharge plan: tbd  Provided education and attained signature for Medicare IM and IRF Patient Rights and Privacy Information provided to patient : yes  Provided patient with [facility] Resources: declined  Barriers to discharge: none identified    Discharge Recommendations   Disposition: return home with assistance from sons and therapy services  Transportation Needs: family  Name of Transportation Company and Phone: n/a    Additional comments   D:  See H&P for full medical background.  I:  Met with patient and son to complete assessment and social work consult.  A:  Patient is doing well and is motivated to start working with therapy.  Supportive family who is involved.    P:  SW will follow and assist as needed.    Please invite to Care Conference:  Israel (son) [phone]  Sridhar (son) [phone]      CAMELIA Hannah  Weekend   Phone: [phone]  Pager: [phone]

## 2019-07-06 NOTE — PLAN OF CARE
Shift: [id number]  Status: Pt admitted for progressive weakness. S/p PLEX treatment and dose 1 of Rituximab. Newly diagnosed neuromyelitis optica.   Vitals: VSS on RA  Neuros: A/Ox4, RLE 4/5, N/T bilateral thighs to toes. R eye blind, L eye with blurry/cloudy vision and dysconjugate   IV: PIV SL  Resp/trach: LS clear   Diet: Regular   Bowel status: Last BM 7/5/19  : Voiding spontaneously   Skin: Bruising throughout   Pain: Right Knee pain. Tylenol given   Activity: Up with 1/GB/walker  Social: No visitors   Plan: Medically ready for ARU, awaiting placement. Continue to monitor and follow POC

## 2019-07-06 NOTE — PHARMACY-MEDICATION REGIMEN REVIEW
Pharmacy Medication Regimen Review  Ladonna Sheriff is a 74 year old female who is currently in the Acute Rehab Unit.    Assessment: Upon review of the medications and patient chart the following irregularities were found: Medications that need other dose adjustment (including age or given adverse effects from medications):     Plan:   1.  Gabapentin has been ordered 300mg BID and 600mg at bedtime.  Currently order is 300mg Qday and 600mg at bedtime. Will follow up with MD to consider changing gabapentin to 300mg BID and 600mg at bedtime.     2.  Will monitor medications as appropriate.    Amlodipine on HOLD.   Attending provider will be sent this note for review.  If there are any emergent issues noted above, pharmacist will contact provider directly by phone.      Pharmacy will periodically review the resident's medication regimen for any PRN medications not administered in > 72 hours and discontinue them. The pharmacist will discuss gradual dose reductions of psychopharmacologic medications with interdisciplinary team on a regular basis.    Please contact pharmacy if the above does not answer specific medication questions/concerns.    Background:  A pharmacist has reviewed all medications and pertinent medical history today.  Medications were reviewed for appropriate use and any irregularities found are listed with recommendations.      Current Facility-Administered Medications:      acetaminophen (TYLENOL) tablet 325-975 mg, 325-975 mg, Oral, Q6H PRN, Zulema Wesley MD     albuterol (PROAIR HFA/PROVENTIL HFA/VENTOLIN HFA) 108 (90 Base) MCG/ACT inhaler 1 puff, 1 puff, Inhalation, Q6H PRN, Zulema Wesley MD     aspirin (ASA) EC tablet 325 mg, 325 mg, Oral, QPM, Zulema Wesley MD     calcium carbonate-vitamin D (OSCAL w/D) per tablet 1 tablet, 1 tablet, Oral, QPM, Malinda Gaviria MD     [START ON 7/7/2019] clopidogrel (PLAVIX) tablet 75 mg, 75 mg, Oral, Lupillo GONZALEZ Alicia, MD     enoxaparin (LOVENOX)  injection 40 mg, 40 mg, Subcutaneous, Q24H, Zulema Wesley MD     [START ON 7/7/2019] FLUoxetine (PROzac) capsule 20 mg, 20 mg, Oral, Daily, Zulema Wesley MD     furosemide (LASIX) tablet 20 mg, 20 mg, Oral, TID, Zulema Wesley MD, 20 mg at 07/06/19 1319     [START ON 7/7/2019] gabapentin (NEURONTIN) capsule 300 mg, 300 mg, Oral, Daily, Zulema Wesley MD     gabapentin (NEURONTIN) capsule 600 mg, 600 mg, Oral, At Bedtime, Zulema Wesley MD     melatonin tablet 1 mg, 1 mg, Oral, At Bedtime PRN, Zulema Wesley MD     metoprolol tartrate (LOPRESSOR) tablet 50 mg, 50 mg, Oral, BID, Zulema Wesley MD     polyethylene glycol (MIRALAX/GLYCOLAX) Packet 17 g, 17 g, Oral, Daily PRN, Zulema Wesley MD     potassium gluconate tablet 2.5 mEq, 2.5 mEq, Oral, Daily, Malinda Gaviria MD, 2.5 mEq at 07/06/19 1319     [START ON 7/7/2019] predniSONE (DELTASONE) tablet 5 mg, 5 mg, Oral, QAM, Zulema Wesley MD     ranitidine (ZANTAC) tablet 150 mg, 150 mg, Oral, BID, Zulema Wesley MD     senna-docusate (SENOKOT-S/PERICOLACE) 8.6-50 MG per tablet 1-4 tablet, 1-4 tablet, Oral, BID PRN, Zulema Wesley MD     simvastatin (ZOCOR) tablet 20 mg, 20 mg, Oral, At Bedtime, Zulema Wesley MD     traZODone (DESYREL) tablet 100 mg, 100 mg, Oral, At Bedtime, Zulema Wesley MD     trolamine salicylate (ASPERCREME) 10 % cream, , Topical, 4x Daily PRN, Zulema Wesley MD     [START ON 7/7/2019] umeclidinium (INCRUSE ELLIPTA) 62.5 MCG/INH inhaler 1 puff, 1 puff, Inhalation, Daily, Zulema Wesley MD     [START ON 7/7/2019] vitamin B complex with vitamin C (STRESS TAB) tablet 1 tablet, 1 tablet, Oral, Daily, Malinda Gaviria MD     [START ON 7/7/2019] vitamin C (ASCORBIC ACID) tablet 500 mg, 500 mg, Oral, Lupillo GONZALEZ Alicia, MD  No current outpatient prescriptions on file.   PMH: neuromyelitis optica, obstructive lung disease, h/o stroke/TIA, HF.

## 2019-07-06 NOTE — PLAN OF CARE
Discharge Planner PT   Patient plan for discharge: rehab.   Current status: pt is SBA needing extra time for all bed mobility. Pt needing CGA to min A with V.c for posture and foot placement. Pt amb up to 50'x 1 with WW and min  with V.c for RLE placement.. Pt demo standing target drill for LE with pt only able to touch target with RLE 10% vs 100% with LLE. Pt demo supine and seated TE x 10 with V.c for control of RLE. Pt stating sore. R knee due to hyperextension during PT session yesterday. Pt icing today. No swelling noted RN aware.   Barriers to return to prior living situation: balance weakness. R LE control  Recommendations for discharge: PT - per plan established by the Physical Therapist, according to functional mobility the  discharge recommendation is ARU for cont skilled PT. Pt willing to work with PT and able to tolerate 3Hr of therapies   Rationale for recommendations: see above.     At time of discharge pt met. 1/4 goals  Pt needing increased assist for all set PT goals of transfer, gait and stairs.   Pt needing cont skilled PT at ARU        Entered by: Justin Newmna 07/06/2019 9:31 AM

## 2019-07-06 NOTE — PLAN OF CARE
Status: Pt on 6a for treatment of progressive bilateral numbness/weakness; Neuromyelitis optica.  Vitals: VSS  Neuros: A/Ox4. R eye blindness with dysconjugate gaze. Blurred vision in L eye. RLE 4/5. N/T from toes to ankle  IV: PIV SL  Resp/trach: LS clear, RA  Diet: Regular  Bowel status: No BM this shift  : Voiding spontaneously  Skin: Bruising throughout  Pain: R knee pain, Tylenol given, ice pack applied  Activity: Ax1 GB walker  Plan: Ready for ARU discharge, pending placement. Continue to monitor and assess.

## 2019-07-06 NOTE — H&P
"    [facility]   Acute Rehabilitation Unit  Admission History and Physical    chief complaint   R leg weakness    History of Present illness  Ladonna Sheriff is a 74 year old right hand dominant female with a PMH notable for previous CVA/TIA, discoid lupus, Sjogrens, temporal arteritis, CHF, optic neuritis with visual impairment and recently diagnosed COPD who is being admitted to the ARU for functional deficits 2/2 neuromyelitis optica. Pt initially presented  To the ED on 6/19/19 with 2-3 weeks of progressive numbness from the chest down associated with BL LE weakness R>L. Transverse myelitis was initial working diagnosis however MRI spine was negative . Further work up was diagnostic for neuromelitis optica. Pt completed 5 days of plasmapheresis and high dose methylprednisolone and IV Rituximab.   Pt reports improvement in strength  With continued RLE weakness greater than L. She also \"twisted her knee \" in PT session the other day which resulted in medial knee pain with activity. XR were taken at the time and were negative for fx.       During her acute hospitalization, patient was seen and evaluated by  PT, OT and PM&R consult service. Currently the patient is medically appropriate and all specialties collectively recommended that patient would benefit from ongoing therapies in the acute inpatient rehabilitation setting as well as supervision and management of Rehab Nursing and Rehab MD.       In review of the therapy notes,  OT- Pt executes transfer with min A.  Pt stands for g/h following shower with SBA.  Pt able to complete LB dressing with set-up and increased time.  PT- Ambulates 50 ft with WW and GCA to min A    PAST Medical History  Past Medical History:   Diagnosis Date     Cerebral infarction (H)      CHF (congestive heart failure) (H)      Hypertension      Lupus (H)      Lupus (H)      Optic neuritis      Optic neuritis      Sjogren's syndrome (H)      Sjogren's " syndrome (H)      Temporal arteritis (H)      TIA (transient ischemic attack)      Uncomplicated asthma        Surgical History  Past Surgical History:   Procedure Laterality Date     CHOLECYSTECTOMY       GYN SURGERY      hysterectomy     GYN SURGERY      tubal ligation     IR CVC NON TUNNEL PLACEMENT  6/21/2019     IR FOLLOW UP VISIT INPATIENT  7/2/2019       SOCIAL HISTORY  Pt lives alone in a 2nd floor apt with no elevator and 16 steps. She has a son that lives on the 3rd floor whom checks on her daily, and a son in [place] who visits weekly.  She is  A former smoker    in 2010    FAMILY HISTORY  Family History   Problem Relation Age of Onset     Asthma Mother      Lupus Sister      Bone Cancer Brother      Kidney Cancer Sister          Prior Functional history   She has groceries delivered and uses [employer] for transpo. She uses a straight cane for ambulation.  She is independent for all other ADL's    Medications  Medications Prior to Admission   Medication Sig Dispense Refill Last Dose     albuterol (PROAIR HFA/PROVENTIL HFA/VENTOLIN HFA) 108 (90 Base) MCG/ACT inhaler Inhale 2 puffs into the lungs every 6 hours as needed    6/21/2019 at Unknown time     amLODIPine (NORVASC) 10 MG tablet Take 10 mg by mouth every evening    6/21/2019 at Unknown time     aspirin (ASA) 325 MG EC tablet Take 325 mg by mouth every evening    6/21/2019 at Unknown time     B Complex-C (VITAMIN B COMPLEX W/VITAMIN C) TABS tablet Take 1 tablet by mouth daily   6/21/2019 at Unknown time     Calcium-Vitamin D 600-200 MG-UNIT TABS Take 1 tablet by mouth every evening    6/21/2019 at Unknown time     clopidogrel (PLAVIX) 75 MG tablet Take 75 mg by mouth every morning   6/21/2019 at Unknown time     FLUoxetine (PROZAC) 20 MG capsule Take 20 mg by mouth daily   6/21/2019 at Unknown time     furosemide (LASIX) 20 MG tablet Take 20 mg by mouth 3 times daily   6/21/2019 at Unknown time     metoprolol tartrate (LOPRESSOR) 50  "MG tablet Take 50 mg by mouth 2 times daily   6/21/2019 at Unknown time     Multiple Vitamins-Minerals (MULTIVITAL PO) Take 1 tablet by mouth every morning    6/21/2019 at Unknown time     Potassium Gluconate 595 MG TBCR Take 1 tablet by mouth every evening    6/20/2019 at Unknown time     predniSONE (DELTASONE) 5 MG tablet Take 5 mg by mouth every morning   6/21/2019 at Unknown time     simvastatin (ZOCOR) 20 MG tablet Take 20 mg by mouth At Bedtime   6/20/2019 at Unknown time     traZODone (DESYREL) 100 MG tablet Take 100 mg by mouth At Bedtime   6/20/2019 at Unknown time     umeclidinium (INCRUSE ELLIPTA) 62.5 MCG/INH inhaler Inhale 1 puff into the lungs daily   6/21/2019 at Unknown time     vitamin C (ASCORBIC ACID) 100 MG tablet Take 500 mg by mouth every morning    6/21/2019 at Unknown time       ALLERGIES     Allergies   Allergen Reactions     Prevacid [Lansoprazole] Rash         Review of Systems  A 10 point ROS was performed and negative unless otherwise noted in HPI.       Physical Exam  VITAL SIGNS:  There were no vitals taken for this visit.  BMI:  Estimated body mass index is 34.18 kg/m  as calculated from the following:    Height as of 2/2/19: 1.702 m (5' 7\").    Weight as of 6/27/19: 99 kg (218 lb 4.1 oz).     General: alert and oriented x 3, NAD  HEENT: AT/NC, PERRL  Pulmonary: Comfortable on RA no accessory muscle use.   Cardiovascular: Ext WWP  Abdominal: Pos bowel sounds, soft, non tender, non distended  Extremities: moving all extremities, no edema, good pulses  MSK/neuro:   Mental Status:  Cooperative, appropriate   Cranial Nerves: grossly intact 2-12    Sensory: no obvious sensory deficits   Strength: 4/5 with BL Hip flexion, 4/5 with Knee extension and Knee flexion on R, all other muscle groups 5/5   Reflexes: normal and equal throughout   Villasenor's test: negative   Babinski reflex: downgoing bilaterally    Tone:no increased tone, no clonus    Abnormal movements: none   Coordination: no " dysmetria, no pronator drift    Speech: no obvious aphasia    Cognition: no gross impairment conversationally today, no apraxia, no obvious neglect,    Skin: warm and dry      Labs  Pertinent labs    Lab Results   Component Value Date    WBC 7.0 07/04/2019    HGB 11.9 07/04/2019    HCT 38.8 07/04/2019     (H) 07/04/2019     07/05/2019     Lab Results   Component Value Date     07/04/2019    POTASSIUM 3.6 07/04/2019    CHLORIDE 106 07/04/2019    CO2 29 07/04/2019    GLC 99 07/04/2019     Lab Results   Component Value Date    GFRESTIMATED 65 07/05/2019    GFRESTBLACK 76 07/05/2019     Lab Results   Component Value Date    AST 24 07/04/2019    ALT 41 07/04/2019    ALKPHOS 50 07/04/2019    BILITOTAL 0.5 07/04/2019     Lab Results   Component Value Date    INR 1.12 06/30/2019     Lab Results   Component Value Date    BUN 15 07/04/2019    CR 0.87 07/05/2019         ASSESSMENT/PLAN:  Ladonna Sheriff is a 74 year old right hand dominant female with a PMH notable for previous CVA/TIA, discoid lupus, Sjogrens, temporal arteritis, CHF, optic neuritis with visual impairment and recently diagnosed COPD who is being admitted to the ARU 7/6/2019 for functional deficits 2/2 neuromyelitis optica. Deficits include BL LE weakness R>L, mobility, ADL's.    Admission to acute inpatient rehab  7/6/2019   Impairment group code 04.130    -PT and OT 120minutes of each on a daily basis, in addition to rehab nursing and close management of physiatrist.    -Impairment of ADL's:  OT for 90 minutes daily to work on ADL re-training such as grooming, self cares and bathing.    -Impairment of mobility:  PT for 90 minutes daily to work on neuromuscular re-education focusing on strength, balance, coordination, and endurance.    -Rehab RN for med administration    Medical Conditions:  #Neuromyelitis optica  #H/o discoid lupus, Sjogren's, temporal arteritis, recurrent optic neuritis  AQP4 antibody positive diagnostic of neuromyelitis  optica. Started Rituximab (7/4),   - s/p Rituximab 1,000 mg IV x 1 dose (7/4)-will need repeat infusion in ~2 weeks (after discharge from ARU) per Dr. Macias.  - Completed PLEX x 5 runs (6/22, 6/24, 6/26, 6/28, 6/30)  - completed 5-day course of high-dose methylprednisolone on 6/25    # R Knee Pain: Medial knee pain following strain during PT session. Negative XR during acute admission.  - Topical aspercreme ordered, worked well for her previously.  -  Knee brace ordered, to be worn during therapies.    #Peripheral neuropathy  - gabapentin 300 mg PO BID & 600 mg qhs        #Obstructive lung disease    - Continue PTA Incruse Ellipta inhaler  - Albuterol nebs q4h prn     #Hx of discoid lupus, Sjogren's syndrome, temporal arteritis, optic neuritis  - continue PTA prednisone 5 mg PO daily     #Hx of stroke/TIA  - Continue PTA ASA and clopidogrel     #Hypertension  #CHF  Patient takes amlodipine, metoprolol, and Lasix PTA. Noted to be hypotensive, hold amlodipine preferentially (B-blocker and diuretic needed for heart failure). Improved to 120s systolic after discontinuing amlodipine.   - Continue PTA metoprolol and Lasix    - hold PTA amlodipine d/t low BP  - Daily weights      #Hypercholesterolemia  - Continue PTA statin    Adjustment to disability:  Clinical psychology to eval and treat as needed.  FEN: Regular diet thin liquids  Bowel: Continent , PRN colace and Miralax available  Bladder:Continent, PVR X2 on admission, bladder program in place if no spontandous voids.  DVT Prophylaxis: Enoxaprin 40mg Daily  GI Prophylaxis: Ranitidine BID  Code: Full  Disposition: Home with family  ELOS:  12-14 days  Rehab prognosis:  fair  Follow up Appointments on Discharge: - Follow up with Dr. Macias at [facility] in 2 weeks.    Pt staffed with Dr. Lamar Wesley MD  Physical Medicine & Rehabilitation

## 2019-07-07 LAB
ANION GAP SERPL CALCULATED.3IONS-SCNC: 5 MMOL/L (ref 3–14)
BASOPHILS # BLD AUTO: 0 10E9/L (ref 0–0.2)
BASOPHILS NFR BLD AUTO: 0.5 %
BUN SERPL-MCNC: 17 MG/DL (ref 7–30)
CALCIUM SERPL-MCNC: 8.3 MG/DL (ref 8.5–10.1)
CHLORIDE SERPL-SCNC: 110 MMOL/L (ref 94–109)
CO2 SERPL-SCNC: 30 MMOL/L (ref 20–32)
CREAT SERPL-MCNC: 0.86 MG/DL (ref 0.52–1.04)
DIFFERENTIAL METHOD BLD: ABNORMAL
EOSINOPHIL # BLD AUTO: 0.1 10E9/L (ref 0–0.7)
EOSINOPHIL NFR BLD AUTO: 1.8 %
ERYTHROCYTE [DISTWIDTH] IN BLOOD BY AUTOMATED COUNT: 14 % (ref 10–15)
GFR SERPL CREATININE-BSD FRML MDRD: 66 ML/MIN/{1.73_M2}
GLUCOSE SERPL-MCNC: 76 MG/DL (ref 70–99)
HCT VFR BLD AUTO: 37.5 % (ref 35–47)
HGB BLD-MCNC: 11.9 G/DL (ref 11.7–15.7)
IMM GRANULOCYTES # BLD: 0 10E9/L (ref 0–0.4)
IMM GRANULOCYTES NFR BLD: 0.2 %
LYMPHOCYTES # BLD AUTO: 1.4 10E9/L (ref 0.8–5.3)
LYMPHOCYTES NFR BLD AUTO: 22.5 %
MCH RBC QN AUTO: 32.3 PG (ref 26.5–33)
MCHC RBC AUTO-ENTMCNC: 31.7 G/DL (ref 31.5–36.5)
MCV RBC AUTO: 102 FL (ref 78–100)
MONOCYTES # BLD AUTO: 0.4 10E9/L (ref 0–1.3)
MONOCYTES NFR BLD AUTO: 7 %
NEUTROPHILS # BLD AUTO: 4.2 10E9/L (ref 1.6–8.3)
NEUTROPHILS NFR BLD AUTO: 68 %
NRBC # BLD AUTO: 0 10*3/UL
NRBC BLD AUTO-RTO: 0 /100
PLATELET # BLD AUTO: 144 10E9/L (ref 150–450)
POTASSIUM SERPL-SCNC: 3.4 MMOL/L (ref 3.4–5.3)
RBC # BLD AUTO: 3.68 10E12/L (ref 3.8–5.2)
SODIUM SERPL-SCNC: 145 MMOL/L (ref 133–144)
WBC # BLD AUTO: 6.1 10E9/L (ref 4–11)

## 2019-07-07 PROCEDURE — 97116 GAIT TRAINING THERAPY: CPT | Mod: GP

## 2019-07-07 PROCEDURE — 80048 BASIC METABOLIC PNL TOTAL CA: CPT | Performed by: STUDENT IN AN ORGANIZED HEALTH CARE EDUCATION/TRAINING PROGRAM

## 2019-07-07 PROCEDURE — 25000128 H RX IP 250 OP 636: Performed by: STUDENT IN AN ORGANIZED HEALTH CARE EDUCATION/TRAINING PROGRAM

## 2019-07-07 PROCEDURE — 25000131 ZZH RX MED GY IP 250 OP 636 PS 637: Mod: GY | Performed by: STUDENT IN AN ORGANIZED HEALTH CARE EDUCATION/TRAINING PROGRAM

## 2019-07-07 PROCEDURE — 97535 SELF CARE MNGMENT TRAINING: CPT | Mod: GO

## 2019-07-07 PROCEDURE — 12800006 ZZH R&B REHAB

## 2019-07-07 PROCEDURE — 25000132 ZZH RX MED GY IP 250 OP 250 PS 637: Mod: GY | Performed by: PHYSICAL MEDICINE & REHABILITATION

## 2019-07-07 PROCEDURE — 97110 THERAPEUTIC EXERCISES: CPT | Mod: GP

## 2019-07-07 PROCEDURE — 25000132 ZZH RX MED GY IP 250 OP 250 PS 637: Mod: GY | Performed by: STUDENT IN AN ORGANIZED HEALTH CARE EDUCATION/TRAINING PROGRAM

## 2019-07-07 PROCEDURE — 97162 PT EVAL MOD COMPLEX 30 MIN: CPT | Mod: GP

## 2019-07-07 PROCEDURE — 85025 COMPLETE CBC W/AUTO DIFF WBC: CPT | Performed by: STUDENT IN AN ORGANIZED HEALTH CARE EDUCATION/TRAINING PROGRAM

## 2019-07-07 PROCEDURE — 36415 COLL VENOUS BLD VENIPUNCTURE: CPT | Performed by: STUDENT IN AN ORGANIZED HEALTH CARE EDUCATION/TRAINING PROGRAM

## 2019-07-07 RX ADMIN — SIMVASTATIN 20 MG: 20 TABLET, FILM COATED ORAL at 20:05

## 2019-07-07 RX ADMIN — ASPIRIN 325 MG: 325 TABLET, DELAYED RELEASE ORAL at 20:06

## 2019-07-07 RX ADMIN — UMECLIDINIUM 1 PUFF: 62.5 AEROSOL, POWDER ORAL at 08:08

## 2019-07-07 RX ADMIN — FUROSEMIDE 20 MG: 20 TABLET ORAL at 08:09

## 2019-07-07 RX ADMIN — B-COMPLEX W/ C & FOLIC ACID TAB 1 TABLET: TAB at 08:09

## 2019-07-07 RX ADMIN — RANITIDINE 150 MG: 150 TABLET ORAL at 20:04

## 2019-07-07 RX ADMIN — GABAPENTIN 300 MG: 300 CAPSULE ORAL at 08:11

## 2019-07-07 RX ADMIN — RANITIDINE 150 MG: 150 TABLET ORAL at 08:09

## 2019-07-07 RX ADMIN — FLUOXETINE 20 MG: 20 CAPSULE ORAL at 08:09

## 2019-07-07 RX ADMIN — CLOPIDOGREL BISULFATE 75 MG: 75 TABLET, FILM COATED ORAL at 08:09

## 2019-07-07 RX ADMIN — METOPROLOL TARTRATE 50 MG: 50 TABLET ORAL at 20:05

## 2019-07-07 RX ADMIN — OXYCODONE HYDROCHLORIDE AND ACETAMINOPHEN 500 MG: 500 TABLET ORAL at 08:09

## 2019-07-07 RX ADMIN — PREDNISONE 5 MG: 5 TABLET ORAL at 08:09

## 2019-07-07 RX ADMIN — TROLAMINE SALICYLATE: 10 CREAM TOPICAL at 08:08

## 2019-07-07 RX ADMIN — ENOXAPARIN SODIUM 40 MG: 40 INJECTION SUBCUTANEOUS at 20:07

## 2019-07-07 RX ADMIN — GABAPENTIN 600 MG: 300 CAPSULE ORAL at 20:05

## 2019-07-07 RX ADMIN — FUROSEMIDE 20 MG: 20 TABLET ORAL at 20:06

## 2019-07-07 RX ADMIN — OYSTER SHELL CALCIUM WITH VITAMIN D 1 TABLET: 500; 200 TABLET, FILM COATED ORAL at 20:04

## 2019-07-07 RX ADMIN — TRAZODONE HYDROCHLORIDE 100 MG: 50 TABLET ORAL at 20:06

## 2019-07-07 RX ADMIN — FUROSEMIDE 20 MG: 20 TABLET ORAL at 14:08

## 2019-07-07 RX ADMIN — Medication 2.5 MEQ: at 08:09

## 2019-07-07 NOTE — PLAN OF CARE
FOCUS/GOAL  Bowel management, Bladder management, Pain management, Skin integrity and Cognition/Memory/Judgment/Problem solving    ASSESSMENT, INTERVENTIONS AND CONTINUING PLAN FOR GOAL:  Pt is alert and oriented, denied pain, sob, lightheadedness, n/v, dizziness or new changes in sensation, continent of bowel and some very slight dribbling incontinence of bladder, calling appropriately for needs, able to reposition independently, no further care concerns at this time continue with POC.

## 2019-07-07 NOTE — PLAN OF CARE
FOCUS/GOAL  Bladder management, Nutrition/Feeding/Swallowing precautions and Medical management    ASSESSMENT, INTERVENTIONS AND CONTINUING PLAN FOR GOAL:  Alert & oriented, uses call light appropriately to make needs known, uses magnifier for reading and to access phone and call light. Needs CGA with gait belt and walker for transfers and toileting. Continent of bladder with use of toilet, no BM do far this shift. Appetite 100% for breakfast & lunch.  Denies pain or discomfort. Pleasant & cooperative.

## 2019-07-07 NOTE — PLAN OF CARE
FOCUS/GOAL  Bowel management, Bladder management and Skin integrity    ASSESSMENT, INTERVENTIONS AND CONTINUING PLAN FOR GOAL:  Pt alert and oriented x4, pleasant and cooperative. CGA of 1 with walker and gait belt. PVRs done. Pt wearing pad and underwear. Last BM this shift. Bruises to abdomen from lovenox, bilateral arms, and right hip. Pt states she fell at previous hospital when she was trying to do pericare on toilet. Denies pain. Barrier cream applied to perianal area which was red and blanchable. Poor vision but able to make needs known and use call light. Schedule written down on paper and pt using magnifying glass to read it. Alarm in use.

## 2019-07-07 NOTE — PROGRESS NOTES
07/06/19 1255   Quick Adds   Type of Visit Initial Occupational Therapy Evaluation   Living Environment   Lives With alone   Living Arrangements apartment   Home Accessibility stairs to enter home   Number of Stairs, Main Entrance other (see comments)   Stair Railings, Main Entrance other (see comments)   Transportation Anticipated family or friend will provide;health plan transportation   Living Environment Comment OT: Pt lives alone in 2nd floor apartment unit (16 stairs to access and no elevator access). Bedroom set up: Queen size bed, no rails. Bathroom set up: tubshower, no grab bars or chair. Standard toilet, no grab bars. Carpeted mario throughout. Son lives on 3rd floor of apartment building and provides daily checks.    Self-Care   Usual Activity Tolerance good   Current Activity Tolerance fair   Regular Exercise No   Equipment Currently Used at Home cane, straight;other (see comments)   Activity/Exercise/Self-Care Comment OT: No formal exercise program. Daily activities limited by visual deficits. Pt enjoys watching TV, petcare, and going out for lunch with friends.   Functional Level   Ambulation 1-->assistive equipment   Transferring 1-->assistive equipment   Toileting 0-->independent   Bathing 0-->independent   Dressing 0-->independent   Eating 0-->independent   Communication 0-->understands/communicates without difficulty   Swallowing 0-->swallows foods/liquids without difficulty   Cognition 0 - no cognition issues reported   Fall history within last six months yes   Number of times patient has fallen within last six months 2   Which of the above functional risks had a recent onset or change? ambulation;transferring;toileting;bathing;dressing;fall history   Prior Functional Level Comment OT: Pt Mod IND transfers and mobility using SEC. Pt IND/Mod IND ADLs and IADLs. Pt has groceries delivered and access to [employer] mobility.        Present no   Language English   General  "Information   Onset of Illness/Injury or Date of Surgery - Date 06/19/19   Referring Physician Dr. Newton   Patient/Family Goals Statement \"Stand on my own, walk IND, and take stairs IND\"   Additional Occupational Profile Info/Pertinent History of Current Problem Ladonna Sheriff is a 74 year old right hand dominant female with a PMH notable for previous CVA/TIA, discoid lupus, Sjogrens, temporal arteritis, CHF, optic neuritis with visual impairment and recently diagnosed COPD who is being admitted to the ARU 7/6/2019 for functional deficits 2/2 neuromyelitis optica. Deficits include BL LE weakness R>L, mobility, ADL's.   Precautions/Limitations fall precautions   Weight-Bearing Status - LUE full weight-bearing   Weight-Bearing Status - RUE full weight-bearing   Weight-Bearing Status - LLE full weight-bearing   Weight-Bearing Status - RLE full weight-bearing   Heart Disease Risk Factors Medical history;Age   Cognitive Status Examination   Orientation orientation to person, place and time   Level of Consciousness alert   Follows Commands (Cognition) WNL   Memory intact   Attention No deficits were identified   Cognitive Comment No initial concerns, family present and report that pt is at baseline.    Visual Perception   Visual Perception Wears glasses  (astygmatism)   Visual Acuity Baseline R eye is blind, peripheral vision absent in L eye.    Visual Perception Comments No acute changes, L eye vision is very cloudy.   Sensory Examination   Sensory Comments Reports a dull numbness from belly button to toes, see PT evaluation for formal sensory testing of BLE. No deficits identified in BUE.   Pain Assessment   Patient Currently in Pain No   Integumentary/Edema   Integumentary/Edema no deficits were identifed   Posture   Posture forward head position;protracted shoulders   Range of Motion (ROM)   ROM Comment BUE AROM is WFL/WNL   Strength   Strength Comments Definite weakness in BLE, instability in RLE (knee and hip), BUE " strength 4+/5 grossly   Hand Strength   Left hand  (pounds) 60 pounds   Right hand  (pounds) 65 pounds   Hand Strength Comments Pt denies new deficits   Coordination   Left hand, nine hole peg test (seconds) 33.40   Right hand, nine hole peg test (seconds) 26.01   Coordination Comments Pt scoring below normal for age limits though may be affected by visual deficits.  Pt denies new changes with coordination or hand function, gross observation revealed no concerns   ARC Assessment Only   Acute Rehab FIM See FIM scores for Mobility/ADL Assessment   Instrumental Activities of Daily Living (IADL)   Previous Responsibilities meal prep;housekeeping;laundry;medication management;finances   IADL Comments Pt has 2 cats   Activities of Daily Living Analysis   Impairments Contributing to Impaired Activities of Daily Living balance impaired;coordination impaired;motor control impaired;postural control impaired;strength decreased;sensory feedback impaired   General Therapy Interventions   Planned Therapy Interventions ADL retraining;IADL retraining;balance training   Clinical Impression   Criteria for Skilled Therapeutic Interventions Met yes, treatment indicated   OT Diagnosis Decreased IND ADLs/IADLs and functional mobility   Influenced by the following impairments Impaired balance, impaired sensory feedback in BLE, BLE weakness, baseline medical complexity including visual deficits, impaired postural stability   Assessment of Occupational Performance 5 or more Performance Deficits   Identified Performance Deficits Performance deficits include g/h, dressing, bathing, toileting, mobility, transfers, cooking, household management, stairs, finances, med admin, community transportation.   Clinical Decision Making (Complexity) High complexity   Therapy Frequency Daily   Predicted Duration of Therapy Intervention (days/wks) 10-14 days   Anticipated Equipment Needs at Discharge shower chair;other (see comments);bathing  equipment  (fww, bathroom grab bars)   Anticipated Discharge Disposition Home with Home Therapy;Home with Outpatient Therapy   Risks and Benefits of Treatment have been explained. Yes   Patient, Family & other staff in agreement with plan of care Yes   Clinical Impression Comments The pt will benefit from skilled OT intervention to address goals in POC and maximize IND and safety in d/c environment.   Total Evaluation Time   Total Evaluation Time (Minutes) 30

## 2019-07-07 NOTE — PLAN OF CARE
Patient is presenting with impaired gait, transfers and ability to climb stairs as well as activity tolerance. Patient has 16 steps with rails on both sides to navigate to get into her apartment. Patiient lacks knee stability on the right with tendency to go into hyperextensiion with stance. Impaired sensation also at that level with impaired co-contraction at right knee. Transfers cga,  Ambulation 40 feet with CGA and wheelchair follow.  Anticipate need for home PT and additional guidance on stairs will be needed

## 2019-07-07 NOTE — PLAN OF CARE
Discharge Planner OT   Patient plan for discharge: home  Current status: Pt completed full shower and ADL. Pt needed A to dry LEs, able to wash all areas from seated on tub bench.  Pt needs A for set-up for ADL, min A for R LE with dressing 2/2 knee pain.  Pt CGA for clothing management in standing, needing A for greg cares following toileting.  Barriers to return to prior living situation: medical status, stairs, lives alone, tub/shower, limited activity tolerance, R knee pain, impaired functional mobility  Recommendations for discharge: ELOS 14 days to discharge home with HH OT  Rationale for recommendations: Pt inconsistent with level of A at times 2/2 R LE.  Continue OT daily to maximize safety and I with ADL.        Entered by: Yola Mccann 07/07/2019 3:48 PM

## 2019-07-07 NOTE — PROGRESS NOTES
"   07/07/19 0821   Quick Adds   Type of Visit Initial PT Evaluation   Living Environment   Lives With alone   Living Arrangements apartment   Home Accessibility stairs to enter home   Number of Stairs, Main Entrance other (see comments)  (16)   Stair Railings, Main Entrance railings on both sides of stairs   Transportation Anticipated family or friend will provide;other (see comments)  ([employer])   Living Environment Comment One son lives in same complex and checks on patient daily, one son in [place] who visits weekly   Self-Care   Usual Activity Tolerance good   Current Activity Tolerance fair   Regular Exercise No   Equipment Currently Used at Home cane, straight   Functional Level Prior   Ambulation 0-->independent   Transferring 0-->independent   Toileting 0-->independent   Bathing 0-->independent   Communication 0-->understands/communicates without difficulty   Swallowing 0-->swallows foods/liquids without difficulty   Cognition 0 - no cognition issues reported   Fall history within last six months yes   Number of times patient has fallen within last six months 1   Which of the above functional risks had a recent onset or change? ambulation;transferring;fall history   Prior Functional Level Comment Patient had a fall while toileting when hospitalized.  No other falls in the past 6 months   General Information   Onset of Illness/Injury or Date of Surgery - Date 06/20/19   Referring Physician Standal   Patient/Family Goals Statement Home, go upstairs   Pertinent History of Current Problem (include personal factors and/or comorbidities that impact the POC) From H and P: \"Ladonna Sheriff is a 74 year old right hand dominant female with a PMH notable for previous CVA/TIA, discoid lupus, Sjogrens, temporal arteritis, CHF, optic neuritis with visual impairment and recently diagnosed COPD who is being admitted to the ARU for functional deficits 2/2 neuromyelitis optica. Pt initially presented  To the ED on " "6/19/19 with 2-3 weeks of progressive numbness from the chest down associated with BL LE weakness R>L. Transverse myelitis was initial working diagnosis however MRI spine was negative . Further work up was diagnostic for neuromelitis optica. Pt completed 5 days of plasmapheresis and high dose methylprednisolone and IV Rituximab. \"   Precautions/Limitations other (see comments)  (Patient blind in R eye and very limited vision in left)   Weight-Bearing Status - LUE full weight-bearing   Weight-Bearing Status - RUE full weight-bearing   Weight-Bearing Status - LLE full weight-bearing   Weight-Bearing Status - RLE full weight-bearing   Heart Disease Risk Factors Smoking;Age   General Observations Met patient in room as she was sitting up at edge of bed.  Eager to participate in therapies   Cognitive Status Examination   Orientation orientation to person, place and time   Level of Consciousness alert   Follows Commands and Answers Questions 100% of the time   Personal Safety and Judgment intact   Memory intact   Pain Assessment   Patient Currently in Pain Yes, see Vital Sign flowsheet   Posture    Posture Forward head position   Strength   Manual Muscle Testing Quick Adds MMT: Hip;MMT: Knee;MMT: Ankle   MMT: Hip, Rehab Eval   Hip Flexion - Left Side (4/5) good, left   Hip Flexion - Right Side (3-/5) fair minus, right   Hip ABduction - Right Side (3-/5) fair minus, right   MMT: Knee, Rehab Eval   Knee Flexion - Left Side (4/5) good, left   Knee Flexion - Right Side (4-/5) good minus, right   Knee Extension - Left Side (4/5) good, left   Knee Extension - Right Side (4-/5) good minus, right   MMT: Ankle, Rehab Eval   Ankle Dorsiflexion - Left Side (4/5) good, left   Ankle Dorsiflexion - Right Side (4/5) good, left   ARC Assessment Only   Acute Rehab FIM See FIM scores for Mobility/ADL Assessment   Balance   Balance other (describe)   Balance Comments Good sitting balance with dynamic and staic.  Requires use of walker to " support in standing with reliance upon UE to support during transitional movements.  Dynamic balance not assessed   Sensory Examination   Sensory Perception other (describe)   Sensory Perception Comments plantar aspect of Juan LE absent 9/10 touches juan.  heel touch was intact, deeper pressure inact juan   Sensory Perception Quick Adds Light touch;Proprioception   Sensation Light Touch   LUE not tested   LLE moderate impairment   RUE not tested   RLE moderate impairment   Head/Neck not tested   Trunk not tested   Proprioception    LUE not tested   LLE w/i normal limits   RUE not tested   RLE mild impairment  (great toe 0/5,  ankles WNL)   Head/Neck not tested   Trunk not tested   Coordination   Coordination no deficits were identified   Muscle Tone   Muscle Tone no deficits were identified   General Therapy Interventions   Planned Therapy Interventions balance training;gait training;groups;motor coordination training;ROM;strengthening;transfer training   Clinical Impression   Criteria for Skilled Therapeutic Intervention yes, treatment indicated   PT Diagnosis weakness, pain, impaired activity tolerance   Influenced by the following impairments Impaired force production, impaired sensory/muscle co-contraction at right LE   Functional limitations due to impairments Impaired gait, transfers, , stair climbing   Clinical Presentation Evolving/Changing   Clinical Presentation Rationale Impaired sensory and muscle skeltal dysfunction   Clinical Decision Making (Complexity) Moderate complexity   Therapy Frequency Daily   Predicted Duration of Therapy Intervention (days/wks) 14 days   Anticipated Equipment Needs at Discharge walker   Anticipated Discharge Disposition Home with Home Therapy   Risk & Benefits of therapy have been explained Yes   Patient, Family & other staff in agreement with plan of care Yes   Clinical Impression Comments Patient is presenting with impaired gait, transfers and ability to climb stairs as well  as activity tolerance. Patient has 16 steps with rails on both sides to navigate to get into her apartment. Patiient lacks knee stability on the right with tendency to go into hyperextensiion with stance. Impaired sensation also at that level with impaired co-contraction at right knee. Transfers cga,  Ambulation 40 feet with CGA and wheelchair follow.  Anticipate need for home PT and additional guidance on stairs will be needed   Total Evaluation Time   Total Evaluation Time (Minutes) 45

## 2019-07-08 PROCEDURE — 97535 SELF CARE MNGMENT TRAINING: CPT | Mod: GO | Performed by: STUDENT IN AN ORGANIZED HEALTH CARE EDUCATION/TRAINING PROGRAM

## 2019-07-08 PROCEDURE — 25000131 ZZH RX MED GY IP 250 OP 636 PS 637: Mod: GY | Performed by: STUDENT IN AN ORGANIZED HEALTH CARE EDUCATION/TRAINING PROGRAM

## 2019-07-08 PROCEDURE — 25000132 ZZH RX MED GY IP 250 OP 250 PS 637: Mod: GY | Performed by: STUDENT IN AN ORGANIZED HEALTH CARE EDUCATION/TRAINING PROGRAM

## 2019-07-08 PROCEDURE — 97530 THERAPEUTIC ACTIVITIES: CPT | Mod: GP

## 2019-07-08 PROCEDURE — 97116 GAIT TRAINING THERAPY: CPT | Mod: GP

## 2019-07-08 PROCEDURE — 25000132 ZZH RX MED GY IP 250 OP 250 PS 637: Mod: GY | Performed by: PHYSICAL MEDICINE & REHABILITATION

## 2019-07-08 PROCEDURE — 97110 THERAPEUTIC EXERCISES: CPT | Mod: GP

## 2019-07-08 PROCEDURE — 97110 THERAPEUTIC EXERCISES: CPT | Mod: GO | Performed by: STUDENT IN AN ORGANIZED HEALTH CARE EDUCATION/TRAINING PROGRAM

## 2019-07-08 PROCEDURE — 97112 NEUROMUSCULAR REEDUCATION: CPT | Mod: GP

## 2019-07-08 PROCEDURE — 12800006 ZZH R&B REHAB

## 2019-07-08 PROCEDURE — 25000128 H RX IP 250 OP 636: Performed by: STUDENT IN AN ORGANIZED HEALTH CARE EDUCATION/TRAINING PROGRAM

## 2019-07-08 PROCEDURE — 97530 THERAPEUTIC ACTIVITIES: CPT | Mod: GO | Performed by: STUDENT IN AN ORGANIZED HEALTH CARE EDUCATION/TRAINING PROGRAM

## 2019-07-08 RX ORDER — GABAPENTIN 300 MG/1
300 CAPSULE ORAL 2 TIMES DAILY
Status: DISCONTINUED | OUTPATIENT
Start: 2019-07-08 | End: 2019-07-16 | Stop reason: HOSPADM

## 2019-07-08 RX ADMIN — FUROSEMIDE 20 MG: 20 TABLET ORAL at 12:31

## 2019-07-08 RX ADMIN — FUROSEMIDE 20 MG: 20 TABLET ORAL at 08:19

## 2019-07-08 RX ADMIN — RANITIDINE 150 MG: 150 TABLET ORAL at 20:15

## 2019-07-08 RX ADMIN — PREDNISONE 5 MG: 5 TABLET ORAL at 08:18

## 2019-07-08 RX ADMIN — B-COMPLEX W/ C & FOLIC ACID TAB 1 TABLET: TAB at 08:19

## 2019-07-08 RX ADMIN — UMECLIDINIUM 1 PUFF: 62.5 AEROSOL, POWDER ORAL at 08:19

## 2019-07-08 RX ADMIN — ASPIRIN 325 MG: 325 TABLET, DELAYED RELEASE ORAL at 20:14

## 2019-07-08 RX ADMIN — CLOPIDOGREL BISULFATE 75 MG: 75 TABLET, FILM COATED ORAL at 08:18

## 2019-07-08 RX ADMIN — OXYCODONE HYDROCHLORIDE AND ACETAMINOPHEN 500 MG: 500 TABLET ORAL at 08:18

## 2019-07-08 RX ADMIN — METOPROLOL TARTRATE 50 MG: 50 TABLET ORAL at 08:19

## 2019-07-08 RX ADMIN — OYSTER SHELL CALCIUM WITH VITAMIN D 1 TABLET: 500; 200 TABLET, FILM COATED ORAL at 20:15

## 2019-07-08 RX ADMIN — FLUOXETINE 20 MG: 20 CAPSULE ORAL at 08:18

## 2019-07-08 RX ADMIN — METOPROLOL TARTRATE 50 MG: 50 TABLET ORAL at 20:15

## 2019-07-08 RX ADMIN — FUROSEMIDE 20 MG: 20 TABLET ORAL at 17:12

## 2019-07-08 RX ADMIN — Medication 2.5 MEQ: at 08:24

## 2019-07-08 RX ADMIN — ENOXAPARIN SODIUM 40 MG: 40 INJECTION SUBCUTANEOUS at 20:14

## 2019-07-08 RX ADMIN — TROLAMINE SALICYLATE: 10 CREAM TOPICAL at 20:29

## 2019-07-08 RX ADMIN — SIMVASTATIN 20 MG: 20 TABLET, FILM COATED ORAL at 20:15

## 2019-07-08 RX ADMIN — RANITIDINE 150 MG: 150 TABLET ORAL at 08:18

## 2019-07-08 RX ADMIN — TRAZODONE HYDROCHLORIDE 100 MG: 50 TABLET ORAL at 20:14

## 2019-07-08 RX ADMIN — GABAPENTIN 600 MG: 300 CAPSULE ORAL at 20:15

## 2019-07-08 RX ADMIN — GABAPENTIN 300 MG: 300 CAPSULE ORAL at 08:19

## 2019-07-08 RX ADMIN — GABAPENTIN 300 MG: 300 CAPSULE ORAL at 15:20

## 2019-07-08 NOTE — PLAN OF CARE
Pt focusing on stairs d/t 16 stairs at home. Worked in // bars on 2'' and 4'' steps and standing balance. Pt amb CGA w/ 'x2 with knee hyperextension at swing through.  R LE ataxia during foot placement improved with 1 lb weight added to R ankle. Weight left in room to use within room if desired. Striped sign added to door to assist pt in finding room d/t limited vision.

## 2019-07-08 NOTE — PLAN OF CARE
VSS. A/Ox's 4. Denied pain or need for pain control. Pt has numbness and tingling from waistline down to toes on bilateral legs- states that it is improving. Tolerated regular diet. Denied any nausea, CP, SOB, lightheadedness or dizziness. Voiding without pain or difficulty. With assist of 1 with walker/gait belt. Passing flatus. Son was visiting in room this evening. Resting in bed at this time with call light in reach and able to make needs known.

## 2019-07-08 NOTE — PLAN OF CARE
"Pt is alert and oriented, able to make needs known. Assist of 1 to ambulate to the bathroom with a walker. Had a bowel movement this afternoon. Pt reported that the stool is hard but declined the need for Miralax when offered. Stated \"If I don't go again today, then I'll take it\". Skin is intact, no redness noted except a fading bruise on the right buttock. Pt is on Lasix 3 times daily and the last dose is scheduled at 2000. RN informed Pharmacist to change the last dose to an earlier time to prevent excessive voiding at night. Schedule was changed. Pt expressed happiness about the new schedule when she was informed. Currently denies discomfort. Continue with POC.   "

## 2019-07-08 NOTE — PROGRESS NOTES
[facility]   Acute Rehabilitation Unit  Daily progress note    INTERVAL HISTORY  Notes reviewed.  No acute events overnight and today Ms. Sheriff has no new concerns or complaints.  She chronically has very poor vision.  Denies chest pain, shortness of breath, or any issues with bowel or bladder.  Having some neuropathic pain in the toes which is helped with Aspercreme, and Neurontin was started in the hospital.  She is already noting physical improvements, and hopes to get home as soon as possible to get back to her two cats.  Functionally she is SBA for ADLs, set up for UBD, CGA for LBD, and Min A for toileting.         MEDICATIONS  Scheduled:    aspirin  325 mg Oral QPM     calcium carbonate-vitamin D  1 tablet Oral QPM     clopidogrel  75 mg Oral QAM     enoxaparin  40 mg Subcutaneous Q24H     FLUoxetine  20 mg Oral Daily     furosemide  20 mg Oral TID     gabapentin  300 mg Oral BID     gabapentin  600 mg Oral At Bedtime     metoprolol tartrate  50 mg Oral BID     potassium gluconate  2.5 mEq Oral Daily     predniSONE  5 mg Oral QAM     ranitidine  150 mg Oral BID     simvastatin  20 mg Oral At Bedtime     traZODone  100 mg Oral At Bedtime     umeclidinium  1 puff Inhalation Daily     vitamin B complex with vitamin C  1 tablet Oral Daily     vitamin C  500 mg Oral QAM        PRN:  acetaminophen, albuterol, melatonin, polyethylene glycol, senna-docusate, trolamine salicylate       PHYSICAL EXAM  Patient Vitals for the past 24 hrs:   BP Temp Temp src Pulse Resp SpO2   07/08/19 1638 126/63 97.1  F (36.2  C) Oral 65 16 95 %   07/08/19 0631 132/70 96.7  F (35.9  C) Oral 64 18 94 %   07/07/19 2005 119/83 -- -- 73 -- --       GEN: NAD, pleasant and cooperative  HEENT: NC/AT  CVS: RRR, S1+S2, +systolic murmur  PULM: CTA b/l, no w/r/r  ABD: Soft, NT, ND, bowel sounds present  EXT: No calf tenderness b/l  Neuro: Answers appropriately, follows commands    LABS  CBC RESULTS:   Recent  Labs   Lab Test 07/07/19  0539   WBC 6.1   RBC 3.68*   HGB 11.9   HCT 37.5   *   MCH 32.3   MCHC 31.7   RDW 14.0   *       Last Comprehensive Metabolic Panel:  Sodium   Date Value Ref Range Status   07/07/2019 145 (H) 133 - 144 mmol/L Final     Potassium   Date Value Ref Range Status   07/07/2019 3.4 3.4 - 5.3 mmol/L Final     Chloride   Date Value Ref Range Status   07/07/2019 110 (H) 94 - 109 mmol/L Final     Carbon Dioxide   Date Value Ref Range Status   07/07/2019 30 20 - 32 mmol/L Final     Anion Gap   Date Value Ref Range Status   07/07/2019 5 3 - 14 mmol/L Final     Glucose   Date Value Ref Range Status   07/07/2019 76 70 - 99 mg/dL Final     Urea Nitrogen   Date Value Ref Range Status   07/07/2019 17 7 - 30 mg/dL Final     Creatinine   Date Value Ref Range Status   07/07/2019 0.86 0.52 - 1.04 mg/dL Final     GFR Estimate   Date Value Ref Range Status   07/07/2019 66 >60 mL/min/[1.73_m2] Final     Comment:     Non  GFR Calc  Starting 12/18/2018, serum creatinine based estimated GFR (eGFR) will be   calculated using the Chronic Kidney Disease Epidemiology Collaboration   (CKD-EPI) equation.       Calcium   Date Value Ref Range Status   07/07/2019 8.3 (L) 8.5 - 10.1 mg/dL Final       Recent Labs   Lab 07/07/19  0539 07/04/19  1012   GLC 76 99         ASSESSMENT/PLAN:  Ladonna Sheriff is a 74 year old right hand dominant female with a PMH notable for previous CVA/TIA, discoid lupus, Sjogrens, temporal arteritis, CHF, optic neuritis with visual impairment and recently diagnosed COPD who is being admitted to the ARU 7/6/2019 for functional deficits 2/2 neuromyelitis optica. Deficits include BL LE weakness R>L, mobility, ADL's.     Admission to acute inpatient rehab  7/6/2019   Impairment group code 04.130     -PT and OT 120minutes of each on a daily basis, in addition to rehab nursing and close management of physiatrist.    -Impairment of ADL's:  OT for 90 minutes daily to work on ADL  re-training such as grooming, self cares and bathing.    -Impairment of mobility:  PT for 90 minutes daily to work on neuromuscular re-education focusing on strength, balance, coordination, and endurance.    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 Started Rituximab (7/4),   - s/p Rituximab 1,000 mg IV x 1 dose (7/4)-will need repeat infusion in ~2 weeks (after discharge from ARU) per Dr. Macias.  - Completed PLEX x 5 runs (6/22, 6/24, 6/26, 6/28, 6/30)  - completed 5-day course of high-dose methylprednisolone on 6/25     # R Knee Pain: Medial knee pain following strain during PT session. Negative XR during acute admission.  - Topical aspercreme ordered, worked well for her previously.  -  Knee brace ordered, to be worn during therapies.     #Peripheral neuropathy  - gabapentin 300 mg PO BID & 600 mg qhs        #Obstructive lung disease    - Continue PTA Incruse Ellipta inhaler  - Albuterol nebs q4h prn     #Hx of discoid lupus, Sjogren's syndrome, temporal arteritis, optic neuritis  - continue PTA prednisone 5 mg PO daily     #Hx of stroke/TIA  - Continue PTA ASA and clopidogrel     #Hypertension  #CHF  Patient takes amlodipine, metoprolol, and Lasix PTA. Noted to be hypotensive, hold amlodipine preferentially (B-blocker and diuretic needed for heart failure). Improved to 120s systolic after discontinuing amlodipine.   - Continue PTA metoprolol and Lasix    - hold PTA amlodipine d/t low BP  - Daily weights      #Hypercholesterolemia  - Continue PTA statin     Adjustment to disability:  Clinical psychology to eval and treat as needed.  FEN: Regular consistency solids, thin liquids.  Change to low sodium diet.  Bowel: Continent , PRN colace and Miralax available  Bladder:Continent, PVRs x3 WNL on admission, may check PRN only.  DVT Prophylaxis:  Enoxaprin 40mg Daily  GI Prophylaxis: Ranitidine BID  Code: Full  Disposition: Home  ELOS:  12-14 days  Rehab prognosis:  fair  Follow up Appointments on Discharge: - Follow up with Dr. Macias at [facility] after discharge to arrange next Rituximab infusion        Raji Gaviria MD  Department of Rehabilitation Medicine  Pager: [phone]    Time Spent on this Encounter   I, Raji Gaviria, spent a total of 30 minutes face-to-face or managing the care of Ladonna Sheriff. Over 50% of my time on the unit was spent counseling the patient and coordinating care. See note for details.

## 2019-07-08 NOTE — PLAN OF CARE
Discharge Planner OT   Patient plan for discharge: home  Current status: Clsoe SBA standng for ADL, Set up for UB dressing and CGA for LB dressing. Assist for toileting  Barriers to return to prior living situation: LE instability and weakness, level of assist, stairs to access apt  Recommendations for discharge: Recommend assist with IADL as needed. DME TBD         Entered by: Maya Ivy 07/08/2019 8:05 AM

## 2019-07-08 NOTE — PLAN OF CARE
FOCUS/GOAL  Bowel management, Bladder management, Skin integrity and Cognition/Memory/Judgment/Problem solving    ASSESSMENT, INTERVENTIONS AND CONTINUING PLAN FOR GOAL:  Pt is alert and oriented, denied pain, sob, lightheadedness, n/v, dizziness or new changes in sensation, continent of bowel and some very slight dribbling incontinence of bladder, calling appropriately for needs, able to reposition independently, no further care concerns at this time continue with POC.

## 2019-07-09 LAB — LAB SCANNED RESULT: ABNORMAL

## 2019-07-09 PROCEDURE — 97116 GAIT TRAINING THERAPY: CPT | Mod: GP | Performed by: REHABILITATION PRACTITIONER

## 2019-07-09 PROCEDURE — 25000132 ZZH RX MED GY IP 250 OP 250 PS 637: Mod: GY | Performed by: PHYSICAL MEDICINE & REHABILITATION

## 2019-07-09 PROCEDURE — 97110 THERAPEUTIC EXERCISES: CPT | Mod: GP | Performed by: REHABILITATION PRACTITIONER

## 2019-07-09 PROCEDURE — 97110 THERAPEUTIC EXERCISES: CPT | Mod: GO

## 2019-07-09 PROCEDURE — 12800006 ZZH R&B REHAB

## 2019-07-09 PROCEDURE — 25000131 ZZH RX MED GY IP 250 OP 636 PS 637: Mod: GY | Performed by: STUDENT IN AN ORGANIZED HEALTH CARE EDUCATION/TRAINING PROGRAM

## 2019-07-09 PROCEDURE — 25000132 ZZH RX MED GY IP 250 OP 250 PS 637: Mod: GY | Performed by: STUDENT IN AN ORGANIZED HEALTH CARE EDUCATION/TRAINING PROGRAM

## 2019-07-09 PROCEDURE — 97530 THERAPEUTIC ACTIVITIES: CPT | Mod: GO | Performed by: STUDENT IN AN ORGANIZED HEALTH CARE EDUCATION/TRAINING PROGRAM

## 2019-07-09 PROCEDURE — 97116 GAIT TRAINING THERAPY: CPT | Mod: GP

## 2019-07-09 PROCEDURE — 97110 THERAPEUTIC EXERCISES: CPT | Mod: GP

## 2019-07-09 PROCEDURE — 97535 SELF CARE MNGMENT TRAINING: CPT | Mod: GO | Performed by: STUDENT IN AN ORGANIZED HEALTH CARE EDUCATION/TRAINING PROGRAM

## 2019-07-09 RX ADMIN — FLUOXETINE 20 MG: 20 CAPSULE ORAL at 08:52

## 2019-07-09 RX ADMIN — GABAPENTIN 300 MG: 300 CAPSULE ORAL at 08:52

## 2019-07-09 RX ADMIN — FUROSEMIDE 20 MG: 20 TABLET ORAL at 16:44

## 2019-07-09 RX ADMIN — FUROSEMIDE 20 MG: 20 TABLET ORAL at 06:24

## 2019-07-09 RX ADMIN — Medication 2.5 MEQ: at 08:53

## 2019-07-09 RX ADMIN — CLOPIDOGREL BISULFATE 75 MG: 75 TABLET, FILM COATED ORAL at 08:53

## 2019-07-09 RX ADMIN — GABAPENTIN 600 MG: 300 CAPSULE ORAL at 20:21

## 2019-07-09 RX ADMIN — B-COMPLEX W/ C & FOLIC ACID TAB 1 TABLET: TAB at 08:52

## 2019-07-09 RX ADMIN — OYSTER SHELL CALCIUM WITH VITAMIN D 1 TABLET: 500; 200 TABLET, FILM COATED ORAL at 20:21

## 2019-07-09 RX ADMIN — RANITIDINE 150 MG: 150 TABLET ORAL at 08:52

## 2019-07-09 RX ADMIN — OXYCODONE HYDROCHLORIDE AND ACETAMINOPHEN 500 MG: 500 TABLET ORAL at 08:52

## 2019-07-09 RX ADMIN — FUROSEMIDE 20 MG: 20 TABLET ORAL at 13:35

## 2019-07-09 RX ADMIN — METOPROLOL TARTRATE 50 MG: 50 TABLET ORAL at 20:21

## 2019-07-09 RX ADMIN — SIMVASTATIN 20 MG: 20 TABLET, FILM COATED ORAL at 20:21

## 2019-07-09 RX ADMIN — UMECLIDINIUM 1 PUFF: 62.5 AEROSOL, POWDER ORAL at 08:51

## 2019-07-09 RX ADMIN — METOPROLOL TARTRATE 50 MG: 50 TABLET ORAL at 08:52

## 2019-07-09 RX ADMIN — TRAZODONE HYDROCHLORIDE 100 MG: 50 TABLET ORAL at 20:21

## 2019-07-09 RX ADMIN — RANITIDINE 150 MG: 150 TABLET ORAL at 20:21

## 2019-07-09 RX ADMIN — PREDNISONE 5 MG: 5 TABLET ORAL at 08:53

## 2019-07-09 RX ADMIN — ASPIRIN 325 MG: 325 TABLET, DELAYED RELEASE ORAL at 20:21

## 2019-07-09 RX ADMIN — GABAPENTIN 300 MG: 300 CAPSULE ORAL at 13:35

## 2019-07-09 RX ADMIN — TROLAMINE SALICYLATE: 10 CREAM TOPICAL at 20:22

## 2019-07-09 NOTE — PLAN OF CARE
Assist of 1 to ambulate to the bathroom with a walker. Gait is unsteady at times due to RLE stiffness. Voids in the toilet without difficulty but dribbles urine in the incontinent pad before she sits on the toilet. Uses magnifying glass for reading due to blindness on the right eye and decrease peripheral vision on the left eye. Numbness is more prominent on the RLE. Gabapentin was initiated for neuropathic discomfort. Had 2 bowel movements in the afternoon. Pleasant and cooperative with cares. Bed alarm in place. Able to use the call light to request for assistance.

## 2019-07-09 NOTE — PROGRESS NOTES
[facility]   Acute Rehabilitation Unit  Daily progress note    INTERVAL HISTORY  No acute events overnight.  Today Ladonna Sheriff has no new concerns or complaints.  She denies chest pain, shortness of breath, or problems with bowel or bladder.  She feels she is improving, however has not started stairs with PT yet which will be her biggest barrier.  We discussed her next Rituximab infusion as an outpatient, we will set up a follow up appointment with neurology (Dr. Macias) shortly after discharge.  She would like to see him in his [place] location.        MEDICATIONS  Scheduled:    aspirin  325 mg Oral QPM     calcium carbonate-vitamin D  1 tablet Oral QPM     clopidogrel  75 mg Oral QAM     enoxaparin  40 mg Subcutaneous Q24H     FLUoxetine  20 mg Oral Daily     furosemide  20 mg Oral TID     gabapentin  300 mg Oral BID     gabapentin  600 mg Oral At Bedtime     metoprolol tartrate  50 mg Oral BID     potassium gluconate  2.5 mEq Oral Daily     predniSONE  5 mg Oral QAM     ranitidine  150 mg Oral BID     simvastatin  20 mg Oral At Bedtime     traZODone  100 mg Oral At Bedtime     umeclidinium  1 puff Inhalation Daily     vitamin B complex with vitamin C  1 tablet Oral Daily     vitamin C  500 mg Oral QAM        PRN:  acetaminophen, albuterol, melatonin, polyethylene glycol, senna-docusate, trolamine salicylate       PHYSICAL EXAM  Patient Vitals for the past 24 hrs:   BP Temp Temp src Pulse Resp SpO2   07/09/19 0647 142/72 96.7  F (35.9  C) Oral 59 16 95 %   07/08/19 2013 136/76 -- -- 67 -- --   07/08/19 1638 126/63 97.1  F (36.2  C) Oral 65 16 95 %       GEN: NAD, pleasant and cooperative  HEENT: NC/AT  CVS: RRR, S1+S2, +systolic murmur  PULM: CTA b/l, no w/r/r  ABD: Soft, NT, ND, bowel sounds present  EXT: +Non-pitting edema, no calf tenderness b/l  Neuro: Answers appropriately, follows commands    LABS  CBC RESULTS:   Recent Labs   Lab Test 07/07/19  0539   WBC 6.1   RBC  3.68*   HGB 11.9   HCT 37.5   *   MCH 32.3   MCHC 31.7   RDW 14.0   *       Last Comprehensive Metabolic Panel:  Sodium   Date Value Ref Range Status   07/07/2019 145 (H) 133 - 144 mmol/L Final     Potassium   Date Value Ref Range Status   07/07/2019 3.4 3.4 - 5.3 mmol/L Final     Chloride   Date Value Ref Range Status   07/07/2019 110 (H) 94 - 109 mmol/L Final     Carbon Dioxide   Date Value Ref Range Status   07/07/2019 30 20 - 32 mmol/L Final     Anion Gap   Date Value Ref Range Status   07/07/2019 5 3 - 14 mmol/L Final     Glucose   Date Value Ref Range Status   07/07/2019 76 70 - 99 mg/dL Final     Urea Nitrogen   Date Value Ref Range Status   07/07/2019 17 7 - 30 mg/dL Final     Creatinine   Date Value Ref Range Status   07/07/2019 0.86 0.52 - 1.04 mg/dL Final     GFR Estimate   Date Value Ref Range Status   07/07/2019 66 >60 mL/min/[1.73_m2] Final     Comment:     Non  GFR Calc  Starting 12/18/2018, serum creatinine based estimated GFR (eGFR) will be   calculated using the Chronic Kidney Disease Epidemiology Collaboration   (CKD-EPI) equation.       Calcium   Date Value Ref Range Status   07/07/2019 8.3 (L) 8.5 - 10.1 mg/dL Final       Recent Labs   Lab 07/07/19  0539 07/04/19  1012   GLC 76 99         ASSESSMENT/PLAN:  Ladonna Sheriff is a 74 year old right hand dominant female with a PMH notable for previous CVA/TIA, discoid lupus, Sjogrens, temporal arteritis, CHF, optic neuritis with visual impairment and recently diagnosed COPD who is being admitted to the ARU 7/6/2019 for functional deficits 2/2 neuromyelitis optica. Deficits include BL LE weakness R>L, mobility, ADL's.     Admission to acute inpatient rehab  7/6/2019   Impairment group code 04.130     -PT and OT 120minutes of each on a daily basis, in addition to rehab nursing and close management of physiatrist.    -Impairment of ADL's:  OT for 90 minutes daily to work on ADL re-training such as grooming, self cares and  bathing.    -Impairment of mobility:  PT for 90 minutes daily to work on neuromuscular re-education focusing on strength, balance, coordination, and endurance.    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 Started Rituximab (7/4),   - s/p Rituximab 1,000 mg IV x 1 dose (7/4)-will need repeat infusion in ~2 weeks (after discharge from ARU) per Dr. Macias.  - Completed PLEX x 5 runs (6/22, 6/24, 6/26, 6/28, 6/30)  - completed 5-day course of high-dose methylprednisolone on 6/25     # R Knee Pain: Medial knee pain following strain during PT session. Negative XR during acute admission.  - Topical aspercreme ordered, worked well for her previously.  -  Knee brace ordered, to be worn during therapies.     #Peripheral neuropathy  - gabapentin 300 mg PO BID & 600 mg qhs        #Obstructive lung disease    - Continue PTA Incruse Ellipta inhaler  - Albuterol nebs q4h prn     #Hx of discoid lupus, Sjogren's syndrome, temporal arteritis, optic neuritis  - continue PTA prednisone 5 mg PO daily     #Hx of stroke/TIA  - Continue PTA ASA and clopidogrel     #Hypertension  #CHF  Patient takes amlodipine, metoprolol, and Lasix PTA. Noted to be hypotensive, hold amlodipine preferentially (B-blocker and diuretic needed for heart failure). Improved to 120s systolic after discontinuing amlodipine.   - Continue PTA metoprolol and Lasix    - hold PTA amlodipine d/t low BP  - Daily weights      #Hypercholesterolemia  - Continue PTA statin     Adjustment to disability:  Clinical psychology to eval and treat as needed.  FEN: Regular consistency solids, thin liquids, low sodium.  Re-check in 2-3 days  Bowel: Continent , PRN colace and Miralax available  Bladder:Continent, PVRs x3 WNL on admission, may check PRN only.  DVT Prophylaxis: Now ambulating adequate distance to  deter DVTs, will discontinue Lovenox  GI Prophylaxis: Ranitidine BID  Code: Full  Disposition: Home  ELOS:  12-14 days  Rehab prognosis:  fair  Follow up Appointments on Discharge: - Follow up with Dr[facility] Macias at [facility] after discharge to arrange next Rituximab infusion        Raji Gaviria MD  Department of Rehabilitation Medicine  Pager: [phone]    Time Spent on this Encounter   I, Raji Gaviria, spent a total of 35 minutes face-to-face or managing the care of Ladonna Sheriff. Over 50% of my time on the unit was spent counseling the patient and coordinating care including formal team rounds. See note for details.

## 2019-07-09 NOTE — PLAN OF CARE
CGA w/walker. Has some dribbling incontinence at times, frequently after taking Lasix.  No c/o pain this shift.  Pt's son sets up meds for pt.  Will need updated medication sheet d/t changes in medication.  She is able to identify medications & indications of use but due to her visual deficits, her son sets up.

## 2019-07-09 NOTE — CARE CONFERENCE
Acute Rehab Care Conference/Team Rounds      Type: Team Rounds    Present: Dr. Malinda Gaviria, Eloise Moss RN, Lyudmila Porter SW, Maya Ivy OT, Benjy Garcia PT, Alonzo Delgado SLP, Susan Cho , Julita Fortune Dietician          Discharge Barriers/Treatment/Education    Rehab Diagnosis: Neuromyelitis Optica    Active Medical Co-morbidities/Prognosis: Lupus, Sjogren's syndrome, optic neuritis, HTN, HLD, CHF, COPD    Safety: She called for assistance and communicated her needs. She is blind right eye, decreased peripheral vision left eye. CGA with transfers and ambulation with a walker.     Pain: Neuropathic pain both feet some relief with current scheduled medication, she did no request PRN's medication.    Medications, Skin, Tubes/Lines: Skin is intact, no tubes/lines.    Swallowing/Nutrition:    Bowel/Bladder: Continent of B/B, uses toilet. LBM on 7/8. Supervision with greg cares, CGA with clothing management.    Psychosocial: Pt lives alone in an apartment but has an adult son in the same building who is available for support.    ADLs/IADLs: Pt is SBA for ADL tasks and CGA for functional mobility using the walker. Using a 1# weight on RLE for coordination and tape for visual cues. Working towards MOD I with ADL and IADL, early in her stay but making good progress and motivated. Need to determine DME for bathroom. Son lives near by and is available to assist with IADL as needed. May not need OT at discharge.     Mobility: Pt progressing steadily with mobility, focusing primarily on improving LE coordination R>LLE and standing dynamic balance. Demo sup<>sit SBA, STS FWW CGA, amb 150+ ft FWW CGA with pt watching feet d/t visual deficits. Need to start stairs training. Recommend OP PT. Will need FWW from [facility] at discharge.    Cognition/Language:    Community Re-Entry: supervision for limited community distances with FWW    Transportation: recommend assist from family/friends.    Decision maker:  self    Plan of Care and goals reviewed and updated.    Discharge Plan/Recommendations    Fall Precautions: continue    Overall plan for the patient:   Medically stable.  Nursing - No concerns.  Son sets up medications weekly at baseline.  PT - Instability in R>L legs, ataxic.  She is compensating well for vision deficits.  Ambualting 150-200 ft CGA with a walker.  Able to do 6 inch stairs in parallel bars only thus far, but has 16 steps at home which she will need to navigate in order for discharge.  OT - SBA for ADLs, CGA for functional mobility and had a misstep in therapy today.  Need to figure out equipment for home.  Tentative discharge date of 7/16.  Outpatient PT and ?OT.        Utilization Review and Continued Stay Justification    Medical Necessity Criteria:    For any criteria that is not met, please document reason and plan for discharge, transfer, or modification of plan of care to address.    Requires intensive rehabilitation program to treat functional deficits?: Yes    Requires 3x per week or greater involvement of rehabilitation physician to oversee rehabilitation program?: Yes    Requires rehabilitation nursing interventions?: Yes    Patient is making functional progress?: Yes    There is a potential for additional functional progress? Yes    Patient is participating in therapy 3 hours per day a minimum of 5 days per week or 15 hours per week in 7 day period?:Yes    Has discharge needs that require coordinated discharge planning approach?:Yes      Barriers/Concerns related to meeting medical necessity criteria:  None    Team Plan to Address Concern:  N/A      Final Physician Sign off    Statement of Approval: I have reviewed and agree with the recommendations and documentation in this care conference note.       Patient Goals    Social Work goal: Patient will discharge home/community setting upon completion of therapy/RN goals.  Social Work goal: Patient and family will be involved in discharge  planning and made aware of services available to meet patient's needs.    OT goal: hygiene/grooming: independent  OT goal: upper body dressing: Independent  OT goal: lower body dressing: Modified independent  OT goal: upper body bathing: Modified independent  OT goal: lower body bathing: Supervision/stand-by assist  OT goal: toilet transfer/toileting: Modified independent  OT goal: meal preparation: Modified independent  OT goal: home management: Modified independent  OT goal 1: Pt will demo SBA shower transfer using DME/AE prn  OT goal 2: Pt will demo IND with visual compensatory strategies to maximize IND and safety with ADLs/IADLs  OT/NATACHA face-to-face collaboration occurred, patient progress and plan of care reviewed.     PT Frequency: PT to participate in 90 minutes of therapy daily  PT goal: transfers: Modified independent  PT goal: gait: Modified independent, 150 feet, Rolling walker  PT goal: stairs: Supervision/stand-by assist, Greater than 10 stairs, Rail on both sides  PT goal: perform aerobic activity with stable cardiovascular response: continuous activity, 15 minutes, NuStep       Nursing Goal:  Pain Management: Pt will verbalize understanding and appropriatealy utilize pharmacological and non-pharmacological interventions to manage pain during stay     Nursing Goal: Medication Management: Pt and family will verbalize understanding of medication regimen before discharge. Pt's son sets up medication for weeks at the time      Nursing Goal: Skin Integrity: Pt will verbalize three interventions to prevent pressure ulcers and remain free of skin breakdown before discharge

## 2019-07-09 NOTE — PLAN OF CARE
PT: this wrier worked with pt just before coming to ARU. Pt has made good progress since with increased IND with supine to sit. CGA with V.c 25% of the time for hand and foot placement for stand pivot transfer with WW. Pt has progressed well with amb up to 180'x 1 with WW and W/c follow. Pt amb with cuff wt on R LE for increased sensation for control and placement of RLE. Pt demo supine and seated there ex. X 10 needing increased assist for control of RLE.

## 2019-07-09 NOTE — PLAN OF CARE
Discharge Planner OT   Patient plan for discharge: Home  Current status: LOB noted while ambulating with 2ww and CGA; initiated BUE HEP with 2# DR  Barriers to return to prior living situation: LE instability and weakness, level of assist, stairs to access apt  Recommendations for discharge: Recommend assist with IADL as needed. DME TBD   Rationale for recommendations: To increase safety and indep with I/ADLs.       Entered by: Arelis Royal 07/09/2019 12:42 PM

## 2019-07-10 ENCOUNTER — DOCUMENTATION ONLY (OUTPATIENT)
Dept: OTHER | Facility: CLINIC | Age: 74
End: 2019-07-10

## 2019-07-10 ENCOUNTER — AMBULATORY - HEALTHEAST (OUTPATIENT)
Dept: OTHER | Facility: CLINIC | Age: 74
End: 2019-07-10

## 2019-07-10 PROCEDURE — 25000131 ZZH RX MED GY IP 250 OP 636 PS 637: Mod: GY | Performed by: STUDENT IN AN ORGANIZED HEALTH CARE EDUCATION/TRAINING PROGRAM

## 2019-07-10 PROCEDURE — 12800006 ZZH R&B REHAB

## 2019-07-10 PROCEDURE — 25000132 ZZH RX MED GY IP 250 OP 250 PS 637: Mod: GY | Performed by: PHYSICAL MEDICINE & REHABILITATION

## 2019-07-10 PROCEDURE — 97110 THERAPEUTIC EXERCISES: CPT | Mod: GP | Performed by: PHYSICAL THERAPIST

## 2019-07-10 PROCEDURE — 97535 SELF CARE MNGMENT TRAINING: CPT | Mod: GO

## 2019-07-10 PROCEDURE — 97530 THERAPEUTIC ACTIVITIES: CPT | Mod: GP | Performed by: PHYSICAL THERAPIST

## 2019-07-10 PROCEDURE — 97116 GAIT TRAINING THERAPY: CPT | Mod: GP | Performed by: PHYSICAL THERAPIST

## 2019-07-10 PROCEDURE — 97535 SELF CARE MNGMENT TRAINING: CPT | Mod: GO | Performed by: OCCUPATIONAL THERAPIST

## 2019-07-10 PROCEDURE — 97530 THERAPEUTIC ACTIVITIES: CPT | Mod: GO | Performed by: OCCUPATIONAL THERAPIST

## 2019-07-10 PROCEDURE — 25000132 ZZH RX MED GY IP 250 OP 250 PS 637: Mod: GY | Performed by: STUDENT IN AN ORGANIZED HEALTH CARE EDUCATION/TRAINING PROGRAM

## 2019-07-10 RX ADMIN — FUROSEMIDE 20 MG: 20 TABLET ORAL at 12:27

## 2019-07-10 RX ADMIN — OYSTER SHELL CALCIUM WITH VITAMIN D 1 TABLET: 500; 200 TABLET, FILM COATED ORAL at 20:03

## 2019-07-10 RX ADMIN — RANITIDINE 150 MG: 150 TABLET ORAL at 20:03

## 2019-07-10 RX ADMIN — TROLAMINE SALICYLATE: 10 CREAM TOPICAL at 17:15

## 2019-07-10 RX ADMIN — ACETAMINOPHEN 650 MG: 325 TABLET, FILM COATED ORAL at 08:40

## 2019-07-10 RX ADMIN — SIMVASTATIN 20 MG: 20 TABLET, FILM COATED ORAL at 20:03

## 2019-07-10 RX ADMIN — B-COMPLEX W/ C & FOLIC ACID TAB 1 TABLET: TAB at 08:41

## 2019-07-10 RX ADMIN — RANITIDINE 150 MG: 150 TABLET ORAL at 08:41

## 2019-07-10 RX ADMIN — GABAPENTIN 600 MG: 300 CAPSULE ORAL at 19:59

## 2019-07-10 RX ADMIN — GABAPENTIN 300 MG: 300 CAPSULE ORAL at 08:40

## 2019-07-10 RX ADMIN — METOPROLOL TARTRATE 50 MG: 50 TABLET ORAL at 08:37

## 2019-07-10 RX ADMIN — FLUOXETINE 20 MG: 20 CAPSULE ORAL at 08:40

## 2019-07-10 RX ADMIN — GABAPENTIN 300 MG: 300 CAPSULE ORAL at 14:35

## 2019-07-10 RX ADMIN — OXYCODONE HYDROCHLORIDE AND ACETAMINOPHEN 500 MG: 500 TABLET ORAL at 08:40

## 2019-07-10 RX ADMIN — ASPIRIN 325 MG: 325 TABLET, DELAYED RELEASE ORAL at 20:03

## 2019-07-10 RX ADMIN — FUROSEMIDE 20 MG: 20 TABLET ORAL at 06:05

## 2019-07-10 RX ADMIN — TRAZODONE HYDROCHLORIDE 100 MG: 50 TABLET ORAL at 20:03

## 2019-07-10 RX ADMIN — UMECLIDINIUM 1 PUFF: 62.5 AEROSOL, POWDER ORAL at 08:44

## 2019-07-10 RX ADMIN — PREDNISONE 5 MG: 5 TABLET ORAL at 08:40

## 2019-07-10 RX ADMIN — CLOPIDOGREL BISULFATE 75 MG: 75 TABLET, FILM COATED ORAL at 08:40

## 2019-07-10 RX ADMIN — FUROSEMIDE 20 MG: 20 TABLET ORAL at 17:05

## 2019-07-10 RX ADMIN — Medication 2.5 MEQ: at 08:40

## 2019-07-10 RX ADMIN — METOPROLOL TARTRATE 50 MG: 50 TABLET ORAL at 20:01

## 2019-07-10 NOTE — PLAN OF CARE
Patient slept well. She called for assistance to the bathroom, needed CGA with transfers and ambulation. Independent with toileting Hygiene, CGA for clothing management. No c/o pain.

## 2019-07-10 NOTE — PLAN OF CARE
Discharge Planner OT   Patient plan for discharge: Home with continued therapy  Current status: Pt shown options for tub/shower AE - preferred chair (vs bench) and able to complete transfer with CGA; educated on other helpful equipment for bathroom and how to obtain items.  Barriers to return to prior living situation: weakness, impaired balance  Recommendations for discharge: Home with continued therapy  Rationale for recommendations: to increase pt's (I) with ADL/IADLs       Entered by: Leighann Alonzo 07/10/2019 12:27 PM

## 2019-07-10 NOTE — PLAN OF CARE
FOCUS/GOAL  Bladder management and Pain management    ASSESSMENT, INTERVENTIONS AND CONTINUING PLAN FOR GOAL:  CGA with walker/gait belt for transfers. Used call light appropriately, no attempts at self transfer noted. Pt has occasional dribbling incontinence reported, min ao1 for clothing management and independent with pericare. C/O lower back pain and right knee/ankle pain this morning, requested and given Tylenol PRN with relief reported. Will continue with POC.

## 2019-07-10 NOTE — PLAN OF CARE
PT: Pt with good effort and participation.  Pt is needing A with transfers and gait due to sensory and proprioceptive deficits. Cont to work on RLE strengthening, balance, safety.

## 2019-07-11 LAB
ANION GAP SERPL CALCULATED.3IONS-SCNC: 6 MMOL/L (ref 3–14)
BUN SERPL-MCNC: 17 MG/DL (ref 7–30)
CALCIUM SERPL-MCNC: 7.8 MG/DL (ref 8.5–10.1)
CHLORIDE SERPL-SCNC: 110 MMOL/L (ref 94–109)
CO2 SERPL-SCNC: 29 MMOL/L (ref 20–32)
CREAT SERPL-MCNC: 0.9 MG/DL (ref 0.52–1.04)
GFR SERPL CREATININE-BSD FRML MDRD: 62 ML/MIN/{1.73_M2}
GLUCOSE SERPL-MCNC: 91 MG/DL (ref 70–99)
POTASSIUM SERPL-SCNC: 3.4 MMOL/L (ref 3.4–5.3)
SODIUM SERPL-SCNC: 145 MMOL/L (ref 133–144)

## 2019-07-11 PROCEDURE — 97535 SELF CARE MNGMENT TRAINING: CPT | Mod: GO | Performed by: STUDENT IN AN ORGANIZED HEALTH CARE EDUCATION/TRAINING PROGRAM

## 2019-07-11 PROCEDURE — 12800006 ZZH R&B REHAB

## 2019-07-11 PROCEDURE — 80048 BASIC METABOLIC PNL TOTAL CA: CPT | Performed by: PHYSICAL MEDICINE & REHABILITATION

## 2019-07-11 PROCEDURE — 25000131 ZZH RX MED GY IP 250 OP 636 PS 637: Mod: GY | Performed by: STUDENT IN AN ORGANIZED HEALTH CARE EDUCATION/TRAINING PROGRAM

## 2019-07-11 PROCEDURE — 25000132 ZZH RX MED GY IP 250 OP 250 PS 637: Mod: GY | Performed by: STUDENT IN AN ORGANIZED HEALTH CARE EDUCATION/TRAINING PROGRAM

## 2019-07-11 PROCEDURE — 25000132 ZZH RX MED GY IP 250 OP 250 PS 637: Mod: GY | Performed by: PHYSICAL MEDICINE & REHABILITATION

## 2019-07-11 PROCEDURE — 97112 NEUROMUSCULAR REEDUCATION: CPT | Mod: GP

## 2019-07-11 PROCEDURE — 97116 GAIT TRAINING THERAPY: CPT | Mod: GP

## 2019-07-11 PROCEDURE — 36415 COLL VENOUS BLD VENIPUNCTURE: CPT | Performed by: PHYSICAL MEDICINE & REHABILITATION

## 2019-07-11 PROCEDURE — 97110 THERAPEUTIC EXERCISES: CPT | Mod: GO | Performed by: STUDENT IN AN ORGANIZED HEALTH CARE EDUCATION/TRAINING PROGRAM

## 2019-07-11 RX ADMIN — RANITIDINE 150 MG: 150 TABLET ORAL at 09:34

## 2019-07-11 RX ADMIN — GABAPENTIN 300 MG: 300 CAPSULE ORAL at 09:33

## 2019-07-11 RX ADMIN — SIMVASTATIN 20 MG: 20 TABLET, FILM COATED ORAL at 20:34

## 2019-07-11 RX ADMIN — CLOPIDOGREL BISULFATE 75 MG: 75 TABLET, FILM COATED ORAL at 09:34

## 2019-07-11 RX ADMIN — ACETAMINOPHEN 650 MG: 325 TABLET, FILM COATED ORAL at 13:26

## 2019-07-11 RX ADMIN — OXYCODONE HYDROCHLORIDE AND ACETAMINOPHEN 500 MG: 500 TABLET ORAL at 09:33

## 2019-07-11 RX ADMIN — ASPIRIN 325 MG: 325 TABLET, DELAYED RELEASE ORAL at 20:34

## 2019-07-11 RX ADMIN — Medication 2.5 MEQ: at 09:33

## 2019-07-11 RX ADMIN — B-COMPLEX W/ C & FOLIC ACID TAB 1 TABLET: TAB at 09:33

## 2019-07-11 RX ADMIN — UMECLIDINIUM 1 PUFF: 62.5 AEROSOL, POWDER ORAL at 09:33

## 2019-07-11 RX ADMIN — PREDNISONE 5 MG: 5 TABLET ORAL at 09:34

## 2019-07-11 RX ADMIN — FUROSEMIDE 20 MG: 20 TABLET ORAL at 13:26

## 2019-07-11 RX ADMIN — OYSTER SHELL CALCIUM WITH VITAMIN D 1 TABLET: 500; 200 TABLET, FILM COATED ORAL at 20:34

## 2019-07-11 RX ADMIN — GABAPENTIN 300 MG: 300 CAPSULE ORAL at 13:26

## 2019-07-11 RX ADMIN — FUROSEMIDE 20 MG: 20 TABLET ORAL at 06:33

## 2019-07-11 RX ADMIN — METOPROLOL TARTRATE 50 MG: 50 TABLET ORAL at 20:34

## 2019-07-11 RX ADMIN — RANITIDINE 150 MG: 150 TABLET ORAL at 20:34

## 2019-07-11 RX ADMIN — GABAPENTIN 600 MG: 300 CAPSULE ORAL at 20:34

## 2019-07-11 RX ADMIN — FLUOXETINE 20 MG: 20 CAPSULE ORAL at 09:34

## 2019-07-11 RX ADMIN — FUROSEMIDE 20 MG: 20 TABLET ORAL at 16:42

## 2019-07-11 RX ADMIN — TRAZODONE HYDROCHLORIDE 100 MG: 50 TABLET ORAL at 20:34

## 2019-07-11 RX ADMIN — METOPROLOL TARTRATE 50 MG: 50 TABLET ORAL at 09:34

## 2019-07-11 NOTE — PROGRESS NOTES
"SPIRITUAL HEALTH SERVICES  SPIRITUAL ASSESSMENT Progress Note  [facility] ([facility]) 5R     PRIMARY FOCUS:     Goals of care    Symptom/pain management    Emotional/spiritual/Confucianism distress    Support for coping    REFERRAL SOURCE: LOS Visit    ILLNESS CIRCUMSTANCES:   Reviewed documentation. Reflective conversation shared with Ladonna which integrated elements of illness and family narratives.     Context of Serious Illness/Symptom(s) - Ladonna explained her NMO has affected her vision and created tingling and numbness in her legs. She said she is determined to work hard in her therapies and feels she is getting stronger.    Resources for Support - Ladonna talked about her three sons, Bandar, Sridhar, and Israel and Israel's partner NURIS.     DISTRESS:     Emotional/Spiritual/Existential Distress - Ladonna mentioned she is worried about her friend Kathe who is struggling with stage 4 cervical cancer that has spread.    Rastafarian Distress - n/d    Social/Cultural/Economic Distress - n/d    SPIRITUAL/Caodaism COPING:     Pentecostalism/Vicki - Ladonna said she grew up in the [employer] but since her  passed and because of her vision she does not attend Faith anymore.     Spiritual Practice(s) - Ladonna said she prays daily. We prayed for her recovery, her family, and her friend Kathe's recovery.    Emotional/Relational/Existential Connections - Her family as mentioned above.    GOALS OF CARE:    Goals of Care - Ladonna said she wants to go home to take care of her two cats again.    Meaning/Sense-Making - Ladonna said she is \"determined and will persevere\" to get better.     PLAN: Follow up on ARU next week.      Radha Smyth   Intern  Pager [phone]    "

## 2019-07-11 NOTE — PROGRESS NOTES
Patient slept well thus far tonight. Fall risk with alarms on. CGA with transfers and ambulation. No pain complaints thus far. Call light within reach. Continue the plan of care.

## 2019-07-11 NOTE — PLAN OF CARE
"Alert and oriented, VSS. Appetite good. PRN aspercreme requested x1 for discomfort to R ankle and toes on R foot; pt reports a previous \"sprain\" this week. Voiding without difficulty. LBM today. No skin concerns other than bruising noted. Continue with plan of care.   "

## 2019-07-11 NOTE — PLAN OF CARE
FOCUS/GOAL  Pain management and Mobility    ASSESSMENT, INTERVENTIONS AND CONTINUING PLAN FOR GOAL:  CGA with walker/gait belt for transfers. Pt reported having a sore cuticle on her left ring finger this morning, reported that she had pulled off a hangnail. Area was red and tender. Pt requested vaseline and a bandaid, nursing to continue to monitor area. C/O headache this afternoon, Tylenol given per PRN orders with relief reported. Will continue with POC.

## 2019-07-11 NOTE — PLAN OF CARE
Discharge Planner OT   Patient plan for discharge: Home with assist  Current status: SBA/CGA for ADL routine. CGA for amb using walker  Barriers to return to prior living situation: stairs and assist level  Recommendations for discharge: Recommend tub transfer JD mclean, reacher, supervision for showers and assist for IADL as needed.          Entered by: Maya Ivy 07/11/2019 9:14 AM

## 2019-07-11 NOTE — PLAN OF CARE
Pt provided aircast for ankle stability. Pt focused on standing balance and L LE coordination both standing and seated. Improvement of coordination seen w/in session with high repetitions. Pt progressed to 12x up/down 6   stairs. Continue to progress stair endurance for return home with 16 stairs.

## 2019-07-11 NOTE — PROGRESS NOTES
[facility]   Acute Rehabilitation Unit  Daily progress note    INTERVAL HISTORY  No acute events overnight.  Today Ladonna Sheriff has no new concerns or complaints.  She feels well and is progressing on stairs.  Now able to perform 12 - 6' steps, and she does not feel these will be a barrier to discharge home.  Her long term goal will be to ambulate long distances without an assistive device.  Denies chest pain or shortness of breath.  Labs reviewed, stable.       MEDICATIONS  Scheduled:    aspirin  325 mg Oral QPM     calcium carbonate-vitamin D  1 tablet Oral QPM     clopidogrel  75 mg Oral QAM     FLUoxetine  20 mg Oral Daily     furosemide  20 mg Oral TID     gabapentin  300 mg Oral BID     gabapentin  600 mg Oral At Bedtime     metoprolol tartrate  50 mg Oral BID     potassium gluconate  2.5 mEq Oral Daily     predniSONE  5 mg Oral QAM     ranitidine  150 mg Oral BID     simvastatin  20 mg Oral At Bedtime     traZODone  100 mg Oral At Bedtime     umeclidinium  1 puff Inhalation Daily     vitamin B complex with vitamin C  1 tablet Oral Daily     vitamin C  500 mg Oral QAM        PRN:  acetaminophen, albuterol, melatonin, polyethylene glycol, senna-docusate, trolamine salicylate       PHYSICAL EXAM  Patient Vitals for the past 24 hrs:   BP Temp Temp src Pulse Heart Rate Resp SpO2   07/11/19 1552 111/59 98.4  F (36.9  C) Oral 67 -- 18 96 %   07/11/19 1008 102/59 -- -- -- -- -- --   07/11/19 0934 117/75 -- -- 76 -- -- --   07/11/19 0740 141/70 96.6  F (35.9  C) Oral 63 -- 20 94 %   07/10/19 2001 111/58 96.1  F (35.6  C) Oral -- 75 20 97 %       GEN: NAD, pleasant and cooperative  HEENT: NC/AT  CVS: RRR, S1+S2, +systolic murmur  PULM: CTA b/l, no w/r/r  ABD: Soft, NT, ND, bowel sounds present  EXT: +Non-pitting edema, no calf tenderness b/l  Neuro: Answers appropriately, follows commands    LABS  Lab Results   Component Value Date    WBC 6.1 07/07/2019     Lab Results   Component  Value Date    RBC 3.68 07/07/2019     Lab Results   Component Value Date    HGB 11.9 07/07/2019     Lab Results   Component Value Date    HCT 37.5 07/07/2019     Lab Results   Component Value Date     07/07/2019     Lab Results   Component Value Date    MCH 32.3 07/07/2019     Lab Results   Component Value Date    MCHC 31.7 07/07/2019     Lab Results   Component Value Date    RDW 14.0 07/07/2019     Lab Results   Component Value Date     07/07/2019         Last Comprehensive Metabolic Panel:  Sodium   Date Value Ref Range Status   07/11/2019 145 (H) 133 - 144 mmol/L Final     Potassium   Date Value Ref Range Status   07/11/2019 3.4 3.4 - 5.3 mmol/L Final     Chloride   Date Value Ref Range Status   07/11/2019 110 (H) 94 - 109 mmol/L Final     Carbon Dioxide   Date Value Ref Range Status   07/11/2019 29 20 - 32 mmol/L Final     Anion Gap   Date Value Ref Range Status   07/11/2019 6 3 - 14 mmol/L Final     Glucose   Date Value Ref Range Status   07/11/2019 91 70 - 99 mg/dL Final     Urea Nitrogen   Date Value Ref Range Status   07/11/2019 17 7 - 30 mg/dL Final     Creatinine   Date Value Ref Range Status   07/11/2019 0.90 0.52 - 1.04 mg/dL Final     GFR Estimate   Date Value Ref Range Status   07/11/2019 62 >60 mL/min/[1.73_m2] Final     Comment:     Non  GFR Calc  Starting 12/18/2018, serum creatinine based estimated GFR (eGFR) will be   calculated using the Chronic Kidney Disease Epidemiology Collaboration   (CKD-EPI) equation.       Calcium   Date Value Ref Range Status   07/11/2019 7.8 (L) 8.5 - 10.1 mg/dL Final       Recent Labs   Lab 07/11/19  0538 07/07/19  0539   GLC 91 76         ASSESSMENT/PLAN:  Ladonna Sheriff is a 74 year old right hand dominant female with a PMH notable for previous CVA/TIA, discoid lupus, Sjogrens, temporal arteritis, CHF, optic neuritis with visual impairment and recently diagnosed COPD who is being admitted to the ARU 7/6/2019 for functional deficits 2/2  neuromyelitis optica. Deficits include BL LE weakness R>L, mobility, ADL's.     Admission to acute inpatient rehab  7/6/2019   Impairment group code 04.130     -PT and OT 120minutes of each on a daily basis, in addition to rehab nursing and close management of physiatrist.    -Impairment of ADL's:  OT for 90 minutes daily to work on ADL re-training such as grooming, self cares and bathing.    -Impairment of mobility:  PT for 90 minutes daily to work on neuromuscular re-education focusing on strength, balance, coordination, and endurance.    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 Started Rituximab (7/4),   - s/p Rituximab 1,000 mg IV x 1 dose (7/4)-will need repeat infusion in ~2 weeks (after discharge from ARU) per Dr. Macias.  - Completed PLEX x 5 runs (6/22, 6/24, 6/26, 6/28, 6/30)  - completed 5-day course of high-dose methylprednisolone on 6/25     # R Knee Pain: Medial knee pain following strain during PT session. Negative XR during acute admission.  - Topical aspercreme ordered, worked well for her previously.  -  Knee brace ordered, to be worn during therapies.     #Peripheral neuropathy  - gabapentin 300 mg PO BID & 600 mg qhs        #Obstructive lung disease    - Continue PTA Incruse Ellipta inhaler  - Albuterol nebs q4h prn     #Hx of discoid lupus, Sjogren's syndrome, temporal arteritis, optic neuritis  - continue PTA prednisone 5 mg PO daily     #Hx of stroke/TIA  - Continue PTA ASA and clopidogrel     #Hypertension  #CHF  Patient takes amlodipine, metoprolol, and Lasix PTA. Noted to be hypotensive, hold amlodipine preferentially (B-blocker and diuretic needed for heart failure). Improved to 120s systolic after discontinuing amlodipine.   - Continue PTA metoprolol and Lasix    - hold PTA amlodipine d/t low BP  - Daily  weights      #Hypercholesterolemia  - Continue PTA statin     Adjustment to disability:  Clinical psychology to eval and treat as needed.  FEN: Regular consistency solids, thin liquids, low sodium.  Re-checked 7/10, was stable.  Na and Cl still mildly elevated.  Bowel: Continent , PRN colace and Miralax available  Bladder:Continent, PVRs x3 WNL on admission, may check PRN only.  DVT Prophylaxis:  Discontinued Lovenox due to adequate ambulation distances  GI Prophylaxis: Ranitidine BID  Code: Full  Disposition: Home  ELOS:  Tentative discharge date of 7/16/19  Rehab prognosis:  fair  Follow up Appointments on Discharge: - Follow up with Dr. Macias at [facility] after discharge to arrange next Rituximab infusion        Raji Gaviria MD  Department of Rehabilitation Medicine  Pager: [phone]    Time Spent on this Encounter   I, Raji Gaviria, spent a total of 25 minutes face-to-face or managing the care of Ladonna Sheriff. Over 50% of my time on the unit was spent counseling the patient and coordinating care. See note for details.

## 2019-07-12 PROCEDURE — 25000131 ZZH RX MED GY IP 250 OP 636 PS 637: Mod: GY | Performed by: STUDENT IN AN ORGANIZED HEALTH CARE EDUCATION/TRAINING PROGRAM

## 2019-07-12 PROCEDURE — 97535 SELF CARE MNGMENT TRAINING: CPT | Mod: GO | Performed by: OCCUPATIONAL THERAPIST

## 2019-07-12 PROCEDURE — 25000132 ZZH RX MED GY IP 250 OP 250 PS 637: Mod: GY | Performed by: PHYSICAL MEDICINE & REHABILITATION

## 2019-07-12 PROCEDURE — 97112 NEUROMUSCULAR REEDUCATION: CPT | Mod: GP

## 2019-07-12 PROCEDURE — 12800006 ZZH R&B REHAB

## 2019-07-12 PROCEDURE — 25000132 ZZH RX MED GY IP 250 OP 250 PS 637: Mod: GY | Performed by: STUDENT IN AN ORGANIZED HEALTH CARE EDUCATION/TRAINING PROGRAM

## 2019-07-12 PROCEDURE — 97110 THERAPEUTIC EXERCISES: CPT | Mod: GO | Performed by: OCCUPATIONAL THERAPIST

## 2019-07-12 PROCEDURE — 97535 SELF CARE MNGMENT TRAINING: CPT | Mod: GO

## 2019-07-12 PROCEDURE — 97116 GAIT TRAINING THERAPY: CPT | Mod: GP

## 2019-07-12 PROCEDURE — 97150 GROUP THERAPEUTIC PROCEDURES: CPT | Mod: GP

## 2019-07-12 RX ADMIN — TRAZODONE HYDROCHLORIDE 100 MG: 50 TABLET ORAL at 21:58

## 2019-07-12 RX ADMIN — GABAPENTIN 300 MG: 300 CAPSULE ORAL at 14:12

## 2019-07-12 RX ADMIN — UMECLIDINIUM 1 PUFF: 62.5 AEROSOL, POWDER ORAL at 08:46

## 2019-07-12 RX ADMIN — PREDNISONE 5 MG: 5 TABLET ORAL at 08:45

## 2019-07-12 RX ADMIN — FUROSEMIDE 20 MG: 20 TABLET ORAL at 06:41

## 2019-07-12 RX ADMIN — FUROSEMIDE 20 MG: 20 TABLET ORAL at 17:49

## 2019-07-12 RX ADMIN — GABAPENTIN 300 MG: 300 CAPSULE ORAL at 08:45

## 2019-07-12 RX ADMIN — Medication 1 MG: at 22:01

## 2019-07-12 RX ADMIN — FUROSEMIDE 20 MG: 20 TABLET ORAL at 11:52

## 2019-07-12 RX ADMIN — METOPROLOL TARTRATE 50 MG: 50 TABLET ORAL at 08:45

## 2019-07-12 RX ADMIN — OXYCODONE HYDROCHLORIDE AND ACETAMINOPHEN 500 MG: 500 TABLET ORAL at 08:45

## 2019-07-12 RX ADMIN — METOPROLOL TARTRATE 50 MG: 50 TABLET ORAL at 21:58

## 2019-07-12 RX ADMIN — RANITIDINE 150 MG: 150 TABLET ORAL at 21:58

## 2019-07-12 RX ADMIN — B-COMPLEX W/ C & FOLIC ACID TAB 1 TABLET: TAB at 08:45

## 2019-07-12 RX ADMIN — OYSTER SHELL CALCIUM WITH VITAMIN D 1 TABLET: 500; 200 TABLET, FILM COATED ORAL at 21:58

## 2019-07-12 RX ADMIN — Medication 2.5 MEQ: at 08:45

## 2019-07-12 RX ADMIN — GABAPENTIN 600 MG: 300 CAPSULE ORAL at 21:57

## 2019-07-12 RX ADMIN — ASPIRIN 325 MG: 325 TABLET, DELAYED RELEASE ORAL at 21:57

## 2019-07-12 RX ADMIN — CLOPIDOGREL BISULFATE 75 MG: 75 TABLET, FILM COATED ORAL at 08:45

## 2019-07-12 RX ADMIN — SIMVASTATIN 20 MG: 20 TABLET, FILM COATED ORAL at 21:58

## 2019-07-12 RX ADMIN — RANITIDINE 150 MG: 150 TABLET ORAL at 08:48

## 2019-07-12 RX ADMIN — FLUOXETINE 20 MG: 20 CAPSULE ORAL at 08:45

## 2019-07-12 NOTE — PLAN OF CARE
FOCUS/GOAL  Bowel management, Bladder management, Medical management and Mobility    ASSESSMENT, INTERVENTIONS AND CONTINUING PLAN FOR GOAL:  Patient A&O x4. SBA w/ walker. Continent of bowel and bladder. Last BM 7/12/19. No complaints of pain during shift. Continue with plan of care.

## 2019-07-12 NOTE — PROGRESS NOTES
[facility]   Acute Rehabilitation Unit  Daily progress note    INTERVAL HISTORY  No acute events overnight.  Today has no new concerns or complaints.  Will be further working on stairs this afternoon with PT.  Denies chest pain, shortness of breath, or problems with bowel or bladder.  Appetite is good.        MEDICATIONS  Scheduled:    aspirin  325 mg Oral QPM     calcium carbonate-vitamin D  1 tablet Oral QPM     clopidogrel  75 mg Oral QAM     FLUoxetine  20 mg Oral Daily     furosemide  20 mg Oral TID     gabapentin  300 mg Oral BID     gabapentin  600 mg Oral At Bedtime     metoprolol tartrate  50 mg Oral BID     potassium gluconate  2.5 mEq Oral Daily     predniSONE  5 mg Oral QAM     ranitidine  150 mg Oral BID     simvastatin  20 mg Oral At Bedtime     traZODone  100 mg Oral At Bedtime     umeclidinium  1 puff Inhalation Daily     vitamin B complex with vitamin C  1 tablet Oral Daily     vitamin C  500 mg Oral QAM        PRN:  acetaminophen, albuterol, melatonin, polyethylene glycol, senna-docusate, trolamine salicylate       PHYSICAL EXAM  Patient Vitals for the past 24 hrs:   BP Temp Temp src Pulse Resp SpO2   07/12/19 1150 119/70 -- -- 65 -- --   07/12/19 0842 131/64 96.5  F (35.8  C) Oral 66 16 93 %   07/11/19 2034 114/56 -- -- 76 -- --       GEN: NAD, pleasant and cooperative  HEENT: NC/AT  CVS: RRR, S1+S2, +systolic murmur  PULM: CTA b/l, no w/r/r  ABD: Soft, NT, ND, bowel sounds present  EXT: +Non-pitting edema, no calf tenderness b/l  Neuro: Answers appropriately, follows commands    LABS  Lab Results   Component Value Date    WBC 6.1 07/07/2019     Lab Results   Component Value Date    RBC 3.68 07/07/2019     Lab Results   Component Value Date    HGB 11.9 07/07/2019     Lab Results   Component Value Date    HCT 37.5 07/07/2019     Lab Results   Component Value Date     07/07/2019     Lab Results   Component Value Date    MCH 32.3 07/07/2019     Lab  Results   Component Value Date    MCHC 31.7 07/07/2019     Lab Results   Component Value Date    RDW 14.0 07/07/2019     Lab Results   Component Value Date     07/07/2019         Last Comprehensive Metabolic Panel:  Sodium   Date Value Ref Range Status   07/11/2019 145 (H) 133 - 144 mmol/L Final     Potassium   Date Value Ref Range Status   07/11/2019 3.4 3.4 - 5.3 mmol/L Final     Chloride   Date Value Ref Range Status   07/11/2019 110 (H) 94 - 109 mmol/L Final     Carbon Dioxide   Date Value Ref Range Status   07/11/2019 29 20 - 32 mmol/L Final     Anion Gap   Date Value Ref Range Status   07/11/2019 6 3 - 14 mmol/L Final     Glucose   Date Value Ref Range Status   07/11/2019 91 70 - 99 mg/dL Final     Urea Nitrogen   Date Value Ref Range Status   07/11/2019 17 7 - 30 mg/dL Final     Creatinine   Date Value Ref Range Status   07/11/2019 0.90 0.52 - 1.04 mg/dL Final     GFR Estimate   Date Value Ref Range Status   07/11/2019 62 >60 mL/min/[1.73_m2] Final     Comment:     Non  GFR Calc  Starting 12/18/2018, serum creatinine based estimated GFR (eGFR) will be   calculated using the Chronic Kidney Disease Epidemiology Collaboration   (CKD-EPI) equation.       Calcium   Date Value Ref Range Status   07/11/2019 7.8 (L) 8.5 - 10.1 mg/dL Final       Recent Labs   Lab 07/11/19  0538 07/07/19  0539   GLC 91 76         ASSESSMENT/PLAN:  Ladonna Sheriff is a 74 year old right hand dominant female with a PMH notable for previous CVA/TIA, discoid lupus, Sjogrens, temporal arteritis, CHF, optic neuritis with visual impairment and recently diagnosed COPD who is being admitted to the ARU 7/6/2019 for functional deficits 2/2 neuromyelitis optica. Deficits include BL LE weakness R>L, mobility, ADL's.     Admission to acute inpatient rehab  7/6/2019   Impairment group code 04.130     -PT and OT 120minutes of each on a daily basis, in addition to rehab nursing and close management of physiatrist.    -Impairment of  ADL's:  OT for 90 minutes daily to work on ADL re-training such as grooming, self cares and bathing.    -Impairment of mobility:  PT for 90 minutes daily to work on neuromuscular re-education focusing on strength, balance, coordination, and endurance.    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 Started Rituximab (7/4),   - s/p Rituximab 1,000 mg IV x 1 dose (7/4)-will need repeat infusion in ~2 weeks (after discharge from ARU) per Dr. Macias.  - Completed PLEX x 5 runs (6/22, 6/24, 6/26, 6/28, 6/30)  - completed 5-day course of high-dose methylprednisolone on 6/25     # R Knee Pain: Medial knee pain following strain during PT session. Negative XR during acute admission.  - Topical aspercreme ordered, worked well for her previously.  -  Knee brace ordered, to be worn during therapies.     #Peripheral neuropathy  - gabapentin 300 mg PO BID & 600 mg qhs        #Obstructive lung disease    - Continue PTA Incruse Ellipta inhaler  - Albuterol nebs q4h prn     #Hx of discoid lupus, Sjogren's syndrome, temporal arteritis, optic neuritis  - continue PTA prednisone 5 mg PO daily     #Hx of stroke/TIA  - Continue PTA ASA and clopidogrel     #Hypertension  #CHF  Patient takes amlodipine, metoprolol, and Lasix PTA. Noted to be hypotensive, hold amlodipine preferentially (B-blocker and diuretic needed for heart failure). Improved to 120s systolic after discontinuing amlodipine.   - Continue PTA metoprolol and Lasix    - hold PTA amlodipine d/t low BP  - Daily weights      #Hypercholesterolemia  - Continue PTA statin     Adjustment to disability:  Clinical psychology to eval and treat as needed.  FEN: Regular consistency solids, thin liquids, low sodium.  Re-checked 7/11, was stable.  Na and Cl still mildly elevated.  Bowel: Continent , PRN colace and Miralax  available  Bladder:Continent, PVRs x3 WNL on admission, may check PRN only.  DVT Prophylaxis:  Discontinued Lovenox due to adequate ambulation distances  GI Prophylaxis: Ranitidine BID  Code: Full  Disposition: Home  ELOS:  Tentative discharge date of 7/16/19  Rehab prognosis:  fair  Follow up Appointments on Discharge: - Follow up with Dr. Macias at [facility] after discharge to arrange next Rituximab infusion        Raji Gaviria MD  Department of Rehabilitation Medicine  Pager: [phone]    Time Spent on this Encounter   I, Raji Gaviria, spent a total of 15 minutes face-to-face or managing the care of Ladonna Sheriff. Over 50% of my time on the unit was spent counseling the patient and coordinating care. See note for details.

## 2019-07-12 NOTE — PLAN OF CARE
No new concerns overnight. Bed alarm on for patient safety, but demonstrates correct usage of call light. Up to use toilet x1. Denies pain or discomforts. Able to make needs known.

## 2019-07-12 NOTE — PLAN OF CARE
Pt attended fall prevention course in AM. Progressed to 12x 6   stairs, B kyara, CGA. Worked on R LE coordination seated in standing, noting improvement within session. Continue with stairs for d/c home and coordination when standing.

## 2019-07-12 NOTE — PLAN OF CARE
Discharge Planner OT   Patient plan for discharge: Home with continued therapy  Current status: Pt able to complete BUE endurance exercise with moderate fatigue; SBA for transfers/mobility with FWW.  Barriers to return to prior living situation: weakness, impaired balance, visual deficits  Recommendations for discharge: Home with home OT/PT  Rationale for recommendations: to increase pt's (I) with ADL/IADLs       Entered by: Leighann Alonzo 07/12/2019 4:44 PM

## 2019-07-12 NOTE — PLAN OF CARE
"VSS; alert and oriented x4. Up with SBA and walker to bathroom x4 this shift. Ambulated in hallway to shower with no difficulty/reports of SOB. Weakness noted in RLE, but pt states she is \"feeling stronger each day\". Voiding spontaneously in toilet; dribbling at times r/t scheduled lasix tid. LBM 7/10. Tolerating 2 gm Na diet well; no reports of pain this shift. Able to make needs known. Cooperative with nursing cares.   "

## 2019-07-12 NOTE — PLAN OF CARE
Pt attended Falls Prevention class today with group of 5 patients. Pt selected for class due to documented gait deficit and falls risk. Class includes education in falls risks, how to decrease that risk through behavior and home modifications and energy conservation; and instruction in available equipment designed to increase home safety. Pt was able to verbalize understanding of materials and participated appropriately in the discussion and problem-solving segments of the class.

## 2019-07-13 PROCEDURE — 25000132 ZZH RX MED GY IP 250 OP 250 PS 637: Mod: GY | Performed by: PHYSICAL MEDICINE & REHABILITATION

## 2019-07-13 PROCEDURE — 25000131 ZZH RX MED GY IP 250 OP 636 PS 637: Mod: GY | Performed by: STUDENT IN AN ORGANIZED HEALTH CARE EDUCATION/TRAINING PROGRAM

## 2019-07-13 PROCEDURE — 97116 GAIT TRAINING THERAPY: CPT | Mod: GP

## 2019-07-13 PROCEDURE — 97110 THERAPEUTIC EXERCISES: CPT | Mod: GO

## 2019-07-13 PROCEDURE — 97535 SELF CARE MNGMENT TRAINING: CPT | Mod: GO

## 2019-07-13 PROCEDURE — 12800006 ZZH R&B REHAB

## 2019-07-13 PROCEDURE — 25000132 ZZH RX MED GY IP 250 OP 250 PS 637: Mod: GY | Performed by: STUDENT IN AN ORGANIZED HEALTH CARE EDUCATION/TRAINING PROGRAM

## 2019-07-13 RX ADMIN — Medication 1 MG: at 21:41

## 2019-07-13 RX ADMIN — Medication 2.5 MEQ: at 07:53

## 2019-07-13 RX ADMIN — RANITIDINE 150 MG: 150 TABLET ORAL at 21:40

## 2019-07-13 RX ADMIN — FUROSEMIDE 20 MG: 20 TABLET ORAL at 06:10

## 2019-07-13 RX ADMIN — B-COMPLEX W/ C & FOLIC ACID TAB 1 TABLET: TAB at 07:53

## 2019-07-13 RX ADMIN — GABAPENTIN 600 MG: 300 CAPSULE ORAL at 21:40

## 2019-07-13 RX ADMIN — SIMVASTATIN 20 MG: 20 TABLET, FILM COATED ORAL at 21:40

## 2019-07-13 RX ADMIN — FUROSEMIDE 20 MG: 20 TABLET ORAL at 11:41

## 2019-07-13 RX ADMIN — GABAPENTIN 300 MG: 300 CAPSULE ORAL at 07:54

## 2019-07-13 RX ADMIN — OXYCODONE HYDROCHLORIDE AND ACETAMINOPHEN 500 MG: 500 TABLET ORAL at 07:55

## 2019-07-13 RX ADMIN — PREDNISONE 5 MG: 5 TABLET ORAL at 07:55

## 2019-07-13 RX ADMIN — FLUOXETINE 20 MG: 20 CAPSULE ORAL at 07:53

## 2019-07-13 RX ADMIN — UMECLIDINIUM 1 PUFF: 62.5 AEROSOL, POWDER ORAL at 07:55

## 2019-07-13 RX ADMIN — FUROSEMIDE 20 MG: 20 TABLET ORAL at 16:25

## 2019-07-13 RX ADMIN — METOPROLOL TARTRATE 50 MG: 50 TABLET ORAL at 07:55

## 2019-07-13 RX ADMIN — TRAZODONE HYDROCHLORIDE 100 MG: 50 TABLET ORAL at 21:40

## 2019-07-13 RX ADMIN — GABAPENTIN 300 MG: 300 CAPSULE ORAL at 13:34

## 2019-07-13 RX ADMIN — OYSTER SHELL CALCIUM WITH VITAMIN D 1 TABLET: 500; 200 TABLET, FILM COATED ORAL at 21:40

## 2019-07-13 RX ADMIN — ACETAMINOPHEN 975 MG: 325 TABLET, FILM COATED ORAL at 09:25

## 2019-07-13 RX ADMIN — RANITIDINE 150 MG: 150 TABLET ORAL at 07:55

## 2019-07-13 RX ADMIN — CLOPIDOGREL BISULFATE 75 MG: 75 TABLET, FILM COATED ORAL at 07:55

## 2019-07-13 RX ADMIN — METOPROLOL TARTRATE 50 MG: 50 TABLET ORAL at 21:41

## 2019-07-13 RX ADMIN — ASPIRIN 325 MG: 325 TABLET, DELAYED RELEASE ORAL at 21:40

## 2019-07-13 NOTE — PLAN OF CARE
Appears to be sleeping well. A&O x4. Transfers SBA w/ walker. No reports of pain.Continent using bathroom. Reports numbness/tingling in LE. Using call light appropriately. Continue with POC.

## 2019-07-13 NOTE — PROGRESS NOTES
FOCUS/GOAL  Mobility and Skin integrity    ASSESSMENT, INTERVENTIONS AND CONTINUING PLAN FOR GOAL:    Patient is Alert and Oriented x4, able to make needs known. Continent of bowel and bladder. No c/o pain. Continue with POC.

## 2019-07-13 NOTE — PLAN OF CARE
OT: Focus of intervention on kitchen mobility with 2ww. Educated pt on safe techniques with 2ww to complete emptying  and putting away groceries and transporting food to dining table. Pt demod safe techniques throughout task with good sequencing for energy conservation.

## 2019-07-13 NOTE — PLAN OF CARE
FOCUS/GOAL  Pain management and Medical management    ASSESSMENT, INTERVENTIONS AND CONTINUING PLAN FOR GOAL:  Pt alert and oriented x4. Uses call light appropriately for needs. SBA with walker and gait belt. Last BM 7/12. Up to toilet and continent. Good appetite. Requested PRN tylenol for sore back pain from the bed and therapy which was helpful. Bruises fading otherwise skin intact. Discharging on 7/16.

## 2019-07-14 PROCEDURE — 97535 SELF CARE MNGMENT TRAINING: CPT | Mod: GO | Performed by: OCCUPATIONAL THERAPIST

## 2019-07-14 PROCEDURE — 97116 GAIT TRAINING THERAPY: CPT | Mod: GP

## 2019-07-14 PROCEDURE — 97530 THERAPEUTIC ACTIVITIES: CPT | Mod: GP

## 2019-07-14 PROCEDURE — 25000131 ZZH RX MED GY IP 250 OP 636 PS 637: Mod: GY | Performed by: STUDENT IN AN ORGANIZED HEALTH CARE EDUCATION/TRAINING PROGRAM

## 2019-07-14 PROCEDURE — 12800006 ZZH R&B REHAB

## 2019-07-14 PROCEDURE — 25000132 ZZH RX MED GY IP 250 OP 250 PS 637: Mod: GY | Performed by: STUDENT IN AN ORGANIZED HEALTH CARE EDUCATION/TRAINING PROGRAM

## 2019-07-14 PROCEDURE — 25000132 ZZH RX MED GY IP 250 OP 250 PS 637: Mod: GY | Performed by: PHYSICAL MEDICINE & REHABILITATION

## 2019-07-14 RX ADMIN — GABAPENTIN 300 MG: 300 CAPSULE ORAL at 08:06

## 2019-07-14 RX ADMIN — FUROSEMIDE 20 MG: 20 TABLET ORAL at 12:16

## 2019-07-14 RX ADMIN — FLUOXETINE 20 MG: 20 CAPSULE ORAL at 08:06

## 2019-07-14 RX ADMIN — B-COMPLEX W/ C & FOLIC ACID TAB 1 TABLET: TAB at 08:06

## 2019-07-14 RX ADMIN — GABAPENTIN 300 MG: 300 CAPSULE ORAL at 14:10

## 2019-07-14 RX ADMIN — Medication 2.5 MEQ: at 08:05

## 2019-07-14 RX ADMIN — RANITIDINE 150 MG: 150 TABLET ORAL at 08:06

## 2019-07-14 RX ADMIN — OXYCODONE HYDROCHLORIDE AND ACETAMINOPHEN 500 MG: 500 TABLET ORAL at 08:05

## 2019-07-14 RX ADMIN — TRAZODONE HYDROCHLORIDE 100 MG: 50 TABLET ORAL at 20:51

## 2019-07-14 RX ADMIN — OYSTER SHELL CALCIUM WITH VITAMIN D 1 TABLET: 500; 200 TABLET, FILM COATED ORAL at 20:51

## 2019-07-14 RX ADMIN — METOPROLOL TARTRATE 50 MG: 50 TABLET ORAL at 20:52

## 2019-07-14 RX ADMIN — GABAPENTIN 600 MG: 300 CAPSULE ORAL at 20:51

## 2019-07-14 RX ADMIN — CLOPIDOGREL BISULFATE 75 MG: 75 TABLET, FILM COATED ORAL at 08:06

## 2019-07-14 RX ADMIN — ASPIRIN 325 MG: 325 TABLET, DELAYED RELEASE ORAL at 20:51

## 2019-07-14 RX ADMIN — UMECLIDINIUM 1 PUFF: 62.5 AEROSOL, POWDER ORAL at 08:10

## 2019-07-14 RX ADMIN — SIMVASTATIN 20 MG: 20 TABLET, FILM COATED ORAL at 20:51

## 2019-07-14 RX ADMIN — FUROSEMIDE 20 MG: 20 TABLET ORAL at 16:10

## 2019-07-14 RX ADMIN — METOPROLOL TARTRATE 50 MG: 50 TABLET ORAL at 08:05

## 2019-07-14 RX ADMIN — RANITIDINE 150 MG: 150 TABLET ORAL at 20:51

## 2019-07-14 RX ADMIN — TROLAMINE SALICYLATE: 10 CREAM TOPICAL at 20:51

## 2019-07-14 RX ADMIN — Medication 1 MG: at 20:51

## 2019-07-14 RX ADMIN — FUROSEMIDE 20 MG: 20 TABLET ORAL at 06:20

## 2019-07-14 RX ADMIN — PREDNISONE 5 MG: 5 TABLET ORAL at 08:44

## 2019-07-14 NOTE — PROGRESS NOTES
Patient is Alert and Oriented x4, using call light to make needs known.  Continent of bowel and bladder. Denies pain. Continue with POC.

## 2019-07-14 NOTE — PLAN OF CARE
Appears to be sleeping well. A&O x4. SBA w/ walker. No c/o pain. Continent of urine using toilet. Reports numbness and tingling in LE. Uses call light appropriately. Continue with POC.

## 2019-07-14 NOTE — PROGRESS NOTES
[facility]   Acute Rehabilitation Unit  Daily progress note    INTERVAL HISTORY  No acute events overnight.  Yesterday with PT had an episodes of knee buckling, but she states she was able to recover on her own.  Per notes, she did need some assist.  Today Ms. Sheriff has no concerns or complaints, and was able to complete 16 stairs.  She denies chest pain or shortness of breath, and would like to go home.  I discussed with therapies, they will further evaluate this afternoon including independence day, and we will tentatively move her discharge date up to tomorrow.      MEDICATIONS  Scheduled:    aspirin  325 mg Oral QPM     calcium carbonate-vitamin D  1 tablet Oral QPM     clopidogrel  75 mg Oral QAM     FLUoxetine  20 mg Oral Daily     furosemide  20 mg Oral TID     gabapentin  300 mg Oral BID     gabapentin  600 mg Oral At Bedtime     metoprolol tartrate  50 mg Oral BID     potassium gluconate  2.5 mEq Oral Daily     predniSONE  5 mg Oral QAM     ranitidine  150 mg Oral BID     simvastatin  20 mg Oral At Bedtime     traZODone  100 mg Oral At Bedtime     umeclidinium  1 puff Inhalation Daily     vitamin B complex with vitamin C  1 tablet Oral Daily     vitamin C  500 mg Oral QAM        PRN:  acetaminophen, albuterol, melatonin, polyethylene glycol, senna-docusate, trolamine salicylate       PHYSICAL EXAM  Patient Vitals for the past 24 hrs:   BP Temp Temp src Pulse Heart Rate Resp SpO2   07/14/19 0805 116/63 96.8  F (36  C) Oral 62 62 16 --   07/13/19 2141 113/57 -- -- 68 -- -- --   07/13/19 1558 115/71 96.5  F (35.8  C) Oral 68 -- 18 95 %       GEN: NAD, pleasant and cooperative  HEENT: NC/AT  CVS: RRR, S1+S2, +systolic murmur  PULM: CTA b/l, no w/r/r  ABD: Soft, NT, ND, bowel sounds present  EXT: +Non-pitting edema, no calf tenderness b/l  Neuro: Answers appropriately, follows commands    LABS  Lab Results   Component Value Date    WBC 6.1 07/07/2019     Lab Results    Component Value Date    RBC 3.68 07/07/2019     Lab Results   Component Value Date    HGB 11.9 07/07/2019     Lab Results   Component Value Date    HCT 37.5 07/07/2019     Lab Results   Component Value Date     07/07/2019     Lab Results   Component Value Date    MCH 32.3 07/07/2019     Lab Results   Component Value Date    MCHC 31.7 07/07/2019     Lab Results   Component Value Date    RDW 14.0 07/07/2019     Lab Results   Component Value Date     07/07/2019       Last Comprehensive Metabolic Panel:  Sodium   Date Value Ref Range Status   07/11/2019 145 (H) 133 - 144 mmol/L Final     Potassium   Date Value Ref Range Status   07/11/2019 3.4 3.4 - 5.3 mmol/L Final     Chloride   Date Value Ref Range Status   07/11/2019 110 (H) 94 - 109 mmol/L Final     Carbon Dioxide   Date Value Ref Range Status   07/11/2019 29 20 - 32 mmol/L Final     Anion Gap   Date Value Ref Range Status   07/11/2019 6 3 - 14 mmol/L Final     Glucose   Date Value Ref Range Status   07/11/2019 91 70 - 99 mg/dL Final     Urea Nitrogen   Date Value Ref Range Status   07/11/2019 17 7 - 30 mg/dL Final     Creatinine   Date Value Ref Range Status   07/11/2019 0.90 0.52 - 1.04 mg/dL Final     GFR Estimate   Date Value Ref Range Status   07/11/2019 62 >60 mL/min/[1.73_m2] Final     Comment:     Non  GFR Calc  Starting 12/18/2018, serum creatinine based estimated GFR (eGFR) will be   calculated using the Chronic Kidney Disease Epidemiology Collaboration   (CKD-EPI) equation.       Calcium   Date Value Ref Range Status   07/11/2019 7.8 (L) 8.5 - 10.1 mg/dL Final       Recent Labs   Lab 07/11/19  0538   GLC 91         ASSESSMENT/PLAN:  Ladonna Sheriff is a 74 year old right hand dominant female with a PMH notable for previous CVA/TIA, discoid lupus, Sjogrens, temporal arteritis, CHF, optic neuritis with visual impairment and recently diagnosed COPD who is being admitted to the ARU 7/6/2019 for functional deficits 2/2  neuromyelitis optica. Deficits include BL LE weakness R>L, mobility, ADL's.     Admission to acute inpatient rehab  7/6/2019   Impairment group code 04.130     -PT and OT 120minutes of each on a daily basis, in addition to rehab nursing and close management of physiatrist.    -Impairment of ADL's:  OT for 90 minutes daily to work on ADL re-training such as grooming, self cares and bathing.    -Impairment of mobility:  PT for 90 minutes daily to work on neuromuscular re-education focusing on strength, balance, coordination, and endurance.    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 Started Rituximab (7/4),   - s/p Rituximab 1,000 mg IV x 1 dose (7/4)-will need repeat infusion in ~2 weeks (after discharge from ARU) per Dr. Macias.  - Completed PLEX x 5 runs (6/22, 6/24, 6/26, 6/28, 6/30)  - completed 5-day course of high-dose methylprednisolone on 6/25     # R Knee Pain: Medial knee pain following strain during PT session. Negative XR during acute admission.  - Topical aspercreme ordered, worked well for her previously.  -  Knee brace ordered, to be worn during therapies.     #Peripheral neuropathy  - gabapentin 300 mg PO BID & 600 mg qhs        #Obstructive lung disease    - Continue PTA Incruse Ellipta inhaler  - Albuterol nebs q4h prn     #Hx of discoid lupus, Sjogren's syndrome, temporal arteritis, optic neuritis  - continue PTA prednisone 5 mg PO daily     #Hx of stroke/TIA  - Continue PTA ASA and clopidogrel     #Hypertension  #CHF  Patient takes amlodipine, metoprolol, and Lasix PTA. Noted to be hypotensive, hold amlodipine preferentially (B-blocker and diuretic needed for heart failure). Improved to 120s systolic after discontinuing amlodipine.   - Continue PTA metoprolol and Lasix    - hold PTA amlodipine d/t low BP  - Daily  weights      #Hypercholesterolemia  - Continue PTA statin     Adjustment to disability:  Clinical psychology to eval and treat as needed.  FEN: Regular consistency solids, thin liquids, low sodium.  Re-checked 7/11, was stable.  Na and Cl still mildly elevated.  Bowel: Continent , PRN colace and Miralax available  Bladder:Continent, PVRs x3 WNL on admission, may check PRN only.  DVT Prophylaxis:  Discontinued Lovenox due to adequate ambulation distances  GI Prophylaxis: Ranitidine BID  Code: Full  Disposition: Home  ELOS:  Therapies will evaluate this afternoon and potentially move up discharge date to 7/15  Rehab prognosis:  fair  Follow up Appointments on Discharge: - Follow up with Dr. Macias at [facility] after discharge to arrange next Rituximab infusion        Raji Gaviria MD  Department of Rehabilitation Medicine  Pager: [phone]    Time Spent on this Encounter   I, Raji Gaviria, spent a total of 25 minutes face-to-face or managing the care of Ladonna Sheriff. Over 50% of my time on the unit was spent counseling the patient and coordinating care. See note for details.

## 2019-07-14 NOTE — PLAN OF CARE
Pt status changed to Mod I in room with FWW, team members notified. Pt progressing in stair tolerance and safety, x16 w/ supervision at beginning of session.  [place] day 7/15 with plans to d/c 7/16 am. Specific timing TBD by pts son-in-law.

## 2019-07-14 NOTE — PLAN OF CARE
Discharge Planner OT   Patient plan for discharge: Home with home OT/PT  Current status: Writer assisted with de-cluttering pt's room to promote safety - pt able to demonstrate bed mobility, transfers, and toileting with mod (I) and team/whiteboard updated to reflect mod (I) status in room.  Barriers to return to prior living situation: weakness, impaired balance  Recommendations for discharge: Home with home OT/PT  Rationale for recommendations: to increase pt's safety and (I) with ADL/IADLs       Entered by: Leighann Alonzo 07/14/2019 3:36 PM

## 2019-07-15 PROCEDURE — 97110 THERAPEUTIC EXERCISES: CPT | Mod: GO | Performed by: STUDENT IN AN ORGANIZED HEALTH CARE EDUCATION/TRAINING PROGRAM

## 2019-07-15 PROCEDURE — 25000132 ZZH RX MED GY IP 250 OP 250 PS 637: Mod: GY | Performed by: PHYSICAL MEDICINE & REHABILITATION

## 2019-07-15 PROCEDURE — 25000132 ZZH RX MED GY IP 250 OP 250 PS 637: Mod: GY | Performed by: STUDENT IN AN ORGANIZED HEALTH CARE EDUCATION/TRAINING PROGRAM

## 2019-07-15 PROCEDURE — 97535 SELF CARE MNGMENT TRAINING: CPT | Mod: GO | Performed by: STUDENT IN AN ORGANIZED HEALTH CARE EDUCATION/TRAINING PROGRAM

## 2019-07-15 PROCEDURE — 25000131 ZZH RX MED GY IP 250 OP 636 PS 637: Mod: GY | Performed by: STUDENT IN AN ORGANIZED HEALTH CARE EDUCATION/TRAINING PROGRAM

## 2019-07-15 PROCEDURE — 12800006 ZZH R&B REHAB

## 2019-07-15 PROCEDURE — 97116 GAIT TRAINING THERAPY: CPT | Mod: GP

## 2019-07-15 PROCEDURE — 97530 THERAPEUTIC ACTIVITIES: CPT | Mod: GP

## 2019-07-15 PROCEDURE — 97110 THERAPEUTIC EXERCISES: CPT | Mod: GP

## 2019-07-15 RX ADMIN — Medication 1 MG: at 20:35

## 2019-07-15 RX ADMIN — B-COMPLEX W/ C & FOLIC ACID TAB 1 TABLET: TAB at 09:04

## 2019-07-15 RX ADMIN — UMECLIDINIUM 1 PUFF: 62.5 AEROSOL, POWDER ORAL at 09:03

## 2019-07-15 RX ADMIN — Medication 2.5 MEQ: at 09:06

## 2019-07-15 RX ADMIN — GABAPENTIN 300 MG: 300 CAPSULE ORAL at 13:20

## 2019-07-15 RX ADMIN — OXYCODONE HYDROCHLORIDE AND ACETAMINOPHEN 500 MG: 500 TABLET ORAL at 09:06

## 2019-07-15 RX ADMIN — RANITIDINE 150 MG: 150 TABLET ORAL at 20:36

## 2019-07-15 RX ADMIN — FUROSEMIDE 20 MG: 20 TABLET ORAL at 13:20

## 2019-07-15 RX ADMIN — OYSTER SHELL CALCIUM WITH VITAMIN D 1 TABLET: 500; 200 TABLET, FILM COATED ORAL at 20:35

## 2019-07-15 RX ADMIN — GABAPENTIN 600 MG: 300 CAPSULE ORAL at 20:35

## 2019-07-15 RX ADMIN — SIMVASTATIN 20 MG: 20 TABLET, FILM COATED ORAL at 20:36

## 2019-07-15 RX ADMIN — CLOPIDOGREL BISULFATE 75 MG: 75 TABLET, FILM COATED ORAL at 09:06

## 2019-07-15 RX ADMIN — FLUOXETINE 20 MG: 20 CAPSULE ORAL at 09:04

## 2019-07-15 RX ADMIN — RANITIDINE 150 MG: 150 TABLET ORAL at 09:05

## 2019-07-15 RX ADMIN — METOPROLOL TARTRATE 50 MG: 50 TABLET ORAL at 09:04

## 2019-07-15 RX ADMIN — FUROSEMIDE 20 MG: 20 TABLET ORAL at 16:26

## 2019-07-15 RX ADMIN — METOPROLOL TARTRATE 50 MG: 50 TABLET ORAL at 20:37

## 2019-07-15 RX ADMIN — GABAPENTIN 300 MG: 300 CAPSULE ORAL at 09:04

## 2019-07-15 RX ADMIN — FUROSEMIDE 20 MG: 20 TABLET ORAL at 06:32

## 2019-07-15 RX ADMIN — PREDNISONE 5 MG: 5 TABLET ORAL at 09:06

## 2019-07-15 RX ADMIN — TRAZODONE HYDROCHLORIDE 100 MG: 50 TABLET ORAL at 20:36

## 2019-07-15 RX ADMIN — ASPIRIN 325 MG: 325 TABLET, DELAYED RELEASE ORAL at 20:36

## 2019-07-15 NOTE — PROGRESS NOTES
[facility]   Acute Rehabilitation Unit  Daily progress note    INTERVAL HISTORY  No acute events overnight.  This morning has no new concerns or complaints.  Yesterday we had discussed possibly discharging today, however after afternoon therapies it was felt the extra day would be beneficial and therefore we will maintain tomorrow as the discharge date.  She denies chest pain or shortness of breath.  Functionally she is now Mod I in the room with a FWW.       MEDICATIONS  Scheduled:    aspirin  325 mg Oral QPM     calcium carbonate-vitamin D  1 tablet Oral QPM     clopidogrel  75 mg Oral QAM     FLUoxetine  20 mg Oral Daily     furosemide  20 mg Oral TID     gabapentin  300 mg Oral BID     gabapentin  600 mg Oral At Bedtime     metoprolol tartrate  50 mg Oral BID     potassium gluconate  2.5 mEq Oral Daily     predniSONE  5 mg Oral QAM     ranitidine  150 mg Oral BID     simvastatin  20 mg Oral At Bedtime     traZODone  100 mg Oral At Bedtime     umeclidinium  1 puff Inhalation Daily     vitamin B complex with vitamin C  1 tablet Oral Daily     vitamin C  500 mg Oral QAM        PRN:  acetaminophen, albuterol, melatonin, polyethylene glycol, senna-docusate, trolamine salicylate       PHYSICAL EXAM  Patient Vitals for the past 24 hrs:   BP Temp Temp src Pulse Heart Rate Resp SpO2   07/15/19 0904 107/58 97.8  F (36.6  C) Oral -- 68 16 --   07/15/19 0700 112/61 96.6  F (35.9  C) Oral 64 -- 16 95 %   07/14/19 2052 130/71 -- -- -- 66 -- --   07/14/19 1607 123/65 97.7  F (36.5  C) Oral 68 -- 16 95 %       GEN: NAD, pleasant and cooperative  HEENT: NC/AT  CVS: RRR, S1+S2, +systolic murmur  PULM: CTA b/l, no w/r/r  ABD: Soft, NT, ND, bowel sounds present  EXT: +Non-pitting edema, no calf tenderness b/l  Neuro: Answers appropriately, follows commands    LABS  Lab Results   Component Value Date    WBC 6.1 07/07/2019     Lab Results   Component Value Date    RBC 3.68 07/07/2019     Lab  Results   Component Value Date    HGB 11.9 07/07/2019     Lab Results   Component Value Date    HCT 37.5 07/07/2019     Lab Results   Component Value Date     07/07/2019     Lab Results   Component Value Date    MCH 32.3 07/07/2019     Lab Results   Component Value Date    MCHC 31.7 07/07/2019     Lab Results   Component Value Date    RDW 14.0 07/07/2019     Lab Results   Component Value Date     07/07/2019       Last Comprehensive Metabolic Panel:  Sodium   Date Value Ref Range Status   07/11/2019 145 (H) 133 - 144 mmol/L Final     Potassium   Date Value Ref Range Status   07/11/2019 3.4 3.4 - 5.3 mmol/L Final     Chloride   Date Value Ref Range Status   07/11/2019 110 (H) 94 - 109 mmol/L Final     Carbon Dioxide   Date Value Ref Range Status   07/11/2019 29 20 - 32 mmol/L Final     Anion Gap   Date Value Ref Range Status   07/11/2019 6 3 - 14 mmol/L Final     Glucose   Date Value Ref Range Status   07/11/2019 91 70 - 99 mg/dL Final     Urea Nitrogen   Date Value Ref Range Status   07/11/2019 17 7 - 30 mg/dL Final     Creatinine   Date Value Ref Range Status   07/11/2019 0.90 0.52 - 1.04 mg/dL Final     GFR Estimate   Date Value Ref Range Status   07/11/2019 62 >60 mL/min/[1.73_m2] Final     Comment:     Non  GFR Calc  Starting 12/18/2018, serum creatinine based estimated GFR (eGFR) will be   calculated using the Chronic Kidney Disease Epidemiology Collaboration   (CKD-EPI) equation.       Calcium   Date Value Ref Range Status   07/11/2019 7.8 (L) 8.5 - 10.1 mg/dL Final       Recent Labs   Lab 07/11/19  0538   GLC 91         ASSESSMENT/PLAN:  Ladonna Sheriff is a 74 year old right hand dominant female with a PMH notable for previous CVA/TIA, discoid lupus, Sjogrens, temporal arteritis, CHF, optic neuritis with visual impairment and recently diagnosed COPD who is being admitted to the ARU 7/6/2019 for functional deficits 2/2 neuromyelitis optica. Deficits include BL LE weakness R>L,  mobility, ADL's.     Admission to acute inpatient rehab  7/6/2019   Impairment group code 04.130     -PT and OT 120minutes of each on a daily basis, in addition to rehab nursing and close management of physiatrist.    -Impairment of ADL's:  OT for 90 minutes daily to work on ADL re-training such as grooming, self cares and bathing.    -Impairment of mobility:  PT for 90 minutes daily to work on neuromuscular re-education focusing on strength, balance, coordination, and endurance.    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 Started Rituximab (7/4),   - s/p Rituximab 1,000 mg IV x 1 dose (7/4)-will need repeat infusion in ~2 weeks (after discharge from ARU) per Dr. Macias.  - Completed PLEX x 5 runs (6/22, 6/24, 6/26, 6/28, 6/30)  - completed 5-day course of high-dose methylprednisolone on 6/25     # R Knee Pain: Medial knee pain following strain during PT session. Negative XR during acute admission.  - Topical aspercreme ordered, worked well for her previously.  -  Knee brace ordered, to be worn during therapies.     #Peripheral neuropathy  - gabapentin 300 mg PO BID & 600 mg qhs        #Obstructive lung disease    - Continue PTA Incruse Ellipta inhaler  - Albuterol nebs q4h prn     #Hx of discoid lupus, Sjogren's syndrome, temporal arteritis, optic neuritis  - continue PTA prednisone 5 mg PO daily     #Hx of stroke/TIA  - Continue PTA ASA and clopidogrel     #Hypertension  #CHF  Patient takes amlodipine, metoprolol, and Lasix PTA. Noted to be hypotensive, hold amlodipine preferentially (B-blocker and diuretic needed for heart failure). Improved to 120s systolic after discontinuing amlodipine.   - Continue PTA metoprolol and Lasix    - hold PTA amlodipine d/t low BP  - Daily weights      #Hypercholesterolemia  - Continue PTA  statin     Adjustment to disability:  Clinical psychology to eval and treat as needed.  FEN: Regular consistency solids, thin liquids, low sodium.  Re-checked 7/11, was stable.  Na and Cl still mildly elevated.  Bowel: Continent , PRN colace and Miralax available  Bladder:Continent, PVRs x3 WNL on admission, may check PRN only.  DVT Prophylaxis:  Discontinued Lovenox due to adequate ambulation distances  GI Prophylaxis: Ranitidine BID  Code: Full  Disposition: Home  ELOS:  Will maintain discharge date of 7/16  Rehab prognosis:  fair  Follow up Appointments on Discharge: - Follow up with Dr. Macias at [facility] after discharge to arrange next Rituximab infusion        Raji Gaviria MD  Department of Rehabilitation Medicine  Pager: [phone]    Time Spent on this Encounter   I, Raji Gaviria, spent a total of 20 minutes face-to-face or managing the care of Ladonna Sheriff. Over 50% of my time on the unit was spent counseling the patient and coordinating care. See note for details.

## 2019-07-15 NOTE — PLAN OF CARE
Pt Mod I in room today with no issues. Completed Independence day tasks. Pt up/down 16 stairs, B rail and supervision. Practiced floor transfer with mod JASPAL. Pt d/c tomorrow am with family training before. HEP distributed and practiced.

## 2019-07-15 NOTE — PLAN OF CARE
FOCUS/GOAL  Mobility and Safety management    ASSESSMENT, INTERVENTIONS AND CONTINUING PLAN FOR GOAL:  Pt is VSS, A&Ox4 and denies any pain. Pt is MOD I in room and is continent using toilet independently. Pt is blind in R eye and has peripheral vision in L eye. Pt has plan to discharge to home tomorrow.

## 2019-07-15 NOTE — PROGRESS NOTES
[facility]  Met with pt to discuss plans for HC.  Pt to be discharged home Tuesday 07 16 19   and has agreed to have [facility] follow with services of SN, PT, OT and HHA. Patient care support center processing referral.  Pt verbalized understanding that initial visit is scheduled for 07 17 19 or 07 18 19.   Pt has 24 hour phone number for [facility] for any questions or concerns.

## 2019-07-15 NOTE — PLAN OF CARE
FOCUS/GOAL  Medical management    ASSESSMENT, INTERVENTIONS AND CONTINUING PLAN FOR GOAL:  Pt is alert and oriented. Now Mod I in room with walker. Denies shortness of breath/chest pain. Continent of bowel/bladder. Reports she had a bowel movement earlier today. Denies pain. Denies any other concerns at this time. On track to discharge Tuesday. Continue POC.

## 2019-07-15 NOTE — PLAN OF CARE
Discharge Planner OT   Patient plan for discharge: Home with assist as needed and HH   Current status:  MOD I in the room and with ADL  Barriers to return to prior living situation: none  Recommendations for discharge: Recommend tub bench and GB, recommend assist with IADL as needed and HH OT with HHA         Entered by: Maya Ivy 07/15/2019 7:49 AM

## 2019-07-15 NOTE — PLAN OF CARE
Patient slept well. She is modified independent in the room, using a walker. She has not called for assistance.  No c/o pain. Plan to discharge to home tomorrow.

## 2019-07-15 NOTE — PLAN OF CARE
Occupational Therapy Discharge Summary    Reason for therapy discharge:    All goals and outcomes met, no further needs identified.    Progress towards therapy goal(s). See goals on Care Plan in [facility] electronic health record for goal details.  Goals met    Therapy recommendation(s):    Continued therapy is recommended.  Rationale/Recommendations:  Recommend HH OT to continue problem solving and improving pt's IND with IADL and functional mobility. .Pt is MOD I with self care routine and simple IADL. Recommend supervision with showers.

## 2019-07-16 VITALS
HEIGHT: 67 IN | RESPIRATION RATE: 16 BRPM | TEMPERATURE: 97.1 F | HEART RATE: 60 BPM | WEIGHT: 212.4 LBS | SYSTOLIC BLOOD PRESSURE: 105 MMHG | OXYGEN SATURATION: 91 % | BODY MASS INDEX: 33.34 KG/M2 | DIASTOLIC BLOOD PRESSURE: 57 MMHG

## 2019-07-16 PROCEDURE — 25000132 ZZH RX MED GY IP 250 OP 250 PS 637: Mod: GY | Performed by: PHYSICAL MEDICINE & REHABILITATION

## 2019-07-16 PROCEDURE — 25000132 ZZH RX MED GY IP 250 OP 250 PS 637: Mod: GY | Performed by: STUDENT IN AN ORGANIZED HEALTH CARE EDUCATION/TRAINING PROGRAM

## 2019-07-16 PROCEDURE — 25000131 ZZH RX MED GY IP 250 OP 636 PS 637: Mod: GY | Performed by: STUDENT IN AN ORGANIZED HEALTH CARE EDUCATION/TRAINING PROGRAM

## 2019-07-16 RX ORDER — ACETAMINOPHEN 325 MG/1
325-975 TABLET ORAL EVERY 6 HOURS PRN
COMMUNITY
Start: 2019-07-16 | End: 2020-01-01

## 2019-07-16 RX ORDER — GABAPENTIN 300 MG/1
CAPSULE ORAL
Qty: 120 CAPSULE | Refills: 0 | Status: SHIPPED | OUTPATIENT
Start: 2019-07-16 | End: 2019-07-31

## 2019-07-16 RX ADMIN — RANITIDINE 150 MG: 150 TABLET ORAL at 08:24

## 2019-07-16 RX ADMIN — ACETAMINOPHEN 975 MG: 325 TABLET, FILM COATED ORAL at 05:32

## 2019-07-16 RX ADMIN — UMECLIDINIUM 1 PUFF: 62.5 AEROSOL, POWDER ORAL at 08:23

## 2019-07-16 RX ADMIN — B-COMPLEX W/ C & FOLIC ACID TAB 1 TABLET: TAB at 08:24

## 2019-07-16 RX ADMIN — FLUOXETINE 20 MG: 20 CAPSULE ORAL at 08:24

## 2019-07-16 RX ADMIN — FUROSEMIDE 20 MG: 20 TABLET ORAL at 05:32

## 2019-07-16 RX ADMIN — PREDNISONE 5 MG: 5 TABLET ORAL at 08:24

## 2019-07-16 RX ADMIN — OXYCODONE HYDROCHLORIDE AND ACETAMINOPHEN 500 MG: 500 TABLET ORAL at 08:24

## 2019-07-16 RX ADMIN — METOPROLOL TARTRATE 50 MG: 50 TABLET ORAL at 08:25

## 2019-07-16 RX ADMIN — GABAPENTIN 300 MG: 300 CAPSULE ORAL at 08:24

## 2019-07-16 RX ADMIN — Medication 2.5 MEQ: at 08:24

## 2019-07-16 RX ADMIN — CLOPIDOGREL BISULFATE 75 MG: 75 TABLET, FILM COATED ORAL at 08:25

## 2019-07-16 NOTE — PLAN OF CARE
Pt. discharged at 1115 to home, was accompanied by Son, and left with personal belongings. Pt. received complete discharge paperwork and 2 medications as filled by discharge pharmacy. Pt. was given times of last dose for all discharge medications in writing on discharge medication sheets. Discharge teaching included teaching on new medication, pain management, activity restrictions, dressing changes, and signs and symptoms of infection. Pt. to follow up with Physician in 6 days. Pt. had no further questions at the time of discharge and no unmet needs were identified.

## 2019-07-16 NOTE — CARE CONFERENCE
"Acute Rehab Care Conference/Team Rounds      Type: {Type of Conference:8702802}    Present: ***      Discharge Barriers/Treatment/Education    Rehab Diagnosis: ***    Active Medical Co-morbidities/Prognosis: ***    Safety:    Pain: Patient has pain and request for prn Tylenol     Medications, Skin, Tubes/Lines:   will need assistance to manage medication due to poor vision, skin intact and no line present  Swallowing/Nutrition:    Bowel/Bladder:  Patient continent of bowel and bladder  Psychosocial:    ADLs/IADLs:    Mobility:     Cognition/Language:    Community Re-Entry:    Transportation:    Decision maker: {[facility] DECISION MAKER:8791801}    Plan of Care and goals reviewed and updated.    Discharge Plan/Recommendations    Fall Precautions: {ACR FALL PRECAUTIONS:8813851}    Patient/Family input to goals: ***    Anticipated rehab needs following discharge: ***    Anticipated care giver support after discharge: ***    Estimated length of stay: ***    Overall plan for the patient: ***      Utilization Review and Continued Stay Justification    Medical Necessity Criteria:    For any criteria that is not met, please document reason and plan for discharge, transfer, or modification of plan of care to address.    Requires intensive rehabilitation program to treat functional deficits?: {YES/NO :766160::\"Yes\"}    Requires 3x per week or greater involvement of rehabilitation physician to oversee rehabilitation program?: {YES/NO :418797::\"Yes\"}    Requires rehabilitation nursing interventions?: {YES/NO :507846::\"Yes\"}    Patient is making functional progress?: {YES/NO :366061::\"Yes\"}    There is a potential for additional functional progress? {YES/NO :559526::\"Yes\"}    Patient is participating in therapy 3 hours per day a minimum of 5 days per week or 15 hours per week in 7 day period?:{YES/NO :421849::\"Yes\"}    Has discharge needs that require coordinated discharge planning approach?:{YES/NO " ":278837::\"Yes\"}      Barriers/Concerns related to meeting medical necessity criteria:  ***    Team Plan to Address Concern:  ***      Final Physician Sign off    Statement of Approval: ***    Patient Goals  {Social Work Goals:938374}    {OT Goals:581697}    {PT Goals:627248}    {Speech Goals:498100}    {RN Goals:539548}  "

## 2019-07-16 NOTE — PLAN OF CARE
Patient slept most of this shift, awake and requested for PRN Tylenol for headache, took    975 mg tylenol and schedule lasix, patient up in room independently using walker, plan to discharge home today, continue plan of care

## 2019-07-16 NOTE — DISCHARGE SUMMARY
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 Pt initially presented  To the ED on 6/19/19 with 2-3 weeks of progressive numbness from the chest down associated with BL LE weakness R>L. Transverse myelitis was initial working diagnosis however MRI spine was negative . Further work up was diagnostic for neuromelitis optica. Pt completed 5 days of plasmapheresis and high dose methylprednisolone and IV Rituximab.   Pt reports improvement in strength  With continued RLE weakness greater than L. She also \"twisted her knee \" in PT session the other day which resulted in medial knee pain with activity. XR were taken at the time and were negative for fx.     REHABILITATION COURSE  Ladonna Sheriff was admitted to the [facility] acute inpatient rehabilitation unit on 7/6/2019 where she participated in physical and occupational therapies for 3 hours/day.  She had no medical complications while on the unit.  Her labs were monitored which showed minimally elevated sodium and chloride levels but were stable.  Functionally she made excellent improvements and was modified independent for ambulation with a front wheeled walker, and able to navigate 16 stairs which is necessary for her home.  " She is also modified independent for ADLs and simple IADLs.  She will continue with home health PT and OT to further improve her deficits and independence.  She will have follow-up with neurology to discuss repeat infusion of rituximab.      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 Started Rituximab (7/4),   - s/p Rituximab 1,000 mg IV x 1 dose (7/4)-will need repeat infusion in ~2 weeks (after discharge from ARU) per Dr. Macias.  - Completed PLEX x 5 runs (6/22, 6/24, 6/26, 6/28, 6/30)  - completed 5-day course of high-dose methylprednisolone on 6/25     # R Knee Pain: Medial knee pain following strain during PT session. Negative XR during acute admission.  - Topical aspercreme ordered, worked well for her previously.  -  Knee brace ordered, to be worn during therapies.     #Peripheral neuropathy  - gabapentin 300 mg PO BID & 600 mg qhs        #Obstructive lung disease    - Continue PTA Incruse Ellipta inhaler  - Albuterol nebs q4h prn     #Hx of discoid lupus, Sjogren's syndrome, temporal arteritis, optic neuritis  - continue PTA prednisone 5 mg PO daily     #Hx of stroke/TIA  - Continue PTA ASA and clopidogrel     #Hypertension  #CHF  Patient takes amlodipine, metoprolol, and Lasix PTA. Noted to be hypotensive, hold amlodipine preferentially (B-blocker and diuretic needed for heart failure). Improved to 120s systolic after discontinuing amlodipine.   - Continue PTA metoprolol and Lasix    - hold PTA amlodipine d/t low BP  - Daily weights      #Hypercholesterolemia  - Continue PTA statin     FEN: Regular consistency solids, thin liquids, low sodium.  Re-checked 7/11, was stable.  Na and Cl still mildly elevated.    Follow up Appointments on Discharge: - Follow up with Dr. Macias at [facility] after discharge to arrange next Rituximab infusion       dISCHARGE MEDICATIONS  Discharge  Medication List as of 7/16/2019 10:19 AM      START taking these medications    Details   acetaminophen (TYLENOL) 325 MG tablet Take 1-3 tablets (325-975 mg) by mouth every 6 hours as needed for mild pain or fever (> 101 F), OTC      melatonin 1 MG TABS tablet Take 1 tablet (1 mg) by mouth nightly as needed for sleep, OTC      ranitidine (ZANTAC) 150 MG tablet Take 1 tablet (150 mg) by mouth 2 times daily, Disp-60 tablet, R-0, E-Prescribe         CONTINUE these medications which have CHANGED    Details   gabapentin (NEURONTIN) 300 MG capsule Take 1 capsule (300 mg) in the morning and afternoon, and 2 capsules (600 mg) at night, Disp-120 capsule, R-0, E-Prescribe         CONTINUE these medications which have NOT CHANGED    Details   albuterol (PROAIR HFA/PROVENTIL HFA/VENTOLIN HFA) 108 (90 Base) MCG/ACT inhaler Inhale 2 puffs into the lungs every 6 hours as needed , Historical      aspirin (ASA) 325 MG EC tablet Take 325 mg by mouth every evening , Historical      B Complex-C (VITAMIN B COMPLEX W/VITAMIN C) TABS tablet Take 1 tablet by mouth daily, Historical      Calcium-Vitamin D 600-200 MG-UNIT TABS Take 1 tablet by mouth every evening , Historical      clopidogrel (PLAVIX) 75 MG tablet Take 75 mg by mouth every morning, Historical      FLUoxetine (PROZAC) 20 MG capsule Take 20 mg by mouth daily, Historical      furosemide (LASIX) 20 MG tablet Take 20 mg by mouth 3 times daily, Historical      metoprolol tartrate (LOPRESSOR) 50 MG tablet Take 50 mg by mouth 2 times daily, Historical      Multiple Vitamins-Minerals (MULTIVITAL PO) Take 1 tablet by mouth every morning , Historical      Potassium Gluconate 595 MG TBCR Take 1 tablet by mouth every evening , Historical      predniSONE (DELTASONE) 5 MG tablet Take 5 mg by mouth every morning, Historical      simvastatin (ZOCOR) 20 MG tablet Take 20 mg by mouth At Bedtime, Historical      traZODone (DESYREL) 100 MG tablet Take 100 mg by mouth At Bedtime, Historical       trolamine salicylate (ASPERCREME) 10 % external cream Apply topically 2 times daily as needed for moderate painTransitional      umeclidinium (INCRUSE ELLIPTA) 62.5 MCG/INH inhaler Inhale 1 puff into the lungs daily, Historical      vitamin C (ASCORBIC ACID) 100 MG tablet Take 500 mg by mouth every morning , Historical               DISCHARGE INSTRUCTIONS AND FOLLOW UP  Discharge Procedure Orders   Home care nursing referral   Referral Priority: Routine Referral Type: Home Health Therapies & Aides   Number of Visits Requested: 1     Home Care PT Referral for Hospital Discharge   Referral Priority: Routine Referral Type: Home Health Therapies & Aides   Number of Visits Requested: 1     Home Care OT Referral for Hospital Discharge   Referral Priority: Routine   Number of Visits Requested: 1     Reason for your hospital stay   Order Comments: Rehabilitation after flare of neuromyelitis optica     Adult [facility] Follow-up and recommended labs and tests   Order Comments: Follow up with primary care provider, Joceline Ty, within 7 days for hospital follow- up.  The following labs/tests are recommended: BMP, CBC.      Appointments on [place] and/or [facility] (with [facility] or [facility] provider or service). Call [phone] if you haven't heard regarding these appointments within 7 days of discharge.     Activity   Order Comments: Your activity upon discharge: activity as tolerated with a walker and no driving for today     Order Specific Question Answer Comments   Is discharge order? Yes      When to contact your care team   Order Comments: Call your primary doctor if you have any of the following: Chest pain, shortness of breath, fever, worsening of weakness, increased confusion, or any other symptom you may find concerning.     MD face to face encounter   Order Comments: Documentation of Face to Face and Certification for Home Health Services    I certify that patient: Ladonna Sheriff is under my care and that I,  or a nurse practitioner or physician's assistant working with me, had a face-to-face encounter that meets the physician face-to-face encounter requirements with this patient on: 7/16/2019.    This encounter with the patient was in whole, or in part, for the following medical condition, which is the primary reason for home health care: Neuromyelitis optica.    I certify that, based on my findings, the following services are medically necessary home health services: Nursing, Occupational Therapy and Physical Therapy.    My clinical findings support the need for the above services because: Nurse is needed: To assess changes in medications or other medical regimen. and To provide assessment and oversight required in the home to assure adherence to the medical plan.., Occupational Therapy Services are needed to assess and treat cognitive ability and address ADL safety due to impairment in vision, strength, balance, endurance. and Physical Therapy Services are needed to assess and treat the following functional impairments: vision, strength, balance, endurance.    Further, I certify that my clinical findings support that this patient is homebound (i.e. absences from home require considerable and taxing effort and are for medical reasons or Jain services or infrequently or of short duration when for other reasons) because: Requires assistance of another person or specialized equipment to access medical services because patient: Is unable to exit home safely on own due to: Significantly decreased vision and weakness.    Based on the above findings. I certify that this patient is confined to the home and needs intermittent skilled nursing care, physical therapy and/or speech therapy.  The patient is under my care, and I have initiated the establishment of the plan of care.  This patient will be followed by a physician who will periodically review the plan of care.  Physician/Provider to provide follow up care: Karson  Joceline OAKES    Attending hospital physician (the Medicare certified PECOS provider): Malinda Gaviria  Physician Signature: See electronic signature associated with these discharge orders.  Date: 7/16/2019     Diet   Order Comments: Follow this diet upon discharge: Orders Placed This Encounter      Combination Diet Regular Diet Adult; Thin Liquids (water, ice chips, juice, milk, gelatin, ice cream, etc); 2 gm NA Diet     Order Specific Question Answer Comments   Is discharge order? Yes           physical examination    Most recent Vital Signs:   Vitals:    07/15/19 0904 07/15/19 1638 07/15/19 2037 07/16/19 0809   BP: 107/58 124/66 120/58 105/57   BP Location: Left arm Left arm  Left arm   Pulse:  69  60   Resp: 16 16 16   Temp: 97.8  F (36.6  C) 98  F (36.7  C)  97.1  F (36.2  C)   TempSrc: Oral Oral  Oral   SpO2:  93%  91%   Weight:       Height:           GEN: NAD, pleasant and cooperative  HEENT: NC/AT  CVS: RRR, S1+S2, +systolic murmur  PULM: CTA b/l, no w/r/r  ABD: Soft, NT, ND, bowel sounds present  EXT: +Non-pitting edema, no calf tenderness b/l  Neuro: Answers appropriately, follows commands    35 minutes spent in discharge, including >50% in counseling and coordination of care, medication review and plan of care recommended on follow up.     Discharge summary was forwarded to Joceline Ty (PCP) at the time of discharge, so as to bridge from hospital to outpatient care.     It was our pleasure to care for Ladonna Sheriff during this hospitalization. Please do not hesitate to contact me should there be questions regarding the hospital course or discharge plan.      Raji Gaviria MD  Department of Rehabilitation Medicine  Pager: [phone]

## 2019-07-16 NOTE — DISCHARGE INSTRUCTIONS
Follow up appointments:    -- Follow up with PCP within 2 weeks  You are scheduled to see Joceline Ty PA-C on Monday, July 22nd at 8:40 AM. Please bring a copy of your medication list and insurance card with you to your appointment.    Address           [facility]                          [address]                          [place] [address]  Phone              [phone]  Fax                  [phone]    -- Follow up with Dr. Larry Macias at [facility] within 1-2 weeks for hospital follow up and to schedule repeat rituximab infusion. The following labs/tests are recommended: CBC  You are scheduled to see Dr. Larry Macias on Wednesday, July 31st at 12:00 PM.    Home Care  [facility] will be following up with you for physical therapy, occupational therapy, skilled nursing, as well as a home health aide. They will contact you to set up a time to come to your home. Should you need anything, their number is [phone].

## 2019-07-16 NOTE — PLAN OF CARE
FOCUS/GOAL  Medication management    ASSESSMENT, INTERVENTIONS AND CONTINUING PLAN FOR GOAL:  Pt is alert and oriented. Mod I in room with walker. Denies shortness of breath/chest pain. Continent of bowel/bladder. Reports she had a bowel movement yesterday. Denies pain. Denies any other concerns at this time. Discharging tomorrow. Continue POC

## 2019-07-16 NOTE — PLAN OF CARE
Physical Therapy Discharge Summary    Reason for therapy discharge:    All goals and outcomes met, no further needs identified.    Progress towards therapy goal(s). See goals on Care Plan in [facility] electronic health record for goal details.  Goals met    Therapy recommendation(s):    Continued therapy is recommended.  Rationale/Recommendations:  Continue to progress in LE strength, endurance, coordination and balance.    Summary: Pt Mod I in room, FWW and supervision for amb, needing seated rest break ~ every 200'. Up/down 16x 6'' stairs, supervision, B rail. One instance of knee buckling on stairs d/t fatigue.     DME: FWW distributed at discharge.

## 2019-07-16 NOTE — TELEPHONE ENCOUNTER
RECORDS RECEIVED FROM: [facility] Discharge F/U   DATE RECEIVED: 7/31/192   NOTES (FOR ALL VISITS) STATUS DETAILS   OFFICE NOTE from referring provider [facility]:  7/6/19-7/16/9   OFFICE NOTE from other specialist N/A    DISCHARGE SUMMARY from hospital [facility]:  7/6/19-7/16/9     DISCHARGE REPORT from the ER N/A    OPERATIVE REPORT N/A    MEDICATION LIST Internal    IMAGING  (FOR ALL VISITS)     EMG N/A    EEG N/A    ECT N/A    MRI (HEAD, NECK, SPINE) [facility]:  MRI Brain 6/20/19  MRI Thoracic Spin 6/19/19  MRI Cervical Spine  6/19/19   LUMBAR PUNCTURE N/A    [place] Scan N/A    CT (HEAD, NECK, SPINE) N/A

## 2019-07-17 ENCOUNTER — PATIENT OUTREACH (OUTPATIENT)
Dept: CARE COORDINATION | Facility: CLINIC | Age: 74
End: 2019-07-17

## 2019-07-17 DIAGNOSIS — G36.0 NEUROMYELITIS OPTICA (H): Primary | ICD-10-CM

## 2019-07-17 ASSESSMENT — ACTIVITIES OF DAILY LIVING (ADL)
DEPENDENT_IADLS:: CLEANING;COOKING;LAUNDRY;SHOPPING;MEAL PREPARATION;MEDICATION MANAGEMENT;MONEY MANAGEMENT;TRANSPORTATION

## 2019-07-17 NOTE — PROGRESS NOTES
"Clinic Care Coordination Contact    Chief Complaint   Patient presents with     Clinic Care Coordination - Post Hospital     Discharge home, from Acute Rehab Unit, with      Clinical Concerns:  Current Medical Concerns: patient has diagnosis of Neuromyelitis optica. 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    Current Behavioral Concerns: None    Education Provided to patient: about care coordination and home care services     Functional Status:  Dependent ADLs:: Ambulation-cane, Bathing  Dependent IADLs:: Cleaning, Cooking, Laundry, Shopping, Meal Preparation, Medication Management, Money Management, Transportation  Bed or wheelchair confined:: No  Mobility Status: Independent w/Device  Fallen 2 or more times in the past year?: No  Any fall with injury in the past year?: No    Living Situation:  Patient lives alone. Son lives in the same complex building, but in another apartment.    Diet:  Diet:: Regular  Inadequate nutrition (GOAL):: No    Transportation:  Transportation concerns (GOAL):: No  Transportation means:: Family     Psychosocial:  Mental health DX:: No  Mental health management concern (GOAL):: No  Informal Support system:: Children     Financial/Insurance: MEDICARE/AARP  Financial/Insurance concerns (GOAL):: No     Resources:  List of home care services:: Skilled Nursing, Home Health Aid, Physicial Therapy, Occupational Therapy;   Community Resources: Acute Rehab  Supplies used at home:: None  Equipment Currently Used at Home: cane, straight    Advance Care Plan/Directive  Advanced Care Plans/Directives on file:: Yes  Type Advanced Care Plans/Directives: Advanced Directive - On File  Advanced Care Plan/Directive Status: Not Applicable    Referrals Placed: Home Care, Meals on Wheels     Goals:   Goals        General    1. Psychosocial (pt-stated)     Notes - Note edited  7/17/2019  4:07 PM by Eloisa Wood, RN    Goal Statement: \"I would like to get " "information about receiving meals at home\"  Measure of Success: Patient will be able to set up a meal delivery system for herself within a month.  Supportive Steps to Achieve:   1. RN Care Coordinator will send resources for meal delivery at home to patient by mail.  2. Patient will select an option that suits her and enroll.  3. RN CC will assist patient setting the delivery up if she needs it.  Barriers: Patient has a diagnosis of Neuromyelitis optica, which impairs her vision capacity.   Strengths: Patient has great family support through her son Hayden.  Date to Achieve By: 8/17/19  Patient expressed understanding of goal: Yes.              Patient/Caregiver understanding: Yes  Outreach Frequency: monthly    Future Appointments              In 2 weeks Vasquez Macias MD [facility] Multiple Sclerosis, [facility]          Plan:   RN Care Coordinator:    Enrolled patient in care coordination    Will mail information about care coordination and meal delivery systems resources on 7/18/19.     Will give warm handoff to  Clinic Care Coordinator.   Care Coordinator will follow up with patient.    Eloisa Wood RN Care Coordinator  [facility]  Phone: [phone]  Email: [email or web address]      "

## 2019-07-17 NOTE — LETTER
Health Care Home - Access Care Plan    About Me:    Patient Name:  Ladonna Sheriff    YOB: 1945  Age:                      74 year old   Bari MRN:     [id number] Telephone Information:   Home Phone [phone]   Mobile [phone]       Address:  [address]  [place] [facility] [place] [facility] [address] Email address:  [email or web address]      Emergency Contact(s)   Name Relationship Lgl Grd Work Phone Home Phone Mobile Phone   1. DUNG SHERIFF Son No   [phone]   2. TERI SHERIFF Son No  [phone]   3. SPIKE SHERIFF                Health Maintenance: Not assessed at this time.    My Access Plan  Medical Emergency 910   Questions or concerns during clinic hours Primary Clinic Line, I will call the clinic directly: [facility] - [phone]      Appointment Line [phone] - extension: [phone]   Preferred Urgent Care [facility]  [phone]      Preferred Hospital [facility]  [phone]     Preferred Pharmacy No Pharmacies Listed      Behavioral Health Crisis Line The National Suicide Prevention Lifeline at 1-606.742.3960 or 910               My Care Team Members  Patient Care Team       Relationship Specialty Notifications Start End    Joceline Ty PA-C PCP - General Physician Assistant  2/2/19     Phone: [phone] Fax: [phone]         [facility] [address] [facility] [address]    Eloisa Wood, RN Clinic Care Coordinator Primary Care - CC  7/17/19            My Medical and Care Information  Problem List   Patient Active Problem List   Diagnosis     Optic neuritis     Acute respiratory failure with hypoxia (H)     Pulmonary embolism (H)     Transverse myelitis (H)     Neuromyelitis optica (H)

## 2019-07-17 NOTE — LETTER
[facility]  [address]  [place] [address]      July 18, 2019    Ladonna Sheriff  [address]   [place] [address]      Dear Ladonna,    I am a clinic care coordinator who works with Joceline Ty PA-C at the [facility]. I wanted to thank you for spending the time to talk with me yesterday. Below is a description of clinic care coordination and how we can further assist you.     The goal of clinic care coordination is to help you manage your health and improve access to the health system in the most efficient manner. The clinic care coordinator is a registered nurse and/or .The registered nurse can assist you in meeting your health care goals by providing education, coordinating services, and strengthening the communication among your providers. The  can assist you with financial, behavioral, psychosocial and/or counseling resources.    Please feel free to contact me at [phone] with any questions. We at [facility] are focused on providing you with the highest-quality healthcare experience possible and that all starts with you.     Sincerely,       Eloisa Wood RN Care Coordinator  [facility]         Enclosed: I have enclosed a copy of a 24 Hour Access Plan. This has helpful phone numbers for you to call when needed. Please keep this in an easy to access place to use as needed. I am also sending you information on some meal delivery resources for your analysis.

## 2019-07-17 NOTE — PROGRESS NOTES
Clinic Care Coordination Contact  Care Coordination Communication     Clinical Data  Patient was hospitalized at the [facility] [place], from 6/20/19 to 7/6/19 with diagnoses of acute progressive bilateral lower extremity neuritis and recurrent optic neuritis. Her discharge diagnosis was neuromyelitis optica.   She had transition of care at the [facility] Acute Rehabilitation Unit from 7/6/19 to 7/16/19. Her discharge diagnoses were the same as above, plus: Lupus; Sjogren's syndrome; optic neuritis; hypertension; hyperlipidemia; congestive heart failure; COPD.    Assessment  Home Care Contact:  Home Care Agency: [facility]  : Rochelle Rosales RN (April, in the interim)  Care Coordination contacted home care: Yes  Anticipated start of care date: 7/18/19    Patient Contact:   Introduced self and role of care coordination. RN Care Coordinator engaged in AIDET communication during encounter.  Discharge instructions were reviewed with patient.   Patient did not have any questions about her medications. Her son is setting them up for her.  Follow up appointment is scheduled for 7/31/19, at 11:45 am, with Dr. Vasquez Macias ([facility] Multiple Sclerosis) .  Home care has contacted patient: Yes, today.   Patient questions/concerns: home care start date.    Plan  RN Care Coordinator will:    contact [facility] for additional information on start date and update patient.    await notification from home care staff informing of patients discharge plans/needs.     review chart and outreach to home care in 4 weeks and as needed.      Eloisa Wood RN Care Coordinator  [facility]  Phone: [phone]  Email: [email or web address]

## 2019-07-19 ENCOUNTER — DOCUMENTATION ONLY (OUTPATIENT)
Dept: CARE COORDINATION | Facility: CLINIC | Age: 74
End: 2019-07-19

## 2019-07-31 ENCOUNTER — OFFICE VISIT (OUTPATIENT)
Dept: NEUROLOGY | Facility: CLINIC | Age: 74
End: 2019-07-31
Attending: PSYCHIATRY & NEUROLOGY
Payer: MEDICARE

## 2019-07-31 ENCOUNTER — PRE VISIT (OUTPATIENT)
Dept: NEUROLOGY | Facility: CLINIC | Age: 74
End: 2019-07-31

## 2019-07-31 VITALS
SYSTOLIC BLOOD PRESSURE: 101 MMHG | HEART RATE: 62 BPM | DIASTOLIC BLOOD PRESSURE: 67 MMHG | WEIGHT: 200 LBS | OXYGEN SATURATION: 93 % | BODY MASS INDEX: 31.32 KG/M2

## 2019-07-31 DIAGNOSIS — G36.0 NEUROMYELITIS OPTICA (DEVIC) (H): ICD-10-CM

## 2019-07-31 DIAGNOSIS — G36.0 NEUROMYELITIS OPTICA (H): ICD-10-CM

## 2019-07-31 RX ORDER — GABAPENTIN 300 MG/1
CAPSULE ORAL
Qty: 120 CAPSULE | Refills: 11 | Status: SHIPPED | OUTPATIENT
Start: 2019-07-31 | End: 2020-01-01

## 2019-07-31 RX ORDER — HEPARIN SODIUM (PORCINE) LOCK FLUSH IV SOLN 100 UNIT/ML 100 UNIT/ML
5 SOLUTION INTRAVENOUS
Status: CANCELLED | OUTPATIENT
Start: 2019-07-31

## 2019-07-31 RX ORDER — DIPHENHYDRAMINE HCL 25 MG
50 CAPSULE ORAL ONCE
Status: CANCELLED
Start: 2019-07-31

## 2019-07-31 RX ORDER — ACETAMINOPHEN 325 MG/1
650 TABLET ORAL ONCE
Status: CANCELLED
Start: 2019-07-31

## 2019-07-31 RX ORDER — HEPARIN SODIUM,PORCINE 10 UNIT/ML
5 VIAL (ML) INTRAVENOUS
Status: CANCELLED | OUTPATIENT
Start: 2019-07-31

## 2019-07-31 RX ORDER — METHYLPREDNISOLONE SODIUM SUCCINATE 125 MG/2ML
125 INJECTION, POWDER, LYOPHILIZED, FOR SOLUTION INTRAMUSCULAR; INTRAVENOUS ONCE
Status: CANCELLED | OUTPATIENT
Start: 2019-07-31

## 2019-07-31 ASSESSMENT — PAIN SCALES - GENERAL: PAINLEVEL: NO PAIN (0)

## 2019-07-31 NOTE — NURSING NOTE
Chief Complaint   Patient presents with     New Patient     [facility] NEW MS, FOLLOW UP FROM ED       Dorcas Miller, EMT

## 2019-07-31 NOTE — PROGRESS NOTES
MULTIPLE SCLEROSIS CLINIC AT THE [facility]  FOLLOWUP/ESTABLISHED PATIENT VISIT      PRINCIPAL NEUROLOGIC DIAGNOSIS: Neuromyelitis Optica    Date of Onset: 6/3/19  Date of Diagnosis: 2019  Initial Clinical Course: Relapsing Remitting  Current Clinical Course: Relapsing Remitting  Past Disease Modifying Therapy(ies): NA  Current Disease Modifying Therapy(ies):Rituxan  Most Recent MRI of the Brain: 19  Most Recent MRI of the Cervical Cord 19  Most Recent MRI of the Thoracic Cord: 19  Most Recent Lumbar Puncture: 19   Most Recent NMO: 19 positive  Most Recent Remote Hepatitis Panel: 19 negative  Most Recent VZV Ig19 positive  Most Recent TB Quant: 19 negative      CHIEF COMPLAINT: Follow up on DMT    HPI Starting around 6/3/19, the patient states she has been dealing with some bilateral numbness from her trunk to her toes. She reports it first started with her right leg, but has progressively worsened and has seen both her primary care physician as well as neurologist Dr. Robbi La of [facility] on during which she required assistance with ambulation, so they scheduled thoracic and lumbar spine MRIs for . However, on , the patient reports her right foot has been very heavy and she has to concentrate in order to pick it up and walk appropriately, so she called her primary care physician who recommended she come to the ED. After being admitted to the hospital, she her MRIs were reviewed. MRI cervical spine unremarkable, MRI thoracic spine notable for T4-T5 enhancing lesion w/ surrounding edema. MR Brain revealed many T2 hyperintensities with one small enhancing focus. Patient underwent lumbar puncture on . CSF contained elevated protein, glucose, and RBCs, with no WBCs. CSF infectious workup negative. 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 Per Dr. Macias, patient  "was started on rituximab. Quantiferon Gold, HB core Ab and HB surface antigen checked and negative prior to starting immunosuppressive agent. Patient received fist infusion on 7/4 which she tolerated well with no major complications    INTERVAL HISTORY:    Overall unchanged    Issues with current MS therapy: Tolerating DMT without issue    REVIEW OF SYSTEMS:    Mood: unchanged  Spasticity:none  Bladder: unchanged  Bowel: unchanged  Pain related to today's visit:reviewed on nursing intake documentation  Fatigue: unchanged  Sleep: well/ no problem  Memory/Concentration: unchanged      Otherwise 10 point ROS was neg other than the symptoms noted above.    PAST HISTORY was reviewed and updated:      MEDICATIONS and ALLERGIES were reviewed and updated.    SOCIAL HISTORY was reviewed and updated:        EXAM:    PHYSICAL EXAMINATION:   VITAL SIGNS:  Vital signs:      BP: 101/67 Pulse: 62     SpO2: 93 %       Weight: 90.7 kg (200 lb)(verbal/pt)  Estimated body mass index is 31.32 kg/m  as calculated from the following:    Height as of 7/6/19: 1.702 m (5' 7\").    Weight as of this encounter: 90.7 kg (200 lb).            GENERAL: The patient is a well-nourished  who presents to the evaluation with her son Israel.  NEUROLOGIC:   MENTAL STATUS: Alert,awake and  oriented times four.   CRANIAL NERVES: . Visual fields are noted for left hemianopia . She is also blind in the right eye. The pupils are  round and react to light and there is no Wilmar Acosta pupil.  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 Rotary nystagmus in any direction a few degrees beyond primary gaze. Facial strength and sensation are  normal. Hearing is  normal. Palate elevation and tongue protrusion are  normal.   POWER:     Motor    Upper      Right Left   Shoulder Abduction 5 5   Elbow Flexion 5 5   Elbow Extension 5 5   Wrist Extension 5 5   Digit Extension 5 5   Digit Flexion 5 5   APB 5 5   Tone 0 0   Lower   "     Right Left   Hip Flexion 4.5 5   Knee Extension 5 5   Knee Flexion 5 5   Foot Dorsiflexion 5 5   Foot Plantar Flexion 5 5   EH 5 5   Toe Flexion 5 5   Tone 1 0           Grade Description   0 No increase in muscle tone   1 Slight increase in muscle tone, manifested by a catch and release or by minimal resistance at the end of the range of motion when the affected part(s) is moved in flexion or extension   1+ Slight increase in muscle tone, manifested by a catch, followed by minimal resistance throughout the remainder (less than half) of the ROM   2 More marked increase in muscle tone through most of the ROM, but affected part(s) easily moved   3 Considerable increase in muscle tone, passive movement difficult   4 Affected part(s) rigid in flexion or extension           SENSORY:     Light touch:  severely diminished in bilateral lower extremities     REFLEXES:     Reflexes       Right  Left   Biceps 3  3   Triceps 3  3   Brachioradialis 3  3   Patellar  3  3       MOTOR/CEREBELLAR:    Right Left   RRM 0 Normal 0 Normal   SHAYAN 0 Normal 0 Normal   FTN 0 Normal 0 Normal   RRM 1 Abnormal 1 Abnormal   HKS 1 Abnormal 1 Abnormal           GAIT: Did not walk the patient    RESULTS:  Monitoring labs:    Admission on 07/06/2019, Discharged on 07/16/2019   Component Date Value Ref Range Status     Sodium 07/07/2019 145* 133 - 144 mmol/L Final     Potassium 07/07/2019 3.4  3.4 - 5.3 mmol/L Final     Chloride 07/07/2019 110* 94 - 109 mmol/L Final     Carbon Dioxide 07/07/2019 30  20 - 32 mmol/L Final     Anion Gap 07/07/2019 5  3 - 14 mmol/L Final     Glucose 07/07/2019 76  70 - 99 mg/dL Final     Urea Nitrogen 07/07/2019 17  7 - 30 mg/dL Final     Creatinine 07/07/2019 0.86  0.52 - 1.04 mg/dL Final     GFR Estimate 07/07/2019 66  >60 mL/min/[1.73_m2] Final     GFR Estimate If Black 07/07/2019 77  >60 mL/min/[1.73_m2] Final     Calcium 07/07/2019 8.3* 8.5 - 10.1 mg/dL Final     WBC 07/07/2019 6.1  4.0 - 11.0 10e9/L Final      RBC Count 07/07/2019 3.68* 3.8 - 5.2 10e12/L Final     Hemoglobin 07/07/2019 11.9  11.7 - 15.7 g/dL Final     Hematocrit 07/07/2019 37.5  35.0 - 47.0 % Final     MCV 07/07/2019 102* 78 - 100 fl Final     MCH 07/07/2019 32.3  26.5 - 33.0 pg Final     MCHC 07/07/2019 31.7  31.5 - 36.5 g/dL Final     RDW 07/07/2019 14.0  10.0 - 15.0 % Final     Platelet Count 07/07/2019 144* 150 - 450 10e9/L Final     Diff Method 07/07/2019 Automated Method   Final     % Neutrophils 07/07/2019 68.0  % Final     % Lymphocytes 07/07/2019 22.5  % Final     % Monocytes 07/07/2019 7.0  % Final     % Eosinophils 07/07/2019 1.8  % Final     % Basophils 07/07/2019 0.5  % Final     % Immature Granulocytes 07/07/2019 0.2  % Final     Nucleated RBCs 07/07/2019 0  0 /100 Final     Absolute Neutrophil 07/07/2019 4.2  1.6 - 8.3 10e9/L Final     Absolute Lymphocytes 07/07/2019 1.4  0.8 - 5.3 10e9/L Final     Absolute Monocytes 07/07/2019 0.4  0.0 - 1.3 10e9/L Final     Absolute Eosinophils 07/07/2019 0.1  0.0 - 0.7 10e9/L Final     Absolute Basophils 07/07/2019 0.0  0.0 - 0.2 10e9/L Final     Abs Immature Granulocytes 07/07/2019 0.0  0 - 0.4 10e9/L Final     Absolute Nucleated RBC 07/07/2019 0.0   Final     Sodium 07/11/2019 145* 133 - 144 mmol/L Final     Potassium 07/11/2019 3.4  3.4 - 5.3 mmol/L Final     Chloride 07/11/2019 110* 94 - 109 mmol/L Final     Carbon Dioxide 07/11/2019 29  20 - 32 mmol/L Final     Anion Gap 07/11/2019 6  3 - 14 mmol/L Final     Glucose 07/11/2019 91  70 - 99 mg/dL Final     Urea Nitrogen 07/11/2019 17  7 - 30 mg/dL Final     Creatinine 07/11/2019 0.90  0.52 - 1.04 mg/dL Final     GFR Estimate 07/11/2019 62  >60 mL/min/[1.73_m2] Final     GFR Estimate If Black 07/11/2019 72  >60 mL/min/[1.73_m2] Final     Calcium 07/11/2019 7.8* 8.5 - 10.1 mg/dL Final   Admission on 06/20/2019, Discharged on 07/06/2019   No results displayed because visit has over 200 results.      Admission on 06/19/2019, Discharged on 06/20/2019    Component Date Value Ref Range Status     WBC 06/19/2019 6.0  4.0 - 11.0 10e9/L Final     RBC Count 06/19/2019 4.33  3.8 - 5.2 10e12/L Final     Hemoglobin 06/19/2019 14.1  11.7 - 15.7 g/dL Final     Hematocrit 06/19/2019 43.8  35.0 - 47.0 % Final     MCV 06/19/2019 101* 78 - 100 fl Final     MCH 06/19/2019 32.6  26.5 - 33.0 pg Final     MCHC 06/19/2019 32.2  31.5 - 36.5 g/dL Final     RDW 06/19/2019 13.2  10.0 - 15.0 % Final     Platelet Count 06/19/2019 192  150 - 450 10e9/L Final     Diff Method 06/19/2019 Automated Method   Final     % Neutrophils 06/19/2019 65.8  % Final     % Lymphocytes 06/19/2019 24.0  % Final     % Monocytes 06/19/2019 8.2  % Final     % Eosinophils 06/19/2019 1.5  % Final     % Basophils 06/19/2019 0.2  % Final     % Immature Granulocytes 06/19/2019 0.3  % Final     Nucleated RBCs 06/19/2019 0  0 /100 Final     Absolute Neutrophil 06/19/2019 3.9  1.6 - 8.3 10e9/L Final     Absolute Lymphocytes 06/19/2019 1.4  0.8 - 5.3 10e9/L Final     Absolute Monocytes 06/19/2019 0.5  0.0 - 1.3 10e9/L Final     Absolute Eosinophils 06/19/2019 0.1  0.0 - 0.7 10e9/L Final     Absolute Basophils 06/19/2019 0.0  0.0 - 0.2 10e9/L Final     Abs Immature Granulocytes 06/19/2019 0.0  0 - 0.4 10e9/L Final     Absolute Nucleated RBC 06/19/2019 0.0   Final     Sodium 06/19/2019 138  133 - 144 mmol/L Final     Potassium 06/19/2019 3.8  3.4 - 5.3 mmol/L Final     Chloride 06/19/2019 103  94 - 109 mmol/L Final     Carbon Dioxide 06/19/2019 28  20 - 32 mmol/L Final     Anion Gap 06/19/2019 7  3 - 14 mmol/L Final     Glucose 06/19/2019 85  70 - 99 mg/dL Final     Urea Nitrogen 06/19/2019 18  7 - 30 mg/dL Final     Creatinine 06/19/2019 0.98  0.52 - 1.04 mg/dL Final     GFR Estimate 06/19/2019 57* >60 mL/min/[1.73_m2] Final     GFR Estimate If Black 06/19/2019 66  >60 mL/min/[1.73_m2] Final     Calcium 06/19/2019 9.3  8.5 - 10.1 mg/dL Final     Magnesium 06/19/2019 2.0  1.6 - 2.3 mg/dL Final     Color Urine 06/19/2019  Yellow   Final     Appearance Urine 06/19/2019 Clear   Final     Glucose Urine 06/19/2019 Negative  NEG^Negative mg/dL Final     Bilirubin Urine 06/19/2019 Negative  NEG^Negative Final     Ketones Urine 06/19/2019 Negative  NEG^Negative mg/dL Final     Specific Gravity Urine 06/19/2019 1.013  1.003 - 1.035 Final     Blood Urine 06/19/2019 Negative  NEG^Negative Final     pH Urine 06/19/2019 6.0  5.0 - 7.0 pH Final     Protein Albumin Urine 06/19/2019 Negative  NEG^Negative mg/dL Final     Urobilinogen mg/dL 06/19/2019 Normal  0.0 - 2.0 mg/dL Final     Nitrite Urine 06/19/2019 Positive* NEG^Negative Final     Leukocyte Esterase Urine 06/19/2019 Moderate* NEG^Negative Final     Source 06/19/2019 Midstream Urine   Final     WBC Urine 06/19/2019 19* 0 - 5 /HPF Final     RBC Urine 06/19/2019 1  0 - 2 /HPF Final     Bacteria Urine 06/19/2019 Moderate* NEG^Negative /HPF Final     Mucous Urine 06/19/2019 Present* NEG^Negative /LPF Final     Specimen Description 06/19/2019 Midstream Urine   Final     Special Requests 06/19/2019 Specimen received in preservative   Final     Culture Micro 06/19/2019 *  Final                    Value:>100,000 colonies/mL  Escherichia coli     Admission on 02/02/2019, Discharged on 02/04/2019   Component Date Value Ref Range Status     Interpretation ECG 02/02/2019 Click View Image link to view waveform and result   Final     N-Terminal Pro BNP Inpatient 02/02/2019 97  0 - 900 pg/mL Final     WBC 02/02/2019 7.3  4.0 - 11.0 10e9/L Final     RBC Count 02/02/2019 4.40  3.8 - 5.2 10e12/L Final     Hemoglobin 02/02/2019 14.2  11.7 - 15.7 g/dL Final     Hematocrit 02/02/2019 43.2  35.0 - 47.0 % Final     MCV 02/02/2019 98  78 - 100 fl Final     MCH 02/02/2019 32.3  26.5 - 33.0 pg Final     MCHC 02/02/2019 32.9  31.5 - 36.5 g/dL Final     RDW 02/02/2019 14.5  10.0 - 15.0 % Final     Platelet Count 02/02/2019 191  150 - 450 10e9/L Final     D Dimer 02/02/2019 1.2* 0.0 - 0.50 ug/ml FEU Final     Sodium  02/02/2019 140  133 - 144 mmol/L Final     Potassium 02/02/2019 3.2* 3.4 - 5.3 mmol/L Final     Chloride 02/02/2019 100  94 - 109 mmol/L Final     Carbon Dioxide 02/02/2019 31  20 - 32 mmol/L Final     Anion Gap 02/02/2019 9  3 - 14 mmol/L Final     Glucose 02/02/2019 105* 70 - 99 mg/dL Final     Urea Nitrogen 02/02/2019 36* 7 - 30 mg/dL Final     Creatinine 02/02/2019 2.65* 0.52 - 1.04 mg/dL Final     GFR Estimate 02/02/2019 17* >60 mL/min/[1.73_m2] Final     GFR Estimate If Black 02/02/2019 20* >60 mL/min/[1.73_m2] Final     Calcium 02/02/2019 9.2  8.5 - 10.1 mg/dL Final     Troponin I ES 02/02/2019 <0.015  0.000 - 0.045 ug/L Final     Heparin 10A Level 02/03/2019 1.17* IU/mL Final     Potassium 02/03/2019 3.8  3.4 - 5.3 mmol/L Final     Sodium 02/03/2019 143  133 - 144 mmol/L Final     Potassium 02/03/2019 3.5  3.4 - 5.3 mmol/L Final     Chloride 02/03/2019 107  94 - 109 mmol/L Final     Carbon Dioxide 02/03/2019 29  20 - 32 mmol/L Final     Anion Gap 02/03/2019 7  3 - 14 mmol/L Final     Glucose 02/03/2019 86  70 - 99 mg/dL Final     Urea Nitrogen 02/03/2019 34* 7 - 30 mg/dL Final     Creatinine 02/03/2019 1.75* 0.52 - 1.04 mg/dL Final     GFR Estimate 02/03/2019 28* >60 mL/min/[1.73_m2] Final     GFR Estimate If Black 02/03/2019 33* >60 mL/min/[1.73_m2] Final     Calcium 02/03/2019 8.4* 8.5 - 10.1 mg/dL Final     Heparin 10A Level 02/03/2019 0.73  IU/mL Final     Heparin 10A Level 02/03/2019 0.54  IU/mL Final     Sodium 02/04/2019 142  133 - 144 mmol/L Final     Potassium 02/04/2019 4.0  3.4 - 5.3 mmol/L Final     Chloride 02/04/2019 109  94 - 109 mmol/L Final     Carbon Dioxide 02/04/2019 30  20 - 32 mmol/L Final     Anion Gap 02/04/2019 3  3 - 14 mmol/L Final     Glucose 02/04/2019 86  70 - 99 mg/dL Final     Urea Nitrogen 02/04/2019 21  7 - 30 mg/dL Final     Creatinine 02/04/2019 1.13* 0.52 - 1.04 mg/dL Final     GFR Estimate 02/04/2019 48* >60 mL/min/[1.73_m2] Final     GFR Estimate If Black 02/04/2019 55*  >60 mL/min/[1.73_m2] Final     Calcium 02/04/2019 8.5  8.5 - 10.1 mg/dL Final     Heparin 10A Level 02/04/2019 0.63  IU/mL Final   ]      MRI brain:    no new MRI to review    ASSESSMENT/PLAN:  The patient is a 74-year-old woman with newly diagnosed neuromyelitis optica who is presenting today as a posthospitalization follow-up.  Overall, the patient has slightly improved since last time I seen her.  She is close back to full strength in her lower extremities.  Currently the main thing that is bothering the patient is her significant numbness and imbalance.  I spent time reviewing with the patient the necessity of physical therapy.  I explained to her given her diagnosis that her chances of recovery are not as good as other demyelinating diseases.  I instructed her that the best thing she can do for herself to ensure that she recovers is by working hard with physical therapy.  In regards to her long-term treatment, I will keep her on rituximab.  I spent a prolonged period of time reviewing with her the side effects of rituximab.  I also spent time reviewing with her her imaging from the hospitalization.  Depending on how well she recovers, consideration could be done to try other medications to see if she could walk better.  The patient also asked if she could be weaned from gabapentin, I spent time explained to the patient what the gabapentin is doing.  I advised her that she wants to try to wean the gabapentin, that I would decrease the dose by 1 capsule/day every week as tolerated. I will plan to follow her up in 3 months. I instructed her to call my office with any questions, concerns, issues, or problems.     Wean gabapentin by 300mg per day every week as tolerated  Get another dose of rituxan  Follow up in 3 months    I spent 45 minutes in this visit, with >50% direct patient time spent counseling about prognosis, treatment options, and coordination of care.    Vasquez Macias MD [facility]  Staff Neurologist    07/31/19       (Chart documentation was completed in part with Dragon voice-recognition software. Even though reviewed, some grammatical, spelling, and word errors may remain.)

## 2019-07-31 NOTE — PATIENT INSTRUCTIONS
Will do another dose of rituxan    Will refill the gabapentin    Can decrease the gabapentin by 1 tablet per day every week as tolerated until off.    Follow up in 3 months    You saw a neurology provider today at the [facility].  You may have also met with the MS RN Care Coordinator.  In order to get a message to your MS Center provider, you should contact [phone] option 3 for the triage nurse line.    You should contact us via this protocol if you have any of the following symptoms:    New or worsening neurologic symptoms that persist for 24-48 hours, such as:  o New onset of pain or marked worsening of pain  o Difficulty with speech, swallowing, or breathing  o New onset of vertigo or dizziness  o Change in bowel or bladder function (incontinence, difficulty urinating)    Increasing difficulty in self care    Marked changes in vision (double vision, blurred vision, graying of vision)    Change in mobility    Change in cognitive function    Falling    Worsening numbness, tingling or pain with a change in function    Worsening fatigue lasting more than 2 weeks  If you had labs completed today, we will contact you with the results.  If you are active in [employer], they will be released to you there.  Otherwise, your results will be provided to you via mail or telephone call.  Some results take up to 2 weeks for completion.  If you haven t heard anything about your lab results within 2 weeks, you can call or send a [employer] message to obtain your results.  If you have an MRI scheduled in the week or two prior to your next appointment, we will go over the results at your scheduled follow up appointment.  If you are not scheduled to see your MS Center provider within about 2 weeks after your MRI, please call or send a [employer] message to obtain your results if you haven t heard anything within 2 weeks.  Please be aware that it takes at least 5 business days after routine MRIs for  your results to be reviewed by both the radiologist and your doctor.  MRIs completed at facilities outside of the [facility] system take about 2 weeks in order for the MRI disc to be mailed to our clinic and uploaded into your medical record.    Prescription refills should be faxed to us by your pharmacy.  Our fax number for prescription refills is [phone].  Please do your best to come to your appointments, and to arrive 15 minutes early to allow time for checking in.  [facility] reserves the right to terminate care of established patients if a patient misses three or more appointments in a clinic without providing notification within a 12-month period.    Developing Your Care Team  Individuals living with chronic illnesses like MS may be unaware that they are at risk for the same range of medical problems as everyone else.  This is why you must establish a relationship with other health care providers in addition to your Multiple Sclerosis doctor.  It can be difficult at times to figure out whether a health concern is related to your MS, or whether it is related to something else, such as hormonal changes, pseduoexacerbations, changes in your core body temperature, flu-like reactions to interferons, exercise, or infections.  Urinary tract infections (UTIs) are common culprits that can cause fatigue, weakness, or other  MS attack -like symptoms without classic symptoms of a UTI.  For this reason, if you call or come in to discuss symptoms, you may be asked to get in touch with your primary provider or another specialist, so that you receive the comprehensive care you need.  What is Multiple Sclerosis (MS)?  MS is a disease in which the nerve tissues in the brain and/or spinal cord are attacked by immune cells in the body.  These immune cells are present in everyone, and their normal role is to fight off infections.  In people with MS, these cells change the way they function and cross into  the nervous system.  Once there, they cause inflammation that damages the myelin (or the protective coating of a nerve cell, much like the plastic covering on an electrical cord) and parts of the nerve cell itself.  So far, a clear cause for this immune system dysfunction has not been found.  MS often starts out as the  relapsing-remitting  form.  This means there are episodes when you have symptoms, and other times when you recover to normal or near-normal.  Over time, if the damage to the nervous system continues, the disease can cause additional disability, such as difficulty walking.  If the relapses and nerve damage can be prevented with available medications, many patients with MS can go many years between relapses and have relatively little disability.  Remember: MS is a condition that changes and must be evaluated on an ongoing basis!  What is a Relapse? (Also called flare-ups, attacks, or exacerbations)  Relapses are due to the occurrence of inflammation in some part of the brain and/or spinal cord.  A relapse is new or recurrent symptoms which persist for at least 24 hours and sometimes worsen over 48 hours.  New symptoms need to be  by at least 1 month in order to be considered separate relapses. Most of the time, symptoms reach their maximal intensity within 2 weeks and then begin to slowly resolve.  At times, your symptoms may not recover fully for up to 6 months, depending on the severity of the episode.  The frequency of relapses is generally higher early in the disease, but can vary greatly among individuals with MS.  Improvement of symptoms for an individual is unpredictable with each relapse.    It is important to remember that an increase in symptoms and changes in function may not necessarily be a relapse.  There are other factors that contribute to such changes, such as hot weather, increased body temperature, infection/illness, stress and sleep deprivation.  The worsening of symptoms  may feel like a relapse when in reality it is not.  These episodes are referred to as pseudorelapses.  Once the underlying cause is addressed, symptoms usually fade away and you feel better.  If you experience a worsening of symptoms that lasts more than 48 hours and does not improve with cooling down, decreasing stress, or treatment of an infection, please call us and we can help to better determine whether you are having a pseudorelapse versus a relapse.

## 2019-07-31 NOTE — LETTER
2019       RE: Ladonna Sheriff  [address]  [place] [address]     Dear Colleague,    Thank you for referring your patient, Ladonna Sheriff, to the [facility] MULTIPLE SCLEROSIS at [facility] Please see a copy of my visit note below.    MULTIPLE SCLEROSIS CLINIC AT THE [facility]  FOLLOWUP/ESTABLISHED PATIENT VISIT    PRINCIPAL NEUROLOGIC DIAGNOSIS: Neuromyelitis Optica    Date of Onset: 6/3/19  Date of Diagnosis: 2019  Initial Clinical Course: Relapsing Remitting  Current Clinical Course: Relapsing Remitting  Past Disease Modifying Therapy(ies): NA  Current Disease Modifying Therapy(ies):Rituxan  Most Recent MRI of the Brain: 19  Most Recent MRI of the Cervical Cord 19  Most Recent MRI of the Thoracic Cord: 19  Most Recent Lumbar Puncture: 19   Most Recent NMO: 19 positive  Most Recent Remote Hepatitis Panel: 19 negative  Most Recent VZV Ig19 positive  Most Recent TB Quant: 19 negative    CHIEF COMPLAINT: Follow up on DMT    HPI Starting around 6/3/19, the patient states she has been dealing with some bilateral numbness from her trunk to her toes. She reports it first started with her right leg, but has progressively worsened and has seen both her primary care physician as well as neurologist Dr. Robbi La of [facility]  on during which she required assistance with ambulation, so they scheduled thoracic and lumbar spine MRIs for . However, on , the patient reports her right foot has been very heavy and she has to concentrate in order to pick it up and walk appropriately, so she called her primary care physician who recommended she come to the ED. After being admitted to the hospital, she her MRIs were reviewed. MRI cervical spine unremarkable, MRI thoracic spine notable for T4-T5 enhancing lesion w/ surrounding edema. MR Brain revealed many T2 hyperintensities with one small  "enhancing focus. Patient underwent lumbar puncture on 6/21. CSF contained elevated protein, glucose, and RBCs, with no WBCs. CSF infectious workup negative. 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 Per Dr. Macias, patient was started on rituximab. Quantiferon Gold, HB core Ab and HB surface antigen checked and negative prior to starting immunosuppressive agent. Patient received fist infusion on 7/4 which she tolerated well with no major complications    INTERVAL HISTORY:    Overall unchanged    Issues with current MS therapy: Tolerating DMT without issue    REVIEW OF SYSTEMS:    Mood: unchanged  Spasticity:none  Bladder: unchanged  Bowel: unchanged  Pain related to today's visit:reviewed on nursing intake documentation  Fatigue: unchanged  Sleep: well/ no problem  Memory/Concentration: unchanged    Otherwise 10 point ROS was neg other than the symptoms noted above.    PAST HISTORY was reviewed and updated:    MEDICATIONS and ALLERGIES were reviewed and updated.    SOCIAL HISTORY was reviewed and updated:    EXAM:    PHYSICAL EXAMINATION:   VITAL SIGNS:  Vital signs:      BP: 101/67 Pulse: 62     SpO2: 93 %       Weight: 90.7 kg (200 lb)(verbal/pt)  Estimated body mass index is 31.32 kg/m  as calculated from the following:    Height as of 7/6/19: 1.702 m (5' 7\").    Weight as of this encounter: 90.7 kg (200 lb).    GENERAL: The patient is a well-nourished  who presents to the evaluation with her son Israel.  NEUROLOGIC:   MENTAL STATUS: Alert,awake and  oriented times four.   CRANIAL NERVES: . Visual fields are noted for left hemianopia . She is also blind in the right eye. The pupils are  round and react to light and there is no Wilmar Acosta pupil.  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 Rotary nystagmus in any direction a few degrees beyond primary gaze. Facial strength and sensation are  normal. " Hearing is  normal. Palate elevation and tongue protrusion are  normal.   POWER:     Motor    Upper      Right Left   Shoulder Abduction 5 5   Elbow Flexion 5 5   Elbow Extension 5 5   Wrist Extension 5 5   Digit Extension 5 5   Digit Flexion 5 5   APB 5 5   Tone 0 0   Lower       Right Left   Hip Flexion 4.5 5   Knee Extension 5 5   Knee Flexion 5 5   Foot Dorsiflexion 5 5   Foot Plantar Flexion 5 5   EH 5 5   Toe Flexion 5 5   Tone 1 0           Grade Description   0 No increase in muscle tone   1 Slight increase in muscle tone, manifested by a catch and release or by minimal resistance at the end of the range of motion when the affected part(s) is moved in flexion or extension   1+ Slight increase in muscle tone, manifested by a catch, followed by minimal resistance throughout the remainder (less than half) of the ROM   2 More marked increase in muscle tone through most of the ROM, but affected part(s) easily moved   3 Considerable increase in muscle tone, passive movement difficult   4 Affected part(s) rigid in flexion or extension       SENSORY:     Light touch:  severely diminished in bilateral lower extremities     REFLEXES:     Reflexes       Right  Left   Biceps 3  3   Triceps 3  3   Brachioradialis 3  3   Patellar  3  3       MOTOR/CEREBELLAR:    Right Left   RRM 0 Normal 0 Normal   SHAYAN 0 Normal 0 Normal   FTN 0 Normal 0 Normal   RRM 1 Abnormal 1 Abnormal   HKS 1 Abnormal 1 Abnormal       GAIT: Did not walk the patient    RESULTS:  Monitoring labs:    Admission on 07/06/2019, Discharged on 07/16/2019   Component Date Value Ref Range Status     Sodium 07/07/2019 145* 133 - 144 mmol/L Final     Potassium 07/07/2019 3.4  3.4 - 5.3 mmol/L Final     Chloride 07/07/2019 110* 94 - 109 mmol/L Final     Carbon Dioxide 07/07/2019 30  20 - 32 mmol/L Final     Anion Gap 07/07/2019 5  3 - 14 mmol/L Final     Glucose 07/07/2019 76  70 - 99 mg/dL Final     Urea Nitrogen 07/07/2019 17  7 - 30 mg/dL Final     Creatinine  07/07/2019 0.86  0.52 - 1.04 mg/dL Final     GFR Estimate 07/07/2019 66  >60 mL/min/[1.73_m2] Final     GFR Estimate If Black 07/07/2019 77  >60 mL/min/[1.73_m2] Final     Calcium 07/07/2019 8.3* 8.5 - 10.1 mg/dL Final     WBC 07/07/2019 6.1  4.0 - 11.0 10e9/L Final     RBC Count 07/07/2019 3.68* 3.8 - 5.2 10e12/L Final     Hemoglobin 07/07/2019 11.9  11.7 - 15.7 g/dL Final     Hematocrit 07/07/2019 37.5  35.0 - 47.0 % Final     MCV 07/07/2019 102* 78 - 100 fl Final     MCH 07/07/2019 32.3  26.5 - 33.0 pg Final     MCHC 07/07/2019 31.7  31.5 - 36.5 g/dL Final     RDW 07/07/2019 14.0  10.0 - 15.0 % Final     Platelet Count 07/07/2019 144* 150 - 450 10e9/L Final     Diff Method 07/07/2019 Automated Method   Final     % Neutrophils 07/07/2019 68.0  % Final     % Lymphocytes 07/07/2019 22.5  % Final     % Monocytes 07/07/2019 7.0  % Final     % Eosinophils 07/07/2019 1.8  % Final     % Basophils 07/07/2019 0.5  % Final     % Immature Granulocytes 07/07/2019 0.2  % Final     Nucleated RBCs 07/07/2019 0  0 /100 Final     Absolute Neutrophil 07/07/2019 4.2  1.6 - 8.3 10e9/L Final     Absolute Lymphocytes 07/07/2019 1.4  0.8 - 5.3 10e9/L Final     Absolute Monocytes 07/07/2019 0.4  0.0 - 1.3 10e9/L Final     Absolute Eosinophils 07/07/2019 0.1  0.0 - 0.7 10e9/L Final     Absolute Basophils 07/07/2019 0.0  0.0 - 0.2 10e9/L Final     Abs Immature Granulocytes 07/07/2019 0.0  0 - 0.4 10e9/L Final     Absolute Nucleated RBC 07/07/2019 0.0   Final     Sodium 07/11/2019 145* 133 - 144 mmol/L Final     Potassium 07/11/2019 3.4  3.4 - 5.3 mmol/L Final     Chloride 07/11/2019 110* 94 - 109 mmol/L Final     Carbon Dioxide 07/11/2019 29  20 - 32 mmol/L Final     Anion Gap 07/11/2019 6  3 - 14 mmol/L Final     Glucose 07/11/2019 91  70 - 99 mg/dL Final     Urea Nitrogen 07/11/2019 17  7 - 30 mg/dL Final     Creatinine 07/11/2019 0.90  0.52 - 1.04 mg/dL Final     GFR Estimate 07/11/2019 62  >60 mL/min/[1.73_m2] Final     GFR Estimate If  Black 07/11/2019 72  >60 mL/min/[1.73_m2] Final     Calcium 07/11/2019 7.8* 8.5 - 10.1 mg/dL Final   Admission on 06/20/2019, Discharged on 07/06/2019   No results displayed because visit has over 200 results.      Admission on 06/19/2019, Discharged on 06/20/2019   Component Date Value Ref Range Status     WBC 06/19/2019 6.0  4.0 - 11.0 10e9/L Final     RBC Count 06/19/2019 4.33  3.8 - 5.2 10e12/L Final     Hemoglobin 06/19/2019 14.1  11.7 - 15.7 g/dL Final     Hematocrit 06/19/2019 43.8  35.0 - 47.0 % Final     MCV 06/19/2019 101* 78 - 100 fl Final     MCH 06/19/2019 32.6  26.5 - 33.0 pg Final     MCHC 06/19/2019 32.2  31.5 - 36.5 g/dL Final     RDW 06/19/2019 13.2  10.0 - 15.0 % Final     Platelet Count 06/19/2019 192  150 - 450 10e9/L Final     Diff Method 06/19/2019 Automated Method   Final     % Neutrophils 06/19/2019 65.8  % Final     % Lymphocytes 06/19/2019 24.0  % Final     % Monocytes 06/19/2019 8.2  % Final     % Eosinophils 06/19/2019 1.5  % Final     % Basophils 06/19/2019 0.2  % Final     % Immature Granulocytes 06/19/2019 0.3  % Final     Nucleated RBCs 06/19/2019 0  0 /100 Final     Absolute Neutrophil 06/19/2019 3.9  1.6 - 8.3 10e9/L Final     Absolute Lymphocytes 06/19/2019 1.4  0.8 - 5.3 10e9/L Final     Absolute Monocytes 06/19/2019 0.5  0.0 - 1.3 10e9/L Final     Absolute Eosinophils 06/19/2019 0.1  0.0 - 0.7 10e9/L Final     Absolute Basophils 06/19/2019 0.0  0.0 - 0.2 10e9/L Final     Abs Immature Granulocytes 06/19/2019 0.0  0 - 0.4 10e9/L Final     Absolute Nucleated RBC 06/19/2019 0.0   Final     Sodium 06/19/2019 138  133 - 144 mmol/L Final     Potassium 06/19/2019 3.8  3.4 - 5.3 mmol/L Final     Chloride 06/19/2019 103  94 - 109 mmol/L Final     Carbon Dioxide 06/19/2019 28  20 - 32 mmol/L Final     Anion Gap 06/19/2019 7  3 - 14 mmol/L Final     Glucose 06/19/2019 85  70 - 99 mg/dL Final     Urea Nitrogen 06/19/2019 18  7 - 30 mg/dL Final     Creatinine 06/19/2019 0.98  0.52 - 1.04  mg/dL Final     GFR Estimate 06/19/2019 57* >60 mL/min/[1.73_m2] Final     GFR Estimate If Black 06/19/2019 66  >60 mL/min/[1.73_m2] Final     Calcium 06/19/2019 9.3  8.5 - 10.1 mg/dL Final     Magnesium 06/19/2019 2.0  1.6 - 2.3 mg/dL Final     Color Urine 06/19/2019 Yellow   Final     Appearance Urine 06/19/2019 Clear   Final     Glucose Urine 06/19/2019 Negative  NEG^Negative mg/dL Final     Bilirubin Urine 06/19/2019 Negative  NEG^Negative Final     Ketones Urine 06/19/2019 Negative  NEG^Negative mg/dL Final     Specific Gravity Urine 06/19/2019 1.013  1.003 - 1.035 Final     Blood Urine 06/19/2019 Negative  NEG^Negative Final     pH Urine 06/19/2019 6.0  5.0 - 7.0 pH Final     Protein Albumin Urine 06/19/2019 Negative  NEG^Negative mg/dL Final     Urobilinogen mg/dL 06/19/2019 Normal  0.0 - 2.0 mg/dL Final     Nitrite Urine 06/19/2019 Positive* NEG^Negative Final     Leukocyte Esterase Urine 06/19/2019 Moderate* NEG^Negative Final     Source 06/19/2019 Midstream Urine   Final     WBC Urine 06/19/2019 19* 0 - 5 /HPF Final     RBC Urine 06/19/2019 1  0 - 2 /HPF Final     Bacteria Urine 06/19/2019 Moderate* NEG^Negative /HPF Final     Mucous Urine 06/19/2019 Present* NEG^Negative /LPF Final     Specimen Description 06/19/2019 Midstream Urine   Final     Special Requests 06/19/2019 Specimen received in preservative   Final     Culture Micro 06/19/2019 *  Final                    Value:>100,000 colonies/mL  Escherichia coli     Admission on 02/02/2019, Discharged on 02/04/2019   Component Date Value Ref Range Status     Interpretation ECG 02/02/2019 Click View Image link to view waveform and result   Final     N-Terminal Pro BNP Inpatient 02/02/2019 97  0 - 900 pg/mL Final     WBC 02/02/2019 7.3  4.0 - 11.0 10e9/L Final     RBC Count 02/02/2019 4.40  3.8 - 5.2 10e12/L Final     Hemoglobin 02/02/2019 14.2  11.7 - 15.7 g/dL Final     Hematocrit 02/02/2019 43.2  35.0 - 47.0 % Final     MCV 02/02/2019 98  78 - 100 fl  Final     MCH 02/02/2019 32.3  26.5 - 33.0 pg Final     MCHC 02/02/2019 32.9  31.5 - 36.5 g/dL Final     RDW 02/02/2019 14.5  10.0 - 15.0 % Final     Platelet Count 02/02/2019 191  150 - 450 10e9/L Final     D Dimer 02/02/2019 1.2* 0.0 - 0.50 ug/ml FEU Final     Sodium 02/02/2019 140  133 - 144 mmol/L Final     Potassium 02/02/2019 3.2* 3.4 - 5.3 mmol/L Final     Chloride 02/02/2019 100  94 - 109 mmol/L Final     Carbon Dioxide 02/02/2019 31  20 - 32 mmol/L Final     Anion Gap 02/02/2019 9  3 - 14 mmol/L Final     Glucose 02/02/2019 105* 70 - 99 mg/dL Final     Urea Nitrogen 02/02/2019 36* 7 - 30 mg/dL Final     Creatinine 02/02/2019 2.65* 0.52 - 1.04 mg/dL Final     GFR Estimate 02/02/2019 17* >60 mL/min/[1.73_m2] Final     GFR Estimate If Black 02/02/2019 20* >60 mL/min/[1.73_m2] Final     Calcium 02/02/2019 9.2  8.5 - 10.1 mg/dL Final     Troponin I ES 02/02/2019 <0.015  0.000 - 0.045 ug/L Final     Heparin 10A Level 02/03/2019 1.17* IU/mL Final     Potassium 02/03/2019 3.8  3.4 - 5.3 mmol/L Final     Sodium 02/03/2019 143  133 - 144 mmol/L Final     Potassium 02/03/2019 3.5  3.4 - 5.3 mmol/L Final     Chloride 02/03/2019 107  94 - 109 mmol/L Final     Carbon Dioxide 02/03/2019 29  20 - 32 mmol/L Final     Anion Gap 02/03/2019 7  3 - 14 mmol/L Final     Glucose 02/03/2019 86  70 - 99 mg/dL Final     Urea Nitrogen 02/03/2019 34* 7 - 30 mg/dL Final     Creatinine 02/03/2019 1.75* 0.52 - 1.04 mg/dL Final     GFR Estimate 02/03/2019 28* >60 mL/min/[1.73_m2] Final     GFR Estimate If Black 02/03/2019 33* >60 mL/min/[1.73_m2] Final     Calcium 02/03/2019 8.4* 8.5 - 10.1 mg/dL Final     Heparin 10A Level 02/03/2019 0.73  IU/mL Final     Heparin 10A Level 02/03/2019 0.54  IU/mL Final     Sodium 02/04/2019 142  133 - 144 mmol/L Final     Potassium 02/04/2019 4.0  3.4 - 5.3 mmol/L Final     Chloride 02/04/2019 109  94 - 109 mmol/L Final     Carbon Dioxide 02/04/2019 30  20 - 32 mmol/L Final     Anion Gap 02/04/2019 3  3 -  14 mmol/L Final     Glucose 02/04/2019 86  70 - 99 mg/dL Final     Urea Nitrogen 02/04/2019 21  7 - 30 mg/dL Final     Creatinine 02/04/2019 1.13* 0.52 - 1.04 mg/dL Final     GFR Estimate 02/04/2019 48* >60 mL/min/[1.73_m2] Final     GFR Estimate If Black 02/04/2019 55* >60 mL/min/[1.73_m2] Final     Calcium 02/04/2019 8.5  8.5 - 10.1 mg/dL Final     Heparin 10A Level 02/04/2019 0.63  IU/mL Final   ]      MRI brain:    no new MRI to review    ASSESSMENT/PLAN:  The patient is a 74-year-old woman with newly diagnosed neuromyelitis optica who is presenting today as a posthospitalization follow-up.  Overall, the patient has slightly improved since last time I seen her.  She is close back to full strength in her lower extremities.  Currently the main thing that is bothering the patient is her significant numbness and imbalance.  I spent time reviewing with the patient the necessity of physical therapy.  I explained to her given her diagnosis that her chances of recovery are not as good as other demyelinating diseases.  I instructed her that the best thing she can do for herself to ensure that she recovers is by working hard with physical therapy.  In regards to her long-term treatment, I will keep her on rituximab.  I spent a prolonged period of time reviewing with her the side effects of rituximab.  I also spent time reviewing with her her imaging from the hospitalization.  Depending on how well she recovers, consideration could be done to try other medications to see if she could walk better.  The patient also asked if she could be weaned from gabapentin, I spent time explained to the patient what the gabapentin is doing.  I advised her that she wants to try to wean the gabapentin, that I would decrease the dose by 1 capsule/day every week as tolerated. I will plan to follow her up in 3 months. I instructed her to call my office with any questions, concerns, issues, or problems.     Wean gabapentin by 300mg per day  every week as tolerated  Get another dose of rituxan  Follow up in 3 months    I spent 45 minutes in this visit, with >50% direct patient time spent counseling about prognosis, treatment options, and coordination of care.    Vasquez Macias MD [facility]  Staff Neurologist   07/31/19     (Chart documentation was completed in part with Dragon voice-recognition software. Even though reviewed, some grammatical, spelling, and word errors may remain.)

## 2019-10-01 ENCOUNTER — PATIENT OUTREACH (OUTPATIENT)
Dept: CARE COORDINATION | Facility: CLINIC | Age: 74
End: 2019-10-01

## 2019-10-01 NOTE — PROGRESS NOTES
Clinic Care Coordination Contact  [facility]/Voicemail       Clinical Data: Care Coordinator Outreach to follow up after discharge from Home Care and assess any additional needs.   Outreach attempted x 1.  Left message on patient's voicemail with call back information and requested return call.  Plan: Care Coordinator will try to reach patient again in 3-5 business days.  CAMELIA Cadet   Social Work Care Coordinator  [phone]

## 2019-10-14 NOTE — PROGRESS NOTES
"Clinic Care Coordination Contact    Follow Up Progress Note      SW CC reached pt for follow up. Pt report she is doing well at home following Home Care discharge. Pt reports she has been able to access additional community supports and denies any additional needs at this time.     Goals addressed this encounter:   Goals Addressed                 This Visit's Progress       Patient Stated      COMPLETED: 1. Psychosocial (pt-stated)        Goal Statement: \"I would like to get information about receiving meals at home\"  Measure of Success: Patient will be able to set up a meal delivery system for herself within a month.  Supportive Steps to Achieve:   1. RN Care Coordinator will send resources for meal delivery at home to patient by mail.  2. Patient will select an option that suits her and enroll.  3. RN CC will assist patient setting the delivery up if she needs it.  Barriers: Patient has a diagnosis of Neuromyelitis optica, which impairs her vision capacity.   Strengths: Patient has great family support through her son Hayden.  Date to Achieve By: 8/17/19  Patient expressed understanding of goal: Yes.                 Plan: No further outreaches will be made at this time unless a new referral is made or a change in the pt's status occurs. Patient was provided with this writer's contact information and encouraged to call with any questions or concerns.    CAMELIA Cadet   Social Work Care Coordinator  [phone]  "

## 2019-10-30 ENCOUNTER — OFFICE VISIT (OUTPATIENT)
Dept: NEUROLOGY | Facility: CLINIC | Age: 74
End: 2019-10-30
Attending: PSYCHIATRY & NEUROLOGY
Payer: MEDICARE

## 2019-10-30 VITALS
WEIGHT: 210 LBS | DIASTOLIC BLOOD PRESSURE: 73 MMHG | SYSTOLIC BLOOD PRESSURE: 107 MMHG | BODY MASS INDEX: 32.96 KG/M2 | HEART RATE: 72 BPM | HEIGHT: 67 IN

## 2019-10-30 DIAGNOSIS — R41.3 MEMORY LOSS: Primary | ICD-10-CM

## 2019-10-30 DIAGNOSIS — R41.3 MEMORY LOSS: ICD-10-CM

## 2019-10-30 LAB
ALBUMIN SERPL-MCNC: 4 G/DL (ref 3.4–5)
ALP SERPL-CCNC: 83 U/L (ref 40–150)
ALT SERPL W P-5'-P-CCNC: 39 U/L (ref 0–50)
ANION GAP SERPL CALCULATED.3IONS-SCNC: 6 MMOL/L (ref 3–14)
AST SERPL W P-5'-P-CCNC: 29 U/L (ref 0–45)
BASOPHILS # BLD AUTO: 0 10E9/L (ref 0–0.2)
BASOPHILS NFR BLD AUTO: 0.3 %
BILIRUB SERPL-MCNC: 0.5 MG/DL (ref 0.2–1.3)
BUN SERPL-MCNC: 19 MG/DL (ref 7–30)
CALCIUM SERPL-MCNC: 9.1 MG/DL (ref 8.5–10.1)
CD19 CELLS # BLD: 18 CELLS/UL (ref 107–698)
CD19 CELLS NFR BLD: 2 % (ref 6–27)
CHLORIDE SERPL-SCNC: 102 MMOL/L (ref 94–109)
CO2 SERPL-SCNC: 30 MMOL/L (ref 20–32)
CREAT SERPL-MCNC: 0.9 MG/DL (ref 0.52–1.04)
DIFFERENTIAL METHOD BLD: ABNORMAL
EOSINOPHIL # BLD AUTO: 0.1 10E9/L (ref 0–0.7)
EOSINOPHIL NFR BLD AUTO: 1 %
ERYTHROCYTE [DISTWIDTH] IN BLOOD BY AUTOMATED COUNT: 14.2 % (ref 10–15)
GFR SERPL CREATININE-BSD FRML MDRD: 62 ML/MIN/{1.73_M2}
GLUCOSE SERPL-MCNC: 95 MG/DL (ref 70–99)
HCT VFR BLD AUTO: 47.5 % (ref 35–47)
HGB BLD-MCNC: 15.2 G/DL (ref 11.7–15.7)
IMM GRANULOCYTES # BLD: 0 10E9/L (ref 0–0.4)
IMM GRANULOCYTES NFR BLD: 0.3 %
LYMPHOCYTES # BLD AUTO: 0.8 10E9/L (ref 0.8–5.3)
LYMPHOCYTES NFR BLD AUTO: 12.2 %
MCH RBC QN AUTO: 31.9 PG (ref 26.5–33)
MCHC RBC AUTO-ENTMCNC: 32 G/DL (ref 31.5–36.5)
MCV RBC AUTO: 100 FL (ref 78–100)
MONOCYTES # BLD AUTO: 0.4 10E9/L (ref 0–1.3)
MONOCYTES NFR BLD AUTO: 5.8 %
NEUTROPHILS # BLD AUTO: 5.4 10E9/L (ref 1.6–8.3)
NEUTROPHILS NFR BLD AUTO: 80.4 %
NRBC # BLD AUTO: 0 10*3/UL
NRBC BLD AUTO-RTO: 0 /100
PLATELET # BLD AUTO: 174 10E9/L (ref 150–450)
POTASSIUM SERPL-SCNC: 3.9 MMOL/L (ref 3.4–5.3)
PROT SERPL-MCNC: 8.6 G/DL (ref 6.8–8.8)
RBC # BLD AUTO: 4.77 10E12/L (ref 3.8–5.2)
SODIUM SERPL-SCNC: 139 MMOL/L (ref 133–144)
TSH SERPL DL<=0.005 MIU/L-ACNC: 2.2 MU/L (ref 0.4–4)
VIT B12 SERPL-MCNC: 324 PG/ML (ref 193–986)
WBC # BLD AUTO: 6.7 10E9/L (ref 4–11)

## 2019-10-30 PROCEDURE — 84443 ASSAY THYROID STIM HORMONE: CPT | Performed by: PSYCHIATRY & NEUROLOGY

## 2019-10-30 PROCEDURE — 82607 VITAMIN B-12: CPT | Performed by: PSYCHIATRY & NEUROLOGY

## 2019-10-30 PROCEDURE — 80053 COMPREHEN METABOLIC PANEL: CPT | Performed by: PSYCHIATRY & NEUROLOGY

## 2019-10-30 PROCEDURE — 85025 COMPLETE CBC W/AUTO DIFF WBC: CPT | Performed by: PSYCHIATRY & NEUROLOGY

## 2019-10-30 PROCEDURE — 36415 COLL VENOUS BLD VENIPUNCTURE: CPT | Performed by: PSYCHIATRY & NEUROLOGY

## 2019-10-30 PROCEDURE — G0463 HOSPITAL OUTPT CLINIC VISIT: HCPCS

## 2019-10-30 PROCEDURE — 86355 B CELLS TOTAL COUNT: CPT | Performed by: PSYCHIATRY & NEUROLOGY

## 2019-10-30 ASSESSMENT — MIFFLIN-ST. JEOR: SCORE: 1485.18

## 2019-10-30 ASSESSMENT — PAIN SCALES - GENERAL: PAINLEVEL: NO PAIN (0)

## 2019-10-30 NOTE — PROGRESS NOTES
MULTIPLE SCLEROSIS CLINIC AT THE [facility]  FOLLOWUP/ESTABLISHED PATIENT VISIT      PRINCIPAL NEUROLOGIC DIAGNOSIS: Neuromyelitis Optica     Date of Onset: 6/3/19  Date of Diagnosis: 2019  Initial Clinical Course: Relapsing Remitting  Current Clinical Course: Relapsing Remitting  Past Disease Modifying Therapy(ies): NA  Current Disease Modifying Therapy(ies):Rituxan  Most Recent MRI of the Brain: 19  Most Recent MRI of the Cervical Cord 19  Most Recent MRI of the Thoracic Cord: 19  Most Recent Lumbar Puncture: 19   Most Recent NMO: 19 positive  Most Recent Remote Hepatitis Panel: 19 negative  Most Recent VZV Ig19 positive  Most Recent TB Quant: 19 negative      CHIEF COMPLAINT: Follow up on DMT      INTERVAL HISTORY:    The patient reports that she has numbness in her abdomen. She reports that she has a rubber band around her upper stomach. She also feels like something is pressing up against her rib cage. The patient reports nothing makes it better worse. The patient is unsure if her gabapentin is working or not. She does not think that this is worse.        Issues with current MS therapy: Tolerating DMT without issue    REVIEW OF SYSTEMS:    Mood: unchanged  Spasticity:unchanged  Bladder: urgency, hestiancy   Bowel: irritable bowels if she eats eggs or butter or high fat she has instant urgency  Pain related to today's visit:reviewed on nursing intake documentation  Fatigue: worse, the patient reports that she is always tired. She need to take a cat naps for an hour most afternoon.   Sleep: well/ no problem and only wake up once per night. Sleeps about 9 to 10 hours per night  Memory/Concentration: The patient is having some issues with short term memory. She is forgetting minor details of activies. Her son is more concerned about it that she is.        Otherwise 10 point ROS was neg other than the symptoms noted above.    PAST HISTORY was reviewed and  "updated:      MEDICATIONS and ALLERGIES were reviewed and updated.    SOCIAL HISTORY was reviewed and updated:  \  EXAM:    PHYSICAL EXAMINATION:   VITAL SIGNS: Vital signs:      BP: 107/73 Pulse: 72           Height: 170.2 cm (5' 7\") Weight: 95.3 kg (210 lb)(Verbal given)  Estimated body mass index is 32.89 kg/m  as calculated from the following:    Height as of this encounter: 1.702 m (5' 7\").    Weight as of this encounter: 95.3 kg (210 lb).            GENERAL: The patient is a well-nourished  who presents to the evaluation with son.  NEUROLOGIC:   MENTAL STATUS: Alert,awake and  oriented times four.   CRANIAL NERVES: . Visual fields are full to confrontation. Left field neglect The pupils are  round and react to light and there is no Wilmar Acosta pupil.  Extraocular movements are  intact with no  internuclear ophthalmoplegia. No nystagmus. Facial strength and sensation are  normal. Hearing is  normal. Palate elevation and tongue protrusion are  normal.   POWER:     Motor    Upper      Right Left   Shoulder Abduction 5 5   Elbow Flexion 5 5   Elbow Extension 5 5   Wrist Extension 5 5   Digit Extension 5 5   Digit Flexion 5 5   APB 5 5   Tone 0 0   Lower       Right Left   Hip Flexion 5 5   Knee Extension 5 5   Knee Flexion 5 5   Foot Dorsiflexion 5 5   Foot Plantar Flexion 5 5   EH 5 5   Toe Flexion 5 5   Tone 1 0           Grade Description   0 No increase in muscle tone   1 Slight increase in muscle tone, manifested by a catch and release or by minimal resistance at the end of the range of motion when the affected part(s) is moved in flexion or extension   1+ Slight increase in muscle tone, manifested by a catch, followed by minimal resistance throughout the remainder (less than half) of the ROM   2 More marked increase in muscle tone through most of the ROM, but affected part(s) easily moved   3 Considerable increase in muscle tone, passive movement difficult   4 Affected part(s) rigid in flexion or extension "           SENSORY:     Light touch:  Intact in all extremities        MOTOR/CEREBELLAR:    Right Left   RRM 0 Normal 0 Normal   SHAYAN 0 Normal 0 Normal   FTN 0 Normal 0 Normal   RRM 0 Normal 0 Normal   HKS 0 Normal 0 Normal           GAIT: Gait is   narrow-based, shuffling and steady     RESULTS:  Monitoring labs:    Admission on 07/06/2019, Discharged on 07/16/2019   Component Date Value Ref Range Status     Sodium 07/07/2019 145* 133 - 144 mmol/L Final     Potassium 07/07/2019 3.4  3.4 - 5.3 mmol/L Final     Chloride 07/07/2019 110* 94 - 109 mmol/L Final     Carbon Dioxide 07/07/2019 30  20 - 32 mmol/L Final     Anion Gap 07/07/2019 5  3 - 14 mmol/L Final     Glucose 07/07/2019 76  70 - 99 mg/dL Final     Urea Nitrogen 07/07/2019 17  7 - 30 mg/dL Final     Creatinine 07/07/2019 0.86  0.52 - 1.04 mg/dL Final     GFR Estimate 07/07/2019 66  >60 mL/min/[1.73_m2] Final     GFR Estimate If Black 07/07/2019 77  >60 mL/min/[1.73_m2] Final     Calcium 07/07/2019 8.3* 8.5 - 10.1 mg/dL Final     WBC 07/07/2019 6.1  4.0 - 11.0 10e9/L Final     RBC Count 07/07/2019 3.68* 3.8 - 5.2 10e12/L Final     Hemoglobin 07/07/2019 11.9  11.7 - 15.7 g/dL Final     Hematocrit 07/07/2019 37.5  35.0 - 47.0 % Final     MCV 07/07/2019 102* 78 - 100 fl Final     MCH 07/07/2019 32.3  26.5 - 33.0 pg Final     MCHC 07/07/2019 31.7  31.5 - 36.5 g/dL Final     RDW 07/07/2019 14.0  10.0 - 15.0 % Final     Platelet Count 07/07/2019 144* 150 - 450 10e9/L Final     Diff Method 07/07/2019 Automated Method   Final     % Neutrophils 07/07/2019 68.0  % Final     % Lymphocytes 07/07/2019 22.5  % Final     % Monocytes 07/07/2019 7.0  % Final     % Eosinophils 07/07/2019 1.8  % Final     % Basophils 07/07/2019 0.5  % Final     % Immature Granulocytes 07/07/2019 0.2  % Final     Nucleated RBCs 07/07/2019 0  0 /100 Final     Absolute Neutrophil 07/07/2019 4.2  1.6 - 8.3 10e9/L Final     Absolute Lymphocytes 07/07/2019 1.4  0.8 - 5.3 10e9/L Final     Absolute  Monocytes 07/07/2019 0.4  0.0 - 1.3 10e9/L Final     Absolute Eosinophils 07/07/2019 0.1  0.0 - 0.7 10e9/L Final     Absolute Basophils 07/07/2019 0.0  0.0 - 0.2 10e9/L Final     Abs Immature Granulocytes 07/07/2019 0.0  0 - 0.4 10e9/L Final     Absolute Nucleated RBC 07/07/2019 0.0   Final     Sodium 07/11/2019 145* 133 - 144 mmol/L Final     Potassium 07/11/2019 3.4  3.4 - 5.3 mmol/L Final     Chloride 07/11/2019 110* 94 - 109 mmol/L Final     Carbon Dioxide 07/11/2019 29  20 - 32 mmol/L Final     Anion Gap 07/11/2019 6  3 - 14 mmol/L Final     Glucose 07/11/2019 91  70 - 99 mg/dL Final     Urea Nitrogen 07/11/2019 17  7 - 30 mg/dL Final     Creatinine 07/11/2019 0.90  0.52 - 1.04 mg/dL Final     GFR Estimate 07/11/2019 62  >60 mL/min/[1.73_m2] Final     GFR Estimate If Black 07/11/2019 72  >60 mL/min/[1.73_m2] Final     Calcium 07/11/2019 7.8* 8.5 - 10.1 mg/dL Final   No results displayed because visit has over 200 results.      Admission on 06/19/2019, Discharged on 06/20/2019   Component Date Value Ref Range Status     WBC 06/19/2019 6.0  4.0 - 11.0 10e9/L Final     RBC Count 06/19/2019 4.33  3.8 - 5.2 10e12/L Final     Hemoglobin 06/19/2019 14.1  11.7 - 15.7 g/dL Final     Hematocrit 06/19/2019 43.8  35.0 - 47.0 % Final     MCV 06/19/2019 101* 78 - 100 fl Final     MCH 06/19/2019 32.6  26.5 - 33.0 pg Final     MCHC 06/19/2019 32.2  31.5 - 36.5 g/dL Final     RDW 06/19/2019 13.2  10.0 - 15.0 % Final     Platelet Count 06/19/2019 192  150 - 450 10e9/L Final     Diff Method 06/19/2019 Automated Method   Final     % Neutrophils 06/19/2019 65.8  % Final     % Lymphocytes 06/19/2019 24.0  % Final     % Monocytes 06/19/2019 8.2  % Final     % Eosinophils 06/19/2019 1.5  % Final     % Basophils 06/19/2019 0.2  % Final     % Immature Granulocytes 06/19/2019 0.3  % Final     Nucleated RBCs 06/19/2019 0  0 /100 Final     Absolute Neutrophil 06/19/2019 3.9  1.6 - 8.3 10e9/L Final     Absolute Lymphocytes 06/19/2019 1.4   0.8 - 5.3 10e9/L Final     Absolute Monocytes 06/19/2019 0.5  0.0 - 1.3 10e9/L Final     Absolute Eosinophils 06/19/2019 0.1  0.0 - 0.7 10e9/L Final     Absolute Basophils 06/19/2019 0.0  0.0 - 0.2 10e9/L Final     Abs Immature Granulocytes 06/19/2019 0.0  0 - 0.4 10e9/L Final     Absolute Nucleated RBC 06/19/2019 0.0   Final     Sodium 06/19/2019 138  133 - 144 mmol/L Final     Potassium 06/19/2019 3.8  3.4 - 5.3 mmol/L Final     Chloride 06/19/2019 103  94 - 109 mmol/L Final     Carbon Dioxide 06/19/2019 28  20 - 32 mmol/L Final     Anion Gap 06/19/2019 7  3 - 14 mmol/L Final     Glucose 06/19/2019 85  70 - 99 mg/dL Final     Urea Nitrogen 06/19/2019 18  7 - 30 mg/dL Final     Creatinine 06/19/2019 0.98  0.52 - 1.04 mg/dL Final     GFR Estimate 06/19/2019 57* >60 mL/min/[1.73_m2] Final     GFR Estimate If Black 06/19/2019 66  >60 mL/min/[1.73_m2] Final     Calcium 06/19/2019 9.3  8.5 - 10.1 mg/dL Final     Magnesium 06/19/2019 2.0  1.6 - 2.3 mg/dL Final     Color Urine 06/19/2019 Yellow   Final     Appearance Urine 06/19/2019 Clear   Final     Glucose Urine 06/19/2019 Negative  NEG^Negative mg/dL Final     Bilirubin Urine 06/19/2019 Negative  NEG^Negative Final     Ketones Urine 06/19/2019 Negative  NEG^Negative mg/dL Final     Specific Gravity Urine 06/19/2019 1.013  1.003 - 1.035 Final     Blood Urine 06/19/2019 Negative  NEG^Negative Final     pH Urine 06/19/2019 6.0  5.0 - 7.0 pH Final     Protein Albumin Urine 06/19/2019 Negative  NEG^Negative mg/dL Final     Urobilinogen mg/dL 06/19/2019 Normal  0.0 - 2.0 mg/dL Final     Nitrite Urine 06/19/2019 Positive* NEG^Negative Final     Leukocyte Esterase Urine 06/19/2019 Moderate* NEG^Negative Final     Source 06/19/2019 Midstream Urine   Final     WBC Urine 06/19/2019 19* 0 - 5 /HPF Final     RBC Urine 06/19/2019 1  0 - 2 /HPF Final     Bacteria Urine 06/19/2019 Moderate* NEG^Negative /HPF Final     Mucous Urine 06/19/2019 Present* NEG^Negative /LPF Final      Specimen Description 06/19/2019 Midstream Urine   Final     Special Requests 06/19/2019 Specimen received in preservative   Final     Culture Micro 06/19/2019 *  Final                    Value:>100,000 colonies/mL  Escherichia coli     ]      MRI brain:    no new MRI to review    ASSESSMENT/PLAN:  The patient has a 74-year-old woman with neuromyelitis optica spectrum disorder who is presenting today as a follow-up.  Overall, the patient is fairly clinically improved since last time I seen her.  She is able to walk a little bit more steady, even though her gait is somewhat shuffling.  Additionally, her coordination is profoundly better today than it was roughly 4 months ago.  I will have the patient begin checking her CD19 counts monthly.  I will have her re-dose her Rituxan once her CD percentage gets above 2%.  I will also check a CMP and CBC today as well.  I will also check TSH and vitamin B12 given that her son is raising issues with memory.  I will have the patient also get formal neuropsych testing as well.  Additionally, I will increase the patient's gabapentin given that she is complaining of symptoms of paresthesia and tightness around her abdomen and deadness of her leg.  I will have the patient follow back up in 4 months.  I instructed the patient to call my office with any questions, concerns, issues or problems.    Get neuropsych testing  Start checking monthly CD19 counts  Increase her gabapentin to 900 twice daily  Check CBC and CMP and TSH and vitamin B12  Follow-up in 4 months    I spent 25 minutes in this visit, with >50% direct patient time spent counseling about prognosis, treatment options, and coordination of care.    Vasquez Macias MD, MD A [facility]  Staff Neurologist   10/30/19       (Chart documentation was completed in part with Dragon voice-recognition software. Even though reviewed, some grammatical, spelling, and word errors may remain.)

## 2019-10-30 NOTE — LETTER
10/30/2019       RE: Ladonna Sheriff  [id number]  [place] [address]     Dear Colleague,    Thank you for referring your patient, Ladonna Sheriff, to the [facility] MULTIPLE SCLEROSIS at [facility]. Please see a copy of my visit note below.    MULTIPLE SCLEROSIS CLINIC AT THE [facility]  FOLLOWUP/ESTABLISHED PATIENT VISIT      PRINCIPAL NEUROLOGIC DIAGNOSIS: Neuromyelitis Optica     Date of Onset: 6/3/19  Date of Diagnosis: 2019  Initial Clinical Course: Relapsing Remitting  Current Clinical Course: Relapsing Remitting  Past Disease Modifying Therapy(ies): NA  Current Disease Modifying Therapy(ies):Rituxan  Most Recent MRI of the Brain: 19  Most Recent MRI of the Cervical Cord 19  Most Recent MRI of the Thoracic Cord: 19  Most Recent Lumbar Puncture: 19   Most Recent NMO: 19 positive  Most Recent Remote Hepatitis Panel: 19 negative  Most Recent VZV Ig19 positive  Most Recent TB Quant: 19 negative      CHIEF COMPLAINT: Follow up on DMT      INTERVAL HISTORY:    The patient reports that she has numbness in her abdomen. She reports that she has a rubber band around her upper stomach. She also feels like something is pressing up against her rib cage. The patient reports nothing makes it better worse. The patient is unsure if her gabapentin is working or not. She does not think that this is worse.        Issues with current MS therapy: Tolerating DMT without issue    REVIEW OF SYSTEMS:    Mood: unchanged  Spasticity:unchanged  Bladder: urgency, hestiancy   Bowel: irritable bowels if she eats eggs or butter or high fat she has instant urgency  Pain related to today's visit:reviewed on nursing intake documentation  Fatigue: worse, the patient reports that she is always tired. She need to take a cat naps for an hour most afternoon.   Sleep: well/ no problem and only wake up once per night. Sleeps about 9 to 10 hours  "per night  Memory/Concentration: The patient is having some issues with short term memory. She is forgetting minor details of activies. Her son is more concerned about it that she is.        Otherwise 10 point ROS was neg other than the symptoms noted above.    PAST HISTORY was reviewed and updated:      MEDICATIONS and ALLERGIES were reviewed and updated.    SOCIAL HISTORY was reviewed and updated:  \  EXAM:    PHYSICAL EXAMINATION:   VITAL SIGNS: Vital signs:      BP: 107/73 Pulse: 72           Height: 170.2 cm (5' 7\") Weight: 95.3 kg (210 lb)(Verbal given)  Estimated body mass index is 32.89 kg/m  as calculated from the following:    Height as of this encounter: 1.702 m (5' 7\").    Weight as of this encounter: 95.3 kg (210 lb).      GENERAL: The patient is a well-nourished  who presents to the evaluation with son.  NEUROLOGIC:   MENTAL STATUS: Alert,awake and  oriented times four.   CRANIAL NERVES: . Visual fields are full to confrontation. Left field neglect The pupils are  round and react to light and there is no Wilmar Acosta pupil.  Extraocular movements are  intact with no  internuclear ophthalmoplegia. No nystagmus. Facial strength and sensation are  normal. Hearing is  normal. Palate elevation and tongue protrusion are  normal.   POWER:     Motor    Upper      Right Left   Shoulder Abduction 5 5   Elbow Flexion 5 5   Elbow Extension 5 5   Wrist Extension 5 5   Digit Extension 5 5   Digit Flexion 5 5   APB 5 5   Tone 0 0   Lower       Right Left   Hip Flexion 5 5   Knee Extension 5 5   Knee Flexion 5 5   Foot Dorsiflexion 5 5   Foot Plantar Flexion 5 5   EH 5 5   Toe Flexion 5 5   Tone 1 0           Grade Description   0 No increase in muscle tone   1 Slight increase in muscle tone, manifested by a catch and release or by minimal resistance at the end of the range of motion when the affected part(s) is moved in flexion or extension   1+ Slight increase in muscle tone, manifested by a catch, followed by " minimal resistance throughout the remainder (less than half) of the ROM   2 More marked increase in muscle tone through most of the ROM, but affected part(s) easily moved   3 Considerable increase in muscle tone, passive movement difficult   4 Affected part(s) rigid in flexion or extension           SENSORY:     Light touch:  Intact in all extremities      MOTOR/CEREBELLAR:    Right Left   RRM 0 Normal 0 Normal   SHAYAN 0 Normal 0 Normal   FTN 0 Normal 0 Normal   RRM 0 Normal 0 Normal   HKS 0 Normal 0 Normal       GAIT: Gait is   narrow-based, shuffling and steady     RESULTS:  Monitoring labs:    Admission on 07/06/2019, Discharged on 07/16/2019   Component Date Value Ref Range Status     Sodium 07/07/2019 145* 133 - 144 mmol/L Final     Potassium 07/07/2019 3.4  3.4 - 5.3 mmol/L Final     Chloride 07/07/2019 110* 94 - 109 mmol/L Final     Carbon Dioxide 07/07/2019 30  20 - 32 mmol/L Final     Anion Gap 07/07/2019 5  3 - 14 mmol/L Final     Glucose 07/07/2019 76  70 - 99 mg/dL Final     Urea Nitrogen 07/07/2019 17  7 - 30 mg/dL Final     Creatinine 07/07/2019 0.86  0.52 - 1.04 mg/dL Final     GFR Estimate 07/07/2019 66  >60 mL/min/[1.73_m2] Final     GFR Estimate If Black 07/07/2019 77  >60 mL/min/[1.73_m2] Final     Calcium 07/07/2019 8.3* 8.5 - 10.1 mg/dL Final     WBC 07/07/2019 6.1  4.0 - 11.0 10e9/L Final     RBC Count 07/07/2019 3.68* 3.8 - 5.2 10e12/L Final     Hemoglobin 07/07/2019 11.9  11.7 - 15.7 g/dL Final     Hematocrit 07/07/2019 37.5  35.0 - 47.0 % Final     MCV 07/07/2019 102* 78 - 100 fl Final     MCH 07/07/2019 32.3  26.5 - 33.0 pg Final     MCHC 07/07/2019 31.7  31.5 - 36.5 g/dL Final     RDW 07/07/2019 14.0  10.0 - 15.0 % Final     Platelet Count 07/07/2019 144* 150 - 450 10e9/L Final     Diff Method 07/07/2019 Automated Method   Final     % Neutrophils 07/07/2019 68.0  % Final     % Lymphocytes 07/07/2019 22.5  % Final     % Monocytes 07/07/2019 7.0  % Final     % Eosinophils 07/07/2019 1.8  %  Final     % Basophils 07/07/2019 0.5  % Final     % Immature Granulocytes 07/07/2019 0.2  % Final     Nucleated RBCs 07/07/2019 0  0 /100 Final     Absolute Neutrophil 07/07/2019 4.2  1.6 - 8.3 10e9/L Final     Absolute Lymphocytes 07/07/2019 1.4  0.8 - 5.3 10e9/L Final     Absolute Monocytes 07/07/2019 0.4  0.0 - 1.3 10e9/L Final     Absolute Eosinophils 07/07/2019 0.1  0.0 - 0.7 10e9/L Final     Absolute Basophils 07/07/2019 0.0  0.0 - 0.2 10e9/L Final     Abs Immature Granulocytes 07/07/2019 0.0  0 - 0.4 10e9/L Final     Absolute Nucleated RBC 07/07/2019 0.0   Final     Sodium 07/11/2019 145* 133 - 144 mmol/L Final     Potassium 07/11/2019 3.4  3.4 - 5.3 mmol/L Final     Chloride 07/11/2019 110* 94 - 109 mmol/L Final     Carbon Dioxide 07/11/2019 29  20 - 32 mmol/L Final     Anion Gap 07/11/2019 6  3 - 14 mmol/L Final     Glucose 07/11/2019 91  70 - 99 mg/dL Final     Urea Nitrogen 07/11/2019 17  7 - 30 mg/dL Final     Creatinine 07/11/2019 0.90  0.52 - 1.04 mg/dL Final     GFR Estimate 07/11/2019 62  >60 mL/min/[1.73_m2] Final     GFR Estimate If Black 07/11/2019 72  >60 mL/min/[1.73_m2] Final     Calcium 07/11/2019 7.8* 8.5 - 10.1 mg/dL Final   No results displayed because visit has over 200 results.      Admission on 06/19/2019, Discharged on 06/20/2019   Component Date Value Ref Range Status     WBC 06/19/2019 6.0  4.0 - 11.0 10e9/L Final     RBC Count 06/19/2019 4.33  3.8 - 5.2 10e12/L Final     Hemoglobin 06/19/2019 14.1  11.7 - 15.7 g/dL Final     Hematocrit 06/19/2019 43.8  35.0 - 47.0 % Final     MCV 06/19/2019 101* 78 - 100 fl Final     MCH 06/19/2019 32.6  26.5 - 33.0 pg Final     MCHC 06/19/2019 32.2  31.5 - 36.5 g/dL Final     RDW 06/19/2019 13.2  10.0 - 15.0 % Final     Platelet Count 06/19/2019 192  150 - 450 10e9/L Final     Diff Method 06/19/2019 Automated Method   Final     % Neutrophils 06/19/2019 65.8  % Final     % Lymphocytes 06/19/2019 24.0  % Final     % Monocytes 06/19/2019 8.2  % Final      % Eosinophils 06/19/2019 1.5  % Final     % Basophils 06/19/2019 0.2  % Final     % Immature Granulocytes 06/19/2019 0.3  % Final     Nucleated RBCs 06/19/2019 0  0 /100 Final     Absolute Neutrophil 06/19/2019 3.9  1.6 - 8.3 10e9/L Final     Absolute Lymphocytes 06/19/2019 1.4  0.8 - 5.3 10e9/L Final     Absolute Monocytes 06/19/2019 0.5  0.0 - 1.3 10e9/L Final     Absolute Eosinophils 06/19/2019 0.1  0.0 - 0.7 10e9/L Final     Absolute Basophils 06/19/2019 0.0  0.0 - 0.2 10e9/L Final     Abs Immature Granulocytes 06/19/2019 0.0  0 - 0.4 10e9/L Final     Absolute Nucleated RBC 06/19/2019 0.0   Final     Sodium 06/19/2019 138  133 - 144 mmol/L Final     Potassium 06/19/2019 3.8  3.4 - 5.3 mmol/L Final     Chloride 06/19/2019 103  94 - 109 mmol/L Final     Carbon Dioxide 06/19/2019 28  20 - 32 mmol/L Final     Anion Gap 06/19/2019 7  3 - 14 mmol/L Final     Glucose 06/19/2019 85  70 - 99 mg/dL Final     Urea Nitrogen 06/19/2019 18  7 - 30 mg/dL Final     Creatinine 06/19/2019 0.98  0.52 - 1.04 mg/dL Final     GFR Estimate 06/19/2019 57* >60 mL/min/[1.73_m2] Final     GFR Estimate If Black 06/19/2019 66  >60 mL/min/[1.73_m2] Final     Calcium 06/19/2019 9.3  8.5 - 10.1 mg/dL Final     Magnesium 06/19/2019 2.0  1.6 - 2.3 mg/dL Final     Color Urine 06/19/2019 Yellow   Final     Appearance Urine 06/19/2019 Clear   Final     Glucose Urine 06/19/2019 Negative  NEG^Negative mg/dL Final     Bilirubin Urine 06/19/2019 Negative  NEG^Negative Final     Ketones Urine 06/19/2019 Negative  NEG^Negative mg/dL Final     Specific Gravity Urine 06/19/2019 1.013  1.003 - 1.035 Final     Blood Urine 06/19/2019 Negative  NEG^Negative Final     pH Urine 06/19/2019 6.0  5.0 - 7.0 pH Final     Protein Albumin Urine 06/19/2019 Negative  NEG^Negative mg/dL Final     Urobilinogen mg/dL 06/19/2019 Normal  0.0 - 2.0 mg/dL Final     Nitrite Urine 06/19/2019 Positive* NEG^Negative Final     Leukocyte Esterase Urine 06/19/2019 Moderate*  NEG^Negative Final     Source 06/19/2019 Midstream Urine   Final     WBC Urine 06/19/2019 19* 0 - 5 /HPF Final     RBC Urine 06/19/2019 1  0 - 2 /HPF Final     Bacteria Urine 06/19/2019 Moderate* NEG^Negative /HPF Final     Mucous Urine 06/19/2019 Present* NEG^Negative /LPF Final     Specimen Description 06/19/2019 Midstream Urine   Final     Special Requests 06/19/2019 Specimen received in preservative   Final     Culture Micro 06/19/2019 *  Final                    Value:>100,000 colonies/mL  Escherichia coli     ]      MRI brain:    no new MRI to review    ASSESSMENT/PLAN:  The patient has a 74-year-old woman with neuromyelitis optica spectrum disorder who is presenting today as a follow-up.  Overall, the patient is fairly clinically improved since last time I seen her.  She is able to walk a little bit more steady, even though her gait is somewhat shuffling.  Additionally, her coordination is profoundly better today than it was roughly 4 months ago.  I will have the patient begin checking her CD19 counts monthly.  I will have her re-dose her Rituxan once her CD percentage gets above 2%.  I will also check a CMP and CBC today as well.  I will also check TSH and vitamin B12 given that her son is raising issues with memory.  I will have the patient also get formal neuropsych testing as well.  Additionally, I will increase the patient's gabapentin given that she is complaining of symptoms of paresthesia and tightness around her abdomen and deadness of her leg.  I will have the patient follow back up in 4 months.  I instructed the patient to call my office with any questions, concerns, issues or problems.    Get neuropsych testing  Start checking monthly CD19 counts  Increase her gabapentin to 900 twice daily  Check CBC and CMP and TSH and vitamin B12  Follow-up in 4 months    I spent 25 minutes in this visit, with >50% direct patient time spent counseling about prognosis, treatment options, and coordination of  care.    Vasquez Macias MD, MD A [facility]  Staff Neurologist   10/30/19     (Chart documentation was completed in part with Dragon voice-recognition software. Even though reviewed, some grammatical, spelling, and word errors may remain.)

## 2019-10-30 NOTE — PATIENT INSTRUCTIONS
Will start check monthly blood work.    Will redose the rituxan based off you blood work.    Increase the gabapentin to 3 tabs twice daily    Get memory testing    Follow up in 4 months    You saw a neurology provider today at the [facility].  You may have also met with the MS RN Care Coordinator.  In order to get a message to your MS Center provider, you should contact [phone] option 3 for the triage nurse line.    You should contact us via this protocol if you have any of the following symptoms:    New or worsening neurologic symptoms that persist for 24-48 hours, such as:  o New onset of pain or marked worsening of pain  o Difficulty with speech, swallowing, or breathing  o New onset of vertigo or dizziness  o Change in bowel or bladder function (incontinence, difficulty urinating)    Increasing difficulty in self care    Marked changes in vision (double vision, blurred vision, graying of vision)    Change in mobility    Change in cognitive function    Falling    Worsening numbness, tingling or pain with a change in function    Worsening fatigue lasting more than 2 weeks  If you had labs completed today, we will contact you with the results.  If you are active in [employer], they will be released to you there.  Otherwise, your results will be provided to you via mail or telephone call.  Some results take up to 2 weeks for completion.  If you haven t heard anything about your lab results within 2 weeks, you can call or send a [employer] message to obtain your results.  If you have an MRI scheduled in the week or two prior to your next appointment, we will go over the results at your scheduled follow up appointment.  If you are not scheduled to see your MS Center provider within about 2 weeks after your MRI, please call or send a [employer] message to obtain your results if you haven t heard anything within 2 weeks.  Please be aware that it takes at least 5 business days after  routine MRIs for your results to be reviewed by both the radiologist and your doctor.  MRIs completed at facilities outside of the [place] system take about 2 weeks in order for the MRI disc to be mailed to our clinic and uploaded into your medical record.    Prescription refills should be faxed to us by your pharmacy.  Our fax number for prescription refills is [phone].  Please do your best to come to your appointments, and to arrive 15 minutes early to allow time for checking in.  [facility] reserves the right to terminate care of established patients if a patient misses three or more appointments in a clinic without providing notification within a 12-month period.    Developing Your Care Team  Individuals living with chronic illnesses like MS may be unaware that they are at risk for the same range of medical problems as everyone else.  This is why you must establish a relationship with other health care providers in addition to your Multiple Sclerosis doctor.  It can be difficult at times to figure out whether a health concern is related to your MS, or whether it is related to something else, such as hormonal changes, pseduoexacerbations, changes in your core body temperature, flu-like reactions to interferons, exercise, or infections.  Urinary tract infections (UTIs) are common culprits that can cause fatigue, weakness, or other  MS attack -like symptoms without classic symptoms of a UTI.  For this reason, if you call or come in to discuss symptoms, you may be asked to get in touch with your primary provider or another specialist, so that you receive the comprehensive care you need.  What is Multiple Sclerosis (MS)?  MS is a disease in which the nerve tissues in the brain and/or spinal cord are attacked by immune cells in the body.  These immune cells are present in everyone, and their normal role is to fight off infections.  In people with MS, these cells change the way they  function and cross into the nervous system.  Once there, they cause inflammation that damages the myelin (or the protective coating of a nerve cell, much like the plastic covering on an electrical cord) and parts of the nerve cell itself.  So far, a clear cause for this immune system dysfunction has not been found.  MS often starts out as the  relapsing-remitting  form.  This means there are episodes when you have symptoms, and other times when you recover to normal or near-normal.  Over time, if the damage to the nervous system continues, the disease can cause additional disability, such as difficulty walking.  If the relapses and nerve damage can be prevented with available medications, many patients with MS can go many years between relapses and have relatively little disability.  Remember: MS is a condition that changes and must be evaluated on an ongoing basis!  What is a Relapse? (Also called flare-ups, attacks, or exacerbations)  Relapses are due to the occurrence of inflammation in some part of the brain and/or spinal cord.  A relapse is new or recurrent symptoms which persist for at least 24 hours and sometimes worsen over 48 hours.  New symptoms need to be  by at least 1 month in order to be considered separate relapses. Most of the time, symptoms reach their maximal intensity within 2 weeks and then begin to slowly resolve.  At times, your symptoms may not recover fully for up to 6 months, depending on the severity of the episode.  The frequency of relapses is generally higher early in the disease, but can vary greatly among individuals with MS.  Improvement of symptoms for an individual is unpredictable with each relapse.    It is important to remember that an increase in symptoms and changes in function may not necessarily be a relapse.  There are other factors that contribute to such changes, such as hot weather, increased body temperature, infection/illness, stress and sleep deprivation.  The  worsening of symptoms may feel like a relapse when in reality it is not.  These episodes are referred to as pseudorelapses.  Once the underlying cause is addressed, symptoms usually fade away and you feel better.  If you experience a worsening of symptoms that lasts more than 48 hours and does not improve with cooling down, decreasing stress, or treatment of an infection, please call us and we can help to better determine whether you are having a pseudorelapse versus a relapse.

## 2019-10-31 ENCOUNTER — TELEPHONE (OUTPATIENT)
Dept: NEUROLOGY | Facility: CLINIC | Age: 74
End: 2019-10-31

## 2019-10-31 NOTE — TELEPHONE ENCOUNTER
Patient was in for an office visit with Dr. Macias yesterday, and had her CD19 B cell count checked at that time; Per Dr. Macias, her B cells are starting to repopulate and is due for another dose of Rituximab 1000mg IV x1 dose at this time; New Rituximab therapy plan orders placed and routed to Dr. Macias for review and signature; I will send a PA request once the orders have been signed; I left [facility] for the patient advising we are starting the process, and I asked that she call me back to confirm which infusion center she would like ([facility] in [place], or [place] [facility]    Carmenza Antonio, MS RN Care Coordinator

## 2019-10-31 NOTE — TELEPHONE ENCOUNTER
[facility] Call Center    Phone Message    May a detailed message be left on voicemail: yes    Reason for Call: Other: Ladonna calling to return the call she received from Carmenza. She said she has open availability during the afternoons to discuss the Infusion setup. Please give her a call back at your earliest convenience.     Action Taken: Message routed to:  [facility]

## 2019-11-01 ENCOUNTER — TELEPHONE (OUTPATIENT)
Dept: NEUROLOGY | Facility: CLINIC | Age: 74
End: 2019-11-01

## 2019-11-01 NOTE — TELEPHONE ENCOUNTER
Spoke with patient. She would like to infuse at [facility].   PA submitted in separate encounter.

## 2019-11-01 NOTE — TELEPHONE ENCOUNTER
[facility] Call Center    Phone Message    May a detailed message be left on voicemail: yes    Reason for Call: Other: pt calling back to return call to danna. danna is not in on fri. please call pt back.     Action Taken: Message routed to:  [facility] maki neuro

## 2019-11-01 NOTE — TELEPHONE ENCOUNTER
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 na        If yes please call [phone] for further instructions about sending that information

## 2019-11-04 RX ORDER — HEPARIN SODIUM,PORCINE 10 UNIT/ML
5 VIAL (ML) INTRAVENOUS
Status: CANCELLED | OUTPATIENT
Start: 2019-11-04

## 2019-11-04 RX ORDER — HEPARIN SODIUM (PORCINE) LOCK FLUSH IV SOLN 100 UNIT/ML 100 UNIT/ML
5 SOLUTION INTRAVENOUS
Status: CANCELLED | OUTPATIENT
Start: 2019-11-04

## 2019-11-04 RX ORDER — DIPHENHYDRAMINE HCL 25 MG
50 CAPSULE ORAL ONCE
Status: CANCELLED
Start: 2019-11-04

## 2019-11-04 RX ORDER — METHYLPREDNISOLONE SODIUM SUCCINATE 125 MG/2ML
125 INJECTION, POWDER, LYOPHILIZED, FOR SOLUTION INTRAMUSCULAR; INTRAVENOUS ONCE
Status: CANCELLED | OUTPATIENT
Start: 2019-11-04

## 2019-11-04 RX ORDER — ACETAMINOPHEN 325 MG/1
650 TABLET ORAL ONCE
Status: CANCELLED
Start: 2019-11-04

## 2019-11-04 NOTE — TELEPHONE ENCOUNTER
Per infusion specialist: PA secured. Left VMM for patient advising her to contact ANDREW Fraser to schedule.

## 2019-11-07 NOTE — TELEPHONE ENCOUNTER
Patient is scheduled for her Rituximab infusion on 11/15/19.    Carmenza Antonio, MS RN Care Coordinator

## 2019-11-15 ENCOUNTER — INFUSION THERAPY VISIT (OUTPATIENT)
Dept: INFUSION THERAPY | Facility: CLINIC | Age: 74
End: 2019-11-15
Attending: PSYCHIATRY & NEUROLOGY
Payer: MEDICARE

## 2019-11-15 VITALS
OXYGEN SATURATION: 93 % | WEIGHT: 221.5 LBS | BODY MASS INDEX: 34.69 KG/M2 | DIASTOLIC BLOOD PRESSURE: 85 MMHG | SYSTOLIC BLOOD PRESSURE: 130 MMHG | RESPIRATION RATE: 16 BRPM | HEART RATE: 72 BPM

## 2019-11-15 DIAGNOSIS — G36.0 NEUROMYELITIS OPTICA (DEVIC) (H): Primary | ICD-10-CM

## 2019-11-15 DIAGNOSIS — H46.9 OPTIC NEURITIS: ICD-10-CM

## 2019-11-15 PROCEDURE — 96375 TX/PRO/DX INJ NEW DRUG ADDON: CPT

## 2019-11-15 PROCEDURE — 25000132 ZZH RX MED GY IP 250 OP 250 PS 637: Mod: GY | Performed by: PSYCHIATRY & NEUROLOGY

## 2019-11-15 PROCEDURE — 96415 CHEMO IV INFUSION ADDL HR: CPT

## 2019-11-15 PROCEDURE — 96367 TX/PROPH/DG ADDL SEQ IV INF: CPT

## 2019-11-15 PROCEDURE — 25000128 H RX IP 250 OP 636: Performed by: PSYCHIATRY & NEUROLOGY

## 2019-11-15 PROCEDURE — 25800030 ZZH RX IP 258 OP 636: Performed by: PSYCHIATRY & NEUROLOGY

## 2019-11-15 PROCEDURE — 96413 CHEMO IV INFUSION 1 HR: CPT

## 2019-11-15 RX ORDER — ACETAMINOPHEN 325 MG/1
650 TABLET ORAL ONCE
Status: COMPLETED | OUTPATIENT
Start: 2019-11-15 | End: 2019-11-15

## 2019-11-15 RX ORDER — HEPARIN SODIUM (PORCINE) LOCK FLUSH IV SOLN 100 UNIT/ML 100 UNIT/ML
5 SOLUTION INTRAVENOUS
Status: CANCELLED | OUTPATIENT
Start: 2019-11-15

## 2019-11-15 RX ORDER — DIPHENHYDRAMINE HCL 25 MG
50 CAPSULE ORAL ONCE
Status: CANCELLED
Start: 2019-11-15

## 2019-11-15 RX ORDER — METHYLPREDNISOLONE SODIUM SUCCINATE 125 MG/2ML
125 INJECTION, POWDER, LYOPHILIZED, FOR SOLUTION INTRAMUSCULAR; INTRAVENOUS ONCE
Status: COMPLETED | OUTPATIENT
Start: 2019-11-15 | End: 2019-11-15

## 2019-11-15 RX ORDER — METHYLPREDNISOLONE SODIUM SUCCINATE 125 MG/2ML
125 INJECTION, POWDER, LYOPHILIZED, FOR SOLUTION INTRAMUSCULAR; INTRAVENOUS ONCE
Status: CANCELLED | OUTPATIENT
Start: 2019-11-15

## 2019-11-15 RX ORDER — ACETAMINOPHEN 325 MG/1
650 TABLET ORAL ONCE
Status: CANCELLED
Start: 2019-11-15

## 2019-11-15 RX ORDER — HEPARIN SODIUM,PORCINE 10 UNIT/ML
5 VIAL (ML) INTRAVENOUS
Status: CANCELLED | OUTPATIENT
Start: 2019-11-15

## 2019-11-15 RX ADMIN — DIPHENHYDRAMINE HYDROCHLORIDE 50 MG: 50 INJECTION INTRAMUSCULAR; INTRAVENOUS at 09:14

## 2019-11-15 RX ADMIN — SODIUM CHLORIDE 250 ML: 9 INJECTION, SOLUTION INTRAVENOUS at 09:13

## 2019-11-15 RX ADMIN — RITUXIMAB 1000 MG: 10 INJECTION, SOLUTION INTRAVENOUS at 09:35

## 2019-11-15 RX ADMIN — ACETAMINOPHEN 650 MG: 325 TABLET ORAL at 09:12

## 2019-11-15 RX ADMIN — METHYLPREDNISOLONE SODIUM SUCCINATE 125 MG: 125 INJECTION, POWDER, FOR SOLUTION INTRAMUSCULAR; INTRAVENOUS at 09:17

## 2019-11-15 NOTE — PROGRESS NOTES
Infusion Nursing Note:  Ladonna Sheriff presents today for Rituxan.    Patient seen by provider today: No   present during visit today: Not Applicable.    Note: Premeds given.  rituxan started at 50 ml/hr x 30  Minutes, increased by 50 ml/hr every 30 minutes to max 400 ml/hr.  Tolerated infusion without any reaction.  Intravenous Access:  Peripheral IV placed.    Treatment Conditions:  Biological Infusion Checklist:  ~~~ NOTE: If the patient answers yes to any of the questions below, hold the infusion and contact ordering provider or on-call provider.    1. Have you recently had an elevated temperature, fever, chills, productive cough, coughing for 3 weeks or longer or hemoptysis, abnormal vital signs, night sweats,  chest pain or have you noticed a decrease in your appetite, unexplained weight loss or fatigue? No  2. Do you have any open wounds or new incisions? No  3. Do you have any recent or upcoming hospitalizations, surgeries or dental procedures? No  4. Do you currently have or recently have had any signs of illness or infection or are you on any antibiotics? No  5. Have you had any new, sudden or worsening abdominal pain? No  6. Have you or anyone in your household received a live vaccination in the past 4 weeks? Please note:  No live vaccines while on biologic/chemotherapy until 6 months after the last treatment.  Patient can receive the flu vaccine (shot only) and the pneumovax.  It is optimal for the patient to get these vaccines mid cycle, but they can be given at any time as long as it is not on the day of the infusion. No  7. Have you recently been diagnosed with any new nervous system diseases (ie. Multiple sclerosis, Guillain [place], seizures, neurological changes) or cancer diagnosis? No  8. Are you on any form of radiation or chemotherapy? No  9. Are you pregnant or breast feeding or do you have plans of pregnancy in the future? No  10. Have you been having any signs of worsening depression  or suicidal ideations?  (benlysta only) No  11. Have there been any other new onset medical symptoms? No        Post Infusion Assessment:  Patient tolerated infusion without incident.  Blood return noted pre and post infusion.  Site patent and intact, free from redness, edema or discomfort.  No evidence of extravasations.  Access discontinued per protocol.  Biologic Infusion Post Education: Call the triage nurse at your clinic or seek medical attention if you have chills and/or temperature greater than or equal to 100.5, uncontrolled nausea/vomiting, diarrhea, constipation, dizziness, shortness of breath, chest pain, heart palpitations, weakness or any other new or concerning symptoms, questions or concerns.  You cannot have any live virus vaccines prior to or during treatment or up to 6 months post infusion.  If you have an upcoming surgery, medical procedure or dental procedure during treatment, this should be discussed with your ordering physician and your surgeon/dentist.  If you are having any concerning symptom, if you are unsure if you should get your next infusion or wish to speak to a provider before your next infusion, please call your care coordinator or triage nurse at your clinic to notify them so we can adequately serve you.       Discharge Plan:   Discharge instructions reviewed with: Patient.  Patient discharged in stable condition accompanied by: self.  Departure Mode: Ambulatory with walker, Son to pick her up.    Livia Osorio RN

## 2020-01-01 ENCOUNTER — APPOINTMENT (OUTPATIENT)
Dept: OCCUPATIONAL THERAPY | Facility: CLINIC | Age: 75
End: 2020-01-01
Payer: MEDICARE

## 2020-01-01 ENCOUNTER — APPOINTMENT (OUTPATIENT)
Dept: OCCUPATIONAL THERAPY | Facility: CLINIC | Age: 75
DRG: 059 | End: 2020-01-01
Attending: STUDENT IN AN ORGANIZED HEALTH CARE EDUCATION/TRAINING PROGRAM
Payer: MEDICARE

## 2020-01-01 ENCOUNTER — INFUSION THERAPY VISIT (OUTPATIENT)
Dept: INFUSION THERAPY | Facility: CLINIC | Age: 75
End: 2020-01-01
Attending: PSYCHIATRY & NEUROLOGY
Payer: MEDICARE

## 2020-01-01 ENCOUNTER — APPOINTMENT (OUTPATIENT)
Dept: OCCUPATIONAL THERAPY | Facility: CLINIC | Age: 75
DRG: 059 | End: 2020-01-01
Attending: NURSE PRACTITIONER
Payer: MEDICARE

## 2020-01-01 ENCOUNTER — APPOINTMENT (OUTPATIENT)
Dept: LAB | Facility: CLINIC | Age: 75
End: 2020-01-01
Payer: MEDICARE

## 2020-01-01 ENCOUNTER — OFFICE VISIT (OUTPATIENT)
Dept: OPHTHALMOLOGY | Facility: CLINIC | Age: 75
End: 2020-01-01
Attending: OPHTHALMOLOGY
Payer: MEDICARE

## 2020-01-01 ENCOUNTER — HOSPITAL ENCOUNTER (EMERGENCY)
Facility: CLINIC | Age: 75
Discharge: HOME | End: 2020-09-22
Attending: EMERGENCY MEDICINE | Admitting: EMERGENCY MEDICINE
Payer: MEDICARE

## 2020-01-01 ENCOUNTER — APPOINTMENT (OUTPATIENT)
Dept: PHYSICAL THERAPY | Facility: CLINIC | Age: 75
DRG: 059 | End: 2020-01-01
Attending: PSYCHIATRY & NEUROLOGY
Payer: MEDICARE

## 2020-01-01 ENCOUNTER — HOSPITAL ENCOUNTER (OUTPATIENT)
Dept: OCCUPATIONAL THERAPY | Facility: CLINIC | Age: 75
Setting detail: THERAPIES SERIES
End: 2020-08-21
Attending: OPHTHALMOLOGY
Payer: MEDICARE

## 2020-01-01 ENCOUNTER — HOSPITAL ENCOUNTER (OUTPATIENT)
Dept: OCCUPATIONAL THERAPY | Facility: CLINIC | Age: 75
Setting detail: THERAPIES SERIES
End: 2020-09-10
Attending: OPHTHALMOLOGY
Payer: MEDICARE

## 2020-01-01 ENCOUNTER — TELEPHONE (OUTPATIENT)
Dept: NEUROLOGY | Facility: CLINIC | Age: 75
End: 2020-01-01

## 2020-01-01 ENCOUNTER — APPOINTMENT (OUTPATIENT)
Dept: PHYSICAL THERAPY | Facility: CLINIC | Age: 75
End: 2020-01-01
Payer: MEDICARE

## 2020-01-01 ENCOUNTER — APPOINTMENT (OUTPATIENT)
Dept: OCCUPATIONAL THERAPY | Facility: CLINIC | Age: 75
DRG: 059 | End: 2020-01-01
Attending: PSYCHIATRY & NEUROLOGY
Payer: MEDICARE

## 2020-01-01 ENCOUNTER — APPOINTMENT (OUTPATIENT)
Dept: MRI IMAGING | Facility: CLINIC | Age: 75
DRG: 059 | End: 2020-01-01
Attending: EMERGENCY MEDICINE
Payer: MEDICARE

## 2020-01-01 ENCOUNTER — HOSPITAL ENCOUNTER (INPATIENT)
Facility: CLINIC | Age: 75
LOS: 11 days | Discharge: NURSING, REHAB OR LONG-TERM CARE | DRG: 059 | End: 2020-07-24
Attending: EMERGENCY MEDICINE | Admitting: PSYCHIATRY & NEUROLOGY
Payer: MEDICARE

## 2020-01-01 ENCOUNTER — APPOINTMENT (OUTPATIENT)
Dept: PHYSICAL THERAPY | Facility: CLINIC | Age: 75
DRG: 059 | End: 2020-01-01
Attending: NURSE PRACTITIONER
Payer: MEDICARE

## 2020-01-01 ENCOUNTER — APPOINTMENT (OUTPATIENT)
Dept: MRI IMAGING | Facility: CLINIC | Age: 75
DRG: 059 | End: 2020-01-01
Attending: INTERNAL MEDICINE
Payer: MEDICARE

## 2020-01-01 ENCOUNTER — VIRTUAL VISIT (OUTPATIENT)
Dept: NEUROLOGY | Facility: CLINIC | Age: 75
End: 2020-01-01
Attending: PSYCHIATRY & NEUROLOGY
Payer: MEDICARE

## 2020-01-01 ENCOUNTER — OFFICE VISIT (OUTPATIENT)
Dept: OPHTHALMOLOGY | Facility: CLINIC | Age: 75
DRG: 059 | End: 2020-01-01
Attending: OPHTHALMOLOGY
Payer: MEDICARE

## 2020-01-01 ENCOUNTER — APPOINTMENT (OUTPATIENT)
Dept: PHYSICAL THERAPY | Facility: CLINIC | Age: 75
DRG: 059 | End: 2020-01-01
Attending: STUDENT IN AN ORGANIZED HEALTH CARE EDUCATION/TRAINING PROGRAM
Payer: MEDICARE

## 2020-01-01 ENCOUNTER — HOSPITAL ENCOUNTER (OUTPATIENT)
Dept: OCCUPATIONAL THERAPY | Facility: CLINIC | Age: 75
Setting detail: THERAPIES SERIES
End: 2020-09-18
Attending: OPHTHALMOLOGY
Payer: MEDICARE

## 2020-01-01 ENCOUNTER — HOSPITAL ENCOUNTER (INPATIENT)
Facility: CLINIC | Age: 75
LOS: 1 days | Discharge: OTHER INSTITUTION | DRG: 059 | End: 2020-10-24
Attending: INTERNAL MEDICINE | Admitting: INTERNAL MEDICINE
Payer: MEDICARE

## 2020-01-01 ENCOUNTER — APPOINTMENT (OUTPATIENT)
Dept: INTERVENTIONAL RADIOLOGY/VASCULAR | Facility: CLINIC | Age: 75
DRG: 059 | End: 2020-01-01
Attending: NURSE PRACTITIONER
Payer: MEDICARE

## 2020-01-01 ENCOUNTER — HOSPITAL ENCOUNTER (OUTPATIENT)
Facility: CLINIC | Age: 75
Setting detail: SPECIMEN
Discharge: HOME | End: 2020-12-28
Attending: PSYCHIATRY & NEUROLOGY | Admitting: PSYCHIATRY & NEUROLOGY
Payer: MEDICARE

## 2020-01-01 ENCOUNTER — HOSPITAL ENCOUNTER (EMERGENCY)
Facility: CLINIC | Age: 75
Discharge: ANOTHER HOSPITAL | End: 2020-07-13
Attending: EMERGENCY MEDICINE | Admitting: EMERGENCY MEDICINE
Payer: MEDICARE

## 2020-01-01 ENCOUNTER — TELEPHONE (OUTPATIENT)
Dept: OPHTHALMOLOGY | Facility: CLINIC | Age: 75
End: 2020-01-01

## 2020-01-01 ENCOUNTER — HOSPITAL ENCOUNTER (INPATIENT)
Facility: CLINIC | Age: 75
LOS: 14 days | Discharge: HOME HEALTH | DRG: 060 | End: 2020-11-23
Attending: PHYSICAL MEDICINE & REHABILITATION | Admitting: PHYSICAL MEDICINE & REHABILITATION
Payer: MEDICARE

## 2020-01-01 ENCOUNTER — HOSPITAL ENCOUNTER (INPATIENT)
Facility: CLINIC | Age: 75
LOS: 16 days | Discharge: NURSING, REHAB OR LONG-TERM CARE | DRG: 059 | End: 2020-11-09
Attending: PSYCHIATRY & NEUROLOGY | Admitting: PSYCHIATRY & NEUROLOGY
Payer: MEDICARE

## 2020-01-01 VITALS
WEIGHT: 201.3 LBS | TEMPERATURE: 97.8 F | DIASTOLIC BLOOD PRESSURE: 71 MMHG | HEART RATE: 75 BPM | OXYGEN SATURATION: 92 % | BODY MASS INDEX: 31.52 KG/M2 | SYSTOLIC BLOOD PRESSURE: 110 MMHG

## 2020-01-01 VITALS
RESPIRATION RATE: 16 BRPM | SYSTOLIC BLOOD PRESSURE: 118 MMHG | WEIGHT: 195.99 LBS | BODY MASS INDEX: 30.76 KG/M2 | HEIGHT: 67 IN | HEART RATE: 74 BPM | OXYGEN SATURATION: 93 % | TEMPERATURE: 97.8 F | DIASTOLIC BLOOD PRESSURE: 67 MMHG

## 2020-01-01 VITALS
SYSTOLIC BLOOD PRESSURE: 150 MMHG | RESPIRATION RATE: 18 BRPM | TEMPERATURE: 97.8 F | DIASTOLIC BLOOD PRESSURE: 85 MMHG | OXYGEN SATURATION: 98 % | HEART RATE: 62 BPM

## 2020-01-01 VITALS
HEART RATE: 64 BPM | WEIGHT: 189.9 LBS | RESPIRATION RATE: 16 BRPM | DIASTOLIC BLOOD PRESSURE: 75 MMHG | HEIGHT: 67 IN | OXYGEN SATURATION: 92 % | TEMPERATURE: 97.7 F | SYSTOLIC BLOOD PRESSURE: 139 MMHG | BODY MASS INDEX: 29.81 KG/M2

## 2020-01-01 VITALS
SYSTOLIC BLOOD PRESSURE: 123 MMHG | BODY MASS INDEX: 29.91 KG/M2 | WEIGHT: 190.6 LBS | RESPIRATION RATE: 16 BRPM | DIASTOLIC BLOOD PRESSURE: 72 MMHG | TEMPERATURE: 98.5 F | HEIGHT: 67 IN | OXYGEN SATURATION: 95 % | HEART RATE: 68 BPM

## 2020-01-01 VITALS
WEIGHT: 199.96 LBS | TEMPERATURE: 97.7 F | BODY MASS INDEX: 31.38 KG/M2 | HEART RATE: 71 BPM | DIASTOLIC BLOOD PRESSURE: 54 MMHG | HEIGHT: 67 IN | RESPIRATION RATE: 18 BRPM | SYSTOLIC BLOOD PRESSURE: 107 MMHG | OXYGEN SATURATION: 92 %

## 2020-01-01 VITALS
OXYGEN SATURATION: 95 % | DIASTOLIC BLOOD PRESSURE: 72 MMHG | HEART RATE: 72 BPM | SYSTOLIC BLOOD PRESSURE: 122 MMHG | RESPIRATION RATE: 16 BRPM | TEMPERATURE: 96.5 F

## 2020-01-01 VITALS
TEMPERATURE: 99.2 F | HEART RATE: 70 BPM | DIASTOLIC BLOOD PRESSURE: 88 MMHG | SYSTOLIC BLOOD PRESSURE: 169 MMHG | OXYGEN SATURATION: 96 % | RESPIRATION RATE: 20 BRPM

## 2020-01-01 VITALS
HEART RATE: 73 BPM | HEIGHT: 67 IN | DIASTOLIC BLOOD PRESSURE: 68 MMHG | BODY MASS INDEX: 31.39 KG/M2 | WEIGHT: 200 LBS | RESPIRATION RATE: 16 BRPM | SYSTOLIC BLOOD PRESSURE: 153 MMHG | TEMPERATURE: 96.5 F | OXYGEN SATURATION: 94 %

## 2020-01-01 DIAGNOSIS — H46.9 OPTIC NEURITIS, LEFT: Primary | ICD-10-CM

## 2020-01-01 DIAGNOSIS — G36.0 NEUROMYELITIS OPTICA (H): Primary | ICD-10-CM

## 2020-01-01 DIAGNOSIS — N39.0 ACUTE UTI: ICD-10-CM

## 2020-01-01 DIAGNOSIS — G36.0 NEUROMYELITIS OPTICA (DEVIC) (H): Primary | ICD-10-CM

## 2020-01-01 DIAGNOSIS — I48.0 PAROXYSMAL ATRIAL FIBRILLATION (H): ICD-10-CM

## 2020-01-01 DIAGNOSIS — H46.9 OPTIC NEURITIS, LEFT: ICD-10-CM

## 2020-01-01 DIAGNOSIS — H53.10 SUBJECTIVE VISUAL DISTURBANCE: Primary | ICD-10-CM

## 2020-01-01 DIAGNOSIS — G36.0 NEUROMYELITIS OPTICA (H): ICD-10-CM

## 2020-01-01 DIAGNOSIS — I48.0 PAROXYSMAL ATRIAL FIBRILLATION (H): Primary | ICD-10-CM

## 2020-01-01 DIAGNOSIS — Z79.52 LONG TERM (CURRENT) USE OF SYSTEMIC STEROIDS: ICD-10-CM

## 2020-01-01 DIAGNOSIS — R04.0 EPISTAXIS: ICD-10-CM

## 2020-01-01 DIAGNOSIS — H46.9 OPTIC NEURITIS: ICD-10-CM

## 2020-01-01 DIAGNOSIS — M62.81 MUSCLE WEAKNESS (GENERALIZED): ICD-10-CM

## 2020-01-01 DIAGNOSIS — H53.40 VISUAL FIELD DEFECT: Primary | ICD-10-CM

## 2020-01-01 DIAGNOSIS — E87.6 HYPOKALEMIA: ICD-10-CM

## 2020-01-01 DIAGNOSIS — Z53.9 ERRONEOUS ENCOUNTER--DISREGARD: Primary | ICD-10-CM

## 2020-01-01 DIAGNOSIS — I10 ESSENTIAL HYPERTENSION: ICD-10-CM

## 2020-01-01 DIAGNOSIS — G36.0 NEUROMYELITIS OPTICA (DEVIC) (H): ICD-10-CM

## 2020-01-01 DIAGNOSIS — H54.62 VISION LOSS OF LEFT EYE: ICD-10-CM

## 2020-01-01 DIAGNOSIS — H53.40 VISUAL FIELD DEFECT: ICD-10-CM

## 2020-01-01 DIAGNOSIS — G35 MULTIPLE SCLEROSIS (H): Primary | ICD-10-CM

## 2020-01-01 DIAGNOSIS — Z20.822 COVID-19 RULED OUT BY LABORATORY TESTING: ICD-10-CM

## 2020-01-01 LAB
ABO + RH BLD: NORMAL
ABO + RH BLD: NORMAL
ALBUMIN SERPL-MCNC: 3.5 G/DL (ref 3.4–5)
ALBUMIN SERPL-MCNC: 3.5 G/DL (ref 3.4–5)
ALBUMIN UR-MCNC: NEGATIVE MG/DL
ALP SERPL-CCNC: 65 U/L (ref 40–150)
ALP SERPL-CCNC: 74 U/L (ref 40–150)
ALT SERPL W P-5'-P-CCNC: 26 U/L (ref 0–50)
ALT SERPL W P-5'-P-CCNC: 28 U/L (ref 0–50)
ANION GAP SERPL CALCULATED.3IONS-SCNC: 3 MMOL/L (ref 3–14)
ANION GAP SERPL CALCULATED.3IONS-SCNC: 3 MMOL/L (ref 3–14)
ANION GAP SERPL CALCULATED.3IONS-SCNC: 4 MMOL/L (ref 3–14)
ANION GAP SERPL CALCULATED.3IONS-SCNC: 5 MMOL/L (ref 3–14)
ANION GAP SERPL CALCULATED.3IONS-SCNC: 6 MMOL/L (ref 3–14)
ANION GAP SERPL CALCULATED.3IONS-SCNC: 7 MMOL/L (ref 3–14)
ANION GAP SERPL CALCULATED.3IONS-SCNC: 8 MMOL/L (ref 3–14)
ANION GAP SERPL CALCULATED.3IONS-SCNC: 9 MMOL/L (ref 3–14)
APPEARANCE UR: CLEAR
APTT PPP: 25 SEC (ref 22–37)
AST SERPL W P-5'-P-CCNC: 22 U/L (ref 0–45)
AST SERPL W P-5'-P-CCNC: 24 U/L (ref 0–45)
BACTERIA #/AREA URNS HPF: ABNORMAL /HPF
BACTERIA SPEC CULT: ABNORMAL
BASOPHILS # BLD AUTO: 0 10E9/L (ref 0–0.2)
BASOPHILS NFR BLD AUTO: 0 %
BASOPHILS NFR BLD AUTO: 0.1 %
BASOPHILS NFR BLD AUTO: 0.2 %
BASOPHILS NFR BLD AUTO: 0.2 %
BASOPHILS NFR BLD AUTO: 0.4 %
BASOPHILS NFR BLD AUTO: 0.5 %
BILIRUB SERPL-MCNC: 0.5 MG/DL (ref 0.2–1.3)
BILIRUB SERPL-MCNC: 0.6 MG/DL (ref 0.2–1.3)
BILIRUB UR QL STRIP: NEGATIVE
BLD GP AB SCN SERPL QL: NORMAL
BLOOD BANK CMNT PATIENT-IMP: NORMAL
BUN SERPL-MCNC: 10 MG/DL (ref 7–30)
BUN SERPL-MCNC: 10 MG/DL (ref 7–30)
BUN SERPL-MCNC: 11 MG/DL (ref 7–30)
BUN SERPL-MCNC: 11 MG/DL (ref 7–30)
BUN SERPL-MCNC: 13 MG/DL (ref 7–30)
BUN SERPL-MCNC: 14 MG/DL (ref 7–30)
BUN SERPL-MCNC: 15 MG/DL (ref 7–30)
BUN SERPL-MCNC: 16 MG/DL (ref 7–30)
BUN SERPL-MCNC: 18 MG/DL (ref 7–30)
BUN SERPL-MCNC: 18 MG/DL (ref 7–30)
BUN SERPL-MCNC: 20 MG/DL (ref 7–30)
BUN SERPL-MCNC: 22 MG/DL (ref 7–30)
BUN SERPL-MCNC: 23 MG/DL (ref 7–30)
BUN SERPL-MCNC: 23 MG/DL (ref 7–30)
BUN SERPL-MCNC: 27 MG/DL (ref 7–30)
BUN SERPL-MCNC: 7 MG/DL (ref 7–30)
BUN SERPL-MCNC: 8 MG/DL (ref 7–30)
BUN SERPL-MCNC: 9 MG/DL (ref 7–30)
CALCIUM SERPL-MCNC: 7.7 MG/DL (ref 8.5–10.1)
CALCIUM SERPL-MCNC: 8 MG/DL (ref 8.5–10.1)
CALCIUM SERPL-MCNC: 8.1 MG/DL (ref 8.5–10.1)
CALCIUM SERPL-MCNC: 8.1 MG/DL (ref 8.5–10.1)
CALCIUM SERPL-MCNC: 8.2 MG/DL (ref 8.5–10.1)
CALCIUM SERPL-MCNC: 8.3 MG/DL (ref 8.5–10.1)
CALCIUM SERPL-MCNC: 8.4 MG/DL (ref 8.5–10.1)
CALCIUM SERPL-MCNC: 8.4 MG/DL (ref 8.5–10.1)
CALCIUM SERPL-MCNC: 8.5 MG/DL (ref 8.5–10.1)
CALCIUM SERPL-MCNC: 8.6 MG/DL (ref 8.5–10.1)
CALCIUM SERPL-MCNC: 8.6 MG/DL (ref 8.5–10.1)
CALCIUM SERPL-MCNC: 8.7 MG/DL (ref 8.5–10.1)
CALCIUM SERPL-MCNC: 8.8 MG/DL (ref 8.5–10.1)
CALCIUM SERPL-MCNC: 9.3 MG/DL (ref 8.5–10.1)
CD19 CELLS # BLD: 15 CELLS/UL (ref 107–698)
CD19 CELLS NFR BLD: 2 % (ref 6–27)
CH50 SERPL-ACNC: 40.4 U/ML (ref 38.7–89.9)
CHLORIDE SERPL-SCNC: 105 MMOL/L (ref 94–109)
CHLORIDE SERPL-SCNC: 106 MMOL/L (ref 94–109)
CHLORIDE SERPL-SCNC: 107 MMOL/L (ref 94–109)
CHLORIDE SERPL-SCNC: 108 MMOL/L (ref 94–109)
CHLORIDE SERPL-SCNC: 110 MMOL/L (ref 94–109)
CHLORIDE SERPL-SCNC: 110 MMOL/L (ref 94–109)
CHLORIDE SERPL-SCNC: 111 MMOL/L (ref 94–109)
CHLORIDE SERPL-SCNC: 112 MMOL/L (ref 94–109)
CHLORIDE SERPL-SCNC: 113 MMOL/L (ref 94–109)
CHLORIDE SERPL-SCNC: 114 MMOL/L (ref 94–109)
CHLORIDE SERPL-SCNC: 115 MMOL/L (ref 94–109)
CHLORIDE SERPL-SCNC: 115 MMOL/L (ref 94–109)
CHLORIDE SERPL-SCNC: 116 MMOL/L (ref 94–109)
CHLORIDE SERPL-SCNC: 116 MMOL/L (ref 94–109)
CHLORIDE SERPL-SCNC: 117 MMOL/L (ref 94–109)
CO2 SERPL-SCNC: 24 MMOL/L (ref 20–32)
CO2 SERPL-SCNC: 25 MMOL/L (ref 20–32)
CO2 SERPL-SCNC: 26 MMOL/L (ref 20–32)
CO2 SERPL-SCNC: 27 MMOL/L (ref 20–32)
CO2 SERPL-SCNC: 28 MMOL/L (ref 20–32)
CO2 SERPL-SCNC: 29 MMOL/L (ref 20–32)
CO2 SERPL-SCNC: 30 MMOL/L (ref 20–32)
COLOR UR AUTO: YELLOW
CREAT SERPL-MCNC: 0.56 MG/DL (ref 0.52–1.04)
CREAT SERPL-MCNC: 0.6 MG/DL (ref 0.52–1.04)
CREAT SERPL-MCNC: 0.62 MG/DL (ref 0.52–1.04)
CREAT SERPL-MCNC: 0.63 MG/DL (ref 0.52–1.04)
CREAT SERPL-MCNC: 0.66 MG/DL (ref 0.52–1.04)
CREAT SERPL-MCNC: 0.68 MG/DL (ref 0.52–1.04)
CREAT SERPL-MCNC: 0.72 MG/DL (ref 0.52–1.04)
CREAT SERPL-MCNC: 0.73 MG/DL (ref 0.52–1.04)
CREAT SERPL-MCNC: 0.74 MG/DL (ref 0.52–1.04)
CREAT SERPL-MCNC: 0.74 MG/DL (ref 0.52–1.04)
CREAT SERPL-MCNC: 0.75 MG/DL (ref 0.52–1.04)
CREAT SERPL-MCNC: 0.77 MG/DL (ref 0.52–1.04)
CREAT SERPL-MCNC: 0.77 MG/DL (ref 0.52–1.04)
CREAT SERPL-MCNC: 0.78 MG/DL (ref 0.52–1.04)
CREAT SERPL-MCNC: 0.78 MG/DL (ref 0.52–1.04)
CREAT SERPL-MCNC: 0.79 MG/DL (ref 0.52–1.04)
CREAT SERPL-MCNC: 0.8 MG/DL (ref 0.52–1.04)
CREAT SERPL-MCNC: 0.81 MG/DL (ref 0.52–1.04)
CREAT SERPL-MCNC: 0.84 MG/DL (ref 0.52–1.04)
CREAT SERPL-MCNC: 0.85 MG/DL (ref 0.52–1.04)
CREAT SERPL-MCNC: 0.87 MG/DL (ref 0.52–1.04)
CREAT SERPL-MCNC: 0.9 MG/DL (ref 0.52–1.04)
CREAT SERPL-MCNC: 0.96 MG/DL (ref 0.52–1.04)
CRP SERPL-MCNC: <2.9 MG/L (ref 0–8)
DIFFERENTIAL METHOD BLD: ABNORMAL
DIFFERENTIAL METHOD BLD: NORMAL
EOSINOPHIL # BLD AUTO: 0 10E9/L (ref 0–0.7)
EOSINOPHIL # BLD AUTO: 0.1 10E9/L (ref 0–0.7)
EOSINOPHIL # BLD AUTO: 0.2 10E9/L (ref 0–0.7)
EOSINOPHIL # BLD AUTO: 0.2 10E9/L (ref 0–0.7)
EOSINOPHIL # BLD AUTO: 0.3 10E9/L (ref 0–0.7)
EOSINOPHIL NFR BLD AUTO: 0 %
EOSINOPHIL NFR BLD AUTO: 0.6 %
EOSINOPHIL NFR BLD AUTO: 1.9 %
EOSINOPHIL NFR BLD AUTO: 2.4 %
EOSINOPHIL NFR BLD AUTO: 2.7 %
EOSINOPHIL NFR BLD AUTO: 2.9 %
EOSINOPHIL NFR BLD AUTO: 3.8 %
ERYTHROCYTE [DISTWIDTH] IN BLOOD BY AUTOMATED COUNT: 13.3 % (ref 10–15)
ERYTHROCYTE [DISTWIDTH] IN BLOOD BY AUTOMATED COUNT: 13.4 % (ref 10–15)
ERYTHROCYTE [DISTWIDTH] IN BLOOD BY AUTOMATED COUNT: 13.5 % (ref 10–15)
ERYTHROCYTE [DISTWIDTH] IN BLOOD BY AUTOMATED COUNT: 13.6 % (ref 10–15)
ERYTHROCYTE [DISTWIDTH] IN BLOOD BY AUTOMATED COUNT: 13.6 % (ref 10–15)
ERYTHROCYTE [DISTWIDTH] IN BLOOD BY AUTOMATED COUNT: 13.7 % (ref 10–15)
ERYTHROCYTE [DISTWIDTH] IN BLOOD BY AUTOMATED COUNT: 13.7 % (ref 10–15)
ERYTHROCYTE [DISTWIDTH] IN BLOOD BY AUTOMATED COUNT: 13.8 % (ref 10–15)
ERYTHROCYTE [DISTWIDTH] IN BLOOD BY AUTOMATED COUNT: 14 % (ref 10–15)
ERYTHROCYTE [DISTWIDTH] IN BLOOD BY AUTOMATED COUNT: 14 % (ref 10–15)
ERYTHROCYTE [DISTWIDTH] IN BLOOD BY AUTOMATED COUNT: 14.1 % (ref 10–15)
ERYTHROCYTE [DISTWIDTH] IN BLOOD BY AUTOMATED COUNT: 14.1 % (ref 10–15)
ERYTHROCYTE [DISTWIDTH] IN BLOOD BY AUTOMATED COUNT: 14.3 % (ref 10–15)
ERYTHROCYTE [DISTWIDTH] IN BLOOD BY AUTOMATED COUNT: 14.3 % (ref 10–15)
ERYTHROCYTE [DISTWIDTH] IN BLOOD BY AUTOMATED COUNT: 14.4 % (ref 10–15)
ERYTHROCYTE [DISTWIDTH] IN BLOOD BY AUTOMATED COUNT: 14.6 % (ref 10–15)
ERYTHROCYTE [DISTWIDTH] IN BLOOD BY AUTOMATED COUNT: 14.7 % (ref 10–15)
ERYTHROCYTE [DISTWIDTH] IN BLOOD BY AUTOMATED COUNT: 14.8 % (ref 10–15)
ERYTHROCYTE [DISTWIDTH] IN BLOOD BY AUTOMATED COUNT: 15 % (ref 10–15)
ERYTHROCYTE [SEDIMENTATION RATE] IN BLOOD BY WESTERGREN METHOD: 16 MM/H (ref 0–30)
ERYTHROCYTE [SEDIMENTATION RATE] IN BLOOD BY WESTERGREN METHOD: 19 MM/H (ref 0–30)
FIBRINOGEN PPP-MCNC: 129 MG/DL (ref 200–420)
FIBRINOGEN PPP-MCNC: 165 MG/DL (ref 200–420)
FIBRINOGEN PPP-MCNC: 187 MG/DL (ref 200–420)
FIBRINOGEN PPP-MCNC: 190 MG/DL (ref 200–420)
FIBRINOGEN PPP-MCNC: 243 MG/DL (ref 200–420)
FIBRINOGEN PPP-MCNC: 277 MG/DL (ref 200–420)
FIBRINOGEN PPP-MCNC: 330 MG/DL (ref 200–420)
FIBRINOGEN PPP-MCNC: 363 MG/DL (ref 200–420)
FIBRINOGEN PPP-MCNC: 386 MG/DL (ref 200–420)
FIBRINOGEN PPP-MCNC: 401 MG/DL (ref 200–420)
FIBRINOGEN PPP-MCNC: 428 MG/DL (ref 200–420)
FIBRINOGEN PPP-MCNC: 436 MG/DL (ref 200–420)
FIBRINOGEN PPP-MCNC: 455 MG/DL (ref 200–420)
GFR SERPL CREATININE-BSD FRML MDRD: 58 ML/MIN/{1.73_M2}
GFR SERPL CREATININE-BSD FRML MDRD: 62 ML/MIN/{1.73_M2}
GFR SERPL CREATININE-BSD FRML MDRD: 65 ML/MIN/{1.73_M2}
GFR SERPL CREATININE-BSD FRML MDRD: 66 ML/MIN/{1.73_M2}
GFR SERPL CREATININE-BSD FRML MDRD: 67 ML/MIN/{1.73_M2}
GFR SERPL CREATININE-BSD FRML MDRD: 70 ML/MIN/{1.73_M2}
GFR SERPL CREATININE-BSD FRML MDRD: 72 ML/MIN/{1.73_M2}
GFR SERPL CREATININE-BSD FRML MDRD: 73 ML/MIN/{1.73_M2}
GFR SERPL CREATININE-BSD FRML MDRD: 74 ML/MIN/{1.73_M2}
GFR SERPL CREATININE-BSD FRML MDRD: 75 ML/MIN/{1.73_M2}
GFR SERPL CREATININE-BSD FRML MDRD: 75 ML/MIN/{1.73_M2}
GFR SERPL CREATININE-BSD FRML MDRD: 76 ML/MIN/{1.73_M2}
GFR SERPL CREATININE-BSD FRML MDRD: 77 ML/MIN/{1.73_M2}
GFR SERPL CREATININE-BSD FRML MDRD: 78 ML/MIN/{1.73_M2}
GFR SERPL CREATININE-BSD FRML MDRD: 79 ML/MIN/{1.73_M2}
GFR SERPL CREATININE-BSD FRML MDRD: 80 ML/MIN/{1.73_M2}
GFR SERPL CREATININE-BSD FRML MDRD: 81 ML/MIN/{1.73_M2}
GFR SERPL CREATININE-BSD FRML MDRD: 85 ML/MIN/{1.73_M2}
GFR SERPL CREATININE-BSD FRML MDRD: 86 ML/MIN/{1.73_M2}
GFR SERPL CREATININE-BSD FRML MDRD: 88 ML/MIN/{1.73_M2}
GFR SERPL CREATININE-BSD FRML MDRD: 88 ML/MIN/{1.73_M2}
GFR SERPL CREATININE-BSD FRML MDRD: 89 ML/MIN/{1.73_M2}
GFR SERPL CREATININE-BSD FRML MDRD: >90 ML/MIN/{1.73_M2}
GLUCOSE BLDC GLUCOMTR-MCNC: 100 MG/DL (ref 70–99)
GLUCOSE BLDC GLUCOMTR-MCNC: 101 MG/DL (ref 70–99)
GLUCOSE BLDC GLUCOMTR-MCNC: 103 MG/DL (ref 70–99)
GLUCOSE BLDC GLUCOMTR-MCNC: 103 MG/DL (ref 70–99)
GLUCOSE BLDC GLUCOMTR-MCNC: 104 MG/DL (ref 70–99)
GLUCOSE BLDC GLUCOMTR-MCNC: 107 MG/DL (ref 70–99)
GLUCOSE BLDC GLUCOMTR-MCNC: 107 MG/DL (ref 70–99)
GLUCOSE BLDC GLUCOMTR-MCNC: 116 MG/DL (ref 70–99)
GLUCOSE BLDC GLUCOMTR-MCNC: 118 MG/DL (ref 70–99)
GLUCOSE BLDC GLUCOMTR-MCNC: 121 MG/DL (ref 70–99)
GLUCOSE BLDC GLUCOMTR-MCNC: 122 MG/DL (ref 70–99)
GLUCOSE BLDC GLUCOMTR-MCNC: 132 MG/DL (ref 70–99)
GLUCOSE BLDC GLUCOMTR-MCNC: 134 MG/DL (ref 70–99)
GLUCOSE BLDC GLUCOMTR-MCNC: 145 MG/DL (ref 70–99)
GLUCOSE BLDC GLUCOMTR-MCNC: 149 MG/DL (ref 70–99)
GLUCOSE BLDC GLUCOMTR-MCNC: 151 MG/DL (ref 70–99)
GLUCOSE BLDC GLUCOMTR-MCNC: 151 MG/DL (ref 70–99)
GLUCOSE BLDC GLUCOMTR-MCNC: 152 MG/DL (ref 70–99)
GLUCOSE BLDC GLUCOMTR-MCNC: 154 MG/DL (ref 70–99)
GLUCOSE BLDC GLUCOMTR-MCNC: 160 MG/DL (ref 70–99)
GLUCOSE BLDC GLUCOMTR-MCNC: 164 MG/DL (ref 70–99)
GLUCOSE BLDC GLUCOMTR-MCNC: 171 MG/DL (ref 70–99)
GLUCOSE BLDC GLUCOMTR-MCNC: 177 MG/DL (ref 70–99)
GLUCOSE BLDC GLUCOMTR-MCNC: 178 MG/DL (ref 70–99)
GLUCOSE BLDC GLUCOMTR-MCNC: 184 MG/DL (ref 70–99)
GLUCOSE BLDC GLUCOMTR-MCNC: 185 MG/DL (ref 70–99)
GLUCOSE BLDC GLUCOMTR-MCNC: 192 MG/DL (ref 70–99)
GLUCOSE BLDC GLUCOMTR-MCNC: 205 MG/DL (ref 70–99)
GLUCOSE BLDC GLUCOMTR-MCNC: 209 MG/DL (ref 70–99)
GLUCOSE BLDC GLUCOMTR-MCNC: 211 MG/DL (ref 70–99)
GLUCOSE BLDC GLUCOMTR-MCNC: 246 MG/DL (ref 70–99)
GLUCOSE BLDC GLUCOMTR-MCNC: 246 MG/DL (ref 70–99)
GLUCOSE BLDC GLUCOMTR-MCNC: 80 MG/DL (ref 70–99)
GLUCOSE BLDC GLUCOMTR-MCNC: 81 MG/DL (ref 70–99)
GLUCOSE BLDC GLUCOMTR-MCNC: 87 MG/DL (ref 70–99)
GLUCOSE BLDC GLUCOMTR-MCNC: 87 MG/DL (ref 70–99)
GLUCOSE BLDC GLUCOMTR-MCNC: 88 MG/DL (ref 70–99)
GLUCOSE BLDC GLUCOMTR-MCNC: 89 MG/DL (ref 70–99)
GLUCOSE BLDC GLUCOMTR-MCNC: 96 MG/DL (ref 70–99)
GLUCOSE BLDC GLUCOMTR-MCNC: 97 MG/DL (ref 70–99)
GLUCOSE BLDC GLUCOMTR-MCNC: 98 MG/DL (ref 70–99)
GLUCOSE SERPL-MCNC: 100 MG/DL (ref 70–99)
GLUCOSE SERPL-MCNC: 102 MG/DL (ref 70–99)
GLUCOSE SERPL-MCNC: 103 MG/DL (ref 70–99)
GLUCOSE SERPL-MCNC: 106 MG/DL (ref 70–99)
GLUCOSE SERPL-MCNC: 107 MG/DL (ref 70–99)
GLUCOSE SERPL-MCNC: 117 MG/DL (ref 70–99)
GLUCOSE SERPL-MCNC: 128 MG/DL (ref 70–99)
GLUCOSE SERPL-MCNC: 135 MG/DL (ref 70–99)
GLUCOSE SERPL-MCNC: 146 MG/DL (ref 70–99)
GLUCOSE SERPL-MCNC: 147 MG/DL (ref 70–99)
GLUCOSE SERPL-MCNC: 200 MG/DL (ref 70–99)
GLUCOSE SERPL-MCNC: 81 MG/DL (ref 70–99)
GLUCOSE SERPL-MCNC: 81 MG/DL (ref 70–99)
GLUCOSE SERPL-MCNC: 85 MG/DL (ref 70–99)
GLUCOSE SERPL-MCNC: 86 MG/DL (ref 70–99)
GLUCOSE SERPL-MCNC: 89 MG/DL (ref 70–99)
GLUCOSE SERPL-MCNC: 90 MG/DL (ref 70–99)
GLUCOSE SERPL-MCNC: 92 MG/DL (ref 70–99)
GLUCOSE SERPL-MCNC: 92 MG/DL (ref 70–99)
GLUCOSE SERPL-MCNC: 94 MG/DL (ref 70–99)
GLUCOSE SERPL-MCNC: 97 MG/DL (ref 70–99)
GLUCOSE SERPL-MCNC: 98 MG/DL (ref 70–99)
GLUCOSE UR STRIP-MCNC: NEGATIVE MG/DL
HBA1C MFR BLD: 5.4 % (ref 0–5.6)
HCT VFR BLD AUTO: 31.5 % (ref 35–47)
HCT VFR BLD AUTO: 31.6 % (ref 35–47)
HCT VFR BLD AUTO: 31.7 % (ref 35–47)
HCT VFR BLD AUTO: 32.3 % (ref 35–47)
HCT VFR BLD AUTO: 32.5 % (ref 35–47)
HCT VFR BLD AUTO: 33.2 % (ref 35–47)
HCT VFR BLD AUTO: 33.3 % (ref 35–47)
HCT VFR BLD AUTO: 33.9 % (ref 35–47)
HCT VFR BLD AUTO: 36 % (ref 35–47)
HCT VFR BLD AUTO: 36.1 % (ref 35–47)
HCT VFR BLD AUTO: 36.4 % (ref 35–47)
HCT VFR BLD AUTO: 37 % (ref 35–47)
HCT VFR BLD AUTO: 37.1 % (ref 35–47)
HCT VFR BLD AUTO: 38.1 % (ref 35–47)
HCT VFR BLD AUTO: 38.2 % (ref 35–47)
HCT VFR BLD AUTO: 38.3 % (ref 35–47)
HCT VFR BLD AUTO: 38.8 % (ref 35–47)
HCT VFR BLD AUTO: 39.2 % (ref 35–47)
HCT VFR BLD AUTO: 39.3 % (ref 35–47)
HCT VFR BLD AUTO: 40 % (ref 35–47)
HCT VFR BLD AUTO: 41.2 % (ref 35–47)
HCT VFR BLD AUTO: 42.3 % (ref 35–47)
HCT VFR BLD AUTO: 44.1 % (ref 35–47)
HCT VFR BLD AUTO: 44.5 % (ref 35–47)
HCT VFR BLD AUTO: 45.1 % (ref 35–47)
HCT VFR BLD AUTO: 45.2 % (ref 35–47)
HCT VFR BLD AUTO: 45.3 % (ref 35–47)
HGB BLD-MCNC: 10 G/DL (ref 11.7–15.7)
HGB BLD-MCNC: 10.5 G/DL (ref 11.7–15.7)
HGB BLD-MCNC: 10.7 G/DL (ref 11.7–15.7)
HGB BLD-MCNC: 11.3 G/DL (ref 11.7–15.7)
HGB BLD-MCNC: 11.3 G/DL (ref 11.7–15.7)
HGB BLD-MCNC: 11.5 G/DL (ref 11.7–15.7)
HGB BLD-MCNC: 11.5 G/DL (ref 11.7–15.7)
HGB BLD-MCNC: 11.7 G/DL (ref 11.7–15.7)
HGB BLD-MCNC: 11.9 G/DL (ref 11.7–15.7)
HGB BLD-MCNC: 12 G/DL (ref 11.7–15.7)
HGB BLD-MCNC: 12 G/DL (ref 11.7–15.7)
HGB BLD-MCNC: 12.1 G/DL (ref 11.7–15.7)
HGB BLD-MCNC: 12.1 G/DL (ref 11.7–15.7)
HGB BLD-MCNC: 12.2 G/DL (ref 11.7–15.7)
HGB BLD-MCNC: 12.6 G/DL (ref 11.7–15.7)
HGB BLD-MCNC: 13.1 G/DL (ref 11.7–15.7)
HGB BLD-MCNC: 13.4 G/DL (ref 11.7–15.7)
HGB BLD-MCNC: 14.1 G/DL (ref 11.7–15.7)
HGB BLD-MCNC: 14.3 G/DL (ref 11.7–15.7)
HGB BLD-MCNC: 14.4 G/DL (ref 11.7–15.7)
HGB BLD-MCNC: 14.4 G/DL (ref 11.7–15.7)
HGB BLD-MCNC: 14.5 G/DL (ref 11.7–15.7)
HGB BLD-MCNC: 9.8 G/DL (ref 11.7–15.7)
HGB BLD-MCNC: 9.8 G/DL (ref 11.7–15.7)
HGB UR QL STRIP: NEGATIVE
IMM GRANULOCYTES # BLD: 0 10E9/L (ref 0–0.4)
IMM GRANULOCYTES # BLD: 0.1 10E9/L (ref 0–0.4)
IMM GRANULOCYTES NFR BLD: 0.2 %
IMM GRANULOCYTES NFR BLD: 0.2 %
IMM GRANULOCYTES NFR BLD: 0.3 %
IMM GRANULOCYTES NFR BLD: 0.3 %
IMM GRANULOCYTES NFR BLD: 0.4 %
IMM GRANULOCYTES NFR BLD: 0.4 %
IMM GRANULOCYTES NFR BLD: 0.5 %
IMM GRANULOCYTES NFR BLD: 0.5 %
IMM GRANULOCYTES NFR BLD: 0.8 %
INR PPP: 0.81 (ref 0.86–1.14)
INR PPP: 1.06 (ref 0.86–1.14)
INR PPP: 1.08 (ref 0.86–1.14)
INR PPP: 1.14 (ref 0.86–1.14)
INR PPP: 1.15 (ref 0.86–1.14)
INR PPP: 1.18 (ref 0.86–1.14)
INR PPP: 1.19 (ref 0.86–1.14)
INR PPP: 1.2 (ref 0.86–1.14)
INR PPP: 1.27 (ref 0.86–1.14)
INR PPP: 1.28 (ref 0.86–1.14)
INTERPRETATION ECG - MUSE: NORMAL
KETONES UR STRIP-MCNC: NEGATIVE MG/DL
LAB SCANNED RESULT: ABNORMAL
LABORATORY COMMENT REPORT: NORMAL
LACTATE BLD-SCNC: 1.3 MMOL/L (ref 0.7–2)
LEUKOCYTE ESTERASE UR QL STRIP: ABNORMAL
LYMPHOCYTES # BLD AUTO: 0.7 10E9/L (ref 0.8–5.3)
LYMPHOCYTES # BLD AUTO: 0.7 10E9/L (ref 0.8–5.3)
LYMPHOCYTES # BLD AUTO: 0.8 10E9/L (ref 0.8–5.3)
LYMPHOCYTES # BLD AUTO: 0.8 10E9/L (ref 0.8–5.3)
LYMPHOCYTES # BLD AUTO: 1 10E9/L (ref 0.8–5.3)
LYMPHOCYTES # BLD AUTO: 1.1 10E9/L (ref 0.8–5.3)
LYMPHOCYTES # BLD AUTO: 1.2 10E9/L (ref 0.8–5.3)
LYMPHOCYTES # BLD AUTO: 1.3 10E9/L (ref 0.8–5.3)
LYMPHOCYTES # BLD AUTO: 1.8 10E9/L (ref 0.8–5.3)
LYMPHOCYTES NFR BLD AUTO: 10.5 %
LYMPHOCYTES NFR BLD AUTO: 11.7 %
LYMPHOCYTES NFR BLD AUTO: 13.6 %
LYMPHOCYTES NFR BLD AUTO: 14.3 %
LYMPHOCYTES NFR BLD AUTO: 14.4 %
LYMPHOCYTES NFR BLD AUTO: 17.8 %
LYMPHOCYTES NFR BLD AUTO: 17.9 %
LYMPHOCYTES NFR BLD AUTO: 23.3 %
LYMPHOCYTES NFR BLD AUTO: 6.5 %
MAGNESIUM SERPL-MCNC: 2.1 MG/DL (ref 1.6–2.3)
MAGNESIUM SERPL-MCNC: 2.2 MG/DL (ref 1.6–2.3)
MAGNESIUM SERPL-MCNC: 2.2 MG/DL (ref 1.6–2.3)
MAGNESIUM SERPL-MCNC: 2.3 MG/DL (ref 1.6–2.3)
MAGNESIUM SERPL-MCNC: 2.4 MG/DL (ref 1.6–2.3)
MCH RBC QN AUTO: 29.1 PG (ref 26.5–33)
MCH RBC QN AUTO: 29.1 PG (ref 26.5–33)
MCH RBC QN AUTO: 29.2 PG (ref 26.5–33)
MCH RBC QN AUTO: 29.3 PG (ref 26.5–33)
MCH RBC QN AUTO: 29.3 PG (ref 26.5–33)
MCH RBC QN AUTO: 29.4 PG (ref 26.5–33)
MCH RBC QN AUTO: 29.8 PG (ref 26.5–33)
MCH RBC QN AUTO: 29.8 PG (ref 26.5–33)
MCH RBC QN AUTO: 30 PG (ref 26.5–33)
MCH RBC QN AUTO: 30 PG (ref 26.5–33)
MCH RBC QN AUTO: 30.1 PG (ref 26.5–33)
MCH RBC QN AUTO: 30.1 PG (ref 26.5–33)
MCH RBC QN AUTO: 30.2 PG (ref 26.5–33)
MCH RBC QN AUTO: 30.2 PG (ref 26.5–33)
MCH RBC QN AUTO: 30.3 PG (ref 26.5–33)
MCH RBC QN AUTO: 30.3 PG (ref 26.5–33)
MCH RBC QN AUTO: 30.4 PG (ref 26.5–33)
MCH RBC QN AUTO: 30.4 PG (ref 26.5–33)
MCH RBC QN AUTO: 30.5 PG (ref 26.5–33)
MCH RBC QN AUTO: 31.2 PG (ref 26.5–33)
MCH RBC QN AUTO: 31.2 PG (ref 26.5–33)
MCH RBC QN AUTO: 31.4 PG (ref 26.5–33)
MCH RBC QN AUTO: 31.4 PG (ref 26.5–33)
MCH RBC QN AUTO: 31.5 PG (ref 26.5–33)
MCH RBC QN AUTO: 31.5 PG (ref 26.5–33)
MCH RBC QN AUTO: 31.8 PG (ref 26.5–33)
MCH RBC QN AUTO: 32.4 PG (ref 26.5–33)
MCHC RBC AUTO-ENTMCNC: 30.1 G/DL (ref 31.5–36.5)
MCHC RBC AUTO-ENTMCNC: 30.3 G/DL (ref 31.5–36.5)
MCHC RBC AUTO-ENTMCNC: 30.5 G/DL (ref 31.5–36.5)
MCHC RBC AUTO-ENTMCNC: 30.8 G/DL (ref 31.5–36.5)
MCHC RBC AUTO-ENTMCNC: 30.8 G/DL (ref 31.5–36.5)
MCHC RBC AUTO-ENTMCNC: 30.9 G/DL (ref 31.5–36.5)
MCHC RBC AUTO-ENTMCNC: 31 G/DL (ref 31.5–36.5)
MCHC RBC AUTO-ENTMCNC: 31 G/DL (ref 31.5–36.5)
MCHC RBC AUTO-ENTMCNC: 31.1 G/DL (ref 31.5–36.5)
MCHC RBC AUTO-ENTMCNC: 31.2 G/DL (ref 31.5–36.5)
MCHC RBC AUTO-ENTMCNC: 31.3 G/DL (ref 31.5–36.5)
MCHC RBC AUTO-ENTMCNC: 31.5 G/DL (ref 31.5–36.5)
MCHC RBC AUTO-ENTMCNC: 31.6 G/DL (ref 31.5–36.5)
MCHC RBC AUTO-ENTMCNC: 31.7 G/DL (ref 31.5–36.5)
MCHC RBC AUTO-ENTMCNC: 31.8 G/DL (ref 31.5–36.5)
MCHC RBC AUTO-ENTMCNC: 32.1 G/DL (ref 31.5–36.5)
MCHC RBC AUTO-ENTMCNC: 32.2 G/DL (ref 31.5–36.5)
MCHC RBC AUTO-ENTMCNC: 32.5 G/DL (ref 31.5–36.5)
MCHC RBC AUTO-ENTMCNC: 32.5 G/DL (ref 31.5–36.5)
MCHC RBC AUTO-ENTMCNC: 32.7 G/DL (ref 31.5–36.5)
MCV RBC AUTO: 100 FL (ref 78–100)
MCV RBC AUTO: 101 FL (ref 78–100)
MCV RBC AUTO: 94 FL (ref 78–100)
MCV RBC AUTO: 94 FL (ref 78–100)
MCV RBC AUTO: 95 FL (ref 78–100)
MCV RBC AUTO: 96 FL (ref 78–100)
MCV RBC AUTO: 97 FL (ref 78–100)
MCV RBC AUTO: 98 FL (ref 78–100)
MCV RBC AUTO: 99 FL (ref 78–100)
MONOCYTES # BLD AUTO: 0.3 10E9/L (ref 0–1.3)
MONOCYTES # BLD AUTO: 0.4 10E9/L (ref 0–1.3)
MONOCYTES # BLD AUTO: 0.5 10E9/L (ref 0–1.3)
MONOCYTES # BLD AUTO: 0.8 10E9/L (ref 0–1.3)
MONOCYTES # BLD AUTO: 1 10E9/L (ref 0–1.3)
MONOCYTES NFR BLD AUTO: 3.4 %
MONOCYTES NFR BLD AUTO: 3.4 %
MONOCYTES NFR BLD AUTO: 4.7 %
MONOCYTES NFR BLD AUTO: 6.2 %
MONOCYTES NFR BLD AUTO: 7.7 %
MONOCYTES NFR BLD AUTO: 8 %
MONOCYTES NFR BLD AUTO: 8.3 %
MONOCYTES NFR BLD AUTO: 8.5 %
MONOCYTES NFR BLD AUTO: 9.1 %
MUCOUS THREADS #/AREA URNS LPF: PRESENT /LPF
NEUTROPHILS # BLD AUTO: 2.9 10E9/L (ref 1.6–8.3)
NEUTROPHILS # BLD AUTO: 3.9 10E9/L (ref 1.6–8.3)
NEUTROPHILS # BLD AUTO: 4 10E9/L (ref 1.6–8.3)
NEUTROPHILS # BLD AUTO: 4.8 10E9/L (ref 1.6–8.3)
NEUTROPHILS # BLD AUTO: 5.2 10E9/L (ref 1.6–8.3)
NEUTROPHILS # BLD AUTO: 6.5 10E9/L (ref 1.6–8.3)
NEUTROPHILS # BLD AUTO: 6.7 10E9/L (ref 1.6–8.3)
NEUTROPHILS # BLD AUTO: 8.9 10E9/L (ref 1.6–8.3)
NEUTROPHILS # BLD AUTO: 9.6 10E9/L (ref 1.6–8.3)
NEUTROPHILS NFR BLD AUTO: 64.3 %
NEUTROPHILS NFR BLD AUTO: 69.1 %
NEUTROPHILS NFR BLD AUTO: 72.9 %
NEUTROPHILS NFR BLD AUTO: 74.4 %
NEUTROPHILS NFR BLD AUTO: 75.6 %
NEUTROPHILS NFR BLD AUTO: 76.6 %
NEUTROPHILS NFR BLD AUTO: 83.3 %
NEUTROPHILS NFR BLD AUTO: 85.7 %
NEUTROPHILS NFR BLD AUTO: 89.5 %
NITRATE UR QL: POSITIVE
NRBC # BLD AUTO: 0 10*3/UL
NRBC BLD AUTO-RTO: 0 /100
PH UR STRIP: 5.5 PH (ref 5–7)
PHOSPHATE SERPL-MCNC: 2.9 MG/DL (ref 2.5–4.5)
PHOSPHATE SERPL-MCNC: 4 MG/DL (ref 2.5–4.5)
PLATELET # BLD AUTO: 147 10E9/L (ref 150–450)
PLATELET # BLD AUTO: 156 10E9/L (ref 150–450)
PLATELET # BLD AUTO: 160 10E9/L (ref 150–450)
PLATELET # BLD AUTO: 170 10E9/L (ref 150–450)
PLATELET # BLD AUTO: 170 10E9/L (ref 150–450)
PLATELET # BLD AUTO: 172 10E9/L (ref 150–450)
PLATELET # BLD AUTO: 178 10E9/L (ref 150–450)
PLATELET # BLD AUTO: 185 10E9/L (ref 150–450)
PLATELET # BLD AUTO: 187 10E9/L (ref 150–450)
PLATELET # BLD AUTO: 191 10E9/L (ref 150–450)
PLATELET # BLD AUTO: 191 10E9/L (ref 150–450)
PLATELET # BLD AUTO: 192 10E9/L (ref 150–450)
PLATELET # BLD AUTO: 196 10E9/L (ref 150–450)
PLATELET # BLD AUTO: 196 10E9/L (ref 150–450)
PLATELET # BLD AUTO: 205 10E9/L (ref 150–450)
PLATELET # BLD AUTO: 210 10E9/L (ref 150–450)
PLATELET # BLD AUTO: 210 10E9/L (ref 150–450)
PLATELET # BLD AUTO: 211 10E9/L (ref 150–450)
PLATELET # BLD AUTO: 214 10E9/L (ref 150–450)
PLATELET # BLD AUTO: 216 10E9/L (ref 150–450)
PLATELET # BLD AUTO: 216 10E9/L (ref 150–450)
PLATELET # BLD AUTO: 217 10E9/L (ref 150–450)
PLATELET # BLD AUTO: 232 10E9/L (ref 150–450)
PLATELET # BLD AUTO: 233 10E9/L (ref 150–450)
PLATELET # BLD AUTO: 234 10E9/L (ref 150–450)
PLATELET # BLD AUTO: 238 10E9/L (ref 150–450)
PLATELET # BLD AUTO: 249 10E9/L (ref 150–450)
PLATELET # BLD AUTO: 259 10E9/L (ref 150–450)
POTASSIUM SERPL-SCNC: 2.8 MMOL/L (ref 3.4–5.3)
POTASSIUM SERPL-SCNC: 3.1 MMOL/L (ref 3.4–5.3)
POTASSIUM SERPL-SCNC: 3.1 MMOL/L (ref 3.4–5.3)
POTASSIUM SERPL-SCNC: 3.4 MMOL/L (ref 3.4–5.3)
POTASSIUM SERPL-SCNC: 3.5 MMOL/L (ref 3.4–5.3)
POTASSIUM SERPL-SCNC: 3.6 MMOL/L (ref 3.4–5.3)
POTASSIUM SERPL-SCNC: 3.7 MMOL/L (ref 3.4–5.3)
POTASSIUM SERPL-SCNC: 3.8 MMOL/L (ref 3.4–5.3)
POTASSIUM SERPL-SCNC: 3.9 MMOL/L (ref 3.4–5.3)
POTASSIUM SERPL-SCNC: 4 MMOL/L (ref 3.4–5.3)
POTASSIUM SERPL-SCNC: 4 MMOL/L (ref 3.4–5.3)
POTASSIUM SERPL-SCNC: 4.1 MMOL/L (ref 3.4–5.3)
POTASSIUM SERPL-SCNC: 4.2 MMOL/L (ref 3.4–5.3)
POTASSIUM SERPL-SCNC: 4.2 MMOL/L (ref 3.4–5.3)
PROT SERPL-MCNC: 7.4 G/DL (ref 6.8–8.8)
PROT SERPL-MCNC: 8.4 G/DL (ref 6.8–8.8)
RBC # BLD AUTO: 3.29 10E12/L (ref 3.8–5.2)
RBC # BLD AUTO: 3.3 10E12/L (ref 3.8–5.2)
RBC # BLD AUTO: 3.33 10E12/L (ref 3.8–5.2)
RBC # BLD AUTO: 3.37 10E12/L (ref 3.8–5.2)
RBC # BLD AUTO: 3.42 10E12/L (ref 3.8–5.2)
RBC # BLD AUTO: 3.44 10E12/L (ref 3.8–5.2)
RBC # BLD AUTO: 3.5 10E12/L (ref 3.8–5.2)
RBC # BLD AUTO: 3.51 10E12/L (ref 3.8–5.2)
RBC # BLD AUTO: 3.61 10E12/L (ref 3.8–5.2)
RBC # BLD AUTO: 3.76 10E12/L (ref 3.8–5.2)
RBC # BLD AUTO: 3.79 10E12/L (ref 3.8–5.2)
RBC # BLD AUTO: 3.8 10E12/L (ref 3.8–5.2)
RBC # BLD AUTO: 3.92 10E12/L (ref 3.8–5.2)
RBC # BLD AUTO: 3.95 10E12/L (ref 3.8–5.2)
RBC # BLD AUTO: 4 10E12/L (ref 3.8–5.2)
RBC # BLD AUTO: 4.01 10E12/L (ref 3.8–5.2)
RBC # BLD AUTO: 4.01 10E12/L (ref 3.8–5.2)
RBC # BLD AUTO: 4.05 10E12/L (ref 3.8–5.2)
RBC # BLD AUTO: 4.09 10E12/L (ref 3.8–5.2)
RBC # BLD AUTO: 4.2 10E12/L (ref 3.8–5.2)
RBC # BLD AUTO: 4.2 10E12/L (ref 3.8–5.2)
RBC # BLD AUTO: 4.22 10E12/L (ref 3.8–5.2)
RBC # BLD AUTO: 4.49 10E12/L (ref 3.8–5.2)
RBC # BLD AUTO: 4.57 10E12/L (ref 3.8–5.2)
RBC # BLD AUTO: 4.58 10E12/L (ref 3.8–5.2)
RBC # BLD AUTO: 4.59 10E12/L (ref 3.8–5.2)
RBC # BLD AUTO: 4.6 10E12/L (ref 3.8–5.2)
RBC #/AREA URNS AUTO: 0 /HPF (ref 0–2)
SARS-COV-2 RNA SPEC QL NAA+PROBE: NEGATIVE
SARS-COV-2 RNA SPEC QL NAA+PROBE: NORMAL
SARS-COV-2 RNA SPEC QL NAA+PROBE: NOT DETECTED
SARS-COV-2 RNA SPEC QL NAA+PROBE: NOT DETECTED
SODIUM SERPL-SCNC: 138 MMOL/L (ref 133–144)
SODIUM SERPL-SCNC: 139 MMOL/L (ref 133–144)
SODIUM SERPL-SCNC: 141 MMOL/L (ref 133–144)
SODIUM SERPL-SCNC: 141 MMOL/L (ref 133–144)
SODIUM SERPL-SCNC: 142 MMOL/L (ref 133–144)
SODIUM SERPL-SCNC: 143 MMOL/L (ref 133–144)
SODIUM SERPL-SCNC: 144 MMOL/L (ref 133–144)
SODIUM SERPL-SCNC: 144 MMOL/L (ref 133–144)
SODIUM SERPL-SCNC: 145 MMOL/L (ref 133–144)
SODIUM SERPL-SCNC: 145 MMOL/L (ref 133–144)
SODIUM SERPL-SCNC: 146 MMOL/L (ref 133–144)
SODIUM SERPL-SCNC: 147 MMOL/L (ref 133–144)
SODIUM SERPL-SCNC: 148 MMOL/L (ref 133–144)
SODIUM SERPL-SCNC: 148 MMOL/L (ref 133–144)
SOURCE: ABNORMAL
SP GR UR STRIP: 1.01 (ref 1–1.03)
SPECIMEN EXP DATE BLD: NORMAL
SPECIMEN SOURCE: ABNORMAL
SPECIMEN SOURCE: NORMAL
TROPONIN I SERPL-MCNC: <0.015 UG/L (ref 0–0.04)
UROBILINOGEN UR STRIP-MCNC: NORMAL MG/DL (ref 0–2)
WBC # BLD AUTO: 10.7 10E9/L (ref 4–11)
WBC # BLD AUTO: 11.3 10E9/L (ref 4–11)
WBC # BLD AUTO: 12.5 10E9/L (ref 4–11)
WBC # BLD AUTO: 3.7 10E9/L (ref 4–11)
WBC # BLD AUTO: 4.5 10E9/L (ref 4–11)
WBC # BLD AUTO: 5.2 10E9/L (ref 4–11)
WBC # BLD AUTO: 5.3 10E9/L (ref 4–11)
WBC # BLD AUTO: 5.6 10E9/L (ref 4–11)
WBC # BLD AUTO: 5.7 10E9/L (ref 4–11)
WBC # BLD AUTO: 5.7 10E9/L (ref 4–11)
WBC # BLD AUTO: 5.8 10E9/L (ref 4–11)
WBC # BLD AUTO: 6.4 10E9/L (ref 4–11)
WBC # BLD AUTO: 6.7 10E9/L (ref 4–11)
WBC # BLD AUTO: 7.1 10E9/L (ref 4–11)
WBC # BLD AUTO: 7.5 10E9/L (ref 4–11)
WBC # BLD AUTO: 8.6 10E9/L (ref 4–11)
WBC # BLD AUTO: 8.6 10E9/L (ref 4–11)
WBC # BLD AUTO: 8.7 10E9/L (ref 4–11)
WBC # BLD AUTO: 8.8 10E9/L (ref 4–11)
WBC # BLD AUTO: 9 10E9/L (ref 4–11)
WBC # BLD AUTO: 9 10E9/L (ref 4–11)
WBC # BLD AUTO: 9.1 10E9/L (ref 4–11)
WBC # BLD AUTO: 9.3 10E9/L (ref 4–11)
WBC # BLD AUTO: 9.4 10E9/L (ref 4–11)
WBC # BLD AUTO: 9.9 10E9/L (ref 4–11)
WBC #/AREA URNS AUTO: 31 /HPF (ref 0–5)

## 2020-01-01 PROCEDURE — 250N000013 HC RX MED GY IP 250 OP 250 PS 637: Performed by: STUDENT IN AN ORGANIZED HEALTH CARE EDUCATION/TRAINING PROGRAM

## 2020-01-01 PROCEDURE — 250N000013 HC RX MED GY IP 250 OP 250 PS 637: Performed by: INTERNAL MEDICINE

## 2020-01-01 PROCEDURE — 250N000013 HC RX MED GY IP 250 OP 250 PS 637: Performed by: PHYSICAL MEDICINE & REHABILITATION

## 2020-01-01 PROCEDURE — 97110 THERAPEUTIC EXERCISES: CPT | Mod: GO

## 2020-01-01 PROCEDURE — 36556 INSERT NON-TUNNEL CV CATH: CPT

## 2020-01-01 PROCEDURE — 120N000002 HC R&B MED SURG/OB UMMC

## 2020-01-01 PROCEDURE — 84100 ASSAY OF PHOSPHORUS: CPT | Performed by: STUDENT IN AN ORGANIZED HEALTH CARE EDUCATION/TRAINING PROGRAM

## 2020-01-01 PROCEDURE — 250N000011 HC RX IP 250 OP 636: Performed by: PSYCHIATRY & NEUROLOGY

## 2020-01-01 PROCEDURE — 25000128 H RX IP 250 OP 636: Performed by: STUDENT IN AN ORGANIZED HEALTH CARE EDUCATION/TRAINING PROGRAM

## 2020-01-01 PROCEDURE — 25000125 ZZHC RX 250

## 2020-01-01 PROCEDURE — 97530 THERAPEUTIC ACTIVITIES: CPT | Mod: GP

## 2020-01-01 PROCEDURE — 97116 GAIT TRAINING THERAPY: CPT | Mod: GP

## 2020-01-01 PROCEDURE — 00000146 ZZHCL STATISTIC GLUCOSE BY METER IP

## 2020-01-01 PROCEDURE — 85384 FIBRINOGEN ACTIVITY: CPT

## 2020-01-01 PROCEDURE — 97535 SELF CARE MNGMENT TRAINING: CPT | Mod: GO

## 2020-01-01 PROCEDURE — 36514 APHERESIS PLASMA: CPT | Performed by: PATHOLOGY

## 2020-01-01 PROCEDURE — 80048 BASIC METABOLIC PNL TOTAL CA: CPT | Performed by: PSYCHIATRY & NEUROLOGY

## 2020-01-01 PROCEDURE — 25000132 ZZH RX MED GY IP 250 OP 250 PS 637: Mod: GY | Performed by: STUDENT IN AN ORGANIZED HEALTH CARE EDUCATION/TRAINING PROGRAM

## 2020-01-01 PROCEDURE — A9585 GADOBUTROL INJECTION: HCPCS | Performed by: EMERGENCY MEDICINE

## 2020-01-01 PROCEDURE — 36415 COLL VENOUS BLD VENIPUNCTURE: CPT | Performed by: PSYCHIATRY & NEUROLOGY

## 2020-01-01 PROCEDURE — 36514 APHERESIS PLASMA: CPT

## 2020-01-01 PROCEDURE — 250N000012 HC RX MED GY IP 250 OP 636 PS 637: Performed by: PHYSICAL MEDICINE & REHABILITATION

## 2020-01-01 PROCEDURE — 99232 SBSQ HOSP IP/OBS MODERATE 35: CPT | Mod: GC | Performed by: PSYCHIATRY & NEUROLOGY

## 2020-01-01 PROCEDURE — 85049 AUTOMATED PLATELET COUNT: CPT | Performed by: PSYCHIATRY & NEUROLOGY

## 2020-01-01 PROCEDURE — 86162 COMPLEMENT TOTAL (CH50): CPT | Performed by: PSYCHIATRY & NEUROLOGY

## 2020-01-01 PROCEDURE — 85610 PROTHROMBIN TIME: CPT | Performed by: PSYCHIATRY & NEUROLOGY

## 2020-01-01 PROCEDURE — 97161 PT EVAL LOW COMPLEX 20 MIN: CPT | Mod: GP

## 2020-01-01 PROCEDURE — 93005 ELECTROCARDIOGRAM TRACING: CPT | Performed by: EMERGENCY MEDICINE

## 2020-01-01 PROCEDURE — 85049 AUTOMATED PLATELET COUNT: CPT | Performed by: STUDENT IN AN ORGANIZED HEALTH CARE EDUCATION/TRAINING PROGRAM

## 2020-01-01 PROCEDURE — 12000001 ZZH R&B MED SURG/OB UMMC

## 2020-01-01 PROCEDURE — 99231 SBSQ HOSP IP/OBS SF/LOW 25: CPT | Mod: GC | Performed by: STUDENT IN AN ORGANIZED HEALTH CARE EDUCATION/TRAINING PROGRAM

## 2020-01-01 PROCEDURE — 999N001017 HC STATISTIC GLUCOSE BY METER IP

## 2020-01-01 PROCEDURE — 97112 NEUROMUSCULAR REEDUCATION: CPT | Mod: GP

## 2020-01-01 PROCEDURE — 99233 SBSQ HOSP IP/OBS HIGH 50: CPT | Mod: GC | Performed by: PHYSICAL MEDICINE & REHABILITATION

## 2020-01-01 PROCEDURE — 999N000150 HC STATISTIC PT MED CONFERENCE < 30 MIN: Performed by: PHYSICAL THERAPIST

## 2020-01-01 PROCEDURE — 128N000003 HC R&B REHAB

## 2020-01-01 PROCEDURE — 250N000011 HC RX IP 250 OP 636: Performed by: STUDENT IN AN ORGANIZED HEALTH CARE EDUCATION/TRAINING PROGRAM

## 2020-01-01 PROCEDURE — 85025 COMPLETE CBC W/AUTO DIFF WBC: CPT | Performed by: EMERGENCY MEDICINE

## 2020-01-01 PROCEDURE — 97165 OT EVAL LOW COMPLEX 30 MIN: CPT | Mod: GO | Performed by: OCCUPATIONAL THERAPIST

## 2020-01-01 PROCEDURE — 97535 SELF CARE MNGMENT TRAINING: CPT | Mod: GO | Performed by: OCCUPATIONAL THERAPIST

## 2020-01-01 PROCEDURE — 83735 ASSAY OF MAGNESIUM: CPT | Performed by: PSYCHIATRY & NEUROLOGY

## 2020-01-01 PROCEDURE — 250N000009 HC RX 250: Performed by: STUDENT IN AN ORGANIZED HEALTH CARE EDUCATION/TRAINING PROGRAM

## 2020-01-01 PROCEDURE — 272N000504 HC NEEDLE CR4

## 2020-01-01 PROCEDURE — C1769 GUIDE WIRE: HCPCS

## 2020-01-01 PROCEDURE — 250N000011 HC RX IP 250 OP 636: Performed by: PATHOLOGY

## 2020-01-01 PROCEDURE — 36415 COLL VENOUS BLD VENIPUNCTURE: CPT | Performed by: STUDENT IN AN ORGANIZED HEALTH CARE EDUCATION/TRAINING PROGRAM

## 2020-01-01 PROCEDURE — U0003 INFECTIOUS AGENT DETECTION BY NUCLEIC ACID (DNA OR RNA); SEVERE ACUTE RESPIRATORY SYNDROME CORONAVIRUS 2 (SARS-COV-2) (CORONAVIRUS DISEASE [COVID-19]), AMPLIFIED PROBE TECHNIQUE, MAKING USE OF HIGH THROUGHPUT TECHNOLOGIES AS DESCRIBED BY CMS-2020-01-R: HCPCS | Performed by: STUDENT IN AN ORGANIZED HEALTH CARE EDUCATION/TRAINING PROGRAM

## 2020-01-01 PROCEDURE — 87088 URINE BACTERIA CULTURE: CPT | Performed by: EMERGENCY MEDICINE

## 2020-01-01 PROCEDURE — 97530 THERAPEUTIC ACTIVITIES: CPT | Mod: GP | Performed by: REHABILITATION PRACTITIONER

## 2020-01-01 PROCEDURE — 90734 MENACWYD/MENACWYCRM VACC IM: CPT | Mod: GY | Performed by: STUDENT IN AN ORGANIZED HEALTH CARE EDUCATION/TRAINING PROGRAM

## 2020-01-01 PROCEDURE — 82565 ASSAY OF CREATININE: CPT | Performed by: STUDENT IN AN ORGANIZED HEALTH CARE EDUCATION/TRAINING PROGRAM

## 2020-01-01 PROCEDURE — 83036 HEMOGLOBIN GLYCOSYLATED A1C: CPT | Performed by: STUDENT IN AN ORGANIZED HEALTH CARE EDUCATION/TRAINING PROGRAM

## 2020-01-01 PROCEDURE — 99239 HOSP IP/OBS DSCHRG MGMT >30: CPT | Performed by: PHYSICAL MEDICINE & REHABILITATION

## 2020-01-01 PROCEDURE — 25000128 H RX IP 250 OP 636

## 2020-01-01 PROCEDURE — 97167 OT EVAL HIGH COMPLEX 60 MIN: CPT | Mod: GO | Performed by: OCCUPATIONAL THERAPIST

## 2020-01-01 PROCEDURE — 36415 COLL VENOUS BLD VENIPUNCTURE: CPT | Performed by: INTERNAL MEDICINE

## 2020-01-01 PROCEDURE — 97530 THERAPEUTIC ACTIVITIES: CPT | Mod: GO

## 2020-01-01 PROCEDURE — 99233 SBSQ HOSP IP/OBS HIGH 50: CPT | Performed by: PHYSICAL MEDICINE & REHABILITATION

## 2020-01-01 PROCEDURE — 258N000003 HC RX IP 258 OP 636: Performed by: STUDENT IN AN ORGANIZED HEALTH CARE EDUCATION/TRAINING PROGRAM

## 2020-01-01 PROCEDURE — 85610 PROTHROMBIN TIME: CPT | Performed by: PATHOLOGY

## 2020-01-01 PROCEDURE — 258N000003 HC RX IP 258 OP 636: Performed by: INTERNAL MEDICINE

## 2020-01-01 PROCEDURE — 97116 GAIT TRAINING THERAPY: CPT | Mod: GP | Performed by: REHABILITATION PRACTITIONER

## 2020-01-01 PROCEDURE — 25000132 ZZH RX MED GY IP 250 OP 250 PS 637: Mod: GY | Performed by: PSYCHIATRY & NEUROLOGY

## 2020-01-01 PROCEDURE — 97110 THERAPEUTIC EXERCISES: CPT | Mod: GP | Performed by: REHABILITATION PRACTITIONER

## 2020-01-01 PROCEDURE — 97110 THERAPEUTIC EXERCISES: CPT | Mod: GP | Performed by: PHYSICAL THERAPIST

## 2020-01-01 PROCEDURE — 25000128 H RX IP 250 OP 636: Performed by: PATHOLOGY

## 2020-01-01 PROCEDURE — 999N000125 HC STATISTIC PATIENT MED CONFERENCE < 30 MIN

## 2020-01-01 PROCEDURE — 93005 ELECTROCARDIOGRAM TRACING: CPT

## 2020-01-01 PROCEDURE — 92083 EXTENDED VISUAL FIELD XM: CPT | Mod: ZF | Performed by: OPHTHALMOLOGY

## 2020-01-01 PROCEDURE — 77001 FLUOROGUIDE FOR VEIN DEVICE: CPT | Mod: 26 | Performed by: RADIOLOGY

## 2020-01-01 PROCEDURE — 97530 THERAPEUTIC ACTIVITIES: CPT | Mod: GP | Performed by: PHYSICAL THERAPIST

## 2020-01-01 PROCEDURE — 27210908 ZZH NEEDLE CR4

## 2020-01-01 PROCEDURE — P9041 ALBUMIN (HUMAN),5%, 50ML: HCPCS

## 2020-01-01 PROCEDURE — 250N000013 HC RX MED GY IP 250 OP 250 PS 637: Performed by: PSYCHIATRY & NEUROLOGY

## 2020-01-01 PROCEDURE — 99222 1ST HOSP IP/OBS MODERATE 55: CPT | Mod: AI | Performed by: INTERNAL MEDICINE

## 2020-01-01 PROCEDURE — 97116 GAIT TRAINING THERAPY: CPT | Mod: GP | Performed by: PHYSICAL THERAPIST

## 2020-01-01 PROCEDURE — U0003 INFECTIOUS AGENT DETECTION BY NUCLEIC ACID (DNA OR RNA); SEVERE ACUTE RESPIRATORY SYNDROME CORONAVIRUS 2 (SARS-COV-2) (CORONAVIRUS DISEASE [COVID-19]), AMPLIFIED PROBE TECHNIQUE, MAKING USE OF HIGH THROUGHPUT TECHNOLOGIES AS DESCRIBED BY CMS-2020-01-R: HCPCS | Performed by: PSYCHIATRY & NEUROLOGY

## 2020-01-01 PROCEDURE — 84132 ASSAY OF SERUM POTASSIUM: CPT | Performed by: PSYCHIATRY & NEUROLOGY

## 2020-01-01 PROCEDURE — 250N000011 HC RX IP 250 OP 636

## 2020-01-01 PROCEDURE — 86900 BLOOD TYPING SEROLOGIC ABO: CPT | Performed by: PATHOLOGY

## 2020-01-01 PROCEDURE — 250N000009 HC RX 250

## 2020-01-01 PROCEDURE — G0463 HOSPITAL OUTPT CLINIC VISIT: HCPCS

## 2020-01-01 PROCEDURE — 250N000012 HC RX MED GY IP 250 OP 636 PS 637: Performed by: STUDENT IN AN ORGANIZED HEALTH CARE EDUCATION/TRAINING PROGRAM

## 2020-01-01 PROCEDURE — P9041 ALBUMIN (HUMAN),5%, 50ML: HCPCS | Performed by: PATHOLOGY

## 2020-01-01 PROCEDURE — 85027 COMPLETE CBC AUTOMATED: CPT | Performed by: PSYCHIATRY & NEUROLOGY

## 2020-01-01 PROCEDURE — 85610 PROTHROMBIN TIME: CPT

## 2020-01-01 PROCEDURE — 92133 CPTRZD OPH DX IMG PST SGM ON: CPT | Mod: ZF | Performed by: OPHTHALMOLOGY

## 2020-01-01 PROCEDURE — 25000131 ZZH RX MED GY IP 250 OP 636 PS 637: Mod: GY | Performed by: STUDENT IN AN ORGANIZED HEALTH CARE EDUCATION/TRAINING PROGRAM

## 2020-01-01 PROCEDURE — 25000128 H RX IP 250 OP 636: Performed by: EMERGENCY MEDICINE

## 2020-01-01 PROCEDURE — 40000556 ZZH STATISTIC PERIPHERAL IV START W US GUIDANCE

## 2020-01-01 PROCEDURE — 83735 ASSAY OF MAGNESIUM: CPT | Performed by: STUDENT IN AN ORGANIZED HEALTH CARE EDUCATION/TRAINING PROGRAM

## 2020-01-01 PROCEDURE — 250N000011 HC RX IP 250 OP 636: Performed by: INTERNAL MEDICINE

## 2020-01-01 PROCEDURE — 999N000127 HC STATISTIC PERIPHERAL IV START W US GUIDANCE

## 2020-01-01 PROCEDURE — 99285 EMERGENCY DEPT VISIT HI MDM: CPT | Mod: 25

## 2020-01-01 PROCEDURE — 97110 THERAPEUTIC EXERCISES: CPT | Mod: GO | Performed by: OCCUPATIONAL THERAPIST

## 2020-01-01 PROCEDURE — 97110 THERAPEUTIC EXERCISES: CPT | Mod: GP

## 2020-01-01 PROCEDURE — 80048 BASIC METABOLIC PNL TOTAL CA: CPT | Performed by: STUDENT IN AN ORGANIZED HEALTH CARE EDUCATION/TRAINING PROGRAM

## 2020-01-01 PROCEDURE — 97112 NEUROMUSCULAR REEDUCATION: CPT | Mod: GP | Performed by: PHYSICAL THERAPIST

## 2020-01-01 PROCEDURE — U0003 INFECTIOUS AGENT DETECTION BY NUCLEIC ACID (DNA OR RNA); SEVERE ACUTE RESPIRATORY SYNDROME CORONAVIRUS 2 (SARS-COV-2) (CORONAVIRUS DISEASE [COVID-19]), AMPLIFIED PROBE TECHNIQUE, MAKING USE OF HIGH THROUGHPUT TECHNOLOGIES AS DESCRIBED BY CMS-2020-01-R: HCPCS | Performed by: EMERGENCY MEDICINE

## 2020-01-01 PROCEDURE — 02H633Z INSERTION OF INFUSION DEVICE INTO RIGHT ATRIUM, PERCUTANEOUS APPROACH: ICD-10-PCS | Performed by: RADIOLOGY

## 2020-01-01 PROCEDURE — 93010 ELECTROCARDIOGRAM REPORT: CPT | Performed by: INTERNAL MEDICINE

## 2020-01-01 PROCEDURE — 86901 BLOOD TYPING SEROLOGIC RH(D): CPT | Performed by: PATHOLOGY

## 2020-01-01 PROCEDURE — 85384 FIBRINOGEN ACTIVITY: CPT | Performed by: PSYCHIATRY & NEUROLOGY

## 2020-01-01 PROCEDURE — 85025 COMPLETE CBC W/AUTO DIFF WBC: CPT | Performed by: PHYSICAL MEDICINE & REHABILITATION

## 2020-01-01 PROCEDURE — 25000128 H RX IP 250 OP 636: Performed by: RADIOLOGY

## 2020-01-01 PROCEDURE — 93010 ELECTROCARDIOGRAM REPORT: CPT | Mod: Z6 | Performed by: EMERGENCY MEDICINE

## 2020-01-01 PROCEDURE — 99223 1ST HOSP IP/OBS HIGH 75: CPT | Mod: GC | Performed by: PHYSICAL MEDICINE & REHABILITATION

## 2020-01-01 PROCEDURE — 80048 BASIC METABOLIC PNL TOTAL CA: CPT | Performed by: INTERNAL MEDICINE

## 2020-01-01 PROCEDURE — 85025 COMPLETE CBC W/AUTO DIFF WBC: CPT

## 2020-01-01 PROCEDURE — 70553 MRI BRAIN STEM W/O & W/DYE: CPT

## 2020-01-01 PROCEDURE — 99233 SBSQ HOSP IP/OBS HIGH 50: CPT | Mod: GC | Performed by: PSYCHIATRY & NEUROLOGY

## 2020-01-01 PROCEDURE — 97530 THERAPEUTIC ACTIVITIES: CPT | Mod: GO | Performed by: OCCUPATIONAL THERAPIST

## 2020-01-01 PROCEDURE — G0463 HOSPITAL OUTPT CLINIC VISIT: HCPCS | Mod: ZF

## 2020-01-01 PROCEDURE — 255N000002 HC RX 255 OP 636: Performed by: EMERGENCY MEDICINE

## 2020-01-01 PROCEDURE — 76937 US GUIDE VASCULAR ACCESS: CPT

## 2020-01-01 PROCEDURE — 36415 COLL VENOUS BLD VENIPUNCTURE: CPT | Performed by: PHYSICAL MEDICINE & REHABILITATION

## 2020-01-01 PROCEDURE — 250N000012 HC RX MED GY IP 250 OP 636 PS 637: Performed by: INTERNAL MEDICINE

## 2020-01-01 PROCEDURE — 86161 COMPLEMENT/FUNCTION ACTIVITY: CPT | Performed by: PSYCHIATRY & NEUROLOGY

## 2020-01-01 PROCEDURE — 83605 ASSAY OF LACTIC ACID: CPT | Performed by: PSYCHIATRY & NEUROLOGY

## 2020-01-01 PROCEDURE — 30901 CONTROL OF NOSEBLEED: CPT

## 2020-01-01 PROCEDURE — 96365 THER/PROPH/DIAG IV INF INIT: CPT

## 2020-01-01 PROCEDURE — 120N000001 HC R&B MED SURG/OB

## 2020-01-01 PROCEDURE — 258N000003 HC RX IP 258 OP 636: Performed by: PSYCHIATRY & NEUROLOGY

## 2020-01-01 PROCEDURE — 99285 EMERGENCY DEPT VISIT HI MDM: CPT | Mod: 25 | Performed by: EMERGENCY MEDICINE

## 2020-01-01 PROCEDURE — 250N000009 HC RX 250: Performed by: PATHOLOGY

## 2020-01-01 PROCEDURE — 90620 MENB-4C VACCINE IM: CPT | Mod: GY | Performed by: STUDENT IN AN ORGANIZED HEALTH CARE EDUCATION/TRAINING PROGRAM

## 2020-01-01 PROCEDURE — 90620 MENB-4C VACCINE IM: CPT | Performed by: STUDENT IN AN ORGANIZED HEALTH CARE EDUCATION/TRAINING PROGRAM

## 2020-01-01 PROCEDURE — C1752 CATH,HEMODIALYSIS,SHORT-TERM: HCPCS

## 2020-01-01 PROCEDURE — 76937 US GUIDE VASCULAR ACCESS: CPT | Mod: 26 | Performed by: RADIOLOGY

## 2020-01-01 PROCEDURE — 81001 URINALYSIS AUTO W/SCOPE: CPT | Performed by: INTERNAL MEDICINE

## 2020-01-01 PROCEDURE — 25800030 ZZH RX IP 258 OP 636: Performed by: EMERGENCY MEDICINE

## 2020-01-01 PROCEDURE — 85027 COMPLETE CBC AUTOMATED: CPT | Performed by: STUDENT IN AN ORGANIZED HEALTH CARE EDUCATION/TRAINING PROGRAM

## 2020-01-01 PROCEDURE — 87186 SC STD MICRODIL/AGAR DIL: CPT | Performed by: EMERGENCY MEDICINE

## 2020-01-01 PROCEDURE — 85652 RBC SED RATE AUTOMATED: CPT | Performed by: EMERGENCY MEDICINE

## 2020-01-01 PROCEDURE — 250N000013 HC RX MED GY IP 250 OP 250 PS 637: Performed by: EMERGENCY MEDICINE

## 2020-01-01 PROCEDURE — C9803 HOPD COVID-19 SPEC COLLECT: HCPCS

## 2020-01-01 PROCEDURE — 25800030 ZZH RX IP 258 OP 636: Performed by: STUDENT IN AN ORGANIZED HEALTH CARE EDUCATION/TRAINING PROGRAM

## 2020-01-01 PROCEDURE — 90471 IMMUNIZATION ADMIN: CPT | Performed by: STUDENT IN AN ORGANIZED HEALTH CARE EDUCATION/TRAINING PROGRAM

## 2020-01-01 PROCEDURE — 36556 INSERT NON-TUNNEL CV CATH: CPT | Mod: GC | Performed by: RADIOLOGY

## 2020-01-01 PROCEDURE — 85025 COMPLETE CBC W/AUTO DIFF WBC: CPT | Performed by: STUDENT IN AN ORGANIZED HEALTH CARE EDUCATION/TRAINING PROGRAM

## 2020-01-01 PROCEDURE — 85610 PROTHROMBIN TIME: CPT | Performed by: STUDENT IN AN ORGANIZED HEALTH CARE EDUCATION/TRAINING PROGRAM

## 2020-01-01 PROCEDURE — 25000125 ZZHC RX 250: Performed by: EMERGENCY MEDICINE

## 2020-01-01 PROCEDURE — 97032 APPL MODALITY 1+ESTIM EA 15: CPT | Mod: GP | Performed by: PHYSICAL THERAPIST

## 2020-01-01 PROCEDURE — 96415 CHEMO IV INFUSION ADDL HR: CPT

## 2020-01-01 PROCEDURE — 77001 FLUOROGUIDE FOR VEIN DEVICE: CPT

## 2020-01-01 PROCEDURE — 94640 AIRWAY INHALATION TREATMENT: CPT

## 2020-01-01 PROCEDURE — U0003 INFECTIOUS AGENT DETECTION BY NUCLEIC ACID (DNA OR RNA); SEVERE ACUTE RESPIRATORY SYNDROME CORONAVIRUS 2 (SARS-COV-2) (CORONAVIRUS DISEASE [COVID-19]), AMPLIFIED PROBE TECHNIQUE, MAKING USE OF HIGH THROUGHPUT TECHNOLOGIES AS DESCRIBED BY CMS-2020-01-R: HCPCS | Performed by: INTERNAL MEDICINE

## 2020-01-01 PROCEDURE — 85384 FIBRINOGEN ACTIVITY: CPT | Performed by: PATHOLOGY

## 2020-01-01 PROCEDURE — 96413 CHEMO IV INFUSION 1 HR: CPT

## 2020-01-01 PROCEDURE — 80053 COMPREHEN METABOLIC PANEL: CPT | Performed by: STUDENT IN AN ORGANIZED HEALTH CARE EDUCATION/TRAINING PROGRAM

## 2020-01-01 PROCEDURE — 99238 HOSP IP/OBS DSCHRG MGMT 30/<: CPT | Mod: GC | Performed by: STUDENT IN AN ORGANIZED HEALTH CARE EDUCATION/TRAINING PROGRAM

## 2020-01-01 PROCEDURE — 80048 BASIC METABOLIC PNL TOTAL CA: CPT | Performed by: PHYSICAL MEDICINE & REHABILITATION

## 2020-01-01 PROCEDURE — 99223 1ST HOSP IP/OBS HIGH 75: CPT | Mod: GC | Performed by: PSYCHIATRY & NEUROLOGY

## 2020-01-01 PROCEDURE — 97163 PT EVAL HIGH COMPLEX 45 MIN: CPT | Mod: GP | Performed by: PHYSICAL THERAPIST

## 2020-01-01 PROCEDURE — 96374 THER/PROPH/DIAG INJ IV PUSH: CPT | Mod: 59 | Performed by: EMERGENCY MEDICINE

## 2020-01-01 PROCEDURE — C9803 HOPD COVID-19 SPEC COLLECT: HCPCS | Performed by: EMERGENCY MEDICINE

## 2020-01-01 PROCEDURE — 40000141 ZZH STATISTIC PERIPHERAL IV START W/O US GUIDANCE

## 2020-01-01 PROCEDURE — 27210732 ZZH ACCESSORY CR1

## 2020-01-01 PROCEDURE — G0008 ADMIN INFLUENZA VIRUS VAC: HCPCS | Performed by: PSYCHIATRY & NEUROLOGY

## 2020-01-01 PROCEDURE — 97165 OT EVAL LOW COMPLEX 30 MIN: CPT | Mod: GO

## 2020-01-01 PROCEDURE — 86140 C-REACTIVE PROTEIN: CPT | Performed by: EMERGENCY MEDICINE

## 2020-01-01 PROCEDURE — 99223 1ST HOSP IP/OBS HIGH 75: CPT | Performed by: PHYSICAL MEDICINE & REHABILITATION

## 2020-01-01 PROCEDURE — 85025 COMPLETE CBC W/AUTO DIFF WBC: CPT | Performed by: INTERNAL MEDICINE

## 2020-01-01 PROCEDURE — 25000125 ZZHC RX 250: Performed by: PATHOLOGY

## 2020-01-01 PROCEDURE — 99232 SBSQ HOSP IP/OBS MODERATE 35: CPT | Mod: GC | Performed by: PHYSICAL MEDICINE & REHABILITATION

## 2020-01-01 PROCEDURE — 72156 MRI NECK SPINE W/O & W/DYE: CPT

## 2020-01-01 PROCEDURE — 90662 IIV NO PRSV INCREASED AG IM: CPT | Performed by: PSYCHIATRY & NEUROLOGY

## 2020-01-01 PROCEDURE — 80048 BASIC METABOLIC PNL TOTAL CA: CPT | Performed by: EMERGENCY MEDICINE

## 2020-01-01 PROCEDURE — 72157 MRI CHEST SPINE W/O & W/DYE: CPT

## 2020-01-01 PROCEDURE — 999N000128 HC STATISTIC PERIPHERAL IV START W/O US GUIDANCE

## 2020-01-01 PROCEDURE — 84484 ASSAY OF TROPONIN QUANT: CPT | Performed by: STUDENT IN AN ORGANIZED HEALTH CARE EDUCATION/TRAINING PROGRAM

## 2020-01-01 PROCEDURE — 86355 B CELLS TOTAL COUNT: CPT | Performed by: STUDENT IN AN ORGANIZED HEALTH CARE EDUCATION/TRAINING PROGRAM

## 2020-01-01 PROCEDURE — 99443 PR PHYSICIAN TELEPHONE EVALUATION 21-30 MIN: CPT | Mod: 95 | Performed by: PSYCHIATRY & NEUROLOGY

## 2020-01-01 PROCEDURE — 87086 URINE CULTURE/COLONY COUNT: CPT | Performed by: EMERGENCY MEDICINE

## 2020-01-01 PROCEDURE — 25500064 ZZH RX 255 OP 636: Performed by: EMERGENCY MEDICINE

## 2020-01-01 PROCEDURE — 86850 RBC ANTIBODY SCREEN: CPT | Performed by: PATHOLOGY

## 2020-01-01 PROCEDURE — 250N000021 HC RX MED A9270 GY (STAT IND- M) 250: Performed by: STUDENT IN AN ORGANIZED HEALTH CARE EDUCATION/TRAINING PROGRAM

## 2020-01-01 PROCEDURE — 999N000157 HC STATISTIC RCP TIME EA 10 MIN

## 2020-01-01 PROCEDURE — 99283 EMERGENCY DEPT VISIT LOW MDM: CPT

## 2020-01-01 PROCEDURE — 25000581 ZZH RX MED A9270 GY (STAT IND- M) 250: Mod: GY | Performed by: STUDENT IN AN ORGANIZED HEALTH CARE EDUCATION/TRAINING PROGRAM

## 2020-01-01 PROCEDURE — 99239 HOSP IP/OBS DSCHRG MGMT >30: CPT | Performed by: INTERNAL MEDICINE

## 2020-01-01 PROCEDURE — 85652 RBC SED RATE AUTOMATED: CPT | Performed by: PSYCHIATRY & NEUROLOGY

## 2020-01-01 PROCEDURE — 83735 ASSAY OF MAGNESIUM: CPT | Performed by: INTERNAL MEDICINE

## 2020-01-01 PROCEDURE — 80053 COMPREHEN METABOLIC PANEL: CPT | Performed by: INTERNAL MEDICINE

## 2020-01-01 PROCEDURE — 96361 HYDRATE IV INFUSION ADD-ON: CPT

## 2020-01-01 PROCEDURE — 85730 THROMBOPLASTIN TIME PARTIAL: CPT | Performed by: STUDENT IN AN ORGANIZED HEALTH CARE EDUCATION/TRAINING PROGRAM

## 2020-01-01 PROCEDURE — 84132 ASSAY OF SERUM POTASSIUM: CPT | Performed by: STUDENT IN AN ORGANIZED HEALTH CARE EDUCATION/TRAINING PROGRAM

## 2020-01-01 RX ORDER — METOPROLOL TARTRATE 50 MG
50 TABLET ORAL 2 TIMES DAILY
Status: DISCONTINUED | OUTPATIENT
Start: 2020-01-01 | End: 2020-01-01

## 2020-01-01 RX ORDER — TRAZODONE HYDROCHLORIDE 100 MG/1
100 TABLET ORAL AT BEDTIME
Status: DISCONTINUED | OUTPATIENT
Start: 2020-01-01 | End: 2020-01-01 | Stop reason: HOSPADM

## 2020-01-01 RX ORDER — HEPARIN SODIUM,PORCINE 10 UNIT/ML
5 VIAL (ML) INTRAVENOUS
Status: CANCELLED | OUTPATIENT
Start: 2020-01-01

## 2020-01-01 RX ORDER — SIMVASTATIN 20 MG
20 TABLET ORAL AT BEDTIME
Status: DISCONTINUED | OUTPATIENT
Start: 2020-01-01 | End: 2020-01-01 | Stop reason: HOSPADM

## 2020-01-01 RX ORDER — POTASSIUM CHLORIDE 1.5 G/1.58G
40 POWDER, FOR SOLUTION ORAL ONCE
Status: COMPLETED | OUTPATIENT
Start: 2020-01-01 | End: 2020-01-01

## 2020-01-01 RX ORDER — ALBUMIN HUMAN 25 %
3500 INTRAVENOUS SOLUTION INTRAVENOUS
Status: DISCONTINUED | OUTPATIENT
Start: 2020-01-01 | End: 2020-01-01

## 2020-01-01 RX ORDER — ONDANSETRON 4 MG/1
4 TABLET, ORALLY DISINTEGRATING ORAL EVERY 6 HOURS PRN
Status: DISCONTINUED | OUTPATIENT
Start: 2020-01-01 | End: 2020-01-01 | Stop reason: HOSPADM

## 2020-01-01 RX ORDER — AMLODIPINE BESYLATE 5 MG/1
5 TABLET ORAL DAILY
Status: DISCONTINUED | OUTPATIENT
Start: 2020-01-01 | End: 2020-01-01 | Stop reason: HOSPADM

## 2020-01-01 RX ORDER — HEPARIN SODIUM (PORCINE) LOCK FLUSH IV SOLN 100 UNIT/ML 100 UNIT/ML
3 SOLUTION INTRAVENOUS
Status: DISCONTINUED | OUTPATIENT
Start: 2020-01-01 | End: 2020-01-01 | Stop reason: HOSPADM

## 2020-01-01 RX ORDER — ONDANSETRON 2 MG/ML
4 INJECTION INTRAMUSCULAR; INTRAVENOUS
Status: CANCELLED | OUTPATIENT
Start: 2021-01-01

## 2020-01-01 RX ORDER — NICOTINE POLACRILEX 4 MG
15-30 LOZENGE BUCCAL
Status: DISCONTINUED | OUTPATIENT
Start: 2020-01-01 | End: 2020-01-01 | Stop reason: HOSPADM

## 2020-01-01 RX ORDER — HEPARIN SODIUM (PORCINE) LOCK FLUSH IV SOLN 100 UNIT/ML 100 UNIT/ML
3 SOLUTION INTRAVENOUS ONCE
Status: COMPLETED | OUTPATIENT
Start: 2020-01-01 | End: 2020-01-01

## 2020-01-01 RX ORDER — NALOXONE HYDROCHLORIDE 0.4 MG/ML
.1-.4 INJECTION, SOLUTION INTRAMUSCULAR; INTRAVENOUS; SUBCUTANEOUS
Status: DISCONTINUED | OUTPATIENT
Start: 2020-01-01 | End: 2020-01-01 | Stop reason: HOSPADM

## 2020-01-01 RX ORDER — FLUOXETINE 40 MG/1
40 CAPSULE ORAL DAILY
Status: DISCONTINUED | OUTPATIENT
Start: 2020-01-01 | End: 2020-01-01 | Stop reason: HOSPADM

## 2020-01-01 RX ORDER — POTASSIUM CHLORIDE 7.45 MG/ML
10 INJECTION INTRAVENOUS
Status: DISCONTINUED | OUTPATIENT
Start: 2020-01-01 | End: 2020-01-01 | Stop reason: HOSPADM

## 2020-01-01 RX ORDER — DIPHENHYDRAMINE HYDROCHLORIDE 50 MG/ML
50 INJECTION INTRAMUSCULAR; INTRAVENOUS
Status: CANCELLED | OUTPATIENT
Start: 2020-01-01

## 2020-01-01 RX ORDER — ASCORBIC ACID 500 MG
500 TABLET ORAL EVERY MORNING
Status: DISCONTINUED | OUTPATIENT
Start: 2020-01-01 | End: 2020-01-01 | Stop reason: HOSPADM

## 2020-01-01 RX ORDER — GADOBUTROL 604.72 MG/ML
7.5 INJECTION INTRAVENOUS ONCE
Status: COMPLETED | OUTPATIENT
Start: 2020-01-01 | End: 2020-01-01

## 2020-01-01 RX ORDER — FUROSEMIDE 20 MG
20 TABLET ORAL DAILY
Status: DISCONTINUED | OUTPATIENT
Start: 2020-01-01 | End: 2020-01-01 | Stop reason: HOSPADM

## 2020-01-01 RX ORDER — HEPARIN SODIUM 1000 [USP'U]/ML
3 INJECTION, SOLUTION INTRAVENOUS; SUBCUTANEOUS ONCE
Status: CANCELLED | OUTPATIENT
Start: 2020-01-01 | End: 2020-01-01

## 2020-01-01 RX ORDER — DOCUSATE SODIUM 100 MG/1
100 CAPSULE, LIQUID FILLED ORAL 2 TIMES DAILY PRN
Status: DISCONTINUED | OUTPATIENT
Start: 2020-01-01 | End: 2020-01-01 | Stop reason: HOSPADM

## 2020-01-01 RX ORDER — POTASSIUM CHLORIDE 750 MG/1
40 TABLET, EXTENDED RELEASE ORAL ONCE
Status: COMPLETED | OUTPATIENT
Start: 2020-01-01 | End: 2020-01-01

## 2020-01-01 RX ORDER — POLYETHYLENE GLYCOL 3350 17 G/17G
17 POWDER, FOR SOLUTION ORAL DAILY PRN
Status: DISCONTINUED | OUTPATIENT
Start: 2020-01-01 | End: 2020-01-01 | Stop reason: HOSPADM

## 2020-01-01 RX ORDER — POLYETHYLENE GLYCOL 3350 17 G/17G
17 POWDER, FOR SOLUTION ORAL DAILY
Status: DISCONTINUED | OUTPATIENT
Start: 2020-01-01 | End: 2020-01-01 | Stop reason: HOSPADM

## 2020-01-01 RX ORDER — LOPERAMIDE HCL 2 MG
2 CAPSULE ORAL 4 TIMES DAILY PRN
Status: DISCONTINUED | OUTPATIENT
Start: 2020-01-01 | End: 2020-01-01 | Stop reason: HOSPADM

## 2020-01-01 RX ORDER — GABAPENTIN 300 MG/1
300 CAPSULE ORAL EVERY MORNING
Status: DISCONTINUED | OUTPATIENT
Start: 2020-01-01 | End: 2020-01-01 | Stop reason: HOSPADM

## 2020-01-01 RX ORDER — ONDANSETRON 2 MG/ML
4 INJECTION INTRAMUSCULAR; INTRAVENOUS EVERY 6 HOURS PRN
Status: DISCONTINUED | OUTPATIENT
Start: 2020-01-01 | End: 2020-01-01 | Stop reason: HOSPADM

## 2020-01-01 RX ORDER — CALCIUM GLUCONATE 100 MG/ML
AMPUL (ML) INTRAVENOUS
Status: COMPLETED | OUTPATIENT
Start: 2020-01-01 | End: 2020-01-01

## 2020-01-01 RX ORDER — CARBOXYMETHYLCELLULOSE SODIUM 5 MG/ML
1 SOLUTION/ DROPS OPHTHALMIC
Status: DISCONTINUED | OUTPATIENT
Start: 2020-01-01 | End: 2020-01-01 | Stop reason: HOSPADM

## 2020-01-01 RX ORDER — HEPARIN SODIUM (PORCINE) LOCK FLUSH IV SOLN 100 UNIT/ML 100 UNIT/ML
3 SOLUTION INTRAVENOUS ONCE
Status: CANCELLED | OUTPATIENT
Start: 2020-01-01 | End: 2020-01-01

## 2020-01-01 RX ORDER — AMLODIPINE BESYLATE 5 MG/1
5 TABLET ORAL DAILY
COMMUNITY
Start: 2020-01-01

## 2020-01-01 RX ORDER — AMLODIPINE BESYLATE 10 MG/1
10 TABLET ORAL DAILY
Status: DISCONTINUED | OUTPATIENT
Start: 2020-01-01 | End: 2020-01-01

## 2020-01-01 RX ORDER — METOPROLOL SUCCINATE 25 MG/1
25 TABLET, EXTENDED RELEASE ORAL 2 TIMES DAILY
Qty: 60 TABLET | Refills: 0 | Status: SHIPPED | OUTPATIENT
Start: 2020-01-01 | End: 2021-01-01

## 2020-01-01 RX ORDER — POTASSIUM CHLORIDE 1.5 G/1.58G
40 POWDER, FOR SOLUTION ORAL DAILY
Status: DISCONTINUED | OUTPATIENT
Start: 2020-01-01 | End: 2020-01-01

## 2020-01-01 RX ORDER — SODIUM CHLORIDE 9 MG/ML
INJECTION, SOLUTION INTRAVENOUS CONTINUOUS
Status: DISCONTINUED | OUTPATIENT
Start: 2020-01-01 | End: 2020-01-01

## 2020-01-01 RX ORDER — HEPARIN SODIUM (PORCINE) LOCK FLUSH IV SOLN 100 UNIT/ML 100 UNIT/ML
5 SOLUTION INTRAVENOUS
Status: CANCELLED | OUTPATIENT
Start: 2020-01-01

## 2020-01-01 RX ORDER — ALBUMIN HUMAN 25 %
2500 INTRAVENOUS SOLUTION INTRAVENOUS
Status: COMPLETED | OUTPATIENT
Start: 2020-01-01 | End: 2020-01-01

## 2020-01-01 RX ORDER — ASCORBIC ACID 500 MG
500 TABLET ORAL DAILY
Status: DISCONTINUED | OUTPATIENT
Start: 2020-01-01 | End: 2020-01-01 | Stop reason: HOSPADM

## 2020-01-01 RX ORDER — ALBUMIN HUMAN 25 %
3000 INTRAVENOUS SOLUTION INTRAVENOUS
Status: COMPLETED | OUTPATIENT
Start: 2020-01-01 | End: 2020-01-01

## 2020-01-01 RX ORDER — MAGNESIUM SULFATE HEPTAHYDRATE 40 MG/ML
4 INJECTION, SOLUTION INTRAVENOUS EVERY 4 HOURS PRN
Status: DISCONTINUED | OUTPATIENT
Start: 2020-01-01 | End: 2020-01-01 | Stop reason: HOSPADM

## 2020-01-01 RX ORDER — HEPARIN SODIUM (PORCINE) LOCK FLUSH IV SOLN 100 UNIT/ML 100 UNIT/ML
5 SOLUTION INTRAVENOUS EVERY 24 HOURS
Status: DISCONTINUED | OUTPATIENT
Start: 2020-01-01 | End: 2020-01-01 | Stop reason: HOSPADM

## 2020-01-01 RX ORDER — HEPARIN SODIUM (PORCINE) LOCK FLUSH IV SOLN 100 UNIT/ML 100 UNIT/ML
3 SOLUTION INTRAVENOUS EVERY 24 HOURS
Status: DISCONTINUED | OUTPATIENT
Start: 2020-01-01 | End: 2020-01-01 | Stop reason: HOSPADM

## 2020-01-01 RX ORDER — POTASSIUM CL/LIDO/0.9 % NACL 10MEQ/0.1L
10 INTRAVENOUS SOLUTION, PIGGYBACK (ML) INTRAVENOUS
Status: DISCONTINUED | OUTPATIENT
Start: 2020-01-01 | End: 2020-01-01 | Stop reason: HOSPADM

## 2020-01-01 RX ORDER — FUROSEMIDE 20 MG
20 TABLET ORAL
Status: DISCONTINUED | OUTPATIENT
Start: 2020-01-01 | End: 2020-01-01

## 2020-01-01 RX ORDER — NICOTINE POLACRILEX 4 MG
15-30 LOZENGE BUCCAL
Status: DISCONTINUED | OUTPATIENT
Start: 2020-01-01 | End: 2020-01-01

## 2020-01-01 RX ORDER — BISACODYL 10 MG
10 SUPPOSITORY, RECTAL RECTAL DAILY PRN
Status: DISCONTINUED | OUTPATIENT
Start: 2020-01-01 | End: 2020-01-01 | Stop reason: HOSPADM

## 2020-01-01 RX ORDER — GABAPENTIN 300 MG/1
600 CAPSULE ORAL AT BEDTIME
Status: DISCONTINUED | OUTPATIENT
Start: 2020-01-01 | End: 2020-01-01 | Stop reason: HOSPADM

## 2020-01-01 RX ORDER — POTASSIUM CHLORIDE 1.5 G/1.58G
40 POWDER, FOR SOLUTION ORAL 2 TIMES DAILY
Status: DISCONTINUED | OUTPATIENT
Start: 2020-01-01 | End: 2020-01-01

## 2020-01-01 RX ORDER — TETRACAINE HYDROCHLORIDE 5 MG/ML
SOLUTION OPHTHALMIC
Status: DISCONTINUED
Start: 2020-01-01 | End: 2020-01-01 | Stop reason: HOSPADM

## 2020-01-01 RX ORDER — DEXTROSE MONOHYDRATE 25 G/50ML
25-50 INJECTION, SOLUTION INTRAVENOUS
Status: CANCELLED | OUTPATIENT
Start: 2020-01-01

## 2020-01-01 RX ORDER — MYCOPHENOLATE MOFETIL 500 MG/1
500 TABLET ORAL 2 TIMES DAILY
Status: DISCONTINUED | OUTPATIENT
Start: 2020-01-01 | End: 2020-01-01 | Stop reason: HOSPADM

## 2020-01-01 RX ORDER — METOPROLOL TARTRATE 50 MG
50 TABLET ORAL 2 TIMES DAILY
Status: DISCONTINUED | OUTPATIENT
Start: 2020-01-01 | End: 2020-01-01 | Stop reason: HOSPADM

## 2020-01-01 RX ORDER — MYCOPHENOLATE MOFETIL 500 MG/1
500 TABLET, FILM COATED ORAL
Status: DISCONTINUED | OUTPATIENT
Start: 2020-01-01 | End: 2020-01-01

## 2020-01-01 RX ORDER — CEFTRIAXONE 1 G/1
1 INJECTION, POWDER, FOR SOLUTION INTRAMUSCULAR; INTRAVENOUS EVERY 24 HOURS
Status: DISCONTINUED | OUTPATIENT
Start: 2020-01-01 | End: 2020-01-01

## 2020-01-01 RX ORDER — ACETAMINOPHEN 325 MG/1
975 TABLET ORAL EVERY 4 HOURS PRN
Status: DISCONTINUED | OUTPATIENT
Start: 2020-01-01 | End: 2020-01-01 | Stop reason: HOSPADM

## 2020-01-01 RX ORDER — HEPARIN SODIUM,PORCINE 10 UNIT/ML
5 VIAL (ML) INTRAVENOUS
Status: CANCELLED | OUTPATIENT
Start: 2021-01-01

## 2020-01-01 RX ORDER — CLOPIDOGREL BISULFATE 75 MG/1
75 TABLET ORAL DAILY
Status: DISCONTINUED | OUTPATIENT
Start: 2020-01-01 | End: 2020-01-01 | Stop reason: HOSPADM

## 2020-01-01 RX ORDER — POTASSIUM CHLORIDE 29.8 MG/ML
20 INJECTION INTRAVENOUS
Status: DISCONTINUED | OUTPATIENT
Start: 2020-01-01 | End: 2020-01-01 | Stop reason: HOSPADM

## 2020-01-01 RX ORDER — LIDOCAINE 40 MG/G
CREAM TOPICAL
Status: DISCONTINUED | OUTPATIENT
Start: 2020-01-01 | End: 2020-01-01 | Stop reason: HOSPADM

## 2020-01-01 RX ORDER — MINERAL OIL/HYDROPHIL PETROLAT
OINTMENT (GRAM) TOPICAL
Status: DISCONTINUED | OUTPATIENT
Start: 2020-01-01 | End: 2020-01-01 | Stop reason: HOSPADM

## 2020-01-01 RX ORDER — CEFTRIAXONE 1 G/1
1 INJECTION, POWDER, FOR SOLUTION INTRAMUSCULAR; INTRAVENOUS EVERY 24 HOURS
Status: COMPLETED | OUTPATIENT
Start: 2020-01-01 | End: 2020-01-01

## 2020-01-01 RX ORDER — BENZOCAINE/MENTHOL 6 MG-10 MG
LOZENGE MUCOUS MEMBRANE 2 TIMES DAILY PRN
Status: DISCONTINUED | OUTPATIENT
Start: 2020-01-01 | End: 2020-01-01 | Stop reason: HOSPADM

## 2020-01-01 RX ORDER — GADOBUTROL 604.72 MG/ML
10 INJECTION INTRAVENOUS ONCE
Status: COMPLETED | OUTPATIENT
Start: 2020-01-01 | End: 2020-01-01

## 2020-01-01 RX ORDER — POTASSIUM CHLORIDE 7.45 MG/ML
10 INJECTION INTRAVENOUS
Status: DISCONTINUED | OUTPATIENT
Start: 2020-01-01 | End: 2020-01-01

## 2020-01-01 RX ORDER — ACETAMINOPHEN 325 MG/1
650 TABLET ORAL EVERY 4 HOURS PRN
Status: DISCONTINUED | OUTPATIENT
Start: 2020-01-01 | End: 2020-01-01 | Stop reason: HOSPADM

## 2020-01-01 RX ORDER — CEFTRIAXONE 1 G/1
1 INJECTION, POWDER, FOR SOLUTION INTRAMUSCULAR; INTRAVENOUS EVERY 24 HOURS
Status: DISCONTINUED | OUTPATIENT
Start: 2020-01-01 | End: 2020-01-01 | Stop reason: HOSPADM

## 2020-01-01 RX ORDER — POTASSIUM CHLORIDE 750 MG/1
20 TABLET, EXTENDED RELEASE ORAL DAILY
Status: DISCONTINUED | OUTPATIENT
Start: 2020-01-01 | End: 2020-01-01 | Stop reason: HOSPADM

## 2020-01-01 RX ORDER — MYCOPHENOLATE MOFETIL 500 MG/1
500 TABLET, FILM COATED ORAL 2 TIMES DAILY
Status: DISCONTINUED | OUTPATIENT
Start: 2020-01-01 | End: 2020-01-01 | Stop reason: RX

## 2020-01-01 RX ORDER — ACETAMINOPHEN 325 MG/1
975 TABLET ORAL EVERY 4 HOURS PRN
COMMUNITY
Start: 2020-01-01

## 2020-01-01 RX ORDER — CALCIUM GLUCONATE 100 MG/ML
AMPUL (ML) INTRAVENOUS
Status: CANCELLED | OUTPATIENT
Start: 2020-01-01

## 2020-01-01 RX ORDER — LIDOCAINE HYDROCHLORIDE 10 MG/ML
1-30 INJECTION, SOLUTION EPIDURAL; INFILTRATION; INTRACAUDAL; PERINEURAL
Status: COMPLETED | OUTPATIENT
Start: 2020-01-01 | End: 2020-01-01

## 2020-01-01 RX ORDER — MYCOPHENOLATE MOFETIL 500 MG/1
TABLET ORAL
Qty: 120 TABLET | Refills: 1 | Status: SHIPPED | OUTPATIENT
Start: 2020-01-01 | End: 2020-01-01

## 2020-01-01 RX ORDER — MYCOPHENOLATE MOFETIL 250 MG/1
500 CAPSULE ORAL
Status: DISCONTINUED | OUTPATIENT
Start: 2020-01-01 | End: 2020-01-01 | Stop reason: HOSPADM

## 2020-01-01 RX ORDER — ONDANSETRON 2 MG/ML
4 INJECTION INTRAMUSCULAR; INTRAVENOUS
Status: CANCELLED | OUTPATIENT
Start: 2020-01-01

## 2020-01-01 RX ORDER — TROLAMINE SALICYLATE 10 G/100G
CREAM TOPICAL 2 TIMES DAILY PRN
Status: DISCONTINUED | OUTPATIENT
Start: 2020-01-01 | End: 2020-01-01 | Stop reason: HOSPADM

## 2020-01-01 RX ORDER — CLOPIDOGREL BISULFATE 75 MG/1
75 TABLET ORAL EVERY MORNING
Status: DISCONTINUED | OUTPATIENT
Start: 2020-01-01 | End: 2020-01-01

## 2020-01-01 RX ORDER — POTASSIUM CHLORIDE 750 MG/1
20-40 TABLET, EXTENDED RELEASE ORAL
Status: DISCONTINUED | OUTPATIENT
Start: 2020-01-01 | End: 2020-01-01 | Stop reason: HOSPADM

## 2020-01-01 RX ORDER — ASCORBIC ACID 500 MG
500 TABLET ORAL DAILY
COMMUNITY

## 2020-01-01 RX ORDER — AMLODIPINE BESYLATE 10 MG/1
10 TABLET ORAL DAILY
Status: DISCONTINUED | OUTPATIENT
Start: 2020-01-01 | End: 2020-01-01 | Stop reason: HOSPADM

## 2020-01-01 RX ORDER — POTASSIUM CHLORIDE 1500 MG/1
20 TABLET, EXTENDED RELEASE ORAL DAILY
COMMUNITY
Start: 2020-01-01

## 2020-01-01 RX ORDER — POTASSIUM CHLORIDE 1.5 G/1.58G
20-40 POWDER, FOR SOLUTION ORAL
Status: DISCONTINUED | OUTPATIENT
Start: 2020-01-01 | End: 2020-01-01

## 2020-01-01 RX ORDER — POTASSIUM CHLORIDE 29.8 MG/ML
20 INJECTION INTRAVENOUS
Status: DISCONTINUED | OUTPATIENT
Start: 2020-01-01 | End: 2020-01-01

## 2020-01-01 RX ORDER — DIPHENHYDRAMINE HCL 25 MG
25 CAPSULE ORAL
Status: DISCONTINUED | OUTPATIENT
Start: 2020-01-01 | End: 2020-01-01 | Stop reason: HOSPADM

## 2020-01-01 RX ORDER — LIDOCAINE 40 MG/G
CREAM TOPICAL
Status: CANCELLED | OUTPATIENT
Start: 2020-01-01

## 2020-01-01 RX ORDER — FAMOTIDINE 20 MG/1
20 TABLET, FILM COATED ORAL 2 TIMES DAILY
Status: DISCONTINUED | OUTPATIENT
Start: 2020-01-01 | End: 2020-01-01

## 2020-01-01 RX ORDER — LIDOCAINE 40 MG/G
CREAM TOPICAL
Status: DISCONTINUED | OUTPATIENT
Start: 2020-01-01 | End: 2020-01-01

## 2020-01-01 RX ORDER — DEXTROSE MONOHYDRATE 25 G/50ML
25-50 INJECTION, SOLUTION INTRAVENOUS
Status: DISCONTINUED | OUTPATIENT
Start: 2020-01-01 | End: 2020-01-01

## 2020-01-01 RX ORDER — ACETAMINOPHEN 325 MG/1
650 TABLET ORAL EVERY 6 HOURS PRN
Status: DISCONTINUED | OUTPATIENT
Start: 2020-01-01 | End: 2020-01-01 | Stop reason: HOSPADM

## 2020-01-01 RX ORDER — MYCOPHENOLATE MOFETIL 500 MG/1
500 TABLET, FILM COATED ORAL 2 TIMES DAILY
Qty: 60 TABLET | Refills: 4 | Status: ON HOLD | OUTPATIENT
Start: 2020-01-01 | End: 2020-01-01

## 2020-01-01 RX ORDER — POTASSIUM CHLORIDE 750 MG/1
40 TABLET, EXTENDED RELEASE ORAL DAILY
Status: DISCONTINUED | OUTPATIENT
Start: 2020-01-01 | End: 2020-01-01

## 2020-01-01 RX ORDER — DEXTROSE MONOHYDRATE 25 G/50ML
25-50 INJECTION, SOLUTION INTRAVENOUS
Status: DISCONTINUED | OUTPATIENT
Start: 2020-01-01 | End: 2020-01-01 | Stop reason: HOSPADM

## 2020-01-01 RX ORDER — FUROSEMIDE 20 MG
20 TABLET ORAL 3 TIMES DAILY
Status: DISCONTINUED | OUTPATIENT
Start: 2020-01-01 | End: 2020-01-01 | Stop reason: HOSPADM

## 2020-01-01 RX ORDER — AMOXICILLIN 250 MG
2 CAPSULE ORAL 2 TIMES DAILY PRN
Status: DISCONTINUED | OUTPATIENT
Start: 2020-01-01 | End: 2020-01-01 | Stop reason: HOSPADM

## 2020-01-01 RX ORDER — POTASSIUM CHLORIDE 1500 MG/1
40 TABLET, EXTENDED RELEASE ORAL DAILY
Status: ON HOLD
Start: 2020-01-01 | End: 2020-01-01

## 2020-01-01 RX ORDER — ALBUTEROL SULFATE 90 UG/1
2 AEROSOL, METERED RESPIRATORY (INHALATION) EVERY 6 HOURS PRN
Status: DISCONTINUED | OUTPATIENT
Start: 2020-01-01 | End: 2020-01-01 | Stop reason: HOSPADM

## 2020-01-01 RX ORDER — METOPROLOL SUCCINATE 25 MG/1
25 TABLET, EXTENDED RELEASE ORAL 2 TIMES DAILY
Status: DISCONTINUED | OUTPATIENT
Start: 2020-01-01 | End: 2020-01-01 | Stop reason: HOSPADM

## 2020-01-01 RX ORDER — CEPHALEXIN 500 MG/1
500 CAPSULE ORAL ONCE
Status: COMPLETED | OUTPATIENT
Start: 2020-01-01 | End: 2020-01-01

## 2020-01-01 RX ORDER — METOPROLOL TARTRATE 25 MG/1
50 TABLET, FILM COATED ORAL 2 TIMES DAILY
Status: DISCONTINUED | OUTPATIENT
Start: 2020-01-01 | End: 2020-01-01 | Stop reason: HOSPADM

## 2020-01-01 RX ORDER — MYCOPHENOLATE MOFETIL 500 MG/1
500 TABLET ORAL 2 TIMES DAILY
COMMUNITY
End: 2020-01-01

## 2020-01-01 RX ORDER — POTASSIUM CHLORIDE 750 MG/1
20-40 TABLET, EXTENDED RELEASE ORAL
Status: DISCONTINUED | OUTPATIENT
Start: 2020-01-01 | End: 2020-01-01

## 2020-01-01 RX ORDER — PANTOPRAZOLE SODIUM 40 MG/1
40 TABLET, DELAYED RELEASE ORAL
Status: DISCONTINUED | OUTPATIENT
Start: 2020-01-01 | End: 2020-01-01

## 2020-01-01 RX ORDER — POTASSIUM CHLORIDE 1.5 G/1.58G
20-40 POWDER, FOR SOLUTION ORAL
Status: DISCONTINUED | OUTPATIENT
Start: 2020-01-01 | End: 2020-01-01 | Stop reason: HOSPADM

## 2020-01-01 RX ORDER — MYCOPHENOLATE MOFETIL 250 MG/1
500 CAPSULE ORAL 2 TIMES DAILY
Status: DISCONTINUED | OUTPATIENT
Start: 2020-01-01 | End: 2020-01-01 | Stop reason: HOSPADM

## 2020-01-01 RX ORDER — TRAZODONE HYDROCHLORIDE 50 MG/1
100 TABLET, FILM COATED ORAL AT BEDTIME
Status: DISCONTINUED | OUTPATIENT
Start: 2020-01-01 | End: 2020-01-01 | Stop reason: HOSPADM

## 2020-01-01 RX ORDER — POTASSIUM CL/LIDO/0.9 % NACL 10MEQ/0.1L
10 INTRAVENOUS SOLUTION, PIGGYBACK (ML) INTRAVENOUS
Status: DISCONTINUED | OUTPATIENT
Start: 2020-01-01 | End: 2020-01-01

## 2020-01-01 RX ORDER — CLOPIDOGREL BISULFATE 75 MG/1
75 TABLET ORAL DAILY
Status: ON HOLD | COMMUNITY
End: 2020-01-01

## 2020-01-01 RX ORDER — POTASSIUM CHLORIDE 750 MG/1
40 TABLET, EXTENDED RELEASE ORAL DAILY
Status: DISCONTINUED | OUTPATIENT
Start: 2020-01-01 | End: 2020-01-01 | Stop reason: HOSPADM

## 2020-01-01 RX ORDER — CALCIUM CARBONATE 500 MG/1
500 TABLET, CHEWABLE ORAL DAILY PRN
Status: DISCONTINUED | OUTPATIENT
Start: 2020-01-01 | End: 2020-01-01 | Stop reason: HOSPADM

## 2020-01-01 RX ORDER — NICOTINE POLACRILEX 4 MG
15-30 LOZENGE BUCCAL
Status: CANCELLED | OUTPATIENT
Start: 2020-01-01

## 2020-01-01 RX ORDER — METOPROLOL SUCCINATE 50 MG/1
50 TABLET, EXTENDED RELEASE ORAL 2 TIMES DAILY
Status: ON HOLD | COMMUNITY
End: 2020-01-01

## 2020-01-01 RX ORDER — AMOXICILLIN 250 MG
1 CAPSULE ORAL 2 TIMES DAILY PRN
Status: DISCONTINUED | OUTPATIENT
Start: 2020-01-01 | End: 2020-01-01 | Stop reason: HOSPADM

## 2020-01-01 RX ORDER — ALBUMIN HUMAN 25 %
2500 INTRAVENOUS SOLUTION INTRAVENOUS
Status: CANCELLED | OUTPATIENT
Start: 2020-01-01

## 2020-01-01 RX ORDER — HEPARIN SODIUM (PORCINE) LOCK FLUSH IV SOLN 100 UNIT/ML 100 UNIT/ML
5 SOLUTION INTRAVENOUS
Status: CANCELLED | OUTPATIENT
Start: 2021-01-01

## 2020-01-01 RX ORDER — LANOLIN ALCOHOL/MO/W.PET/CERES
3 CREAM (GRAM) TOPICAL
Status: DISCONTINUED | OUTPATIENT
Start: 2020-01-01 | End: 2020-01-01 | Stop reason: HOSPADM

## 2020-01-01 RX ORDER — GABAPENTIN 300 MG/1
300 CAPSULE ORAL EVERY 24 HOURS
Status: DISCONTINUED | OUTPATIENT
Start: 2020-01-01 | End: 2020-01-01 | Stop reason: HOSPADM

## 2020-01-01 RX ORDER — ACETAMINOPHEN 325 MG/1
325-975 TABLET ORAL EVERY 6 HOURS PRN
Status: DISCONTINUED | OUTPATIENT
Start: 2020-01-01 | End: 2020-01-01 | Stop reason: HOSPADM

## 2020-01-01 RX ORDER — CLOPIDOGREL BISULFATE 75 MG/1
75 TABLET ORAL DAILY
COMMUNITY
Start: 2020-01-01 | End: 2020-01-01

## 2020-01-01 RX ORDER — FAMOTIDINE 20 MG/1
20 TABLET, FILM COATED ORAL 2 TIMES DAILY
Status: DISCONTINUED | OUTPATIENT
Start: 2020-01-01 | End: 2020-01-01 | Stop reason: HOSPADM

## 2020-01-01 RX ORDER — MAGNESIUM SULFATE HEPTAHYDRATE 40 MG/ML
4 INJECTION, SOLUTION INTRAVENOUS EVERY 4 HOURS PRN
Status: DISCONTINUED | OUTPATIENT
Start: 2020-01-01 | End: 2020-01-01

## 2020-01-01 RX ORDER — HEPARIN SODIUM,PORCINE 10 UNIT/ML
2-5 VIAL (ML) INTRAVENOUS
Status: DISCONTINUED | OUTPATIENT
Start: 2020-01-01 | End: 2020-01-01

## 2020-01-01 RX ORDER — METOPROLOL SUCCINATE 50 MG/1
50 TABLET, EXTENDED RELEASE ORAL 2 TIMES DAILY
Status: DISCONTINUED | OUTPATIENT
Start: 2020-01-01 | End: 2020-01-01 | Stop reason: HOSPADM

## 2020-01-01 RX ORDER — NOREPINEPHRINE BITARTRATE 0.06 MG/ML
0.03-0.4 INJECTION, SOLUTION INTRAVENOUS CONTINUOUS
Status: DISCONTINUED | OUTPATIENT
Start: 2020-01-01 | End: 2020-01-01

## 2020-01-01 RX ORDER — ALBUMIN HUMAN 25 %
3000 INTRAVENOUS SOLUTION INTRAVENOUS
Status: CANCELLED | OUTPATIENT
Start: 2020-01-01

## 2020-01-01 RX ORDER — LORAZEPAM 2 MG/ML
0.25 CONCENTRATE ORAL
Status: COMPLETED | OUTPATIENT
Start: 2020-01-01 | End: 2020-01-01

## 2020-01-01 RX ORDER — FUROSEMIDE 20 MG
20 TABLET ORAL 2 TIMES DAILY
Status: DISCONTINUED | OUTPATIENT
Start: 2020-01-01 | End: 2020-01-01 | Stop reason: HOSPADM

## 2020-01-01 RX ORDER — METOPROLOL SUCCINATE 50 MG/1
50 TABLET, EXTENDED RELEASE ORAL 2 TIMES DAILY
Status: DISCONTINUED | OUTPATIENT
Start: 2020-01-01 | End: 2020-01-01

## 2020-01-01 RX ADMIN — B-COMPLEX W/ C & FOLIC ACID TAB 1 TABLET: TAB at 10:26

## 2020-01-01 RX ADMIN — FUROSEMIDE 20 MG: 20 TABLET ORAL at 08:42

## 2020-01-01 RX ADMIN — MYCOPHENOLATE MOFETIL 500 MG: 250 CAPSULE ORAL at 08:59

## 2020-01-01 RX ADMIN — FAMOTIDINE 20 MG: 20 TABLET ORAL at 20:30

## 2020-01-01 RX ADMIN — FUROSEMIDE 20 MG: 20 TABLET ORAL at 16:39

## 2020-01-01 RX ADMIN — Medication 5 ML: at 15:40

## 2020-01-01 RX ADMIN — TRAZODONE HYDROCHLORIDE 100 MG: 100 TABLET ORAL at 22:02

## 2020-01-01 RX ADMIN — AMLODIPINE BESYLATE 5 MG: 5 TABLET ORAL at 08:51

## 2020-01-01 RX ADMIN — APIXABAN 5 MG: 2.5 TABLET, FILM COATED ORAL at 21:06

## 2020-01-01 RX ADMIN — MYCOPHENOLATE MOFETIL 500 MG: 250 CAPSULE ORAL at 17:30

## 2020-01-01 RX ADMIN — SODIUM CHLORIDE, PRESERVATIVE FREE 3 ML: 5 INJECTION INTRAVENOUS at 15:38

## 2020-01-01 RX ADMIN — METOPROLOL TARTRATE 50 MG: 25 TABLET, FILM COATED ORAL at 08:28

## 2020-01-01 RX ADMIN — ACETAMINOPHEN 975 MG: 325 TABLET, FILM COATED ORAL at 11:49

## 2020-01-01 RX ADMIN — TRAZODONE HYDROCHLORIDE 100 MG: 100 TABLET ORAL at 22:19

## 2020-01-01 RX ADMIN — SIMVASTATIN 20 MG: 20 TABLET, FILM COATED ORAL at 19:53

## 2020-01-01 RX ADMIN — ALBUMIN HUMAN 3000 ML: 0.05 INJECTION, SOLUTION INTRAVENOUS at 09:40

## 2020-01-01 RX ADMIN — MYCOPHENOLATE MOFETIL 500 MG: 250 CAPSULE ORAL at 20:10

## 2020-01-01 RX ADMIN — OXYCODONE HYDROCHLORIDE AND ACETAMINOPHEN 500 MG: 500 TABLET ORAL at 08:50

## 2020-01-01 RX ADMIN — MYCOPHENOLATE MOFETIL 500 MG: 500 TABLET, FILM COATED ORAL at 21:31

## 2020-01-01 RX ADMIN — SIMVASTATIN 20 MG: 20 TABLET, FILM COATED ORAL at 20:15

## 2020-01-01 RX ADMIN — ALBUMIN HUMAN 3000 ML: 0.05 INJECTION, SOLUTION INTRAVENOUS at 08:46

## 2020-01-01 RX ADMIN — AMLODIPINE BESYLATE 10 MG: 10 TABLET ORAL at 08:48

## 2020-01-01 RX ADMIN — Medication 3 ML: at 09:04

## 2020-01-01 RX ADMIN — APIXABAN 5 MG: 2.5 TABLET, FILM COATED ORAL at 07:51

## 2020-01-01 RX ADMIN — CALCIUM CARBONATE 600 MG (1,500 MG)-VITAMIN D3 400 UNIT TABLET 1 TABLET: at 21:39

## 2020-01-01 RX ADMIN — FLUOXETINE 20 MG: 20 CAPSULE ORAL at 08:49

## 2020-01-01 RX ADMIN — FUROSEMIDE 20 MG: 20 TABLET ORAL at 16:24

## 2020-01-01 RX ADMIN — ACETAMINOPHEN 975 MG: 325 TABLET, FILM COATED ORAL at 15:53

## 2020-01-01 RX ADMIN — METOPROLOL SUCCINATE 50 MG: 50 TABLET, EXTENDED RELEASE ORAL at 09:20

## 2020-01-01 RX ADMIN — TRAZODONE HYDROCHLORIDE 100 MG: 100 TABLET ORAL at 20:38

## 2020-01-01 RX ADMIN — MYCOPHENOLATE MOFETIL 500 MG: 500 TABLET, FILM COATED ORAL at 10:08

## 2020-01-01 RX ADMIN — METOPROLOL TARTRATE 50 MG: 50 TABLET, FILM COATED ORAL at 10:00

## 2020-01-01 RX ADMIN — MYCOPHENOLATE MOFETIL 500 MG: 250 CAPSULE ORAL at 21:39

## 2020-01-01 RX ADMIN — SIMVASTATIN 20 MG: 20 TABLET, FILM COATED ORAL at 21:53

## 2020-01-01 RX ADMIN — TRAZODONE HYDROCHLORIDE 100 MG: 50 TABLET ORAL at 21:38

## 2020-01-01 RX ADMIN — ASPIRIN 325 MG: 325 TABLET, COATED ORAL at 20:30

## 2020-01-01 RX ADMIN — METOPROLOL SUCCINATE 50 MG: 50 TABLET, EXTENDED RELEASE ORAL at 19:42

## 2020-01-01 RX ADMIN — Medication 3 ML: at 12:00

## 2020-01-01 RX ADMIN — MYCOPHENOLATE MOFETIL 500 MG: 500 TABLET, FILM COATED ORAL at 08:32

## 2020-01-01 RX ADMIN — FLUOXETINE 20 MG: 20 CAPSULE ORAL at 07:47

## 2020-01-01 RX ADMIN — MELATONIN TAB 3 MG 3 MG: 3 TAB at 01:27

## 2020-01-01 RX ADMIN — AMLODIPINE BESYLATE 10 MG: 10 TABLET ORAL at 12:01

## 2020-01-01 RX ADMIN — POTASSIUM CHLORIDE 20 MEQ: 750 TABLET, EXTENDED RELEASE ORAL at 08:31

## 2020-01-01 RX ADMIN — FAMOTIDINE 20 MG: 20 TABLET ORAL at 21:35

## 2020-01-01 RX ADMIN — METOPROLOL TARTRATE 50 MG: 25 TABLET, FILM COATED ORAL at 08:48

## 2020-01-01 RX ADMIN — ENOXAPARIN SODIUM 40 MG: 40 INJECTION SUBCUTANEOUS at 07:57

## 2020-01-01 RX ADMIN — Medication 1 TABLET: at 07:47

## 2020-01-01 RX ADMIN — Medication 1 TABLET: at 08:49

## 2020-01-01 RX ADMIN — Medication 3 ML: at 11:34

## 2020-01-01 RX ADMIN — FAMOTIDINE 20 MG: 20 TABLET ORAL at 20:10

## 2020-01-01 RX ADMIN — SIMVASTATIN 20 MG: 20 TABLET, FILM COATED ORAL at 22:33

## 2020-01-01 RX ADMIN — GABAPENTIN 600 MG: 300 CAPSULE ORAL at 21:59

## 2020-01-01 RX ADMIN — MYCOPHENOLATE MOFETIL 500 MG: 250 CAPSULE ORAL at 08:27

## 2020-01-01 RX ADMIN — B-COMPLEX W/ C & FOLIC ACID TAB 1 TABLET: TAB at 08:00

## 2020-01-01 RX ADMIN — FUROSEMIDE 20 MG: 20 TABLET ORAL at 13:36

## 2020-01-01 RX ADMIN — METOPROLOL TARTRATE 50 MG: 25 TABLET, FILM COATED ORAL at 21:07

## 2020-01-01 RX ADMIN — FUROSEMIDE 20 MG: 20 TABLET ORAL at 21:28

## 2020-01-01 RX ADMIN — OXYCODONE HYDROCHLORIDE AND ACETAMINOPHEN 500 MG: 500 TABLET ORAL at 08:13

## 2020-01-01 RX ADMIN — METOPROLOL TARTRATE 50 MG: 50 TABLET, FILM COATED ORAL at 08:35

## 2020-01-01 RX ADMIN — ANTICOAGULANT CITRATE DEXTROSE SOLUTION FORMULA A 691 ML: 12.25; 11; 3.65 SOLUTION INTRAVENOUS at 13:24

## 2020-01-01 RX ADMIN — SODIUM CHLORIDE 1000 MG: 9 INJECTION, SOLUTION INTRAVENOUS at 12:40

## 2020-01-01 RX ADMIN — METOPROLOL SUCCINATE 25 MG: 25 TABLET, EXTENDED RELEASE ORAL at 21:45

## 2020-01-01 RX ADMIN — Medication 3 ML: at 10:10

## 2020-01-01 RX ADMIN — ENOXAPARIN SODIUM 40 MG: 40 INJECTION SUBCUTANEOUS at 08:30

## 2020-01-01 RX ADMIN — FUROSEMIDE 20 MG: 20 TABLET ORAL at 20:30

## 2020-01-01 RX ADMIN — METOPROLOL SUCCINATE 25 MG: 25 TABLET, EXTENDED RELEASE ORAL at 08:31

## 2020-01-01 RX ADMIN — APIXABAN 5 MG: 2.5 TABLET, FILM COATED ORAL at 08:31

## 2020-01-01 RX ADMIN — Medication 2 ML: at 12:11

## 2020-01-01 RX ADMIN — SODIUM CHLORIDE 1000 MG: 9 INJECTION, SOLUTION INTRAVENOUS at 16:15

## 2020-01-01 RX ADMIN — B-COMPLEX W/ C & FOLIC ACID TAB 1 TABLET: TAB at 08:43

## 2020-01-01 RX ADMIN — FLUOXETINE HYDROCHLORIDE 40 MG: 40 CAPSULE ORAL at 09:28

## 2020-01-01 RX ADMIN — MYCOPHENOLATE MOFETIL 500 MG: 250 CAPSULE ORAL at 18:37

## 2020-01-01 RX ADMIN — APIXABAN 5 MG: 2.5 TABLET, FILM COATED ORAL at 21:51

## 2020-01-01 RX ADMIN — Medication 3 ML: at 14:38

## 2020-01-01 RX ADMIN — APIXABAN 5 MG: 2.5 TABLET, FILM COATED ORAL at 20:31

## 2020-01-01 RX ADMIN — SIMVASTATIN 20 MG: 20 TABLET, FILM COATED ORAL at 19:43

## 2020-01-01 RX ADMIN — ENOXAPARIN SODIUM 40 MG: 40 INJECTION SUBCUTANEOUS at 08:47

## 2020-01-01 RX ADMIN — METOPROLOL SUCCINATE 25 MG: 25 TABLET, EXTENDED RELEASE ORAL at 08:25

## 2020-01-01 RX ADMIN — ACETAMINOPHEN 975 MG: 325 TABLET, FILM COATED ORAL at 11:13

## 2020-01-01 RX ADMIN — APIXABAN 5 MG: 5 TABLET, FILM COATED ORAL at 20:04

## 2020-01-01 RX ADMIN — NEISSERIA MENINGITIDIS SEROGROUP B NHBA FUSION PROTEIN ANTIGEN, NEISSERIA MENINGITIDIS SEROGROUP B FHBP FUSION PROTEIN ANTIGEN AND NEISSERIA MENINGITIDIS SEROGROUP B NADA PROTEIN ANTIGEN 0.5 ML: 50; 50; 50; 25 INJECTION, SUSPENSION INTRAMUSCULAR at 17:42

## 2020-01-01 RX ADMIN — FUROSEMIDE 20 MG: 20 TABLET ORAL at 08:26

## 2020-01-01 RX ADMIN — B-COMPLEX W/ C & FOLIC ACID TAB 1 TABLET: TAB at 08:32

## 2020-01-01 RX ADMIN — ACETAMINOPHEN 650 MG: 325 TABLET, FILM COATED ORAL at 15:46

## 2020-01-01 RX ADMIN — ENOXAPARIN SODIUM 40 MG: 40 INJECTION SUBCUTANEOUS at 08:28

## 2020-01-01 RX ADMIN — ALBUMIN HUMAN 2500 ML: 0.05 INJECTION, SOLUTION INTRAVENOUS at 09:15

## 2020-01-01 RX ADMIN — METOPROLOL TARTRATE 50 MG: 25 TABLET, FILM COATED ORAL at 07:51

## 2020-01-01 RX ADMIN — OXYCODONE HYDROCHLORIDE AND ACETAMINOPHEN 500 MG: 500 TABLET ORAL at 08:22

## 2020-01-01 RX ADMIN — FAMOTIDINE 20 MG: 20 TABLET ORAL at 08:34

## 2020-01-01 RX ADMIN — B-COMPLEX W/ C & FOLIC ACID TAB 1 TABLET: TAB at 08:38

## 2020-01-01 RX ADMIN — SIMVASTATIN 20 MG: 20 TABLET, FILM COATED ORAL at 21:39

## 2020-01-01 RX ADMIN — APIXABAN 5 MG: 2.5 TABLET, FILM COATED ORAL at 22:33

## 2020-01-01 RX ADMIN — SIMVASTATIN 20 MG: 20 TABLET, FILM COATED ORAL at 21:35

## 2020-01-01 RX ADMIN — FLUOXETINE 40 MG: 20 CAPSULE ORAL at 08:42

## 2020-01-01 RX ADMIN — FUROSEMIDE 20 MG: 20 TABLET ORAL at 14:16

## 2020-01-01 RX ADMIN — APIXABAN 5 MG: 5 TABLET, FILM COATED ORAL at 20:21

## 2020-01-01 RX ADMIN — SIMVASTATIN 20 MG: 20 TABLET, FILM COATED ORAL at 21:55

## 2020-01-01 RX ADMIN — CLOPIDOGREL BISULFATE 75 MG: 75 TABLET ORAL at 07:50

## 2020-01-01 RX ADMIN — CEFTRIAXONE 1 G: 1 INJECTION, POWDER, FOR SOLUTION INTRAMUSCULAR; INTRAVENOUS at 07:56

## 2020-01-01 RX ADMIN — MYCOPHENOLATE MOFETIL 500 MG: 250 CAPSULE ORAL at 17:57

## 2020-01-01 RX ADMIN — AMLODIPINE BESYLATE 10 MG: 10 TABLET ORAL at 08:41

## 2020-01-01 RX ADMIN — MYCOPHENOLATE MOFETIL 500 MG: 500 TABLET, FILM COATED ORAL at 20:32

## 2020-01-01 RX ADMIN — B-COMPLEX W/ C & FOLIC ACID TAB 1 TABLET: TAB at 07:51

## 2020-01-01 RX ADMIN — Medication 1 TABLET: at 21:06

## 2020-01-01 RX ADMIN — INSULIN ASPART 3 UNITS: 100 INJECTION, SOLUTION INTRAVENOUS; SUBCUTANEOUS at 19:35

## 2020-01-01 RX ADMIN — ANTICOAGULANT CITRATE DEXTROSE SOLUTION FORMULA A 640 ML: 12.25; 11; 3.65 SOLUTION INTRAVENOUS at 10:45

## 2020-01-01 RX ADMIN — Medication 3 ML: at 14:51

## 2020-01-01 RX ADMIN — METOPROLOL TARTRATE 50 MG: 25 TABLET, FILM COATED ORAL at 21:43

## 2020-01-01 RX ADMIN — APIXABAN 5 MG: 2.5 TABLET, FILM COATED ORAL at 08:00

## 2020-01-01 RX ADMIN — GABAPENTIN 300 MG: 300 CAPSULE ORAL at 07:57

## 2020-01-01 RX ADMIN — METOPROLOL TARTRATE 50 MG: 25 TABLET, FILM COATED ORAL at 21:36

## 2020-01-01 RX ADMIN — MYCOPHENOLATE MOFETIL 500 MG: 250 CAPSULE ORAL at 17:45

## 2020-01-01 RX ADMIN — MYCOPHENOLATE MOFETIL 500 MG: 250 CAPSULE ORAL at 09:27

## 2020-01-01 RX ADMIN — B-COMPLEX W/ C & FOLIC ACID TAB 1 TABLET: TAB at 10:09

## 2020-01-01 RX ADMIN — METOPROLOL TARTRATE 50 MG: 25 TABLET, FILM COATED ORAL at 21:28

## 2020-01-01 RX ADMIN — SIMVASTATIN 20 MG: 20 TABLET, FILM COATED ORAL at 21:31

## 2020-01-01 RX ADMIN — POTASSIUM CHLORIDE 40 MEQ: 750 TABLET, EXTENDED RELEASE ORAL at 08:16

## 2020-01-01 RX ADMIN — Medication 3 ML: at 14:50

## 2020-01-01 RX ADMIN — FUROSEMIDE 20 MG: 20 TABLET ORAL at 08:40

## 2020-01-01 RX ADMIN — ECULIZUMAB 900 MG: 300 INJECTION, SOLUTION, CONCENTRATE INTRAVENOUS at 15:07

## 2020-01-01 RX ADMIN — FUROSEMIDE 20 MG: 20 TABLET ORAL at 12:00

## 2020-01-01 RX ADMIN — SIMVASTATIN 20 MG: 20 TABLET, FILM COATED ORAL at 21:36

## 2020-01-01 RX ADMIN — POLYETHYLENE GLYCOL 3350 17 G: 17 POWDER, FOR SOLUTION ORAL at 12:01

## 2020-01-01 RX ADMIN — AMLODIPINE BESYLATE 10 MG: 10 TABLET ORAL at 08:59

## 2020-01-01 RX ADMIN — SIMVASTATIN 20 MG: 20 TABLET, FILM COATED ORAL at 22:02

## 2020-01-01 RX ADMIN — B-COMPLEX W/ C & FOLIC ACID TAB 1 TABLET: TAB at 08:31

## 2020-01-01 RX ADMIN — CEFTRIAXONE 1 G: 1 INJECTION, POWDER, FOR SOLUTION INTRAMUSCULAR; INTRAVENOUS at 09:59

## 2020-01-01 RX ADMIN — FLUOXETINE HYDROCHLORIDE 40 MG: 40 CAPSULE ORAL at 08:40

## 2020-01-01 RX ADMIN — FUROSEMIDE 20 MG: 20 TABLET ORAL at 08:00

## 2020-01-01 RX ADMIN — MYCOPHENOLATE MOFETIL 500 MG: 250 CAPSULE ORAL at 08:33

## 2020-01-01 RX ADMIN — APIXABAN 5 MG: 2.5 TABLET, FILM COATED ORAL at 22:10

## 2020-01-01 RX ADMIN — GABAPENTIN 600 MG: 300 CAPSULE ORAL at 22:58

## 2020-01-01 RX ADMIN — ASPIRIN 325 MG ORAL TABLET 325 MG: 325 PILL ORAL at 19:54

## 2020-01-01 RX ADMIN — MYCOPHENOLATE MOFETIL 500 MG: 250 CAPSULE ORAL at 17:10

## 2020-01-01 RX ADMIN — CALCIUM CARBONATE 600 MG (1,500 MG)-VITAMIN D3 400 UNIT TABLET 1 TABLET: at 22:33

## 2020-01-01 RX ADMIN — Medication 3 ML: at 10:09

## 2020-01-01 RX ADMIN — SIMVASTATIN 20 MG: 20 TABLET, FILM COATED ORAL at 21:41

## 2020-01-01 RX ADMIN — SODIUM CHLORIDE, PRESERVATIVE FREE 3 ML: 5 INJECTION INTRAVENOUS at 14:50

## 2020-01-01 RX ADMIN — FUROSEMIDE 20 MG: 20 TABLET ORAL at 08:47

## 2020-01-01 RX ADMIN — FUROSEMIDE 20 MG: 20 TABLET ORAL at 10:01

## 2020-01-01 RX ADMIN — AMLODIPINE BESYLATE 5 MG: 5 TABLET ORAL at 09:29

## 2020-01-01 RX ADMIN — CLOPIDOGREL BISULFATE 75 MG: 75 TABLET ORAL at 08:59

## 2020-01-01 RX ADMIN — APIXABAN 5 MG: 5 TABLET, FILM COATED ORAL at 12:01

## 2020-01-01 RX ADMIN — OXYCODONE HYDROCHLORIDE AND ACETAMINOPHEN 500 MG: 500 TABLET ORAL at 08:32

## 2020-01-01 RX ADMIN — MYCOPHENOLATE MOFETIL 500 MG: 250 CAPSULE ORAL at 08:34

## 2020-01-01 RX ADMIN — INFLUENZA A VIRUS A/MICHIGAN/45/2015 X-275 (H1N1) ANTIGEN (FORMALDEHYDE INACTIVATED), INFLUENZA A VIRUS A/SINGAPORE/INFIMH-16-0019/2016 IVR-186 (H3N2) ANTIGEN (FORMALDEHYDE INACTIVATED), INFLUENZA B VIRUS B/PHUKET/3073/2013 ANTIGEN (FORMALDEHYDE INACTIVATED), AND INFLUENZA B VIRUS B/MARYLAND/15/2016 BX-69A ANTIGEN (FORMALDEHYDE INACTIVATED) 0.7 ML: 60; 60; 60; 60 INJECTION, SUSPENSION INTRAMUSCULAR at 10:54

## 2020-01-01 RX ADMIN — UMECLIDINIUM 1 PUFF: 62.5 AEROSOL, POWDER ORAL at 10:12

## 2020-01-01 RX ADMIN — Medication 3 ML: at 10:00

## 2020-01-01 RX ADMIN — SODIUM CHLORIDE, PRESERVATIVE FREE 3 ML: 5 INJECTION INTRAVENOUS at 09:51

## 2020-01-01 RX ADMIN — APIXABAN 5 MG: 5 TABLET, FILM COATED ORAL at 19:00

## 2020-01-01 RX ADMIN — Medication 3 ML: at 14:39

## 2020-01-01 RX ADMIN — GABAPENTIN 300 MG: 300 CAPSULE ORAL at 10:27

## 2020-01-01 RX ADMIN — MYCOPHENOLATE MOFETIL 500 MG: 250 CAPSULE ORAL at 18:11

## 2020-01-01 RX ADMIN — FUROSEMIDE 20 MG: 20 TABLET ORAL at 09:19

## 2020-01-01 RX ADMIN — TRAZODONE HYDROCHLORIDE 100 MG: 100 TABLET ORAL at 22:07

## 2020-01-01 RX ADMIN — AMLODIPINE BESYLATE 10 MG: 10 TABLET ORAL at 09:28

## 2020-01-01 RX ADMIN — OXYCODONE HYDROCHLORIDE AND ACETAMINOPHEN 500 MG: 500 TABLET ORAL at 08:48

## 2020-01-01 RX ADMIN — APIXABAN 5 MG: 2.5 TABLET, FILM COATED ORAL at 08:42

## 2020-01-01 RX ADMIN — POTASSIUM CHLORIDE 40 MEQ: 750 TABLET, EXTENDED RELEASE ORAL at 08:41

## 2020-01-01 RX ADMIN — METOPROLOL TARTRATE 50 MG: 25 TABLET, FILM COATED ORAL at 10:11

## 2020-01-01 RX ADMIN — METOPROLOL SUCCINATE 25 MG: 25 TABLET, EXTENDED RELEASE ORAL at 20:31

## 2020-01-01 RX ADMIN — B-COMPLEX W/ C & FOLIC ACID TAB 1 TABLET: TAB at 09:46

## 2020-01-01 RX ADMIN — METOPROLOL SUCCINATE 50 MG: 50 TABLET, EXTENDED RELEASE ORAL at 19:54

## 2020-01-01 RX ADMIN — FUROSEMIDE 20 MG: 20 TABLET ORAL at 10:09

## 2020-01-01 RX ADMIN — POTASSIUM CHLORIDE 20 MEQ: 750 TABLET, EXTENDED RELEASE ORAL at 08:45

## 2020-01-01 RX ADMIN — FUROSEMIDE 20 MG: 20 TABLET ORAL at 08:38

## 2020-01-01 RX ADMIN — AMLODIPINE BESYLATE 10 MG: 10 TABLET ORAL at 08:28

## 2020-01-01 RX ADMIN — POTASSIUM CHLORIDE 20 MEQ: 750 TABLET, EXTENDED RELEASE ORAL at 08:32

## 2020-01-01 RX ADMIN — FAMOTIDINE 20 MG: 20 TABLET ORAL at 20:38

## 2020-01-01 RX ADMIN — METOPROLOL SUCCINATE 50 MG: 50 TABLET, EXTENDED RELEASE ORAL at 20:04

## 2020-01-01 RX ADMIN — ENOXAPARIN SODIUM 40 MG: 40 INJECTION SUBCUTANEOUS at 08:32

## 2020-01-01 RX ADMIN — POLYETHYLENE GLYCOL 3350 17 G: 17 POWDER, FOR SOLUTION ORAL at 12:25

## 2020-01-01 RX ADMIN — METOPROLOL TARTRATE 50 MG: 50 TABLET, FILM COATED ORAL at 21:24

## 2020-01-01 RX ADMIN — Medication 3 ML: at 12:24

## 2020-01-01 RX ADMIN — FLUOXETINE 20 MG: 20 CAPSULE ORAL at 07:57

## 2020-01-01 RX ADMIN — Medication 3 ML: at 12:01

## 2020-01-01 RX ADMIN — FLUOXETINE 20 MG: 20 CAPSULE ORAL at 07:51

## 2020-01-01 RX ADMIN — FLUOXETINE 40 MG: 20 CAPSULE ORAL at 08:44

## 2020-01-01 RX ADMIN — FAMOTIDINE 20 MG: 20 TABLET ORAL at 07:55

## 2020-01-01 RX ADMIN — TRAZODONE HYDROCHLORIDE 100 MG: 100 TABLET ORAL at 22:58

## 2020-01-01 RX ADMIN — METOPROLOL SUCCINATE 50 MG: 50 TABLET, EXTENDED RELEASE ORAL at 12:00

## 2020-01-01 RX ADMIN — FLUOXETINE HYDROCHLORIDE 40 MG: 40 CAPSULE ORAL at 09:00

## 2020-01-01 RX ADMIN — CALCIUM GLUCONATE 4 G: 98 INJECTION, SOLUTION INTRAVENOUS at 13:13

## 2020-01-01 RX ADMIN — Medication 1 TABLET: at 20:04

## 2020-01-01 RX ADMIN — GABAPENTIN 300 MG: 300 CAPSULE ORAL at 13:36

## 2020-01-01 RX ADMIN — FLUOXETINE HYDROCHLORIDE 40 MG: 40 CAPSULE ORAL at 12:01

## 2020-01-01 RX ADMIN — TRAZODONE HYDROCHLORIDE 100 MG: 50 TABLET ORAL at 21:01

## 2020-01-01 RX ADMIN — OXYCODONE HYDROCHLORIDE AND ACETAMINOPHEN 500 MG: 500 TABLET ORAL at 07:51

## 2020-01-01 RX ADMIN — APIXABAN 5 MG: 2.5 TABLET, FILM COATED ORAL at 08:22

## 2020-01-01 RX ADMIN — SODIUM CHLORIDE 1000 MG: 9 INJECTION, SOLUTION INTRAVENOUS at 12:28

## 2020-01-01 RX ADMIN — FLUOXETINE 20 MG: 20 CAPSULE ORAL at 07:55

## 2020-01-01 RX ADMIN — SIMVASTATIN 20 MG: 20 TABLET, FILM COATED ORAL at 20:32

## 2020-01-01 RX ADMIN — CLOPIDOGREL BISULFATE 75 MG: 75 TABLET ORAL at 08:28

## 2020-01-01 RX ADMIN — METOPROLOL TARTRATE 50 MG: 25 TABLET, FILM COATED ORAL at 08:49

## 2020-01-01 RX ADMIN — FUROSEMIDE 20 MG: 20 TABLET ORAL at 08:22

## 2020-01-01 RX ADMIN — FUROSEMIDE 20 MG: 20 TABLET ORAL at 08:59

## 2020-01-01 RX ADMIN — MYCOPHENOLATE MOFETIL 500 MG: 500 TABLET, FILM COATED ORAL at 08:13

## 2020-01-01 RX ADMIN — TRAZODONE HYDROCHLORIDE 100 MG: 50 TABLET ORAL at 21:53

## 2020-01-01 RX ADMIN — FUROSEMIDE 20 MG: 20 TABLET ORAL at 21:44

## 2020-01-01 RX ADMIN — CALCIUM CARBONATE (ANTACID) CHEW TAB 500 MG 500 MG: 500 CHEW TAB at 22:05

## 2020-01-01 RX ADMIN — TRAZODONE HYDROCHLORIDE 100 MG: 50 TABLET ORAL at 21:39

## 2020-01-01 RX ADMIN — CALCIUM CARBONATE 600 MG (1,500 MG)-VITAMIN D3 400 UNIT TABLET 1 TABLET: at 21:31

## 2020-01-01 RX ADMIN — AMLODIPINE BESYLATE 10 MG: 10 TABLET ORAL at 07:57

## 2020-01-01 RX ADMIN — ASPIRIN 325 MG: 325 TABLET, COATED ORAL at 00:06

## 2020-01-01 RX ADMIN — FAMOTIDINE 20 MG: 20 TABLET ORAL at 07:47

## 2020-01-01 RX ADMIN — MYCOPHENOLATE MOFETIL 500 MG: 500 TABLET, FILM COATED ORAL at 21:01

## 2020-01-01 RX ADMIN — UMECLIDINIUM 1 PUFF: 62.5 AEROSOL, POWDER ORAL at 07:56

## 2020-01-01 RX ADMIN — FUROSEMIDE 20 MG: 20 TABLET ORAL at 09:28

## 2020-01-01 RX ADMIN — Medication 1 TABLET: at 21:29

## 2020-01-01 RX ADMIN — FLUOXETINE 40 MG: 20 CAPSULE ORAL at 08:12

## 2020-01-01 RX ADMIN — GADOBUTROL 7.5 ML: 604.72 INJECTION INTRAVENOUS at 17:56

## 2020-01-01 RX ADMIN — METOPROLOL SUCCINATE 25 MG: 25 TABLET, EXTENDED RELEASE ORAL at 10:10

## 2020-01-01 RX ADMIN — POTASSIUM CHLORIDE 40 MEQ: 1.5 POWDER, FOR SOLUTION ORAL at 22:01

## 2020-01-01 RX ADMIN — OXYCODONE HYDROCHLORIDE AND ACETAMINOPHEN 500 MG: 500 TABLET ORAL at 09:29

## 2020-01-01 RX ADMIN — HYDROCORTISONE: 1 CREAM TOPICAL at 16:14

## 2020-01-01 RX ADMIN — SODIUM CHLORIDE: 9 INJECTION, SOLUTION INTRAVENOUS at 21:23

## 2020-01-01 RX ADMIN — APIXABAN 5 MG: 5 TABLET, FILM COATED ORAL at 08:25

## 2020-01-01 RX ADMIN — APIXABAN 5 MG: 5 TABLET, FILM COATED ORAL at 09:03

## 2020-01-01 RX ADMIN — CALCIUM CARBONATE 600 MG (1,500 MG)-VITAMIN D3 400 UNIT TABLET 1 TABLET: at 20:31

## 2020-01-01 RX ADMIN — ASPIRIN 325 MG: 325 TABLET, COATED ORAL at 20:04

## 2020-01-01 RX ADMIN — MYCOPHENOLATE MOFETIL 500 MG: 250 CAPSULE ORAL at 08:40

## 2020-01-01 RX ADMIN — ACETAMINOPHEN 650 MG: 325 TABLET, FILM COATED ORAL at 21:57

## 2020-01-01 RX ADMIN — POTASSIUM CHLORIDE 40 MEQ: 750 TABLET, EXTENDED RELEASE ORAL at 12:45

## 2020-01-01 RX ADMIN — METOPROLOL SUCCINATE 50 MG: 50 TABLET, EXTENDED RELEASE ORAL at 19:21

## 2020-01-01 RX ADMIN — TRAZODONE HYDROCHLORIDE 100 MG: 100 TABLET ORAL at 21:36

## 2020-01-01 RX ADMIN — Medication 1 TABLET: at 19:35

## 2020-01-01 RX ADMIN — TRAZODONE HYDROCHLORIDE 100 MG: 100 TABLET ORAL at 21:55

## 2020-01-01 RX ADMIN — B-COMPLEX W/ C & FOLIC ACID TAB 1 TABLET: TAB at 11:03

## 2020-01-01 RX ADMIN — SIMVASTATIN 20 MG: 20 TABLET, FILM COATED ORAL at 20:29

## 2020-01-01 RX ADMIN — FUROSEMIDE 20 MG: 20 TABLET ORAL at 20:04

## 2020-01-01 RX ADMIN — POTASSIUM CHLORIDE 20 MEQ: 750 TABLET, EXTENDED RELEASE ORAL at 09:45

## 2020-01-01 RX ADMIN — MYCOPHENOLATE MOFETIL 500 MG: 500 TABLET, FILM COATED ORAL at 21:06

## 2020-01-01 RX ADMIN — ANTICOAGULANT CITRATE DEXTROSE SOLUTION FORMULA A 574 ML: 12.25; 11; 3.65 SOLUTION INTRAVENOUS at 09:40

## 2020-01-01 RX ADMIN — GABAPENTIN 300 MG: 300 CAPSULE ORAL at 08:28

## 2020-01-01 RX ADMIN — AMLODIPINE BESYLATE 10 MG: 10 TABLET ORAL at 08:24

## 2020-01-01 RX ADMIN — FUROSEMIDE 20 MG: 20 TABLET ORAL at 08:45

## 2020-01-01 RX ADMIN — FUROSEMIDE 20 MG: 20 TABLET ORAL at 20:35

## 2020-01-01 RX ADMIN — ENOXAPARIN SODIUM 40 MG: 40 INJECTION SUBCUTANEOUS at 09:00

## 2020-01-01 RX ADMIN — APIXABAN 5 MG: 2.5 TABLET, FILM COATED ORAL at 08:38

## 2020-01-01 RX ADMIN — METOPROLOL TARTRATE 50 MG: 50 TABLET, FILM COATED ORAL at 21:35

## 2020-01-01 RX ADMIN — TRAZODONE HYDROCHLORIDE 100 MG: 100 TABLET ORAL at 21:07

## 2020-01-01 RX ADMIN — MYCOPHENOLATE MOFETIL 500 MG: 500 TABLET, FILM COATED ORAL at 21:34

## 2020-01-01 RX ADMIN — ALBUMIN HUMAN 2500 ML: 0.05 INJECTION, SOLUTION INTRAVENOUS at 10:44

## 2020-01-01 RX ADMIN — METOPROLOL TARTRATE 50 MG: 25 TABLET, FILM COATED ORAL at 08:30

## 2020-01-01 RX ADMIN — SIMVASTATIN 20 MG: 20 TABLET, FILM COATED ORAL at 21:45

## 2020-01-01 RX ADMIN — FUROSEMIDE 20 MG: 20 TABLET ORAL at 21:07

## 2020-01-01 RX ADMIN — MYCOPHENOLATE MOFETIL 500 MG: 250 CAPSULE ORAL at 18:55

## 2020-01-01 RX ADMIN — FLUOXETINE HYDROCHLORIDE 40 MG: 40 CAPSULE ORAL at 07:50

## 2020-01-01 RX ADMIN — MYCOPHENOLATE MOFETIL 500 MG: 250 CAPSULE ORAL at 17:19

## 2020-01-01 RX ADMIN — CLOPIDOGREL BISULFATE 75 MG: 75 TABLET ORAL at 08:40

## 2020-01-01 RX ADMIN — FAMOTIDINE 20 MG: 20 TABLET ORAL at 08:47

## 2020-01-01 RX ADMIN — GABAPENTIN 300 MG: 300 CAPSULE ORAL at 08:48

## 2020-01-01 RX ADMIN — ACETAMINOPHEN 650 MG: 325 TABLET, FILM COATED ORAL at 17:23

## 2020-01-01 RX ADMIN — AMLODIPINE BESYLATE 5 MG: 5 TABLET ORAL at 08:42

## 2020-01-01 RX ADMIN — ANTICOAGULANT CITRATE DEXTROSE SOLUTION FORMULA A: 12.25; 11; 3.65 SOLUTION INTRAVENOUS at 09:00

## 2020-01-01 RX ADMIN — MYCOPHENOLATE MOFETIL 500 MG: 500 TABLET, FILM COATED ORAL at 08:31

## 2020-01-01 RX ADMIN — MYCOPHENOLATE MOFETIL 500 MG: 500 TABLET, FILM COATED ORAL at 08:46

## 2020-01-01 RX ADMIN — Medication 3 ML: at 10:47

## 2020-01-01 RX ADMIN — METOPROLOL SUCCINATE 50 MG: 50 TABLET, EXTENDED RELEASE ORAL at 08:33

## 2020-01-01 RX ADMIN — METOPROLOL SUCCINATE 25 MG: 25 TABLET, EXTENDED RELEASE ORAL at 21:56

## 2020-01-01 RX ADMIN — APIXABAN 5 MG: 5 TABLET, FILM COATED ORAL at 08:41

## 2020-01-01 RX ADMIN — HYDROCORTISONE: 1 CREAM TOPICAL at 09:47

## 2020-01-01 RX ADMIN — ASPIRIN 325 MG: 325 TABLET, COATED ORAL at 21:33

## 2020-01-01 RX ADMIN — TRAZODONE HYDROCHLORIDE 100 MG: 100 TABLET ORAL at 23:39

## 2020-01-01 RX ADMIN — AMLODIPINE BESYLATE 10 MG: 10 TABLET ORAL at 08:34

## 2020-01-01 RX ADMIN — SIMVASTATIN 20 MG: 20 TABLET, FILM COATED ORAL at 20:37

## 2020-01-01 RX ADMIN — CALCIUM CARBONATE 600 MG (1,500 MG)-VITAMIN D3 400 UNIT TABLET 1 TABLET: at 21:01

## 2020-01-01 RX ADMIN — METOPROLOL SUCCINATE 50 MG: 50 TABLET, EXTENDED RELEASE ORAL at 09:03

## 2020-01-01 RX ADMIN — OXYCODONE HYDROCHLORIDE AND ACETAMINOPHEN 500 MG: 500 TABLET ORAL at 07:52

## 2020-01-01 RX ADMIN — SODIUM CHLORIDE, PRESERVATIVE FREE 3 ML: 5 INJECTION INTRAVENOUS at 14:49

## 2020-01-01 RX ADMIN — MYCOPHENOLATE MOFETIL 500 MG: 500 TABLET, FILM COATED ORAL at 21:51

## 2020-01-01 RX ADMIN — FUROSEMIDE 20 MG: 20 TABLET ORAL at 07:52

## 2020-01-01 RX ADMIN — TRAZODONE HYDROCHLORIDE 100 MG: 100 TABLET ORAL at 19:42

## 2020-01-01 RX ADMIN — FLUOXETINE HYDROCHLORIDE 40 MG: 40 CAPSULE ORAL at 08:41

## 2020-01-01 RX ADMIN — SIMVASTATIN 20 MG: 20 TABLET, FILM COATED ORAL at 22:10

## 2020-01-01 RX ADMIN — Medication 1 TABLET: at 08:27

## 2020-01-01 RX ADMIN — NEISSERIA MENINGITIDIS SEROGROUP B NHBA FUSION PROTEIN ANTIGEN, NEISSERIA MENINGITIDIS SEROGROUP B FHBP FUSION PROTEIN ANTIGEN AND NEISSERIA MENINGITIDIS SEROGROUP B NADA PROTEIN ANTIGEN 0.5 ML: 50; 50; 50; 25 INJECTION, SUSPENSION INTRAMUSCULAR at 07:58

## 2020-01-01 RX ADMIN — FUROSEMIDE 20 MG: 20 TABLET ORAL at 13:53

## 2020-01-01 RX ADMIN — OXYCODONE HYDROCHLORIDE AND ACETAMINOPHEN 500 MG: 500 TABLET ORAL at 10:11

## 2020-01-01 RX ADMIN — AMLODIPINE BESYLATE 10 MG: 10 TABLET ORAL at 10:11

## 2020-01-01 RX ADMIN — ANTICOAGULANT CITRATE DEXTROSE SOLUTION FORMULA A 703 ML: 12.25; 11; 3.65 SOLUTION INTRAVENOUS at 13:02

## 2020-01-01 RX ADMIN — ASPIRIN 325 MG ORAL TABLET 325 MG: 325 PILL ORAL at 19:42

## 2020-01-01 RX ADMIN — FLUOXETINE HYDROCHLORIDE 40 MG: 40 CAPSULE ORAL at 08:28

## 2020-01-01 RX ADMIN — OXYCODONE HYDROCHLORIDE AND ACETAMINOPHEN 500 MG: 500 TABLET ORAL at 09:45

## 2020-01-01 RX ADMIN — TRAZODONE HYDROCHLORIDE 100 MG: 100 TABLET ORAL at 22:39

## 2020-01-01 RX ADMIN — GABAPENTIN 600 MG: 300 CAPSULE ORAL at 22:10

## 2020-01-01 RX ADMIN — CLOPIDOGREL BISULFATE 75 MG: 75 TABLET ORAL at 08:50

## 2020-01-01 RX ADMIN — METOPROLOL SUCCINATE 25 MG: 25 TABLET, EXTENDED RELEASE ORAL at 08:00

## 2020-01-01 RX ADMIN — ENOXAPARIN SODIUM 40 MG: 40 INJECTION SUBCUTANEOUS at 10:24

## 2020-01-01 RX ADMIN — AMLODIPINE BESYLATE 10 MG: 10 TABLET ORAL at 08:27

## 2020-01-01 RX ADMIN — ASPIRIN 325 MG ORAL TABLET 325 MG: 325 PILL ORAL at 20:29

## 2020-01-01 RX ADMIN — MYCOPHENOLATE MOFETIL 500 MG: 500 TABLET, FILM COATED ORAL at 08:02

## 2020-01-01 RX ADMIN — OXYCODONE HYDROCHLORIDE AND ACETAMINOPHEN 500 MG: 500 TABLET ORAL at 08:31

## 2020-01-01 RX ADMIN — ASPIRIN 325 MG: 325 TABLET, COATED ORAL at 19:35

## 2020-01-01 RX ADMIN — METOPROLOL TARTRATE 50 MG: 25 TABLET, FILM COATED ORAL at 08:26

## 2020-01-01 RX ADMIN — AMLODIPINE BESYLATE 10 MG: 10 TABLET ORAL at 07:52

## 2020-01-01 RX ADMIN — MYCOPHENOLATE MOFETIL 500 MG: 250 CAPSULE ORAL at 07:50

## 2020-01-01 RX ADMIN — APIXABAN 5 MG: 2.5 TABLET, FILM COATED ORAL at 21:01

## 2020-01-01 RX ADMIN — ACETAMINOPHEN 650 MG: 325 TABLET, FILM COATED ORAL at 21:21

## 2020-01-01 RX ADMIN — TRAZODONE HYDROCHLORIDE 100 MG: 100 TABLET ORAL at 20:37

## 2020-01-01 RX ADMIN — AMLODIPINE BESYLATE 10 MG: 10 TABLET ORAL at 09:01

## 2020-01-01 RX ADMIN — METOPROLOL SUCCINATE 50 MG: 50 TABLET, EXTENDED RELEASE ORAL at 21:38

## 2020-01-01 RX ADMIN — Medication 5 ML: at 16:27

## 2020-01-01 RX ADMIN — MYCOPHENOLATE MOFETIL 500 MG: 500 TABLET, FILM COATED ORAL at 07:51

## 2020-01-01 RX ADMIN — UMECLIDINIUM 1 PUFF: 62.5 AEROSOL, POWDER ORAL at 08:28

## 2020-01-01 RX ADMIN — Medication 3 ML: at 13:59

## 2020-01-01 RX ADMIN — METOPROLOL SUCCINATE 50 MG: 50 TABLET, EXTENDED RELEASE ORAL at 20:38

## 2020-01-01 RX ADMIN — CALCIUM GLUCONATE 3.7 G: 98 INJECTION, SOLUTION INTRAVENOUS at 09:15

## 2020-01-01 RX ADMIN — OXYCODONE HYDROCHLORIDE AND ACETAMINOPHEN 500 MG: 500 TABLET ORAL at 07:57

## 2020-01-01 RX ADMIN — ASPIRIN 325 MG ORAL TABLET 325 MG: 325 PILL ORAL at 20:10

## 2020-01-01 RX ADMIN — FUROSEMIDE 20 MG: 20 TABLET ORAL at 09:45

## 2020-01-01 RX ADMIN — FUROSEMIDE 20 MG: 20 TABLET ORAL at 13:45

## 2020-01-01 RX ADMIN — POTASSIUM CHLORIDE 20 MEQ: 750 TABLET, EXTENDED RELEASE ORAL at 08:50

## 2020-01-01 RX ADMIN — Medication 1 TABLET: at 20:35

## 2020-01-01 RX ADMIN — METOPROLOL TARTRATE 50 MG: 25 TABLET, FILM COATED ORAL at 20:30

## 2020-01-01 RX ADMIN — METOPROLOL SUCCINATE 25 MG: 25 TABLET, EXTENDED RELEASE ORAL at 21:32

## 2020-01-01 RX ADMIN — Medication 1 TABLET: at 08:25

## 2020-01-01 RX ADMIN — AMLODIPINE BESYLATE 10 MG: 10 TABLET ORAL at 08:40

## 2020-01-01 RX ADMIN — SIMVASTATIN 20 MG: 20 TABLET, FILM COATED ORAL at 21:44

## 2020-01-01 RX ADMIN — MYCOPHENOLATE MOFETIL 500 MG: 250 CAPSULE ORAL at 09:00

## 2020-01-01 RX ADMIN — INSULIN ASPART 1 UNITS: 100 INJECTION, SOLUTION INTRAVENOUS; SUBCUTANEOUS at 17:15

## 2020-01-01 RX ADMIN — GABAPENTIN 300 MG: 300 CAPSULE ORAL at 11:34

## 2020-01-01 RX ADMIN — APIXABAN 5 MG: 2.5 TABLET, FILM COATED ORAL at 21:45

## 2020-01-01 RX ADMIN — APIXABAN 5 MG: 5 TABLET, FILM COATED ORAL at 19:21

## 2020-01-01 RX ADMIN — CEPHALEXIN 500 MG: 500 CAPSULE ORAL at 16:53

## 2020-01-01 RX ADMIN — MYCOPHENOLATE MOFETIL 500 MG: 500 TABLET, FILM COATED ORAL at 08:50

## 2020-01-01 RX ADMIN — AMLODIPINE BESYLATE 5 MG: 5 TABLET ORAL at 08:31

## 2020-01-01 RX ADMIN — APIXABAN 5 MG: 5 TABLET, FILM COATED ORAL at 22:01

## 2020-01-01 RX ADMIN — SODIUM CHLORIDE 1000 MG: 9 INJECTION, SOLUTION INTRAVENOUS at 09:23

## 2020-01-01 RX ADMIN — ENOXAPARIN SODIUM 40 MG: 40 INJECTION SUBCUTANEOUS at 08:46

## 2020-01-01 RX ADMIN — GABAPENTIN 600 MG: 300 CAPSULE ORAL at 21:43

## 2020-01-01 RX ADMIN — POTASSIUM CHLORIDE 20 MEQ: 750 TABLET, EXTENDED RELEASE ORAL at 08:38

## 2020-01-01 RX ADMIN — POTASSIUM CHLORIDE 40 MEQ: 750 TABLET, EXTENDED RELEASE ORAL at 14:18

## 2020-01-01 RX ADMIN — OXYCODONE HYDROCHLORIDE AND ACETAMINOPHEN 500 MG: 500 TABLET ORAL at 08:25

## 2020-01-01 RX ADMIN — SIMVASTATIN 20 MG: 20 TABLET, FILM COATED ORAL at 19:00

## 2020-01-01 RX ADMIN — MYCOPHENOLATE MOFETIL 500 MG: 500 TABLET, FILM COATED ORAL at 08:38

## 2020-01-01 RX ADMIN — FLUOXETINE HYDROCHLORIDE 40 MG: 40 CAPSULE ORAL at 08:34

## 2020-01-01 RX ADMIN — METOPROLOL SUCCINATE 50 MG: 50 TABLET, EXTENDED RELEASE ORAL at 20:10

## 2020-01-01 RX ADMIN — B-COMPLEX W/ C & FOLIC ACID TAB 1 TABLET: TAB at 08:51

## 2020-01-01 RX ADMIN — MYCOPHENOLATE MOFETIL 500 MG: 250 CAPSULE ORAL at 08:36

## 2020-01-01 RX ADMIN — FAMOTIDINE 20 MG: 20 TABLET ORAL at 08:40

## 2020-01-01 RX ADMIN — METOPROLOL TARTRATE 50 MG: 50 TABLET, FILM COATED ORAL at 23:39

## 2020-01-01 RX ADMIN — METOPROLOL SUCCINATE 50 MG: 50 TABLET, EXTENDED RELEASE ORAL at 08:59

## 2020-01-01 RX ADMIN — AMLODIPINE BESYLATE 10 MG: 10 TABLET ORAL at 07:47

## 2020-01-01 RX ADMIN — TRAZODONE HYDROCHLORIDE 100 MG: 50 TABLET ORAL at 22:10

## 2020-01-01 RX ADMIN — GABAPENTIN 600 MG: 300 CAPSULE ORAL at 21:41

## 2020-01-01 RX ADMIN — UMECLIDINIUM 1 PUFF: 62.5 AEROSOL, POWDER ORAL at 07:52

## 2020-01-01 RX ADMIN — MYCOPHENOLATE MOFETIL 500 MG: 250 CAPSULE ORAL at 18:56

## 2020-01-01 RX ADMIN — POTASSIUM CHLORIDE 20 MEQ: 750 TABLET, EXTENDED RELEASE ORAL at 08:23

## 2020-01-01 RX ADMIN — Medication 5 ML: at 16:49

## 2020-01-01 RX ADMIN — FAMOTIDINE 20 MG: 20 TABLET ORAL at 19:54

## 2020-01-01 RX ADMIN — ASPIRIN 325 MG: 325 TABLET, COATED ORAL at 21:06

## 2020-01-01 RX ADMIN — METOPROLOL TARTRATE 50 MG: 25 TABLET, FILM COATED ORAL at 07:57

## 2020-01-01 RX ADMIN — TRAZODONE HYDROCHLORIDE 100 MG: 100 TABLET ORAL at 21:59

## 2020-01-01 RX ADMIN — TRAZODONE HYDROCHLORIDE 100 MG: 100 TABLET ORAL at 21:20

## 2020-01-01 RX ADMIN — METOPROLOL SUCCINATE 25 MG: 25 TABLET, EXTENDED RELEASE ORAL at 08:13

## 2020-01-01 RX ADMIN — MYCOPHENOLATE MOFETIL 500 MG: 500 TABLET, FILM COATED ORAL at 09:46

## 2020-01-01 RX ADMIN — MYCOPHENOLATE MOFETIL 500 MG: 500 TABLET, FILM COATED ORAL at 21:40

## 2020-01-01 RX ADMIN — FUROSEMIDE 20 MG: 20 TABLET ORAL at 07:57

## 2020-01-01 RX ADMIN — ASPIRIN 325 MG ORAL TABLET 325 MG: 325 PILL ORAL at 19:34

## 2020-01-01 RX ADMIN — TRAZODONE HYDROCHLORIDE 100 MG: 50 TABLET ORAL at 20:31

## 2020-01-01 RX ADMIN — ASPIRIN 325 MG: 325 TABLET, COATED ORAL at 20:35

## 2020-01-01 RX ADMIN — Medication 1 TABLET: at 21:33

## 2020-01-01 RX ADMIN — GABAPENTIN 600 MG: 300 CAPSULE ORAL at 22:02

## 2020-01-01 RX ADMIN — GABAPENTIN 300 MG: 300 CAPSULE ORAL at 07:52

## 2020-01-01 RX ADMIN — METOPROLOL TARTRATE 50 MG: 50 TABLET, FILM COATED ORAL at 07:55

## 2020-01-01 RX ADMIN — POLYETHYLENE GLYCOL 3350 17 G: 17 POWDER, FOR SOLUTION ORAL at 16:26

## 2020-01-01 RX ADMIN — METOPROLOL SUCCINATE 50 MG: 50 TABLET, EXTENDED RELEASE ORAL at 08:41

## 2020-01-01 RX ADMIN — CALCIUM GLUCONATE 4 G: 98 INJECTION, SOLUTION INTRAVENOUS at 09:40

## 2020-01-01 RX ADMIN — CALCIUM CARBONATE 600 MG (1,500 MG)-VITAMIN D3 400 UNIT TABLET 1 TABLET: at 21:45

## 2020-01-01 RX ADMIN — SODIUM CHLORIDE, PRESERVATIVE FREE 3 ML: 5 INJECTION INTRAVENOUS at 10:43

## 2020-01-01 RX ADMIN — ACETAMINOPHEN 650 MG: 325 TABLET, FILM COATED ORAL at 03:33

## 2020-01-01 RX ADMIN — FUROSEMIDE 20 MG: 20 TABLET ORAL at 08:27

## 2020-01-01 RX ADMIN — FLUOXETINE 40 MG: 20 CAPSULE ORAL at 08:31

## 2020-01-01 RX ADMIN — ASPIRIN 325 MG ORAL TABLET 325 MG: 325 PILL ORAL at 21:36

## 2020-01-01 RX ADMIN — ENOXAPARIN SODIUM 40 MG: 40 INJECTION SUBCUTANEOUS at 07:47

## 2020-01-01 RX ADMIN — METOPROLOL SUCCINATE 25 MG: 25 TABLET, EXTENDED RELEASE ORAL at 08:42

## 2020-01-01 RX ADMIN — FUROSEMIDE 20 MG: 20 TABLET ORAL at 08:50

## 2020-01-01 RX ADMIN — SIMVASTATIN 20 MG: 20 TABLET, FILM COATED ORAL at 21:59

## 2020-01-01 RX ADMIN — GADOBUTROL 9 ML: 604.72 INJECTION INTRAVENOUS at 21:10

## 2020-01-01 RX ADMIN — FUROSEMIDE 20 MG: 20 TABLET ORAL at 08:12

## 2020-01-01 RX ADMIN — POTASSIUM CHLORIDE 40 MEQ: 750 TABLET, EXTENDED RELEASE ORAL at 09:29

## 2020-01-01 RX ADMIN — FLUOXETINE 20 MG: 20 CAPSULE ORAL at 08:30

## 2020-01-01 RX ADMIN — FLUOXETINE HYDROCHLORIDE 40 MG: 40 CAPSULE ORAL at 09:20

## 2020-01-01 RX ADMIN — ANTICOAGULANT CITRATE DEXTROSE SOLUTION FORMULA A 557 ML: 12.25; 11; 3.65 SOLUTION INTRAVENOUS at 08:47

## 2020-01-01 RX ADMIN — FUROSEMIDE 20 MG: 20 TABLET ORAL at 08:35

## 2020-01-01 RX ADMIN — AMLODIPINE BESYLATE 10 MG: 10 TABLET ORAL at 07:55

## 2020-01-01 RX ADMIN — Medication 0.5 ML: at 07:54

## 2020-01-01 RX ADMIN — SODIUM CHLORIDE 1000 MG: 9 INJECTION, SOLUTION INTRAVENOUS at 05:59

## 2020-01-01 RX ADMIN — FUROSEMIDE 20 MG: 20 TABLET ORAL at 08:31

## 2020-01-01 RX ADMIN — APIXABAN 5 MG: 2.5 TABLET, FILM COATED ORAL at 21:32

## 2020-01-01 RX ADMIN — METOPROLOL SUCCINATE 25 MG: 25 TABLET, EXTENDED RELEASE ORAL at 21:01

## 2020-01-01 RX ADMIN — AMLODIPINE BESYLATE 10 MG: 10 TABLET ORAL at 08:26

## 2020-01-01 RX ADMIN — AMLODIPINE BESYLATE 10 MG: 10 TABLET ORAL at 08:35

## 2020-01-01 RX ADMIN — B-COMPLEX W/ C & FOLIC ACID TAB 1 TABLET: TAB at 10:11

## 2020-01-01 RX ADMIN — TRAZODONE HYDROCHLORIDE 100 MG: 100 TABLET ORAL at 21:25

## 2020-01-01 RX ADMIN — MYCOPHENOLATE MOFETIL 500 MG: 250 CAPSULE ORAL at 09:03

## 2020-01-01 RX ADMIN — FLUOXETINE 20 MG: 20 CAPSULE ORAL at 07:53

## 2020-01-01 RX ADMIN — ASPIRIN 325 MG: 325 TABLET, COATED ORAL at 21:29

## 2020-01-01 RX ADMIN — FLUOXETINE 20 MG: 20 CAPSULE ORAL at 10:12

## 2020-01-01 RX ADMIN — CEFTRIAXONE 1 G: 1 INJECTION, POWDER, FOR SOLUTION INTRAMUSCULAR; INTRAVENOUS at 08:34

## 2020-01-01 RX ADMIN — METOPROLOL SUCCINATE 25 MG: 25 TABLET, EXTENDED RELEASE ORAL at 08:38

## 2020-01-01 RX ADMIN — Medication 3 ML: at 10:11

## 2020-01-01 RX ADMIN — ASPIRIN 325 MG: 325 TABLET, COATED ORAL at 21:23

## 2020-01-01 RX ADMIN — ENOXAPARIN SODIUM 40 MG: 40 INJECTION SUBCUTANEOUS at 07:50

## 2020-01-01 RX ADMIN — AMLODIPINE BESYLATE 10 MG: 10 TABLET ORAL at 08:30

## 2020-01-01 RX ADMIN — CALCIUM CARBONATE 600 MG (1,500 MG)-VITAMIN D3 400 UNIT TABLET 1 TABLET: at 21:32

## 2020-01-01 RX ADMIN — ASPIRIN 325 MG: 325 TABLET, COATED ORAL at 20:13

## 2020-01-01 RX ADMIN — Medication 1 TABLET: at 21:44

## 2020-01-01 RX ADMIN — ALBUMIN HUMAN 2500 ML: 0.05 INJECTION, SOLUTION INTRAVENOUS at 08:59

## 2020-01-01 RX ADMIN — SODIUM CHLORIDE, PRESERVATIVE FREE 3 ML: 5 INJECTION INTRAVENOUS at 10:44

## 2020-01-01 RX ADMIN — FUROSEMIDE 20 MG: 20 TABLET ORAL at 13:52

## 2020-01-01 RX ADMIN — Medication 3 ML: at 12:08

## 2020-01-01 RX ADMIN — MYCOPHENOLATE MOFETIL 500 MG: 250 CAPSULE ORAL at 09:20

## 2020-01-01 RX ADMIN — GABAPENTIN 600 MG: 300 CAPSULE ORAL at 21:28

## 2020-01-01 RX ADMIN — CALCIUM CARBONATE 600 MG (1,500 MG)-VITAMIN D3 400 UNIT TABLET 1 TABLET: at 21:38

## 2020-01-01 RX ADMIN — Medication 3 ML: at 09:11

## 2020-01-01 RX ADMIN — FUROSEMIDE 20 MG: 20 TABLET ORAL at 13:21

## 2020-01-01 RX ADMIN — AMLODIPINE BESYLATE 5 MG: 5 TABLET ORAL at 08:44

## 2020-01-01 RX ADMIN — METOPROLOL SUCCINATE 25 MG: 25 TABLET, EXTENDED RELEASE ORAL at 08:49

## 2020-01-01 RX ADMIN — AMLODIPINE BESYLATE 10 MG: 10 TABLET ORAL at 07:51

## 2020-01-01 RX ADMIN — SILVER NITRATE APPLICATORS 4 APPLICATOR: 25; 75 STICK TOPICAL at 14:01

## 2020-01-01 RX ADMIN — FUROSEMIDE 20 MG: 20 TABLET ORAL at 09:00

## 2020-01-01 RX ADMIN — METOPROLOL SUCCINATE 25 MG: 25 TABLET, EXTENDED RELEASE ORAL at 21:39

## 2020-01-01 RX ADMIN — FUROSEMIDE 20 MG: 20 TABLET ORAL at 09:26

## 2020-01-01 RX ADMIN — TRAZODONE HYDROCHLORIDE 100 MG: 50 TABLET ORAL at 21:31

## 2020-01-01 RX ADMIN — ALBUMIN HUMAN 3000 ML: 0.05 INJECTION, SOLUTION INTRAVENOUS at 13:24

## 2020-01-01 RX ADMIN — ENOXAPARIN SODIUM 40 MG: 40 INJECTION SUBCUTANEOUS at 08:41

## 2020-01-01 RX ADMIN — B-COMPLEX W/ C & FOLIC ACID TAB 1 TABLET: TAB at 08:13

## 2020-01-01 RX ADMIN — FLUOXETINE 40 MG: 20 CAPSULE ORAL at 07:51

## 2020-01-01 RX ADMIN — CALCIUM CARBONATE 600 MG (1,500 MG)-VITAMIN D3 400 UNIT TABLET 1 TABLET: at 21:55

## 2020-01-01 RX ADMIN — MYCOPHENOLATE MOFETIL 500 MG: 250 CAPSULE ORAL at 08:24

## 2020-01-01 RX ADMIN — Medication 1 TABLET: at 00:06

## 2020-01-01 RX ADMIN — Medication 5 ML: at 15:38

## 2020-01-01 RX ADMIN — APIXABAN 5 MG: 2.5 TABLET, FILM COATED ORAL at 08:45

## 2020-01-01 RX ADMIN — METOPROLOL SUCCINATE 25 MG: 25 TABLET, EXTENDED RELEASE ORAL at 21:31

## 2020-01-01 RX ADMIN — SIMVASTATIN 20 MG: 20 TABLET, FILM COATED ORAL at 19:34

## 2020-01-01 RX ADMIN — GABAPENTIN 300 MG: 300 CAPSULE ORAL at 07:50

## 2020-01-01 RX ADMIN — SIMVASTATIN 20 MG: 20 TABLET, FILM COATED ORAL at 19:21

## 2020-01-01 RX ADMIN — B-COMPLEX W/ C & FOLIC ACID TAB 1 TABLET: TAB at 08:46

## 2020-01-01 RX ADMIN — SIMVASTATIN 20 MG: 20 TABLET, FILM COATED ORAL at 21:51

## 2020-01-01 RX ADMIN — FAMOTIDINE 20 MG: 20 TABLET ORAL at 19:42

## 2020-01-01 RX ADMIN — FLUOXETINE 40 MG: 20 CAPSULE ORAL at 08:22

## 2020-01-01 RX ADMIN — MYCOPHENOLATE MOFETIL 500 MG: 500 TABLET, FILM COATED ORAL at 09:30

## 2020-01-01 RX ADMIN — MYCOPHENOLATE MOFETIL 500 MG: 250 CAPSULE ORAL at 12:01

## 2020-01-01 RX ADMIN — APIXABAN 5 MG: 2.5 TABLET, FILM COATED ORAL at 08:32

## 2020-01-01 RX ADMIN — Medication 1 TABLET: at 20:13

## 2020-01-01 RX ADMIN — UMECLIDINIUM 1 PUFF: 62.5 AEROSOL, POWDER ORAL at 11:44

## 2020-01-01 RX ADMIN — METOPROLOL SUCCINATE 50 MG: 50 TABLET, EXTENDED RELEASE ORAL at 20:15

## 2020-01-01 RX ADMIN — SIMVASTATIN 20 MG: 20 TABLET, FILM COATED ORAL at 21:07

## 2020-01-01 RX ADMIN — ASPIRIN 325 MG ORAL TABLET 325 MG: 325 PILL ORAL at 20:15

## 2020-01-01 RX ADMIN — ASPIRIN 325 MG: 325 TABLET, COATED ORAL at 20:34

## 2020-01-01 RX ADMIN — TRAZODONE HYDROCHLORIDE 100 MG: 50 TABLET ORAL at 21:34

## 2020-01-01 RX ADMIN — Medication 3 ML: at 16:00

## 2020-01-01 RX ADMIN — SODIUM CHLORIDE 250 ML: 9 INJECTION, SOLUTION INTRAVENOUS at 13:48

## 2020-01-01 RX ADMIN — MYCOPHENOLATE MOFETIL 500 MG: 500 TABLET, FILM COATED ORAL at 22:33

## 2020-01-01 RX ADMIN — FLUOXETINE HYDROCHLORIDE 40 MG: 40 CAPSULE ORAL at 08:33

## 2020-01-01 RX ADMIN — ALBUMIN HUMAN 3000 ML: 0.05 INJECTION, SOLUTION INTRAVENOUS at 13:05

## 2020-01-01 RX ADMIN — ALBUMIN HUMAN 3000 ML: 0.05 INJECTION, SOLUTION INTRAVENOUS at 13:10

## 2020-01-01 RX ADMIN — FUROSEMIDE 20 MG: 20 TABLET ORAL at 08:32

## 2020-01-01 RX ADMIN — ENOXAPARIN SODIUM 40 MG: 40 INJECTION SUBCUTANEOUS at 07:51

## 2020-01-01 RX ADMIN — CALCIUM GLUCONATE 4 G: 98 INJECTION, SOLUTION INTRAVENOUS at 08:47

## 2020-01-01 RX ADMIN — METOPROLOL SUCCINATE 25 MG: 25 TABLET, EXTENDED RELEASE ORAL at 09:28

## 2020-01-01 RX ADMIN — FAMOTIDINE 20 MG: 20 TABLET ORAL at 08:59

## 2020-01-01 RX ADMIN — POTASSIUM CHLORIDE 40 MEQ: 750 TABLET, EXTENDED RELEASE ORAL at 07:51

## 2020-01-01 RX ADMIN — CEFTRIAXONE 1 G: 1 INJECTION, POWDER, FOR SOLUTION INTRAMUSCULAR; INTRAVENOUS at 21:23

## 2020-01-01 RX ADMIN — FAMOTIDINE 20 MG: 20 TABLET ORAL at 10:01

## 2020-01-01 RX ADMIN — B-COMPLEX W/ C & FOLIC ACID TAB 1 TABLET: TAB at 07:57

## 2020-01-01 RX ADMIN — MYCOPHENOLATE MOFETIL 500 MG: 250 CAPSULE ORAL at 17:08

## 2020-01-01 RX ADMIN — FLUOXETINE 40 MG: 20 CAPSULE ORAL at 09:45

## 2020-01-01 RX ADMIN — FUROSEMIDE 20 MG: 20 TABLET ORAL at 08:48

## 2020-01-01 RX ADMIN — ALBUMIN HUMAN 3000 ML: 0.05 INJECTION, SOLUTION INTRAVENOUS at 13:02

## 2020-01-01 RX ADMIN — APIXABAN 5 MG: 2.5 TABLET, FILM COATED ORAL at 10:09

## 2020-01-01 RX ADMIN — SIMVASTATIN 20 MG: 20 TABLET, FILM COATED ORAL at 20:21

## 2020-01-01 RX ADMIN — METOPROLOL TARTRATE 50 MG: 25 TABLET, FILM COATED ORAL at 10:33

## 2020-01-01 RX ADMIN — UMECLIDINIUM 1 PUFF: 62.5 AEROSOL, POWDER ORAL at 08:30

## 2020-01-01 RX ADMIN — OXYCODONE HYDROCHLORIDE AND ACETAMINOPHEN 500 MG: 500 TABLET ORAL at 10:10

## 2020-01-01 RX ADMIN — FLUOXETINE HYDROCHLORIDE 40 MG: 40 CAPSULE ORAL at 08:25

## 2020-01-01 RX ADMIN — ENOXAPARIN SODIUM 40 MG: 40 INJECTION SUBCUTANEOUS at 08:25

## 2020-01-01 RX ADMIN — AMLODIPINE BESYLATE 10 MG: 10 TABLET ORAL at 10:34

## 2020-01-01 RX ADMIN — CLOPIDOGREL BISULFATE 75 MG: 75 TABLET ORAL at 08:27

## 2020-01-01 RX ADMIN — METOPROLOL SUCCINATE 50 MG: 50 TABLET, EXTENDED RELEASE ORAL at 09:27

## 2020-01-01 RX ADMIN — FLUOXETINE 40 MG: 20 CAPSULE ORAL at 08:38

## 2020-01-01 RX ADMIN — TRAZODONE HYDROCHLORIDE 100 MG: 100 TABLET ORAL at 21:29

## 2020-01-01 RX ADMIN — FLUOXETINE 20 MG: 20 CAPSULE ORAL at 08:36

## 2020-01-01 RX ADMIN — FAMOTIDINE 20 MG: 20 TABLET ORAL at 21:06

## 2020-01-01 RX ADMIN — SIMVASTATIN 20 MG: 20 TABLET, FILM COATED ORAL at 21:01

## 2020-01-01 RX ADMIN — SIMVASTATIN 20 MG: 20 TABLET, FILM COATED ORAL at 23:39

## 2020-01-01 RX ADMIN — AMLODIPINE BESYLATE 10 MG: 10 TABLET ORAL at 07:50

## 2020-01-01 RX ADMIN — ASPIRIN 325 MG ORAL TABLET 325 MG: 325 PILL ORAL at 20:37

## 2020-01-01 RX ADMIN — METOPROLOL SUCCINATE 25 MG: 25 TABLET, EXTENDED RELEASE ORAL at 22:10

## 2020-01-01 RX ADMIN — APIXABAN 5 MG: 2.5 TABLET, FILM COATED ORAL at 09:29

## 2020-01-01 RX ADMIN — FLUOXETINE 40 MG: 20 CAPSULE ORAL at 09:29

## 2020-01-01 RX ADMIN — APIXABAN 5 MG: 5 TABLET, FILM COATED ORAL at 09:01

## 2020-01-01 RX ADMIN — INSULIN ASPART 1 UNITS: 100 INJECTION, SOLUTION INTRAVENOUS; SUBCUTANEOUS at 12:00

## 2020-01-01 RX ADMIN — METOPROLOL SUCCINATE 25 MG: 25 TABLET, EXTENDED RELEASE ORAL at 22:34

## 2020-01-01 RX ADMIN — FUROSEMIDE 20 MG: 20 TABLET ORAL at 08:41

## 2020-01-01 RX ADMIN — SODIUM CHLORIDE, PRESERVATIVE FREE 3 ML: 5 INJECTION INTRAVENOUS at 10:38

## 2020-01-01 RX ADMIN — ASPIRIN 325 MG: 325 TABLET, COATED ORAL at 21:44

## 2020-01-01 RX ADMIN — OXYCODONE HYDROCHLORIDE AND ACETAMINOPHEN 500 MG: 500 TABLET ORAL at 08:35

## 2020-01-01 RX ADMIN — GABAPENTIN 300 MG: 300 CAPSULE ORAL at 14:16

## 2020-01-01 RX ADMIN — Medication 3 ML: at 11:15

## 2020-01-01 RX ADMIN — METOPROLOL SUCCINATE 50 MG: 50 TABLET, EXTENDED RELEASE ORAL at 08:34

## 2020-01-01 RX ADMIN — POTASSIUM CHLORIDE 40 MEQ: 750 TABLET, EXTENDED RELEASE ORAL at 10:26

## 2020-01-01 RX ADMIN — INSULIN ASPART 1 UNITS: 100 INJECTION, SOLUTION INTRAVENOUS; SUBCUTANEOUS at 08:47

## 2020-01-01 RX ADMIN — FLUOXETINE 40 MG: 20 CAPSULE ORAL at 08:32

## 2020-01-01 RX ADMIN — MYCOPHENOLATE MOFETIL 500 MG: 250 CAPSULE ORAL at 17:24

## 2020-01-01 RX ADMIN — OXYCODONE HYDROCHLORIDE AND ACETAMINOPHEN 500 MG: 500 TABLET ORAL at 08:42

## 2020-01-01 RX ADMIN — TRAZODONE HYDROCHLORIDE 100 MG: 100 TABLET ORAL at 21:33

## 2020-01-01 RX ADMIN — APIXABAN 5 MG: 2.5 TABLET, FILM COATED ORAL at 21:39

## 2020-01-01 RX ADMIN — GABAPENTIN 300 MG: 300 CAPSULE ORAL at 08:26

## 2020-01-01 RX ADMIN — FAMOTIDINE 20 MG: 20 TABLET ORAL at 20:35

## 2020-01-01 RX ADMIN — AMLODIPINE BESYLATE 5 MG: 5 TABLET ORAL at 10:10

## 2020-01-01 RX ADMIN — AMLODIPINE BESYLATE 10 MG: 10 TABLET ORAL at 09:21

## 2020-01-01 RX ADMIN — TRAZODONE HYDROCHLORIDE 100 MG: 100 TABLET ORAL at 22:10

## 2020-01-01 RX ADMIN — MYCOPHENOLATE MOFETIL 500 MG: 500 TABLET, FILM COATED ORAL at 21:45

## 2020-01-01 RX ADMIN — B-COMPLEX W/ C & FOLIC ACID TAB 1 TABLET: TAB at 07:52

## 2020-01-01 RX ADMIN — Medication 3 ML: at 11:47

## 2020-01-01 RX ADMIN — AMLODIPINE BESYLATE 10 MG: 10 TABLET ORAL at 09:03

## 2020-01-01 RX ADMIN — ANTICOAGULANT CITRATE DEXTROSE SOLUTION FORMULA A 593 ML: 12.25; 11; 3.65 SOLUTION INTRAVENOUS at 08:46

## 2020-01-01 RX ADMIN — POTASSIUM CHLORIDE 20 MEQ: 750 TABLET, EXTENDED RELEASE ORAL at 10:08

## 2020-01-01 RX ADMIN — METOPROLOL SUCCINATE 50 MG: 50 TABLET, EXTENDED RELEASE ORAL at 09:00

## 2020-01-01 RX ADMIN — TRAZODONE HYDROCHLORIDE 100 MG: 100 TABLET ORAL at 21:57

## 2020-01-01 RX ADMIN — GABAPENTIN 300 MG: 300 CAPSULE ORAL at 10:11

## 2020-01-01 RX ADMIN — METOPROLOL SUCCINATE 50 MG: 50 TABLET, EXTENDED RELEASE ORAL at 19:53

## 2020-01-01 RX ADMIN — CALCIUM CARBONATE 600 MG (1,500 MG)-VITAMIN D3 400 UNIT TABLET 1 TABLET: at 21:07

## 2020-01-01 RX ADMIN — SODIUM CHLORIDE 1000 MG: 9 INJECTION, SOLUTION INTRAVENOUS at 12:07

## 2020-01-01 RX ADMIN — METOPROLOL SUCCINATE 25 MG: 25 TABLET, EXTENDED RELEASE ORAL at 21:51

## 2020-01-01 RX ADMIN — CLOPIDOGREL BISULFATE 75 MG: 75 TABLET ORAL at 08:26

## 2020-01-01 RX ADMIN — FLUOXETINE 20 MG: 20 CAPSULE ORAL at 10:26

## 2020-01-01 RX ADMIN — FUROSEMIDE 20 MG: 20 TABLET ORAL at 14:41

## 2020-01-01 RX ADMIN — CALCIUM GLUCONATE 3 G: 98 INJECTION, SOLUTION INTRAVENOUS at 10:45

## 2020-01-01 RX ADMIN — FLUOXETINE 40 MG: 20 CAPSULE ORAL at 08:00

## 2020-01-01 RX ADMIN — UMECLIDINIUM 1 PUFF: 62.5 AEROSOL, POWDER ORAL at 07:47

## 2020-01-01 RX ADMIN — FUROSEMIDE 20 MG: 20 TABLET ORAL at 21:24

## 2020-01-01 RX ADMIN — ACETAMINOPHEN 650 MG: 325 TABLET, FILM COATED ORAL at 08:34

## 2020-01-01 RX ADMIN — POTASSIUM CHLORIDE 40 MEQ: 1.5 POWDER, FOR SOLUTION ORAL at 21:53

## 2020-01-01 RX ADMIN — OXYCODONE HYDROCHLORIDE AND ACETAMINOPHEN 500 MG: 500 TABLET ORAL at 08:28

## 2020-01-01 RX ADMIN — CALCIUM CARBONATE 600 MG (1,500 MG)-VITAMIN D3 400 UNIT TABLET 1 TABLET: at 21:50

## 2020-01-01 RX ADMIN — SIMVASTATIN 20 MG: 20 TABLET, FILM COATED ORAL at 21:29

## 2020-01-01 RX ADMIN — FUROSEMIDE 20 MG: 20 TABLET ORAL at 08:34

## 2020-01-01 RX ADMIN — LIDOCAINE HYDROCHLORIDE 10 ML: 10 INJECTION, SOLUTION EPIDURAL; INFILTRATION; INTRACAUDAL; PERINEURAL at 09:51

## 2020-01-01 RX ADMIN — METOPROLOL SUCCINATE 50 MG: 50 TABLET, EXTENDED RELEASE ORAL at 08:24

## 2020-01-01 RX ADMIN — SIMVASTATIN 20 MG: 20 TABLET, FILM COATED ORAL at 00:06

## 2020-01-01 RX ADMIN — APIXABAN 5 MG: 2.5 TABLET, FILM COATED ORAL at 08:12

## 2020-01-01 RX ADMIN — GABAPENTIN 600 MG: 300 CAPSULE ORAL at 21:33

## 2020-01-01 RX ADMIN — POTASSIUM CHLORIDE 40 MEQ: 1.5 POWDER, FOR SOLUTION ORAL at 09:33

## 2020-01-01 RX ADMIN — FUROSEMIDE 20 MG: 20 TABLET ORAL at 19:35

## 2020-01-01 RX ADMIN — FUROSEMIDE 20 MG: 20 TABLET ORAL at 20:34

## 2020-01-01 RX ADMIN — MYCOPHENOLATE MOFETIL 500 MG: 500 TABLET, FILM COATED ORAL at 21:39

## 2020-01-01 RX ADMIN — ENOXAPARIN SODIUM 40 MG: 40 INJECTION SUBCUTANEOUS at 08:27

## 2020-01-01 RX ADMIN — B-COMPLEX W/ C & FOLIC ACID TAB 1 TABLET: TAB at 08:22

## 2020-01-01 RX ADMIN — CALCIUM GLUCONATE 3.7 G: 98 INJECTION, SOLUTION INTRAVENOUS at 13:10

## 2020-01-01 RX ADMIN — UMECLIDINIUM 1 PUFF: 62.5 AEROSOL, POWDER ORAL at 08:27

## 2020-01-01 RX ADMIN — CLOPIDOGREL BISULFATE 75 MG: 75 TABLET ORAL at 08:32

## 2020-01-01 RX ADMIN — ENOXAPARIN SODIUM 40 MG: 40 INJECTION SUBCUTANEOUS at 11:36

## 2020-01-01 RX ADMIN — ENOXAPARIN SODIUM 40 MG: 40 INJECTION SUBCUTANEOUS at 08:58

## 2020-01-01 RX ADMIN — SIMVASTATIN 20 MG: 20 TABLET, FILM COATED ORAL at 21:20

## 2020-01-01 RX ADMIN — POTASSIUM CHLORIDE 40 MEQ: 1.5 POWDER, FOR SOLUTION ORAL at 11:58

## 2020-01-01 RX ADMIN — ECULIZUMAB 900 MG: 300 INJECTION, SOLUTION, CONCENTRATE INTRAVENOUS at 13:48

## 2020-01-01 RX ADMIN — AMLODIPINE BESYLATE 10 MG: 10 TABLET ORAL at 10:00

## 2020-01-01 RX ADMIN — METOPROLOL SUCCINATE 25 MG: 25 TABLET, EXTENDED RELEASE ORAL at 21:34

## 2020-01-01 RX ADMIN — APIXABAN 5 MG: 2.5 TABLET, FILM COATED ORAL at 21:55

## 2020-01-01 RX ADMIN — UMECLIDINIUM 1 PUFF: 62.5 AEROSOL, POWDER ORAL at 08:50

## 2020-01-01 RX ADMIN — AMLODIPINE BESYLATE 10 MG: 10 TABLET ORAL at 08:32

## 2020-01-01 RX ADMIN — METOPROLOL SUCCINATE 50 MG: 50 TABLET, EXTENDED RELEASE ORAL at 19:34

## 2020-01-01 RX ADMIN — FUROSEMIDE 20 MG: 20 TABLET ORAL at 07:53

## 2020-01-01 RX ADMIN — METOPROLOL SUCCINATE 25 MG: 25 TABLET, EXTENDED RELEASE ORAL at 21:07

## 2020-01-01 RX ADMIN — MYCOPHENOLATE MOFETIL 500 MG: 500 TABLET, FILM COATED ORAL at 18:24

## 2020-01-01 RX ADMIN — CALCIUM GLUCONATE 3.8 G: 98 INJECTION, SOLUTION INTRAVENOUS at 13:24

## 2020-01-01 RX ADMIN — UMECLIDINIUM 1 PUFF: 62.5 AEROSOL, POWDER ORAL at 08:46

## 2020-01-01 RX ADMIN — METOPROLOL SUCCINATE 25 MG: 25 TABLET, EXTENDED RELEASE ORAL at 08:44

## 2020-01-01 RX ADMIN — FLUOXETINE 20 MG: 20 CAPSULE ORAL at 10:00

## 2020-01-01 RX ADMIN — FLUOXETINE 20 MG: 20 CAPSULE ORAL at 08:48

## 2020-01-01 RX ADMIN — METOPROLOL SUCCINATE 50 MG: 50 TABLET, EXTENDED RELEASE ORAL at 10:42

## 2020-01-01 RX ADMIN — MYCOPHENOLATE MOFETIL 500 MG: 250 CAPSULE ORAL at 08:41

## 2020-01-01 RX ADMIN — Medication 1 TABLET: at 20:34

## 2020-01-01 RX ADMIN — OXYCODONE HYDROCHLORIDE AND ACETAMINOPHEN 500 MG: 500 TABLET ORAL at 10:26

## 2020-01-01 RX ADMIN — LORAZEPAM 0.25 MG: 2 SOLUTION, CONCENTRATE ORAL at 16:40

## 2020-01-01 RX ADMIN — ANTICOAGULANT CITRATE DEXTROSE SOLUTION FORMULA A 657 ML: 12.25; 11; 3.65 SOLUTION INTRAVENOUS at 13:10

## 2020-01-01 RX ADMIN — METOPROLOL SUCCINATE 50 MG: 50 TABLET, EXTENDED RELEASE ORAL at 20:29

## 2020-01-01 RX ADMIN — GABAPENTIN 300 MG: 300 CAPSULE ORAL at 12:07

## 2020-01-01 RX ADMIN — CALCIUM GLUCONATE 3 G: 98 INJECTION, SOLUTION INTRAVENOUS at 08:48

## 2020-01-01 RX ADMIN — GABAPENTIN 300 MG: 300 CAPSULE ORAL at 12:28

## 2020-01-01 RX ADMIN — ACETAMINOPHEN 650 MG: 325 TABLET, FILM COATED ORAL at 16:15

## 2020-01-01 RX ADMIN — B-COMPLEX W/ C & FOLIC ACID TAB 1 TABLET: TAB at 08:30

## 2020-01-01 RX ADMIN — SIMVASTATIN 20 MG: 20 TABLET, FILM COATED ORAL at 21:38

## 2020-01-01 RX ADMIN — METOPROLOL TARTRATE 50 MG: 25 TABLET, FILM COATED ORAL at 20:34

## 2020-01-01 RX ADMIN — INSULIN ASPART 1 UNITS: 100 INJECTION, SOLUTION INTRAVENOUS; SUBCUTANEOUS at 12:37

## 2020-01-01 RX ADMIN — ASPIRIN 325 MG ORAL TABLET 325 MG: 325 PILL ORAL at 20:38

## 2020-01-01 RX ADMIN — ACETAMINOPHEN 975 MG: 325 TABLET, FILM COATED ORAL at 08:31

## 2020-01-01 RX ADMIN — FLUOXETINE HYDROCHLORIDE 40 MG: 40 CAPSULE ORAL at 08:59

## 2020-01-01 RX ADMIN — SODIUM CHLORIDE 1000 MG: 9 INJECTION, SOLUTION INTRAVENOUS at 16:31

## 2020-01-01 RX ADMIN — APIXABAN 5 MG: 5 TABLET, FILM COATED ORAL at 10:53

## 2020-01-01 RX ADMIN — METOPROLOL TARTRATE 50 MG: 25 TABLET, FILM COATED ORAL at 07:53

## 2020-01-01 RX ADMIN — Medication 5 ML: at 17:10

## 2020-01-01 RX ADMIN — TRAZODONE HYDROCHLORIDE 100 MG: 100 TABLET ORAL at 21:24

## 2020-01-01 RX ADMIN — ALBUMIN HUMAN 2500 ML: 0.05 INJECTION, SOLUTION INTRAVENOUS at 08:47

## 2020-01-01 RX ADMIN — OXYCODONE HYDROCHLORIDE AND ACETAMINOPHEN 500 MG: 500 TABLET ORAL at 08:00

## 2020-01-01 RX ADMIN — ASPIRIN 325 MG ORAL TABLET 325 MG: 325 PILL ORAL at 23:39

## 2020-01-01 RX ADMIN — GABAPENTIN 300 MG: 300 CAPSULE ORAL at 08:30

## 2020-01-01 RX ADMIN — FUROSEMIDE 20 MG: 20 TABLET ORAL at 20:13

## 2020-01-01 RX ADMIN — MYCOPHENOLATE MOFETIL 500 MG: 500 TABLET, FILM COATED ORAL at 07:55

## 2020-01-01 RX ADMIN — METOPROLOL TARTRATE 50 MG: 25 TABLET, FILM COATED ORAL at 00:06

## 2020-01-01 RX ADMIN — TRAZODONE HYDROCHLORIDE 100 MG: 50 TABLET ORAL at 21:50

## 2020-01-01 RX ADMIN — METOPROLOL TARTRATE 50 MG: 25 TABLET, FILM COATED ORAL at 20:04

## 2020-01-01 RX ADMIN — CALCIUM CARBONATE 600 MG (1,500 MG)-VITAMIN D3 400 UNIT TABLET 1 TABLET: at 21:35

## 2020-01-01 RX ADMIN — GABAPENTIN 600 MG: 300 CAPSULE ORAL at 21:06

## 2020-01-01 RX ADMIN — METOPROLOL SUCCINATE 25 MG: 25 TABLET, EXTENDED RELEASE ORAL at 09:45

## 2020-01-01 RX ADMIN — B-COMPLEX W/ C & FOLIC ACID TAB 1 TABLET: TAB at 09:30

## 2020-01-01 RX ADMIN — MYCOPHENOLATE MOFETIL 500 MG: 500 TABLET, FILM COATED ORAL at 10:00

## 2020-01-01 RX ADMIN — FUROSEMIDE 20 MG: 20 TABLET ORAL at 21:33

## 2020-01-01 RX ADMIN — POTASSIUM CHLORIDE 20 MEQ: 750 TABLET, EXTENDED RELEASE ORAL at 14:41

## 2020-01-01 RX ADMIN — FUROSEMIDE 20 MG: 20 TABLET ORAL at 10:34

## 2020-01-01 RX ADMIN — TRAZODONE HYDROCHLORIDE 100 MG: 50 TABLET ORAL at 21:45

## 2020-01-01 RX ADMIN — FUROSEMIDE 20 MG: 20 TABLET ORAL at 07:47

## 2020-01-01 RX ADMIN — POTASSIUM CHLORIDE 20 MEQ: 750 TABLET, EXTENDED RELEASE ORAL at 09:28

## 2020-01-01 RX ADMIN — METOPROLOL SUCCINATE 50 MG: 50 TABLET, EXTENDED RELEASE ORAL at 22:02

## 2020-01-01 RX ADMIN — CALCIUM GLUCONATE 3 G: 98 INJECTION, SOLUTION INTRAVENOUS at 13:02

## 2020-01-01 RX ADMIN — APIXABAN 5 MG: 2.5 TABLET, FILM COATED ORAL at 09:45

## 2020-01-01 RX ADMIN — SODIUM CHLORIDE 1000 MG: 9 INJECTION, SOLUTION INTRAVENOUS at 11:27

## 2020-01-01 RX ADMIN — OXYCODONE HYDROCHLORIDE AND ACETAMINOPHEN 500 MG: 500 TABLET ORAL at 08:38

## 2020-01-01 RX ADMIN — FLUOXETINE HYDROCHLORIDE 40 MG: 40 CAPSULE ORAL at 09:03

## 2020-01-01 RX ADMIN — APIXABAN 5 MG: 5 TABLET, FILM COATED ORAL at 19:53

## 2020-01-01 RX ADMIN — CALCIUM CARBONATE 600 MG (1,500 MG)-VITAMIN D3 400 UNIT TABLET 1 TABLET: at 22:10

## 2020-01-01 RX ADMIN — APIXABAN 5 MG: 2.5 TABLET, FILM COATED ORAL at 21:30

## 2020-01-01 RX ADMIN — MYCOPHENOLATE MOFETIL 500 MG: 500 TABLET, FILM COATED ORAL at 08:23

## 2020-01-01 RX ADMIN — SIMVASTATIN 20 MG: 20 TABLET, FILM COATED ORAL at 20:39

## 2020-01-01 RX ADMIN — FUROSEMIDE 20 MG: 20 TABLET ORAL at 13:58

## 2020-01-01 RX ADMIN — ANTICOAGULANT CITRATE DEXTROSE SOLUTION FORMULA A 649 ML: 12.25; 11; 3.65 SOLUTION INTRAVENOUS at 13:05

## 2020-01-01 RX ADMIN — SIMVASTATIN 20 MG: 20 TABLET, FILM COATED ORAL at 22:58

## 2020-01-01 RX ADMIN — TRAZODONE HYDROCHLORIDE 100 MG: 100 TABLET ORAL at 21:44

## 2020-01-01 RX ADMIN — HYDROCORTISONE: 1 CREAM TOPICAL at 10:24

## 2020-01-01 RX ADMIN — CLOPIDOGREL BISULFATE 75 MG: 75 TABLET ORAL at 16:40

## 2020-01-01 RX ADMIN — MYCOPHENOLATE MOFETIL 500 MG: 500 TABLET, FILM COATED ORAL at 22:10

## 2020-01-01 RX ADMIN — CALCIUM GLUCONATE: 98 INJECTION, SOLUTION INTRAVENOUS at 08:59

## 2020-01-01 RX ADMIN — UMECLIDINIUM 1 PUFF: 62.5 AEROSOL, POWDER ORAL at 07:23

## 2020-01-01 RX ADMIN — Medication 1 TABLET: at 20:30

## 2020-01-01 RX ADMIN — ANTICOAGULANT CITRATE DEXTROSE SOLUTION FORMULA A 640 ML: 12.25; 11; 3.65 SOLUTION INTRAVENOUS at 15:02

## 2020-01-01 RX ADMIN — CALCIUM GLUCONATE 3 G: 98 INJECTION, SOLUTION INTRAVENOUS at 15:02

## 2020-01-01 RX ADMIN — ANTICOAGULANT CITRATE DEXTROSE SOLUTION FORMULA A 594 ML: 12.25; 11; 3.65 SOLUTION INTRAVENOUS at 09:40

## 2020-01-01 RX ADMIN — METOPROLOL SUCCINATE 50 MG: 50 TABLET, EXTENDED RELEASE ORAL at 08:44

## 2020-01-01 RX ADMIN — LOPERAMIDE HYDROCHLORIDE 2 MG: 2 CAPSULE ORAL at 10:54

## 2020-01-01 RX ADMIN — MYCOPHENOLATE MOFETIL 500 MG: 500 TABLET, FILM COATED ORAL at 21:53

## 2020-01-01 RX ADMIN — TRAZODONE HYDROCHLORIDE 100 MG: 50 TABLET ORAL at 22:04

## 2020-01-01 RX ADMIN — ACETAMINOPHEN 650 MG: 325 TABLET, FILM COATED ORAL at 22:37

## 2020-01-01 RX ADMIN — APIXABAN 5 MG: 2.5 TABLET, FILM COATED ORAL at 08:50

## 2020-01-01 RX ADMIN — FUROSEMIDE 20 MG: 20 TABLET ORAL at 10:12

## 2020-01-01 RX ADMIN — METOPROLOL SUCCINATE 25 MG: 25 TABLET, EXTENDED RELEASE ORAL at 21:52

## 2020-01-01 RX ADMIN — AMLODIPINE BESYLATE 5 MG: 5 TABLET ORAL at 08:38

## 2020-01-01 RX ADMIN — TRAZODONE HYDROCHLORIDE 100 MG: 100 TABLET ORAL at 00:09

## 2020-01-01 RX ADMIN — FAMOTIDINE 20 MG: 20 TABLET ORAL at 19:35

## 2020-01-01 RX ADMIN — Medication 3 ML: at 10:46

## 2020-01-01 RX ADMIN — POTASSIUM CHLORIDE 20 MEQ: 750 TABLET, EXTENDED RELEASE ORAL at 08:00

## 2020-01-01 RX ADMIN — CALCIUM CARBONATE 600 MG (1,500 MG)-VITAMIN D3 400 UNIT TABLET 1 TABLET: at 21:52

## 2020-01-01 RX ADMIN — MYCOPHENOLATE MOFETIL 500 MG: 250 CAPSULE ORAL at 18:07

## 2020-01-01 RX ADMIN — SODIUM CHLORIDE 500 MG: 9 INJECTION, SOLUTION INTRAVENOUS at 14:13

## 2020-01-01 RX ADMIN — CLOPIDOGREL BISULFATE 75 MG: 75 TABLET ORAL at 08:34

## 2020-01-01 RX ADMIN — TRAZODONE HYDROCHLORIDE 100 MG: 50 TABLET ORAL at 22:32

## 2020-01-01 RX ADMIN — METOPROLOL SUCCINATE 50 MG: 50 TABLET, EXTENDED RELEASE ORAL at 19:00

## 2020-01-01 RX ADMIN — POTASSIUM CHLORIDE 20 MEQ: 750 TABLET, EXTENDED RELEASE ORAL at 08:12

## 2020-01-01 RX ADMIN — TRAZODONE HYDROCHLORIDE 100 MG: 100 TABLET ORAL at 21:04

## 2020-01-01 RX ADMIN — ANTICOAGULANT CITRATE DEXTROSE SOLUTION FORMULA A 674 ML: 12.25; 11; 3.65 SOLUTION INTRAVENOUS at 09:15

## 2020-01-01 RX ADMIN — METOPROLOL SUCCINATE 50 MG: 50 TABLET, EXTENDED RELEASE ORAL at 08:40

## 2020-01-01 RX ADMIN — ACETAMINOPHEN 650 MG: 325 TABLET, FILM COATED ORAL at 10:58

## 2020-01-01 RX ADMIN — ENOXAPARIN SODIUM 40 MG: 40 INJECTION SUBCUTANEOUS at 10:12

## 2020-01-01 RX ADMIN — FAMOTIDINE 20 MG: 20 TABLET ORAL at 07:52

## 2020-01-01 RX ADMIN — APIXABAN 5 MG: 2.5 TABLET, FILM COATED ORAL at 21:38

## 2020-01-01 RX ADMIN — TRAZODONE HYDROCHLORIDE 100 MG: 50 TABLET ORAL at 21:07

## 2020-01-01 RX ADMIN — SODIUM CHLORIDE 1000 MG: 9 INJECTION, SOLUTION INTRAVENOUS at 16:38

## 2020-01-01 RX ADMIN — LOPERAMIDE HYDROCHLORIDE 2 MG: 2 CAPSULE ORAL at 20:35

## 2020-01-01 RX ADMIN — APIXABAN 5 MG: 2.5 TABLET, FILM COATED ORAL at 21:35

## 2020-01-01 RX ADMIN — FUROSEMIDE 20 MG: 20 TABLET ORAL at 09:03

## 2020-01-01 RX ADMIN — OXYCODONE HYDROCHLORIDE AND ACETAMINOPHEN 500 MG: 500 TABLET ORAL at 08:46

## 2020-01-01 RX ADMIN — SODIUM CHLORIDE, PRESERVATIVE FREE 3 ML: 5 INJECTION INTRAVENOUS at 10:39

## 2020-01-01 RX ADMIN — APIXABAN 5 MG: 5 TABLET, FILM COATED ORAL at 09:27

## 2020-01-01 RX ADMIN — MYCOPHENOLATE MOFETIL 500 MG: 500 TABLET, FILM COATED ORAL at 08:42

## 2020-01-01 RX ADMIN — FLUOXETINE 40 MG: 20 CAPSULE ORAL at 08:51

## 2020-01-01 RX ADMIN — METOPROLOL SUCCINATE 50 MG: 50 TABLET, EXTENDED RELEASE ORAL at 20:21

## 2020-01-01 RX ADMIN — MYCOPHENOLATE MOFETIL 500 MG: 500 TABLET, FILM COATED ORAL at 21:55

## 2020-01-01 RX ADMIN — METOPROLOL TARTRATE 50 MG: 25 TABLET, FILM COATED ORAL at 19:35

## 2020-01-01 RX ADMIN — ALBUMIN HUMAN 2500 ML: 0.05 INJECTION, SOLUTION INTRAVENOUS at 15:02

## 2020-01-01 RX ADMIN — ACETAMINOPHEN 650 MG: 325 TABLET, FILM COATED ORAL at 09:04

## 2020-01-01 RX ADMIN — AMLODIPINE BESYLATE 5 MG: 5 TABLET ORAL at 08:00

## 2020-01-01 RX ADMIN — Medication 5 ML: at 16:28

## 2020-01-01 RX ADMIN — FLUOXETINE 40 MG: 20 CAPSULE ORAL at 10:08

## 2020-01-01 RX ADMIN — SIMVASTATIN 20 MG: 20 TABLET, FILM COATED ORAL at 20:04

## 2020-01-01 RX ADMIN — FLUOXETINE 20 MG: 20 CAPSULE ORAL at 08:26

## 2020-01-01 RX ADMIN — OXYCODONE HYDROCHLORIDE AND ACETAMINOPHEN 500 MG: 500 TABLET ORAL at 07:47

## 2020-01-01 RX ADMIN — TRAZODONE HYDROCHLORIDE 100 MG: 100 TABLET ORAL at 21:17

## 2020-01-01 RX ADMIN — METOPROLOL TARTRATE 50 MG: 25 TABLET, FILM COATED ORAL at 07:47

## 2020-01-01 RX ADMIN — FUROSEMIDE 20 MG: 20 TABLET ORAL at 07:50

## 2020-01-01 RX ADMIN — SIMVASTATIN 20 MG: 20 TABLET, FILM COATED ORAL at 22:20

## 2020-01-01 RX ADMIN — FUROSEMIDE 20 MG: 20 TABLET ORAL at 04:15

## 2020-01-01 RX ADMIN — FUROSEMIDE 20 MG: 20 TABLET ORAL at 08:25

## 2020-01-01 RX ADMIN — FLUOXETINE 20 MG: 20 CAPSULE ORAL at 08:27

## 2020-01-01 RX ADMIN — SIMVASTATIN 20 MG: 20 TABLET, FILM COATED ORAL at 21:33

## 2020-01-01 RX ADMIN — TRAZODONE HYDROCHLORIDE 100 MG: 100 TABLET ORAL at 21:41

## 2020-01-01 ASSESSMENT — VISUAL ACUITY
OU: OTHER (SEE COMMENT)
OU: OTHER (SEE COMMENT)
OS_CC: 20/125
OU: OTHER (SEE COMMENT)
OU: BLURRED VISION
OU: OTHER (SEE COMMENT)
OU: BLURRED VISION
OU: OTHER (SEE COMMENT)
OU: NO VISION
OU: NO VISION
OU: OTHER (SEE COMMENT)
OS_CC: CF
OU: OTHER (SEE COMMENT)
OD_CC: NLP
OU: OTHER (SEE COMMENT)
OU: NO VISION
OU: NO VISION
OU: BLURRED VISION;GLASSES;OTHER (SEE COMMENT)
OU: OTHER (SEE COMMENT)
OD_CC: NLP
OU: OTHER (SEE COMMENT)
OU: NO VISION
METHOD: SNELLEN - LINEAR
OU: OTHER (SEE COMMENT)
METHOD: SNELLEN - LINEAR
OS: COUNT FINGERS
OU: OTHER (SEE COMMENT)

## 2020-01-01 ASSESSMENT — ACTIVITIES OF DAILY LIVING (ADL)
ADLS_ACUITY_SCORE: 16
ADLS_ACUITY_SCORE: 21
ADLS_ACUITY_SCORE: 14
ADLS_ACUITY_SCORE: 14
ADLS_ACUITY_SCORE: 21
ADLS_ACUITY_SCORE: 15
ADLS_ACUITY_SCORE: 17
ADLS_ACUITY_SCORE: 15
ADLS_ACUITY_SCORE: 14
ADLS_ACUITY_SCORE: 13
ADLS_ACUITY_SCORE: 21
ADLS_ACUITY_SCORE: 19
ADLS_ACUITY_SCORE: 15
ADLS_ACUITY_SCORE: 15
ADLS_ACUITY_SCORE: 21
ADLS_ACUITY_SCORE: 20
ADLS_ACUITY_SCORE: 15
ADLS_ACUITY_SCORE: 21
ADLS_ACUITY_SCORE: 15
ADLS_ACUITY_SCORE: 14
ADLS_ACUITY_SCORE: 15
ADLS_ACUITY_SCORE: 14
ADLS_ACUITY_SCORE: 19
ADLS_ACUITY_SCORE: 19
ADLS_ACUITY_SCORE: 15
ADLS_ACUITY_SCORE: 16
ADLS_ACUITY_SCORE: 19
DIFFICULTY_EATING/SWALLOWING: NO
ADLS_ACUITY_SCORE: 17
ADLS_ACUITY_SCORE: 21
ADLS_ACUITY_SCORE: 19
ADLS_ACUITY_SCORE: 14
ADLS_ACUITY_SCORE: 15
ADLS_ACUITY_SCORE: 14
ADLS_ACUITY_SCORE: 14
ADLS_ACUITY_SCORE: 19
ADLS_ACUITY_SCORE: 21
WEAR_GLASSES_OR_BLIND: YES
ADLS_ACUITY_SCORE: 21
ADLS_ACUITY_SCORE: 14
ADLS_ACUITY_SCORE: 19
ADLS_ACUITY_SCORE: 15
ADLS_ACUITY_SCORE: 15
ADLS_ACUITY_SCORE: 19
ADLS_ACUITY_SCORE: 13
CONCENTRATING,_REMEMBERING_OR_MAKING_DECISIONS_DIFFICULTY: NO
ADLS_ACUITY_SCORE: 21
ADLS_ACUITY_SCORE: 15
PREVIOUS_RESPONSIBILITIES: MEAL PREP
ADLS_ACUITY_SCORE: 15
ADLS_ACUITY_SCORE: 17
ADLS_ACUITY_SCORE: 14
ADLS_ACUITY_SCORE: 15
ADLS_ACUITY_SCORE: 21
ADLS_ACUITY_SCORE: 14
ADLS_ACUITY_SCORE: 14
ADLS_ACUITY_SCORE: 21
ADLS_ACUITY_SCORE: 15
ADLS_ACUITY_SCORE: 13
ADLS_ACUITY_SCORE: 15
WALKING_OR_CLIMBING_STAIRS_DIFFICULTY: YES
ADLS_ACUITY_SCORE: 14
ADLS_ACUITY_SCORE: 15
ADLS_ACUITY_SCORE: 21
DRESSING/BATHING_DIFFICULTY: NO
ADLS_ACUITY_SCORE: 15
ADLS_ACUITY_SCORE: 14
ADLS_ACUITY_SCORE: 16
ADLS_ACUITY_SCORE: 21
ADLS_ACUITY_SCORE: 15
ADLS_ACUITY_SCORE: 17
ADLS_ACUITY_SCORE: 16
ADLS_ACUITY_SCORE: 14
ADLS_ACUITY_SCORE: 15
ADLS_ACUITY_SCORE: 21
ADLS_ACUITY_SCORE: 14
ADLS_ACUITY_SCORE: 21
ADLS_ACUITY_SCORE: 17
ADLS_ACUITY_SCORE: 14
ADLS_ACUITY_SCORE: 14
PREVIOUS_RESPONSIBILITIES: MEAL PREP
ADLS_ACUITY_SCORE: 15
ADLS_ACUITY_SCORE: 15
ADLS_ACUITY_SCORE: 14
ADLS_ACUITY_SCORE: 21
ADLS_ACUITY_SCORE: 15
ADLS_ACUITY_SCORE: 21
ADLS_ACUITY_SCORE: 14
ADLS_ACUITY_SCORE: 21
ADLS_ACUITY_SCORE: 15
ADLS_ACUITY_SCORE: 15
ADLS_ACUITY_SCORE: 14
ADLS_ACUITY_SCORE: 21
ADLS_ACUITY_SCORE: 15
DIFFICULTY_COMMUNICATING: NO
ADLS_ACUITY_SCORE: 21
ADLS_ACUITY_SCORE: 15
ADLS_ACUITY_SCORE: 21
ADLS_ACUITY_SCORE: 14
ADLS_ACUITY_SCORE: 21
ADLS_ACUITY_SCORE: 21
ADLS_ACUITY_SCORE: 14
DOING_ERRANDS_INDEPENDENTLY_DIFFICULTY: NO
ADLS_ACUITY_SCORE: 21
ADLS_ACUITY_SCORE: 14
ADLS_ACUITY_SCORE: 21
ADLS_ACUITY_SCORE: 14
ADLS_ACUITY_SCORE: 15
ADLS_ACUITY_SCORE: 21
ADLS_ACUITY_SCORE: 14
ADLS_ACUITY_SCORE: 13
ADLS_ACUITY_SCORE: 15
ADLS_ACUITY_SCORE: 15
ADLS_ACUITY_SCORE: 21
ADLS_ACUITY_SCORE: 19
ADLS_ACUITY_SCORE: 21
ADLS_ACUITY_SCORE: 15
FALL_HISTORY_WITHIN_LAST_SIX_MONTHS: YES
ADLS_ACUITY_SCORE: 21
ADLS_ACUITY_SCORE: 14
ADLS_ACUITY_SCORE: 21
ADLS_ACUITY_SCORE: 21
WHICH_OF_THE_ABOVE_FUNCTIONAL_RISKS_HAD_A_RECENT_ONSET_OR_CHANGE?: AMBULATION
ADLS_ACUITY_SCORE: 15
ADLS_ACUITY_SCORE: 15
WALKING_OR_CLIMBING_STAIRS: AMBULATION DIFFICULTY, REQUIRES EQUIPMENT;AMBULATION DIFFICULTY, ASSISTANCE 1 PERSON
ADLS_ACUITY_SCORE: 14
ADLS_ACUITY_SCORE: 21
ADLS_ACUITY_SCORE: 15
ADLS_ACUITY_SCORE: 16
EQUIPMENT_CURRENTLY_USED_AT_HOME: CANE, STRAIGHT;WALKER, ROLLING
ADLS_ACUITY_SCORE: 21
ADLS_ACUITY_SCORE: 15
ADLS_ACUITY_SCORE: 19
ADLS_ACUITY_SCORE: 21
ADLS_ACUITY_SCORE: 15
ADLS_ACUITY_SCORE: 14
ADLS_ACUITY_SCORE: 19
ADLS_ACUITY_SCORE: 21
ADLS_ACUITY_SCORE: 21
ADLS_ACUITY_SCORE: 15
ADLS_ACUITY_SCORE: 19
ADLS_ACUITY_SCORE: 21
ADLS_ACUITY_SCORE: 14
NUMBER_OF_TIMES_PATIENT_HAS_FALLEN_WITHIN_LAST_SIX_MONTHS: 2
ADLS_ACUITY_SCORE: 17
ADLS_ACUITY_SCORE: 14
ADLS_ACUITY_SCORE: 15
ADLS_ACUITY_SCORE: 14
ADLS_ACUITY_SCORE: 21
ADLS_ACUITY_SCORE: 21
ADLS_ACUITY_SCORE: 15
ADLS_ACUITY_SCORE: 15
ADLS_ACUITY_SCORE: 21
ADLS_ACUITY_SCORE: 13
ADLS_ACUITY_SCORE: 15
ADLS_ACUITY_SCORE: 15
ADLS_ACUITY_SCORE: 16
ADLS_ACUITY_SCORE: 14

## 2020-01-01 ASSESSMENT — REFRACTION_WEARINGRX
OS_SPHERE: -0.25
OS_CYLINDER: +2.00
OD_AXIS: 044
OS_AXIS: 135
OS_SPHERE: -0.25
OD_SPHERE: +0.25
OS_CYLINDER: +2.00
OD_ADD: +2.25
OD_CYLINDER: +0.50
OS_ADD: +2.25
OD_CYLINDER: +0.50
OD_ADD: +2.25
OD_AXIS: 044
OS_ADD: +2.25
OS_AXIS: 135
OD_SPHERE: +0.25

## 2020-01-01 ASSESSMENT — CHADS2 SCORE
PRIOR STROKE OR TIA OR THROMBOEMBOLISM: YES
DIABETES: NO
AGE GREATER THAN OR EQUAL TO 75: YES
HYPERTENSION: NO
CHF: YES
CHADS2 SCORE: 4

## 2020-01-01 ASSESSMENT — TONOMETRY
OD_IOP_MMHG: 10
OD_IOP_MMHG: 10
IOP_METHOD: ICARE
OS_IOP_MMHG: 10
OS_IOP_MMHG: 10
IOP_METHOD: ICARE

## 2020-01-01 ASSESSMENT — CONF VISUAL FIELD
OS_INFERIOR_NASAL_RESTRICTION: 1
OS_INFERIOR_TEMPORAL_RESTRICTION: 1
OD_SUPERIOR_NASAL_RESTRICTION: 1
OS_NORMAL: 1
OD_INFERIOR_TEMPORAL_RESTRICTION: 1
OS_SUPERIOR_TEMPORAL_RESTRICTION: 1
OD_SUPERIOR_TEMPORAL_RESTRICTION: 1
OD_SUPERIOR_NASAL_RESTRICTION: 1
OS_SUPERIOR_NASAL_RESTRICTION: 1
OD_SUPERIOR_TEMPORAL_RESTRICTION: 1
OD_INFERIOR_NASAL_RESTRICTION: 1
OD_INFERIOR_NASAL_RESTRICTION: 1
OD_INFERIOR_TEMPORAL_RESTRICTION: 1

## 2020-01-01 ASSESSMENT — ENCOUNTER SYMPTOMS
ABDOMINAL PAIN: 0
NAUSEA: 0
FEVER: 0
WEAKNESS: 1
COLOR CHANGE: 0
ARTHRALGIAS: 0
NECK STIFFNESS: 0
CONFUSION: 0
VOMITING: 0
COUGH: 0
HEADACHES: 0
FEVER: 0
SHORTNESS OF BREATH: 0
NUMBNESS: 1
EYE REDNESS: 0
ABDOMINAL PAIN: 0
DIFFICULTY URINATING: 0
DIARRHEA: 0
EYE PAIN: 1
FEVER: 0

## 2020-01-01 ASSESSMENT — EXTERNAL EXAM - LEFT EYE
OS_EXAM: NORMAL
OS_EXAM: NORMAL

## 2020-01-01 ASSESSMENT — CUP TO DISC RATIO
OS_RATIO: 0.6
OD_RATIO: 0.8
OS_RATIO: 0.6
OD_RATIO: 0.8

## 2020-01-01 ASSESSMENT — EXTERNAL EXAM - RIGHT EYE
OD_EXAM: NORMAL
OD_EXAM: NORMAL

## 2020-01-01 ASSESSMENT — SLIT LAMP EXAM - LIDS
COMMENTS: NORMAL
COMMENTS: MODERATE BLEPHARITIS
COMMENTS: NORMAL
COMMENTS: MODERATE BLEPHARITIS

## 2020-01-01 ASSESSMENT — MIFFLIN-ST. JEOR
SCORE: 1434.75
SCORE: 1434.82
SCORE: 1392.19
SCORE: 1416.63
SCORE: 1434.82
SCORE: 1434.75
SCORE: 1389.01
SCORE: 1434.63
SCORE: 1416.63
SCORE: 1396.72
SCORE: 1416.63
SCORE: 1392.19

## 2020-01-01 ASSESSMENT — PAIN SCALES - GENERAL
PAINLEVEL: NO PAIN (0)
PAINLEVEL: NO PAIN (0)

## 2020-02-03 NOTE — ED TRIAGE NOTES
Patient presents with complaints of numbness from under her breasts down. Patient states that this has bene ongoing since the start of June and has seen a Neurologist and her PCP. She states that she feels like dominick legs are getting more weak. She does have a MRI scheduled but it is in 10 days. ABC intact without need for intervention at this time.     
TRANSFER

## 2020-03-26 ENCOUNTER — APPOINTMENT (OUTPATIENT)
Dept: NEUROLOGY | Facility: CLINIC | Age: 75
End: 2020-03-26
Attending: PSYCHIATRY & NEUROLOGY
Payer: MEDICARE

## 2020-03-27 ENCOUNTER — VIRTUAL VISIT (OUTPATIENT)
Dept: NEUROLOGY | Facility: CLINIC | Age: 75
End: 2020-03-27
Attending: PSYCHIATRY & NEUROLOGY
Payer: MEDICARE

## 2020-03-27 DIAGNOSIS — Z79.52 LONG TERM (CURRENT) USE OF SYSTEMIC STEROIDS: ICD-10-CM

## 2020-03-27 DIAGNOSIS — G36.0 NEUROMYELITIS OPTICA (H): Primary | ICD-10-CM

## 2020-03-27 PROCEDURE — 82306 VITAMIN D 25 HYDROXY: CPT | Mod: TEL | Performed by: PSYCHIATRY & NEUROLOGY

## 2020-03-27 RX ORDER — AMLODIPINE BESYLATE 10 MG/1
10 TABLET ORAL DAILY
Status: ON HOLD | COMMUNITY
Start: 2020-03-19 | End: 2020-01-01

## 2020-03-27 NOTE — PATIENT INSTRUCTIONS
Impression:    NMO currently stable on Rituxamab, no new issues or concerns  Infusion due in May but we will postpone till June after a CD 19 count.    Plan:    CD 19 count in early June.     MA to call to to help her find a [facility] lab.    discharge AM gabapentin and then PM after 2 weeks    Decrease prednisone to 5 mg every other day x 1 month then 1/2 every other day x 1 month and then stop.

## 2020-03-27 NOTE — PROGRESS NOTES
"Ladonna Sheriff is a 75 year old female who is being evaluated via a billable telephone visit.      The patient has been notified of following:     \"This telephone visit will be conducted via a call between you and your physician/provider. We have found that certain health care needs can be provided without the need for a physical exam.  This service lets us provide the care you need with a short phone conversation.  If a prescription is necessary we can send it directly to your pharmacy.  If lab work is needed we can place an order for that and you can then stop by our lab to have the test done at a later time.    If during the course of the call the physician/provider feels a telephone visit is not appropriate, you will not be charged for this service.\"     Ladonna Sheriff has a Hx. Of NMO diagnosed by Dr. Macias, she received Rituxamab in July and November, she originally presented with numbness in her legs and in between the stomach up to the breast. She also has bilateral leg weakness.     About 20 years ago she had an episode of ON and in 1995 she had a bad attack in the R eye the did not recover, at that time she went to [facility] for IV steroids and work up to rule out MS but they only diagnosed her with Sjogren's syndrome    The left eye has minimal vision but blurry. 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 She had Rituxamab twice in July and November, unclear dose. She is feeling well, no new symptoms or worsening of them since last visit with Dr. Macias on 10-30-10, no URI symptoms.    She has a Hx. Of Lupus, Sjogren's and TIA, neuropathic pain  Chief Complaint   Patient presents with     RECHECK     [facility] RETURN - FOLLOW UP TELEPHONE VISIT       I have reviewed and updated the patient's Allergies and Medication List.    ALLERGIES  Prevacid [lansoprazole] and Pravastatin    Vinh Wu, EMT      MRI of the brain shows multiple T2 lesions consistent with the diagnosis of NMO.    MRI T spine show a large lesion T3-T5 with edema and " enhancement on 6-19. C spine WNL    Impression:    NMO currently stable on Rituxamab, no new issues or concerns  Infusion due in May but we will postpone till June after a CD 19 count.    Plan:    CD 19 count in early June.     MA to call to to help her find a [facility] lab.    discharge AM gabapentin and then PM after 2 weeks    Decrease prednisone to 5 mg every other day x 1 month then 1/2 every other day x 1 month and then stop.    RTC in 4 m      ADDENDUM: Patient called clinic complaining of worsening of numbness from the breast down, MRI of the thoracic cord was ordered ASAP which showed:      Cord volume loss and abnormal signal/enhancement at T2  through T4 consistent with known demyelinating disease, however tumor cannot be completely excluded. Enhancement has decreased in intensity compared to the prior exam.    The disconcertingly issue is the persistent enhancement compared to last MRI despite reported improvement, not supportive of NMO but her NMO antibody is positive, I will recheck. And look at titer to evaluate response to Rituxamab.     Of note, when I called her today to report MRI results she said the symptom have been present for 1 month and some back to diagnosis but during her virtual visit with me on 3-27 she was stable so it is unclear at this point when the symptoms worsened, I believe she doesn't remember.     On 3-27 I also recommended for her to taper and stop prednisone 5 mg daily since she is on Rituxamab, and she is no longer on it.     I will treat the patient with 3 days of high dose prednisone 1000 mg a day in 2 doses (500 mg BID) and program infusion of Rituxamab for next week, her CD 19 count is at 4% (normal > 6%)    She was explained the side effects of high-dose prednisone and instructed to call me tomorrow or Friday if she has any significant side effects or call the physician on call at [facility] over the weekend if needed.    She lives alone but her son lives above  her apartment, so he will be overseeing her treatment and I will see her next Wednesday for a 15-minute follow-up.    Caterina Franklin MD  Chief, Multiple Sclerosis Division  Department of Neurology  [facility]

## 2020-05-15 ENCOUNTER — HOSPITAL ENCOUNTER (OUTPATIENT)
Dept: MRI IMAGING | Facility: CLINIC | Age: 75
End: 2020-05-15
Attending: PSYCHIATRY & NEUROLOGY
Payer: MEDICARE

## 2020-05-15 ENCOUNTER — HOSPITAL ENCOUNTER (OUTPATIENT)
Dept: LAB | Facility: CLINIC | Age: 75
End: 2020-05-15
Attending: INTERNAL MEDICINE
Payer: MEDICARE

## 2020-05-15 DIAGNOSIS — G36.0 NEUROMYELITIS OPTICA (DEVIC) (H): ICD-10-CM

## 2020-05-15 DIAGNOSIS — Z79.52 LONG TERM (CURRENT) USE OF SYSTEMIC STEROIDS: ICD-10-CM

## 2020-05-15 DIAGNOSIS — G36.0 NEUROMYELITIS OPTICA (H): ICD-10-CM

## 2020-05-15 LAB
ALBUMIN SERPL-MCNC: 3.5 G/DL (ref 3.4–5)
ALP SERPL-CCNC: 78 U/L (ref 40–150)
ALT SERPL W P-5'-P-CCNC: 47 U/L (ref 0–50)
ANION GAP SERPL CALCULATED.3IONS-SCNC: 3 MMOL/L (ref 3–14)
AST SERPL W P-5'-P-CCNC: 42 U/L (ref 0–45)
BASOPHILS # BLD AUTO: 0 10E9/L (ref 0–0.2)
BASOPHILS NFR BLD AUTO: 0.4 %
BILIRUB SERPL-MCNC: 0.5 MG/DL (ref 0.2–1.3)
BUN SERPL-MCNC: 12 MG/DL (ref 7–30)
CALCIUM SERPL-MCNC: 9.4 MG/DL (ref 8.5–10.1)
CD19 CELLS # BLD: 34 CELLS/UL (ref 107–698)
CD19 CELLS NFR BLD: 4 % (ref 6–27)
CHLORIDE SERPL-SCNC: 106 MMOL/L (ref 94–109)
CO2 SERPL-SCNC: 30 MMOL/L (ref 20–32)
CREAT BLD-MCNC: 1.1 MG/DL (ref 0.52–1.04)
CREAT SERPL-MCNC: 0.94 MG/DL (ref 0.52–1.04)
DEPRECATED CALCIDIOL+CALCIFEROL SERPL-MC: 42 UG/L (ref 20–75)
DIFFERENTIAL METHOD BLD: ABNORMAL
EOSINOPHIL # BLD AUTO: 0.1 10E9/L (ref 0–0.7)
EOSINOPHIL NFR BLD AUTO: 2.9 %
ERYTHROCYTE [DISTWIDTH] IN BLOOD BY AUTOMATED COUNT: 14.6 % (ref 10–15)
GFR SERPL CREATININE-BSD FRML MDRD: 48 ML/MIN/{1.73_M2}
GFR SERPL CREATININE-BSD FRML MDRD: 59 ML/MIN/{1.73_M2}
GLUCOSE SERPL-MCNC: 95 MG/DL (ref 70–99)
HCT VFR BLD AUTO: 44.9 % (ref 35–47)
HGB BLD-MCNC: 14.2 G/DL (ref 11.7–15.7)
IMM GRANULOCYTES # BLD: 0 10E9/L (ref 0–0.4)
IMM GRANULOCYTES NFR BLD: 0.2 %
LYMPHOCYTES # BLD AUTO: 0.7 10E9/L (ref 0.8–5.3)
LYMPHOCYTES NFR BLD AUTO: 14.4 %
MCH RBC QN AUTO: 31.6 PG (ref 26.5–33)
MCHC RBC AUTO-ENTMCNC: 31.6 G/DL (ref 31.5–36.5)
MCV RBC AUTO: 100 FL (ref 78–100)
MONOCYTES # BLD AUTO: 0.4 10E9/L (ref 0–1.3)
MONOCYTES NFR BLD AUTO: 7.2 %
NEUTROPHILS # BLD AUTO: 3.6 10E9/L (ref 1.6–8.3)
NEUTROPHILS NFR BLD AUTO: 74.9 %
NRBC # BLD AUTO: 0 10*3/UL
NRBC BLD AUTO-RTO: 0 /100
PLATELET # BLD AUTO: 204 10E9/L (ref 150–450)
POTASSIUM SERPL-SCNC: 4.3 MMOL/L (ref 3.4–5.3)
PROT SERPL-MCNC: 8.1 G/DL (ref 6.8–8.8)
RBC # BLD AUTO: 4.5 10E12/L (ref 3.8–5.2)
SODIUM SERPL-SCNC: 139 MMOL/L (ref 133–144)
WBC # BLD AUTO: 4.9 10E9/L (ref 4–11)

## 2020-05-15 PROCEDURE — 72157 MRI CHEST SPINE W/O & W/DYE: CPT

## 2020-05-15 PROCEDURE — 85025 COMPLETE CBC W/AUTO DIFF WBC: CPT | Performed by: PSYCHIATRY & NEUROLOGY

## 2020-05-15 PROCEDURE — 72156 MRI NECK SPINE W/O & W/DYE: CPT

## 2020-05-15 PROCEDURE — 80053 COMPREHEN METABOLIC PANEL: CPT | Performed by: PSYCHIATRY & NEUROLOGY

## 2020-05-15 PROCEDURE — A9585 GADOBUTROL INJECTION: HCPCS | Performed by: PSYCHIATRY & NEUROLOGY

## 2020-05-15 PROCEDURE — 36415 COLL VENOUS BLD VENIPUNCTURE: CPT | Performed by: PSYCHIATRY & NEUROLOGY

## 2020-05-15 PROCEDURE — 82565 ASSAY OF CREATININE: CPT

## 2020-05-15 PROCEDURE — 25500064 ZZH RX 255 OP 636: Performed by: PSYCHIATRY & NEUROLOGY

## 2020-05-15 PROCEDURE — 86355 B CELLS TOTAL COUNT: CPT | Performed by: PSYCHIATRY & NEUROLOGY

## 2020-05-15 PROCEDURE — 82306 VITAMIN D 25 HYDROXY: CPT | Performed by: PSYCHIATRY & NEUROLOGY

## 2020-05-15 RX ORDER — GADOBUTROL 604.72 MG/ML
10 INJECTION INTRAVENOUS ONCE
Status: COMPLETED | OUTPATIENT
Start: 2020-05-15 | End: 2020-05-15

## 2020-05-15 RX ADMIN — GADOBUTROL 10 ML: 604.72 INJECTION INTRAVENOUS at 06:55

## 2020-05-20 RX ORDER — PREDNISONE 50 MG/1
1000 TABLET ORAL DAILY
Qty: 60 TABLET | Refills: 0 | Status: SHIPPED | OUTPATIENT
Start: 2020-05-20 | End: 2020-01-01

## 2020-05-22 ENCOUNTER — TELEPHONE (OUTPATIENT)
Dept: NEUROLOGY | Facility: CLINIC | Age: 75
End: 2020-05-22

## 2020-05-22 NOTE — TELEPHONE ENCOUNTER
Prior Authorization Infusion/Clinic Administered Request    Location: [facility]  Diagnosis and ICD: NMO G36.0  Drug/Therapy: rituximab 1000 mg x 1     Previously Tried and Failed Therapies: na    Date of provider note with supporting information: 3/27/20    Urgency (When is the patient scheduled?): ASAP needs infusion next week    Would you like to include any research articles? Na         If yes please call [phone] for further instructions about sending that information

## 2020-06-01 ENCOUNTER — INFUSION THERAPY VISIT (OUTPATIENT)
Dept: INFUSION THERAPY | Facility: CLINIC | Age: 75
End: 2020-06-01
Attending: PSYCHIATRY & NEUROLOGY
Payer: MEDICARE

## 2020-06-01 VITALS
SYSTOLIC BLOOD PRESSURE: 112 MMHG | DIASTOLIC BLOOD PRESSURE: 73 MMHG | OXYGEN SATURATION: 95 % | HEIGHT: 67 IN | HEART RATE: 75 BPM | BODY MASS INDEX: 33.18 KG/M2 | TEMPERATURE: 97 F | RESPIRATION RATE: 16 BRPM | WEIGHT: 211.4 LBS

## 2020-06-01 DIAGNOSIS — G36.0 NEUROMYELITIS OPTICA (DEVIC) (H): Primary | ICD-10-CM

## 2020-06-01 DIAGNOSIS — H46.9 OPTIC NEURITIS: ICD-10-CM

## 2020-06-01 PROCEDURE — 96413 CHEMO IV INFUSION 1 HR: CPT

## 2020-06-01 PROCEDURE — 25000132 ZZH RX MED GY IP 250 OP 250 PS 637: Mod: GY | Performed by: PSYCHIATRY & NEUROLOGY

## 2020-06-01 PROCEDURE — 96375 TX/PRO/DX INJ NEW DRUG ADDON: CPT

## 2020-06-01 PROCEDURE — 25800030 ZZH RX IP 258 OP 636: Performed by: PSYCHIATRY & NEUROLOGY

## 2020-06-01 PROCEDURE — 25000128 H RX IP 250 OP 636: Performed by: PSYCHIATRY & NEUROLOGY

## 2020-06-01 PROCEDURE — 96415 CHEMO IV INFUSION ADDL HR: CPT

## 2020-06-01 RX ORDER — METHYLPREDNISOLONE SODIUM SUCCINATE 125 MG/2ML
125 INJECTION, POWDER, LYOPHILIZED, FOR SOLUTION INTRAMUSCULAR; INTRAVENOUS ONCE
Status: CANCELLED | OUTPATIENT
Start: 2020-06-01

## 2020-06-01 RX ORDER — HEPARIN SODIUM (PORCINE) LOCK FLUSH IV SOLN 100 UNIT/ML 100 UNIT/ML
5 SOLUTION INTRAVENOUS
Status: CANCELLED | OUTPATIENT
Start: 2020-06-01

## 2020-06-01 RX ORDER — ACETAMINOPHEN 325 MG/1
650 TABLET ORAL ONCE
Status: CANCELLED
Start: 2020-06-01

## 2020-06-01 RX ORDER — HEPARIN SODIUM,PORCINE 10 UNIT/ML
5 VIAL (ML) INTRAVENOUS
Status: CANCELLED | OUTPATIENT
Start: 2020-06-01

## 2020-06-01 RX ORDER — METHYLPREDNISOLONE SODIUM SUCCINATE 125 MG/2ML
125 INJECTION, POWDER, LYOPHILIZED, FOR SOLUTION INTRAMUSCULAR; INTRAVENOUS ONCE
Status: COMPLETED | OUTPATIENT
Start: 2020-06-01 | End: 2020-06-01

## 2020-06-01 RX ORDER — DIPHENHYDRAMINE HCL 25 MG
50 CAPSULE ORAL ONCE
Status: CANCELLED
Start: 2020-06-01

## 2020-06-01 RX ORDER — ACETAMINOPHEN 325 MG/1
650 TABLET ORAL ONCE
Status: COMPLETED | OUTPATIENT
Start: 2020-06-01 | End: 2020-06-01

## 2020-06-01 RX ADMIN — SODIUM CHLORIDE 250 ML: 9 INJECTION, SOLUTION INTRAVENOUS at 09:06

## 2020-06-01 RX ADMIN — RITUXIMAB 1000 MG: 10 INJECTION, SOLUTION INTRAVENOUS at 09:26

## 2020-06-01 RX ADMIN — ACETAMINOPHEN 650 MG: 325 TABLET ORAL at 09:06

## 2020-06-01 RX ADMIN — DIPHENHYDRAMINE HYDROCHLORIDE 50 MG: 50 INJECTION INTRAMUSCULAR; INTRAVENOUS at 09:06

## 2020-06-01 RX ADMIN — METHYLPREDNISOLONE SODIUM SUCCINATE 125 MG: 125 INJECTION, POWDER, FOR SOLUTION INTRAMUSCULAR; INTRAVENOUS at 09:05

## 2020-06-01 ASSESSMENT — MIFFLIN-ST. JEOR: SCORE: 1486.53

## 2020-06-01 NOTE — PROGRESS NOTES
Infusion Nursing Note:  Ladonna LORETO Sebastienkarol presents today for Rituxan.    Patient seen by provider today: No   present during visit today: Not Applicable.    Note: Rituxan increased by 50 mL/hr every 30 minutes for a max of 400 mL/hr.  Patient had flushing reaction in the past with rapid infusion.  Has not had Rituxan for 6 months.    Intravenous Access:  Peripheral IV placed.    Treatment Conditions:  Biological Infusion Checklist:  ~~~ NOTE: If the patient answers yes to any of the questions below, hold the infusion and contact ordering provider or on-call provider.    1. Have you recently had an elevated temperature, fever, chills, productive cough, coughing for 3 weeks or longer or hemoptysis, abnormal vital signs, night sweats,  chest pain or have you noticed a decrease in your appetite, unexplained weight loss or fatigue? No  2. Do you have any open wounds or new incisions? No  3. Do you have any recent or upcoming hospitalizations, surgeries or dental procedures? No  4. Do you currently have or recently have had any signs of illness or infection or are you on any antibiotics? No  5. Have you had any new, sudden or worsening abdominal pain? No  6. Have you or anyone in your household received a live vaccination in the past 4 weeks? Please note:  No live vaccines while on biologic/chemotherapy until 6 months after the last treatment.  Patient can receive the flu vaccine (shot only) and the pneumovax.  It is optimal for the patient to get these vaccines mid cycle, but they can be given at any time as long as it is not on the day of the infusion. No  7. Have you recently been diagnosed with any new nervous system diseases (ie. Multiple sclerosis, Guillain [place], seizures, neurological changes) or cancer diagnosis? No  8. Are you on any form of radiation or chemotherapy? No  9. Are you pregnant or breast feeding or do you have plans of pregnancy in the future? No  10. Have you been having any signs of  worsening depression or suicidal ideations?  (benlysta only) No  11. Have there been any other new onset medical symptoms? No    Post Infusion Assessment:  Patient tolerated infusion without incident.  Site patent and intact, free from redness, edema or discomfort.  No evidence of extravasations.  Access discontinued per protocol.  Biologic Infusion Post Education: Call the triage nurse at your clinic or seek medical attention if you have chills and/or temperature greater than or equal to 100.5, uncontrolled nausea/vomiting, diarrhea, constipation, dizziness, shortness of breath, chest pain, heart palpitations, weakness or any other new or concerning symptoms, questions or concerns.  You cannot have any live virus vaccines prior to or during treatment or up to 6 months post infusion.  If you have an upcoming surgery, medical procedure or dental procedure during treatment, this should be discussed with your ordering physician and your surgeon/dentist.  If you are having any concerning symptom, if you are unsure if you should get your next infusion or wish to speak to a provider before your next infusion, please call your care coordinator or triage nurse at your clinic to notify them so we can adequately serve you.       Discharge Plan:   AVS to patient via MYCHART.   Patient discharged in stable condition accompanied by: self.  Departure Mode: Wheelchair.    Yudith Chaney RN

## 2020-06-25 NOTE — PROGRESS NOTES
"Ladonna Sheriff is a 75 year old female who is being evaluated via a billable telephone visit.      The patient has been notified of following:     \"This telephone visit will be conducted via a call between you and your physician/provider. We have found that certain health care needs can be provided without the need for a physical exam.  This service lets us provide the care you need with a short phone conversation.  If a prescription is necessary we can send it directly to your pharmacy.  If lab work is needed we can place an order for that and you can then stop by our lab to have the test done at a later time.    Telephone visits are billed at different rates depending on your insurance coverage. During this emergency period, for some insurers they may be billed the same as an in-person visit.  Please reach out to your insurance provider with any questions.    If during the course of the call the physician/provider feels a telephone visit is not appropriate, you will not be charged for this service.\"    Patient has given verbal consent for Telephone visit?  Yes    What phone number would you like to be contacted at? [phone]    How would you like to obtain your AVS? Mail a copy    Phone call duration: 25 minutes    This is a follow-up visit for this 75-year-old female with a history of neuromyelitis optica, on Rituxan.  She was last evaluated by me over the phone for the first time in March of this year.  In early May she called our clinic and complained of worsening of numbness which was origin originally stated to be from the breast down from the first attack, at baseline she also had bilateral lower extremity weakness and walks with a cane.  We obtained an MRI of the cervical and thoracic spine that showed new area of enhancement at the level of T3, this is minimal but true.  She was treated with oral prednisone over 5 days and today she reports that there was very little improvement of her symptoms.  On " further questioning it seems like her symptoms did not worsen much from baseline to the new mild attack.  Her main concern right now is that she is not improving but she refers mostly to the original attack.    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  It is my impression that she did not understand that the therapy would not make all the symptoms go away and that there is some irreversible damage to her ability feel legs.  She seems to be clear on that and says she can deal with it knowing what to expect.    I also explained to her that the fact that there was no inflammation in her spinal cord points towards lack of efficacy from the rituximab therefore I would like to repeat the MRI in 1 month to make sure that there is no inflammation left especially since she got her rituximab infusion in early June.    Today we also discussed the importance of her staying strong and I gave her a list of exercises that she can do while sitting in front of the TV including her leg raising, abdomen and back strengthening exercises, neck stretches and arm lifting exercises.  She is willing to do so once a day.    She has no other acute concerns but would prefer to continue to do phone visits rather than come to the clinic.    Impression: Neuromyelitis optica, status post mild relapse with minimal improvement after high-dose steroids.  Status post recent rituximab infusion.    Plan: Repeat MRI of the thoracic spine with and without contrast in 1 month to confirm resolution of the previous leasing inflammation.  Start an exercise program to strengthen leg, arm, core and neck muscles.    We will check CD19 count, vitamin D levels and CBC CMP.  She has been advised to start alpha lipoic  acid 900 mg a day as well as omega-3 fatty acids to improve remyelination.  She understands and agrees with the plan.    Follow-up visit in 2 months

## 2020-06-25 NOTE — LETTER
6/25/2020       RE: Ladonna Sheriff  [address]  [place] [address]     Dear Colleague,    Thank you for referring your patient, Ladonna Sheriff, to the [facility] MULTIPLE SCLEROSIS at [facility]. Please see a copy of my visit note below.    Ladonna Sheriff is a 75 year old female who is being evaluated via a billable telephone visit.        Phone call duration: 25 minutes    This is a follow-up visit for this 75-year-old female with a history of neuromyelitis optica, on Rituxan.  She was last evaluated by me over the phone for the first time in March of this year.  In early May she called our clinic and complained of worsening of numbness which was origin originally stated to be from the breast down from the first attack, at baseline she also had bilateral lower extremity weakness and walks with a cane.  We obtained an MRI of the cervical and thoracic spine that showed new area of enhancement at the level of T3, this is minimal but true.  She was treated with oral prednisone over 5 days and today she reports that there was very little improvement of her symptoms.  On further questioning it seems like her symptoms did not worsen much from baseline to the new mild attack.  Her main concern right now is that she is not improving but she refers mostly to the original attack.    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  It is my impression that she did not understand that the therapy would not make all the symptoms go away and that there is some irreversible damage to her ability feel legs.  She seems to be clear on that and says she can deal with it knowing what to expect.    I also explained to her that the fact that there  was no inflammation in her spinal cord points towards lack of efficacy from the rituximab therefore I would like to repeat the MRI in 1 month to make sure that there is no inflammation left especially since she got her rituximab infusion in early June.    Today we also discussed the importance of her staying strong and I gave her a list of exercises that she can do while sitting in front of the TV including her leg raising, abdomen and back strengthening exercises, neck stretches and arm lifting exercises.  She is willing to do so once a day.    She has no other acute concerns but would prefer to continue to do phone visits rather than come to the clinic.    Impression: Neuromyelitis optica, status post mild relapse with minimal improvement after high-dose steroids.  Status post recent rituximab infusion.    Plan: Repeat MRI of the thoracic spine with and without contrast in 1 month to confirm resolution of the previous leasing inflammation.  Start an exercise program to strengthen leg, arm, core and neck muscles.    We will check CD19 count, vitamin D levels and CBC CMP.  She has been advised to start alpha lipoic acid 900 mg a day as well as omega-3 fatty acids to improve remyelination.  She understands and agrees with the plan.    Follow-up visit in 2 months        Again, thank you for allowing me to participate in the care of your patient.      Sincerely,    Caterina Franklin MD

## 2020-07-13 PROBLEM — G36.0 NEUROMYELITIS OPTICA (DEVIC) (H): Status: ACTIVE | Noted: 2019-07-31

## 2020-07-13 NOTE — ED TRIAGE NOTES
Patient presents to the ED reporting a loss of vision. States normally has no vision in the right eye due to optic neuritis. States began losing vision in the left eye last night and now vision is gone except for a small amount of peripheral vision. Reports has had trouble in the past with vision loss due to optic neuritis in the left eye and has been treated with high dose steroids.

## 2020-07-13 NOTE — ED NOTES
"[facility], [place]   ED Nurse to Floor Handoff     Ladonna Sheriff is a 75 year old female who speaks English and lives unknown,  in a home  They arrived in the ED by car from emergency room    ED Chief Complaint: Loss of Vision    ED Dx;   Final diagnoses:   Vision loss of left eye   Neuromyelitis optica (H)         Needed?: No    Allergies:   Allergies   Allergen Reactions     Prevacid [Lansoprazole] Rash     Pravastatin Rash     Patient is still not sure about it due many years ago for reaction.   .  Past Medical Hx:   Past Medical History:   Diagnosis Date     Cerebral infarction (H)      CHF (congestive heart failure) (H)      Hypertension      Lupus (H)      Lupus (H)      Optic neuritis      Optic neuritis      Sjogren's syndrome (H)      Sjogren's syndrome (H)      Temporal arteritis (H)      TIA (transient ischemic attack)      Uncomplicated asthma       Baseline Mental status: WDL  Current Mental Status changes: at basesline    Infection present or suspected this encounter: no  Sepsis suspected: No  Isolation type: No active isolations     Activity level - Baseline/Home:  Independent  Activity Level - Current:   Stand with Assist    Bariatric equipment needed?: No    In the ED these meds were given: Medications - No data to display    Drips running?  No    Home pump  No    Current LDAs  Peripheral IV 07/13/20 Right Wrist (Active)   Number of days: 0       Wound 07/11/19 Left Other (Comment) Sore cuticle on left ring finger from hangnail that pt pulled off (Active)   Number of days: 368       Labs results:   Labs Ordered and Resulted from Time of ED Arrival Up to the Time of Departure from the ED   COVID-19 VIRUS (CORONAVIRUS) BY PCR   PERIPHERAL IV CATHETER       Imaging Studies: No results found for this or any previous visit (from the past 24 hour(s)).    Recent vital signs:   /65   Temp 98.1  F (36.7  C) (Oral)   Resp 18   Ht 1.702 m (5' 7\")   Wt 90.7 kg " (200 lb)   SpO2 97%   BMI 31.32 kg/m      Boscobel Coma Scale Score: 15 (07/13/20 1720)       Cardiac Rhythm: Normal Sinus  Pt needs tele? No  Skin/wound Issues: None    Code Status: Full Code    Pain control: good    Nausea control: good    Abnormal labs/tests/findings requiring intervention:  See results     Family present during ED course? Yes   Family Comments/Social Situation comments: Pt blind in R eye, uses a cane.     Tasks needing completion: None    Ashia Salazar, RN  7-12[address] [facility]

## 2020-07-13 NOTE — ED PROVIDER NOTES
"      [facility] ([facility])  July 13, 2020  History     Chief Complaint   Patient presents with     Loss of Vision     HPI  Ladonna Sheriff is a 75 year old female with a medical history significant for neuromyelitis optica, transverse myelitis, lupus, TIA, Sjorgen's syndrome, cerebral infarction, CHF, HTN, temporal arteritis, pulmonary embolism, acute respiratory failure with hypoxia, and uncomplicated asthma who presents the ED today complaining of loss of vision.  The patient states that she began having some blurring and decreased vision 2 nights ago.  The symptoms have progressively worsened since that time to the point where she can no longer see anything except colors.  She states that she currently sees white.  She states that the appearance of her visual disturbance will vary from white to ecru to black.  She complains of chronic numbness in her legs.  She denies any worsening with any other neurologic symptoms.  Patient denies any recent illness including fever, cough, and dyspnea.    Ophthalmology telephone note from today:  Telephone Encounter with Outside ED Provider      \"Paged by ED Physician Dr. Leung from [facility] regarding Ms Sheriff who is a 75 year old female with past medical history of neuromyelitis optica, being followed by neurology and is on rituxan infusions (last infusion early June 2020). 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      According to Dr. Leung, patient presented to the ED today with worsening vision in left eye since yesterday. She was able to read prior to this episode however she now has barely light perception in the left eye. She had pain at the onset of blurred vision however currently no pain. IOP 14 at outside ED, and there is no APD per ED provider. No other neurological focal deficits.      Discussed with neuro-ophthalmology " "service regarding this patient, and based on the information we have from the outside provider and given the patient's known history of NMO and optic neuritis history and current presentation, there is a high concern for optic neuritis. Our recommendation is for patient to obtain IV methylprednisolone 1000mg in the ED, and be admitted for IV steroids, MRI (MRI brain, orbits and c spine with and without contrast) and plasmapheresis. Our recommendation is for the patient to also be evaluated by neuro-ophthalmology. Considering there is no ophthalmology coverage at [facility], it would be advised for the patient to be transferred to the [facility] to obtain this multidisciplinary care.     Karuna Galaviz MD  Ophthalmology, PGY2\"    I have reviewed the Medications, Allergies, Past Medical and Surgical History, and Social History in the [facility] system.  PAST MEDICAL HISTORY:   Past Medical History:   Diagnosis Date     Cerebral infarction (H)      CHF (congestive heart failure) (H)      Hypertension      Lupus (H)      Lupus (H)      Optic neuritis      Optic neuritis      Sjogren's syndrome (H)      Sjogren's syndrome (H)      Temporal arteritis (H)      TIA (transient ischemic attack)      Uncomplicated asthma        PAST SURGICAL HISTORY:   Past Surgical History:   Procedure Laterality Date     CHOLECYSTECTOMY       GYN SURGERY      hysterectomy     GYN SURGERY      tubal ligation     IR CVC NON TUNNEL PLACEMENT  6/21/2019     IR FOLLOW UP VISIT INPATIENT  7/2/2019       Past medical history, past surgical history, medications, and allergies were reviewed with the patient. Additional pertinent items: None    FAMILY HISTORY:   Family History   Problem Relation Age of Onset     Asthma Mother      Lupus Sister      Bone Cancer Brother      Kidney Cancer Sister        SOCIAL HISTORY:   Social History     Tobacco Use     Smoking status: Former Smoker     Packs/day: 0.00     Smokeless tobacco: Never Used "   Substance Use Topics     Alcohol use: Yes     Alcohol/week: 7.0 standard drinks     Types: 7 Shots of liquor per week     Comment: occ     Social history was reviewed with the patient. Additional pertinent items: None      Patient's Medications   New Prescriptions    No medications on file   Previous Medications    ACETAMINOPHEN (TYLENOL) 325 MG TABLET    Take 1-3 tablets (325-975 mg) by mouth every 6 hours as needed for mild pain or fever (> 101 F)    ALBUTEROL (PROAIR HFA/PROVENTIL HFA/VENTOLIN HFA) 108 (90 BASE) MCG/ACT INHALER    Inhale 2 puffs into the lungs every 6 hours as needed     AMLODIPINE (NORVASC) 10 MG TABLET    Take 10 mg by mouth daily    ASPIRIN (ASA) 325 MG EC TABLET    Take 325 mg by mouth every evening     B COMPLEX-C (VITAMIN B COMPLEX W/VITAMIN C) TABS TABLET    Take 1 tablet by mouth daily    CALCIUM-VITAMIN D 600-200 MG-UNIT TABS    Take 1 tablet by mouth every evening     CLOPIDOGREL (PLAVIX) 75 MG TABLET    Take 75 mg by mouth every morning    FLUOXETINE (PROZAC) 20 MG CAPSULE    Take 20 mg by mouth daily    FUROSEMIDE (LASIX) 20 MG TABLET    Take 20 mg by mouth 3 times daily    GABAPENTIN (NEURONTIN) 300 MG CAPSULE    Take 1 capsule (300 mg) in the morning and afternoon, and 2 capsules (600 mg) at night    METOPROLOL TARTRATE (LOPRESSOR) 50 MG TABLET    Take 50 mg by mouth 2 times daily    MULTIPLE VITAMINS-MINERALS (MULTIVITAL PO)    Take 1 tablet by mouth every morning     SIMVASTATIN (ZOCOR) 20 MG TABLET    Take 20 mg by mouth At Bedtime    TRAZODONE (DESYREL) 100 MG TABLET    Take 100 mg by mouth At Bedtime    TROLAMINE SALICYLATE (ASPERCREME) 10 % EXTERNAL CREAM    Apply topically 2 times daily as needed for moderate pain    UMECLIDINIUM (INCRUSE ELLIPTA) 62.5 MCG/INH INHALER    Inhale 1 puff into the lungs daily    VITAMIN C (ASCORBIC ACID) 100 MG TABLET    Take 500 mg by mouth every morning    Modified Medications    No medications on file   Discontinued Medications    No  "medications on file          Allergies   Allergen Reactions     Prevacid [Lansoprazole] Rash     Pravastatin Rash     Patient is still not sure about it due many years ago for reaction.        Review of Systems   Constitutional: Negative for fever.   HENT: Negative for congestion.    Eyes: Positive for visual disturbance. Negative for redness.   Respiratory: Negative for shortness of breath.    Cardiovascular: Negative for chest pain.   Gastrointestinal: Negative for abdominal pain.   Genitourinary: Negative for difficulty urinating.   Musculoskeletal: Negative for arthralgias and neck stiffness.   Skin: Negative for color change.   Neurological: Negative for headaches.   Psychiatric/Behavioral: Negative for confusion.   All other systems reviewed and are negative.    A complete review of systems was performed with pertinent positives and negatives noted in the HPI, and all other systems negative.    Physical Exam   BP: 129/65  Heart Rate: 88  Temp: 98.1  F (36.7  C)  Resp: 18  Height: 170.2 cm (5' 7\")  Weight: 90.7 kg (200 lb)  SpO2: 97 %      Physical Exam  Vitals signs and nursing note reviewed.   Constitutional:       General: She is not in acute distress.     Appearance: Normal appearance. She is not diaphoretic.   HENT:      Head: Normocephalic and atraumatic.      Mouth/Throat:      Pharynx: No oropharyngeal exudate.   Eyes:      General: No scleral icterus.     Comments: Pupils midpoint and nonreactive.  No light perception left eye.   Cardiovascular:      Rate and Rhythm: Normal rate and regular rhythm.   Pulmonary:      Effort: Pulmonary effort is normal. No respiratory distress.   Abdominal:      Palpations: Abdomen is soft.      Tenderness: There is no abdominal tenderness.   Musculoskeletal: Normal range of motion.         General: No tenderness.   Skin:     General: Skin is warm.      Findings: No rash.   Neurological:      Mental Status: She is alert.      Motor: No weakness.      Coordination: " Coordination normal.         ED Course        Procedures             EKG Interpretation:      Interpreted by WOLFGANG INTERIANO MD, MD  Time reviewed: 1705  Symptoms at time of EKG: Visual disturbance  Rhythm: normal sinus   Rate: 75  Axis: Left Axis Deviation  Ectopy: none  Conduction: left bundle branch block (complete)  ST Segments/ T Waves: No acute ischemic changes  Q Waves: nonspecific  Comparison to prior: Unchanged from 6/20/19    Clinical Impression: no acute changes    Results for orders placed or performed during the hospital encounter of 07/13/20   EKG 12-lead, tracing only     Status: None (Preliminary result)   Result Value Ref Range    Interpretation ECG Click View Image link to view waveform and result    Results for orders placed or performed during the hospital encounter of 07/13/20   CBC with platelets differential     Status: Abnormal   Result Value Ref Range    WBC 5.7 4.0 - 11.0 10e9/L    RBC Count 4.57 3.8 - 5.2 10e12/L    Hemoglobin 14.4 11.7 - 15.7 g/dL    Hematocrit 45.3 35.0 - 47.0 %    MCV 99 78 - 100 fl    MCH 31.5 26.5 - 33.0 pg    MCHC 31.8 31.5 - 36.5 g/dL    RDW 13.7 10.0 - 15.0 %    Platelet Count 192 150 - 450 10e9/L    Diff Method Automated Method     % Neutrophils 83.3 %    % Lymphocytes 11.7 %    % Monocytes 4.7 %    % Eosinophils 0.0 %    % Basophils 0.0 %    % Immature Granulocytes 0.3 %    Nucleated RBCs 0 0 /100    Absolute Neutrophil 4.8 1.6 - 8.3 10e9/L    Absolute Lymphocytes 0.7 (L) 0.8 - 5.3 10e9/L    Absolute Monocytes 0.3 0.0 - 1.3 10e9/L    Absolute Eosinophils 0.0 0.0 - 0.7 10e9/L    Absolute Basophils 0.0 0.0 - 0.2 10e9/L    Abs Immature Granulocytes 0.0 0 - 0.4 10e9/L    Absolute Nucleated RBC 0.0    Basic metabolic panel     Status: Abnormal   Result Value Ref Range    Sodium 139 133 - 144 mmol/L    Potassium 3.7 3.4 - 5.3 mmol/L    Chloride 106 94 - 109 mmol/L    Carbon Dioxide 26 20 - 32 mmol/L    Anion Gap 7 3 - 14 mmol/L    Glucose 107 (H) 70 - 99 mg/dL    Urea  Nitrogen 16 7 - 30 mg/dL    Creatinine 0.80 0.52 - 1.04 mg/dL    GFR Estimate 72 >60 mL/min/[1.73_m2]    GFR Estimate If Black 83 >60 mL/min/[1.73_m2]    Calcium 9.3 8.5 - 10.1 mg/dL   CRP inflammation     Status: None   Result Value Ref Range    CRP Inflammation <2.9 0.0 - 8.0 mg/L   Erythrocyte sedimentation rate auto     Status: None   Result Value Ref Range    Sed Rate 19 0 - 30 mm/h                        Results for orders placed or performed during the hospital encounter of 07/13/20 (from the past 24 hour(s))   EKG 12-lead, tracing only   Result Value Ref Range    Interpretation ECG Click View Image link to view waveform and result      Medications - No data to display          Assessments & Plan (with Medical Decision Making)   75 year old female with history of neuromyelitis optica to the emergency department with vision loss in her left eye over the past 2 days.  She otherwise has a baseline neurologic exam.  She was given 1000 mg of Solu-Medrol at the outside emergency department before transport to [place].  MRIs of the brain, orbits, cervical spine, and thoracic spine are currently pending.  The patient has been seen by neurology and will be admitted to their service for further evaluation and management.    I have reviewed the nursing notes.    I have reviewed the findings, diagnosis, plan and need for follow up with the patient.    New Prescriptions    No medications on file       Final diagnoses:   Vision loss of left eye   Neuromyelitis optica (H)       7/13/2020   [facility], EMERGENCY DEPARTMENT     Js Barnard MD  07/13/20 [id number]

## 2020-07-13 NOTE — ED NOTES
Bed: IN03  Expected date: 7/13/20  Expected time: 1:15 PM  Means of arrival: Car  Comments:  Ladonna Sheriff  MRN: [id number]    Coming in by car from [place] with Left eye vision changes. Pt states she is unable to see. Hx of optic neuritis. Pt has 22 g in R wrist. 1000 mg Solu-medrol given

## 2020-07-13 NOTE — ED PROVIDER NOTES
"  History     Chief Complaint:  Loss of Vision     HPI   Ladonna Sheriff is a 75 year old female former tobacco user with a history of  who presents optic neuritis, neuromyelitis optica, hypertension and CHF with loss of vision. The patient reports that she is blind in her right eye at baseline and has had episodes of loss of vision in her left eye in the past, which have been treated with rounds of steroids. She states that last night she developed another episode of blurry vision in her left eye, as well as eye pain, which has progressively worsened today. The patient reports that she currently cannot see anything, describing her vision as \"opaque, brown, or completely black\". She states that the pain has now resolved. She also complains of a \"rubber band\" tightness in her rib cage and numbness in her legs. The patient was previous treating her symptoms with a continuous dose of Prednisone, which was stopped. She reports that she took two previously prescribed 20mg pills last night and two this morning to try to manage symptoms, without relief. The patient denies any nausea, vomiting, headache, diarrhea, tingling, abdominal pain or chest pain. The patient's last Rituxan infusion was June 1 with her Neurologist, Dr. Franklin at the [facility]. Her primary ophthalmologist is Dr. Cho in [place].     Allergies:  Prevacid  Pravastatin    Medications:    Lasix   Proair   Plavix   Prozac   Zocor   Norvasc   Gabapentin   Lopressor     Past Medical History:    Cerebral infarction  CHF   Hypertension   Lupus  Optic neuritis  Sjogren's syndrome  Temporal arteritis  TIA  Asthma   Neuromyelitis optica  Pulmonary embolism  Depression   Diverticulosis    Past Surgical History:    Cholecystectomy   Hysterectomy   Tubal ligation   IR CVC non tunnel placement     Family History:    Mother: asthma   Sister: lupus, kidney cancer  Brother: bone cancer    Social History:  Smoking status: former  Alcohol use: yes " 7/week  Drug use: no  The patient presents to the emergency department with sonSridhar   PCP: Joceline Ty  Marital Status:   [5]    Review of Systems   Eyes: Positive for pain and visual disturbance.   Cardiovascular: Negative for chest pain.   Gastrointestinal: Negative for abdominal pain, diarrhea, nausea and vomiting.   Neurological: Positive for numbness.   All other systems reviewed and are negative.    Physical Exam     Patient Vitals for the past 24 hrs:   BP Temp Temp src Pulse Resp SpO2   07/13/20 1023 (!) 139/94 99.2  F (37.3  C) Oral 88 20 98 %       Physical Exam  General: Patient is alert, awake and interactive when I enter the room  Head: The scalp, face, and head appear normal  Eyes:   Visual acuity only able to perceive moving light in the left eye.  Right eye is chronically blind.  Lids WNL  No foreign body noted in detailed exam upper and lower lids/margins  PERRLA, EOMI.  Cornea: No foreign body.   Intraocular pressure is 14 on the right and 14 on the left.  ENT: The nose is normal, Pinnae are normal, External acoustic canals are normal  Neck: Trachea midline  CV: Pulses are normal.   Resp: No respiratory distress   Musc: Normal muscular tone, moving all extremities.  Skin: No rash or lesions noted  Neuro:  Speech is normal and fluent. Face is symmetric.   Psych: Normal affect.  Appropriate interactions.       Emergency Department Course   Laboratory:  Laboratory findings were communicated with the patient and family who voiced understanding of the findings.    Erthrocyte sedimentation rate auto: 19    CRP inflammation: <2.9    BMP: Glucose: 107 (H) o/w WNL (Creatinine 0.80)    CBC: WNL (WBC 5.7, HGB 14.4, )    Interventions:  1207 solu-Medrol 1000mg IV    Emergency Department Course:  Past medical records, nursing notes, and vitals reviewed.  1102: I performed an exam of the patient and obtained history, as documented above.     IV inserted and blood drawn.    1141: I  discussed the patient with Dr. Galaviz, of [facility]thal[facility].    1243: I rechecked the patient. Explained findings to patient.    1253: I rechecked the patient. I reviewed the results with the Patient and son and answered all related questions prior to transfer.     1251: I discussed the patient with Dr. Levy, of [facility] ([place]).     Findings and plan explained to the Patient and son. Patient will be transferred to [facility] via by personal vehicle. Discussed the case with Dr. Levy, who will admit the patient to a monitored bed for further monitoring, evaluation, and treatment.    Impression & Plan   Medical Decision Making:  Patient is a 75-year-old woman with unfortunate past medical history of recurrent optic neuritis and blindness in her right eye who presents emergency department with blindness in her left eye.  Onset of visual loss began last night before bed and has gradually progressed through the night and this morning.  On my exam today she has faint light perception in her left eye.  Patient was immediately started on Solu-Medrol IV infusion and I discussed the case with ophthalmology.  Ophthalmology recommended transfer to the [facility] for both neurology and ophthalmology consultation and further IV steroids.  I discussed the plan with the patient and her son and they are amenable at this time.  All questions were answered and patient be answered by private car to [facility].    Diagnosis:    ICD-10-CM   1. Optic neuritis  H46.9       Disposition:  Transferred to [facility].    Carri Johnson  7/13/2020   [facility] EMERGENCY DEPARTMENT    Scribe Disclosure:  I, Carri Johnson, am serving as a scribe at 11:02 AM on 7/13/2020 to document services personally performed by Vinh Leung MD based on my observations and the provider's statements to me.        Vinh Leung  MD Bboo  07/13/20 3907

## 2020-07-13 NOTE — CONSULTS
"  OPHTHALMOLOGY CONSULT NOTE  20    Patient: Ladonna Sheriff      HISTORY OF PRESENTING ILLNESS:     Ladonna Sheriff is a 75 year old female with pmhx neuromyelitis optica, transverse myelitis, temporal arteritis, lupus, TIA, CHF, HTN, who was transferred from [place] ED for left sided vision loss x 2 days. The right eye is non-seeing since  from optic neuritis per patient. The left eye has a history of optic neuritis 2 years ago treated with IV steroids and vision improvement. Her baseline left sided vision is such that she is able to read with magnifying lenses but when watching TV she mostly relies on her hearing and has to come very close up to see the screen.     Patient reports that starting on Saturday night she noticed gradual blurring of her vision in the left eye. On  she states that the vision was \"gone\". Right now she states that the vision fluctuates from seeing white, then tan/gray to black and cycles between these shades. She reports having some pain in the left eye with eye movement but that has resolved. Denies fevers, chills, weight loss, scalp tenderness, jaw claudication, but does have some right sided shoulder aches.  No other worsening neurological symptoms. She had  prednisone at home and she took 40mg on Saturday and 40mg on , she cannot recall if she took this morning.     In the outside ED, she did receive 1000 mg IV methylprednisolone prior to being transferred.       10+ review of systems were otherwise negative except for that which has been stated above.      OCULAR/MEDICAL/SURGICAL HISTORIES:     Past Ocular History:  Patient receivs her ocular care at [place].  Right eye is non-seeing since   History of optic neuritis in left eye 2 years ago s/p [facility]   NMO on rituxan    Past Medical History:   Diagnosis Date     Cerebral infarction (H)      CHF (congestive heart failure) (H)      Hypertension      Lupus (H)      Lupus (H)  "     Optic neuritis      Optic neuritis      Sjogren's syndrome (H)      Sjogren's syndrome (H)      Temporal arteritis (H)      TIA (transient ischemic attack)      Uncomplicated asthma        Past Surgical History:   Procedure Laterality Date     CHOLECYSTECTOMY       GYN SURGERY      hysterectomy     GYN SURGERY      tubal ligation     IR CVC NON TUNNEL PLACEMENT  6/21/2019     IR FOLLOW UP VISIT INPATIENT  7/2/2019       EXAMINATION:     Base Eye Exam     Visual Acuity (Snellen - Linear)       Right Left    Dist sc LP HM          Tonometry (Tonopen, 9:48 PM)       Right Left    Pressure 14 15          Pupils       Dark Light Shape React APD    Right 5 5 Round Very mildly reactive +    Left 5 4 Round slow reaction           Extraocular Movement       Right Left     Full Full          Neuro/Psych     Oriented x3:  Yes    Mood/Affect:  Normal          Dilation     Both eyes:  1.0% Mydriacyl @ 9:48 PM            Slit Lamp and Fundus Exam     External Exam       Right Left    External Normal Normal          Slit Lamp Exam       Right Left    Lids/Lashes Dermatochalasis Dermatochalasis    Conjunctiva/Sclera White and quiet White and quiet    Cornea Clear Clear    Anterior Chamber Deep and quiet Deep and quiet    Iris Non reactive Round and reactive    Lens 2+ Nuclear sclerosis Posterior chamber intraocular lens    Vitreous Normal Normal          Fundus Exam       Right Left    Disc nerve pallor nerve pallor    Macula Normal, no hemorrhages or pallor Normal, no hemorrhages or pallor    Vessels Normal Normal    Periphery Normal Normal                Labs/Studies/Imaging Performed  MRI brain and orbits  1. The study demonstrates greater than 20 foci of T2-hyperintensity  within the cerebral white matter consistent with the clinical  suspicion of demyelinating disease. There are no foci of abnormal  enhancement noted intracranially. Although mildly degraded by motion  artifact, no convincing findings of optic neuritis.  2.  Previously seen enhancing lesion in the left parieto-occipital  region appears resolved. No new lesions are identified.    MRI C and T spine  1. Cervical spine: No abnormal enhancement or cord abnormality is  identified. Multilevel cervical spondylosis, without significant  change from 5/15/2020.  2. Thoracic spine: Decreased abnormal cord signal extending from T2 to  T4, with resolution of previously seen enhancement. There is  persistent cord volume loss. No new abnormal cord signal or  enhancement is identified.     ASSESSMENT/PLAN:     Ladonna Sheriff is a 75 year old female with pmhx NMO, optic neuritis, who presents from outside ED out of concern for NMO flare.   - She received 1000mg IV methylprednisolone at outside ED prior to being transferred.   - Vision in the left eye is hand motion and there is nerve pallor on indirect exam. MRI brain and orbits performed in ED without findings of active inflammation.   - There are no concerning symptoms raising suspicion for GCA per patient history.   - There is no concern for hemorrhage, ischemia or retinal detachment on bedside exam in the ED.     Recommendations:  - Admission to inpatient Neurology  - Continue daily 1000mg IV methylprednisolone   - Patient to be evaluated in neuro-ophthalmology clinic for further evaluation on 7/14 (to be arranged by our service)      It is our pleasure to participate in this patient's care and treatment. Please contact us with any further questions or concerns.      Karuna Galaviz MD  Ophthalmology PGY2  [facility]  Pager: [phone]    Discussed with Dr. Misha Jewell

## 2020-07-13 NOTE — TELEPHONE ENCOUNTER
Telephone Encounter with Outside ED Provider     Paged by ED Physician Dr. Leung from [facility] regarding Ms Sheriff who is a 75 year old female with past medical history of neuromyelitis optica, being followed by neurology and is on rituxan infusions (last infusion early June 2020). 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     According to Dr. Leung, patient presented to the ED today with worsening vision in left eye since yesterday. She was able to read prior to this episode however she now has barely light perception in the left eye. She had pain at the onset of blurred vision however currently no pain. IOP 14 at outside ED, and there is no APD per ED provider. No other neurological focal deficits.     Discussed with neuro-ophthalmology service regarding this patient, and based on the information we have from the outside provider and given the patient's known history of NMO and optic neuritis history and current presentation, there is a high concern for optic neuritis. Our recommendation is for patient to obtain IV methylprednisolone 1000mg in the ED, and be admitted for IV steroids, MRI (MRI brain, orbits and c spine with and without contrast) and plasmapheresis. Our recommendation is for the patient to also be evaluated by neuro-ophthalmology. Considering there is no ophthalmology coverage at [facility], it would be advised for the patient to be transferred to the [facility] to obtain this multidisciplinary care.    Karuna Galaviz MD  Ophthalmology, PGY2

## 2020-07-13 NOTE — ED TRIAGE NOTES
Presents with complete vision loss in left eye since Saturday night. Patient reports her vision fluctuates from black to white to tan but unable to see faces, objects. Patient states she has complete vision loss in right eye which is baseline. Hx optic neuritis and neuromyelitis optica.

## 2020-07-13 NOTE — LETTER
Transition Communication Hand-off for Care Transitions to Next Level of Care Provider    Name: Ladonna Sheriff  : 1945  MRN #: [id number]  Primary Care Provider: Joceline Ty     Primary Clinic: [facility] [address]  [facility] [address]     Reason for Hospitalization:  Neuromyelitis optica (H) [G36.0]  Vision loss of left eye [H54.62]  Admit Date/Time: 2020  4:38 PM  Discharge Date: 2020  Payor Source: Payor: MEDICARE / Plan: MEDICARE / Product Type: Medicare /              Reason for Communication Hand-off Referral:   Notification of the discharge plan.    Discharge Plan:     Social Work Services Discharge Note      Patient Name:  Ladonna Sheriff     Anticipated Discharge Date:  2020     Discharge Disposition:   [facility] ([phone])     Following MD:  Facility Assignment     Pre-Admission Screening (PAS) online form has been completed.  The Level of Care (LOC) is:  Determined  Confirmation Code is:  [id number].  Patient/caregiver informed of referral to [facility] for Pre-Admission Screening for skilled nursing facility (SNF) placement and to expect a phone call post discharge from SNF.     Additional Services/Equipment Arranged:  [facility] confirmed readiness for discharge (on 2020) with Dr. Blanca.  [facility] confirmed acceptance for admit to [facility] with Admissions (June).  [facility] arranged for [facility] to provide private pay w/c transport at 10:45am.       Patient / Family response to discharge plan:  Pt voices understanding of the discharge plan and agreement with the discharge plan.     Persons notified of above discharge plan:  Pt, 6A nursing and Amparo Boo (pt's [facility]  [phone])        Staff Discharge Instructions:  Please fax discharge orders and signed hard scripts for any controlled substances (SW will complete this  task).  Please print a packet and send with patient.      CTS Handoff completed:  YES     Medicare Notice of Rights provided to the patient/family:  YES     CAMELIA Marcial  Social Work, 6A  Phone:  [phone]  Pager:  [phone]  7/23/2020

## 2020-07-14 NOTE — PROGRESS NOTES
Assessment & Plan     Ladonna Sheriff is a 75 year old female with the following diagnoses:   1. Optic neuritis, left    2. Neuromyelitis optica (H)       Patient here for consultation from Dr. Rajwinder Spangler for vision loss left eye.  She lost vision in her right eye in approximately 1995.  She had recurring episodes of optic neuritis in the right eye.  She had over 5 episodes.  She has also had approximately 5-10 episodes of optic neuritis in the left eye.  Her vision had always improved for the most part in the left eye prior to this episode.  She wasn't able to drive but could read with magnifier.  She was diagnosed with neuromyelitis optica  Approximately a year ago.  On Saturday she started getting another episode of vision loss in the left eye.  She was seen at [facility] and started on intravenous steroids 7/13 and then transferred to [facility].  She received another course of intravenous steroids today.  The plan was to begin plasmapheresis if no improvement with intravenous steroids.      She is receiving Rituxan infusion with last dose being June 1st.      Visual acuity no light perception RIGHT eye and barely count fingers left eye. She has -1 deficit in adduction left eye.  Moderate exotropia in primary gaze and adduction deficit in right gaze     She has acute optic neuritis LEFT eye in the setting of AQP4 positive neuromyelitis optica.  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  I personally reviewed patient's MRI and it shows clear enhancement of the posterior optic nerve on the left consistent with active optic neuritis.      She received intravenous steroids in the ER yesterday and here at [facility] this morning.  She has not had significant recovery of her vision.  She indicates that she could see around 20/200 or so before Saturday.  She is monocular and has only a slim chance of recovery.  I favor initiating plasmapheresis/getting a port today.   Prognosis is very guarded.  Follow up 2 days sooner as needed for worsening symptoms.             Attending Physician Attestation:  Complete documentation of historical and exam elements from today's encounter can be found in the full encounter summary report (not reduplicated in this progress note).  I personally obtained the chief complaint(s) and history of present illness.  I confirmed and edited as necessary the review of systems, past medical/surgical history, family history, social history, and examination findings as documented by others; and I examined the patient myself.  I personally reviewed the relevant tests, images, and reports as documented above.  I formulated and edited as necessary the assessment and plan and discussed the findings and management plan with the patient and family. - Kaz Lazo MD

## 2020-07-14 NOTE — PLAN OF CARE
Discharge Planner OT   Patient plan for discharge: not stated  Current status: OT eval completed. OT educated pt on set up to increase accuracy in self feeding. Pt min A in room ambulation w/ SEC to bathroom VSS throughout  Barriers to return to prior living situation: medical status  Recommendations for discharge: rehab  Rationale for recommendations: to increase ind in ADLS       Entered by: Teresita Woods 07/14/2020 1:26 PM

## 2020-07-14 NOTE — PROGRESS NOTES
07/14/20 1457   Quick Adds   Type of Visit Initial PT Evaluation   Living Environment   Lives With alone   Living Arrangements apartment   Home Accessibility stairs to enter home   Number of Stairs, Main Entrance other (see comments)  (16)   Stair Railings, Main Entrance railings on both sides of stairs   Transportation Anticipated family or friend will provide;health plan transportation   Living Environment Comment Pt lives alone in apartment, one son lives in same building. Another son lives close by and another lives in [place], [place] though is considering moving in with pt to be caregiver.   Self-Care   Usual Activity Tolerance good   Current Activity Tolerance good   Regular Exercise No   Equipment Currently Used at Home cane, straight   Activity/Exercise/Self-Care Comment Pt ambulates with cane. Son who checks in daily assists with ADLs as needed.   Functional Level Prior   Ambulation 1-->assistive equipment   Transferring 1-->assistive equipment   Toileting 0-->independent   Bathing 0-->independent   Communication 0-->understands/communicates without difficulty   Swallowing 0-->swallows foods/liquids without difficulty   Cognition 0 - no cognition issues reported   Fall history within last six months yes   Number of times patient has fallen within last six months 1   Which of the above functional risks had a recent onset or change? ambulation;transferring   Prior Functional Level Comment Pt reports having a fall when attempting to sit on recliner at home as she accidentally sat on armrest of recliner. Overall Irasema with functional mobility with use of cane.    General Information   Onset of Illness/Injury or Date of Surgery - Date 07/13/20   Referring Physician Rosa Segovia MD    Patient/Family Goals Statement to return home   Pertinent History of Current Problem (include personal factors and/or comorbidities that impact the POC) Per chart, Ms Ladonna Sheriff is a 75 year old female with a PMHx of NMO with  residual R eye vision loss, T4 transverse myelitis, and on-off episodes of L eye optic neuritis with baseline low vision who presented to the ED on 7/13/20 with worsening vision in her L eye concerning for optic neuritis/NMO relapse.   Precautions/Limitations fall precautions  (vision impairment)   General Info Comments Activity orders: up with assist   Cognitive Status Examination   Orientation orientation to person, place and time   Level of Consciousness alert   Follows Commands and Answers Questions 100% of the time   Personal Safety and Judgment intact   Memory intact   Pain Assessment   Patient Currently in Pain No   Integumentary/Edema   Integumentary/Edema no deficits were identifed   Posture    Posture Forward head position;Protracted shoulders   Range of Motion (ROM)   ROM Comment B LE ROM WFL   Strength   Strength Comments B LE strength grossly >3/5 per functional observation   Bed Mobility   Bed Mobility Comments Independent   Transfer Skills   Transfer Comments Independent   Gait   Gait Comments CGA and cane for gait in room, assist required for navigation 2/2 visual impairment   Balance   Balance Comments Good seated balance, SBA for standing balance   Sensory Examination   Sensory Perception Comments Pt reports numbness in B feet/ankles   General Therapy Interventions   Planned Therapy Interventions balance training;gait training;neuromuscular re-education;strengthening;transfer training;risk factor education;home program guidelines;progressive activity/exercise   Clinical Impression   Criteria for Skilled Therapeutic Intervention yes, treatment indicated   PT Diagnosis Impaired functional mobility   Influenced by the following impairments Visual impairment, impaired balance   Functional limitations due to impairments Pt requires assist for safe functional mobility   Clinical Presentation Stable/Uncomplicated   Clinical Presentation Rationale PMH and clinical judgment   Clinical Decision Making  "(Complexity) Low complexity   Therapy Frequency 3x/week   Predicted Duration of Therapy Intervention (days/wks) 2 weeks   Anticipated Equipment Needs at Discharge   (TBD)   Anticipated Discharge Disposition Transitional Care Facility   Risk & Benefits of therapy have been explained Yes   Patient, Family & other staff in agreement with plan of care Yes   Clinical Impression Comments Pending LOS and ability of son to provide assist, pt may be safe to return home with assist and possible home therapies.   [facility] SED Web-PAC TM \"6 Clicks\"   2016, Trustees of [facility], under license to [employer].  All rights reserved.   6 Clicks Short Forms Basic Mobility Inpatient Short Form   [facility] AM-PAC  \"6 Clicks\" V.2 Basic Mobility Inpatient Short Form   1. Turning from your back to your side while in a flat bed without using bedrails? 4 - None   2. Moving from lying on your back to sitting on the side of a flat bed without using bedrails? 4 - None   3. Moving to and from a bed to a chair (including a wheelchair)? 3 - A Little   4. Standing up from a chair using your arms (e.g., wheelchair, or bedside chair)? 4 - None   5. To walk in hospital room? 3 - A Little   6. Climbing 3-5 steps with a railing? 2 - A Lot   Basic Mobility Raw Score (Score out of 24.Lower scores equate to lower levels of function) 20   Total Evaluation Time   Total Evaluation Time (Minutes) 8     "

## 2020-07-14 NOTE — PROGRESS NOTES
"   07/14/20 0800   Quick Adds   Type of Visit Initial Occupational Therapy Evaluation   Living Environment   Lives With alone   Living Arrangements apartment   Home Accessibility stairs to enter home   Number of Stairs, Main Entrance   (16)   Transportation Anticipated family or friend will provide  (metro mobility)   Living Environment Comment One son lives in same complex and checks on patient daily, one son in [place] who visits weekly   Self-Care   Usual Activity Tolerance good   Current Activity Tolerance moderate   Regular Exercise No   Equipment Currently Used at Home cane, straight   Functional Level   Ambulation 1-->assistive equipment   Transferring 1-->assistive equipment   Toileting 0-->independent   Bathing 0-->independent   Dressing 0-->independent   Eating 0-->independent   Communication 0-->understands/communicates without difficulty   Swallowing 0-->swallows foods/liquids without difficulty   Cognition 0 - no cognition issues reported   Fall history within last six months no   Which of the above functional risks had a recent onset or change? 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 She presented to the ED on 7/13/20 with worsening vision in her L eye. Patient reports that this started on Saturday and continued to progress. She initially had blurry vision as well as eye pain that progressed to involve whitening of her vision, followed by a \"tan like color\" followed by visual loss \"completely dark\". She no longer has any pain with eye movements now. She reports " that she has had multiple episodes of L eye visual loss in the past (4-5 times). Last occurred ~2 years ago. Reports she has baseline low vision now because of her prior events of optic neuritis. She has to be close to TV screen to read and has difficulty making out details. During all of her prior events she was treated with oral prednisone with improvement of her symptoms. She took two 20mg prednisone pills last now and two this morning before presenting to the ED. Presented to [facility] and received 1g methylprednisolone before being transferred to [facility].     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 and new L vision loss, able to see some light/dark contrast   Sensory Examination   Sensory Comments OT: Pt reports tingling under breast and below since NMO   Range of Motion (ROM)   ROM Comment OT: BUE WFL   Strength   Strength Comments OT: Overall deconditioned   Eating/Self Feeding   Level of Delta: Eating stand-by assist   Physical Assist/Nonphysical Assist: Eating set-up required   Assistive Device nonslip matting   Activities of Daily Living Analysis   Impairments Contributing to Impaired Activities of Daily Living strength decreased  (vision decreased)   General Therapy Interventions   Planned Therapy Interventions ADL retraining;IADL retraining;balance training;bed mobility training;strengthening;transfer training;visual perception;home program guidelines;progressive activity/exercise   Clinical Impression   Criteria for Skilled Therapeutic Interventions Met yes, treatment indicated   OT Diagnosis decreased ind in ADLS/IADLS   Influenced by the following impairments generalized weakness and new vision loss   Assessment of Occupational Performance 1-3 Performance Deficits   Identified Performance Deficits generalized weakness  "and new vision loss   Clinical Decision Making (Complexity) Low complexity   Therapy Frequency Daily   Predicted Duration of Therapy Intervention (days/wks) 7/25/20   Anticipated Discharge Disposition Home with Outpatient Therapy;Transitional Care Facility;Acute Rehabilitation Facility   Risks and Benefits of Treatment have been explained. Yes   Patient, Family & other staff in agreement with plan of care Yes   [facility] TM \"6 Clicks\"   2016, Trustees of [facility], under license to [employer].  All rights reserved.   6 Clicks Short Forms Daily Activity Inpatient Short Form   [facility] AM-PAC  \"6 Clicks\" Daily Activity Inpatient Short Form   1. Putting on and taking off regular lower body clothing? 3 - A Little   2. Bathing (including washing, rinsing, drying)? 3 - A Little   3. Toileting, which includes using toilet, bedpan or urinal? 3 - A Little   4. Putting on and taking off regular upper body clothing? 3 - A Little   5. Taking care of personal grooming such as brushing teeth? 3 - A Little   6. Eating meals? 3 - A Little   Daily Activity Raw Score (Score out of 24.Lower scores equate to lower levels of function) 18   Total Evaluation Time   Total Evaluation Time (Minutes) 5     "

## 2020-07-14 NOTE — PLAN OF CARE
Status: Patient admitted last evening for worsening L eye vision. Hx optic neuritis and transverse myelitis   Vitals: Hypertensive within parameters, gave scheduled metoprolol. On RA   Neuros: A+O x4. Blind in R eye, L eye goes from blind to seeing brown/taupe. R eye dysconjugate. Baseline N/T in toes. Strengths 5/5.   IV: PIV SL   Resp/trach: Clear   Diet: Regular, ate toast overnight   Bowel status: LBM yest. per pt. BS+. Passing gas   : Voiding   Skin: Intact  Pain: Denied   Activity: A1 + cane. Cane is from home   Social: Son will be designated visitor.  Plan: 5 day course of methylpred with possible PLEX. Methylpred #1 given this morning. Will go to opthalmology clinic today, time TBD. Continue to monitor and follow POC

## 2020-07-14 NOTE — H&P
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 She presented to the ED on 20 with worsening vision in her L eye. Patient reports that this started on Saturday and continued to progress. She initially had blurry vision as well as eye pain that progressed to involve whitening of her vision, followed by a \"tan like color\" followed by visual loss \"completely dark\". She no longer has any pain with eye movements now. She reports that she has had multiple episodes of L eye visual loss in the past (4-5 times). Last occurred ~2 years ago. Reports she has baseline low vision now because of her prior events of optic neuritis. She has to be close to TV screen to read and has difficulty making out details. During all of her prior events she was treated with oral prednisone with improvement of her symptoms. She took two 20mg prednisone pills last now and two this morning before presenting to the ED. Presented to [facility] and received 1g methylprednisolone before being transferred to [facility].      She also reports a band like sensation around her ribcage and numbness in her legs. Reports this initially started a year ago and has been fairly persistent.     Patient follows up with Dr Caterina Franklin for her NMO. Last Rituximab infusion was in 20. Reports she has had a total of 3 infusions so far (2019, 2019 and 2020). Prior to this she was not on any long-term " immunomodulatory treatment for her NMO and was only treated with steroids.     Notably, she had an MR brain and C/T spine on 6/2019 which showed focal gadolinium enhancing lesion from T4 through T5 in the spinal cord on the right side with edema extending from T3-T4 down  to lower T5. Repeat imaging on 5/2020 showed cord volume loss and abnormal signal/enhancement at T2  through T4 consistent with known demyelinating disease. Last note from Dr Franklin planned for repeat imaging to determine efficacy of Rituximab for Ms Voith given evidence of signal enhancement.     ROS: A 10-point ROS was performed as per HPI.     PMH:  Past Medical History:   Diagnosis Date     Cerebral infarction (H)      CHF (congestive heart failure) (H)      Hypertension      Lupus (H)      Lupus (H)      Optic neuritis      Optic neuritis      Sjogren's syndrome (H)      Sjogren's syndrome (H)      Temporal arteritis (H)      TIA (transient ischemic attack)      Uncomplicated asthma      Past Surgical History:   Procedure Laterality Date     CHOLECYSTECTOMY       GYN SURGERY      hysterectomy     GYN SURGERY      tubal ligation     IR CVC NON TUNNEL PLACEMENT  6/21/2019     IR FOLLOW UP VISIT INPATIENT  7/2/2019     Allergies:  Allergies   Allergen Reactions     Prevacid [Lansoprazole] Rash     Pravastatin Rash     Patient is still not sure about it due many years ago for reaction.     Medications:    No current facility-administered medications for this encounter.     Current Outpatient Medications:      acetaminophen (TYLENOL) 325 MG tablet, Take 1-3 tablets (325-975 mg) by mouth every 6 hours as needed for mild pain or fever (> 101 F), Disp: , Rfl:      albuterol (PROAIR HFA/PROVENTIL HFA/VENTOLIN HFA) 108 (90 Base) MCG/ACT inhaler, Inhale 2 puffs into the lungs every 6 hours as needed , Disp: , Rfl:      amLODIPine (NORVASC) 10 MG tablet, Take 10 mg by mouth daily, Disp: , Rfl:      aspirin (ASA) 325 MG EC tablet, Take 325 mg by mouth  every evening , Disp: , Rfl:      B Complex-C (VITAMIN B COMPLEX W/VITAMIN C) TABS tablet, Take 1 tablet by mouth daily, Disp: , Rfl:      Calcium-Vitamin D 600-200 MG-UNIT TABS, Take 1 tablet by mouth every evening , Disp: , Rfl:      clopidogrel (PLAVIX) 75 MG tablet, Take 75 mg by mouth every morning, Disp: , Rfl:      FLUoxetine (PROZAC) 20 MG capsule, Take 20 mg by mouth daily, Disp: , Rfl:      furosemide (LASIX) 20 MG tablet, Take 20 mg by mouth 3 times daily, Disp: , Rfl:      gabapentin (NEURONTIN) 300 MG capsule, Take 1 capsule (300 mg) in the morning and afternoon, and 2 capsules (600 mg) at night, Disp: 120 capsule, Rfl: 11     metoprolol tartrate (LOPRESSOR) 50 MG tablet, Take 50 mg by mouth 2 times daily, Disp: , Rfl:      Multiple Vitamins-Minerals (MULTIVITAL PO), Take 1 tablet by mouth every morning , Disp: , Rfl:      simvastatin (ZOCOR) 20 MG tablet, Take 20 mg by mouth At Bedtime, Disp: , Rfl:      traZODone (DESYREL) 100 MG tablet, Take 100 mg by mouth At Bedtime, Disp: , Rfl:      trolamine salicylate (ASPERCREME) 10 % external cream, Apply topically 2 times daily as needed for moderate pain, Disp: , Rfl:      umeclidinium (INCRUSE ELLIPTA) 62.5 MCG/INH inhaler, Inhale 1 puff into the lungs daily, Disp: , Rfl:      vitamin C (ASCORBIC ACID) 100 MG tablet, Take 500 mg by mouth every morning , Disp: , Rfl:     Social History:  Social History     Tobacco Use     Smoking status: Former Smoker     Packs/day: 0.00     Smokeless tobacco: Never Used   Substance Use Topics     Alcohol use: Yes     Alcohol/week: 7.0 standard drinks     Types: 7 Shots of liquor per week     Comment: occ   Lives alone in apartment and her two cats. Son lives in same apartment building and assists her when needed. She ambulates with a cane.     Family History:    Family History   Problem Relation Age of Onset     Asthma Mother      Lupus Sister      Bone Cancer Brother      Kidney Cancer Sister      Physical Examination:  "  Vitals: BP (!) 148/64   Temp 98.1  F (36.7  C) (Oral)   Resp 18   Ht 1.702 m (5' 7\")   Wt 90.7 kg (200 lb)   SpO2 98%   BMI 31.32 kg/m    General: Adult patient, lying in bed, NAD  HEENT: NC/AT  Chest: non-labored on RA  Extremities: Warm, no edema  Skin: No rash or lesion   Psych: Mood pleasant, affect congruent    Neuro:  Mental status: Awake, alert, attentive, oriented to self, time, place, and circumstance. Language is fluent and coherent with intact comprehension of complex commands.  Cranial nerves: Visual acuity: Only able to detect light in R eye. Only able to detect hand motion in L eye. Unable to count number of fingers in either eye. She had a RAPD in R eye. Pupil constricts in L eye. Her gaze is dysconjugate, she has a L eye exotropia which she reports is chronic since her episode of R eye optic neuritis in 1995. facial sensation intact, face symmetric, shoulder shrug strong, tongue/uvula midline, no dysarthria.   Motor: Normal bulk. Increased tone (spastic) in RLE. No abnormal movements. 5/5 strength in UE. In LE she has 4/5 hip flexor strength bilateral. Remainder of LE strength is 5/5.  Reflexes: Brisk reflexes and symmetric in biceps, brachioradialis. 3+ patellae and 2+ achilles. Negative Villasenor, no clonus. Upgoing toe on R.   Sensory: Sensory level at T4 (reduced light touch and PP below this level)  Coordination:Unable to adequately assess due to blindness.  Gait: Deferred    Investigations:    MR C Spine 5/15/20  IMPRESSION:  1. No abnormal cord signal or enhancement is identified.  2. Mild degenerative change.  3. Abnormal signal/enhancement within the thoracic cord. Refer [facility] spine report for additional details.    MR T spine 5/15/20  IMPRESSION:  Cord volume loss and abnormal signal/enhancement at T2  through T4 consistent with known demyelinating disease, however tumor  cannot be completely excluded. Enhancement has decreased in intensity  compared to the prior exam.     MR " Brain 6/2019  Impression:   The study demonstrates greater than 20 foci of T2-hyperintensity within the cerebral white matter which is suspicious for demyelination. Enhancing focus within the left parieto-occipital region is suspicious for active demyelination in the absence of known malignancy. No less likely, the differential for white matter lesions includes sequela of infectious or inflammatory insults, and vasculopathy.     MR C-Spine 6/2019  IMPRESSION:    1. No spinal cord abnormality to indicate transverse myelitis.  2. Degenerative changes.                               MR T-spine 6/2019                             IMPRESSION:  Focal gadolinium enhancing lesion from T4 through T5 in the spinal cord on the right side with edema extending from T3-T4 down  to lower T5. This is most likely transverse myelitis, with differential diagnosis including a spinal cord astrocytoma.    Lab tests:  ESR & CRP - wnl  CBC & BMP reviewed.     Assessment and Plan:  Ms Ladonna Sheriff is a 75 year old female with a PMHx of NMO with residual R eye vision loss, T4 transverse myelitis, and on-off episodes of L eye optic neuritis with baseline low vision who presented to the ED on 7/13/20 with worsening vision in her L eye concerning for optic neuritis/NMO relapse.     #AQP4 positive NMO  #L eye vision loss concerning for optic neuritis/NMO relapse  - Received 1g methylpred at [facility] prior to transfer  - Will continue total of 5 days of 1g methylpred for relapse event. If no response, we might consider need for PLEX.   - MRI Brain & Orbits, C/T spine  - Ophthalmology consult, for serial evaluation of visual acuity  - Will obtain CD19 levels  - There is concern that her NMO is not responding to Rituximab. She might need consideration of other immunomodulatory therapy for her NMO such as eculizumab or inebilizumab. Will send an inbox message notifying Dr Franklin of her admission for future treatment plan.   - PT/OT  evaluations    Chronic issues:  #Bronchospastic disease  - Continue PTA albuterol and Incruse Ellipta    #Hypertension  - Continue PTA amlodipine and metoprolol    #Heart failure  - Continue PTA Lasix    #Hx of TIA  - Continue PTA ASA 325mg and PTA Plavix  - Continue PTA simvastatin     #Mood d/o  - Continue PTA fluoxetine    #Sleep  - Continue PTA trazodone    FEN: Regular  PPx:SCDs, Lovenox  Code: Full    Patient was seen and discussed with Dr. Spangler.    Rosa Segovia  Neurology PGY-4  Pager [phone]    I saw and evaluated the patient on 7/14/20 and agree with the findings and the plan of care as documented in the resident's note.      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  At baseline she reports that she is able to recognize faces, has some color perception and is able to read if it is close to her face.  She states she started noticed some blurring and loss of color vision on Saturday and over the past 48 hours is declined to the point that she is only able to see light.  MRI was performed in the ED which did not show any obvious optic nerve enhancement.  She states this is similar to her previous episodes of optic neuritis but appears vision loss is more severe.      Her exam is significant for absent pupillary response in the right eye complete blindness on that side.  She is able unable to count fingers in the left eye.  Only able to detect hand motion.  She does have pupillary response but is sluggish.  No dysarthria.  No facial droop.  She has increased tone in the lower extremities and brisk reflexes with upgoing toes.  She has a sensory level at T4.    MRI of the brain, cervical, thoracic spine as outlined below.    I suspect this is optic neuritis related to her neuromyelitis optica.  She is responded favorably to steroids in the past but has not noticed a response after 2  doses of high-dose steroids thus far.  We will continue to treat with daily steroids, however if she does not respond by tomorrow we may need to consider escalating treatment to plasma exchange.  This is especially necessary in the setting of near complete vision loss.  We will also have ophthalmology see today to ensure there is no alternative source of her vision loss such as vascular source.  Subacute onset does favor inflammatory sources.      Rajwinder Spangler DO   of Neurology

## 2020-07-14 NOTE — PROGRESS NOTES
Care Coordinator Progress Note    Admission Date/Time:  7/13/2020  Attending MD:  Rajwinder Spangler*    Data  Chart reviewed, discussed with interdisciplinary team.   Patient was admitted for:    Vision loss of left eye  Neuromyelitis optica (H).    Concerns with insurance coverage for discharge needs: None.  Current Living Situation: Patient lives alone.  Support System: Supportive and Involved  Services Involved: TCU vs Home  Transportation at Discharge: Car and Family or friend will provide  Transportation to Medical Appointments:   - Name of caregiver: Hayden and Sridhar- sons  Barriers to Discharge: medical stability     Coordination of Care and Referrals: Provided patient/family with options for Home vs TCU .        Assessment  Met with patient at the bedside to discuss discharge planning. Upon looking at notes, therapy is recommending TCU due to safety concerns for patient living alone and with loss of vision. Ladonna states that she has somewhat learned to adapt to her vision loss and she doesn't look forward to TCU. She mentioned that she has been to one before and if it is the same things that they will teach her, then she knows what to do already. This writer acknowledged and stated that it is uncertain when her discharge date will be and what the final recommendations are, but RNCC will continue to follow. Ladonna states that she had support from both her sons and immediate family whenever needed. RNCC will continue to follow for any needs that may arise.      Plan  Anticipated Discharge Date:  TBD  Anticipated Discharge Plan:  Home vs TCU    JOHN Ratliff RN Care Coordinator  Pager [phone] (covering pager)

## 2020-07-14 NOTE — PROGRESS NOTES
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 She presented to the ED on 20 with worsening vision in her L eye. Patient reports that this started on Saturday and continued to progress. She initially had blurry vision as well as eye pain that progressed to involve whitening of her vision, followed by a \"tan like color\" followed by visual loss \"completely dark\". She no longer has any pain with eye movements now. She reports that she has had multiple episodes of L eye visual loss in the past (4-5 times). Last occurred ~2 years ago. Reports she has baseline low vision now because of her prior events of optic neuritis. She has to be close to TV screen to read and has difficulty making out details. During all of her prior events she was treated with oral prednisone with improvement of her symptoms. She took two 20mg prednisone pills last now and two this morning before presenting to the ED. Presented to [facility] and received 1g methylprednisolone before being transferred to [facility].     Patient follows up with Dr Caterina Franklin for her NMO. Last Rituximab infusion was in 20. Reports she has had a total of 3 infusions so far (2019, 2019 and 2020). Prior to this she was not on any long-term immunomodulatory treatment for her NMO and was only treated with steroids.      Notably, she had an MR brain and C/T spine on 2019 which showed focal gadolinium enhancing lesion from T4 through T5 in the spinal cord on the right side with edema extending from T3-T4 down  to lower T5. Repeat imaging on 2020 showed cord " "volume loss and abnormal signal/enhancement at T2  through T4 consistent with known demyelinating disease. Last note from Dr Franklin planned for repeat imaging to determine efficacy of Rituximab for Ms Voith given evidence of signal enhancement.     Interval history:   Worsening vision in left eye this morning. Virtually no detail or color vision in L per pt.       Physical Examination   Vitals: BP (!) 160/91 (BP Location: Right arm)   Temp 98.4  F (36.9  C) (Oral)   Resp 16   Ht 1.702 m (5' 7\")   Wt 90.7 kg (200 lb)   SpO2 94%   BMI 31.32 kg/m    Physical Exam:  Constitutional: Alert. Lying in bed comfortably. No acute distress.   Head: Atraumatic, normocephalic.  ENT: Moist mucous membranes. No sinus drainage.  Cardiovascular: Appears warm, well-perfused, and non-toxic.  Respiratory: No increased work of breathing, no accessory muscle use.   Gastrointestinal: Does not appear distended.  Musculoskeletal: Moving all four limbs spontaneously. Grossly intact range of motion.   Skin: No rashes or lesions appreciated on visualized skin.   Hematologic/Lymphatic/Immunologic: No bruising appreciated on visualized skin.   Neurologic:   Mental Status: alert, oriented to p/p/t. Speech is fluent, able to comprehend, and follows commands. No dysarthria.  Cranial Nerves: left pupil is reactive to light (pt able to perceive light being shined in this eye), no reactivity to light in right eye (but consensual response present), Able to bury sclerae in left eye. Gaze not conjugate. No nystagmus noted. Unable to determine faces/colors of shirts/read large font at 6 inches from face. Smile and eyebrow raise equal, blinking symmetric, no weakness. Lashes are buried on eye squeeze. Nasolabial folds symmetric. hearing intact to conversation.  Motor: Bilateral hand thenar and hypothenar atrophy observed, some fasciculations noted in hypothenar L>R. No pronator drift.    Sensory: sensory level at T4, pt describes \"squeezing band\" at " that level, and numbness distal to it. Above level sensation to light touch intact.        Investigations     Results for orders placed or performed during the hospital encounter of 07/13/20   MR Cervical Spine w/o & w Contrast    Impression    Impression: Motion degraded study.  1. Cervical spine: No abnormal enhancement or cord abnormality is  identified. Multilevel cervical spondylosis, without significant  change from 5/15/2020.  2. Thoracic spine: Decreased abnormal cord signal extending from T2 to  T4, with resolution of previously seen enhancement. There is  persistent cord volume loss. No new abnormal cord signal or  enhancement is identified.    I have personally reviewed the examination and initial interpretation  and I agree with the findings.    DANILO WAGNER MD   MR Brain and Orbits    Impression    Impression:   1. The study demonstrates greater than 20 foci of T2-hyperintensity  within the cerebral white matter consistent with the clinical  suspicion of demyelinating disease. There are no foci of abnormal  enhancement noted intracranially. Although mildly degraded by motion  artifact, no convincing findings of optic neuritis.  2. Previously seen enhancing lesion in the left parieto-occipital  region appears resolved. No new lesions are identified.    I have personally reviewed the examination and initial interpretation  and I agree with the findings.    DANILO WAGNER MD   MR Thoracic Spine w/o & w Contrast    Impression    Impression: Motion degraded study.  1. Cervical spine: No abnormal enhancement or cord abnormality is  identified. Multilevel cervical spondylosis, without significant  change from 5/15/2020.  2. Thoracic spine: Decreased abnormal cord signal extending from T2 to  T4, with resolution of previously seen enhancement. There is  persistent cord volume loss. No new abnormal cord signal or  enhancement is identified.    I have personally reviewed the examination and initial  interpretation  and I agree with the findings.    DANILO WAGNER MD       Assessment and Plan:  Ms Ladonna Sheriff is a 75 year old female with a PMHx of NMO with residual R eye vision loss, T4 transverse myelitis, and on-off episodes of L eye optic neuritis with baseline low vision who presented to the ED on 7/13/20 with worsening vision in her L eye concerning for optic neuritis/NMO relapse. Eye exam without cherry red spot or exam findings consistent with CRAO or other etiology per optho.      #AQP4 positive NMO  #L eye vision loss concerning for optic neuritis/NMO relapse  - Received 1g methylpred at [facility] prior to transfer  - Will continue total of 5 days of 1g methylpred for relapse event. If no response, or if worsened symptoms tomorrow, we will consider need for PLEX.   - MRI Brain & Orbits, C/T spine completed without clear new lesion in brain or spinal cord. L optic nerve with slight enhancement  - Ophthalmology consulted, appreciate recs  - Will obtain CD19 levels  - There is concern that her NMO is not responding to Rituximab, however this admit MRI improved from last one doen prior to initiation of rituxumab. She might need consideration of other immunomodulatory therapy for her NMO such as eculizumab or inebilizumab. Dr. Franklin notified of her admission for future treatment plan.   - PT/OT evaluations     Chronic issues:  #Bronchospastic disease  - Continue PTA albuterol and Incruse Ellipta     #Hypertension  - Continue PTA amlodipine and metoprolol     #Heart failure  - Continue PTA Lasix     #Hx of TIA  - Continue PTA ASA 325mg and PTA Plavix  - Continue PTA simvastatin      #Mood d/o  - Continue PTA fluoxetine     #Sleep  - Continue PTA trazodone     FEN: Regular  PPx:SCDs, Lovenox  Code: Full     Patient was seen and discussed with Dr. Spangler.        Isabela Blanca MD  PGY2 Neurology  [phone]

## 2020-07-14 NOTE — PROGRESS NOTES
Arrived from:  ED  Belongings/meds:  W/ patient. Phone and cane at bedside  2 RN Skin Assessment Completed by:  Candace Griffin, RN and Pepe Sultana RN  Non-intact findings documented (yes/no/NA): Yes- intact    Pt arrived from ED at 2230 after Opthalmology clinic and MRI. R eye baseline blind, L eye seeing colors, unable to see up close or far. Able to track somewhat. Both eyes dilated. 5/5 throughout. Baseline N/T to both feet. PIV sl. Regular diet, getting food now for pt. Up w/ A1 and cane.

## 2020-07-14 NOTE — PROGRESS NOTES
Status:Worsening L eye vision. Hx optic neuritis and transverse myelitis.  Vitals: VSS ex hypertensive w/in parameters.  On RA  Neuros: A&Ox4.  Blind R eye.  L eye decreased vision, sees black to seeing brown/taupe.  R eye dysconjugate.  N/T toes (baseline).  Strengths 5/5 throughout.    IV: PIV SL  Resp/trach: WDL   Diet: Regular, good PO  Bowel status: LBM 7/13.  Passing gas.    : voiding spont  Skin: intact  Pain: denies  Activity: A1, GB, cane.  (Cane is patient's own from home).  Social: Son visiting today.  Plan: Methylpred course x 5 days, with possible PLEX.  First Methylpred dose today.  To ophthalmology clinic at 1215.  Continue with current POC.

## 2020-07-14 NOTE — PROGRESS NOTES
Brief update note:   Optho recommending urgent PLEX. Plan for plasma exchange tomorrow. IR aware, planning for line. Transfusion medicine aware.     Isabela Blanca MD  PGY2 Neurology  [phone]

## 2020-07-14 NOTE — CONSULTS
Laboratory Medicine and Pathology  Transfusion Medicine - Apheresis     Ladonna Sheriff MRN# [id number]   YOB: 1945 Age: 75 year old        Reason for consult: Neuromyelitis optica           Assessment and Plan:   The patient is a 75 year old female with Neuromyelitis optica (NMO) and new vision loss consistent with an acute flare of the disease.  A series of therapeutic plasma exchanges (TPE) have been requested.  I reviewed the procedure with the patient including the potential risks and benefits, he use of 5% albumin and/or donor plasma for replacement fluid, and the potential risks and benefits of blood transfusion.  All of her questions were consent. Consent was obtained in the presence of unit nursing staff.      - Plan is 5 TPE approximately every other day.   - Patient requires a central line placement. First procedure tomorrow pending timing of line placement.  - Consider the impact of TPE on laboratory studies and medication levels. If the patient is to receive IVIG or rituximab, these should be given immediately after a TPE rather than before due to effective removal.   - Patient is schedule to received methylprednisolone at 6:00AM. If line is placed overnight, who recommend hold dose until after TPE.  If line is not to be placed until morning such that TPE would be in afternoon, OK to give dose at scheduled time.  I discussed this issue with on call Neurology resident  - Will monitor INR and fibrinogen during the course of the series as TPE with albumin replacement results in dilutional coagulopathy.  Please notify the Transfusion Medicine physician of any upcoming procedures, surgeries, or biopsies   - Please do not start ACE inhibitors throughout the duration of the TPE series as these have been associated with reactions during apheresis.           Chief Complaint:   Vision loss         History of Present Illness:   The patient is a 75 year old female with Aquaporin 4 antibody  positive NMO.  She has had multiple flares in the past with vision loss in the right eye and band like tightness below the breast line with numbness below this level.. She has preserved bladder and bowel function. She lives independently and was walking without assistance.  She has been treated in the past with steroids, rituximab (last dose 6/2020) and TPE (series in 6/2019).  She started to have vision symptoms in her left eye on 7/11/2020. She did not have pain in the eye, which she has had with previous episodes.  She took oral prednisone at home.  She presented to an outside ED yesterday and with treated with 1g IV methylprednisolone. She was then transferred to this facility. She has received a second dose of methylprednisolone. She has not developed other symptoms, but vision has not improved. Can see vague shapes and large contrasts with light/dark, but can't always tell exactly where people are standing.  Patient is right handed. Has otherwise been feeling well recently.         Past Medical History:     Past Medical History:   Diagnosis Date     Cerebral infarction (H)      CHF (congestive heart failure) (H)      Hypertension      Lupus (H)      Optic neuritis      Sjogren's syndrome (H)      Temporal arteritis (H)      TIA (transient ischemic attack)      Uncomplicated asthma    Neuromyelitis optica  Vision loss         Past Surgical History:     Past Surgical History:   Procedure Laterality Date     CHOLECYSTECTOMY       GYN SURGERY      hysterectomy     GYN SURGERY      tubal ligation     IR CVC NON TUNNEL PLACEMENT  6/21/2019     IR FOLLOW UP VISIT INPATIENT  7/2/2019          Social History:   , 3 sons and 1 grandchild, lives independently with 1 son living in the same apartment building           Allergies:     Allergies   Allergen Reactions     Prevacid [Lansoprazole] Rash     Pravastatin Rash     Patient is still not sure about it due many years ago for reaction.           Medications:      Current Facility-Administered Medications   Medication     acetaminophen (TYLENOL) tablet 325-975 mg     albuterol (PROAIR HFA/PROVENTIL HFA/VENTOLIN HFA) 108 (90 Base) MCG/ACT inhaler 2 puff     amLODIPine (NORVASC) tablet 10 mg     aspirin (ASA) EC tablet 325 mg     calcium carbonate 600 mg-vitamin D 400 units (CALTRATE) per tablet 1 tablet     clopidogrel (PLAVIX) tablet 75 mg     enoxaparin ANTICOAGULANT (LOVENOX) injection 40 mg     FLUoxetine (PROzac) capsule 20 mg     furosemide (LASIX) tablet 20 mg     gabapentin (NEURONTIN) capsule 300 mg     gabapentin (NEURONTIN) capsule 300 mg     gabapentin (NEURONTIN) capsule 600 mg     HOLD ACE Inhibitors: benazepril (LOTENSIN), captopril (CAPOTEN), enalapril (VASOTEC), fosinopril (MONOPRIL), lisinopril (PRINIVIL, ZESTRIL), perindopril (ACEON), quinapril (ACCUPRIL), ramipril (ALTACE), trandolapril (MAVIK)     lidocaine (LMX4) cream     lidocaine 1 % 0.1-1 mL     magnesium sulfate 4 g in 100 mL sterile water (premade)     melatonin tablet 3 mg     methylPREDNISolone sodium succinate (solu-MEDROL) 1,000 mg in sodium chloride 0.9 % 250 mL intermittent infusion     metoprolol tartrate (LOPRESSOR) tablet 50 mg     naloxone (NARCAN) injection 0.1-0.4 mg     ondansetron (ZOFRAN-ODT) ODT tab 4 mg    Or     ondansetron (ZOFRAN) injection 4 mg     potassium chloride (KLOR-CON) Packet 20-40 mEq     potassium chloride 10 mEq in 100 mL intermittent infusion with 10 mg lidocaine     potassium chloride 10 mEq in 100 mL sterile water intermittent infusion (premix)     potassium chloride 20 mEq in 50 mL intermittent infusion     potassium chloride ER (KLOR-CON M) CR tablet 20-40 mEq     senna-docusate (SENOKOT-S/PERICOLACE) 8.6-50 MG per tablet 1 tablet    Or     senna-docusate (SENOKOT-S/PERICOLACE) 8.6-50 MG per tablet 2 tablet     simvastatin (ZOCOR) tablet 20 mg     sodium chloride (PF) 0.9% PF flush 3 mL     sodium chloride (PF) 0.9% PF flush 3 mL     traZODone (DESYREL)  tablet 100 mg     trolamine salicylate (ASPERCREME) 10 % cream     umeclidinium (INCRUSE ELLIPTA) 62.5 MCG/INH inhaler 1 puff     vitamin b complex w/vitamin C tablet 1 tablet     vitamin C (ASCORBIC ACID) tablet 500 mg           Review of Systems:   Denied fever, chills, nausea, vomiting, diarrhea  + occasional cough, but no shortness of breath  + vision loss and numbness described above  + dry mouth and eyes         Exam:   T 98 RR 16 P 65 /94 O2 sat 95% on room air  PIV  Alert, no apparent distress  Breathing appears comfortable on room air  No jaundice/scleral icterus  Right eyelids more open the left, with no other facial asymmetry  Moves all extremities, answers/asks questions appropriately          Data:     CD19 B Cell Count   Result Value Ref Range    CD19 B Cells 2 (L) 6 - 27 %    Absolute CD19 15 (L) 107 - 698 cells/uL   Comprehensive metabolic panel   Result Value Ref Range    Sodium 141 133 - 144 mmol/L    Potassium 3.8 3.4 - 5.3 mmol/L    Chloride 108 94 - 109 mmol/L    Carbon Dioxide 27 20 - 32 mmol/L    Anion Gap 7 3 - 14 mmol/L    Glucose 100 (H) 70 - 99 mg/dL    Urea Nitrogen 18 7 - 30 mg/dL    Creatinine 0.77 0.52 - 1.04 mg/dL    GFR Estimate 76 >60 mL/min/[1.73_m2]    GFR Estimate If Black 88 >60 mL/min/[1.73_m2]    Calcium 8.8 8.5 - 10.1 mg/dL    Bilirubin Total 0.6 0.2 - 1.3 mg/dL    Albumin 3.5 3.4 - 5.0 g/dL    Protein Total 7.4 6.8 - 8.8 g/dL    Alkaline Phosphatase 65 40 - 150 U/L    ALT 28 0 - 50 U/L    AST 22 0 - 45 U/L   CBC with platelets differential   Result Value Ref Range    WBC 7.5 4.0 - 11.0 10e9/L    RBC Count 4.49 3.8 - 5.2 10e12/L    Hemoglobin 14.1 11.7 - 15.7 g/dL    Hematocrit 44.5 35.0 - 47.0 %    MCV 99 78 - 100 fl    MCH 31.4 26.5 - 33.0 pg    MCHC 31.7 31.5 - 36.5 g/dL    RDW 14.1 10.0 - 15.0 %    Platelet Count 191 150 - 450 10e9/L    Diff Method Automated Method     % Neutrophils 85.7 %    % Lymphocytes 10.5 %    % Monocytes 3.4 %    % Eosinophils 0.0 %    %  Basophils 0.0 %    % Immature Granulocytes 0.4 %    Nucleated RBCs 0 0 /100    Absolute Neutrophil 6.5 1.6 - 8.3 10e9/L    Absolute Lymphocytes 0.8 0.8 - 5.3 10e9/L    Absolute Monocytes 0.3 0.0 - 1.3 10e9/L    Absolute Eosinophils 0.0 0.0 - 0.7 10e9/L    Absolute Basophils 0.0 0.0 - 0.2 10e9/L    Abs Immature Granulocytes 0.0 0 - 0.4 10e9/L    Absolute Nucleated RBC 0.0    INR   Result Value Ref Range    INR 0.81 (L) 0.86 - 1.14   CBC with platelets   Result Value Ref Range    WBC 8.6 4.0 - 11.0 10e9/L    RBC Count 4.60 3.8 - 5.2 10e12/L    Hemoglobin 14.5 11.7 - 15.7 g/dL    Hematocrit 45.1 35.0 - 47.0 %    MCV 98 78 - 100 fl    MCH 31.5 26.5 - 33.0 pg    MCHC 32.2 31.5 - 36.5 g/dL    RDW 14.0 10.0 - 15.0 %    Platelet Count 191 150 - 450 10e9/L   Basic metabolic panel   Result Value Ref Range    Sodium 138 133 - 144 mmol/L    Potassium 3.4 3.4 - 5.3 mmol/L    Chloride 105 94 - 109 mmol/L    Carbon Dioxide 24 20 - 32 mmol/L    Anion Gap 8 3 - 14 mmol/L    Glucose 200 (H) 70 - 99 mg/dL    Urea Nitrogen 22 7 - 30 mg/dL    Creatinine 0.85 0.52 - 1.04 mg/dL    GFR Estimate 66 >60 mL/min/[1.73_m2]    GFR Estimate If Black 77 >60 mL/min/[1.73_m2]    Calcium 8.6 8.5 - 10.1 mg/dL   Magnesium   Result Value Ref Range    Magnesium 2.3 1.6 - 2.3 mg/dL   Phosphorus   Result Value Ref Range    Phosphorus 4.0 2.5 - 4.5 mg/dL   INR   Result Value Ref Range    INR 1.06 0.86 - 1.14   Partial thromboplastin time   Result Value Ref Range    PTT 25 22 - 37 sec   7/13/2020  MRI Brain and Orbits -   Impression:   1. The study demonstrates greater than 20 foci of T2-hyperintensity within the cerebral white matter consistent with the clinical suspicion of demyelinating disease. There are no foci of abnormal enhancement noted intracranially. Although mildly degraded by motion artifact, no convincing findings of optic neuritis.  2. Previously seen enhancing lesion in the left parieto-occipital region appears resolved. No new lesions are  identified.    MRI Cervical and Thoracic Spine                           Impression: Motion degraded study.  1. Cervical spine: No abnormal enhancement or cord abnormality is identified. Multilevel cervical spondylosis, without significant change from 5/15/2020.  2. Thoracic spine: Decreased abnormal cord signal extending from T2 to T4, with resolution of previously seen enhancement. There is persistent cord volume loss. No new abnormal cord signal or enhancement is identified.    ATTESTATION STATEMENT:  This patient has been seen and evaluated by me directly, Mercedes Franks MD, PhD.    Mercedes Franks M.D., Ph.D.  Attending Physician  Division of Transfusion Medicine  Department of Laboratory [place] [address]  Pager [phone]

## 2020-07-14 NOTE — Clinical Note
7/14/2020       RE: Ladonna Sheriff  [address]  [facility] [id number]     Dear Colleague,    Thank you for referring your patient, Ladonna Sheriff, to the EYE CLINIC at [facility]. Please see a copy of my visit note below.           Assessment & Plan     Ladonna Sheriff is a 75 year old female with the following diagnoses:   1. Optic neuritis, left    2. Neuromyelitis optica (H)       Patient here for consultation from Dr. Rajwinder Spangler for vision loss left eye.  She lost vision in her right eye in approximately 1995.  She had recurring episodes of optic neuritis in the right eye.  She had over 5 episodes.  She has also had approximately 5-10 episodes of optic neuritis in the left eye.  Her vision had always improved for the most part in the left eye prior to this episode.  She wasn't able to drive but could read with magnifier.  She was diagnosed with neuromyelitis optica  Approximately a year ago.  On Saturday she started getting another episode of vision loss in the left eye.  She was seen at [facility] and started on intravenous steroids 7/13 and then transferred to [facility].  She received another course of intravenous steroids today.  The plan was to begin plasmapheresis if no improvement with intravenous steroids.      She is receiving Rituxan infusion with last dose being June 1st.      Visual acuity no light perception RIGHT eye and barely count fingers left eye. She has -1 deficit in adduction left eye.  Moderate exotropia in primary gaze and adduction deficit in right gaze     She has acute optic neuritis LEFT eye in the setting of AQP4 positive neuromyelitis optica.  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  I personally reviewed patient's MRI and it shows clear enhancement of the posterior optic nerve on the left consistent with active optic neuritis.      She received intravenous steroids in the ER  yesterday and here at [facility] this morning.  She has not had significant recovery of her vision.  She indicates that she could see around 20/200 or so before Saturday.  She is monocular and has only a slim chance of recovery.  I favor initiating plasmapheresis/getting a port today.  Prognosis is very guarded.  Follow up 2 days sooner as needed for worsening symptoms.             Attending Physician Attestation:  Complete documentation of historical and exam elements from today's encounter can be found in the full encounter summary report (not reduplicated in this progress note).  I personally obtained the chief complaint(s) and history of present illness.  I confirmed and edited as necessary the review of systems, past medical/surgical history, family history, social history, and examination findings as documented by others; and I examined the patient myself.  I personally reviewed the relevant tests, images, and reports as documented above.  I formulated and edited as necessary the assessment and plan and discussed the findings and management plan with the patient and family. - Kaz Lazo MD      Again, thank you for allowing me to participate in the care of your patient.      Sincerely,    Kaz Lazo MD

## 2020-07-14 NOTE — NURSING NOTE
Chief Complaints and History of Present Illnesses   Patient presents with     Blurred Vision Follow-Up     Chief Complaint(s) and History of Present Illness(es)     Blurred Vision Follow-Up               Comments     Ladonna Sheriff is a 75 year old female who presents today for    1. neuromyelitis optica  Started IV solumedrol today. If no improvement, will have a plasma exchange tomorrow.   No vision changes since last seen.     Genie KENNEDY 1:02 PM July 14, 2020

## 2020-07-14 NOTE — LETTER
2020         RE:  :  MRN: Ladonna Sheriff  1945  [id number]     Dear Dr. Spangler:     Thank you for asking me to see your very pleasant patient, Ladonna Sheriff, in neuro-ophthalmic consultation.  I would like to thank you for sending your records and I have summarized them in the history of present illness.  My assessment and plan are below.  For further details, please see my attached clinic note.        Assessment & Plan     Ladonna Sheriff is a 75 year old female with the following diagnoses:   1. Optic neuritis, left    2. Neuromyelitis optica (H)       Patient here for consultation from Dr. Rajwinder Spangler for vision loss left eye.  She lost vision in her right eye in approximately .  She had recurring episodes of optic neuritis in the right eye.  She had over 5 episodes.  She has also had approximately 5-10 episodes of optic neuritis in the left eye.  Her vision had always improved for the most part in the left eye prior to this episode.  She wasn't able to drive but could read with magnifier.  She was diagnosed with neuromyelitis optica  Approximately a year ago.  On Saturday she started getting another episode of vision loss in the left eye.  She was seen at [facility] and started on intravenous steroids  and then transferred to [facility].  She received another course of intravenous steroids today.  The plan was to begin plasmapheresis if no improvement with intravenous steroids.      She is receiving Rituxan infusion with last dose being .      Visual acuity no light perception RIGHT eye and barely count fingers left eye. She has -1 deficit in adduction left eye.  Moderate exotropia in primary gaze and adduction deficit in right gaze     She has acute optic neuritis LEFT eye in the setting of AQP4 positive neuromyelitis optica.  She has a left internuclear ophthalmoplegia, which she states has been there since .  I personally reviewed patient's MRI and it shows clear enhancement  of the posterior optic nerve on the left consistent with active optic neuritis.      She received intravenous steroids in the ER yesterday and here at [facility] this morning.  She has not had significant recovery of her vision.  She indicates that she could see around 20/200 or so before Saturday.  She is monocular and has only a slim chance of recovery.  I favor initiating plasmapheresis/getting a port today.  Prognosis is very guarded.  Follow up 2 days sooner as needed for worsening symptoms.        Again, thank you for allowing me to participate in the care of your patient.      Sincerely,    Kaz Lazo MD  Professor  Ophthalmology Residency   Director of Neuro-Ophthalmology  [facility] [facility] [employer] Endowed Chair  Departments of Ophthalmology, Neurology, and Neurosurgery  [facility] [address] [place] [address]  [address]  T - [phone]  F - [phone]  JILLIAN [email or web address]      CC: Joceline Ty PA-C  [facility]  [address] [facility] [address]  VIA Facsimile: [phone]     Carmenza Antonio RN  VIA In Basket     Caterina Franklin MD  [address] [place] [id number]  VIA In Basket     Rajwinder Spangler MD  [address] [place] [address]  VIA In Basket    DX = neuromyelitis optica, optic neuritis

## 2020-07-14 NOTE — PLAN OF CARE
6A PT  Discharge Planner PT   Patient plan for discharge: Prefers home, open to rehab if needed (son also agrees with this)  Current status: PT evaluation complete, treatment initiated. Pt completes bed mobility and transfers independently, ambulates 135ft with CGA and cane. Assist required for navigation in room and hallway 2/2 vision impairment. LEs do fatigue with this distance.  Barriers to return to prior living situation: Medical status, vision impairment, impaired balance, lives alone  Recommendations for discharge: TCU  Rationale for recommendations: Currently pt would benefit from continued skilled rehab to maximize safety and independence with functional mobility and ADLs. Pending LOS, potential improvement in vision, and ability of son to provide assist, pt may be able to return home, will update as indicated.       Entered by: Aliya Gallo 07/14/2020 2:50 PM

## 2020-07-15 PROBLEM — I44.7 LEFT BUNDLE BRANCH BLOCK: Status: ACTIVE | Noted: 2020-01-01

## 2020-07-15 PROBLEM — G47.9 SLEEP DISORDER: Status: ACTIVE | Noted: 2020-01-01

## 2020-07-15 PROBLEM — I50.22 CHRONIC SYSTOLIC HEART FAILURE (H): Status: ACTIVE | Noted: 2020-01-01

## 2020-07-15 PROBLEM — I10 HTN (HYPERTENSION): Status: ACTIVE | Noted: 2020-01-01

## 2020-07-15 NOTE — PLAN OF CARE
Status: Worsening L eye vision. Hx optic neuritis and transverse myelitis.  Vitals: VSS, on ra   Neuros: oriented x4. R eye blind per baseline. L eye can see colors and shapes. Able to somewhat track pen light. 5/5 throughout. N/T baseline in toes.   IV: PIV sl   Diet: NPO since midnight  Bowel status: +bs, no bm   : voiding spontaneously   Skin: rash/scab to forehead, otherwise intact.   Pain: denies   Activity: A1 w/ gb + cane.   Plan: PLEX cath to be placed ASAP in IR this morning then starting immediate PLEX treatment. Solumedrol on hold until PLEX can be completed.

## 2020-07-15 NOTE — CONSULTS
Interventional Radiology Consult Service Note    Patient is on IR schedule 7/15 for an image guided placement of large bore, double lumen, non-tunneled CVC.   Labs WNL for procedure.    No NPO required.  Consent will be done prior to procedure.     Please contact the IR charge RN at [phone] for estimated time of procedure.     Case discussed with Dr. Cee. This is a 75 year old female with a PMHx of AQP4 positive NMO with residual R eye vision loss, numbness from the mid-chest down with a band like sensation consistent with a T4 transverse myelitis, and on-off episodes of L eye optic neuritis with baseline low vision. She presented to the ED on 7/13/20 with worsening vision in her L eye. IR consulted for NTCVC placement for apheresis.    Emily Sullivan DNP, APRN  Interventional Radiology  Pager: [phone]

## 2020-07-15 NOTE — PROCEDURES
Laboratory Medicine and Pathology  Transfusion Medicine - Apheresis Procedure    Ladonna Sheriff MRN# [id number]   YOB: 1945 Age: 75 year old        Reason for consult: Neuromyelitis optica           Assessment and Plan:   The patient is a 75 year old female with NMO and acute flare.  She underwent therapeutic plasma exchange (TPE) #1 of planned 5. She tolerated the procedure well.    Please do not start ACE inhibitors throughout the duration of the TPE series as these have been associated with reactions during apheresis.  Please notify the Transfusion Medicine physician of any upcoming procedures, surgeries, or biopsies as TPE with albumin replacement will affect coagulation factor levels.         Chief Complaint:   Vision loss         Interval History:   The patient is a 75 year old female with Aquaporin 4 antibody positive NMO and history of multiple acute flares. 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 Has received methylprednisolone x2. Vision worsened overnight. Now can only see vague shapes and has lost ability to discriminate fine detail. No pain in eyes, headache, new numbness/weakness.  Had a right internal jugular non-tunneled catheter placed this morning.  Otherwise feeling well.         Past Medical History:     Past Medical History:   Diagnosis Date     Cerebral infarction (H)      CHF (congestive heart failure) (H)      Hypertension      Lupus (H)      Optic neuritis      Sjogren's syndrome (H)      Temporal arteritis (H)      TIA (transient ischemic attack)      Uncomplicated asthma    Neuromyelitis optica  Vision loss          Past Surgical History:     Past Surgical History:   Procedure Laterality Date     CHOLECYSTECTOMY       GYN SURGERY      hysterectomy     GYN SURGERY      tubal ligation     IR CVC NON TUNNEL PLACEMENT  6/21/2019     IR FOLLOW UP VISIT INPATIENT  7/2/2019          Social History:   , 3 sons  and 1 grandchild, lives independently with 1 son living in the same apartment building           Allergies:     Allergies   Allergen Reactions     Prevacid [Lansoprazole] Rash     Pravastatin Rash     Patient is still not sure about it due many years ago for reaction.          Medications:     Current Facility-Administered Medications   Medication     acetaminophen (TYLENOL) tablet 325-975 mg     albuterol (PROAIR HFA/PROVENTIL HFA/VENTOLIN HFA) 108 (90 Base) MCG/ACT inhaler 2 puff     amLODIPine (NORVASC) tablet 10 mg     aspirin (ASA) EC tablet 325 mg     calcium carbonate 600 mg-vitamin D 400 units (CALTRATE) per tablet 1 tablet     clopidogrel (PLAVIX) tablet 75 mg     glucose gel 15-30 g    Or     dextrose 50 % injection 25-50 mL    Or     glucagon injection 1 mg     enoxaparin ANTICOAGULANT (LOVENOX) injection 40 mg     FLUoxetine (PROzac) capsule 20 mg     furosemide (LASIX) tablet 20 mg     gabapentin (NEURONTIN) capsule 300 mg     gabapentin (NEURONTIN) capsule 300 mg     gabapentin (NEURONTIN) capsule 600 mg     heparin 100 UNIT/ML injection 3 mL     heparin 100 UNIT/ML injection 3 mL     HOLD ACE Inhibitors: benazepril (LOTENSIN), captopril (CAPOTEN), enalapril (VASOTEC), fosinopril (MONOPRIL), lisinopril (PRINIVIL, ZESTRIL), perindopril (ACEON), quinapril (ACCUPRIL), ramipril (ALTACE), trandolapril (MAVIK)     lidocaine (LMX4) cream     lidocaine 1 % 0.1-1 mL     magnesium sulfate 4 g in 100 mL sterile water (premade)     melatonin tablet 3 mg     methylPREDNISolone sodium succinate (solu-MEDROL) 1,000 mg in sodium chloride 0.9 % 250 mL intermittent infusion     metoprolol tartrate (LOPRESSOR) tablet 50 mg     naloxone (NARCAN) injection 0.1-0.4 mg     ondansetron (ZOFRAN-ODT) ODT tab 4 mg    Or     ondansetron (ZOFRAN) injection 4 mg     potassium chloride (KLOR-CON) Packet 20-40 mEq     potassium chloride 10 mEq in 100 mL intermittent infusion with 10 mg lidocaine     potassium chloride 10 mEq in 100  mL sterile water intermittent infusion (premix)     potassium chloride 20 mEq in 50 mL intermittent infusion     potassium chloride ER (KLOR-CON M) CR tablet 20-40 mEq     senna-docusate (SENOKOT-S/PERICOLACE) 8.6-50 MG per tablet 1 tablet    Or     senna-docusate (SENOKOT-S/PERICOLACE) 8.6-50 MG per tablet 2 tablet     simvastatin (ZOCOR) tablet 20 mg     sodium chloride (PF) 0.9% PF flush 10 mL     sodium chloride (PF) 0.9% PF flush 10 mL     sodium chloride (PF) 0.9% PF flush 3 mL     sodium chloride (PF) 0.9% PF flush 3 mL     traZODone (DESYREL) tablet 100 mg     trolamine salicylate (ASPERCREME) 10 % cream     umeclidinium (INCRUSE ELLIPTA) 62.5 MCG/INH inhaler 1 puff     vitamin b complex w/vitamin C tablet 1 tablet     vitamin C (ASCORBIC ACID) tablet 500 mg           Review of Systems:   + vision loss  Denied fever, chills, cough, nausea, vomiting, diarrhea, shortness of breath, headache, pain at catheter site         Exam:   /66 P 63 T 98.5 RR 18  Alert, no apparent distress  Breathing appears comfortable  Moves extremities, answers/asks questions appropriately  Right internal jugular catheter          Data:      Ref. Range 7/15/2020 14:30   INR Latest Ref Range: 0.86 - 1.14  1.06   Fibrinogen Latest Ref Range: 200 - 420 mg/dL 363          Procedure Summary:   A single plasma volume plasma exchange was performed with a Spectra Optia cell separator.  The right internal jugular catheter was used for access.  ACD-A was used for anticoagulation.  To offset the effects of the citrate, calcium gluconate was given in the return line.  The replacement fluid was 5% albumin.  The patient's vital signs were stable throughout.  The patient tolerated the procedure well.    ATTESTATION STATEMENT:  This patient has been seen and evaluated by me directly, Mercedes Franks MD, PhD.    Mercedes Franks M.D., Ph.D.  Attending Physician  Division of Transfusion Medicine  Department of Laboratory Medicine [place] [address]  Pager [phone]

## 2020-07-15 NOTE — PROCEDURES
[facility], [place]    Procedure: IR Procedure Note    Date/Time: 7/15/2020 12:45 PM  Performed by: Marta Macias MD  Authorized by: Marta Macias MD   IR Fellow Physician:  Radiology Resident Physician: RODOLFO Macias MD.   Other(s) attending procedure: ROSE Crow MD.     UNIVERSAL PROTOCOL   Site Marked: Yes  Prior Images Obtained and Reviewed:  Yes  Required items: Required blood products, implants, devices and special equipment available    Patient identity confirmed:  Verbally with patient, arm band, provided demographic data and hospital-assigned identification number  Patient was reevaluated immediately before administering moderate or deep sedation or anesthesia  Confirmation Checklist:  Patient's identity using two indicators, relevant allergies, procedure was appropriate and matched the consent or emergent situation and correct equipment/implants were available  Time out: Immediately prior to the procedure a time out was called    Universal Protocol: the Joint Commission Universal Protocol was followed    Preparation: Patient was prepped and draped in usual sterile fashion           ANESTHESIA    Anesthesia: Local infiltration  Local Anesthetic:  Lidocaine 1% without epinephrine  Anesthetic Total (mL):  1      SEDATION    Patient Sedated: Yes    Sedation Type:  Moderate (conscious) sedation  Vital signs: Vital signs monitored during sedation    See dictated procedure note for full details.  Findings: -Right basilar lung lesion. Four samples were obtained.     Specimens: core needle biopsy specimens sent for pathological analysis    Complications: None    Condition: Stable    Plan: -Bedrest for 2 hours.   -Upright chest xray in 1 hour.     PROCEDURE   Patient Tolerance:  Patient tolerated the procedure well with no immediate complications     - 4 core samples obtained and evaluated by a bedside pathologist. The samples were likely to represent normal lung parenchymal on pathologist's  preliminary read despite the samples were obtained through the lesion.   Length of time physician/provider present for 1:1 monitoring during sedation: 20

## 2020-07-15 NOTE — PROGRESS NOTES
"[facility]  General Neurology Progress Note    Patient Name:  Ladonna Sheriff  MRN:  [id number]    :  1945    Patient Summary: Per H&P  Ms Ladonna Sheriff is a 75 year old female with a PMHx of AQP4 positive NMO with residual R eye vision loss, numbness from the mid-chest down with a band like sensation consistent with a T4 transverse myelitis, and on-off episodes of L eye optic neuritis with baseline low vision. She presented to the ED on 20 with worsening vision in her L eye. Patient reports that this started on Saturday and continued to progress. She initially had blurry vision as well as eye pain that progressed to involve whitening of her vision, followed by a \"tan like color\" followed by visual loss \"completely dark\". She no longer has any pain with eye movements now. She reports that she has had multiple episodes of L eye visual loss in the past (4-5 times). Last occurred ~2 years ago. Reports she has baseline low vision now because of her prior events of optic neuritis. She has to be close to TV screen to read and has difficulty making out details. During all of her prior events she was treated with oral prednisone with improvement of her symptoms. She took two 20mg prednisone pills last now and two this morning before presenting to the ED. Presented to [facility] and received 1g methylprednisolone before being transferred to [facility].     Patient follows up with Dr Caterina Franklin for her NMO. Last Rituximab infusion was in 20. Reports she has had a total of 3 infusions so far (2019, 2019 and 2020). Prior to this she was not on any long-term immunomodulatory treatment for her NMO and was only treated with steroids.      Notably, she had an MR brain and C/T spine on 2019 which showed focal gadolinium enhancing lesion from T4 through T5 in the spinal cord on the right side with edema extending from T3-T4 down  to lower T5. Repeat imaging on 2020 showed cord " "volume loss and abnormal signal/enhancement at T2  through T4 consistent with known demyelinating disease. Last note from Dr Franklin planned for repeat imaging to determine efficacy of Rituximab for Ms Voith given evidence of signal enhancement.     Interval history:   Vision subjectively similar to yesterday. No new symptoms. No N/V/CP/SOB/fevers. No weakness or new sensory changes.       Physical Examination   Vitals: BP (!) 155/82 (BP Location: Right arm)   Temp 98.6  F (37  C) (Oral)   Resp 16   Ht 1.702 m (5' 7\")   Wt 90.7 kg (200 lb)   SpO2 95%   BMI 31.32 kg/m    Physical Exam:  Constitutional: Alert. Lying in bed comfortably. No acute distress.   Head: Atraumatic, normocephalic.  ENT: Moist mucous membranes. No sinus drainage.  Cardiovascular: Appears warm, well-perfused, and non-toxic.  Respiratory: No increased work of breathing, no accessory muscle use.   Gastrointestinal: Does not appear distended.  Musculoskeletal: Moving all four limbs spontaneously. Grossly intact range of motion.   Skin: No rashes or lesions appreciated on visualized skin.   Hematologic/Lymphatic/Immunologic: No bruising appreciated on visualized skin.   Neurologic:   Mental Status: alert, oriented to p/p/t. Speech is fluent, able to comprehend, and follows commands. No dysarthria.  Cranial Nerves: left pupil is reactive to light (pt able to perceive light being shined in this eye), no reactivity to light in right eye (but consensual response present), Able to bury sclerae in left eye. Gaze not conjugate. No nystagmus noted. Able to note \"blur\" of examiner and note that I was wearing scrubs and a black zip-up. Smile and eyebrow raise equal, blinking symmetric, no weakness. Lashes are buried on eye squeeze. Nasolabial folds symmetric. hearing intact to conversation.  Motor: Bilateral hand thenar and hypothenar atrophy observed, some fasciculations noted in hypothenar L>R. No pronator drift.    Sensory: sensory level at T4, pt " "describes \"squeezing band\" at that level, and numbness distal to it. Above level sensation to light touch intact.        Investigations     Results for orders placed or performed during the hospital encounter of 07/13/20   MR Cervical Spine w/o & w Contrast    Impression    Impression: Motion degraded study.  1. Cervical spine: No abnormal enhancement or cord abnormality is  identified. Multilevel cervical spondylosis, without significant  change from 5/15/2020.  2. Thoracic spine: Decreased abnormal cord signal extending from T2 to  T4, with resolution of previously seen enhancement. There is  persistent cord volume loss. No new abnormal cord signal or  enhancement is identified.    I have personally reviewed the examination and initial interpretation  and I agree with the findings.    DANILO WAGNER MD   MR Brain and Orbits    Impression    Impression:   1. The study demonstrates greater than 20 foci of T2-hyperintensity  within the cerebral white matter consistent with the clinical  suspicion of demyelinating disease. There are no foci of abnormal  enhancement noted intracranially. Although mildly degraded by motion  artifact, no convincing findings of optic neuritis.  2. Previously seen enhancing lesion in the left parieto-occipital  region appears resolved. No new lesions are identified.    I have personally reviewed the examination and initial interpretation  and I agree with the findings.    DANILO WAGNER MD   MR Thoracic Spine w/o & w Contrast    Impression    Impression: Motion degraded study.  1. Cervical spine: No abnormal enhancement or cord abnormality is  identified. Multilevel cervical spondylosis, without significant  change from 5/15/2020.  2. Thoracic spine: Decreased abnormal cord signal extending from T2 to  T4, with resolution of previously seen enhancement. There is  persistent cord volume loss. No new abnormal cord signal or  enhancement is identified.    I have personally reviewed the " examination and initial interpretation  and I agree with the findings.    DANILO WAGNER MD       Assessment and Plan:  Ms Ladonna Sheriff is a 75 year old female with a PMHx of NMO with residual R eye vision loss, T4 transverse myelitis, and on-off episodes of L eye optic neuritis with baseline low vision who presented to the ED on 7/13/20 with worsening vision in her L eye concerning for optic neuritis/NMO relapse.     #AQP4 positive NMO  #L eye vision loss concerning for optic neuritis/NMO relapse  Received 1g methylpred at [facility] prior to transfer. MRI Brain & Orbits, C/T spine completed without clear new lesion in brain or spinal cord. L optic nerve with slight enhancement. CD19 levels as expected. Eye exam without cherry red spot or exam findings consistent with CRAO or other etiology per optho. There is concern that her NMO is not responding to Rituximab, however this admit MRI improved from last one completed prior to admission. Pt has been on rituximab since last June, and now with optic neuritis relapse likely failure of rituximab. She might need consideration of other immunomodulatory therapy for her NMO such as eculizumab or inebilizumab. Dr. Franklin notified, will plan to have pt follow up with Dr. Franklin in clinic for long term tx strategy.   -Plan for PLEX today, transfusion med consulted  -IR consulted for line placement (CVC placement, double lumen, non-tunneled apheresis catheter, dialysis capable), spoke to resident yesterday evening, team will place line today (early unless emergent cases)  - Will continue total of 5 days of 1g methylpred for relapse event  - Ophthalmology consulted, appreciate recs  - PT/OT evaluations     Chronic issues:  #Bronchospastic disease  - Continue PTA albuterol and Incruse Ellipta     #Hypertension  - Continue PTA amlodipine and metoprolol     #Heart failure  - Continue PTA Lasix     #Hx of TIA  - Continue PTA ASA 325mg and PTA Plavix  - Continue PTA simvastatin       #Mood d/o  - Continue PTA fluoxetine     #Sleep  - Continue PTA trazodone     FEN: Regular  PPx:SCDs, Lovenox  Code: Full     Patient was seen and discussed with Dr. Spangler.        Isabela Blanca MD  PGY2 Neurology  [phone]      I saw and evaluated the patient on 7/15/20 and agree with the findings and the plan of care as documented in the resident's note.      Patient feels vision is unimproved and on exam it is stable with finger counting in her left eye only in the medial field.  We will continue steroids and plan on initiating plasma exchange this afternoon after central venous catheter placed.  Patient will be hospitalized for likely 5 exchanges every other day.    Rajwinder Spangler DO   of Neurology

## 2020-07-15 NOTE — PROGRESS NOTES
Patient Name: Ladonna Sheriff  Medical Record Number: [id number]  Today's Date: 7/15/2020    Procedure: Non tunneled venous catheter placement.  Proceduralist: MD Colleen., MD Priscilla.    Procedure Start: 1156  Procedure end: 1215  Sedation medications administered: none     Report given to: HITESH RN  : hans    Other Notes: Pt arrived to IR room 5 from . Consent reviewed. Pt denies any questions or concerns regarding procedure. Pt positioned supine and monitored per protocol. Pt tolerated procedure without any noted complications. Pt transferred back to .    Lyudmila Degroot, JOHN

## 2020-07-15 NOTE — PROCEDURES
[facility] [place]    Procedure: IR Procedure Note    Date/Time: 7/15/2020 12:51 PM  Performed by: Marta Macias MD  Authorized by: Marta Macias MD   IR Fellow Physician:  Radiology Resident Physician: RODOLFO Macias MD.   Other(s) attending procedure: ROSE Crow MD.     UNIVERSAL PROTOCOL   Site Marked: Yes  Prior Images Obtained and Reviewed:  Yes  Required items: Required blood products, implants, devices and special equipment available    Patient identity confirmed:  Verbally with patient, arm band, provided demographic data and hospital-assigned identification number  Patient was reevaluated immediately before administering moderate or deep sedation or anesthesia  Confirmation Checklist:  Patient's identity using two indicators, relevant allergies, procedure was appropriate and matched the consent or emergent situation and correct equipment/implants were available  Time out: Immediately prior to the procedure a time out was called    Universal Protocol: the Joint Commission Universal Protocol was followed    Preparation: Patient was prepped and draped in usual sterile fashion           ANESTHESIA    Anesthesia: Local infiltration  Local Anesthetic:  Lidocaine 1% without epinephrine  Anesthetic Total (mL):  6      SEDATION    Patient Sedated: No    Vital signs: Vital signs monitored during sedation    See dictated procedure note for full details.  Findings: -Non-complicated non-tunnel right internal jugular central venous line placement.     Specimens: none    Complications: None    Condition: Stable    Plan: -Right internal jugular central line is ready to be used.     PROCEDURE   Patient Tolerance:  Patient tolerated the procedure well with no immediate complications    Length of time physician/provider present for 1:1 monitoring during sedation: 0

## 2020-07-15 NOTE — PLAN OF CARE
Status: Worsening L eye vision. Hx optic neuritis and transverse myelitis.  Vitals: VSS, on ra   Neuros: oriented x4. R eye blind per baseline. L eye can see colors and shapes. Able to somewhat track pen light. Both eye dilated from seeing optho again. 5/5 throughout. N/T baseline in toes.   IV: PIV sl   Diet: regular diet   Bowel status: +bs, no bm   : voiding spontaneously   Skin: rash/scab to forehead, otherwise intact.   Pain: denies   Activity: A1 w/ gb + cane.   Social: Son at bedside earlier this evening  Plan: PLEX cath to be placed tomorrow morning in IR then starting immediate PLEX treatment. Solumedrol on hold until PLEX can be completed.

## 2020-07-15 NOTE — PROGRESS NOTES
Social Work: Assessment with Discharge Plan    Patient Name:  Ladonna Sheriff  :  1945  Age:  75 year old  MRN:  [id number]  Completed assessment with:  Patient and Chart Review     Presenting Information   Reason for Referral:  Discharge plan  Date of Intake:  July 15, 2020  Referral Source:  Chart Review  Decision Maker:  Patient   Alternate Decision Maker:  Pts son Hayden Sheriff per ACP  Health Care Directive:  Copy in Chart  Living Situation:  Apartment  Previous Functional Status:  Assistance with Meals:  Pt indicates prior to hospitalization recieving Moms meals and had recently signed up to have clearning lady assitance as well.   Patient and family understanding of hospitalization:  Pts understanding is that she is here due to loosing left eye vision as pt is already blind in Right eye and will be getting plasma exchanges while in patient. Pt remarks good understanding.   Cultural/Language/Spiritual Considerations:  Not identified, pt is a 75 year old  female who per chart review identifies as Congregational.    Adjustment to Illness:  Pt seems to be adjusting well and is hopeful that this treatment course will help her vision.     Physical Health  Reason for Admission:    1. Vision loss of left eye    2. Neuromyelitis optica (H)    3. COVID-19 ruled out by laboratory testing      Services Needed/Recommended:  TCU    Mental Health/Chemical Dependency  Diagnosis:  Pt denies any mental health and reports limited alcohol use that she enjoys a whiskey and coke 2-3 times per week and clarified only one drink each of those times. Pt also reports having not used any tobacco produces since .  Pt indicated during this conversation that pts family has a history with Suicide and when SW asked further if pt has had any thoughts of this or of self harm pt indicates she does not and reports she would never do so as she knows what it does to others.   Support/Services in Place:  N/a as pt denies mental health  or chemical abuse.   Services Needed/Recommended:  n/a    Support System  Significant relationship at present time:  Pt is  per chart review   Family of origin is available for support:  Yes, pt has 3 sons, all who are supportive, two are local and one lives in [place] but they talk regularly and he may move back to [place] and if so will live with pt. Pt also indicates that she and her sister talk frequently nearly every day.   Other support available:  Yes, pt reports she has friends as well as her childrens friends who have been helpful, indicating that her youngest son's girlfriend has also been a support.   Gaps in support system:  Not identified at this time.   Patient is caregiver to:  Other:  Cats - pt reports that she has cats.      Provider Information   Primary Care Physician:  Joceline Ty   [phone]   Clinic:  [facility] [id number] [place], [place] [address]      :  Pt reports having a  [place], through [facility] where she access Mom's meals and a cleaning services - pt cannot recall the program name fully.     Financial   Income Source:  Pt indicates she receives social security income.   Financial Concerns:  Pt does not indicate any financial concerns to meet basic needs at this time, pt reports ability to have small amount of savings though is on a limited and fixed income.   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   Provided education on discharge plan:  YES  Patient agreeable to discharge plan:  YES  A list of Medicare Certified Facilities was provided to the patient  and/or family to encourage patient choice. Patient's choices for facility are:  Yes, pt selected the following options for potential TCU placement as pt indicated that she will follow the recommendation provided that a.) Medicare will cover it and b.) that it is well rated on medicare.gov.  Pt also does express that she would like to understand why the care team is recommending this and what they may be able to do for her that was not done the last time (pt reports last year pt went to rehab for her legs)    Pt selected these locations for referral if still recommended near discharge.     1. [facility] - [phone]  2. [facility] - [phone]  3. [facility] - [phone]  4. [facility] - [phone]      Will NH provide Skilled rehabilitation or complex medical:  YES  General information regarding anticipated insurance coverage and possible out of pocket cost was discussed. Patient and patient's family are aware patient may incur the cost of transportation to the facility, pending insurance payment: YES  Barriers to discharge:  Medical stability     Discharge Recommendations   Anticipated Disposition:  Facility:  TCU TBD  Transportation Needs:  Other:  TBD - SW anticipates pt will have questions about cost.   Name of Transportation Company and Phone:  TBD     Additional comments   SW met with pt and pt was agreeable and pleasant during interaction. Pt expressed good understanding of hospitalization and would like to discharge to home if possible,though is agreeable to recommendations and following those.  Pt did ask about costs related to TCU stay and SW provided education about Medicare coverage basics for days 1-20 and informed pt that when referral sent, generally insurance authorization would also be submitted. Pt expressed understanding.  SW will continue to follow for discharge planning and support needs.     [place]  CALDERON Osuna, [facility]    [facility] [place]  Pager: [phone]

## 2020-07-15 NOTE — PROGRESS NOTES
Status:Worsening L eye vision. Hx optic neuritis and transverse myelitis.  Vitals: VSS.  On RA  Neuros: A&Ox4.  Blind R eye.  L eye decreased vision, sees black to grey. R eye dysconjugate.  N/T toes (baseline).  Strengths 5/5 throughout.    IV: PIV SL  Resp/trach: WDL   Diet: Regular, good PO  Bowel status: LBM 7/13.  Passing gas.    : voiding spont  Skin: intact  Pain: denies  Activity: A1, GB, cane.  (Cane is patient's own from home).  Social: no visitors today  Plan: R CVC Double lumen jugular catheter placed for PLEX today. Methylpred course x 5 days, will receive 2nd second dose today, after PLEX.  PLEX today around 1430.  Ophthalmology clinic on 7/16.  Continue with current POC.

## 2020-07-15 NOTE — PLAN OF CARE
Discharge Planner OT   Patient plan for discharge: Rehab   Current status: Completed rolling w/SBA using bed railings, complete sit<>stand with Florence using cane. Ambulated ~150ft using cane with CGA-Florence, LOB noted 2/2 vision and weakness. Dep on verbal cues for navigation and tactile cues for bed transfer. Pt on RA with SpO2 95% and HR 78.   Barriers to return to prior living situation: medical status, L vision deficits, weakness   Recommendations for discharge: TCU  Rationale for recommendations: to increase IND in ADLs/IADLs        Entered by: Milly Crowell 07/15/2020 1:50 PM

## 2020-07-15 NOTE — PROGRESS NOTES
Arrived from IR.  S/p Non tunneled venous catheter placement (CVC double lumen R internal  jugular).  A&Ox4.  Denies pain.  /94, P 57.  Dressing in place, CDI.  No drainage/bleeding.

## 2020-07-15 NOTE — DISCHARGE INSTRUCTIONS
Apheresis Blood Donor Center Post Instructions  You may feel tired after your procedure today.   Please call your doctor if you have:  bleeding that doesn t stop, fever, pain where a needle or tube (catheter) was placed, seizures, trouble breathing, red urine, nausea or vomiting, other health concerns.     If your symptoms are severe, call 911.  You have a temporary Central Venous Catheter:  Notify your doctor if you have had a fever, chills, shaking  or redness, warmth, swelling, drainage at the exit-site.  This could be a sign of infection.    The Apheresis/Blood Donor Center is open Monday-Friday 7:30 a.m. to 5 p.m.  The phone number is [phone].  A Transfusion Medicine physician can be reached after 5:00 p.m. weekdays and on weekends /Holidays by calling [phone], and asking for the physician on call.      Plasma exchange:  Wednesday, 7/15/2020, you received albumin as part of your treatment (this is the most common), some of your clotting factors have been removed.  You body will replace these factors, but you could be at a slight risk for bleeding.  Please inform us if you have had any bleeding or a recent invasive procedure, such as a biopsy or surgery.    Certain medications that lower your blood pressure (ace inhibitors) such as Lisinopril are contraindicated while you are receiving plasma exchange.  Please inform us if you have started taking this medication during your plasma exchange series.  Thursday, 7/23/2020, you received albumin as part of your treatment (this is the most common), some of your clotting factors have been removed.  You body will replace these factors, but you could be at a slight risk for bleeding.  Please inform us if you have had any bleeding or a recent invasive procedure, such as a biopsy or surgery.    Certain medications that lower your blood pressure (ace inhibitors) such as Lisinopril are contraindicated while you are receiving plasma exchange.  Please inform us if you  have started taking this medication during your plasma exchange series.

## 2020-07-16 NOTE — PLAN OF CARE
HTN but within parameters, scheduled bp medications given. Neuros unchanged; Blind in right eye. Pt can see black/grey/blue at times in left eye. Right eye dysconjugate. Good po intake. Voiding spontaneously. Up with assist of 1, GB and cane. Pt will get plex tomorrow (1/5 completed). Pt is able to make needs known.

## 2020-07-16 NOTE — PLAN OF CARE
Discharge Planner OT   Patient plan for discharge: pt would like to return home   Current status: Pt SBA for sit<>stand transfer from EOB, ambulated with close CGA/hand hold assist ~100 feet x2 in hallway. Pt unsteady on feet at times with 2 LOB needed Florence from Th to correct. Max VCs for path-finding 2/2 significant vision loss. VCs given throughout activity to continually scan to L side. Pt returned to room, fatigued after ambulation. Pt open to ongoing discussions on discharge recommendations pending recovery of vision, will monitor progress and vision changes closely, able to see shadows but reports L peripheral field cut and only able to see blue/black/beige colors.   Barriers to return to prior living situation: new vision loss, balance deficits, fatigue   Recommendations for discharge: TCU   Rationale for recommendations: Pt is below baseline level of function and would benefit from further therapy to progress independence with ADLs and mobility. Pending length of stay, potential improvement in vision, and ability of son to provide assist, pt may be able to return home, will monitor changes closely.       Entered by: Catherine Canas 07/16/2020 4:31 PM

## 2020-07-16 NOTE — PLAN OF CARE
Status:Worsening L eye vision. Hx optic neuritis and transverse myelitis.  Vitals: VSS.  On RA  Neuros: AxOx4.  Blind R eye.  L eye decreased vision, sees black, grey,and blues at times. R eye dysconjugate.  N/T toes at baseline.5/5 throughout.    IV: PIV SL. R jugular CVC for PLEX   Resp/trach: LS clear.   Diet: Regular diet, good PO intake  Bowel status: BS+. No BM this shift.   : voiding spontaneously. Up to bathroom.   Skin: intact  Pain: denied pain.   Activity: A1, GB,  and personal cane.  Plan: PLEX 1/5 today. Continue to monitor and follow POC.

## 2020-07-16 NOTE — PROGRESS NOTES
OPHTHALMOLOGY PROGRESS NOTE  07/16/20    Patient: Ladonna Sheriff      HISTORY OF PRESENTING ILLNESS:     Ladonna Sheriff is a 75 year old female who is admitted for vision loss in the left eye. She lost her vision in the right eye in 1995. She has had recurring episodes of optic neuritis in both eyes. The last episode of retuxan infusion was on 06/01/2020. 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 MRI showed enhancement of the left optic nerve which is consistent with active optic neuritis.        10+ review of systems were otherwise negative except for that which has been stated above.      OCULAR/MEDICAL/SURGICAL HISTORIES:     Past Ocular History:  Optic Neuritis both eyes.     Past Medical History:   Diagnosis Date     Cerebral infarction (H)      CHF (congestive heart failure) (H)      Hypertension      Lupus (H)      Lupus (H)      Optic neuritis      Optic neuritis      Sjogren's syndrome (H)      Sjogren's syndrome (H)      Temporal arteritis (H)      TIA (transient ischemic attack)      Uncomplicated asthma        Past Surgical History:   Procedure Laterality Date     CHOLECYSTECTOMY       GYN SURGERY      hysterectomy     GYN SURGERY      tubal ligation     IR CVC NON TUNNEL PLACEMENT  6/21/2019     IR CVC NON TUNNEL PLACEMENT  7/15/2020     IR FOLLOW UP VISIT INPATIENT  7/2/2019       EXAMINATION:     Base Eye Exam     Visual Acuity       Right Left    Near cc NLP HM          Pupils       APD    Right (+) apd    Left             Slit Lamp and Fundus Exam     External Exam       Right Left    External Normal Normal          Slit Lamp Exam       Right Left    Lids/Lashes Normal Normal    Conjunctiva/Sclera White and quiet White and quiet    Cornea Clear Clear    Anterior Chamber Slightly narrow Deep and quiet    Iris Round and reactive Round and reactive    Lens 2+ NS  PCIOL          Fundus Exam       Right Left    Vitreous Normal Normal    Disc 3-4+ pallor  3+ pallor      C/D Ratio 0.8 0.6    Macula RPE changes  ERM     Vessels Normal Normal    Periphery NOT DILATED NOT DILATED                     ASSESSMENT/PLAN:     #AQP4 Positive Neuromyelitis Optica, left eye.   - Patient presents with vision loss in the left eye. Lab testing is positive for AQP4. There is concern that NMO may not be responding to rituximab given recurrent left optic neuritis. Patient will follow up with Dr. Franklin for consideration of other immunomodulatory therapy for NMO.  - Vision is NLP in the left eye and HM in the right eye. Optic nerve appears pale, left eye > right eye.     Plan:   - Will continue 5 days of 1 g methylprednisone (07/13-07/17).  - Will continue PLEX every other day for a total of 5 sessions (07/15-07/23)   - Ophthalmology to follow.        It is our pleasure to participate in this patient's care and treatment. Please contact us with any further questions or concerns.      Yen Chiang OD  Adjunct   Ophthalmology   Pager: [phone]    Discussed with Dr. Anni Alarcon, neuro-ophthhalmology fellow.

## 2020-07-16 NOTE — PROGRESS NOTES
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 She presented to the ED on 20 with worsening vision in her L eye. Patient reports that this started on  and continued to progress. She initially had blurry vision as well as eye pain that progressed to involve whitening of her vision, followed by a \"tan like color\" followed by visual loss \"completely dark\". She no longer has any pain with eye movements now. She reports that she has had multiple episodes of L eye visual loss in the past (4-5 times). Last occurred ~2 years ago. Reports she has baseline low vision now because of her prior events of optic neuritis. She has to be close to TV screen to read and has difficulty making out details. During all of her prior events she was treated with oral prednisone with improvement of her symptoms. She took two 20mg prednisone pills last now and two this morning before presenting to the ED. Presented to [facility] and received 1g methylprednisolone before being transferred to [facility].     Patient follows up with Dr Caetrina Franklin for her NMO. Last Rituximab infusion was in 20. Reports she has had a total of 3 infusions so far (2019, 2019 and 2020). Prior to this she was not on any long-term immunomodulatory treatment for her NMO and was only treated with steroids.      Notably, she had an MR brain and C/T spine on 2019 which showed focal gadolinium enhancing lesion from T4 through T5 in the spinal cord on the right side with edema extending from T3-T4 down  to lower T5. Repeat imaging on 2020 showed cord volume " "loss and abnormal signal/enhancement at T2  through T4 consistent with known demyelinating disease. Last note from Dr Franklin planned for repeat imaging to determine efficacy of Rituximab for Ms Voith given evidence of signal enhancement.     Interval history:   Vision subjectively similar to previous. No new symptoms. No N/V/CP/SOB/fevers. No weakness or new sensory changes.       Physical Examination   Vitals: BP (!) 165/76 (BP Location: Right arm)   Pulse 53   Temp 98.4  F (36.9  C) (Oral)   Resp 16   Ht 1.702 m (5' 7\")   Wt 90.7 kg (200 lb)   SpO2 95%   BMI 31.32 kg/m    Physical Exam:  Constitutional: Alert. Lying in bed comfortably. No acute distress.   Head: Atraumatic, normocephalic.  ENT: Moist mucous membranes. No sinus drainage.  Cardiovascular: Appears warm, well-perfused, and non-toxic.  Respiratory: No increased work of breathing, no accessory muscle use.   Musculoskeletal: Moving all four limbs spontaneously. Grossly intact range of motion.   Skin: No rashes or lesions appreciated on visualized skin.   Hematologic/Lymphatic/Immunologic: No bruising appreciated on visualized skin.   Neurologic:   Mental Status: alert, oriented to p/p/t. Speech is fluent, able to comprehend, and follows commands. No dysarthria.  Cranial Nerves: left pupil is reactive to light, no reactivity to light in right eye. Able to bury sclerae in left eye. Gaze not conjugate. No nystagmus noted. Able to note outline of examiner. Smile and eyebrow raise equal, blinking symmetric, no weakness. Nasolabial folds symmetric. hearing intact to conversation.  Motor: Symmetric 5/5 strength in upper and lower extremities. No pronator drift.    Sensory: unchanged sensory level at T4, pt describes \"squeezing band\" at that level, and numbness distal to it. Above level sensation to light touch intact.        Investigations     Results for orders placed or performed during the hospital encounter of 07/13/20   MR Cervical Spine w/o & w " Contrast    Impression    Impression: Motion degraded study.  1. Cervical spine: No abnormal enhancement or cord abnormality is  identified. Multilevel cervical spondylosis, without significant  change from 5/15/2020.  2. Thoracic spine: Decreased abnormal cord signal extending from T2 to  T4, with resolution of previously seen enhancement. There is  persistent cord volume loss. No new abnormal cord signal or  enhancement is identified.    I have personally reviewed the examination and initial interpretation  and I agree with the findings.    DANILO WAGNER MD   MR Brain and Orbits    Impression    Impression:   1. The study demonstrates greater than 20 foci of T2-hyperintensity  within the cerebral white matter consistent with the clinical  suspicion of demyelinating disease. There are no foci of abnormal  enhancement noted intracranially. Although mildly degraded by motion  artifact, no convincing findings of optic neuritis.  2. Previously seen enhancing lesion in the left parieto-occipital  region appears resolved. No new lesions are identified.    I have personally reviewed the examination and initial interpretation  and I agree with the findings.    DANILO WAGNER MD   MR Thoracic Spine w/o & w Contrast    Impression    Impression: Motion degraded study.  1. Cervical spine: No abnormal enhancement or cord abnormality is  identified. Multilevel cervical spondylosis, without significant  change from 5/15/2020.  2. Thoracic spine: Decreased abnormal cord signal extending from T2 to  T4, with resolution of previously seen enhancement. There is  persistent cord volume loss. No new abnormal cord signal or  enhancement is identified.    I have personally reviewed the examination and initial interpretation  and I agree with the findings.    DANILO WAGNER MD       Assessment and Plan:  Ms Ladonna Sheriff is a 75 year old female with a PMHx of NMO with residual R eye vision loss, T4 transverse myelitis, and on-off episodes  of L eye optic neuritis with baseline low vision who presented to the ED on 7/13/20 with worsening vision in her L eye concerning for optic neuritis/NMO relapse.     #AQP4 positive NMO  #L eye vision loss concerning for optic neuritis/NMO relapse  Received 1g methylpred at [facility] prior to transfer. MRI Brain & Orbits, C/T spine completed without clear new lesion in brain or spinal cord. L optic nerve with slight enhancement. CD19 levels as expected. Eye exam without cherry red spot or exam findings consistent with CRAO or other etiology per optho. There is concern that her NMO is not responding to Rituximab, however this admit MRI improved from last one completed prior to admission. Pt has been on rituximab since last June, and now with optic neuritis relapse likely failure of rituximab. She might need consideration of other immunomodulatory therapy for her NMO such as eculizumab or inebilizumab. Dr. Franklin notified, will plan to have pt follow up with Dr. Franklin in clinic for long term tx strategy. IR placed line (CVC placement, double lumen, non-tunneled apheresis catheter, dialysis capable) on 7/15 and pt got first session of plasma exchange same day,  -PLEX s/p 1/5 sessions, plan for every other day plasma exchange for 5 total sessions   - transfusion med consulted, appreciate assistance with PLEX  - Will continue total of 5 days of 1g methylpred for relapse event (today is dose 4/5)  - medium dose sliding scale insulin given high dose steroids, PPI for ppx  - Ophthalmology consulted, appreciate recs  - PT/OT evaluations  - PTA gabapentin for transverse myelitis symptoms     Chronic issues:  #Bronchospastic disease  - Continue PTA albuterol and Incruse Ellipta    #SLE  #Sjogren's disease  -holding chronic prednisone given IV methylpred     #Hypertension  - Continue PTA amlodipine and metoprolol  -NO ACEi or Arb antihypertensives while receiving plasma exchange-reaction risk     #Heart failure  Sees Dr. Hewitt  in cardiology clinic for chronic systolic heart failure (EF 45%), mild LV dysfunction, left bundle branch block, moderate aortic stenosis. Stress test in 2018 negative. Well controlled on home medications.   - Continue PTA Lasix  - Continue PTA amlodipine and metoprolol  - Continue PTA simvastatin   - Continue PTA ASA 325mg      #Hx of TIA  Per patient TIA happened in around 2005, when she lived in [place]. She noted sudden change in ability to speak, this resolved shortly after (she can't remember specific details of hospital workup). She had not previously been on aspirin, but was started on full-dose aspirin and plavix after this event and has been on them ever since. No recurrence. No other strokes/TIAs. No history of stents in coronaries or carotids per patient and chart review. No other known indication for DAPT. After extensive discussion with patient, joint decision was made to discontinue plavix to decrease risk of bleeding event with limited additional benefit on second antiplatelet agent given her history.   - Continue PTA ASA 325mg   - discontinue PTA Plavix, and remove from home medication list  - Continue PTA simvastatin      #Mood d/o  - Continue PTA fluoxetine     #Sleep  - Continue PTA trazodone     FEN: Regular  PPx:SCDs, Lovenox  Lines: CVC, PIV  Code: Full     Patient was seen and discussed with Dr. Spangler.        Isabela Blanca MD  PGY2 Neurology  [phone]      Attestation signed by Rajwinder Spangler MD at 7/16/2020 1:44 PM   I saw and evaluated the patient on 7/16/20 and agree with the findings and the plan of care as documented in the resident's note.       Patient feels her vision is unchanged.  She reports she is unable to see minimal color and motion in her left eye.  Today is day number 4 out of 5 of steroids.  Plan on doing at least 5 plasma exchanges as well.  When reviewing medication she is been on dual antiplatelets for 15 years for a single TIA in 2005.  She reports  no other events and states that she was not on a single agent when she had the TIA.  She denies any other indication for dual antiplatelets such as a heart attack or stenting.  We discussed discontinuing Plavix and maintaining on a single antiplatelet agent which she was agreeable to.  She will see ophthalmology again later this week and will be here for at least 1 more week while getting plasma exchange.     Rajwinder Spangler DO   of Neurology

## 2020-07-16 NOTE — PLAN OF CARE
Status:Worsening L eye vision. Hx optic neuritis and transverse myelitis.  Vitals: VSS on RA  Neuros: A&O x4.  Blind R eye.  L eye decreased vision, sees black, grey,and blues at times. R eye dysconjugate.  N/T toes at baseline.5/5 throughout.    IV: PIV SL. R jugular CVC for PLEX   Resp/trach: LS clear.   Diet: Regular  Bowel status: Last BM 07/13, BS+   : VDSP, up to bathroom   Skin: intact  Pain: denied   Activity: A1, GB, and personal cane.  Plan: PLEX 1/5, Next scheduled on 07/17. Continue to monitor and follow POC

## 2020-07-17 NOTE — PROGRESS NOTES
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 She presented to the ED on 20 with worsening vision in her L eye. Patient reports that this started on  and continued to progress. She initially had blurry vision as well as eye pain that progressed to involve whitening of her vision, followed by a \"tan like color\" followed by visual loss \"completely dark\". She no longer has any pain with eye movements now. She reports that she has had multiple episodes of L eye visual loss in the past (4-5 times). Last occurred ~2 years ago. Reports she has baseline low vision now because of her prior events of optic neuritis. She has to be close to TV screen to read and has difficulty making out details. During all of her prior events she was treated with oral prednisone with improvement of her symptoms. She took two 20mg prednisone pills last now and two this morning before presenting to the ED. Presented to [facility] and received 1g methylprednisolone before being transferred to [facility].     Patient follows up with Dr Caterina Franklin for her NMO. Last Rituximab infusion was in 20. Reports she has had a total of 3 infusions so far (2019, 2019 and 2020). Prior to this she was not on any long-term immunomodulatory treatment for her NMO and was only treated with steroids.      Notably, she had an MR brain and C/T spine on 2019 which showed focal gadolinium enhancing lesion from T4 through T5 in the spinal cord on the right side with edema extending from T3-T4 down to lower T5. Repeat imaging on 2020 showed cord volume " "loss and abnormal signal/enhancement at T2 through T4 consistent with known demyelinating disease. Last note from Dr Franklin planned for repeat imaging to determine efficacy of Rituximab for Ms Voith given evidence of signal enhancement.     Interval history:   Vision with improvement today. Pt able to see increased range of color, read wall clock (albeit with some difficulty). Able to see this provider and distinguish outline, however she notes she \"can't see detail\" for example she noted she could not see my eyes or mouth. No new symptoms. No N/V/CP/SOB/fevers. No weakness or new sensory changes.       Physical Examination   Vitals: BP (!) 154/82 (BP Location: Right arm)   Pulse 72   Temp 98.8  F (37.1  C) (Oral)   Resp 16   Ht 1.702 m (5' 7\")   Wt 90.7 kg (200 lb)   SpO2 95%   BMI 31.32 kg/m    Physical Exam:  Constitutional: Alert. Lying in bed comfortably. No acute distress.   Head: Atraumatic, normocephalic. Wearing glasses  ENT: Moist mucous membranes. No sinus drainage.  Cardiovascular: Appears warm, well-perfused, and non-toxic.  Respiratory: No increased work of breathing, no accessory muscle use.   Musculoskeletal: Moving all four limbs spontaneously. Grossly intact range of motion.   Skin: No rashes or lesions appreciated on visualized skin.   Hematologic/Lymphatic/Immunologic: No bruising appreciated on visualized skin.   Neurologic:   Mental Status: alert, oriented to p/p/t. Speech is fluent, able to comprehend, and follows commands. No dysarthria.  Cranial Nerves: left pupil is reactive to light, no reactivity to light in right eye. Able to bury sclerae in left eye. Gaze not conjugate. No nystagmus noted. Able to note outline of examiner and tell time on wall clock. Smile and eyebrow raise equal, blinking symmetric, no weakness. Nasolabial folds symmetric. hearing intact to conversation.  Motor: Symmetric 5/5 strength in upper and lower extremities. No pronator drift.    Sensory: unchanged " "sensory level at T4, pt describes \"squeezing band\" at that level, and numbness distal to it-unchanged. Above level sensation to light touch intact.        Investigations     Results for orders placed or performed during the hospital encounter of 07/13/20   MR Cervical Spine w/o & w Contrast    Impression    Impression: Motion degraded study.  1. Cervical spine: No abnormal enhancement or cord abnormality is  identified. Multilevel cervical spondylosis, without significant  change from 5/15/2020.  2. Thoracic spine: Decreased abnormal cord signal extending from T2 to  T4, with resolution of previously seen enhancement. There is  persistent cord volume loss. No new abnormal cord signal or  enhancement is identified.    I have personally reviewed the examination and initial interpretation  and I agree with the findings.    DANILO WAGNER MD   MR Brain and Orbits    Impression    Impression:   1. The study demonstrates greater than 20 foci of T2-hyperintensity  within the cerebral white matter consistent with the clinical  suspicion of demyelinating disease. There are no foci of abnormal  enhancement noted intracranially. Although mildly degraded by motion  artifact, no convincing findings of optic neuritis.  2. Previously seen enhancing lesion in the left parieto-occipital  region appears resolved. No new lesions are identified.    I have personally reviewed the examination and initial interpretation  and I agree with the findings.    DANILO WAGNER MD   MR Thoracic Spine w/o & w Contrast    Impression    Impression: Motion degraded study.  1. Cervical spine: No abnormal enhancement or cord abnormality is  identified. Multilevel cervical spondylosis, without significant  change from 5/15/2020.  2. Thoracic spine: Decreased abnormal cord signal extending from T2 to  T4, with resolution of previously seen enhancement. There is  persistent cord volume loss. No new abnormal cord signal or  enhancement is identified.    I " have personally reviewed the examination and initial interpretation  and I agree with the findings.    DANILO WAGNER MD       Assessment and Plan:  Ms Ladonna Sheriff is a 75 year old female with a PMHx of NMO with residual R eye vision loss, T4 transverse myelitis, and on-off episodes of L eye optic neuritis with baseline low vision who presented to the ED on 7/13/20 with worsening vision in her L eye concerning for optic neuritis/NMO relapse.     #AQP4 positive NMO  #L eye vision loss concerning for optic neuritis/NMO relapse  Received 1g methylpred at [facility] prior to transfer. MRI Brain & Orbits, C/T spine completed without clear new lesion in brain or spinal cord. L optic nerve with slight enhancement. CD19 levels as expected. Eye exam without cherry red spot or exam findings consistent with CRAO or other etiology per optho. There is concern that her NMO is not responding to Rituximab, however this admit MRI improved from last one completed prior to admission. Pt has been on rituximab since last June, and now with optic neuritis relapse likely failure of rituximab. She might need consideration of other immunomodulatory therapy for her NMO such as eculizumab or inebilizumab. Dr. Franklin notified, will plan to have pt follow up with Dr. Franklin in clinic for long term tx strategy. IR placed line (CVC placement, double lumen, non-tunneled apheresis catheter, dialysis capable) on 7/15 and pt got first session of plasma exchange same day, transfusion med consulted, appreciate assistance with PLEX.  -PLEX s/p 2/5 sessions, plan for every other day plasma exchange for 5 total sessions (undergoing session today)  - Will continue total of 5 days of 1g methylpred for relapse event (today is dose 5/5)  - medium dose sliding scale insulin given high dose steroids, famotidine for ppx  - Ophthalmology/optometry consulted, appreciate recs  - PT/OT evaluations, appreciate assistance  - PTA gabapentin for transverse myelitis  symptoms     Chronic issues:  #Bronchospastic disease  - Continue PTA albuterol and Incruse Ellipta    #SLE  #Sjogren's disease  Previously on chronic prednisone, not for a while per patient.      #Hypertension  - Continue PTA amlodipine and metoprolol  -NO ACEi or Arb antihypertensives while receiving plasma exchange-reaction risk     #Heart failure  Sees Dr. Hewitt in cardiology clinic for chronic systolic heart failure (EF 45%), mild LV dysfunction, left bundle branch block, moderate aortic stenosis. Stress test in 2018 negative. Well controlled on home medications.   - Continue PTA Lasix  - Continue PTA amlodipine and metoprolol  - Continue PTA simvastatin   - Continue PTA ASA 325mg      #Hx of TIA  Per patient TIA happened in around 2005, when she lived in [place]. She noted sudden change in ability to speak, this resolved shortly after (she can't remember specific details of hospital workup). She had not previously been on aspirin, but was started on full-dose aspirin and plavix after this event and has been on them ever since. No recurrence. No other strokes/TIAs. No history of stents in coronaries or carotids per patient and chart review. No other known indication for DAPT. After extensive discussion with patient, joint decision was made to discontinue plavix to decrease risk of bleeding event with limited additional benefit on second antiplatelet agent given her history.   - Continue PTA ASA 325mg   - discontinued PTA Plavix, and remove from home medication list  - Continue PTA simvastatin      #Mood d/o  - Continue PTA fluoxetine     #Sleep  - Continue PTA trazodone     FEN: Regular  PPx: SCDs, Lovenox; famotidine for GI ppx on steroids (allergy to lansoprazole)  Lines: CVC, PIV  Code: Full     Patient was seen and discussed with Dr. Spangler.        Isabela Blanca MD  PGY2 Neurology  [phone]      I saw and evaluated the patient on 7/17/20 and agree with the findings and the plan of care as documented  in the resident's note.      Patient feels her vision is starting to slowly improve in the left eye.  Still feels she has no central vision, but feels her peripheral vision is improved and was able to tell time on the clock earlier today.  Last day of high-dose steroids today.  Plex number 2 out of 5 today.  She will be here until at least July 23 for plasma exchange.    Rajwinder Spangler DO   of Neurology

## 2020-07-17 NOTE — PLAN OF CARE
Status: Worsening L eye vision. Hx optic neuritis and transverse myelitis.  Vitals: VSS on RA  Neuros: AxO x4.  Blind R eye.  L eye decreased vision, sees black, grey,and blues at times. R eye dysconjugate.  N/T toes at baseline.5/5 throughout.    IV: PIV SL. R jugular CVC for PLEX   Resp/trach: LS clear.   Diet: Regular diet, good PO intake. Sliding scale insulin   Bowel status: BS+. No BM this shift.   : voiding spontaneously. Up to bathroom.   Skin: intact  Pain: denied pain.   Activity: A1, GB,  and personal cane.  Plan: PLEX 2/5 today around 0830. Continue to monitor and follow POC.

## 2020-07-17 NOTE — PLAN OF CARE
Status: Pt admitted  for worsening vision in her L eye  Vitals: HTN on RA  Neuros: Alert & oriented x4, N/T BLE toes, blind in R eye, worsening vision loss in L eye, dysconjugate gaze, 5/5 throughout, L cut, R pupil sluggish, R pupil > L pupil  IV: PIV SL, completed 5/5 methylpred, CVC hep locked  Resp/trach: Denies SOB  Diet: Regular diet  Bowel status: BS+  : Voiding without difficulty  Skin: Rashes/scabs throughout  Pain: Denied  Activity: Up with 1/GB/cane  Plan: Completed 2/5 of PLEX, continue to monitor and follow POC

## 2020-07-17 NOTE — PLAN OF CARE
6A PT  Discharge Planner PT   Patient plan for discharge: Prefers home  Current status: Pt reporting significant exhaustion from PLEX, declines OOB activity. Instructed pt in bed exercises to promote strength and encouraged independent completion.   Barriers to return to prior living situation: Medical status, new vision loss, balance deficits  Recommendations for discharge: TCU  Rationale for recommendations: Pt would benefit from continued skilled rehab to maximize safety and independence with functional mobility. Pending LOS and ability of family to provide assist, pt may be able to return home, will update as indicated.       Entered by: Aliya Gallo 07/17/2020 2:35 PM

## 2020-07-17 NOTE — PROGRESS NOTES
Social Work Services Progress Note    Hospital Day: 5  Date of Initial Social Work Evaluation:  7/15/2020  Collaborated with:  Amparo Boo (pt's [facility]  contracted through [facility]  [phone])    Data:  SW is following for discharge planning. TCU placement is recommended.  Pt will remain hospitalized through the weekend as pt is receiving plasma exchanges.      Intervention:  SW received a call from Amparo Boo (pt's [facility]  contracted through [facility]  [phone]).  SW  Updated Amparo in regards to discharge planning.  Per Amparo, pt receives weekly homemaking services home delivered meals and grocery delivery. Per Amparo,  provided pt with grab bars in her shower.  Per Amparo, pt is on [employer] and previously received 8 weeks of training through [employer] who provided pt with DME as it relates to vision impairment.        Plan:    Anticipated Disposition:  TCU placement    Barriers to d/c plan:  Pt is receiving plasma exchanges    Follow Up:  SW will continue to follow.    CAMELIA Marcial  Social Work, 6A  Phone:  [phone]  Pager:  [phone]  7/17/2020

## 2020-07-17 NOTE — PLAN OF CARE
OT/6A: Cancel. Per PT, patient requesting cancellation of OT session this PM d/t exhaustion from PLEX today. Will reschedule.

## 2020-07-17 NOTE — PLAN OF CARE
Status:Worsening L eye vision. Hx optic neuritis and transverse myelitis.  Vitals: VSS.  On RA  Neuros: AxOx4.  Blind R eye.  L eye decreased vision, sees black, grey,and blues at times. R eye dysconjugate.  N/T toes at baseline.5/5 throughout.    IV: PIV SL. R jugular CVC for PLEX   Resp/trach: LS clear.   Diet: Regular diet, good PO intake. Sliding scale insulin started today.   Bowel status: BS+. No BM this shift.   : voiding spontaneously. Up to bathroom.   Skin: intact  Pain: denied pain.   Activity: A1, GB,  and personal cane.  Plan: PLEX 2/5 tomorrow. Continue to monitor and follow POC.

## 2020-07-17 NOTE — PROCEDURES
Laboratory Medicine and Pathology  Transfusion Medicine - Apheresis Procedure    Ladonna Sheriff MRN# [id number]   YOB: 1945 Age: 75 year old        Reason for consult: Neuromyelitis optica           Assessment and Plan:   The patient is a 75 year old female with NMO and acute flare.  She underwent therapeutic plasma exchange (TPE) #2 of planned 5. She tolerated the procedure well.  Vision not improved    Please do not start ACE inhibitors throughout the duration of the TPE series as these have been associated with reactions during apheresis.  Please notify the Transfusion Medicine physician of any upcoming procedures, surgeries, or biopsies as TPE with albumin replacement will affect coagulation factor levels.         Chief Complaint:   Vision loss         Interval History:   The patient is a 75 year old female with Aquaporin 4 antibody positive NMO and history of multiple acute flares. 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 TPE series staerted on 7/15/2020. Also receiving IV methylprednisolone. She feels vision had improved slightly after the first procedure but is now worse. Can only see vague shapes.  Is more tired today, but otherwise feeling well. No new numbness/weakness.         Past Medical History:     Past Medical History:   Diagnosis Date     Cerebral infarction (H)      CHF (congestive heart failure) (H)      Hypertension      Lupus (H)      Optic neuritis      Sjogren's syndrome (H)      Temporal arteritis (H)      TIA (transient ischemic attack)      Uncomplicated asthma    Neuromyelitis optica  Vision loss          Past Surgical History:     Past Surgical History:   Procedure Laterality Date     CHOLECYSTECTOMY       GYN SURGERY      hysterectomy     GYN SURGERY      tubal ligation     IR CVC NON TUNNEL PLACEMENT  6/21/2019     IR CVC NON TUNNEL PLACEMENT  7/15/2020     IR FOLLOW UP VISIT INPATIENT  7/2/2019           Social History:   , 3 sons and 1 grandchild, lives independently with 1 son living in the same apartment building           Allergies:     Allergies   Allergen Reactions     Prevacid [Lansoprazole] Rash     Pravastatin Rash     Patient is still not sure about it due many years ago for reaction.          Medications:     Current Facility-Administered Medications   Medication     acetaminophen (TYLENOL) tablet 325-975 mg     albuterol (PROAIR HFA/PROVENTIL HFA/VENTOLIN HFA) 108 (90 Base) MCG/ACT inhaler 2 puff     amLODIPine (NORVASC) tablet 10 mg     aspirin (ASA) EC tablet 325 mg     bisacodyl (DULCOLAX) Suppository 10 mg     calcium carbonate 600 mg-vitamin D 400 units (CALTRATE) per tablet 1 tablet     glucose gel 15-30 g    Or     dextrose 50 % injection 25-50 mL    Or     glucagon injection 1 mg     docusate sodium (COLACE) capsule 100 mg     enoxaparin ANTICOAGULANT (LOVENOX) injection 40 mg     famotidine (PEPCID) tablet 20 mg     FLUoxetine (PROzac) capsule 20 mg     furosemide (LASIX) tablet 20 mg     gabapentin (NEURONTIN) capsule 300 mg     gabapentin (NEURONTIN) capsule 300 mg     gabapentin (NEURONTIN) capsule 600 mg     heparin 100 UNIT/ML injection 3 mL     heparin 100 UNIT/ML injection 3 mL     HOLD ACE Inhibitors: benazepril (LOTENSIN), captopril (CAPOTEN), enalapril (VASOTEC), fosinopril (MONOPRIL), lisinopril (PRINIVIL, ZESTRIL), perindopril (ACEON), quinapril (ACCUPRIL), ramipril (ALTACE), trandolapril (MAVIK)     insulin aspart (NovoLOG) injection (RAPID ACTING)     insulin aspart (NovoLOG) injection (RAPID ACTING)     lidocaine (LMX4) cream     lidocaine 1 % 0.1-1 mL     magnesium sulfate 4 g in 100 mL sterile water (premade)     melatonin tablet 3 mg     methylPREDNISolone sodium succinate (solu-MEDROL) 1,000 mg in sodium chloride 0.9 % 250 mL intermittent infusion     metoprolol tartrate (LOPRESSOR) tablet 50 mg     naloxone (NARCAN) injection 0.1-0.4 mg     ondansetron (ZOFRAN-ODT) ODT  tab 4 mg    Or     ondansetron (ZOFRAN) injection 4 mg     polyethylene glycol (MIRALAX) Packet 17 g     potassium chloride (KLOR-CON) Packet 20-40 mEq     potassium chloride 10 mEq in 100 mL intermittent infusion with 10 mg lidocaine     potassium chloride 10 mEq in 100 mL sterile water intermittent infusion (premix)     potassium chloride 20 mEq in 50 mL intermittent infusion     potassium chloride ER (KLOR-CON M) CR tablet 20-40 mEq     senna-docusate (SENOKOT-S/PERICOLACE) 8.6-50 MG per tablet 1 tablet    Or     senna-docusate (SENOKOT-S/PERICOLACE) 8.6-50 MG per tablet 2 tablet     simvastatin (ZOCOR) tablet 20 mg     sodium chloride (PF) 0.9% PF flush 3 mL     sodium chloride (PF) 0.9% PF flush 3 mL     traZODone (DESYREL) tablet 100 mg     trolamine salicylate (ASPERCREME) 10 % cream     umeclidinium (INCRUSE ELLIPTA) 62.5 MCG/INH inhaler 1 puff     vitamin b complex w/vitamin C tablet 1 tablet     vitamin C (ASCORBIC ACID) tablet 500 mg           Review of Systems:   + vision loss  + fatigue  Denied fever, chills, cough, nausea, vomiting, diarrhea, shortness of breath, headache, pain at catheter site, eye pain         Exam:   P 66 /82 RR 16  Alert, no apparent distress  Breathing appears comfortable on room air  Moves all extremities, answers/asks questions appropriately  Right internal jugular catheter         Data:     Results for orders placed or performed during the hospital encounter of 07/13/20 (from the past 24 hour(s))   Glucose by meter   Result Value Ref Range    Glucose 152 (H) 70 - 99 mg/dL   Glucose by meter   Result Value Ref Range    Glucose 116 (H) 70 - 99 mg/dL   CBC with platelets differential   Result Value Ref Range    WBC 9.9 4.0 - 11.0 10e9/L    RBC Count 4.59 3.8 - 5.2 10e12/L    Hemoglobin 14.4 11.7 - 15.7 g/dL    Hematocrit 44.1 35.0 - 47.0 %    MCV 96 78 - 100 fl    MCH 31.4 26.5 - 33.0 pg    MCHC 32.7 31.5 - 36.5 g/dL    RDW 13.7 10.0 - 15.0 %    Platelet Count 196 150 - 450  10e9/L    Diff Method Automated Method     % Neutrophils 89.5 %    % Lymphocytes 6.5 %    % Monocytes 3.4 %    % Eosinophils 0.0 %    % Basophils 0.1 %    % Immature Granulocytes 0.5 %    Nucleated RBCs 0 0 /100    Absolute Neutrophil 8.9 (H) 1.6 - 8.3 10e9/L    Absolute Lymphocytes 0.7 (L) 0.8 - 5.3 10e9/L    Absolute Monocytes 0.3 0.0 - 1.3 10e9/L    Absolute Eosinophils 0.0 0.0 - 0.7 10e9/L    Absolute Basophils 0.0 0.0 - 0.2 10e9/L    Abs Immature Granulocytes 0.1 0 - 0.4 10e9/L    Absolute Nucleated RBC 0.0    INR   Result Value Ref Range    INR 1.20 (H) 0.86 - 1.14   Fibrinogen activity   Result Value Ref Range    Fibrinogen 187 (L) 200 - 420 mg/dL   Glucose by meter   Result Value Ref Range    Glucose 118 (H) 70 - 99 mg/dL          Procedure Summary:   A single plasma volume plasma exchange was performed with a Spectra Optia cell separator.  The right internal jugular catheter was used for access.  ACD-A was used for anticoagulation.  To offset the effects of the citrate, calcium gluconate was given in the return line.  The replacement fluid was 5% albumin.  The patient's vital signs were stable throughout.  The patient tolerated the procedure well.    ATTESTATION STATEMENT:  This patient has been seen and evaluated by me directly, Mercedes Franks MD, PhD.  I saw the patient shortly after the procedure was completed.    Mercedes Franks M.D., Ph.D.  Attending Physician  Division of Transfusion Medicine  Department of Laboratory [place] [address]  Pager [phone]

## 2020-07-18 NOTE — PROGRESS NOTES
"[facility]  General Neurology Progress Note    Patient Name:  Ladonna Sheriff  MRN:  [id number]    :  1945    Patient Summary: Per H&P  Ms. 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 She presented to the ED on 20 with worsening vision in her L eye. Patient reports that this started on  and continued to progress. She initially had blurry vision as well as eye pain that progressed to involve whitening of her vision, followed by a \"tan like color\" followed by visual loss \"completely dark\". She no longer has any pain with eye movements now. She reports that she has had multiple episodes of L eye visual loss in the past (4-5 times). Last occurred ~2 years ago. Reports she has baseline low vision now because of her prior events of optic neuritis. She has to be close to TV screen to read and has difficulty making out details. During all of her prior events she was treated with oral prednisone with improvement of her symptoms. She took two 20mg prednisone pills last now and two this morning before presenting to the ED. Presented to [facility] and received 1g methylprednisolone before being transferred to [facility].     Patient follows up with Dr Caterina Franklin for her NMO. Last Rituximab infusion was in 20. Reports she has had a total of 3 infusions so far (2019, 2019 and 2020). Prior to this she was not on any long-term immunomodulatory treatment for her NMO and was only treated with steroids.      Notably, she had an MR brain and C/T spine on 2019 which showed focal gadolinium enhancing lesion from T4 through T5 in the spinal cord on the right side with edema extending from T3-T4 down to lower T5. Repeat imaging on 2020 showed cord volume " "loss and abnormal signal/enhancement at T2 through T4 consistent with known demyelinating disease. Last note from Dr Franklin planned for repeat imaging to determine efficacy of Rituximab for Ms Voith given evidence of signal enhancement.     Interval history:   Vision stable today. Pt unable to read wall clock this morning. No new symptoms. No N/V/CP/SOB/fevers. No weakness or new sensory changes.       Physical Examination   Vitals: /71 (BP Location: Right arm)   Pulse 75   Temp 98.2  F (36.8  C) (Oral)   Resp 18   Ht 1.702 m (5' 7\")   Wt 90.7 kg (199 lb 15.3 oz)   SpO2 95%   BMI 31.32 kg/m    Physical Exam:  Constitutional: Alert. Lying in bed comfortably. No acute distress.   Head: Atraumatic, normocephalic. Wearing glasses  ENT: Moist mucous membranes. No sinus drainage.  Cardiovascular: Appears warm, well-perfused, and non-toxic.  Respiratory: No increased work of breathing, no accessory muscle use.   Musculoskeletal: Moving all four limbs spontaneously.    Skin: No rashes or lesions appreciated on visualized skin.   Hematologic/Lymphatic/Immunologic: No bruising appreciated on visualized skin.   Neurologic:   Mental Status: alert, oriented to p/p/t. Speech is fluent, able to comprehend, and follows commands. No dysarthria.  Cranial Nerves: left pupil is reactive to light, no reactivity to light in right eye. Able to bury sclerae in left eye. Gaze not conjugate. No nystagmus noted.Central scotoma stable. Able to distinguish which hand moving in peripheral vision at approx 2 feet from face. Smile and eyebrow raise equal, blinking symmetric, no weakness. Nasolabial folds symmetric. hearing intact to conversation.  Motor: Moving all extremities equally.  and leg raise 5/5 and symmetric.    Sensory: unchanged sensory level at T4       Investigations     Results for orders placed or performed during the hospital encounter of 07/13/20   MR Cervical Spine w/o & w Contrast    Impression    Impression: " Motion degraded study.  1. Cervical spine: No abnormal enhancement or cord abnormality is  identified. Multilevel cervical spondylosis, without significant  change from 5/15/2020.  2. Thoracic spine: Decreased abnormal cord signal extending from T2 to  T4, with resolution of previously seen enhancement. There is  persistent cord volume loss. No new abnormal cord signal or  enhancement is identified.    I have personally reviewed the examination and initial interpretation  and I agree with the findings.    DANILO WAGNER MD   MR Brain and Orbits    Impression    Impression:   1. The study demonstrates greater than 20 foci of T2-hyperintensity  within the cerebral white matter consistent with the clinical  suspicion of demyelinating disease. There are no foci of abnormal  enhancement noted intracranially. Although mildly degraded by motion  artifact, no convincing findings of optic neuritis.  2. Previously seen enhancing lesion in the left parieto-occipital  region appears resolved. No new lesions are identified.    I have personally reviewed the examination and initial interpretation  and I agree with the findings.    DANILO WAGNER MD   MR Thoracic Spine w/o & w Contrast    Impression    Impression: Motion degraded study.  1. Cervical spine: No abnormal enhancement or cord abnormality is  identified. Multilevel cervical spondylosis, without significant  change from 5/15/2020.  2. Thoracic spine: Decreased abnormal cord signal extending from T2 to  T4, with resolution of previously seen enhancement. There is  persistent cord volume loss. No new abnormal cord signal or  enhancement is identified.    I have personally reviewed the examination and initial interpretation  and I agree with the findings.    DANILO WAGNER MD     Recent Labs   Lab 07/17/20  0915 07/16/20  0648 07/15/20  1430 07/14/20  1645 07/14/20  0722  07/13/20  1157   WBC 9.9  --   --  8.6 7.5  --  5.7   HGB 14.4  --   --  14.5 14.1  --  14.4   MCV 96   --   --  98 99  --  99    170  --  191 191  --  192   INR 1.20*  --  1.06 1.06 0.81*   < >  --    NA  --   --   --  138 141  --  139   POTASSIUM  --   --   --  3.4 3.8  --  3.7   CHLORIDE  --   --   --  105 108  --  106   CO2  --   --   --  24 27  --  26   BUN  --   --   --  22 18  --  16   CR  --  0.78  --  0.85 0.77  --  0.80   ANIONGAP  --   --   --  8 7  --  7   GANESH  --   --   --  8.6 8.8  --  9.3   GLC  --   --   --  200* 100*  --  107*   ALBUMIN  --   --   --   --  3.5  --   --    PROTTOTAL  --   --   --   --  7.4  --   --    BILITOTAL  --   --   --   --  0.6  --   --    ALKPHOS  --   --   --   --  65  --   --    ALT  --   --   --   --  28  --   --    AST  --   --   --   --  22  --   --     < > = values in this interval not displayed.         Assessment and Plan:  Ms Ladonna Sheriff is a 75 year old female with a PMHx of NMO with residual R eye vision loss, T4 transverse myelitis, and on-off episodes of L eye optic neuritis with baseline low vision who presented to the ED on 7/13/20 with worsening vision in her L eye concerning for optic neuritis/NMO relapse.    Continue plasma exchange. Plan for session 3/5 tomorrow.     #AQP4 positive NMO  #L eye vision loss concerning for optic neuritis/NMO relapse  Received 1g methylpred at [facility] prior to transfer. MRI Brain & Orbits, C/T spine completed without clear new lesion in brain or spinal cord. L optic nerve with slight enhancement. CD19 levels as expected. Eye exam without cherry red spot or exam findings consistent with CRAO or other etiology per optho. There is concern that her NMO is not responding to Rituximab, however this admit MRI improved from last one completed prior to admission. Pt has been on rituximab since last June, and now with optic neuritis relapse likely failure of rituximab. She might need consideration of other immunomodulatory therapy for her NMO such as eculizumab or inebilizumab. Dr. Franklin notified, will plan to have pt follow up  with Dr. Franklin in clinic for long term tx strategy. IR placed line (CVC placement, double lumen, non-tunneled apheresis catheter, dialysis capable) on 7/15 and pt got first session of plasma exchange same day, transfusion med consulted, appreciate assistance with PLEX. s/p 5 days of 1g methylpred for relapse event   -PLEX s/p 2/5 sessions, plan for every other day plasma exchange for 5 total sessions   - medium dose sliding scale insulin given high dose steroids, famotidine for ppx  - Ophthalmology/optometry consulted, appreciate recs  - PT/OT evaluations, appreciate assistance  - PTA gabapentin for transverse myelitis symptoms     Chronic issues:  #Bronchospastic disease  - Continue PTA albuterol and Incruse Ellipta    #SLE  #Sjogren's disease  Previously on chronic prednisone, not for a while per patient.      #Hypertension  - Continue PTA amlodipine and metoprolol  -NO ACEi or Arb antihypertensives while receiving plasma exchange-reaction risk     #Heart failure  Sees Dr. Hewitt in cardiology clinic for chronic systolic heart failure (EF 45%), mild LV dysfunction, left bundle branch block, moderate aortic stenosis. Stress test in 2018 negative. Well controlled on home medications.   - Continue PTA Lasix  - Continue PTA amlodipine and metoprolol  - Continue PTA simvastatin   - Continue PTA ASA 325mg      #Hx of TIA  Per patient TIA happened in around 2005, when she lived in [place]. She noted sudden change in ability to speak, this resolved shortly after (she can't remember specific details of hospital workup). She had not previously been on aspirin, but was started on full-dose aspirin and plavix after this event and has been on them ever since. No recurrence. No other strokes/TIAs. No history of stents in coronaries or carotids per patient and chart review. No other known indication for DAPT. After extensive discussion with patient, joint decision was made to discontinue plavix to decrease risk of bleeding  event with limited additional benefit on second antiplatelet agent given her history.   - Continue PTA ASA 325mg   - discontinued PTA Plavix, and remove from home medication list  - Continue PTA simvastatin      #Mood d/o  - Continue PTA fluoxetine     #Sleep  - Continue PTA trazodone     FEN: Regular  PPx: SCDs, Lovenox; famotidine for GI ppx given recent IV steroids (allergy to lansoprazole)  Lines: CVC, PIV  Code: Full     Patient was seen and discussed with Dr. Spangler.        Isabela Blanca MD  PGY2 Neurology  [phone]    I saw and evaluated the patient on 7/18/20 and agree with the findings and the plan of care as documented in the resident's note.      Patient feels her vision is slowly improving.  #3/5 PLEX planned for tomorrow.  Has completed high dose steroid course.      Rajwinder Spangler DO   of Neurology

## 2020-07-18 NOTE — PLAN OF CARE
Status: Pt admitted for worsening vision in her L eye  Vitals: stable on RA  Neuros: A&O x4. N/T BLE toes, blind in R eye, worsening vision loss in L eye, dysconjugate gaze, 5/5 throughout, L cut, R pupil sluggish, R pupil > L pupil  IV: PIV SL, CVC has been hep locked  Resp/trach: LS clear  Diet: Regular diet  Bowel status: Last BM 7/17  : Voiding without difficulty  Skin: Rashes/scabs throughout  Pain: Denied  Activity: Up with 1/GB/cane  Social: Son has been visiting during days  Plan: PLEX 3/5 on Sunday. Continue to monitor and follow POC

## 2020-07-18 NOTE — PLAN OF CARE
[id number]  Status: Pt admitted  for worsening vision in her L eye  Vitals: HTN on RA, scheduled meds administered   Neuros: Alert & oriented x4, N/T BLE toes, blind in R eye, worsening vision loss in L eye, dysconjugate gaze, 5/5 throughout, L cut, R pupil sluggish, R pupil > L pupil  IV: PIV SL, CVC hep locked  Resp/trach: Denies SOB  Diet: Regular diet  Bowel status: BS+, BM 7/17, PRN Miralax administered   : Voiding without difficulty  Skin: Rashes/scabs throughout  Pain: Denied  Activity: Up with 1/GB/cane  Social: Son at bedside for portion of shift, involved in cares   Plan: Completed 2/5 of PLEX today, continue to monitor and follow POC

## 2020-07-18 NOTE — PLAN OF CARE
6A    Discharge Planner OT   Patient plan for discharge: TCU  Current status: CGA-Min A for amb ~75 ft x 2 with use of SPC with LOB x 3-4 corrected with min A, OT: To promote strength/endurance for ADL/IADLs, pt compelted UE/LE exercise, completed 2 x 10 reps of STS with Barriers to return to prior living situation: new vision loss, balance deficits, fatigue   Recommendations for discharge: TCU   Rationale for recommendations: Pt is below baseline level of function and would benefit from further therapy to progress independence with ADLs and mobility. Pending length of stay, potential improvement in vision, and ability of son to provide assist, pt may be able to return home, will monitor changes closely.       Entered by: Diana Dutton 07/18/2020 3:14 PM

## 2020-07-18 NOTE — PLAN OF CARE
Status: Admitted for worsening vision in left eye.   Vitals: VSS on RA.   Neuros: Alert & oriented x4. Blind in R eye and worsening vision in the L eye. Left field cut. Right pupil > left pupil. Right pupil is sluggish. Dysconjugate gaze.   IV: PIV saline locked. CVC heparin locked.   Resp/trach: Lung sounds clear.  Diet: Regular.   Bowel status: BS+ No BM this shift.   : Voiding without difficulty.  Skin: Rash/scab throughout.   Pain: Denies pain this shift.   Activity: Up with 1, GB + cane.   Plan: Plex treatment 3/5 tomorrow 7/19. Continue to monitor and follow POC.

## 2020-07-18 NOTE — PROGRESS NOTES
"Ophthalmololgy progress note    Ladonna Sheriff is a 75 year old female with hx of NMO and optic neuritis, admitted on 7/13 for left sided vision loss. She was diagnosed with acute optic neuritis of the left eye in the setting of AQP4 + NMO. She has received 5 days of IV steroids and 2/5 plasmapharesis (every other day). Today, she reports that she is able to see the outline of the TV screen, along with motion on TV. She can make out the outline of the writer, however cannot see details of the face. She reports she can see the outline of the names on her phone with her magnifier glass and she can make an educated guess as to whom she is calling based on the size of the word. Patient denies pain, or any other new concerns.     Patient's vision in the left eye is CF at 3 feet on exam today. Patient concerned about living independently but is hopeful one of her sons would be able to move in with her to help her at home. She is interested in seeing a low vision specialist to make the most of the vision she has. She is \"cautiously optimistic\" about her visual potential.     Recommendations:  - continue plasmapheresis every other day for a total of 5 sessions (7/15-7/23)  - ophthalmology will continue to perform visual acuity checks while patient is inpatient  - will refer patient to low vision specialist        "

## 2020-07-19 NOTE — PLAN OF CARE
Status: Pt admitted for worsening vision in her L eye  Vitals: stable on RA  Neuros: A&O x4. Blind in R eye, worsening vision loss in L eye, dysconjugate gaze, L field cut, R pupil sluggish. N/T BLE toes.  IV: PIV SL, CVC has been hep locked  Resp/trach: LS clear  Diet: Regular diet  Bowel status: Last BM 7/17  : Voiding without difficulty  Skin: Rashes/scabs throughout  Pain: Denied  Activity: Up with 1/GB/cane  Social: Son has been visiting during days  Plan: PLEX 3/5 today at 0830. Continue to monitor and follow POC

## 2020-07-19 NOTE — PLAN OF CARE
Status: Admitted for worsening vision in left eye.   Vitals: VSS on RA.   Neuros: Alert & oriented x4. Blind in R eye and worsening vision in the L eye. Left field cut. Right pupil > left pupil. Right pupil is sluggish. Dysconjugate gaze.   IV: PIV saline locked. CVC heparin locked.   Resp/trach: Lung sounds clear.  Diet: Regular.   Bowel status: BS+ Multiple BM today with some cramping and discomfort. Incontinent at times.   : Voiding without difficulty. Incontinent at times.   Skin: Rash/scab throughout.   Pain: Denies pain this shift.   Activity: Up with 1, GB + cane.   Plan: Plex 4/5 on 7/21. Continue to monitor and follow POC.

## 2020-07-19 NOTE — PLAN OF CARE
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  Continue with plan of care.

## 2020-07-19 NOTE — PROGRESS NOTES
"[facility]  General Neurology Progress Note    Patient Name:  Ladonna Sheriff  MRN:  [id number]    :  1945      Patient Summary:  Ms. 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 She presented to the ED on 20 with worsening vision in her L eye consistent with an optic neuritis. Admitted for PLEX and IV steroids. Will undergo Day 3/5 of PLEX today.     Interval history:   No acute events overnight. Feels like vision is 80% improved compared to on admission date.  No new symptoms.  No weakness or new sensory changes.     Physical Examination   Vitals: /61 (BP Location: Left arm)   Pulse 73   Temp 97.9  F (36.6  C) (Oral)   Resp 16   Ht 1.702 m (5' 7\")   Wt 90.7 kg (199 lb 15.3 oz)   SpO2 94%   BMI 31.32 kg/m    Physical Exam:  Constitutional: Alert. Lying in bed comfortably. No acute distress.   Head: Atraumatic, normocephalic. Wearing glasses  Cardiovascular: Appears warm, well-perfused, and non-toxic.  Respiratory: No increased work of breathing, no accessory muscle use.   Musculoskeletal: Moving all four limbs spontaneously.    Skin: No rashes or lesions appreciated on visualized skin.   Neurologic:   Mental Status: alert, oriented to p/p/t. Speech is fluent, able to comprehend, and follows commands. No dysarthria.  Cranial Nerves: left pupil is reactive to light, no reactivity to light in right eye.  Gaze dysconjugate with L eye exotropic. No nystagmus noted. 20/200 vision in  L eye, able to make out shapes and some colors but not details. Unable to detect motion in R eye (stable). Smile and eyebrow raise equal, blinking symmetric, no weakness. Nasolabial folds symmetric. Hearing intact to conversation.  Motor: Moving all " extremities equally. 5/5 strength.   Sensory: Unchanged sensory level at T4  Gait: Not assessed    Investigations     Results for orders placed or performed during the hospital encounter of 07/13/20   MR Cervical Spine w/o & w Contrast    Impression    Impression: Motion degraded study.  1. Cervical spine: No abnormal enhancement or cord abnormality is  identified. Multilevel cervical spondylosis, without significant  change from 5/15/2020.  2. Thoracic spine: Decreased abnormal cord signal extending from T2 to  T4, with resolution of previously seen enhancement. There is  persistent cord volume loss. No new abnormal cord signal or  enhancement is identified.    I have personally reviewed the examination and initial interpretation  and I agree with the findings.    DANILO WAGNER MD   MR Brain and Orbits    Impression    Impression:   1. The study demonstrates greater than 20 foci of T2-hyperintensity  within the cerebral white matter consistent with the clinical  suspicion of demyelinating disease. There are no foci of abnormal  enhancement noted intracranially. Although mildly degraded by motion  artifact, no convincing findings of optic neuritis.  2. Previously seen enhancing lesion in the left parieto-occipital  region appears resolved. No new lesions are identified.    I have personally reviewed the examination and initial interpretation  and I agree with the findings.    DANILO WAGNER MD   MR Thoracic Spine w/o & w Contrast    Impression    Impression: Motion degraded study.  1. Cervical spine: No abnormal enhancement or cord abnormality is  identified. Multilevel cervical spondylosis, without significant  change from 5/15/2020.  2. Thoracic spine: Decreased abnormal cord signal extending from T2 to  T4, with resolution of previously seen enhancement. There is  persistent cord volume loss. No new abnormal cord signal or  enhancement is identified.    I have personally reviewed the examination and initial  interpretation  and I agree with the findings.    DANILO WAGNER MD     Recent Labs   Lab 07/17/20  0915 07/16/20  0648 07/15/20  1430 07/14/20  1645 07/14/20  0722  07/13/20  1157   WBC 9.9  --   --  8.6 7.5  --  5.7   HGB 14.4  --   --  14.5 14.1  --  14.4   MCV 96  --   --  98 99  --  99    170  --  191 191  --  192   INR 1.20*  --  1.06 1.06 0.81*   < >  --    NA  --   --   --  138 141  --  139   POTASSIUM  --   --   --  3.4 3.8  --  3.7   CHLORIDE  --   --   --  105 108  --  106   CO2  --   --   --  24 27  --  26   BUN  --   --   --  22 18  --  16   CR  --  0.78  --  0.85 0.77  --  0.80   ANIONGAP  --   --   --  8 7  --  7   GANESH  --   --   --  8.6 8.8  --  9.3   GLC  --   --   --  200* 100*  --  107*   ALBUMIN  --   --   --   --  3.5  --   --    PROTTOTAL  --   --   --   --  7.4  --   --    BILITOTAL  --   --   --   --  0.6  --   --    ALKPHOS  --   --   --   --  65  --   --    ALT  --   --   --   --  28  --   --    AST  --   --   --   --  22  --   --     < > = values in this interval not displayed.     Assessment and Plan:  Ms Ladonna Sheriff is a 75 year old female with a PMHx of NMO with residual R eye vision loss, T4 transverse myelitis, and on-off episodes of L eye optic neuritis with baseline low vision who presented to the ED on 7/13/20 with worsening vision in her L eye concerning for optic neuritis/NMO relapse.    Continue plasma exchange. Plan for session 3/5 today.     #AQP4 positive NMO  #L eye vision loss concerning for optic neuritis/NMO relapse  Received 1g methylpred at [facility] prior to transfer. MRI Brain & Orbits, C/T spine completed without clear new lesion in brain or spinal cord. L optic nerve with slight enhancement. CD19 levels as expected. Eye exam without cherry red spot or exam findings consistent with CRAO or other etiology per optho. There is concern that her NMO is not responding to Rituximab, however this admit MRI improved from last one completed prior to admission. Pt has  been on rituximab since last June, and now with optic neuritis relapse likely failure of rituximab. She might need consideration of other immunomodulatory therapy for her NMO such as eculizumab or inebilizumab. Dr. Franklin notified, will plan to have pt follow up with Dr. Franklin in clinic for long term tx strategy. IR placed line (CVC placement, double lumen, non-tunneled apheresis catheter, dialysis capable) on 7/15 and pt got first session of plasma exchange same day, transfusion med consulted, appreciate assistance with PLEX. s/p 5 days of 1g methylpred for relapse event   -PLEX s/p 2/5 sessions, plan for every other day plasma exchange for 5 total sessions. Plan for 3rd session today.   - Medium dose sliding scale insulin given high dose steroids, famotidine for ppx  - Ophthalmology/optometry consulted, appreciate recs  - PT/OT evaluations, appreciate assistance  - PTA gabapentin for transverse myelitis symptoms     Chronic issues:  #Bronchospastic disease  - Continue PTA albuterol and Incruse Ellipta    #SLE  #Sjogren's disease  Previously on chronic prednisone, not for a while per patient.      #Hypertension  - Continue PTA amlodipine and metoprolol  - NO ACEi or ARB antihypertensives while receiving plasma exchange-reaction risk     #Heart failure  Sees Dr. Hewitt in cardiology clinic for chronic systolic heart failure (EF 45%), mild LV dysfunction, left bundle branch block, moderate aortic stenosis. Stress test in 2018 negative. Well controlled on home medications.   - Continue PTA Lasix  - Continue PTA amlodipine and metoprolol  - Continue PTA simvastatin   - Continue PTA ASA 325mg      #Hx of TIA  Per patient TIA happened in around 2005, when she lived in [place]. She noted sudden change in ability to speak, this resolved shortly after (she can't remember specific details of hospital workup). She had not previously been on aspirin, but was started on full-dose aspirin and plavix after this event and has  been on them ever since. No recurrence. No other strokes/TIAs. No history of stents in coronaries or carotids per patient and chart review. No other known indication for DAPT. After extensive discussion with patient, joint decision was made to discontinue plavix to decrease risk of bleeding event with limited additional benefit on second antiplatelet agent given her history.   - Continue PTA ASA 325mg   - Discontinued PTA Plavix, and removed from home medication list  - Continue PTA simvastatin      #Mood d/o  - Continue PTA fluoxetine     #Sleep  - Continue PTA trazodone     FEN: Regular  PPx: SCDs, Lovenox; famotidine for GI ppx given recent IV steroids (allergy to lansoprazole)  Lines: CVC, PIV  Code: Full     Patient was seen and discussed with Dr. Spangler. I called both patient's sister Archana and patient's son, Hayden to discuss diagnosis, prognosis and management plans, per the patient's request.     Disposition Plan   Expected discharge on 7/23 to likely transitional care unit (final recs pending PT/OT/PMR) once PLEX course is complete.     Entered: Rosa Segovia 07/18/2020, 8:36 PM     Rosa Segovia  Neurology Resident, PGY4  Pager: [phone]      I saw and evaluated the patient on 7/19/20 and agree with the findings and the plan of care as documented in the resident's note.      Vision continues to slowly improve.  PLEX #3/5 today, and therapies following to determine dispo.      Rajwinder Spangler DO   of Neurology

## 2020-07-19 NOTE — PROCEDURES
"              Laboratory Medicine and Pathology  Transfusion Medicine - Apheresis Procedure    Ladonna Sheriff MRN# [id number]   YOB: 1945 Age: 75 year old        Reason for consult: Neuromyelitis optica           Assessment and Plan:   The patient is a 75 year old female with NMO and acute flare.  She underwent therapeutic plasma exchange (TPE) #3 of planned 5 this AM. She tolerated the procedure well per apheresis RN.  The patient is being followed by Ophthalmology and Neurology.  Per Neurology note of 7/19/20, patient reports \"80%\" improvement of left eye vision as compared to status on admission.  We will continue with plan for TPE #4/5 on Tuesday, July 21,2020.    Please do not start ACE inhibitors throughout the duration of the TPE series as these have been associated with reactions during apheresis.  Please notify the Transfusion Medicine physician of any upcoming procedures, surgeries, or biopsies as TPE with albumin replacement will affect coagulation factor levels.         Chief Complaint:   Vision loss         Interval History:   The patient is a 75 year old female with Aquaporin 4 antibody positive NMO and history of multiple acute flares. 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 TPE series staerted on 7/15/2020. Also receiving IV methylprednisolone. She feels vision had improved slightly after the first procedure but the became worse. Now reports \"80%\" improvement (Neurology note of 7/19/20).         Past Medical History:     Past Medical History:   Diagnosis Date     Cerebral infarction (H)      CHF (congestive heart failure) (H)      Hypertension      Lupus (H)      Optic neuritis      Sjogren's syndrome (H)      Temporal arteritis (H)      TIA (transient ischemic attack)      Uncomplicated asthma    Neuromyelitis optica  Vision loss          Past Surgical History:     Past Surgical History:   Procedure Laterality Date     CHOLECYSTECTOMY  "      GYN SURGERY      hysterectomy     GYN SURGERY      tubal ligation     IR CVC NON TUNNEL PLACEMENT  6/21/2019     IR CVC NON TUNNEL PLACEMENT  7/15/2020     IR FOLLOW UP VISIT INPATIENT  7/2/2019          Social History:   , 3 sons and 1 grandchild, lives independently with 1 son living in the same apartment building           Allergies:     Allergies   Allergen Reactions     Prevacid [Lansoprazole] Rash     Pravastatin Rash     Patient is still not sure about it due many years ago for reaction.          Medications:     Current Facility-Administered Medications   Medication     acetaminophen (TYLENOL) tablet 325-975 mg     albuterol (PROAIR HFA/PROVENTIL HFA/VENTOLIN HFA) 108 (90 Base) MCG/ACT inhaler 2 puff     amLODIPine (NORVASC) tablet 10 mg     aspirin (ASA) EC tablet 325 mg     bisacodyl (DULCOLAX) Suppository 10 mg     calcium carbonate 600 mg-vitamin D 400 units (CALTRATE) per tablet 1 tablet     glucose gel 15-30 g    Or     dextrose 50 % injection 25-50 mL    Or     glucagon injection 1 mg     docusate sodium (COLACE) capsule 100 mg     enoxaparin ANTICOAGULANT (LOVENOX) injection 40 mg     famotidine (PEPCID) tablet 20 mg     FLUoxetine (PROzac) capsule 20 mg     furosemide (LASIX) tablet 20 mg     gabapentin (NEURONTIN) capsule 300 mg     gabapentin (NEURONTIN) capsule 300 mg     gabapentin (NEURONTIN) capsule 600 mg     heparin 100 UNIT/ML injection 3 mL     heparin 100 UNIT/ML injection 3 mL     HOLD ACE Inhibitors: benazepril (LOTENSIN), captopril (CAPOTEN), enalapril (VASOTEC), fosinopril (MONOPRIL), lisinopril (PRINIVIL, ZESTRIL), perindopril (ACEON), quinapril (ACCUPRIL), ramipril (ALTACE), trandolapril (MAVIK)     insulin aspart (NovoLOG) injection (RAPID ACTING)     insulin aspart (NovoLOG) injection (RAPID ACTING)     lidocaine (LMX4) cream     lidocaine 1 % 0.1-1 mL     magnesium sulfate 4 g in 100 mL sterile water (premade)     melatonin tablet 3 mg     metoprolol tartrate  (LOPRESSOR) tablet 50 mg     naloxone (NARCAN) injection 0.1-0.4 mg     ondansetron (ZOFRAN-ODT) ODT tab 4 mg    Or     ondansetron (ZOFRAN) injection 4 mg     polyethylene glycol (MIRALAX) Packet 17 g     potassium chloride (KLOR-CON) Packet 20-40 mEq     potassium chloride 10 mEq in 100 mL intermittent infusion with 10 mg lidocaine     potassium chloride 10 mEq in 100 mL sterile water intermittent infusion (premix)     potassium chloride 20 mEq in 50 mL intermittent infusion     potassium chloride ER (KLOR-CON M) CR tablet 20-40 mEq     senna-docusate (SENOKOT-S/PERICOLACE) 8.6-50 MG per tablet 1 tablet    Or     senna-docusate (SENOKOT-S/PERICOLACE) 8.6-50 MG per tablet 2 tablet     simvastatin (ZOCOR) tablet 20 mg     sodium chloride (PF) 0.9% PF flush 3 mL     sodium chloride (PF) 0.9% PF flush 3 mL     traZODone (DESYREL) tablet 100 mg     trolamine salicylate (ASPERCREME) 10 % cream     umeclidinium (INCRUSE ELLIPTA) 62.5 MCG/INH inhaler 1 puff     vitamin B complex with vitamin C (STRESS TAB) tablet 1 tablet     vitamin C (ASCORBIC ACID) tablet 500 mg           Review of Systems:   + vision loss         Exam:     Vitals:    07/19/20 0932 07/19/20 1105 07/19/20 1147 07/19/20 1530   BP: 103/54  123/84 100/63   Pulse: 65 72 69 80   Resp: 16  18 18   Temp: 98  F (36.7  C)  97.7  F (36.5  C) 98.8  F (37.1  C)   TempSrc: Oral  Oral Oral   SpO2:    96%   Weight:       Height:                Data:     BMP  Recent Labs   Lab 07/16/20  0648 07/14/20  1645 07/14/20  0722 07/13/20  1157   NA  --  138 141 139   POTASSIUM  --  3.4 3.8 3.7   CHLORIDE  --  105 108 106   GANESH  --  8.6 8.8 9.3   CO2  --  24 27 26   BUN  --  22 18 16   CR 0.78 0.85 0.77 0.80   GLC  --  200* 100* 107*     CBC  Recent Labs   Lab 07/19/20  1000 07/19/20  0608 07/17/20  0915 07/16/20  0648 07/14/20  1645 07/14/20  0722   WBC 12.5*  --  9.9  --  8.6 7.5   RBC 4.58  --  4.59  --  4.60 4.49   HGB 14.3  --  14.4  --  14.5 14.1   HCT 45.2  --  44.1  --   45.1 44.5   MCV 99  --  96  --  98 99   MCH 31.2  --  31.4  --  31.5 31.4   MCHC 31.6  --  32.7  --  32.2 31.7   RDW 14.3  --  13.7  --  14.0 14.1    147* 196 170 191 191     INR  Recent Labs   Lab 07/19/20  1000 07/17/20  0915 07/15/20  1430 07/14/20  1645   INR 1.14 1.20* 1.06 1.06            Procedure Summary:   A single plasma volume plasma exchange was performed with a Spectra Optia cell separator.  The right internal jugular catheter was used for access.  ACD-A was used for anticoagulation.  To offset the effects of the citrate, calcium gluconate was given in the return line.  The replacement fluid was 5% albumin.  The patient's vital signs were stable throughout the procedure.  The patient tolerated the procedure well.    ATTESTATION STATEMENT:  I was immediately available by phone and pager throughout the TPE.  I was in direct communication with the apheresis nurse regarding the patient care plan.     Dallas Whitney M.D.  Professor, Transfusion Medicine  Laboratory Medicine & Pathology  Pager: [phone]

## 2020-07-20 NOTE — PLAN OF CARE
6A PT  Discharge Planner PT   Patient plan for discharge: notes she would need TCU at this time  Current status: Pt progressed in activity tolerance. Pt required CGA-BELKYS and cane for gait x 25 ft, further x 160 ft with seated rest breaks. Pt required CGA sit<>stand and SBA supine<>sit. Pt additionally participated in supine LE proximal strength ex.   Barriers to return to prior living situation: Medical status, new vision loss, balance deficits  Recommendations for discharge: TCU  Rationale for recommendations: Pt would benefit from continued skilled rehab to maximize safety and independence with functional mobility. Pending LOS and ability of family to provide assist, pt may be able to return home, will update as indicated.       Entered by: Bernie Spring 07/20/2020 11:12 AM

## 2020-07-20 NOTE — PROGRESS NOTES
Social Work Services Progress Note    Hospital Day: 8  Date of Initial Social Work Evaluation:  7/15/2020  Collaborated with:  Amparo Brooks (pt's [facility]  contracted through [facility]'s  [phone])       Data:  SW is following pt for discharge planning.  A TCU stay is recommended.  Pt is receiving Plex treatment through 7/24/2020.      Intervention:  SW spoke with Dr. Blanca who states that discharge is anticipated on 7/24/2020 following completion of plex treatments.  Via Epic, SW sent short term rehab referrals to the following SNF's:  [facility], [facility], [facility] and to [facility].  SW phoned Amparo Boo (pt's [facility]  contracted through [facility]'s  [phone]) and left a message updating in regards to the discharge plan.  SW attempted to update pt.  Pt was on a phone call and indicated that she wished to continue her phone call.       Assessment: Rehab placement is recommended.  Pt is not ready for discharge as pt is receiving PLEX treatments until 7/24/2020.    Plan:    Anticipated Disposition:  Short term rehab placement    Barriers to d/c plan:  Pt is receiving plex treatments until 7/24/2020.    Follow Up:  SW will continue to follow.    CAMELIA Marcial  Social Work, 6A  Phone:  [phone]  Pager:  [phone]  7/20/2020

## 2020-07-20 NOTE — PLAN OF CARE
Status: Pt admitted for worsening vision in her L eye, found to have acute flair of neuromyelitis optica.   Vitals: Stable on RA.   Neuros: A&O x4. Blind in R eye, worsening vision loss in L eye, dysconjugate gaze, L field cut, R pupil sluggish. R pupil >L pupil, sluggish. N/T BLE toes. Pt reports only seeing the color blue or other dark colors but no bright colors.   IV: PIV SL, CVC has been hep locked today.   Resp/trach: LS clear.   Diet: Regular diet, good intake.   Bowel status: Large loose BM earlier today, pt wanted Imodium to help prevent diarrhea. Discussed and educated better diet options, pt believes dairy causes her loose BMs.   : Voiding without difficulty.   Skin: Rashes/scabs throughout.   Pain: Denies.   Activity: Up with 1/GB/cane.   Social: Has been chatting with family over the phone all day.   Plan: PLEX 4/5 tomorrow. Continue to monitor and follow POC.

## 2020-07-20 NOTE — PROGRESS NOTES
OPHTHALMOLOGY PROGRESS NOTE  07/20/20    Patient: Ladonna Sheriff      HISTORY OF PRESENTING ILLNESS:     Ladonna Sheriff is a 75 year old female who is admitted for vision loss in the left eye. She lost her vision in the right eye in 1995. She has had recurring episodes of optic neuritis in both eyes. The last episode of retuxan infusion was on 06/01/2020. 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 MRI showed enhancement of the left optic nerve which is consistent with active optic neuritis.      Interval Update: Patient is doing well. Vision continues to improve.     10+ review of systems were otherwise negative except for that which has been stated above.      OCULAR/MEDICAL/SURGICAL HISTORIES:     Past Ocular History:  Optic Neuritis both eyes.     Past Medical History:   Diagnosis Date     Cerebral infarction (H)      CHF (congestive heart failure) (H)      Hypertension      Lupus (H)      Lupus (H)      Optic neuritis      Optic neuritis      Sjogren's syndrome (H)      Sjogren's syndrome (H)      Temporal arteritis (H)      TIA (transient ischemic attack)      Uncomplicated asthma        Past Surgical History:   Procedure Laterality Date     CHOLECYSTECTOMY       GYN SURGERY      hysterectomy     GYN SURGERY      tubal ligation     IR CVC NON TUNNEL PLACEMENT  6/21/2019     IR CVC NON TUNNEL PLACEMENT  7/15/2020     IR FOLLOW UP VISIT INPATIENT  7/2/2019       EXAMINATION:     Base Eye Exam     Visual Acuity       Right Left    Near cc NLP 20/200          Pupils       APD    Right (+) APD    Left           Neuro/Psych     Oriented x3:  Yes    Mood/Affect:  Normal            Slit Lamp and Fundus Exam     External Exam       Right Left    External Normal Normal          Slit Lamp Exam       Right Left    Lids/Lashes Normal Normal    Conjunctiva/Sclera White and quiet White and quiet    Cornea Clear Clear    Anterior Chamber Slightly narrow Deep and quiet    Iris Round and  reactive Round and reactive    Lens 2+ NS  PCIOL          Fundus Exam       Right Left    Vitreous Normal Normal    Disc 3-4+ pallor  3+ pallor     C/D Ratio 0.8 0.6    Macula RPE changes  ERM     Vessels Normal Normal    Periphery NOT DILATED NOT DILATED                     ASSESSMENT/PLAN:     #AQP4 Positive Neuromyelitis Optica, left eye.   - Patient presents with vision loss in the left eye. Lab testing is positive for AQP4. There is concern that NMO may not be responding to rituximab given recurrent left optic neuritis. Patient will follow up with Dr. Franklin for consideration of other immunomodulatory therapy for NMO.  - Vision is NLP in the left eye and improved to 20/200 in the right eye. Optic nerve appears pale, left eye > right eye.     Plan:   - Will continue 5 days of 1 g methylprednisone (07/13-07/17).  - Will continue PLEX every other day for a total of 5 sessions (07/15-07/23)   - Patient will follow up with Dr. Franklin for long term treatment strategy.   - Recommend low vision evaluation with Dr. Petar Antonio if patient is interested.   - Ophthalmology to follow.         It is our pleasure to participate in this patient's care and treatment. Please contact us with any further questions or concerns.      Yen Chiang, JO-ANN  Adjunct   Ophthalmology   Pager: [phone]

## 2020-07-20 NOTE — PROGRESS NOTES
"[facility]  General Neurology Progress Note    Patient Name:  Ladonna Sheriff        MRN:  [id number]    :  1945        Date of service: 2020      Patient Summary: Per H&P  Ms. 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 She presented to the ED on 20 with worsening vision in her L eye. Patient reports that this started on  and continued to progress. She initially had blurry vision as well as eye pain that progressed to involve whitening of her vision, followed by a \"tan like color\" followed by visual loss \"completely dark\". She no longer has any pain with eye movements now. She reports that she has had multiple episodes of L eye visual loss in the past (4-5 times). Last occurred ~2 years ago. Reports she has baseline low vision now because of her prior events of optic neuritis. She has to be close to TV screen to read and has difficulty making out details. During all of her prior events she was treated with oral prednisone with improvement of her symptoms. She took two 20mg prednisone pills last now and two this morning before presenting to the ED. Presented to [facility] and received 1g methylprednisolone before being transferred to [facility].     Patient follows up with Dr Caterina Franklin for her NMO. Last Rituximab infusion was in 20. Reports she has had a total of 3 infusions so far (2019, 2019 and 2020). Prior to this she was not on any long-term immunomodulatory treatment for her NMO and was only treated with steroids.      Notably, she had an MR brain and C/T spine on 2019 which showed focal gadolinium enhancing lesion from T4 through T5 in the spinal cord on the right side with edema extending from T3-T4 down to lower T5. " "Repeat imaging on 5/2020 showed cord volume loss and abnormal signal/enhancement at T2 through T4 consistent with known demyelinating disease. Last note from Dr Franklin planned for repeat imaging to determine efficacy of Rituximab for Ms Voith given evidence of signal enhancement.     Interval history:   Vision stable today. Able to distinguish number of fingers at 2 feet. Able to read top row of eye chart (per senior resident exam). Pt unable to read wall clock this morning. No N/V/CP/SOB/fevers. No weakness or new sensory changes. Notes some abdominal discomfort and diarrhea post PLEX yesterday, improved this morning with improved appetite.       Physical Examination   Vitals: /50 (BP Location: Right arm)   Pulse 60   Temp 96.7  F (35.9  C) (Oral)   Resp 18   Ht 1.702 m (5' 7.01\")   Wt 90.7 kg (199 lb 15.3 oz)   SpO2 92%   BMI 31.31 kg/m    Physical Exam:  Constitutional: Alert. Lying in bed comfortably. No acute distress.   Head: Atraumatic, normocephalic. Wearing glasses  ENT: Moist mucous membranes. No sinus drainage.  Cardiovascular: Appears warm, well-perfused, and non-toxic.  Respiratory: No increased work of breathing, no accessory muscle use.   Musculoskeletal: Moving all four limbs spontaneously.    Skin: No rashes or lesions appreciated on visualized skin.   Hematologic/Lymphatic/Immunologic: No bruising appreciated on visualized skin.   Neurologic:   Mental Status: alert, oriented to p/p/t. Speech is fluent, able to comprehend, and follows commands. No dysarthria.  Cranial Nerves: left pupil is reactive to light, no reactivity to light in right eye. Able to bury sclerae in left eye. Gaze not conjugate. No nystagmus noted. Central scotoma stable. Able to distinguish fingers approx 2 feet from face. Smile and eyebrow raise equal, blinking symmetric, no weakness. Nasolabial folds symmetric. hearing intact to conversation.  Motor: Moving all extremities equally.  and leg raise 5/5 and " symmetric.    Sensory: unchanged sensory level at T4       Investigations     Results for orders placed or performed during the hospital encounter of 07/13/20   MR Cervical Spine w/o & w Contrast    Impression    Impression: Motion degraded study.  1. Cervical spine: No abnormal enhancement or cord abnormality is  identified. Multilevel cervical spondylosis, without significant  change from 5/15/2020.  2. Thoracic spine: Decreased abnormal cord signal extending from T2 to  T4, with resolution of previously seen enhancement. There is  persistent cord volume loss. No new abnormal cord signal or  enhancement is identified.    I have personally reviewed the examination and initial interpretation  and I agree with the findings.    DANILO WAGNER MD   MR Brain and Orbits    Impression    Impression:   1. The study demonstrates greater than 20 foci of T2-hyperintensity  within the cerebral white matter consistent with the clinical  suspicion of demyelinating disease. There are no foci of abnormal  enhancement noted intracranially. Although mildly degraded by motion  artifact, no convincing findings of optic neuritis.  2. Previously seen enhancing lesion in the left parieto-occipital  region appears resolved. No new lesions are identified.    I have personally reviewed the examination and initial interpretation  and I agree with the findings.    DANILO WAGNER MD   MR Thoracic Spine w/o & w Contrast    Impression    Impression: Motion degraded study.  1. Cervical spine: No abnormal enhancement or cord abnormality is  identified. Multilevel cervical spondylosis, without significant  change from 5/15/2020.  2. Thoracic spine: Decreased abnormal cord signal extending from T2 to  T4, with resolution of previously seen enhancement. There is  persistent cord volume loss. No new abnormal cord signal or  enhancement is identified.    I have personally reviewed the examination and initial interpretation  and I agree with the  findings.    DANILO WAGNER MD     Recent Labs   Lab 07/20/20  0648 07/19/20  1000 07/19/20  0608 07/17/20  0915 07/16/20  0648 07/15/20  1430 07/14/20  1645 07/14/20  0722  07/13/20  1157   WBC 10.7 12.5*  --  9.9  --   --  8.6 7.5  --  5.7   HGB 13.4 14.3  --  14.4  --   --  14.5 14.1  --  14.4   MCV 98 99  --  96  --   --  98 99  --  99    210 147* 196 170  --  191 191  --  192   INR  --  1.14  --  1.20*  --  1.06 1.06 0.81*   < >  --    NA  --   --   --   --   --   --  138 141  --  139   POTASSIUM  --   --   --   --   --   --  3.4 3.8  --  3.7   CHLORIDE  --   --   --   --   --   --  105 108  --  106   CO2  --   --   --   --   --   --  24 27  --  26   BUN  --   --   --   --   --   --  22 18  --  16   CR  --   --   --   --  0.78  --  0.85 0.77  --  0.80   ANIONGAP  --   --   --   --   --   --  8 7  --  7   GANESH  --   --   --   --   --   --  8.6 8.8  --  9.3   GLC  --   --   --   --   --   --  200* 100*  --  107*   ALBUMIN  --   --   --   --   --   --   --  3.5  --   --    PROTTOTAL  --   --   --   --   --   --   --  7.4  --   --    BILITOTAL  --   --   --   --   --   --   --  0.6  --   --    ALKPHOS  --   --   --   --   --   --   --  65  --   --    ALT  --   --   --   --   --   --   --  28  --   --    AST  --   --   --   --   --   --   --  22  --   --     < > = values in this interval not displayed.         Assessment and Plan:  Ms Ladonna Sheriff is a 75 year old female with a PMHx of NMO with residual R eye vision loss, T4 transverse myelitis, and on-off episodes of L eye optic neuritis with baseline low vision who presented to the ED on 7/13/20 with worsening vision in her L eye concerning for optic neuritis/NMO relapse.    Continue plasma exchange. Plan for session 3/5 tomorrow.     #AQP4 positive NMO  #L eye vision loss 2/2 optic neuritis/NMO relapse  Received 1g methylpred at [facility] prior to transfer. MRI Brain & Orbits, C/T spine completed without clear new lesion in brain or spinal cord. L optic nerve  with slight enhancement. CD19 levels as expected. Eye exam without cherry red spot or exam findings consistent with CRAO or other etiology per optho. There is concern that her NMO is not responding to Rituximab, however this admit MRI improved from last one completed prior to admission. Pt has been on rituximab since last June, and now with optic neuritis relapse likely failure of rituximab. She might need consideration of other immunomodulatory therapy for her NMO such as eculizumab or inebilizumab. Dr. Franklin notified, will plan to have pt follow up with Dr. Franklin in clinic for long term tx strategy. IR placed line (CVC placement, double lumen, non-tunneled apheresis catheter, dialysis capable) on 7/15 and pt got first session of plasma exchange same day, transfusion med consulted, appreciate assistance with PLEX. s/p 5 days of 1g methylpred for relapse event   -PLEX s/p 3/5 sessions, plan for every other day plasma exchange for 5 total sessions   - medium dose sliding scale correction insulin given s/p high dose steroids and hx of insulin insensitivity  - Ophthalmology/optometry consulted, appreciate recs  - PT/OT evaluations, appreciate assistance  - PTA gabapentin for transverse myelitis symptoms     Chronic issues:  #Bronchospastic disease  - Continue PTA albuterol and Incruse Ellipta    #SLE  #Sjogren's disease  Previously on chronic prednisone, not for a while per patient.      #Hypertension  - Continue PTA amlodipine and metoprolol  -NO ACEi or Arb antihypertensives while receiving plasma exchange-reaction risk     #Heart failure  Sees Dr. Hewitt in cardiology clinic for chronic systolic heart failure (EF 45%), mild LV dysfunction, left bundle branch block, moderate aortic stenosis. Stress test in 2018 negative. Well controlled on home medications.   - Continue PTA Lasix  - Continue PTA amlodipine and metoprolol  - Continue PTA simvastatin   - Continue PTA ASA 325mg      #Hx of TIA  Per patient TIA happened  in around 2005, when she lived in [place]. She noted sudden change in ability to speak, this resolved shortly after (she can't remember specific details of hospital workup). She had not previously been on aspirin, but was started on full-dose aspirin and plavix after this event and has been on them ever since. No recurrence. No other strokes/TIAs. No history of stents in coronaries or carotids per patient and chart review. No other known indication for DAPT. After extensive discussion with patient, joint decision was made to discontinue plavix to decrease risk of bleeding event with limited additional benefit on second antiplatelet agent given her history.   - Continue PTA ASA 325mg   - discontinued PTA Plavix, and remove from home medication list  - Continue PTA simvastatin      #Mood d/o  - Continue PTA fluoxetine     #Sleep  - Continue PTA trazodone     FEN: Regular  PPx: SCDs, Lovenox  Lines: CVC, PIV  Code: Full     Patient was seen and discussed with

## 2020-07-20 NOTE — PLAN OF CARE
Discharge Planner OT   Patient plan for discharge: TCU  Current status: Pt engaged in ADL activity during therapy session and able to complete lower body dressing and standing ADL with CGA with assistance required to locate items  Barriers to return to prior living situation: Decreased endurance, vision  Recommendations for discharge: short term TCU  Rationale for recommendations: Pt will benefit from continued therapy to increase activity tolerance and independence with ADL       Entered by: Miles Hurley 07/20/2020 2:41 PM

## 2020-07-20 NOTE — PLAN OF CARE
Status: Pt admitted for worsening vision in her L eye, found to have acute flair of neuromyelitis optica  Vitals: stable on RA  Neuros: A&O x4. Blind in R eye, worsening vision loss in L eye, dysconjugate gaze, L field cut, R pupil sluggish. R pupil >L pupil, sluggish. N/T BLE toes.  IV: PIV SL, CVC has been hep locked  Resp/trach: LS clear  Diet: Regular diet, poor intake per report  Bowel status: Last BM loose on 7/19  : Voiding without difficulty  Skin: Rashes/scabs throughout  Pain: Denied  Activity: Up with 1/GB/cane  Social: Son has been visiting during days  Plan: PLEX 4/5 on Tuesday. Continue to monitor and follow POC

## 2020-07-20 NOTE — PROGRESS NOTES
"CLINICAL NUTRITION SERVICES - ASSESSMENT NOTE     Nutrition Prescription    Malnutrition Status:    Unable to determine due to unable to complete all parameters of nutrition assessment at this time.    Recommendations already ordered by Registered Dietitian (RD):  None at this time.     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   -Pt undergoing PLEX - PLEX 4/5 tomorrow.    NUTRITION HISTORY  -Per pt: Has a great appetite and says she has \"no problem in that department\". Trigger foods for diarrhea include dairy (even cheese, which she still eats because she loves the flavor), maybe eggs (however, she usually has cheese with eggs, so this is questionable), and flores fat.     -Per chart: No food allergies. Previously assessed by RD earlier this month for LOS - no malnutrition diagnosis made.     CURRENT NUTRITION ORDERS  Diet: Regular    Intake/Tolerance:   -Per pt: Ate ham and cheese omelet and hash browns this morning. She shares her appetite is good.    -Per chart: Eating 100% meals per RN flowsheets, but eating poorly per RN chart notes - not congruent with pt's report.     LABS  Labs reviewed  -No BMP since 7/14 - lytes, renal labs, and LFTs WNL at that time    MEDICATIONS  Medications reviewed  -Caltrate   -Lasix  -Medium dose sliding scale insulin TID before meals + HS  -Vitamin B complex with vitamin C (stress tab)  -Vitamin C 500 mg    ANTHROPOMETRICS  Height: 170.2 cm (5' 7.008\")  Most Recent Weight: 90.7 kg (199 lb 15.3 oz)    IBW: 61.4 kg (148% IBW)  BMI: 31.31 kg/m2; Obesity Grade I BMI 30-34.9  Weight History: ~6% wt loss over past ~1.5 months.   Wt Readings from Last 20 Encounters:   07/19/20 90.7 kg (199 lb " 15.3 oz)   06/01/20 95.9 kg (211 lb 6.4 oz)   11/15/19 100.5 kg (221 lb 8 oz)   10/30/19 95.3 kg (210 lb)   07/31/19 90.7 kg (200 lb)   07/06/19 96.3 kg (212 lb 6.4 oz)   02/04/19 97.6 kg (215 lb 3.2 oz)   07/19/16 96.4 kg (212 lb 8 oz)   07/17/16 90.7 kg (200 lb)   Dosing Weight: 69 kg (adjusted, based on most recent wt this admit of 90.7 kg on 7/19 and IBW of 61.4 kg)    ASSESSED NUTRITION NEEDS  Estimated Energy Needs: 9391-1047 kcals/day (25 - 30 kcals/kg)  Justification: Maintenance  Estimated Protein Needs:  grams protein/day (1.2 - 1.5 grams of pro/kg)  Justification: Hypercatabolism with acute illness  Estimated Fluid Needs: 1 mL/kcal   Justification: Maintenance or Per provider pending fluid status    PHYSICAL FINDINGS  See malnutrition section below.  -Some bruising noted per RN flowsheets    MALNUTRITION**Nutrition focused physical exam available per MD request. --> Unable to obtain in-person nutrition history or nutrition focused physical assessment (NFPA) from patient as the number of staff going into rooms is restricted to limit exposure and to minimize use of PPE.  % Intake: No decreased intake noted  % Weight Loss: Weight loss does not meet criteria  Subcutaneous Fat Loss: Unable to assess  Muscle Loss: Unable to assess  Fluid Accumulation/Edema: None noted per chart  Malnutrition Diagnosis: Unable to determine due to unable to complete all parameters of nutrition assessment at this time.    NUTRITION DIAGNOSIS  Predicted inadequate nutrient intake related to pt currently eating well but potential for PO to decline pending LOS.       INTERVENTIONS  Implementation  -Nutrition Education: Provided education on RD role, encouraged adequate oral intake (particularly protein). Reviewed protein sources. Pt declined nutrition supplements at this time. Encouraged to reach out if any nutrition questions or concerns arise.    Goals  Patient to consume % of nutritionally adequate meal trays TID, or  the equivalent with supplements/snacks.     Monitoring/Evaluation  Progress toward goals will be monitored and evaluated per protocol.    Maya Valdez RD, LD  Pager: [phone]

## 2020-07-20 NOTE — PLAN OF CARE
Status: Pt admitted to 6A for decreased vision in L eye. Found to have acute flair of neuromyelitis optica.   Vitals: VSS ex soft BP within parameters.  Neuros: A&O x4. Lethargic at times. Strengths 5/5. Decreased sensation in lower extremities. Blind in R eye, worsening vision in L. L field cut. R pupil > L pupil at times and sluggish. Dysconjugate gaze.   IV: PIV SL. CVC heparin locked.   Resp/trach: LS clear. Denies SOB.   Diet: Regular, poor intake this shift.   Bowel status: BS+. Frequent diarrhea, loperamide given. Incontinent at times.   : Voids spontaneously without difficulty.   Skin: Rash and scabs throughout.   Pain: Denies.   Activity: Assist of one, GB, cane. Frequent walks to the bathroom.   Social: Pt updating family members.   Plan: PLEX 3/5 today, next on 7/21. Continue to monitor and follow plan of care.

## 2020-07-21 NOTE — PLAN OF CARE
7a-7:30pm  Status: Admitted with decreased vision in L eye-Neuromyelitis optica.Is getting PLEX and vision has improved slightly.  Vitals: VSS   Neuros: A&Ox4.  ZHENG = strong.BLE with N/T at baseline.R eye blind.  L eye with limited vision (sees some colors ,shapes). Pupil size sometimes unequal.both sluggish. Dysconjugate gaze.    IV: PIV SL. R CVC (PLEX only) hep locked.  Diet: PO'ing Regular diet well.  Meals ordered for her.   Bowel status: Soft BM x 2 this shift.  : Voiding spont  Pain: denies  Activity: A1, GB, cane to bathroom and in halls.  Plan: Had PLEX 4/5 today.  Last on Thursday and then to TCU.

## 2020-07-21 NOTE — PROGRESS NOTES
"[facility]  General Neurology Progress Note    Patient Name:  Ladonna Sheriff        MRN:  [id number]    :  1945        Date of service: 2020      Patient Summary: Per H&P  Ms. 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 She presented to the ED on 20 with worsening vision in her L eye. Patient reports that this started on  and continued to progress. She initially had blurry vision as well as eye pain that progressed to involve whitening of her vision, followed by a \"tan like color\" followed by visual loss \"completely dark\". She no longer has any pain with eye movements now. She reports that she has had multiple episodes of L eye visual loss in the past (4-5 times). Last occurred ~2 years ago. Reports she has baseline low vision now because of her prior events of optic neuritis. She has to be close to TV screen to read and has difficulty making out details. During all of her prior events she was treated with oral prednisone with improvement of her symptoms. She took two 20mg prednisone pills last now and two this morning before presenting to the ED. Presented to [facility] and received 1g methylprednisolone before being transferred to [facility].     Patient follows up with Dr Caterina Franklin for her NMO. Last Rituximab infusion was in 20. Reports she has had a total of 3 infusions so far (2019, 2019 and 2020). Prior to this she was not on any long-term immunomodulatory treatment for her NMO and was only treated with steroids.      Notably, she had an MR brain and C/T spine on 2019 which showed focal gadolinium enhancing lesion from T4 through T5 in the spinal cord on the right side with edema extending from T3-T4 down to lower T5. " "Repeat imaging on 5/2020 showed cord volume loss and abnormal signal/enhancement at T2 through T4 consistent with known demyelinating disease. Last note from Dr Fraknlin planned for repeat imaging to determine efficacy of Rituximab for Ms Voith given evidence of signal enhancement.     Interval history:   Vision stable today. No N/V/CP/SOB/fevers. No weakness or new sensory changes. No diarrhea since episode resolved yesterday.      Physical Examination   Vitals: /58 (BP Location: Right arm)   Pulse 79   Temp 98.6  F (37  C) (Oral)   Resp 16   Ht 1.702 m (5' 7.01\")   Wt 90.7 kg (199 lb 15.3 oz)   SpO2 92%   BMI 31.31 kg/m    Physical Exam:  Constitutional: Alert. Lying in bed comfortably. No acute distress.   Head: Atraumatic, normocephalic.   ENT: Moist mucous membranes. No sinus drainage.  Cardiovascular: Appears warm, well-perfused, and non-toxic.  Respiratory: No increased work of breathing, no accessory muscle use.   Musculoskeletal: Moving all four limbs spontaneously.    Skin: No rashes or lesions appreciated on visualized skin.   Hematologic/Lymphatic/Immunologic: No bruising appreciated on visualized skin.   Neurologic:   Mental Status: alert, oriented to p/p/t. Speech is fluent, able to comprehend, and follows commands. No dysarthria.  Cranial Nerves: left pupil is reactive to light, no reactivity to light in right eye. Able to bury sclerae in left eye. Gaze not conjugate. No nystagmus noted. Central scotoma stable. Able to distinguish fingers approx 2 feet from face. Smile and eyebrow raise equal, blinking symmetric, no weakness. Nasolabial folds symmetric. hearing intact to conversation.  Motor: Moving all extremities equally.   Sensory: unchanged sensory level at T4       Investigations     Results for orders placed or performed during the hospital encounter of 07/13/20   MR Cervical Spine w/o & w Contrast    Impression    Impression: Motion degraded study.  1. Cervical spine: No abnormal " enhancement or cord abnormality is  identified. Multilevel cervical spondylosis, without significant  change from 5/15/2020.  2. Thoracic spine: Decreased abnormal cord signal extending from T2 to  T4, with resolution of previously seen enhancement. There is  persistent cord volume loss. No new abnormal cord signal or  enhancement is identified.    I have personally reviewed the examination and initial interpretation  and I agree with the findings.    DANILO WAGNER MD   MR Brain and Orbits    Impression    Impression:   1. The study demonstrates greater than 20 foci of T2-hyperintensity  within the cerebral white matter consistent with the clinical  suspicion of demyelinating disease. There are no foci of abnormal  enhancement noted intracranially. Although mildly degraded by motion  artifact, no convincing findings of optic neuritis.  2. Previously seen enhancing lesion in the left parieto-occipital  region appears resolved. No new lesions are identified.    I have personally reviewed the examination and initial interpretation  and I agree with the findings.    DANILO WAGNER MD   MR Thoracic Spine w/o & w Contrast    Impression    Impression: Motion degraded study.  1. Cervical spine: No abnormal enhancement or cord abnormality is  identified. Multilevel cervical spondylosis, without significant  change from 5/15/2020.  2. Thoracic spine: Decreased abnormal cord signal extending from T2 to  T4, with resolution of previously seen enhancement. There is  persistent cord volume loss. No new abnormal cord signal or  enhancement is identified.    I have personally reviewed the examination and initial interpretation  and I agree with the findings.    DANILO WAGNER MD     Recent Labs   Lab 07/20/20  0648 07/19/20  1000 07/19/20  0608 07/17/20  0915 07/16/20  0648 07/15/20  1430 07/14/20  1645 07/14/20  0722   WBC 10.7 12.5*  --  9.9  --   --  8.6 7.5   HGB 13.4 14.3  --  14.4  --   --  14.5 14.1   MCV 98 99  --  96  --    --  98 99    210 147* 196 170  --  191 191   INR  --  1.14  --  1.20*  --  1.06 1.06 0.81*   NA  --   --   --   --   --   --  138 141   POTASSIUM  --   --   --   --   --   --  3.4 3.8   CHLORIDE  --   --   --   --   --   --  105 108   CO2  --   --   --   --   --   --  24 27   BUN  --   --   --   --   --   --  22 18   CR  --   --   --   --  0.78  --  0.85 0.77   ANIONGAP  --   --   --   --   --   --  8 7   GANESH  --   --   --   --   --   --  8.6 8.8   GLC  --   --   --   --   --   --  200* 100*   ALBUMIN  --   --   --   --   --   --   --  3.5   PROTTOTAL  --   --   --   --   --   --   --  7.4   BILITOTAL  --   --   --   --   --   --   --  0.6   ALKPHOS  --   --   --   --   --   --   --  65   ALT  --   --   --   --   --   --   --  28   AST  --   --   --   --   --   --   --  22         Assessment and Plan:  Ms Ladonna Sheriff is a 75 year old female with a PMHx of NMO with residual R eye vision loss, T4 transverse myelitis, and on-off episodes of L eye optic neuritis with baseline low vision who presented to the ED on 7/13/20 with worsening vision in her L eye concerning for optic neuritis/NMO relapse.    Continue plasma exchange. Plan for session 4/5 today     #AQP4 positive NMO  #L eye vision loss 2/2 optic neuritis/NMO relapse  Received 1g methylpred at [facility] prior to transfer. MRI Brain & Orbits, C/T spine completed without clear new lesion in brain or spinal cord. L optic nerve with slight enhancement. CD19 levels as expected. Eye exam without cherry red spot or exam findings consistent with CRAO or other etiology per optho. There is concern that her NMO is not responding to Rituximab, however this admit MRI improved from last one completed prior to admission. Pt has been on rituximab since last June, and now with optic neuritis relapse likely failure of rituximab. She might need consideration of other immunomodulatory therapy for her NMO such as eculizumab or inebilizumab. Dr. Franklin notified, will plan to  have pt follow up with Dr. Franklin in clinic for long term tx strategy. IR placed line (CVC placement, double lumen, non-tunneled apheresis catheter, dialysis capable) on 7/15 and pt got first session of plasma exchange same day, transfusion med consulted, appreciate assistance with PLEX. s/p 5 days of 1g methylpred for relapse event   -PLEX s/p 3/5 sessions, plan for every other day plasma exchange for 5 total sessions (session 4 today)  - medium dose sliding scale correction insulin given s/p high dose steroids and hx of insulin insensitivity  - Ophthalmology/optometry consulted, appreciate recs  - PT/OT evaluations, appreciate assistance  - PTA gabapentin for transverse myelitis symptoms  -follow up with primary neurologist Dr. Franklin for change to immunotherapy given recurrence of optic neuritis on rituximab     Chronic issues:  #Bronchospastic disease  - Continue PTA albuterol and Incruse Ellipta    #SLE  #Sjogren's disease  Previously on chronic prednisone, not for a while per patient.      #Hypertension  - Continue PTA amlodipine and metoprolol  -NO ACEi or Arb antihypertensives while receiving plasma exchange-reaction risk     #Heart failure  Sees Dr. Hewitt in cardiology clinic for chronic systolic heart failure (EF 45%), mild LV dysfunction, left bundle branch block, moderate aortic stenosis. Stress test in 2018 negative. Well controlled on home medications.   - Continue PTA Lasix  - Continue PTA amlodipine and metoprolol  - Continue PTA simvastatin   - Continue PTA ASA 325mg      #Hx of TIA  Per patient TIA happened in around 2005, when she lived in [place]. She noted sudden change in ability to speak, this resolved shortly after (she can't remember specific details of hospital workup). She had not previously been on aspirin, but was started on full-dose aspirin and plavix after this event and has been on them ever since. No recurrence. No other strokes/TIAs. No history of stents in coronaries or carotids  per patient and chart review. No other known indication for DAPT. After extensive discussion with patient, joint decision was made to discontinue plavix to decrease risk of bleeding event with limited additional benefit on second antiplatelet agent given her history.   - Continue PTA ASA 325mg   - discontinued PTA Plavix, and remove from home medication list  - Continue PTA simvastatin      #Mood d/o  - Continue PTA fluoxetine     #Sleep  - Continue PTA trazodone     FEN: Regular  PPx: SCDs, Lovenox  Lines: CVC, PIV  Code: Full     Patient was seen and discussed with

## 2020-07-21 NOTE — PLAN OF CARE
Discharge Planner PT   Patient plan for discharge: TCU  Current status: Pt performs basic transfers with CGA. Pt amb ~ 50 feet x 2 with SPC and CGA. Pt performed 3 stairs with CGA, very fatigued after 3 stairs.   Barriers to return to prior living situation: medical status, decreased strength, activity intolerance, impaired balance, vision loss, pt has 16 stairs to her apartment  Recommendations for discharge: TCU  Rationale for recommendations: Pt would benefit from additional skilled PT to address functional mobility, transfers, gait and balance.       Entered by: Mercedes Noble 07/21/2020 1:35 PM

## 2020-07-21 NOTE — PLAN OF CARE
Discharge Planner OT   Patient plan for discharge: TCU  Current status: Pt demonstrates ability to ambulate with CGA, cane, and L hand hold. Noted visual deficits, however, improving compensatory strategies such as scanning and tracking. Pt demonstrated ability to stand, search, and scan for objects on bed in R/L visual fields with 12/12 accuracy. CGA for dynamic balance. Pt noted ability to identify some blue, pink, and yellow colors more readily, in addition to high contrast objects. Ambulated ~100 ft in hallway noting high contrast objects (black bin vs white wall) within <20 ft, and correctly identifying objects <5 ft. Discussed TCU discharge plans with pt. Anticipates to discharge end of week.  Barriers to return to prior living situation: Medical status, visual deficits, endurance level, balance, lives alone, many stairs  Recommendations for discharge: TCU  Rationale for recommendations: Pt is motivated and willing to complete therapy and would benefit from continued skilled occupational therapy to improve independence & safety with ADL activities given vision deficits.        Entered by: Nicki Diehl 07/21/2020 9:02 AM

## 2020-07-21 NOTE — PROCEDURES
"              Laboratory Medicine and Pathology  Transfusion Medicine - Apheresis Procedure    Ladonna Sheriff MRN# [id number]   YOB: 1945 Age: 75 year old        Reason for consult: Neuromyelitis optica           Assessment and Plan:   The patient is a 75 year old female with NMO and acute flare.  She underwent therapeutic plasma exchange (TPE) #4 of planned 5 today. She tolerated the procedure well per apheresis RN.  Vital signs were stable throughout the procedure.  The patient is being followed by Ophthalmology and Neurology.  Final TPE #5/5 on Thursday, July 23, 2020.    Please do not start ACE inhibitors throughout the duration of the TPE series as these have been associated with reactions during apheresis.  Please notify the Transfusion Medicine physician of any upcoming procedures, surgeries, or biopsies as TPE with albumin replacement will affect coagulation factor levels.         Chief Complaint:   Vision loss         Interval History:   The patient is a 75 year old female with Aquaporin 4 antibody positive NMO and history of multiple acute flares. 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 TPE series staerted on 7/15/2020. Also receiving IV methylprednisolone. She feels vision had improved slightly after the first procedure but the became worse. Now reports \"80%\" improvement (Neurology note of 7/19/20).         Past Medical History:     Past Medical History:   Diagnosis Date     Cerebral infarction (H)      CHF (congestive heart failure) (H)      Hypertension      Lupus (H)      Optic neuritis      Sjogren's syndrome (H)      Temporal arteritis (H)      TIA (transient ischemic attack)      Uncomplicated asthma    Neuromyelitis optica  Vision loss          Past Surgical History:     Past Surgical History:   Procedure Laterality Date     CHOLECYSTECTOMY       GYN SURGERY      hysterectomy     GYN SURGERY      tubal ligation     IR CVC NON TUNNEL " PLACEMENT  6/21/2019     IR CVC NON TUNNEL PLACEMENT  7/15/2020     IR FOLLOW UP VISIT INPATIENT  7/2/2019          Social History:   , 3 sons and 1 grandchild, lives independently with 1 son living in the same apartment building           Allergies:     Allergies   Allergen Reactions     Prevacid [Lansoprazole] Rash     Pravastatin Rash     Patient is still not sure about it due many years ago for reaction.          Medications:     Current Facility-Administered Medications   Medication     acetaminophen (TYLENOL) tablet 325-975 mg     albuterol (PROAIR HFA/PROVENTIL HFA/VENTOLIN HFA) 108 (90 Base) MCG/ACT inhaler 2 puff     amLODIPine (NORVASC) tablet 10 mg     aspirin (ASA) EC tablet 325 mg     bisacodyl (DULCOLAX) Suppository 10 mg     calcium carbonate 600 mg-vitamin D 400 units (CALTRATE) per tablet 1 tablet     glucose gel 15-30 g    Or     dextrose 50 % injection 25-50 mL    Or     glucagon injection 1 mg     docusate sodium (COLACE) capsule 100 mg     enoxaparin ANTICOAGULANT (LOVENOX) injection 40 mg     FLUoxetine (PROzac) capsule 20 mg     furosemide (LASIX) tablet 20 mg     gabapentin (NEURONTIN) capsule 300 mg     gabapentin (NEURONTIN) capsule 300 mg     gabapentin (NEURONTIN) capsule 600 mg     heparin 100 UNIT/ML injection 3 mL     heparin 100 UNIT/ML injection 3 mL     HOLD ACE Inhibitors: benazepril (LOTENSIN), captopril (CAPOTEN), enalapril (VASOTEC), fosinopril (MONOPRIL), lisinopril (PRINIVIL, ZESTRIL), perindopril (ACEON), quinapril (ACCUPRIL), ramipril (ALTACE), trandolapril (MAVIK)     insulin aspart (NovoLOG) injection (RAPID ACTING)     insulin aspart (NovoLOG) injection (RAPID ACTING)     lidocaine (LMX4) cream     lidocaine 1 % 0.1-1 mL     loperamide (IMODIUM) capsule 2 mg     magnesium sulfate 4 g in 100 mL sterile water (premade)     melatonin tablet 3 mg     metoprolol tartrate (LOPRESSOR) tablet 50 mg     naloxone (NARCAN) injection 0.1-0.4 mg     ondansetron (ZOFRAN-ODT) ODT  "tab 4 mg    Or     ondansetron (ZOFRAN) injection 4 mg     polyethylene glycol (MIRALAX) Packet 17 g     potassium chloride (KLOR-CON) Packet 20-40 mEq     potassium chloride 10 mEq in 100 mL intermittent infusion with 10 mg lidocaine     potassium chloride 10 mEq in 100 mL sterile water intermittent infusion (premix)     potassium chloride 20 mEq in 50 mL intermittent infusion     potassium chloride ER (KLOR-CON M) CR tablet 20-40 mEq     senna-docusate (SENOKOT-S/PERICOLACE) 8.6-50 MG per tablet 1 tablet    Or     senna-docusate (SENOKOT-S/PERICOLACE) 8.6-50 MG per tablet 2 tablet     simvastatin (ZOCOR) tablet 20 mg     sodium chloride (PF) 0.9% PF flush 3 mL     sodium chloride (PF) 0.9% PF flush 3 mL     traZODone (DESYREL) tablet 100 mg     trolamine salicylate (ASPERCREME) 10 % cream     umeclidinium (INCRUSE ELLIPTA) 62.5 MCG/INH inhaler 1 puff     vitamin B complex with vitamin C (STRESS TAB) tablet 1 tablet     vitamin C (ASCORBIC ACID) tablet 500 mg           Review of Systems:   + vision loss         Exam:     Vitals:    07/20/20 2300 07/21/20 0804 07/21/20 0830 07/21/20 1034   BP: 110/58 104/61  116/59   BP Location: Right arm Right arm     Pulse:    84   Resp: 16 16  16   Temp: 98.6  F (37  C) 97.2  F (36.2  C)     TempSrc: Oral Oral     SpO2: 92% 93%     Weight:   90.7 kg (199 lb 15.3 oz)    Height:   1.702 m (5' 7.01\")             Data:     BMP  Recent Labs   Lab 07/21/20  0636 07/16/20  0648 07/14/20  1645   *  --  138   POTASSIUM 2.8*  --  3.4   CHLORIDE 111*  --  105   GANESH 8.2*  --  8.6   CO2 28  --  24   BUN 14  --  22   CR 0.75 0.78 0.85   *  --  200*     CBC  Recent Labs   Lab 07/20/20  0648 07/19/20  1000 07/19/20  0608 07/17/20  0915  07/14/20  1645   WBC 10.7 12.5*  --  9.9  --  8.6   RBC 4.22 4.58  --  4.59  --  4.60   HGB 13.4 14.3  --  14.4  --  14.5   HCT 41.2 45.2  --  44.1  --  45.1   MCV 98 99  --  96  --  98   MCH 31.8 31.2  --  31.4  --  31.5   MCHC 32.5 31.6  --  32.7  " --  32.2   RDW 14.1 14.3  --  13.7  --  14.0    210 147* 196   < > 191    < > = values in this interval not displayed.     INR  Recent Labs   Lab 07/21/20  0850 07/19/20  1000 07/17/20  0915 07/15/20  1430   INR 1.15* 1.14 1.20* 1.06            Procedure Summary:   A single plasma volume TPE was performed with a Spectra Optia cell separator.  The right internal jugular catheter was used for access.  ACD-A was used for anticoagulation.  To offset the effects of the citrate, calcium gluconate was given in the return line.  The replacement fluid was 5% albumin.  The patient's vital signs were stable throughout the procedure.  The patient tolerated the procedure well.        ATTESTATION STATEMENT:  I, Dallas Whitney M.D., was immediately available and present on-site during the entirety of the procedure.  I reviewed the patient's chart and was in direct communication with apheresis nursing staff during the procedure.  I did not visit in-person to conserve PPE/limit contacts due to covid19.      Dallas Whitney M.D.  Professor, Transfusion Medicine  Laboratory Medicine & Pathology  Pager: [phone]

## 2020-07-21 NOTE — PROGRESS NOTES
Social Work Services Progress Note    Hospital Day: 9  Date of Initial Social Work Evaluation:  7/15/2020  Collaborated with:  Patient, Dr. Blanca, Dr. Segovia    Data:  SW is following pt for discharge planning.  OT and Physical Therapy are recommending short term TCU placement.  Discharge is anticipated on 7/24/2020 upon completion of plex treatments.    Intervention:  SW met with pt this am and updated in regards to discharge planning (TCU recommended referrals made to the facility's pt identified as being preferences, anticipated discharge date of 7/24/2020).  KOBY spoke with Dr. Blanca who states that pt will complete her Plex treatments on 7/23/2020 and be ready for discharge on 7/24/2020.  KOBY received voice mail messages from Admissions at [facility] ([place] [phone]) and from [facility] (Critical access hospital [phone]) asking if pt will have add'l Plex treatments post discharge, asking date of last Covid Screen (7/13/2020) and asking if pt will discharge on Rituximab or any other immuno therapy.   KOBY spoke with Dr. Segovia who states that there are no plans for add'l plex treatments after 7/23/2020.  Dr. Segovia states that pt failed Rituximab and will not discharge on it. Dr. Segovia states that pt will have an out pt follow up appt with Dr. Franklin (Neuro Immunologist) to discuss starting Soliris in the future.  SW relayed this information to Admissions at [facility] and to Admissions at [facility]. Admissions Coordinator's indicate that discharge orders will need to state that pt will not receive any immuno suppressants during SNF stay.     Assessment:  Pt voices understanding of the discharge plan and agreement with the discharge plan.    Plan:    Anticipated Disposition: Short term TCU  placement    Barriers to d/c plan:  Pt is receiving PLEX treatments    Follow Up:  SW will continue to follow for discharge planning.    Pily  CAMELIA Ho  Social Work, 6A  Phone:  [phone]  Pager:  [phone]  7/21/2020

## 2020-07-21 NOTE — PROGRESS NOTES
Status: Decreased vision in L eye.  Neuromyelitis optica.  Vitals: VSS (Soft BP within parameters)  Neuros: A&Ox4.  5/5 strengths throughout.  Bilateral lower extremities have decreased sensation.  Blind R eye.  L eye vision limited vision (sees shadows, dark shades, and blue).  L field cut.  R pupil > L pupil, and sluggish at times.  Dysconjugate gaze.    IV: PIV SL. R CVC (PLEX only) hep locked.  Resp/trach: WDL  Diet: Regular.  Needs assistance with meal ordering and tray set-up.   Bowel status: BS+.  LBM 7/20, diarrhea.  : Voiding spont  Skin: rash and scabbing throughout  Pain: denies  Activity: A1, GB, cane  Plan: PLEX 4/5 today.

## 2020-07-21 NOTE — PLAN OF CARE
Status: Pt admitted to 6A for decreased vision in L eye. Found to have acute flair of neuromyelitis optica.   Vitals: VSS ex soft BP within parameters.  Neuros: A&O x4. Strengths 5/5. Decreased sensation in lower extremities. Blind in R eye, worsening vision in L. L field cut. R pupil > L pupil at times and sluggish. Dysconjugate gaze. Pt reports being able to see shadows and dark colors now.   IV: PIV SL. CVC heparin locked.   Resp/trach: LS clear. Denies SOB.   Diet: Regular, good intake.  Bowel status: BS+. Incontinent at times.   : Voids spontaneously without difficulty.   Skin: Rash and scabs throughout.   Pain: Denies.   Activity: Assist of one, GB, cane.   Social: Pt updating family members.   Plan: PLEX 4/5 tomorrow. Continue to monitor and follow plan of care.

## 2020-07-22 NOTE — PLAN OF CARE
Discharge Planner PT   Patient plan for discharge: TCU  Current status: Pt initially declined PT due to fatigue and need to nap. With return pt participated well, similar to last visit. Pt required BELKYS with cane use x 100 ft. Pt was more stable and required less assist for gait with FWW trial x 140 ft, 190 ft. Seated rest breaks were required. Pt also navigated up and down 3 stairs with railing support and CGA. Pt further performed seated LE strength ex.     Advise pt be assisted up by nursing with Ax1 gait belt and FWW.     Barriers to return to prior living situation: medical status, decreased strength, activity intolerance, impaired balance, vision loss, pt has 16 stairs to her apartment  Recommendations for discharge: TCU  Rationale for recommendations: Pt would benefit from additional skilled PT to address functional mobility, transfers, gait and balance. Do not anticipate pt would have the tolerance for an ARU stay.        Entered by: Bernie Spring 07/22/2020 3:43 PM

## 2020-07-22 NOTE — PROGRESS NOTES
"[facility]  General Neurology Progress Note    Patient Name:  Ladonna Sheriff        MRN:  [id number]    :  1945        Date of service: 2020      Patient Summary: Per H&P  Ms. 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 She presented to the ED on 20 with worsening vision in her L eye. Patient reports that this started on  and continued to progress. She initially had blurry vision as well as eye pain that progressed to involve whitening of her vision, followed by a \"tan like color\" followed by visual loss \"completely dark\". She no longer has any pain with eye movements now. She reports that she has had multiple episodes of L eye visual loss in the past (4-5 times). Last occurred ~2 years ago. Reports she has baseline low vision now because of her prior events of optic neuritis. She has to be close to TV screen to read and has difficulty making out details. During all of her prior events she was treated with oral prednisone with improvement of her symptoms. She took two 20mg prednisone pills last now and two this morning before presenting to the ED. Presented to [facility] and received 1g methylprednisolone before being transferred to [facility].     Patient follows up with Dr Caterina Franklin for her NMO. Last Rituximab infusion was in 20. Reports she has had a total of 3 infusions so far (2019, 2019 and 2020). Prior to this she was not on any long-term immunomodulatory treatment for her NMO and was only treated with steroids.      Notably, she had an MR brain and C/T spine on 2019 which showed focal gadolinium enhancing lesion from T4 through T5 in the spinal cord on the right side with edema extending from T3-T4 down to lower T5. " "Repeat imaging on 5/2020 showed cord volume loss and abnormal signal/enhancement at T2 through T4 consistent with known demyelinating disease. Last note from Dr Franklin planned for repeat imaging to determine efficacy of Rituximab for Ms Voith given evidence of signal enhancement.     Interval history:   Vision stable today. No N/V/CP/SOB/fevers/diarrhea. No weakness or new sensory changes.       Physical Examination   Vitals: BP 99/54 (BP Location: Right arm)   Pulse 95   Temp 99.3  F (37.4  C) (Oral)   Resp 16   Ht 1.702 m (5' 7.01\")   Wt 90.7 kg (199 lb 15.3 oz)   SpO2 92%   BMI 31.31 kg/m    Physical Exam:  Constitutional: Alert. Lying in bed comfortably. No acute distress.   Head: Atraumatic, normocephalic.   ENT: Moist mucous membranes. No sinus drainage.  Cardiovascular: Appears warm, well-perfused, and non-toxic.  Respiratory: No increased work of breathing, no accessory muscle use.   Musculoskeletal: Moving all four limbs spontaneously.    Skin: No rashes or lesions appreciated on visualized skin.   Hematologic/Lymphatic/Immunologic: No bruising appreciated on visualized skin.   Neurologic:   Mental Status: alert, oriented to p/p/t. Speech is fluent, able to comprehend, and follows commands. No dysarthria.  Cranial Nerves: left pupil is reactive to light, no reactivity to light in right eye. Able to bury sclerae in left eye. Gaze not conjugate. No nystagmus noted. Central scotoma stable. Able to distinguish fingers approx 2 feet from face, L eye 20/200. . Smile and eyebrow raise equal, blinking symmetric, no weakness. Nasolabial folds symmetric. hearing intact to conversation.  Motor: Moving all extremities equally.   Sensory: unchanged sensory level at T4       Investigations     Results for orders placed or performed during the hospital encounter of 07/13/20   MR Cervical Spine w/o & w Contrast    Impression    Impression: Motion degraded study.  1. Cervical spine: No abnormal enhancement or cord " abnormality is  identified. Multilevel cervical spondylosis, without significant  change from 5/15/2020.  2. Thoracic spine: Decreased abnormal cord signal extending from T2 to  T4, with resolution of previously seen enhancement. There is  persistent cord volume loss. No new abnormal cord signal or  enhancement is identified.    I have personally reviewed the examination and initial interpretation  and I agree with the findings.    DANILO WAGNER MD   MR Brain and Orbits    Impression    Impression:   1. The study demonstrates greater than 20 foci of T2-hyperintensity  within the cerebral white matter consistent with the clinical  suspicion of demyelinating disease. There are no foci of abnormal  enhancement noted intracranially. Although mildly degraded by motion  artifact, no convincing findings of optic neuritis.  2. Previously seen enhancing lesion in the left parieto-occipital  region appears resolved. No new lesions are identified.    I have personally reviewed the examination and initial interpretation  and I agree with the findings.    DANILO WAGNER MD   MR Thoracic Spine w/o & w Contrast    Impression    Impression: Motion degraded study.  1. Cervical spine: No abnormal enhancement or cord abnormality is  identified. Multilevel cervical spondylosis, without significant  change from 5/15/2020.  2. Thoracic spine: Decreased abnormal cord signal extending from T2 to  T4, with resolution of previously seen enhancement. There is  persistent cord volume loss. No new abnormal cord signal or  enhancement is identified.    I have personally reviewed the examination and initial interpretation  and I agree with the findings.    DANILO WAGNER MD     Recent Labs   Lab 07/22/20  0606 07/21/20  1522 07/21/20  0850 07/21/20  0636 07/20/20  0648 07/19/20  1000  07/17/20  0915 07/16/20  0648   WBC  --   --   --   --  10.7 12.5*  --  9.9  --    HGB  --   --   --   --  13.4 14.3  --  14.4  --    MCV  --   --   --   --  98 99   --  96  --      --   --   --  156 210   < > 196 170   INR  --   --  1.15*  --   --  1.14  --  1.20*  --    NA  --   --   --  145*  --   --   --   --   --    POTASSIUM  --  3.7  --  2.8*  --   --   --   --   --    CHLORIDE  --   --   --  111*  --   --   --   --   --    CO2  --   --   --  28  --   --   --   --   --    BUN  --   --   --  14  --   --   --   --   --    CR  --   --   --  0.75  --   --   --   --  0.78   ANIONGAP  --   --   --  5  --   --   --   --   --    GANESH  --   --   --  8.2*  --   --   --   --   --    GLC  --   --   --  102*  --   --   --   --   --     < > = values in this interval not displayed.         Assessment and Plan:  Ms Ladonna Sheriff is a 75 year old female with a PMHx of NMO with residual R eye vision loss, T4 transverse myelitis, and on-off episodes of L eye optic neuritis with baseline low vision who presented to the ED on 7/13/20 with worsening vision in her L eye concerning for optic neuritis/NMO relapse.    Continue plasma exchange. Plan for session 4/5 today     #AQP4 positive NMO  #L eye vision loss 2/2 optic neuritis/NMO relapse  Received 1g methylpred at [facility] prior to transfer. MRI Brain & Orbits, C/T spine completed without clear new lesion in brain or spinal cord. L optic nerve with slight enhancement. CD19 levels as expected. Eye exam without cherry red spot or exam findings consistent with CRAO or other etiology per optho. There is concern that her NMO is not responding to Rituximab, however this admit MRI improved from last one completed prior to admission. Pt has been on rituximab since last June, and now with optic neuritis relapse likely failure of rituximab. She might need consideration of other immunomodulatory therapy for her NMO such as eculizumab or inebilizumab. Dr. Franklin notified, will plan to have pt follow up with Dr. Franklin in clinic for long term tx strategy. IR placed line (CVC placement, double lumen, non-tunneled apheresis catheter, dialysis capable)  on 7/15 and pt got first session of plasma exchange same day, transfusion med consulted, appreciate assistance with PLEX. s/p 5 days of 1g methylpred for relapse event   -PLEX s/p 4/5 sessions, plan for every other day plasma exchange for 5 total sessions   - medium dose sliding scale correction insulin given s/p high dose steroids and hx of insulin insensitivity  - Ophthalmology/optometry consulted, appreciate recs  - PT/OT evaluations, appreciate assistance  - PTA gabapentin for transverse myelitis symptoms  -follow up with primary neurologist Dr. Franklin for change to immunotherapy given recurrence of optic neuritis on rituximab     Chronic issues:  #Bronchospastic disease  - Continue PTA albuterol and Incruse Ellipta    #SLE  #Sjogren's disease  Previously on chronic prednisone, not for a while per patient.      #Hypertension  - Continue PTA amlodipine and metoprolol  -NO ACEi or Arb antihypertensives while receiving plasma exchange-reaction risk     #Heart failure  Sees Dr. Hewitt in cardiology clinic for chronic systolic heart failure (EF 45%), mild LV dysfunction, left bundle branch block, moderate aortic stenosis. Stress test in 2018 negative. Well controlled on home medications.   - Continue PTA Lasix  - Continue PTA amlodipine and metoprolol  - Continue PTA simvastatin   - Continue PTA ASA 325mg      #Hx of TIA  Per patient TIA happened in around 2005, when she lived in [place]. She noted sudden change in ability to speak, this resolved shortly after (she can't remember specific details of hospital workup). She had not previously been on aspirin, but was started on full-dose aspirin and plavix after this event and has been on them ever since. No recurrence. No other strokes/TIAs. No history of stents in coronaries or carotids per patient and chart review. No other known indication for DAPT. After extensive discussion with patient, joint decision was made to discontinue plavix to decrease risk of bleeding  event with limited additional benefit on second antiplatelet agent given her history.   - Continue PTA ASA 325mg   - discontinued PTA Plavix, and remove from home medication list  - Continue PTA simvastatin      #Mood d/o  - Continue PTA fluoxetine     #Sleep  - Continue PTA trazodone     FEN: Regular  PPx: SCDs, Lovenox  Lines: CVC, PIV  Code: Full     Patient was seen and discussed with

## 2020-07-22 NOTE — PLAN OF CARE
"Admitted for optic neuritis, NMO. Neuros: sluggish pupils, disconjugate gaze, right eye blind, left eye decreased vision \"foggy\". N/T from breasts to BLE feet. VSS on RA. Denies pain. Derik SUBRAMANIAN. PRITESH SL. Up 1/cane for guidance. Voids. Regular diet. Plan 5/5 PLEX Thursday. Continue to monitor.   "

## 2020-07-22 NOTE — PLAN OF CARE
Status: Admitted with decreased vision in L eye-Neuromyelitis optica  Vitals: VSS on RA.   Neuros: AxOx4. 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 L eye vision improving per pt report. Pt can see spot and some detail up close.pupils sluggish, dysconjugate gaze.5/5 throughout. N/T from chest down to BLE.    IV: PIV SL. R CVC PLEX cath, heparin locked.   Resp/trach: LS clear.   Diet: Regular diet. Good PO intake.   Bowel status: BS+. One BM this shift.   : Voiding spontaneously. Ambulates to bathroom.   Skin: bruising/rash throughout.   Pain: Pt denies pain.  Activity: Up A1, cane, and GB.   Social: Pt spoke w/ family on the phone this shift.   Plan: PLEX 5/5 tomorrow (7/23) and then discharge to TCU.

## 2020-07-22 NOTE — PLAN OF CARE
Discharge Planner OT   Patient plan for discharge: TCU  Current status: Facilitated standing ADLs and in room transfers with training in falls prevention strategies and cues for environmental scanning. Pt currently CGA with all ADLs due to falls risk and vision impairment. Pt ambulating 100 feet with SEC and CGA-min A, unsteady on feet, may benefit from fww in room vs. Cane for improved stability- fww ordered to room.  Noted Mild cognitive concerns throughout session, will plan to further assess next OT session.    Barriers to return to prior living situation: Medical status, visual deficits, balance  Recommendations for discharge: TCU  Rationale for recommendations: Pt is motivated for therapy, however, with visual and balance deficits would benefit from additional skilled occupational therapy. Pt requires CGA for tasks due to balance and cuing to facilitate visual scanning of environment.       Entered by: Nicki Diehl 07/22/2020 10:17 AM

## 2020-07-23 NOTE — PLAN OF CARE
Status: Admitted with decreased vision in L eye-Neuromyelitis optica  Vitals: VSS ex soft BPs.  Neuros: AxOx4. 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 L eye vision improving per pt report. Pt can see spot and some detail up close.pupils sluggish, dysconjugate gaze.5/5 throughout. N/T from chest down to BLE.    IV: PIV SL. R CVC PLEX cath, heparin locked.   Resp/trach: LS clear.   Diet: Regular diet. Good PO intake.   Bowel status: BS+. No BM this shift.   : Voiding spontaneously. Ambulates to bathroom.   Skin: bruising/rash throughout.   Pain: Pt denies pain.  Activity: Up A1, cane, and GB.   Social: Pt spoke w/ family on the phone this shift.   Plan: discharge to TCU tomorrow @ 1045. Continue to monitor and follow POC.

## 2020-07-23 NOTE — PLAN OF CARE
6A  Discharge Planner PT   Patient plan for discharge: TCU  Current status: Ambulating with A x 1 and  feet.   Barriers to return to prior living situation: fall risk, medical status  Recommendations for discharge: TCU  Rationale for recommendations: Patient is below her baseline level of mobility and would benefit from a continued therapy program.        Entered by: Merry Patel 07/23/2020 2:20 PM

## 2020-07-23 NOTE — PLAN OF CARE
Status: Pt admitted  for worsening vision in her L eye  Vitals: VSS on RA  Neuros: A&Ox4. Blind in R eye, R. eye dysconjugate, L. eye with vision loss but able to see shapes, L field cut, N/T chest down  IV: L. PIV SL. R. CVC hep locked  Resp/trach: Denies SOB  Diet: Regular diet  Bowel status: BS+, BM  7/22  : Voiding without difficulty  Skin: Rashes/scabs throughout  Pain: Denies  Activity: Up with 1/GB/cane  Plan: Plan for 5/5 PLEX today.Anticipated d/c to short term TCU placement. Continue to monitor and follow POC

## 2020-07-23 NOTE — PROGRESS NOTES
"[facility]  General Neurology Progress Note    Patient Name:  Ladonna Sheriff        MRN:  [id number]    :  1945        Date of service: 2020      Patient Summary: Per H&P  Ms. 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 She presented to the ED on 20 with worsening vision in her L eye. Patient reports that this started on  and continued to progress. She initially had blurry vision as well as eye pain that progressed to involve whitening of her vision, followed by a \"tan like color\" followed by visual loss \"completely dark\". She no longer has any pain with eye movements now. She reports that she has had multiple episodes of L eye visual loss in the past (4-5 times). Last occurred ~2 years ago. Reports she has baseline low vision now because of her prior events of optic neuritis. She has to be close to TV screen to read and has difficulty making out details. During all of her prior events she was treated with oral prednisone with improvement of her symptoms. She took two 20mg prednisone pills last now and two this morning before presenting to the ED. Presented to [facility] and received 1g methylprednisolone before being transferred to [facility].     Patient follows up with Dr Caterina Franklin for her NMO. Last Rituximab infusion was in 20. Reports she has had a total of 3 infusions so far (2019, 2019 and 2020). Prior to this she was not on any long-term immunomodulatory treatment for her NMO and was only treated with steroids.      Notably, she had an MR brain and C/T spine on 2019 which showed focal gadolinium enhancing lesion from T4 through T5 in the spinal cord on the right side with edema extending from T3-T4 down to lower T5. " "Repeat imaging on 5/2020 showed cord volume loss and abnormal signal/enhancement at T2 through T4 consistent with known demyelinating disease. Last note from Dr Franklin planned for repeat imaging to determine efficacy of Rituximab for Ms Voith given evidence of signal enhancement.     Interval history:   Vision stable today, improved from admission per patient. No N/V/CP/SOB/fevers/diarrhea. No weakness or new sensory changes.  No other new endorse complaints.      Physical Examination   Vitals: /62   Pulse 77   Temp 96.8  F (36  C) (Oral)   Resp 16   Ht 1.702 m (5' 7.01\")   Wt 90.7 kg (199 lb 15.3 oz)   SpO2 93%   BMI 31.31 kg/m    Physical Exam:  Constitutional: Alert. Lying in bed comfortably. No acute distress.   Head: Atraumatic, normocephalic.   ENT: Moist mucous membranes. No sinus drainage.  Cardiovascular: Appears warm, well-perfused, and non-toxic.  Respiratory: No increased work of breathing, no accessory muscle use.   Musculoskeletal: Moving all four limbs spontaneously.    Skin: No rashes or lesions appreciated on visualized skin.   Hematologic/Lymphatic/Immunologic: No bruising appreciated on visualized skin.   Neurologic:   Mental Status: alert, oriented to p/p/t. Speech is fluent, able to comprehend, and follows commands. No dysarthria.  Cranial Nerves: left pupil is reactive to light, no reactivity to light in right eye. Able to bury sclerae in left eye. Gaze not conjugate. No nystagmus noted. Able to distinguish fingers approx 2 feet from face, L eye worse than 20/800 (when formally tested with card) . Smile and eyebrow raise equal, blinking symmetric, no weakness. Nasolabial folds symmetric. hearing intact to conversation.  Motor: Moving all extremities equally.   Sensory: unchanged sensory level at T4       Investigations     Results for orders placed or performed during the hospital encounter of 07/13/20   MR Cervical Spine w/o & w Contrast    Impression    Impression: Motion " degraded study.  1. Cervical spine: No abnormal enhancement or cord abnormality is  identified. Multilevel cervical spondylosis, without significant  change from 5/15/2020.  2. Thoracic spine: Decreased abnormal cord signal extending from T2 to  T4, with resolution of previously seen enhancement. There is  persistent cord volume loss. No new abnormal cord signal or  enhancement is identified.    I have personally reviewed the examination and initial interpretation  and I agree with the findings.    DANILO WAGNER MD   MR Brain and Orbits    Impression    Impression:   1. The study demonstrates greater than 20 foci of T2-hyperintensity  within the cerebral white matter consistent with the clinical  suspicion of demyelinating disease. There are no foci of abnormal  enhancement noted intracranially. Although mildly degraded by motion  artifact, no convincing findings of optic neuritis.  2. Previously seen enhancing lesion in the left parieto-occipital  region appears resolved. No new lesions are identified.    I have personally reviewed the examination and initial interpretation  and I agree with the findings.    DANILO WAGNER MD   MR Thoracic Spine w/o & w Contrast    Impression    Impression: Motion degraded study.  1. Cervical spine: No abnormal enhancement or cord abnormality is  identified. Multilevel cervical spondylosis, without significant  change from 5/15/2020.  2. Thoracic spine: Decreased abnormal cord signal extending from T2 to  T4, with resolution of previously seen enhancement. There is  persistent cord volume loss. No new abnormal cord signal or  enhancement is identified.    I have personally reviewed the examination and initial interpretation  and I agree with the findings.    DANILO WAGNER MD     Recent Labs   Lab 07/22/20  0606 07/21/20  1522 07/21/20  0850 07/21/20  0636 07/20/20  0648 07/19/20  1000  07/17/20  0915   WBC  --   --   --   --  10.7 12.5*  --  9.9   HGB  --   --   --   --  13.4  14.3  --  14.4   MCV  --   --   --   --  98 99  --  96     --   --   --  156 210   < > 196   INR  --   --  1.15*  --   --  1.14  --  1.20*   NA  --   --   --  145*  --   --   --   --    POTASSIUM  --  3.7  --  2.8*  --   --   --   --    CHLORIDE  --   --   --  111*  --   --   --   --    CO2  --   --   --  28  --   --   --   --    BUN  --   --   --  14  --   --   --   --    CR  --   --   --  0.75  --   --   --   --    ANIONGAP  --   --   --  5  --   --   --   --    GANESH  --   --   --  8.2*  --   --   --   --    GLC  --   --   --  102*  --   --   --   --     < > = values in this interval not displayed.         Assessment and Plan:  Ms Ladonna Sheriff is a 75 year old female with a PMHx of NMO with residual R eye vision loss, T4 transverse myelitis, and on-off episodes of L eye optic neuritis with baseline low vision who presented to the ED on 7/13/20 with worsening vision in her L eye concerning for optic neuritis/NMO relapse.    Continue plasma exchange. Plan for session 5/5 today, remove central line, discharge to TCU tomorrow 7/24.     #AQP4 positive NMO  #L eye vision loss 2/2 optic neuritis/NMO relapse  Received 1g methylpred at [facility] prior to transfer. MRI Brain & Orbits, C/T spine completed without clear new lesion in brain or spinal cord. L optic nerve with slight enhancement. CD19 levels as expected. Eye exam without cherry red spot or exam findings consistent with CRAO or other etiology per optho. There is concern that her NMO is not responding to Rituximab, however this admit MRI improved from last one completed prior to admission. Pt has been on rituximab since last June, and now with optic neuritis relapse likely failure of rituximab. She might need consideration of other immunomodulatory therapy for her NMO such as eculizumab or inebilizumab. Dr. Franklin notified, will plan to have pt follow up with Dr. Franklin in clinic for long term tx strategy. IR placed line (CVC placement, double lumen,  non-tunneled apheresis catheter, dialysis capable) on 7/15 and pt got first session of plasma exchange same day, transfusion med consulted, appreciate assistance with PLEX. s/p 5 days of 1g methylpred for relapse event   -PLEX s/p 4/5 sessions, plan for every other day plasma exchange for 5 total sessions - last session today, will remove the line post-procedure  - discontinued medium dose sliding scale correction insulin given wnl glucoses now off steroids  - Ophthalmology/optometry consulted, appreciate recs  - PT/OT evaluations, appreciate assistance  - PTA gabapentin for transverse myelitis symptoms  -follow up with primary neurologist Dr. Franklin for change to immunotherapy given recurrence of optic neuritis on rituximab  -will vaccinate for meningococcus prior to discharge in case future immunotherapy is considered outpatient; with menactra and bexsero     Chronic issues:  #Bronchospastic disease  - Continue PTA albuterol and Incruse Ellipta    #SLE  #Sjogren's disease  Previously on chronic prednisone, not for a while per patient.      #Hypertension  - Continue PTA amlodipine and metoprolol  -NO ACEi or Arb antihypertensives while receiving plasma exchange-reaction risk     #Heart failure  Sees Dr. Hewitt in cardiology clinic for chronic systolic heart failure (EF 45%), mild LV dysfunction, left bundle branch block, moderate aortic stenosis. Stress test in 2018 negative. Well controlled on home medications.   - Continue PTA Lasix  - Continue PTA amlodipine and metoprolol  - Continue PTA simvastatin   - Continue PTA ASA 325mg      #Hx of TIA  Per patient TIA happened in around 2005, when she lived in [place]. She noted sudden change in ability to speak, this resolved shortly after (she can't remember specific details of hospital workup). She had not previously been on aspirin, but was started on full-dose aspirin and plavix after this event and has been on them ever since. No recurrence. No other strokes/TIAs.  No history of stents in coronaries or carotids per patient and chart review. No other known indication for DAPT. After extensive discussion with patient, joint decision was made to discontinue plavix to decrease risk of bleeding event with limited additional benefit on second antiplatelet agent given her history.   - Continue PTA ASA 325mg   - discontinued PTA Plavix, and remove from home medication list  - Continue PTA simvastatin      #Mood d/o  - Continue PTA fluoxetine     #Sleep  - Continue PTA trazodone     FEN: Regular  PPx: SCDs, Lovenox  Lines: CVC, PIV  Code: Full     Patient was seen and discussed with Dr.Manousakis Isabela Blanca MD  PGY2 Neurology  [phone]

## 2020-07-23 NOTE — PLAN OF CARE
Discharge Planner OT   Patient plan for discharge: TCU  Current status: OT: administered MOCA blind to assess cognition. Pt scored 21/22 indicating normal cognitive function. Pt ambulated bed <> bathroom with SBA and FWW, pt able to pathfind despite visual deficits. Pt completed toilet transfer with SBA. Pt ambulated x 300 ft with CGA and FWW, good tolerance noted. VSS throughout.   Barriers to return to prior living situation: Medical status, visual deficits, balance  Recommendations for discharge: TCU  Rationale for recommendations: Pt would benefit from continued skilled OT services to improve independence and safety with ADL's and functional transfers as pt is not at baseline.           Entered by: Melvina Oneil 07/23/2020 11:12 AM

## 2020-07-23 NOTE — PROGRESS NOTES
Social Work Services Discharge Note      Patient Name:  Ladonna Sheriff     Anticipated Discharge Date:  7/24/2020    Discharge Disposition:   [facility] ([phone])    Following MD:  Facility Assignment     Pre-Admission Screening (PAS) online form has been completed.  The Level of Care (LOC) is:  Determined  Confirmation Code is:  [id number].  Patient/caregiver informed of referral to [facility] for Pre-Admission Screening for skilled nursing facility (SNF) placement and to expect a phone call post discharge from SNF.     Additional Services/Equipment Arranged:  SW confirmed readiness for discharge (on 7/24/2020) with Dr. Blanca.  SW confirmed acceptance for admit to [facility] with Admissions (June).  SW arranged for [facility] to provide private pay w/c transport at 10:45am.       Patient / Family response to discharge plan:  Pt voices understanding of the discharge plan and agreement with the discharge plan.     Persons notified of above discharge plan:  Pt, 6A nursing and Amparo Boo (pt's [facility]  contracted through [facility]  [phone])       Staff Discharge Instructions:  Please fax discharge orders and signed hard scripts for any controlled substances (SW will complete this task).  Please print a packet and send with patient.     CTS Handoff completed:  YES    Medicare Notice of Rights provided to the patient/family:  YES    CAMELIA Marcial  Social Work, 6A  Phone:  [phone]  Pager:  [phone]  7/23/2020

## 2020-07-23 NOTE — PROGRESS NOTES
Bed was placed in [facility]   Neurology Service Brief Update Note     Patient Name:  Ladonna Sheriff  MRN:  [id number]    :  1945  Date of Service:  2020    Removed jugular central venous catheter at bedside.  Patient was given 0.25 mg of Ativan p.o. 15 minutes preprocedurally for comfort.  Patient was draped and placed in Trendelenburg position, sutures were removed, line was pulled and pressure was applied at site.  No bleeding no respiratory changes no other immediate complications.  Patient tolerated procedure well.  After 5 minutes of pressure applied at the site a bandage was placed.    Isabela Blanca MD  PGY2 Neurology  [phone]

## 2020-07-23 NOTE — PLAN OF CARE
[id number]  Status: Pt admitted  for worsening vision in her L eye  Vitals: VSS on RA  Neuros: Alert & oriented x4, N/T chest down, blind in R eye, vision loss in L eye, starting to see colors and shapes, dysconjugate gaze, 5/5 throughout, L cut, R pupil sluggish, R pupil > L pupil  IV: PIV SL, CVC hep locked  Resp/trach: Denies SOB  Diet: Regular diet  Bowel status: BS+, BM  7/22  : Voiding without difficulty  Skin: Rashes/scabs throughout  Pain: Denied  Activity: Up with 1/GB/cane  Social: Son at bedside for portion of shift, involved in cares   Plan: Plan for 5/5 PLEX tomorrow, continue to monitor and follow POC

## 2020-07-24 NOTE — PLAN OF CARE
Status: Pt admitted  for worsening vision in her L eye  Vitals: VSS on RA  Neuros: A&Ox4. Blind in R eye, R. eye dysconjugate, L. eye with vision loss but able to see shapes, L field cut, N/T chest down  IV: No IV access. Old R. CVC site dressing CDI.   Resp/trach: Denies SOB  Diet: Regular diet  Bowel status: BS+, BM  7/22  : Voiding without difficulty  Skin: Rashes/scabs throughout  Pain: Denies  Activity: Up with 1/GB/walker  Plan: 5/5 PLEX completed. Planning to discharge today to TCU at [facility], SW arranged for [facility] to provide private pay w/c transport at 10:45am. Continue to monitor and follow POC

## 2020-07-24 NOTE — PLAN OF CARE
Status: Admitted for worsening vision in L eye.   Vitals: VSS on RA.  Neuros: Alert and oriented x4. 5/5 throughout. N/T from chest down to BLE. Right eye is blind, left eye can see some shapes and colors. R > L. Pupils sluggish. L field cut. Dysconjugate gaze.    IV: CVC and PIV removed today.   Resp/trach: Lung sounds clear.  Diet: Regular diet.   Bowel status: BS+ No BM this shift.   : Voiding without difficulty.  Skin: Bruising/rash throughout. Bandage to right CVC removal site  Pain: Denies.  Activity: A1, walker & GB.  Plan: Planning to discharge tomorrow to TCU. Continue to monitor and follow POC.

## 2020-07-24 NOTE — DISCHARGE SUMMARY
"[facility]  NEUROLOGY DISCHARGE SUMMARY    Patient Name:  Ladonna Sheriff  MRN:  [id number]      :  1945      Date of Admission:  2020  Date of Discharge::  2020  Admitting Physician:  Rajwinder Spangler MD  Discharge Physician:  Caterina Franklin MD,   Primary Care Provider:   Joceline Ty  Discharge Disposition:   Discharged to TCU    Admission Diagnoses:  1.  Neuromyelitis Optica  2. Transverse myelitis  3. Low vision    Discharge Diagnoses:    1.  Neuromyelitis Optica  2. Transverse myelitis    Brief History of Illness:   Ms. 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 She presented to the ED on 20 with worsening vision in her L eye. Patient reports that this started on  and continued to progress. She initially had blurry vision as well as eye pain that progressed to involve whitening of her vision, followed by a \"tan like color\" followed by visual loss \"completely dark\". She no longer has any pain with eye movements now. She reports that she has had multiple episodes of L eye visual loss in the past (4-5 times). Last occurred ~2 years ago. Reports she has baseline low vision now because of her prior events of optic neuritis. She has to be close to TV screen to read and has difficulty making out details. During all of her prior events she was treated with oral prednisone with improvement of her symptoms. She took two 20mg prednisone pills last now and two this morning before presenting to the ED. Presented to [facility] and received 1g methylprednisolone before being transferred to [facility].      Patient follows up with Dr Caterina Franklin for her NMO. Last Rituximab infusion was in 20. Reports she has had a total of 3 " infusions so far (7/2019, 11/2019 and 6/2020). Prior to this she was not on any long-term immunomodulatory treatment for her NMO and was only treated with steroids.      Notably, she had an MR brain and C/T spine on 6/2019 which showed focal gadolinium enhancing lesion from T4 through T5 in the spinal cord on the right side with edema extending from T3-T4 down to lower T5. Repeat imaging on 5/2020 showed cord volume loss and abnormal signal/enhancement at T2 through T4 consistent with known demyelinating disease. Last note from Dr Franklin planned for repeat imaging to determine efficacy of Rituximab for Ms Sheriff given evidence of signal enhancement.     Ms. Sheriff received 1g methylpred at [facility] prior to transfer. MRI Brain & Orbits, C/T spine completed without clear new lesion in brain or spinal cord. L optic nerve with slight enhancement. CD19 levels as expected. Eye exam without cherry red spot or exam findings consistent with CRAO or other etiology per optho. There is concern that her NMO is not responding to Rituximab, however this admit MRI improved from last one completed prior to admission. Pt has been on rituximab since last June, and now with optic neuritis relapse likely failure of rituximab. She might need consideration of other immunomodulatory therapy for her NMO such as eculizumab or inebilizumab. Dr. Franklin notified, will plan to have pt follow up with Dr. Franklin in clinic for long term tx strategy.     Hospital Course:  IR placed line (CVC placement, double lumen, non-tunneled apheresis catheter, dialysis capable) on 7/15 and pt got first session of plasma exchange same day. She got 5 total sessions of plasma exchange, every other day for 10 days in the hospital. She also received 5 days of 1g methylpred total.    Vision improved moderately in left eye per patient during hospitalization.     #AQP4 positive NMO  #L eye vision loss 2/2 optic neuritis/NMO relapse  -s/p 5 days of 1g methylpred   -PLEX  s/p 5 sessions  - Ophthalmology/optometry consulted, appreciate recs  - PT/OT evaluations, appreciate assistance  - PTA gabapentin for transverse myelitis symptoms  -follow up with primary neurologist Dr. Franklin for change to immunotherapy given recurrence of optic neuritis on rituximab  -will vaccinate for meningococcus prior to discharge in case future immunotherapy is considered outpatient; with menactra and bexsero     Chronic issues:  #Bronchospastic disease  - Continued PTA albuterol and Incruse Ellipta     #SLE  #Sjogren's disease  Previously on chronic prednisone, not for a while per patient.      #Hypertension  - Continued PTA amlodipine and metoprolol     #Heart failure  Sees Dr. Hewitt in cardiology clinic for chronic systolic heart failure (EF 45%), mild LV dysfunction, left bundle branch block, moderate aortic stenosis. Stress test in 2018 negative. Well controlled on home medications.   - Continued PTA Lasix  - Continued PTA amlodipine and metoprolol  - Continued PTA simvastatin   - Continued PTA ASA 325mg      #Hx of TIA  Per patient TIA happened in around 2005, when she lived in [place]. She noted sudden change in ability to speak, this resolved shortly after (she can't remember specific details of hospital workup). She had not previously been on aspirin, but was started on full-dose aspirin and plavix after this event and has been on them ever since. No recurrence. No other strokes/TIAs. No history of stents in coronaries or carotids per patient and chart review. No other known indication for DAPT. After extensive discussion with patient, joint decision was made to discontinue plavix to decrease risk of bleeding event with limited additional benefit on second antiplatelet agent given her history.   - Continued PTA ASA 325mg   - discontinued PTA Plavix, and remove from home medication list  - Continued PTA simvastatin      #Mood d/o  - Continued PTA fluoxetine     #Sleep  - Continued PTA  trazodone      Pertinent Investigations:  Results for orders placed or performed during the hospital encounter of 07/13/20   MR Cervical Spine w/o & w Contrast    Narrative    MR CERVICAL SPINE W/O & W CONTRAST, MR THORACIC SPINE W/O  & W CONTRAST 7/13/2020 9:33 PM    History: history of MS???patient loss???evaluate for demyelination    Comparison: 5/15/2020, 6/19/2019    Technique: Sagittal T2- and T1-weighted images of the cervical and  thoracic spine, axial T2* gradient echo images of the cervical spine  and axial T2-weighted images from to were obtained.  Following  intravenous administration of gadolinium, sagittal and axial  T1-weighted images with fat saturation were obtained.    Contrast: 9 cc Gadavist    Findings:    Examination is mildly limited by motion artifact    Cervical: There is mild anterolisthesis of C4 on C5, of C5 on C6, and  C6 on C7. There is mild disc height narrowing throughout the cervical  spine.  There is normal signal within and normal contour of the  cervical spinal cord.  The findings on a level by level basis are as  follows:  C2-3: No spinal canal or neural foraminal narrowing. Small posterior  disc osteophyte complex.  C3-4:  No spinal canal or neural foraminal narrowing. Mild bilateral  facet arthropathy. Small posterior disc osteophyte complex.  C4-5:  Disc osteophyte complex and mild bilateral facet arthropathy.  Mild bilateral foraminal narrowing. Mild spinal canal narrowing.  C5-6:  Bilateral uncinate spurring and facet arthropathy, with mild  bilateral foraminal narrowing. Posterior disc osteophyte complex, with  minimal spinal canal narrowing.  C6-7:  Moderate bilateral uncinate spurring and facet arthropathy,  with mild right and moderate left foraminal narrowing. No significant  spinal canal narrowing.  C7-T1:  No spinal canal or neural foraminal narrowing.  No abnormality of the paraspinal soft tissues. No abnormal enhancement  of the vertebra or within the thecal  sac.    Thoracic: The external marker is at T10. The thoracic vertebral column  appears in normal alignment. There is no loss of disk height at any  level. The spinal cord contour appears within normal limits.  Previously seen cord volume loss and abnormal signal extending from T2  to T4 appears decreased in prominence, though not completely resolved  from prior examination. Findings are best visualized on axial images.  Resolution of associated contrast enhancement. There is no abnormal  contrast enhancement within the thoracic spinal cord, thecal sac or  vertebral column.       Impression    Impression: Motion degraded study.  1. Cervical spine: No abnormal enhancement or cord abnormality is  identified. Multilevel cervical spondylosis, without significant  change from 5/15/2020.  2. Thoracic spine: Decreased abnormal cord signal extending from T2 to  T4, with resolution of previously seen enhancement. There is  persistent cord volume loss. No new abnormal cord signal or  enhancement is identified.    I have personally reviewed the examination and initial interpretation  and I agree with the findings.    DANILO WAGNER MD   MR Brain and Orbits    Narrative    Brain MR without and with contrast     History: Vision loss, history of MS-evaluate for optic neuritis.     Comparison: 6/20/2019     Technique:   Brain MR: Sagittal thin FLAIR and axial T1-weighted and FLAIR images  were obtained without intravenous contrast. Following intravenous  gadolinium-based contrast administration, axial diffusion,  susceptibility, T2-weighted, T1-weighted, and coronal T1-weighted and  FLAIR images were obtained.    Contrast: 9 cc gadolinium administered    Findings: No mass lesion, midline shift, or abnormal intraaxial or  extraaxial fluid collection. There are, however, greater than 20 foci  of T2-hyperintensity within the cerebral white matter that raise the  question of underlying demyelinating disease. Specifically, there  are  lesions within the periventricular, subcortical, and pericallosal  white matter, as well as within the cerebellar hemispheres. The  T1-weighted images do not show any areas of severe hypointensity.  There is no significant cerebral atrophy. Following the administration  of intravenous contrast, there are no foci of abnormal contrast  enhancement noted. Compared to the previous study, previously seen  enhancing lesion in the left parieto-occipital region appears  resolved, without persistent T2 hyperintensity or contrast  enhancement. No abnormal foci of intracranial enhancement are noted.    The ventricles are proportionate to the cerebral sulci.  Diffusion-weighted images reveal no abnormal reduced diffusion.  Susceptibility weighted images demonstrate no intracranial hemorrhage.  The major intracranial vascular flow-voids do appear patent. There are  no significant abnormalities in the visualized portions of the mastoid  air cells or paranasal sinuses.      Impression    Impression:   1. The study demonstrates greater than 20 foci of T2-hyperintensity  within the cerebral white matter consistent with the clinical  suspicion of demyelinating disease. There are no foci of abnormal  enhancement noted intracranially. Although mildly degraded by motion  artifact, no convincing findings of optic neuritis.  2. Previously seen enhancing lesion in the left parieto-occipital  region appears resolved. No new lesions are identified.    I have personally reviewed the examination and initial interpretation  and I agree with the findings.    DANILO WAGNER MD   MR Thoracic Spine w/o & w Contrast    Narrative    MR CERVICAL SPINE W/O & W CONTRAST, MR THORACIC SPINE W/O  & W CONTRAST 7/13/2020 9:33 PM    History: history of MS???patient loss???evaluate for demyelination    Comparison: 5/15/2020, 6/19/2019    Technique: Sagittal T2- and T1-weighted images of the cervical and  thoracic spine, axial T2* gradient echo images of  the cervical spine  and axial T2-weighted images from to were obtained.  Following  intravenous administration of gadolinium, sagittal and axial  T1-weighted images with fat saturation were obtained.    Contrast: 9 cc Gadavist    Findings:    Examination is mildly limited by motion artifact    Cervical: There is mild anterolisthesis of C4 on C5, of C5 on C6, and  C6 on C7. There is mild disc height narrowing throughout the cervical  spine.  There is normal signal within and normal contour of the  cervical spinal cord.  The findings on a level by level basis are as  follows:  C2-3: No spinal canal or neural foraminal narrowing. Small posterior  disc osteophyte complex.  C3-4:  No spinal canal or neural foraminal narrowing. Mild bilateral  facet arthropathy. Small posterior disc osteophyte complex.  C4-5:  Disc osteophyte complex and mild bilateral facet arthropathy.  Mild bilateral foraminal narrowing. Mild spinal canal narrowing.  C5-6:  Bilateral uncinate spurring and facet arthropathy, with mild  bilateral foraminal narrowing. Posterior disc osteophyte complex, with  minimal spinal canal narrowing.  C6-7:  Moderate bilateral uncinate spurring and facet arthropathy,  with mild right and moderate left foraminal narrowing. No significant  spinal canal narrowing.  C7-T1:  No spinal canal or neural foraminal narrowing.  No abnormality of the paraspinal soft tissues. No abnormal enhancement  of the vertebra or within the thecal sac.    Thoracic: The external marker is at T10. The thoracic vertebral column  appears in normal alignment. There is no loss of disk height at any  level. The spinal cord contour appears within normal limits.  Previously seen cord volume loss and abnormal signal extending from T2  to T4 appears decreased in prominence, though not completely resolved  from prior examination. Findings are best visualized on axial images.  Resolution of associated contrast enhancement. There is no  abnormal  contrast enhancement within the thoracic spinal cord, thecal sac or  vertebral column.       Impression    Impression: Motion degraded study.  1. Cervical spine: No abnormal enhancement or cord abnormality is  identified. Multilevel cervical spondylosis, without significant  change from 5/15/2020.  2. Thoracic spine: Decreased abnormal cord signal extending from T2 to  T4, with resolution of previously seen enhancement. There is  persistent cord volume loss. No new abnormal cord signal or  enhancement is identified.    I have personally reviewed the examination and initial interpretation  and I agree with the findings.    DANILO WAGNER MD   IR CVC Non Tunnel Placement    Narrative    Procedures:  1. Ultrasound guidance for venous access  2. Placement centrally inserted catheter (age > 5 yrs.) non-tunneled,  no port, no pump  3. Fluoroscopic guidance placement    History: Neuromyelitis optica needing apheresis treatment.     Clinical indication: Placement of short-term apheresis compatible  central venous catheter.     Comparison studies: Central venous line placement on 6/21/2019.    Attending: ROSE Crow MD.     Resident: RODOLFO Macias MD.     Procedure performed by Dr. Macias under my supervision. I, Dr. Wilfred Crow, was present for the entire procedure.    Medications: 2 cc of 1% lidocaine for local anesthesia.     Fluoroscopy time: 0.8 minutes    Sedation time: Not applicable.      Findings/procedure:     Prior to the procedure, both verbal and written informed consent  obtained from the patient.     Limited jugular ultrasound documented jugular vein patency. The right  neck prepped and draped in the usual sterile fashion. 1% Lidocaine  used for local analgesia.    Under ultrasound guidance, right internal jugular venotomy made with a  micropuncture needle. Image documenting the needle tip within the vein  was saved to the patient's permanent record.    Micropuncture needle exchanged over guidewire  for the micropuncture  sheath under fluoroscopic guidance. Catheter length measured with the  0.018 guidewire, 20  cm catheter selected. Micropuncture sheath saline  locked. Microwire was exchanged for an 0.035 Bentson guidewire. The  track was dilated to 14  Fr. A 14 French 20  cm double lumen Schon   catheter was placed over the wire with the tip at right atrium. Both  lumens of the catheter tested and aspirated and flushed well. Both the  lumens were then locked with heparinized solution (100 units per cc, 2  cc per lumen). External portion of catheter secured to the skin with  two 2/0 nylon sutures. Patient tolerated the procedure well.    Complications: None.      Impression    IMPRESSION:   Uneventful placement of a right internal jugular 14 Fr x 20  cm  double-lumen Schon central venous catheter. Both lumens of the  catheter are heparin locked and ready to be used.    I have personally reviewed the examination and initial interpretation  and I agree with the findings.    MYRNA PENALOZA         Consultations:    Transfusion medicine, Interventional Radiology, Ophthalmology, PT, OT    Discharge Medications:  Current Discharge Medication List      CONTINUE these medications which have NOT CHANGED    Details   acetaminophen (TYLENOL) 325 MG tablet Take 1-3 tablets (325-975 mg) by mouth every 6 hours as needed for mild pain or fever (> 101 F)    Associated Diagnoses: Neuromyelitis optica (H)      albuterol (PROAIR HFA/PROVENTIL HFA/VENTOLIN HFA) 108 (90 Base) MCG/ACT inhaler Inhale 2 puffs into the lungs every 6 hours as needed       amLODIPine (NORVASC) 10 MG tablet Take 10 mg by mouth daily      aspirin (ASA) 325 MG EC tablet Take 325 mg by mouth every evening       B Complex-C (VITAMIN B COMPLEX W/VITAMIN C) TABS tablet Take 1 tablet by mouth daily      Calcium-Vitamin D 600-200 MG-UNIT TABS Take 1 tablet by mouth every evening       FLUoxetine (PROZAC) 20 MG capsule Take 20 mg by mouth daily      furosemide  (LASIX) 20 MG tablet Take 20 mg by mouth 3 times daily      gabapentin (NEURONTIN) 300 MG capsule Take 1 capsule (300 mg) in the morning and afternoon, and 2 capsules (600 mg) at night  Qty: 120 capsule, Refills: 11    Associated Diagnoses: Neuromyelitis optica (H)      metoprolol tartrate (LOPRESSOR) 50 MG tablet Take 50 mg by mouth 2 times daily      Multiple Vitamins-Minerals (MULTIVITAL PO) Take 1 tablet by mouth every morning       simvastatin (ZOCOR) 20 MG tablet Take 20 mg by mouth At Bedtime      traZODone (DESYREL) 100 MG tablet Take 100 mg by mouth At Bedtime      trolamine salicylate (ASPERCREME) 10 % external cream Apply topically 2 times daily as needed for moderate pain    Associated Diagnoses: Neuromyelitis optica (H)      umeclidinium (INCRUSE ELLIPTA) 62.5 MCG/INH inhaler Inhale 1 puff into the lungs daily      vitamin C (ASCORBIC ACID) 100 MG tablet Take 500 mg by mouth every morning          STOP taking these medications       clopidogrel (PLAVIX) 75 MG tablet Comments:   Reason for Stopping:                General info for SNF    Length of Stay Estimate: Short Term Care: Estimated # of Days <30  Condition at Discharge: Improving  Level of care:skilled   Rehabilitation Potential: Excellent  Admission H&P remains valid and up-to-date: Yes  Recent Chemotherapy: Date: Rituximab 6/2020                      Use Nursing Home Standing Orders: Yes    No immunotherapy is anticipated for patient during TCU stay.     Mantoux instructions    Give two-step Mantoux (PPD) Per Facility Policy Yes     Reason for your hospital stay    You were in the hospital for a relapse of your neuromyelitis optica.  This caused decreased vision in your left eye.  You were given high-dose IV steroids, and plasma exchange therapy.    If you experience worsening of vision change in sensation or new weakness or any other concerning or life-threatening symptoms you should return to the ED.  You should follow-up with Dr. Franklin in  neurology clinic.     Follow Up and recommended labs and tests    Follow up with Dr. Franklin in neurology clininc in 2-4 weeks. No follow up labs or test are needed.  Follow up with primary care provider in 1 month   .No follow up labs or test are needed.     Additional Discharge Instructions     Activity - Up ad courtney     Occupational Therapy Adult Consult    Evaluate and treat as clinically indicated.    Reason:  Low vision, transverse myelitis     Physical Therapy Adult Consult    Evaluate and treat as clinically indicated.    Reason:  transverse myelitis     Advance Diet as Tolerated    Follow this diet upon discharge: Orders Placed This Encounter      Advance Diet as Tolerated: Regular Diet Adult     Clinic/Infusion Appt Request    Please schedule patient in neurology clinic with Dr. Caterina Franklin 2-4 weeks post discharge.       Medication changes:  Discontinued plavix    Discharge physical examination:   Vitals:  B/P: 105/54, T: 97.6, P: 71, R: 18  Physical Exam:  Constitutional: Alert. Lying in bed comfortably. No acute distress.   Head: Atraumatic, normocephalic.   ENT: Moist mucous membranes. No sinus drainage.  Neurologic:   Mental Status: alert, oriented to p/p/t. Speech is fluent, able to comprehend, and follows commands. No dysarthria.  Cranial Nerves: left pupil is reactive to light, no reactivity to light in right eye. Able to bury sclerae in left eye. Gaze not conjugate. No nystagmus noted. Able to distinguish fingers approx 2 feet from face, L eye worse than 20/800 with left peripheral cut. Smile and eyebrow raise equal, blinking symmetric, no weakness. Nasolabial folds symmetric. hearing intact to conversation.  Motor: Moving all extremities equally. no atrophy or fasciculations observed. No pronator or sensory drift. Strength is 5/5 at bilateral shoulder abductors, elbow flex/ext,  finger abductors, thumb opposition, hip flex, knee flex/ext, dorsi/plantar flexion. normoreflexic and symmetric at the  biceps/brachioradialis/patellar/achilles. Finger tapping is equal frequency and amplitude bilaterally.  Sensory: unchanged sensory level at T4  Coordination: FNF no dysmetria        Discharge follow up and instructions:    1.  Diet:   Regular  2.  Activity:  Activity as tolerated  3.  Restrictions:  None  4.  Follow up:  Follow up with Dr. Franklin in neurology in 2-4 weeks.      A total of 30 minutes was spent on discharge activities.     Patient seen and discussed with Dr. Franklin.    Isabela lBanca MD  PGY2 Neurology  [phone]    I saw and evaluated the patient with the resident and agree with the assessment and plan. I was present for entire minor examination.    The patient has been uder my care since Dr. Macias left and was stable at the time of our first video encounter in March, she then developed worsening of B LE numbness with corresponding new lesion in the thoracic cord T2-4 detected by MRI on 5-15, supportive of a relapse, which responded to oral steroids. During our video visit on June 25, we discussed switching DMT given that she had just received her Rituxamab infusion when she developed the relapse,  CD 19 was 4% and 34 (early B cell repopulation) were obtained in 5-20 (ordered in March) and infusion was given soon after.     Most recently she developed L vision loss starting on 7-11-20, she was evaluated by Dr. Lazo on 7-14 and acute ON was confirmed. Currently hospitalized, she underwent 5 days of IV steroids and 5 of PLEX with incomplete resolution of her visual deficit.    Plan is to consider Cellcept given that she is a Rituxamab failure. Eculizumab which is FDA approved for NMO is another option but considering the cost (30,000.00 a year) it is unlikely to be approved by medicaid but we will submit a request.    We will administer pulse steroids 1250 mg q month vs. PLEX q month (if feasible without a hannah cath)  until switch has been completed and efficacy confirmed on the new DMT.    She  understands and agrees with the plan.    Caterina Franklin MD  Chief, Multiple Sclerosis Division  Department of Neurology  [facility] [place]

## 2020-07-24 NOTE — PLAN OF CARE
Occupational Therapy Discharge Summary    Reason for therapy discharge:    Discharged to transitional care facility.    Progress towards therapy goal(s). See goals on Care Plan in [facility] electronic health record for goal details.  Goals partially met.  Barriers to achieving goals:   discharge from facility.    Therapy recommendation(s):    Continued therapy is recommended.  Rationale/Recommendations:  continue skilled OT at tCU. Pt would benefit from continued skilled OT services to improve independence and safety with ADL's and functional transfers as pt is not at baseline.

## 2020-07-24 NOTE — PLAN OF CARE
Pt discharged to [facility]  at 1050 via [facility] transport in a wheelchair. All belongings and paperwork  sent w/ patient. All PIVs removed. RN to RN report given to TCU staff.

## 2020-07-27 NOTE — TELEPHONE ENCOUNTER
Scheduled follow up with Dr. Lazo at the request of resident Dr. Galaviz.  Patient is also interested in seeing low vision occupational therapist.  Will have Dr. Lazo review and if agrees with patient seeing them, he will sign therapy order and they will reach out to patient to schedule.      Bessy oRss on 7/27/2020 at 12:30 PM

## 2020-07-27 NOTE — TELEPHONE ENCOUNTER
I received the below message from the call center; Unfortunately, I don't see a consent to communicate in the patient's chart; I called Ladonna to get permission to talk with Israel, and ended up just talking with her; She said that her son had called earlier because she would like to go home, but since that call, she was seen by a provider at her care facility, and they are tentatively planning on sending her home on Wednesday; Ladonna had many questions about the plan going forward; Per her discharge instructions, she is to follow up with Dr. Franklin in 2-4 weeks; I offered Ladonna an appointment with Dr. Franklin for next week on 8/5/20 as an in person appointment, at which time they can discuss the plan.    Carmenza Antonio, MS RN Care Coordinator

## 2020-07-27 NOTE — TELEPHONE ENCOUNTER
[facility] Call Center    Phone Message    May a detailed message be left on voicemail: yes     Reason for Call: Other: Israel calling to request a call back regarding his mother. He is wondering if she is able to come home. Please call him back at  your earliest convenience.     Action Taken: Message routed to:  [facility]): JIMI MS    Travel Screening: Not Applicable

## 2020-07-27 NOTE — PLAN OF CARE
Physical Therapy Discharge Summary    Reason for therapy discharge:    Discharged to transitional care facility.    Progress towards therapy goal(s). See goals on Care Plan in [facility] electronic health record for goal details.  Goals not met.  Barriers to achieving goals:   discharge from facility.    Therapy recommendation(s):    Continued therapy is recommended.  Rationale/Recommendations:  TCU to maximize pt's funtional IND and safety.

## 2020-08-06 NOTE — LETTER
"8/6/2020       RE: Ladonna Sheriff  [address]  [place] [place] [address]     Dear Colleague,    Thank you for referring your patient, Ladonna Sheriff, to the [facility] MULTIPLE SCLEROSIS at [facility]. Please see a copy of my visit note below.    Ladonna Sheriff is a 75 year old female who is being evaluated via a billable video visit.      The patient has been notified of following:     \"This video visit will be conducted via a call between you and your physician/provider. We have found that certain health care needs can be provided without the need for an in-person physical exam.  This service lets us provide the care you need with a video conversation.  If a prescription is necessary we can send it directly to your pharmacy.  If lab work is needed we can place an order for that and you can then stop by our lab to have the test done at a later time.    Video visits are billed at different rates depending on your insurance coverage.  Please reach out to your insurance provider with any questions.    If during the course of the call the physician/provider feels a video visit is not appropriate, you will not be charged for this service.\"    Patient has given verbal consent for Video visit? {YES-NO  Default Yes:4444::\"Yes\"}  How would you like to obtain your AVS? {AVS Preference:507417}  If you are dropped from the video visit, the video invite should be resent to: {video visit invitation:404847}  Will anyone else be joining your video visit? {:[id number]}  {If patient encounters technical issues they should call [phone] :038358}      Video-Visit Details    Type of service:  Video Visit    Video Start Time: 4:39  Video End Time: Did not join video visit    Originating Location (pt. Location): Other not completed    Distant Location (provider location):  [facility] MULTIPLE SCLEROSIS     Platform used for Video Visit: Mya Fang and phone call on 8-6 and 8-7- " 2020    Go Wang, EMT        Again, thank you for allowing me to participate in the care of your patient.      Sincerely,    Caterina Franklin MD

## 2020-08-06 NOTE — PROGRESS NOTES
"Ladonna Sheriff is a 75 year old female who is being evaluated via a billable video visit.      The patient has been notified of following:     \"This video visit will be conducted via a call between you and your physician/provider. We have found that certain health care needs can be provided without the need for an in-person physical exam.  This service lets us provide the care you need with a video conversation.  If a prescription is necessary we can send it directly to your pharmacy.  If lab work is needed we can place an order for that and you can then stop by our lab to have the test done at a later time.    Video visits are billed at different rates depending on your insurance coverage.  Please reach out to your insurance provider with any questions.    If during the course of the call the physician/provider feels a video visit is not appropriate, you will not be charged for this service.\"    Patient has given verbal consent for Video visit? Yes  How would you like to obtain your AVS? MyChart  If you are dropped from the video visit, the video invite should be resent to:   Will anyone else be joining your video visit? No      Video-Visit Details    Type of service:  Video Visit    Video Start Time: 4:39  Video End Time: Did not join video visit    Originating Location (pt. Location): Other not completed    Distant Location (provider location):  [facility] MULTIPLE SCLEROSIS     Platform used for Video Visit: Mya Fang and phone call on 8-6 and 8-7- 2020    SHELTON Sweeney      This encounter was opened in error. Please disregard.  "

## 2020-08-06 NOTE — LETTER
"8/6/2020       RE: Ladonna Sheriff  [address]  [place] [address]     Dear Colleague,    Thank you for referring your patient, Ladonna Sheriff, to the [facility] MULTIPLE SCLEROSIS at [facility]. Please see a copy of my visit note below.    Ladonna Sheriff is a 75 year old female who is being evaluated via a billable video visit.      The patient has been notified of following:     \"This video visit will be conducted via a call between you and your physician/provider. We have found that certain health care needs can be provided without the need for an in-person physical exam.  This service lets us provide the care you need with a video conversation.  If a prescription is necessary we can send it directly to your pharmacy.  If lab work is needed we can place an order for that and you can then stop by our lab to have the test done at a later time.    Video visits are billed at different rates depending on your insurance coverage.  Please reach out to your insurance provider with any questions.    If during the course of the call the physician/provider feels a video visit is not appropriate, you will not be charged for this service.\"    Patient has given verbal consent for Video visit? {YES-NO  Default Yes:4444::\"Yes\"}  How would you like to obtain your AVS? {AVS Preference:243673}  If you are dropped from the video visit, the video invite should be resent to: {video visit invitation:967615}  Will anyone else be joining your video visit? {:442626}  {If patient encounters technical issues they should call [phone] :101735}      Video-Visit Details    Type of service:  Video Visit    Video Start Time: 4:39  Video End Time: Did not join video visit    Originating Location (pt. Location): Other not completed    Distant Location (provider location):  [facility] MULTIPLE SCLEROSIS     Platform used for Video Visit: Mya Fang and phone call on 8-6 and 8-7- " 2020    Go Wang, EMT        Again, thank you for allowing me to participate in the care of your patient.      Sincerely,    Caterina Franklin MD

## 2020-08-06 NOTE — LETTER
"8/6/2020       RE: Ladonna Sheriff  [address]  [place] [address]     Dear Colleague,    Thank you for referring your patient, Ladonna Sheriff, to the [facility] MULTIPLE SCLEROSIS at [facility]. Please see a copy of my visit note below.    Ladonna Sheriff is a 75 year old female who is being evaluated via a billable video visit.      The patient has been notified of following:     \"This video visit will be conducted via a call between you and your physician/provider. We have found that certain health care needs can be provided without the need for an in-person physical exam.  This service lets us provide the care you need with a video conversation.  If a prescription is necessary we can send it directly to your pharmacy.  If lab work is needed we can place an order for that and you can then stop by our lab to have the test done at a later time.    Video visits are billed at different rates depending on your insurance coverage.  Please reach out to your insurance provider with any questions.    If during the course of the call the physician/provider feels a video visit is not appropriate, you will not be charged for this service.\"    Patient has given verbal consent for Video visit? {YES-NO  Default Yes:4444::\"Yes\"}  How would you like to obtain your AVS? {AVS Preference:909588}  If you are dropped from the video visit, the video invite should be resent to: {video visit invitation:929452}  Will anyone else be joining your video visit? {:927466}  {If patient encounters technical issues they should call [phone] :[id number]}      Video-Visit Details    Type of service:  Video Visit    Video Start Time: 4:39  Video End Time: Did not join video visit    Originating Location (pt. Location): Other not completed    Distant Location (provider location):  [facility] MULTIPLE SCLEROSIS     Platform used for Video Visit: Mya Fang and phone call on 8-6 and 8-7- " 2020    Go Wang, EMT        Again, thank you for allowing me to participate in the care of your patient.      Sincerely,    Caterina Franklin MD

## 2020-08-12 NOTE — PATIENT INSTRUCTIONS
PLAN:    Start Cellcept  250 mg BID x 1 month then increase to 500 MG.  Repeat CBC, CMP in 3 months  Reduce Gabapentin take 1 tablet at night starting tonight x 1 month and stop  Start exercise program 5-10 min twice a day

## 2020-08-12 NOTE — LETTER
2020       RE: Ladonna Sheriff  [address]  [place] [facility] [place] [address]     Dear Colleague,    Thank you for referring your patient, Ladonna Sheriff, to the [facility] MULTIPLE SCLEROSIS at [facility]. Please see a copy of my visit note below.    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 She is presenting to the [facility] for follow up. She was last seen by Dr Franklin via telephone visit on 20.    Briefly, Ms Ladonna Sheriff had an episode of optic neuritis in  and had a bad attack in the R eye where vision did not recover (only able to detect light), at that time she received IV steroids at [place] and work up ruled out MS but she wasn't given a diagnosis. She was ultimately diagnosed with NMO by Dr Macias after AQP4 antibodies were postive in 2019. She received Rituximab infusions in 2019, 2019 and 2020. She was seen by Dr. Franklin for the first time during the pandemic on 3/2020, at the time she was doing well but in may she developed worsening of numbness and was treated for a presumped relapse, MRI of the thoracic spine was read as enlarged thoracic lesion which is clearly visible on sagittal STIR but no enhancement was seen on our review as opposed to the official report.    Unfortunately, she was admitted to [facility] on 2020 for worsening vision in her left eye and left eye pain. She was evaluated by ophthalmology (Dr Lazo) and acute optic neuritis was confirmed. She  received a total of 5 days of 1g IV methylprednisolone as well as a 5 total sessions of plasma exchange. Vision improved moderately in her left eye during hospitalization (from hand motion only to 20/200).       Since her hospitalization, she reports that her L eye vision had gone back to where it was prior to July. She continues to live in an apartment on her own, with her son living in the same building in a floor above. She gets assistance with groceries but is able to manage some cooking and chores at home on her own. Her son feels like since her hospitalization her gait is a little bit slower and that she has been using her cane more frequently. She has not had any new symptoms of vision loss in the eye or new onset weakness or numbness. She continues to have a band-like sensation in her chest. She also reports urinary urgency and incontinence, but this is not new. She has not had any recent falls.     Her son, Israel, thinks she might be depressed because her mood at times can be low. Ms Sheriff states that she does feel lonely because at times she goes days without talking to someone and thinks about her  who passed away 10 years ago.     ROS  A 10-point ROS was performed and is negative expect as per HPI.     Past Medical History  - NMO  - Hx of CVA  - CHF  - Hypertension  - Lupus  - Sjogren's syndrome  - Temporal arteritis  - Uncomplicated asthma  - S/P hysterectomy   - S/P cholecystectomy    Medications   Amlodipine 10mg daily  Gabapentin 300mg BID  Albuterol PRN  Incruse Ellipta 1 pull daily  ASA 325mg daily  Calcium-Vitamin D 600-200 daily  Fluoxetine 20mg daily  Lasix 20mg (TID, but patient takes BID)  Metoprolol 50mg BID  Simvastatin 20mg HS  Trazodone 100mg HS    Allergies  Lansoprazole - rash  Pravastatin - rash    Social History  Former smoker  Lives alone in apartment with her two cats. Son lives in the same building and assists her when needed. She ambulates with a cane.     Family History     Mother - asthma  Sister - lupus    Physical Examination   Vitals: There were no vitals taken for this visit.  General: Adult, in NAD, cooperative  HEENT: NC/AT  Chest: Normal work of breathing in RA  Extremities: No LE swelling.  Skin: No rash or lesion.   Psych: Mood pleasant, affect congruent  Neuro:  Mental status: Awake, alert, attentive, oriented. Speech is fluent, no dysarthria. Comprehension is intact normal. Recent and remote memory is intact.   Cranial nerves: Visual acuity: only able to detect light in the R eye. Able to count fingers in the L eye and state colors that I am wearing. She had 20/200 vision on Snellen chart. Her gaze is dysconjugate with a R eye exotropia. Face is symmetric, facial sensation intact, shoulder shrug strong, tongue is midline.   Motor: Tone within normal with the exception of RLE spasticity. 5/5 strength in all UE extremities. In Le she has 4/5 hip flexor strength bilaterally. Remainder of LE strength is 5/5. No atrophy or twitches.   Reflexes: Brisk symmetric biceps and brachioradialis. 3+ patellar and 2+ achilles. No Villasenor's.  Sensory: Intact to LT and PP in UE. Reduced in LE. Sensory level at T4.   Coordination: FNF no dysmetria, ANUJA normal  Gait: Not tested today.     Investigations   MRI Brain & Orbits 7/13/20  Impression:   1. The study demonstrates greater than 20 foci of T2-hyperintensity within the cerebral white matter consistent with the clinical suspicion of demyelinating disease. There are no foci of abnormal enhancement noted intracranially. Although mildly degraded by motion  artifact, no convincing findings of optic neuritis.  2. Previously seen enhancing lesion in the left parieto-occipitalregion appears resolved. No new lesions are identified.     MR C-Spine 7/13/20  Impression: Motion degraded study.  1. Cervical spine: No abnormal enhancement or cord abnormality is identified. Multilevel cervical spondylosis, without significant change from  5/15/2020.  2. Thoracic spine: Decreased abnormal cord signal extending from T2 to T4, with resolution of previously seen enhancement. There is persistent cord volume loss. No new abnormal cord signal or enhancement is identified.    MR T-Spine 7/13/20  Impression: Motion degraded study.  1. Cervical spine: No abnormal enhancement or cord abnormality is identified. Multilevel cervical spondylosis, without significant change from 5/15/2020.  2. Thoracic spine: Decreased abnormal cord signal extending from T2 to T4, with resolution of previously seen enhancement. There is persistent cord volume loss. No new abnormal cord signal orenhancement is identified.     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 She had 5 days of IV methylprednisolone and 5 total sessions of PLEX with improvement in her L eye vision following this (patient reports L eye vision is back to her baseline prior to 7/2020). No history of new relapses since hospitalization and exam is stable. Plan to initiate mycophenolate as disease-modifying agent given failed treatment with Rituximab.    We will refer her to both NPT and psychotherapy referral for evaluation of mood disorder and cognition evaluation.     - Start Mycophenolate 250mg BID x1 month and then increase to 500mg BID  - Taper off gabapentin (patient not reporting any pain) to 300mg HS x1 month then stop  - Encourage daily walking for 5-10 minutes twice per day  - Neuropsychiatric testing  - Referral for psychotherapy   - Vitamin D, CBC and CMP in 3 months  - Follow up in 3  months    Patient was seen and discussed with Dr. Franklin.    Rosa Segovia  Neurology Resident, PGY4  Pager: [phone]      I saw and evaluated the patient with the resident and agree with the assessment and plan. I was present for key portions of the above examination.    Her son was present during the evaluation and understands and agrees with the plan.    Caterina Franklin MD  Chief, Multiple Sclerosis Division  Department of Neurology  [facility] [place]

## 2020-08-12 NOTE — PROGRESS NOTES
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 She is presenting to the MS/Neuroimmunology Clinic for follow up. She was last seen by Dr Franklin via telephone visit on 20.    Briefly, Ms Ladonna Sheriff had an episode of optic neuritis in  and had a bad attack in the R eye where vision did not recover (only able to detect light), at that time she received IV steroids at [place] and work up ruled out MS but she wasn't given a diagnosis. She was ultimately diagnosed with NMO by Dr Macias after AQP4 antibodies were postive in 2019. She received Rituximab infusions in 2019, 2019 and 2020. She was seen by Dr. Franklin for the first time during the pandemic on 3/2020, at the time she was doing well but in may she developed worsening of numbness and was treated for a presumped relapse, MRI of the thoracic spine was read as enlarged thoracic lesion which is clearly visible on sagittal STIR but no enhancement was seen on our review as opposed to the official report.    Unfortunately, she was admitted to [facility] on 2020 for worsening vision in her left eye and left eye pain. She was evaluated by ophthalmology (Dr Lazo) and acute optic neuritis was confirmed. She received a total of 5 days of 1g IV methylprednisolone as well as a 5 total sessions of plasma exchange. Vision improved moderately in her left eye during hospitalization (from hand motion only to 20/200).       Since her hospitalization, she reports that her L eye vision had gone back to where it  was prior to July. She continues to live in an apartment on her own, with her son living in the same building in a floor above. She gets assistance with groceries but is able to manage some cooking and chores at home on her own. Her son feels like since her hospitalization her gait is a little bit slower and that she has been using her cane more frequently. She has not had any new symptoms of vision loss in the eye or new onset weakness or numbness. She continues to have a band-like sensation in her chest. She also reports urinary urgency and incontinence, but this is not new. She has not had any recent falls.     Her son, Israel, thinks she might be depressed because her mood at times can be low. Ms Sharita states that she does feel lonely because at times she goes days without talking to someone and thinks about her  who passed away 10 years ago.     ROS  A 10-point ROS was performed and is negative expect as per HPI.     Past Medical History  - NMO  - Hx of CVA  - CHF  - Hypertension  - Lupus  - Sjogren's syndrome  - Temporal arteritis  - Uncomplicated asthma  - S/P hysterectomy   - S/P cholecystectomy    Medications   Amlodipine 10mg daily  Gabapentin 300mg BID  Albuterol PRN  Incruse Ellipta 1 pull daily  ASA 325mg daily  Calcium-Vitamin D 600-200 daily  Fluoxetine 20mg daily  Lasix 20mg (TID, but patient takes BID)  Metoprolol 50mg BID  Simvastatin 20mg HS  Trazodone 100mg HS    Allergies  Lansoprazole - rash  Pravastatin - rash    Social History  Former smoker  Lives alone in apartment with her two cats. Son lives in the same building and assists her when needed. She ambulates with a cane.     Family History    Mother - asthma  Sister - lupus    Physical Examination   Vitals: There were no vitals taken for this visit.  General: Adult, in NAD, cooperative  HEENT: NC/AT  Chest: Normal work of breathing in RA  Extremities: No LE swelling.  Skin: No rash or lesion.   Psych: Mood pleasant, affect  congruent  Neuro:  Mental status: Awake, alert, attentive, oriented. Speech is fluent, no dysarthria. Comprehension is intact normal. Recent and remote memory is intact.   Cranial nerves: Visual acuity: only able to detect light in the R eye. Able to count fingers in the L eye and state colors that I am wearing. She had 20/200 vision on Snellen chart. Her gaze is dysconjugate with a R eye exotropia. Face is symmetric, facial sensation intact, shoulder shrug strong, tongue is midline.   Motor: Tone within normal with the exception of RLE spasticity. 5/5 strength in all UE extremities. In Le she has 4/5 hip flexor strength bilaterally. Remainder of LE strength is 5/5. No atrophy or twitches.   Reflexes: Brisk symmetric biceps and brachioradialis. 3+ patellar and 2+ achilles. No Villasenor's.  Sensory: Intact to LT and PP in UE. Reduced in LE. Sensory level at T4.   Coordination: FNF no dysmetria, ANUJA normal  Gait: Not tested today.     Investigations   MRI Brain & Orbits 7/13/20  Impression:   1. The study demonstrates greater than 20 foci of T2-hyperintensity within the cerebral white matter consistent with the clinical suspicion of demyelinating disease. There are no foci of abnormal enhancement noted intracranially. Although mildly degraded by motion  artifact, no convincing findings of optic neuritis.  2. Previously seen enhancing lesion in the left parieto-occipitalregion appears resolved. No new lesions are identified.     MR C-Spine 7/13/20  Impression: Motion degraded study.  1. Cervical spine: No abnormal enhancement or cord abnormality is identified. Multilevel cervical spondylosis, without significant change from 5/15/2020.  2. Thoracic spine: Decreased abnormal cord signal extending from T2 to T4, with resolution of previously seen enhancement. There is persistent cord volume loss. No new abnormal cord signal or enhancement is identified.    MR T-Spine 7/13/20  Impression: Motion degraded study.  1. Cervical  spine: No abnormal enhancement or cord abnormality is identified. Multilevel cervical spondylosis, without significant change from 5/15/2020.  2. Thoracic spine: Decreased abnormal cord signal extending from T2 to T4, with resolution of previously seen enhancement. There is persistent cord volume loss. No new abnormal cord signal orenhancement is identified.     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 She had 5 days of IV methylprednisolone and 5 total sessions of PLEX with improvement in her L eye vision following this (patient reports L eye vision is back to her baseline prior to 7/2020). No history of new relapses since hospitalization and exam is stable. Plan to initiate mycophenolate as disease-modifying agent given failed treatment with Rituximab.    We will refer her to both NPT and psychotherapy referral for evaluation of mood disorder and cognition evaluation.     - Start Mycophenolate 250mg BID x1 month and then increase to 500mg BID  - Taper off gabapentin (patient not reporting any pain) to 300mg HS x1 month then stop  - Encourage daily walking for 5-10 minutes twice per day  - Neuropsychiatric testing  - Referral for psychotherapy   - Vitamin D, CBC and CMP in 3 months  - Follow up in 3 months    Patient was seen and discussed with Dr. Franklin.    Rosa Segovia  Neurology Resident, PGY4  Pager: [phone]      I saw and evaluated the patient with the resident and agree with the assessment and plan. I was present for key portions of the above examination.    Her son was present during the  evaluation and understands and agrees with the plan.    Caterina Franklin MD  Chief, Multiple Sclerosis Division  Department of Neurology  [facility] [place]

## 2020-08-21 NOTE — PROGRESS NOTES
"   08/21/20 0900   Visit Type   Type of Visit Initial   General Information   Start Of Care Date 08/21/20   Referring Physician Kaz Lazo MD   Orders Evaluate And Treat As Indicated  (low vision)   Date of Order 07/27/20   Medical Diagnosis Optic neuritis, left H46.9  - Primary; Neuromyelitis optica (H) G36.0    Onset Of Illness/injury Or Date Of Surgery 7/13/20   Surgical/Medical history reviewed Yes   Precautions/Limitations falls precautions   Additional Occupational Profile Info/Pertinent History of Current Problem Ms. Ladonna Sheriff is a 75 year old female with a PMHx of AQP4 positive NMO with residual R eye vision loss, on-off episodes of L eye optic neuritis with baseline low vision, Bronchospastic disease, Sjogren's disease, Hypertension, Heart Failure, Mood Disorder and TIA's. She presented to the ED on 7/13/20 with worsening vision in her L eye. She initially had blurry vision as well as eye pain that progressed to involve whitening of her vision, followed by a \"tan like color\" followed by visual loss \"completely dark\".  Discharge Diagnoses was Neuromyelitis Optica and Transverse myelitis. She was discharged to TCU on 7/24/20 and then eventually discharged to home. Patient follows up with Dr Caterina Franklin for her NMO. Last Rituximab infusion was in 6/1/20. Reports she has had a total of 3 infusions so far (7/2019, 11/2019 and 6/2020). Prior to this she was not on any long-term immunomodulatory treatment for her NMO and was only treated with steroids.    Prior ADL/IADL status   (had some assistance - see below)   Others present at visit Child (mary)  (son for last 15 minutes)   Patient/family Goals Statement Improve vision, stay independent as long as possible   General information comments blind R eye; baseline is very poor in L eye, feels pretty much back to baseline since hospitalization but her baseline is not very good - she is not aware of having vision rehab in the past   Social History/Home " "Environment   Living Environment Apartment/condo  (alone in condo with 2 cats)   Current Community Support   (son lives in same building - different floor)   Patient Role/employment History  Homemaker;Retired   Avocational liked to needlework/cross stitch, bowl, golf - no longer can do anything; son in same building works outside of the home   Social/Environment Comment has 3 sons total - one in [place], one in [place] and one in [place]; patient lives on 2nd floor and no elevators (16 steps to get to her floor), goes down the stairs backwards; Cleaning help 1x/week; \"[employer] at [employer]\" - someone grocery shops for her every 2 weeks and son assist prn; son assists with finances and he sets up pillboxes (3 weeks at a time - she takes 2x/day); reports she stopped driving years ago   Pain Assessment   Pain Reported No   Fall Risk Screen   Fall screen completed by OT   Have you fallen 2 or more times in the past year? No   Have you fallen and had an injury in the past year? No   Is patient a fall risk? Yes  (Due to visual deficits)   Fall screen comments 1 fall   Cognitive/Behavioral   Communication Intact   Cognitive Status Intact for evaluation process   Behavior Appropriate   Patient/family aware of diagnosis Yes   Physical Status/Equipment   Physical Status Impaired balance   Mobility equipment used Support cane;White cane  (SEC - white and red tape at the bottom)   Visual Report   Functional Complaints Reading;Writing;Homemaking;Safety in mobility   Visual Complaints Visual fatigue;Difficulty maintaining focus   Luis Miguel Bonnet Symptoms? No   Magnifier (strength and type) hand held with light   Reading glasses Bifocal   Technology Closed circuit TV  ([employer] Fire/tablet; smart phone)   Lighting and Glare   Is your lighting adequate? Yes/ at home   Is glare a problem? No/ indoors;No/ outdoors   Are you satisfied with your sunglasses?   (doesn't usually wear sunglasses; has fit-over)   Visual Acuity   Acuity " right eye no light perception - low vision in R eye x~30 yrs per patient/son report  (per recent eye MD note)   Acuity left eye count fingers  (per recent eye MD note)   Contrast Sensitivity   Contrast sensitivity (score/25) 8/25   Preferred Retinal Locus   Right eye   (unable to see - N/A)   Left eye   (clock face: missing upper L quadrant)   Left eye eccentric viewing position Left   MN Read   Smallest print size read ambient light and with task light: 8M   Critical print size ambient light and with task light: 8M (able to read all with task light and chart 11 inches from eyes)   Words per minute at critical print size ambient light: 38wpm; task light: 120 wpm   Functional Reading Screen   Reading screen comments Color vision: 4/8 correct: Black, yellow, red, blue (not correct with purple, brown, gray, green)   [facility]   # relevant measures 34   Composite score 39   Percentage of disability (%) 42.65   Clinical Impression, OT Eval   Criteria for Skilled Therapeutic Interventions Met yes;treatment indicated   Therapy  Diagnosis: Impaired ADL/IADL with deficits in Reading based ADL;Written communication;Home management;Financial management;Dressing;Grooming   Assessment of Occupational Performance 5 or more Performance Deficits   Identified Performance Deficits Decreased ability to do many ADLs (dressing, grooming, functional mobility) and most IADLs (meal prep, finances, medication management, community mobility, etc.)   Clinical Decision Making (Complexity) High complexity   OT Visual Rehabilitation Evaluation Plan   Therapy Plan Occupational therapy intervention   Planned Interventions [place] awareness;Visual field loss awareness;Visual skills training for near tasks;Visual skills training for safety in mobility;Low vision compensatory training for reading;Low vision compensatory training for written communication;Low vision compensatory trainging for financial management;Low vision compensatory training  for object identification;Instruction in environmental adaptations for contrast;Instruction in environmental adaptations for lighting;Optical device/ADL device instruction and training;Computer modifications;Instruction in community resources   Frequency / Duration 1x/week x 12 weeks   Risks and Benefits of Treatment have been explained. Yes   Patient, Family in agreement with plan of care Yes   GOALS   Goals Near Vision;Visual Field;Environmental Modification;Resource Education;Distance Viewing   Goal 1 - Near Vision   Near Vision Goal Comment Patient will demonstrate 3 pieces of adaptive equipment and/or adaptive techniques in regards to magnification, lighting, contrast for increased ADL/IADL independence (reading, meal prep, medication management, writing).   Target Date 11/13/20   Goal 2 - Visual field   Visual Field Goal Comment Patient will verbalize awareness of central visual field Loss and demonstrate use of 2 visual skills/adaptive equipment for increased independence in reading-based activities of daily living, written communication and detail ADL tasks.    Target Date 11/13/20   Goal 3 - Environmental modification   Environmental Modification Goal Comment Patient will demonstrate and/or verbalize 3 environmentally-based ADL modifications to improve visual-based ADL/IADL activities.   Target Date 11/13/20   Goal 4 - Resource education   Goal Description: Resource education Patient will verbalize awareness of community resources for the following:;Audio access to print materials;Access to low vision devices   Target Date 11/13/20   Goal 5 - Distance viewing   Goal Description: Distance viewing Patient will demonstrate proficient use of a distance device in conjunction with appropriate visual skills techniques to correctly survey objects in the distance   Target Date 11/13/20   Total Evaluation Time   OT Eval, High Complexity Minutes (86448) 36   Therapy Certification   Certification date from 08/21/20    Certification date to 11/13/20   Medical Diagnosis Optic neuritis, left H46.9  - Primary; Neuromyelitis optica (H) G36.0    Certification I certify the need for these services furnished under this plan of treatment and while under my care.  (Physician co-signature of this document indicates review and certification of the therapy plan).

## 2020-08-21 NOTE — PROGRESS NOTES
[facility]          OUTPATIENT OCCUPATIONALTHERAPY  EVALUATION  PLAN OF TREATMENT FOR OUTPATIENT REHABILITATION  (COMPLETE FOR INITIAL CLAIMS ONLY)  Patient's Last Name, First Name, M.I.  YOB: 1945  Ladonna Sheriff      Provider's Name  [facility] Medical Record No.  [id number]   Onset Date:  7/13/20 Start of Care Date:  08/21/20   Type:     ___PT  _X_OT   ___SLP Medical Diagnosis:  Optic neuritis, left H46.9  - Primary; Neuromyelitis optica (H) G36.0    Therapy Diagnosis: Impaired ADL/IADL with deficits in Reading based ADL, Written communication, Home management, Financial management, Dressing, Grooming    Visits from SOC: 1     _________________________________________________________________________________  Plan of Treatment/Functional Goals:  Planned Interventions: [place] awareness, Visual field loss awareness, Visual skills training for near tasks, Visual skills training for safety in mobility, Low vision compensatory training for reading, Low vision compensatory training for written communication, Low vision compensatory trainging for financial management, Low vision compensatory training for object identification, Instruction in environmental adaptations for contrast, Instruction in environmental adaptations for lighting, Optical device/ADL device instruction and training, Computer modifications, Instruction in community resources        Goals  1.      Patient will demonstrate 3 pieces of adaptive equipment and/or adaptive techniques in regards to magnification, lighting, contrast for increased ADL/IADL independence (reading, meal prep, medication management, writing).    Target Date: 11/13/20      2.      Patient will verbalize awareness of central visual field Loss and demonstrate use of 2 visual skills/adaptive equipment for increased independence in  reading-based activities of daily living, written communication and detail ADL tasks.     Target Date: 11/13/20      3.       Patient will demonstrate and/or verbalize 3 environmentally-based ADL modifications to improve visual-based ADL/IADL activities.    Target Date: 11/13/20      4. Patient will verbalize awareness of community resources for the following:, Audio access to print materials, Access to low vision devices         Target Date: 11/13/20      5. Patient will demonstrate proficient use of a distance device in conjunction with appropriate visual skills techniques to correctly survey objects in the distance          Target Date: 11/13/20      6.                    7.                 8.                            Therapy Frequency/Duration:  1x/week x 12 weeks    Karmen Villalobos OT       I CERTIFY THE NEED FOR THESE SERVICES FURNISHED UNDER        THIS PLAN OF TREATMENT AND WHILE UNDER MY CARE     (Physician co-signature of this document indicates review and certification of the therapy plan).                  Certification date from: 08/21/20 Certification date to: 11/13/20          Referring Physician: Kaz Lazo MD     Initial Assessment        See Epic Evaluation Start Of Care Date: 08/21/20     Karmen Villalobos OT

## 2020-09-03 NOTE — PROGRESS NOTES
Assessment & Plan     Ladonna Sheriff is a 75 year old female with the following diagnoses:   1. Visual field defect       75 year old woman with diagnosed with positive neuromyelitis optica with left internuclear ophthalmoplegia, which she states has been there since 1995. She had active left optic neurits in 7/14/2020 for which is received received intravenous steroids. She presented today for a regular . She denies any new blurred vision, diplopia, pain with eye movements.  She is currently on mycophenolate 250mg BID as disease-modifying agent given failed treatment with Rituximab.    Her visual acuity is NLP in the right eye and 20/125 in the left eye. APD in the right eye. IOP is normal both eyes. . She has generalized depression in both eyes (right eye worse) and RNFL thinning in both eyes, more so on the right side. I will ask Dr. Franklin if she thinks that Ms. Sheriff would be a good candidate for Uplizna or Soliris (FDA approved medications for NMO).              Attending Physician Attestation:  Complete documentation of historical and exam elements from today's encounter can be found in the full encounter summary report (not reduplicated in this progress note).  I personally obtained the chief complaint(s) and history of present illness.  I confirmed and edited as necessary the review of systems, past medical/surgical history, family history, social history, and examination findings as documented by others; and I examined the patient myself.  I personally reviewed the relevant tests, images, and reports as documented above.  I formulated and edited as necessary the assessment and plan and discussed the findings and management plan with the patient and family. I personally reviewed the ophthalmic test(s) associated with this encounter, agree with the interpretation(s) as documented by the resident/fellow, and have edited the corresponding report(s) as necessary.  - Kaz Alarcon,  MD  Neuro-ophthalmology fellow  [facility]

## 2020-09-03 NOTE — NURSING NOTE
Chief Complaints and History of Present Illnesses   Patient presents with     Blurred Vision Follow-Up     Chief Complaint(s) and History of Present Illness(es)     Blurred Vision Follow-Up               Comments     Ladonna Sheriff is a 75 year old female with the following diagnoses:   1. Optic neuritis, left   2. Neuromyelitis optica (H)      Feels vision in the left eye has declined since the last visit.     Genie Dalemamarychuy CO 2:33 PM September 3, 2020

## 2020-09-22 NOTE — ED TRIAGE NOTES
Pt presents to ED for evaluation of bleeding from left nare which started at 1000 this morning.  Pt is not on any blood thinning medication.  However she is pale and diaphoretic.

## 2020-09-22 NOTE — ED AVS SNAPSHOT
[facility] Emergency Department  [address]  [facility] [address]  Phone:  [phone]  Fax:  [phone]                                    Ladonna Sheriff   MRN: [id number]    Department:  [facility] Emergency Department   Date of Visit:  9/22/2020           After Visit Summary Signature Page    I have received my discharge instructions, and my questions have been answered. I have discussed any challenges I see with this plan with the nurse or doctor.    ..........................................................................................................................................  Patient/Patient Representative Signature      ..........................................................................................................................................  Patient Representative Print Name and Relationship to Patient    ..................................................               ................................................  Date                                   Time    ..........................................................................................................................................  Reviewed by Signature/Title    ...................................................              ..............................................  Date                                               Time          22EPIC Rev 08/18

## 2020-09-22 NOTE — ED PROVIDER NOTES
History   Chief Complaint:  Epistaxis     The history is provided by the patient.      Ladonna Sheriff is a 75 year old female with history of hypertension and lupus on Cellcept who presents with epistaxis. Takes daily aspirin, no history problematic nosebleeds, no trauma, no uri symptoms or fevers.    Allergies:  Prevacid   Pravastatin    Medications:   Albuterol  Amlodipine  Aspirin 325 mg   Cellcept  Prozac  Lasix  Metoprolol tartrate  Simvastatin  Trazodone   Umeclidinium inhaler     Past Medical History:    Cerebral infarction  Congestive heart failure   Hypertension  Lupus  Optic neuritis  Sjogren's syndrome  Temporal arteritis   Transient ischemic attack   Uncomplicated asthma   Left bundle branch block  Pulmonary embolism   Sleep disorder      Past Surgical History:    Cholecystectomy  Hysterectomy  Tubal ligation  Tunnel placement x2     Family History:    Asthma  Lupus  Bone cancer  Kidney cancer     Social History:  The patient was unaccompanied to the ED.  Smoking Status: former smoker  Smokeless Tobacco: Never Used  Alcohol Use: Yes, 7 shots of liquor a week  Drug Use: No  PCP: Joceline Ty     Review of Systems   Constitutional: Negative for fever.   HENT: Positive for nosebleeds.    All other systems reviewed and are negative.    Physical Exam     Patient Vitals for the past 24 hrs:   BP Temp Temp src Pulse Resp SpO2   09/22/20 1345 114/67 -- -- -- -- 98 %   09/22/20 1338 114/67 -- -- 85 -- --   09/22/20 1330 (!) 83/62 96.5  F (35.8  C) Temporal 88 16 100 %       Physical Exam  General: Patient is alert and cooperative.  HENT:  No active epistaxis; fresh blood left medial nare. Large clot removed.   Eyes: EOMI. Normal conjunctiva.  Neck:  Normal range of motion and appearance.   Cardiovascular:  Normal rate.   Pulmonary/Chest:  Effort normal.   Musculoskeletal: Normal range of motion. No edema or tenderness.   Neurological: oriented, normal strength, sensation, and coordination.   Skin: Warm and  dry. No rash or bruising.   Psychiatric: Normal mood and affect. Normal behavior and judgement.    Emergency Department Course   Laboratory:  Laboratory findings were communicated with the patient who voiced understanding of the findings.    CBC: WBC 7.1, HGB 13.1,     Procedures    Silver Nitrate Nasal Cautery     PROCEDURE NOTE: The location of the patient's bleeding in the left nare was identified and cauterized with silver nitrate.  The patient tolerated the procedure well and there were no complications.  He was observed in the emergency department following the procedure and had no recurrence of his bleeding.       Interventions:  1401 Silver nitrate 4 applicators Topical    Emergency Department Course:  Nursing notes and vitals reviewed.    1345 I performed an exam of the patient as documented above.     1400 I performed silver nitrate cautery as noted above.      I rechecked the patient. There has been no recurrent bleeding.  Explained findings to the Patient.    Findings and plan explained to the Patient. Patient discharged home with instructions regarding supportive care, medications, and reasons to return. The importance of close follow-up was reviewed.       Impression & Plan      Medical Decision Making:  Ladonna Sheriff is a 75 year old female who presents to the emergency department today for epistaxis.  Anterior, no active bleeding on arrival; silver nitrate utilized to cauterize suspect site, observed, no recurrence.  discharge with epistaxis instructions, f/u ENT prn.    Diagnosis:    ICD-10-CM    1. Epistaxis  R04.0        Disposition:   discharged to home    Discharge Medications:  New Prescriptions    No medications on file       Scribe Disclosure:  Elizabeth THORNTON, am serving as a scribe at 1:37 PM on 9/22/2020 to document services personally performed by Olvin Cervantes MD based on my observations and the provider's statements to me.   [facility] EMERGENCY DEPARTMENT       Billy  Olvin SHAY MD  09/23/20 1140

## 2020-10-23 PROBLEM — R53.1 WEAKNESS: Status: ACTIVE | Noted: 2020-01-01

## 2020-10-23 NOTE — TELEPHONE ENCOUNTER
Called by outside ED regarding this patient at [facility].  Presenting with what she describes as exact same symptoms as previous transverse myelitis attack with R leg weakness per ED with band like sensation in chest. .  Not able to ambulate or go home per ED.     Recommended checking UA to rule out pseudoexacerbation.  If UA clean would recommend repeat MRI cervical and thoracic spine with and without contrast. .  She is being admitted to [facility].  Would consider neurology consult to review MRI/exam.   May need Steroids or PLEX pending results.      Rajwinder Spangler, DO  Neurology

## 2020-10-23 NOTE — H&P
[facility]    History and Physical  Hospitalist    Name: Ladonna Sheriff    MRN: [id number]  YOB: 1945    Age: 75 year old  Primary care provider: Joceline Ty       Date of Admission:  10/23/2020  Date of Service (when I saw the patient): 10/23/20    Assessment & Plan   Ladonna Sheriff is a 75 year old female with a past medical history significant for AQP4 positive neuromyelitis optica, with blindness in the right eye and limited vision in the left eye, numbness from the mid chest down from the T4 level, Sjogren's syndrome, chronic HFpEF, mild to moderate aortic stenosis, hypertension, discoid lupus, TIA, who presents with concerns for leg weakness.     Patient reports that she was initially diagnosed with neuromyelitis optica in the summer of last year.  She received plasmapheresis, high-dose Solu-Medrol and IV rituximab at that time.  She was in acute rehab afterwards.  She was again hospitalized at the [facility] in July of this year, with concerns for worsening vision.  She received plasmapheresis and 5 days of 1 g methylprednisolone.    On this occasion, patient presents to the hospital with concerns for increasing weakness in the right leg.  Patient reports the symptoms have been present for about 2 days now and feels similar to the previous episodes.  She also feels band in her chest tighten.  She feels like her right leg is like rubber and she has difficulty controlling that and has to drag it with activity.  She does use a walker.  Denies any change in her vision symptoms.  Has low vision at baseline. No other reports of fevers, chills, nausea, vomiting, urinary symptoms.  ER provider discussed the case with neurology on-call at the [facility], and it was felt that there is no urgent need to transfer her and patient can stay at this facility.  An MRI of the C-spine, L-spine was recommended, along with a UA and neurology consultation.    #Right  leg weakness.  Patient symptoms might be due to exacerbation of her neuromyelitis optica.  She also has signs of a possible UTI, with 31 white cells, leukocyte esterase and nitrites in the urine, which could be exacerbating her neurological symptoms as well.  -Admit as inpatient  -MRI of the C-spine and L-spine are pending.  -We will request general neurology consultation  -Start on IV antibiotics for UTI.  Monitor for improvement.  PT/OT consultation.  -Will defer to neurology regarding input for need for steroids versus plasmapheresis.   -If patient requires a higher level of care for example plasmapheresis or inpatient ophthalmology, then she may need to be transferred to the [facility].  I discussed this at length with the patient and she feels comfortable with this plan and waiting for further input from neurology.     # Possible UTI. Continue ceftriaxone, await cultures    #Chronic HFpEF. No signs of exacerbation currently.   #HTN  #Sjogren syndrome  #Lupus  #Remote hx of TIA    Resume home medications for these chronic health issues once the medication list has been verified.     COVID-19 Status. Asymptomatic testing pending     Patient's baseline home medications will need to be resumed once the prior to admission medication list has been verified by pharmacy     DVT Prophylaxis: Pneumatic Compression Devices  Code Status: Full Code    Disposition: Expected discharge: TBD     Plan of care was discussed with the patient in great detail. All of the questions were answered extensively. Patient is comfortable with the plan and agrees with it.     Covid-19  Ladonna Sheriff was evaluated during a global COVID-19 pandemic, and applicable protocols for evaluation were followed during the patient's care.      Marivel Dyer    Chief Complaint   weakness    History is obtained from the patient     Case discussed with ER provider Dr. Allen     History of Present Illness   Ladonna Sheriff is a 75 year old female  who presents with leg weakness. Details of HPi as above.     Past Medical History    I have reviewed this patient's medical history and updated it with pertinent information if needed.   Past Medical History:   Diagnosis Date     Cerebral infarction (H)      CHF (congestive heart failure) (H)      Hypertension      Lupus (H)      Lupus (H)      Optic neuritis      Optic neuritis      Sjogren's syndrome (H)      Sjogren's syndrome (H)      Temporal arteritis (H)      TIA (transient ischemic attack)      Uncomplicated asthma        Past Surgical History   I have reviewed this patient's surgical history and updated it with pertinent information if needed.  Past Surgical History:   Procedure Laterality Date     CHOLECYSTECTOMY       GYN SURGERY      hysterectomy     GYN SURGERY      tubal ligation     IR CVC NON TUNNEL PLACEMENT  6/21/2019     IR CVC NON TUNNEL PLACEMENT  7/15/2020     IR FOLLOW UP VISIT INPATIENT  7/2/2019       Prior to Admission Medications   Prior to Admission Medications   Prescriptions Last Dose Informant Patient Reported? Taking?   B Complex-C (VITAMIN B COMPLEX W/VITAMIN C) TABS tablet   Yes No   Sig: Take 1 tablet by mouth daily   CELLCEPT (BRAND) 500 MG tablet   No No   Sig: Take 1 tablet (500 mg) by mouth 2 times daily   Calcium-Vitamin D 600-200 MG-UNIT TABS   Yes No   Sig: Take 1 tablet by mouth every evening    FLUoxetine (PROZAC) 20 MG capsule   Yes No   Sig: Take 20 mg by mouth daily   Multiple Vitamins-Minerals (MULTIVITAL PO)   Yes No   Sig: Take 1 tablet by mouth every morning    albuterol (PROAIR HFA/PROVENTIL HFA/VENTOLIN HFA) 108 (90 Base) MCG/ACT inhaler   Yes No   Sig: Inhale 2 puffs into the lungs every 6 hours as needed    amLODIPine (NORVASC) 10 MG tablet   Yes No   Sig: Take 10 mg by mouth daily   aspirin (ASA) 325 MG EC tablet   Yes No   Sig: Take 325 mg by mouth every evening    furosemide (LASIX) 20 MG tablet   Yes No   Sig: Take 20 mg by mouth 3 times daily   metoprolol  tartrate (LOPRESSOR) 50 MG tablet   Yes No   Sig: Take 50 mg by mouth 2 times daily   simvastatin (ZOCOR) 20 MG tablet   Yes No   Sig: Take 20 mg by mouth At Bedtime   traZODone (DESYREL) 100 MG tablet   Yes No   Sig: Take 100 mg by mouth At Bedtime   trolamine salicylate (ASPERCREME) 10 % external cream   No No   Sig: Apply topically 2 times daily as needed for moderate pain   umeclidinium (INCRUSE ELLIPTA) 62.5 MCG/INH inhaler   Yes No   Sig: Inhale 1 puff into the lungs daily   vitamin C (ASCORBIC ACID) 500 MG tablet   Yes No   Sig: Take 500 mg by mouth daily      Facility-Administered Medications: None     Allergies   Allergies   Allergen Reactions     Prevacid [Lansoprazole] Rash     Pravastatin Rash     Patient is still not sure about it due many years ago for reaction.       Social History   I have reviewed this patient's social history and updated it with pertinent information if needed.   Social History     Socioeconomic History     Marital status:      Spouse name: Not on file     Number of children: Not on file     Years of education: Not on file     Highest education level: Not on file   Occupational History     Not on file   Social Needs     Financial resource strain: Not on file     Food insecurity     Worry: Not on file     Inability: Not on file     Transportation needs     Medical: Not on file     Non-medical: Not on file   Tobacco Use     Smoking status: Former Smoker     Packs/day: 0.00     Smokeless tobacco: Never Used   Substance and Sexual Activity     Alcohol use: Yes     Alcohol/week: 7.0 standard drinks     Types: 7 Shots of liquor per week     Comment: occ     Drug use: No     Sexual activity: Not on file   Lifestyle     Physical activity     Days per week: Not on file     Minutes per session: Not on file     Stress: Not on file   Relationships     Social connections     Talks on phone: Not on file     Gets together: Not on file     Attends Sabianist service: Not on file     Active  member of club or organization: Not on file     Attends meetings of clubs or organizations: Not on file     Relationship status: Not on file     Intimate partner violence     Fear of current or ex partner: Not on file     Emotionally abused: Not on file     Physically abused: Not on file     Forced sexual activity: Not on file   Other Topics Concern     Not on file   Social History Narrative     Not on file         Family History   I have reviewed this patient's family history and updated it with pertinent information if needed.   Family History   Problem Relation Age of Onset     Asthma Mother      Lupus Sister      Bone Cancer Brother      Kidney Cancer Sister        Review of Systems   The 10 point Review of Systems is negative other than noted in the HPI or here.     Physical Exam   Temp: 97.9  F (36.6  C) Temp src: Oral BP: (!) 156/79 Pulse: 69   Resp: 18 SpO2: 95 % O2 Device: None (Room air)    Vital Signs with Ranges  Temp:  [97.9  F (36.6  C)] 97.9  F (36.6  C)  Pulse:  [65-84] 69  Resp:  [18] 18  BP: (108-156)/(65-79) 156/79  SpO2:  [95 %-97 %] 95 %  200 lbs 0 oz    GENERAL:  Awake and alert, Comfortable appearing, No acute distress.  PSYCH: Pleasant, Cooperative, appropriate affect, no hallucinations   EYES: EOMI, conjunctiva clear  HEENT:  Head is normocephalic, atraumatic, Neck is Supple, trachea is midline   CARDIOVASCULAR: Regular rate and rhythm, Normal S1, S2, pansystolic murmur over RUSB   PULMONARY:  Clear to auscultation bilaterally with good entry on both sides  CHEST: Good inspiratory effort bilaterally   GI: Abdomen is soft, non tender, non-distended, normal bowel sounds. No rebound or guarding   SKIN:  Dry, warm to touch. No obvious exanthems on exposed areas  EXTREMITIES:  Good capillary refill with signs of adequate peripheral perfusion. No peripheral edema   Neuro: Awake and oriented x 3. Decreased sensation from mid-chest downwards, which pt describes as chronic, has good strength in left  leg, able to move right leg spontaneously but weaker than left and appears to have more difficulty with coordination with right leg, no facial droop, decreased vision which pt again reports is unchanged     Data   Data reviewed today:  I personally reviewed.    All lab data and imaging results from today have been reviewed.     Recent Labs   Lab 10/23/20  1450   WBC 5.2   HGB 12.1   MCV 98         POTASSIUM 3.6   CHLORIDE 107   CO2 25   BUN 15   CR 0.96   ANIONGAP 9   GANESH 8.5   *   ALBUMIN 3.5   PROTTOTAL 8.4   BILITOTAL 0.5   ALKPHOS 74   ALT 26   AST 24       No results found for this or any previous visit (from the past 24 hour(s)).

## 2020-10-23 NOTE — PHARMACY-ADMISSION MEDICATION HISTORY
Admission medication history interview status for this patient is complete. See [facility] admission navigator for allergy information, prior to admission medications and immunization status.     Medication history interview done via telephone during Covid-19 pandemic, indicate source(s): Patient  Medication history resources (including written lists, pill bottles, clinic record):None  Pharmacy: -    Changes made to PTA medication list:  Added: -  Deleted: -  Changed: lasix to BID, incruse to prn    Actions taken by pharmacist (provider contacted, etc):called pt for mr     Additional medication history information:None    Medication reconciliation/reorder completed by provider prior to medication history?  no   (Y/N)     For patients on insulin therapy:   Do you use sliding scale insulin based on blood sugars?   What is your pre-meal insulin coverage?    Do you typically eat three meals a day?   How many times do you check your blood glucose per day?   How many episodes of hypoglycemia do you typically have per month?   Do you have a Continuous Glucose Monitor (CGM)?      Prior to Admission medications    Medication Sig Last Dose Taking? Auth Provider   albuterol (PROAIR HFA/PROVENTIL HFA/VENTOLIN HFA) 108 (90 Base) MCG/ACT inhaler Inhale 2 puffs into the lungs every 6 hours as needed   Yes Reported, Patient   amLODIPine (NORVASC) 10 MG tablet Take 10 mg by mouth daily 10/23/2020 at Unknown time Yes Reported, Patient   aspirin (ASA) 325 MG EC tablet Take 325 mg by mouth every evening  10/22/2020 at Unknown time Yes Reported, Patient   B Complex-C (VITAMIN B COMPLEX W/VITAMIN C) TABS tablet Take 1 tablet by mouth daily 10/23/2020 at Unknown time Yes Unknown, Entered By History   Calcium-Vitamin D 600-200 MG-UNIT TABS Take 1 tablet by mouth every evening  10/22/2020 at Unknown time Yes Reported, Patient   CELLCEPT (BRAND) 500 MG tablet Take 1 tablet (500 mg) by mouth 2 times daily 10/23/2020 at x1 Yes Caterina Franklin MD    FLUoxetine (PROZAC) 20 MG capsule Take 20 mg by mouth daily 10/23/2020 at Unknown time Yes Unknown, Entered By History   furosemide (LASIX) 20 MG tablet Take 20 mg by mouth 2 times daily  10/23/2020 at x1 Yes Unknown, Entered By History   metoprolol tartrate (LOPRESSOR) 50 MG tablet Take 50 mg by mouth 2 times daily 10/23/2020 at x1 Yes Unknown, Entered By History   Multiple Vitamins-Minerals (MULTIVITAL PO) Take 1 tablet by mouth every morning  10/23/2020 at Unknown time Yes Reported, Patient   simvastatin (ZOCOR) 20 MG tablet Take 20 mg by mouth At Bedtime 10/22/2020 at Unknown time Yes Reported, Patient   traZODone (DESYREL) 100 MG tablet Take 100 mg by mouth At Bedtime 10/22/2020 at Unknown time Yes Unknown, Entered By History   trolamine salicylate (ASPERCREME) 10 % external cream Apply topically 2 times daily as needed for moderate pain  Yes Alee Ndiaye MD   umeclidinium (INCRUSE ELLIPTA) 62.5 MCG/INH inhaler Inhale 1 puff into the lungs daily as needed   Yes Unknown, Entered By History   vitamin C (ASCORBIC ACID) 500 MG tablet Take 500 mg by mouth daily 10/23/2020 at Unknown time Yes Reported, Patient

## 2020-10-23 NOTE — ED PROVIDER NOTES
"  History     Chief Complaint:  Extremity Weakness      HPI   Ladonna Sheriff is a 75 year old female with a history of neuromyelitis optica who presents with weakness. Per the patient's son, she has been having right leg weakness for the past two days. She had a similar episode back in June 2019 but not as bad. She called her neurologist, Dr. Caterina Franklin at [facility], who recommended she present to the ED. She denies cough, fever, COVID exposure, or other cold symptoms.    Allergies:  Prevacid  Pravastatin    Medications:    Norvasc  Aspirin, 325 MG  Cellcept  Prozac  Lasix  Lopressor  Zocor  Desyrel    Past Medical History:    Cerebral infarction  CHF  Hypertension  Lupus  Optic neuritis  Sjogren's syndrome  Temporal arteritis  TIA  Asthma  PE  Transverse myelitis  LBBB  Sleep disorder  Neuromyelitis optica    Past Surgical History:    Cholecystectomy  Hysterectomy  Tubal ligation  CVC non-tunnel placement x2    Family History:    Mother: Asthma  Sister: Lupus, kidney cancer  Brother: Bone cancer    Social History:  The patient was accompanied to the ED by her son.  Smoking Status: Former Smoker  Smokeless Tobacco: Never Used  Alcohol Use: Positive  Drug Use: Negative  PCP: Joceline Ty  Marital Status:       Review of Systems   Constitutional: Negative for fever.   Respiratory: Negative for cough.    Neurological: Positive for weakness.   All other systems reviewed and are negative.      Physical Exam     Patient Vitals for the past 24 hrs:   BP Temp Temp src Pulse Resp SpO2 Height Weight   10/23/20 1600 139/69 -- -- 65 -- 96 % -- --   10/23/20 1359 108/65 97.9  F (36.6  C) Oral 84 18 96 % 1.702 m (5' 7\") 90.7 kg (200 lb)       Physical Exam  Constitutional:       Comments: Pleasant and cooperative   HENT:      Mouth/Throat:      Pharynx: No posterior oropharyngeal erythema.   Eyes:      Comments: Disconjugate gaze   Neck:      Musculoskeletal: Neck supple.   Cardiovascular:      Rate and Rhythm: " Normal rate and regular rhythm.      Heart sounds: Normal heart sounds.   Pulmonary:      Effort: Pulmonary effort is normal.      Breath sounds: Normal breath sounds.   Abdominal:      General: Bowel sounds are normal. There is no distension.      Palpations: Abdomen is soft.      Tenderness: There is no abdominal tenderness. There is no guarding or rebound.   Musculoskeletal: Normal range of motion.   Skin:     General: Skin is warm and dry.   Neurological:      Mental Status: She is alert.      Comments: Generalized weakness, greater on the right           Emergency Department Course     Imaging:  Radiology findings were communicated with the patient who voiced understanding of the findings.    Cervical spine MRI w/o contrast    (Results Pending)   Thoracic spine MRI w/o contrast    (Results Pending)     Laboratory:  Laboratory findings were communicated with the patient who voiced understanding of the findings.    CBC: WBC 5.2, HGB 12.1,   CMP: Glucose 106(H), GFR Estimate 58(L) o/w WNL (Creatinine 0.96)    UA with micro: In process    Emergency Department Course:    ED Course as of Oct 23 1619   Fri Oct 23, 2020   1429 Nursing notes and vitals reviewed.      1431 I performed a physical exam of the patient as documented above.      1450 IV was inserted and blood was drawn for laboratory testing, results above.       1450 The patient provided a urine sample here in the emergency department. This was sent for laboratory testing, findings above.       1536 I spoke with Dr. Spangler of the neurology service from [facility] regarding patient's presentation, findings, and plan of care.       1542 Patient rechecked and updated.        1612 I spoke with Dr. Dyer of the hospitalist service from [facility] regarding patient's presentation, findings, and plan of care.         Patient was discussed with Dr. Hare and signed out to her pending final disposition.     Impression & Plan        Medical Decision  Making:  Ladonna Sheriff is a 75 year old female who presents to the emergency department today for evaluation of increased generalized weakness.  There is no evidence of an acute metabolic disorder accounting for this.  She has not had any symptoms of acute infectious illness but urinalysis is pending.  As noted I spoke with neurology who recommended MRI of the cervical and thoracic spine.  These are ordered and pending.  I discussed the case with Dr. Dyer of the hospitalist service.  If urinalysis shows signs of infection we will initiate antibiotics.  If MR of the spine shows unexpected findings we will discuss again with neurology whether the patient requires transfer to the [facility].  Otherwise the patient will be admitted here to a medical bed.    Covid-19  Ladonna Sheriff was evaluated during a global COVID-19 pandemic, which necessitated consideration that the patient might be at risk for infection with the SARS-CoV-2 virus that causes COVID-19.   Applicable protocols for evaluation were followed during the patient's care.   COVID-19 was considered as part of the patient's evaluation. The plan for testing is:  a test was obtained during this visit.    Diagnosis:    ICD-10-CM    1. Muscle weakness (generalized)  M62.81    2. Neuromyelitis optica (devic) (H)  G36.0         Disposition:   Patient was discussed with Dr. Hare and signed out to her pending final disposition.     Scribe Disclosure:  I, Kedar Sidhu, am serving as a scribe at 2:29 PM on 10/23/2020 to document services personally performed by Ira Rosario MD based on my observations and the provider's statements to me.    [facility] EMERGENCY DEPT       Ira Rosario MD  10/23/20 4759

## 2020-10-23 NOTE — ED TRIAGE NOTES
ABCs intact. Pt hx neuromylitis optica. Pt c/o R leg weakness starting on Wednesday. Pt states weakness has gotten worse. Pt states she had the same thing happen in 2016.

## 2020-10-23 NOTE — ED NOTES
"[facility]  ED Nurse Handoff Report    Ladonna Sheriff is a 75 year old female   ED Chief complaint: Extremity Weakness  . ED Diagnosis:   Final diagnoses:   Muscle weakness (generalized)   Neuromyelitis optica (devic) (H)     Allergies:   Allergies   Allergen Reactions     Prevacid [Lansoprazole] Rash     Pravastatin Rash     Patient is still not sure about it due many years ago for reaction.       Code Status: Full Code  Activity level - Baseline/Home:  Assist X 1. Activity Level - Current:   Assist X 2 - pt non weight-bearing since current condition but typically gets around with walker. Lift room needed: Yes - unable to bear weight since leg weakness. Bariatric: No   Needed: No   Isolation: No. Infection: Not Applicable.     Vital Signs:   Vitals:    10/23/20 1359 10/23/20 1600 10/23/20 1615   BP: 108/65 139/69 (!) 141/71   Pulse: 84 65 66   Resp: 18     Temp: 97.9  F (36.6  C)     TempSrc: Oral     SpO2: 96% 96% 97%   Weight: 90.7 kg (200 lb)     Height: 1.702 m (5' 7\")         Cardiac Rhythm:  ,      Pain level:    Patient confused: No. Patient Falls Risk: Yes.   Elimination Status: Pt was cath'd for UA.   Patient Report - Initial Complaint: Right leg weakness. Focused Assessment: Pt here for two days of right leg weakness. Pt normally able to get around with walker, but has not been able to since weakness. Pt has neuromyelitis optica. Patient will be getting MRI. ABCs intact. Pt A&OX4. Please call son Ro with any updates.   Tests Performed: Labs, urine, MRI. Abnormal Results:   Labs Ordered and Resulted from Time of ED Arrival Up to the Time of Departure from the ED   CBC WITH PLATELETS DIFFERENTIAL - Abnormal; Notable for the following components:       Result Value    MCHC 30.8 (*)     All other components within normal limits   COMPREHENSIVE METABOLIC PANEL - Abnormal; Notable for the following components:    Glucose 106 (*)     GFR Estimate 58 (*)     All other components within " normal limits   ROUTINE UA WITH MICROSCOPIC REFLEX TO CULTURE   COVID-19 VIRUS (CORONAVIRUS) BY PCR   CARDIAC CONTINUOUS MONITORING   PULSE OXIMETRY NURSING     Cervical spine MRI w/o contrast    (Results Pending)   Thoracic spine MRI w/o contrast    (Results Pending)     .   Treatments provided: N/A  Family Comments: SonIsrael, was here. Please call with updates.   OBS brochure/video discussed/provided to patient:  N/A  ED Medications: Medications - No data to display  Drips infusing:  No  For the majority of the shift, the patient's behavior Green. Interventions performed were N/A.    Sepsis treatment initiated: No     Patient tested for COVID 19 prior to admission: YES    ED Nurse Name/Phone Number: Cheyenne Adkins RN,   4:25 PM  RECEIVING UNIT ED HANDOFF REVIEW    Above ED Nurse Handoff Report was reviewed: Yes  Reviewed by: Johana Bañuelos RN on October 23, 2020 at 4:48 PM

## 2020-10-24 NOTE — DISCHARGE SUMMARY
[facility]  Hospitalist Discharge Summary       Date of Admission:  10/23/2020  Date of Discharge:  10/24/2020    Discharging Provider: Marivel Dyer MD      Discharge Diagnoses   # Right leg weakness. Concern for exacerbation of Neuromyelitis Optica  # Uncomplicated UTI    Being transferred to [facility] for plasmapheresis treatment     Other diagnosis:  #Chronic HFpEF  #HTN  #Sjogren syndrome  #Lupus  #Remote hx of TIA      Unresulted Labs Ordered in the Past 30 Days of this Admission     Date and Time Order Name Status Description    10/23/2020 1450 Urine Culture Aerobic Bacterial Preliminary           Hospital Course     Ladonna Sheriff is a 75 year old female with a past medical history significant for AQP4 positive neuromyelitis optica, with blindness in the right eye and limited vision in the left eye, numbness from the mid chest down from the T4 level, Sjogren's syndrome, chronic HFpEF, mild to moderate aortic stenosis, hypertension, discoid lupus, TIA, who presents with concerns for leg weakness.      Patient reports that she was initially diagnosed with neuromyelitis optica in the summer of last year.  She received plasmapheresis, high-dose Solu-Medrol and IV rituximab at that time.  She was in acute rehab afterwards.  She was again hospitalized at the [facility] in July of this year, with concerns for worsening vision.  She received plasmapheresis and 5 days of 1 g methylprednisolone.     On this occasion, patient presents to the hospital with concerns for increasing weakness in the right leg.  Patient reports the symptoms have been present for about 2 days now and feels similar to the previous episodes.  She also feels a band in her chest tighten.  She feels like her right leg is like rubber and she has difficulty controlling that and has to drag it with activity.  She does use a walker.  Denies any change in her vision symptoms.  Has low vision at baseline. No other  reports of fevers, chills, nausea, vomiting, urinary symptoms.    Patient was subsequently admitted to the hospital.  She underwent MRI of her C-spine and L-spine.  This revealed improvement in the previous T2 signal abnormality, but increasing enhancement, particularly at T4-T5 level.  Patient was found to have an uncomplicated UTI.  She was started on ceftriaxone.  However, not much change in her symptoms with that.  Suspicion for exacerbation of underlying neuromyelitis optica.  She was seen by neurology consultation, who recommend starting patient on high-dose IV Solu-Medrol 500 mg twice daily.  Also recommended transfer to [facility] to consider plasmapheresis therapy.  I discussed the case with neurologist on-call at [facility] who graciously accepted the patient for transfer.  I discussed this plan with the patient as well who is in agreement with the transfer.       Consultations This Hospital Stay   PHYSICAL THERAPY ADULT IP CONSULT  OCCUPATIONAL THERAPY ADULT IP CONSULT  NEUROLOGY IP CONSULT    Code Status   Full Code    Time Spent on this Encounter   I, Marivel Dyer MD, personally saw the patient today and spent greater than 30 minutes discharging this patient.       Marivel Dyer MD  [facility]  ______________________________________________________________________    Physical Exam   Vital Signs: Temp: 96.5  F (35.8  C) Temp src: Oral BP: (!) 153/68 Pulse: 73   Resp: 16 SpO2: 94 % O2 Device: None (Room air)    Weight: 200 lbs 0 oz  Patient is awake and alert, no acute distress. Neurological exam is unchanged from admission, with decreased strength in right leg       Primary Care Physician   Joceline Ty    Discharge Disposition   Transfer to [facility]  Condition at discharge: Stable      Allergies   Allergies   Allergen Reactions     Prevacid [Lansoprazole] Rash     Pravastatin Rash     Patient is still not sure about it due many years ago for  reaction.       Significant Results and Procedures   Results for orders placed or performed during the hospital encounter of 10/23/20   MR Cervical Spine w/o & w Contrast    Narrative    EXAM: MR CERVICAL SPINE W/O AND W CONTRAST  LOCATION: [facility]  DATE/TIME: 10/23/2020 5:15 PM    INDICATION: Weakness.  COMPARISON: MRI cervical spine 07/13/2020, 05/15/2020.  CONTRAST: 7.5 mL Gadavist.  TECHNIQUE: Without and with IV contrast.    FINDINGS:   There is 1 to 2 mm of anterolisthesis at C4-C5 and a trace of anterolisthesis at C5-C6 and C6-C7. Otherwise anatomic alignment is maintained. Vertebral body heights are maintained. No focal pathologic marrow replacement. The spinal cord is normal in   configuration and signal pattern. No abnormal signal or enhancement. No extraspinal abnormality. Partial visualization of small Schmorl's nodes at the superior endplate of T2 and T3. Partial visualization of cord signal abnormality at T3, see separate   thoracic spine MR report.    Craniovertebral junction and C1-C2: Within normal limits with slight degenerative change noted.    C2-C3: Trace of uncinate spurring bilaterally, facets unremarkable. Canal and foramina patent.     C3-C4: Trace of disc osteophyte complex and mild uncinate spurring on the left, minimal on the right and mild left with minimal right facet arthropathy. Spinal canal patent, mild left and minimal right neural foraminal narrowing.     C4-C5: Mild to moderate disc osteophyte complex, small shallow central disc protrusion with trace of annular fissure/tear and a tiny component of protrusion or extrusion extending superiorly in the left paracentral canal. Mild to moderate right and mild   left facet arthropathy. Mild spinal canal narrowing, mild to moderate foraminal narrowing more on the right side.     C5-C6: Mild disc osteophyte complex. Moderate right and mild left facet arthropathy. Slight spinal canal narrowing, mild to moderate right and  mild left neural foraminal narrowing.     C6-C7: Trace of disc bulging, slight left uncinate spurring. Mild facet arthropathy bilaterally. Canal and right neural foramen patent. Slight left neural foraminal narrowing.     C7-T1: Disc and facet without significant abnormality. Canal and foramina patent.      Impression    IMPRESSION:  1.  No definite cervical spinal cord signal abnormality and no abnormal enhancement.  2.  Disc osteophyte complex with shallow protrusion and annular fissure/tear and possible tiny component of extrusion at C4-C5 without significant spinal canal stenosis, mild to moderate foraminal narrowing right more than left due to uncinate spurring   and facet arthropathy.  3.  Mild multilevel degenerative spondylosis otherwise, see report for level by level details.     MR Thoracic Spine w/o & w Contrast    Narrative    EXAM: MR THORACIC SPINE W/O AND W CONTRAST  LOCATION: [facility]  DATE/TIME: 10/23/2020 5:16 PM    INDICATION: History of neuromyelitis optica, now with weakness more on the right side.  COMPARISON: MRI thoracic spine 05/15/2020.  CONTRAST: 7.5 mL Gadavist.  TECHNIQUE: Without and with IV contrast.    FINDINGS:   There is heterogeneously increased long TR signal within the spinal cord parenchyma extending from the mid to inferior T3 level to the T5-T6 disc space level, predominantly centrally and slightly more to the right side, maximally at the T4 level. There   is prominent enhancement extending from the T3-T4 disc level to the T4-T5 disc level centrally and to the right side approximately 5 x 7 mm in axial cross-section and 2.6 cm CC, with another 3 x 6 mm focus at the T5 level centrally and suggestion of a 3   mm focus in the left canal possibly representing nerve root at the T4-T5 level (series 9 image [id number]). The remainder of the thoracic spinal cord is otherwise normal in configuration and signal pattern, the previously noted signal changes at the T2 level    are minimally visualized and without enhancement.     The thoracic spine is in anatomic alignment, vertebral body heights are essentially maintained. No evidence of significant disc protrusion or extrusion or spinal canal stenosis. No evidence of focal pathologic marrow replacement. There is a slightly   rounded region of signal change in the anterior superior T11 vertebral body abutting the endplate with enhancement, configuration and size of which is unchanged over the interval and likely represents a Schmorl's node. Conus tip at T12. No   significant/acute paraspinal abnormality.       Impression    IMPRESSION:  1.  Increased cord parenchyma T2 signal extending from T3-T5/T6, with enhancement centered at the T4 level as well as a small focus of enhancement in the parenchyma at the T5 level and left lateral spinal canal at T5 which may represent a focus of nerve   root enhancement. This is consistent with but not diagnostic of demyelinating disease active/acute plaque formation.  2.  Near complete resolution of the previous cord signal abnormality centered at the T2 level.  3.  Remainder of study without significant MR abnormality.

## 2020-10-24 NOTE — CONSULTS
Patient Name: Ladonna Sheriff  MRN: [id number]  : 1945  Visit Date: 10/24/2020    Reason for Consult: Worsening leg weakness with a history of neuromyelitis optica.    History  Ms. Sheriff is a 75 year old with past medical history of hypertension, PE, Sjogren's disease, right eye blindness who was admitted on 2020 for worsening leg weakness.  She was initially diagnosed around 2019 after she developed vision loss.  She was started on Rituxan but she is no longer receiving that since she was transitioned to CellCept.  She noted the weakness is worse in her right leg for the past few days along with dragging her leg and also worsening numbness in her abdominal area.  She denies any weakness in her upper extremities or her left leg.  She denies any worsening vision however she does have right-sided baseline eye blindness and partial vision loss in the left eye.  She denies any pain with urination but does have urinalysis suggestive of potential urinary tract infection which sometimes can cause a pseudoexacerbation.  Problem List:   Patient Active Problem List   Diagnosis     Optic neuritis     Acute respiratory failure with hypoxia (H)     Pulmonary embolism (H)     Transverse myelitis (H)     Neuromyelitis optica (H)     Neuromyelitis optica (devic) (H)     HTN (hypertension)     Sleep disorder     Left bundle branch block     Chronic systolic heart failure (H)     Weakness       Past Medical History:  Past Medical History:   Diagnosis Date     Cerebral infarction (H)      CHF (congestive heart failure) (H)      Hypertension      Lupus (H)      Lupus (H)      Optic neuritis      Optic neuritis      Sjogren's syndrome (H)      Sjogren's syndrome (H)      Temporal arteritis (H)      TIA (transient ischemic attack)      Uncomplicated asthma      Past Surgical History:  Past Surgical History:   Procedure Laterality Date     CHOLECYSTECTOMY       GYN SURGERY      hysterectomy     GYN SURGERY      tubal  ligation     IR CVC NON TUNNEL PLACEMENT  6/21/2019     IR CVC NON TUNNEL PLACEMENT  7/15/2020     IR FOLLOW UP VISIT INPATIENT  7/2/2019     Medications:  No current outpatient medications on file.     Allergies:  Allergies   Allergen Reactions     Prevacid [Lansoprazole] Rash     Pravastatin Rash     Patient is still not sure about it due many years ago for reaction.     Family History:  Family History   Problem Relation Age of Onset     Asthma Mother      Lupus Sister      Bone Cancer Brother      Kidney Cancer Sister      Social History:  Social History     Socioeconomic History     Marital status:      Spouse name: Not on file     Number of children: Not on file     Years of education: Not on file     Highest education level: Not on file   Occupational History     Not on file   Social Needs     Financial resource strain: Not on file     Food insecurity     Worry: Not on file     Inability: Not on file     Transportation needs     Medical: Not on file     Non-medical: Not on file   Tobacco Use     Smoking status: Former Smoker     Packs/day: 0.00     Smokeless tobacco: Never Used   Substance and Sexual Activity     Alcohol use: Yes     Alcohol/week: 7.0 standard drinks     Types: 7 Shots of liquor per week     Comment: occ     Drug use: No     Sexual activity: Not on file   Lifestyle     Physical activity     Days per week: Not on file     Minutes per session: Not on file     Stress: Not on file   Relationships     Social connections     Talks on phone: Not on file     Gets together: Not on file     Attends Zoroastrianism service: Not on file     Active member of club or organization: Not on file     Attends meetings of clubs or organizations: Not on file     Relationship status: Not on file     Intimate partner violence     Fear of current or ex partner: Not on file     Emotionally abused: Not on file     Physically abused: Not on file     Forced sexual activity: Not on file   Other Topics Concern     Not on  "file   Social History Narrative     Not on file       REVIEW OF SYSTEMS:  A 10 + comprehensive review of systems was performed and negative except what was mentioned in the HPI.    Vital Signs:  /69 (BP Location: Right arm)   Pulse 73   Temp 97.6  F (36.4  C) (Oral)   Resp 18   Ht 1.702 m (5' 7\")   Wt 90.7 kg (200 lb)   SpO2 92%   BMI 31.32 kg/m      PHYSICAL EXAM:  Constitutional: Patient is well developed, appears stated age, and in no distress.  Eyes: Normal conjunctivae and lids.  Ears, Nose, Mouth and Throat: Normocephalic and atraumatic  Neck: No swelling, freely movable.  Skin: No rashes or abnormal lesions seen.  Psych: Normal mood and affect.  Respiratory: No respiratory distress. Breathing is non labored.  CVS: No edema noted.    Neurological examination  Mental Status: The patient is alert and oriented. Recent memory is normal. The person is  attentive with normal concentration. Language is fluent. Speech is of normal corona and  character. The speech is nondysarthric. Fund of knowledge appears normal  Cranial Nerve I: Not tested.  Cranial Nerve II: Right-sided pupil is nonreactive.  With right-sided afferent pupillary defect.  Cranial Nerves III, IV, VI: Right-sided eye exotropia.  Cranial Nerve V: Light touch is intact and symmetric in the V1, V2, V3 divisions of both  trigeminal nerves.  Cranial Nerve VII: Facial movements are symmetric.  Cranial Nerve XI: Sternocleidomastoid strength is 5/5 bilaterally with normal shoulder shrug.  Motor: Tone is normal in all four extremities without fasciculations, atrophy or myoclonus.  There are no involuntary movements.  Muscle Strength: The strength was 5/5 in upper and lower extremities bilaterally.  Reflexes: The reflexes are 2/4 for biceps, triceps, patellar tendon reflexes bilaterally and  symmetrically.  Sensory: The sensory examination is normal for light touch bilaterally and symmetrically.        Impression:  Ms. Sheriff is a 75 year old With " a past medical history of neuromyelitis optica who was admitted on October 23, 2020 for worsening right leg weakness.  Her urinary analysis was positive for infection so initially thought this was a pseudoexacerbation however after personally reviewing her thoracic spine MRI with enhancing lesions it will be beneficial for her to start high-dose steroids in the past she did require plasma exchange so it would be better if she was transferred to the [facility] to do plasma exchange.    Plan:  -Recommend 500 mg twice daily of Solu-Medrol until she can be transferred to the [facility] for plasma exchange.  -Monitor blood glucose levels closely.  -Recommend GI prophylaxis since high-dose steroids can cause worsening peptic ulcer disease.      Michelle Estevez MD  Neurology    Thank you very much for allowing me to participate in the care of this patient.    Issues addressed during this period included: Pathophysiology of the disease, lab/Imaging  results, overall management, support of the patient and family, medication counseling or  monitoring (including adverse effects and risks of medications) and future testing/work up.

## 2020-10-24 NOTE — PLAN OF CARE
Denies CP, SOB. Numbness in bilat lower extremities at baseline. Denies pain. Assist of 1 with walker/gait belt. NS at 100 ml/hour. Regular diet. Continue to monitor and with plan of care. Decrease vision in both eyes at baseline.

## 2020-10-24 NOTE — PLAN OF CARE
A&O x 4. Up with A x 1 gait belt, and walker to [facility], A x 2 any further. Denies pain. IV SL. Tolerating regular diet.  Report given to RN at [facility] pt declined for this RN to contact family. Pt stated she already did

## 2020-10-24 NOTE — LETTER
Transition Communication Hand-off for Care Transitions to Next Level of Care Provider    Name: Ladonna Sheriff  : 1945  MRN #: [id number]  Primary Care Provider: Joceline Ty     Primary Clinic: [facility] [address]  [facility] [address]     Reason for Hospitalization:  Neuromyelitis optica  Neuromyelitis optica (H)  Admit Date/Time: 10/24/2020  5:09 PM  Discharge Date: 2020  Payor Source: Payor: MEDICARE / Plan: MEDICARE / Product Type: Medicare /              Reason for Communication Hand-off Referral:     Care Management Discharge Note     Discharge Date: 20     Discharge Disposition: [facility] ([phone])     Discharge Services: Transportation Services   [facility] ([facility] [phone]), w/c transport, 12:15pm      Discharge DME: None     Discharge Transportation: agency([employer] wheelchair)       [facility] ([facility] [phone]), w/c transport, 12:15pm      Private pay costs discussed:   There are no private pay transportation costs associated with this discharge.     PAS Confirmation Code:  Not required as pt is discharging to acute rehab setting.  Patient/family educated on Medicare website which has current facility and service quality ratings: other (see comments)(NA)  Completed prior to this discharge     Education Provided on the Discharge Plan:  yes     Persons Notified of Discharge Plans:   Pt, 6A nursing and Dr. Jarrell.  Pt declined SW offer to notify family stating that she has done so independently.    Patient/Family in Agreement with the Plan: other (see comments)(Did not discuss with pt for this encounter note.)     Handoff Referral Completed: Yes     Additional Information:  Dr. Cassandra Jarrell has confirmed readiness for discharge. Admissions ([place]) at [facility] has confirmed bed availability and acceptance for admit.  IMM completed.  Pt voices understanding of the discharge plan and agreement with the discharge plan.        CAMELIA Marcial  Social Work, 6A  Phone:  [phone]  Pager:  [phone]  11/9/2020

## 2020-10-24 NOTE — PLAN OF CARE
OT: order received, chart reviewed, and noting neuro consult ordered.  Will hold on OT eval for neuro consult.    OT/PT: reviewed neuro note and recommendation for transfer to the N.  Will hold therapy until POC established.

## 2020-10-25 NOTE — PLAN OF CARE
Status: Admitted to 6A for right leg weakness/decreased sensation. Concern for exacerbation of Neuromyelittis Optica  Vitals: VSS ex HTN within parameters on RA.  Neuros: Alert and oriented x4. N/T from waist down to BLE. Decreased sensation to  the RLE with 3/5 strength and neglect, needs a lot of focus to move RLE. 5/5 LLE and 5/5 BUE. Blind in the R eye, significant vision loss in L eye; can see shadows and silhouettes.Left eye deviated compared to R eye.   IV: PIV saline locked.   Resp/trach: Lung sounds clear  Diet: Regular diet   Bowel status: BS+ Small BM 10/24  : Voiding at bedside commode  Skin: Blanchable redness to bottom. Dry, red patch on forehead. Some small scab and bruise throughout.   Pain: denies  Activity: A1 pivot to bedside commode, A2 to walk to bathroom  Social: Son will be visiting.  Plan: Continue to monitor and follow POC.

## 2020-10-25 NOTE — PROGRESS NOTES
Arrived from:  [facility]  Belongings/meds:  Remain with patient  2 RN Skin Assessment Completed by:  Cheryl MUNOZ and Ana DICKSON RN  Non-intact findings documented (yes/no/NA): Yes

## 2020-10-25 NOTE — PLAN OF CARE
Status: Pt admitted for RLE weakness/decreased sensation   Vitals: HTN within parameters on RA  Neuros: Alert & oriented x4, N/T from rib cage down, RLE 3/5, LLE 4/5, BUE 5/5, blind in R eye, significant vision loss in L eye, L field cut, dysconjugate gaze  IV: PIV SL  Resp/trach: Denies SOB  Diet: NPO pending Plex catheter, normally regular diet   Bowel status: BS+, incontinent of scant amount of stool, BM as well   : Voiding with some urgency   Skin: Blanchable redness to bottom, dry and scaling on forehead, bruises and scabs throughout  Lab: K+ replaced, redraws scheduled    Pain: Denies  Activity: Up with 1-2/GB/walker  Social: Sons involved in her life  Plan: Plan for possible PLEX tomorrow pending Plex catheter, continue to monitor and follow POC

## 2020-10-25 NOTE — H&P
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     Pt was diagnosed with NMO by Dr. Macias after AQP4 antibodies were postive in 2019. She has baseline R vision loss after an episode of optic neuritis in . She started seeing Dr. Franklin in 3/2020 at which time she developed worsening numbness from breast down and was treated presumably for an exacerbation with steroids.    She was most recently admitted to [facility] in 2020 for worsening L vision in her left eye and left eye pain -- treated with IV Methylpred 1 g x 5 days and PLEX after which vision improved moderately.    She was last seen by Dr. Franklin 2020 and at that time, reported vision in L eye was back to what it had been prior to her admit. She continues to have a band-like sensation in her chest. Given her relapse in July despite being on Rituximab, she was switched to Cellcept. Currently, she is on 500 mg BID.     For the last 1 week, pt reports worsening of RLE weakness - this is similar to prior episodes. States she typically uses a cane but has been requiring walker due to weakness. No vision symptoms. Denies cough, recent illness, chest pain, SOB, bowel incontinence/changes, dizziness. No new numbness. She does have urinary frequency but denies burning with urination.    She was admitted to [facility] where she underwent MRI of C/T  spine which revealed T2 signal extending from T3-T5/T6, with enhancement centered at the T4 level as well as a small focus of enhancement in the parenchyma at the T5 level and left lateral spinal canal at T5. Initially were concerned that symptoms were 2/2 ongoing urinary tract infection, however, given the cord findings, she was started on solumedrol 500 BID and transferred to [facility] for PLEX. Of note, started on ceftriaxone for UTI as well.    ROS: A 10-point ROS was performed as per HPI.     PMH:  Past Medical History:   Diagnosis Date     Cerebral infarction (H)      CHF (congestive heart failure) (H)      Hypertension      Lupus (H)      Lupus (H)      Optic neuritis      Optic neuritis      Sjogren's syndrome (H)      Sjogren's syndrome (H)      Temporal arteritis (H)      TIA (transient ischemic attack)      Uncomplicated asthma      Past Surgical History:   Procedure Laterality Date     CHOLECYSTECTOMY       GYN SURGERY      hysterectomy     GYN SURGERY      tubal ligation     IR CVC NON TUNNEL PLACEMENT  6/21/2019     IR CVC NON TUNNEL PLACEMENT  7/15/2020     IR FOLLOW UP VISIT INPATIENT  7/2/2019       Allergies:  Allergies   Allergen Reactions     Prevacid [Lansoprazole] Rash     Pravastatin Rash     Patient is still not sure about it due many years ago for reaction.       Medications:      Current Facility-Administered Medications:      lidocaine (LMX4) cream, , Topical, Q1H PRN, Cassandra Jarrell MD     lidocaine 1 % 0.1-1 mL, 0.1-1 mL, Other, Q1H PRN, Cassandra Jarrell MD     melatonin tablet 1 mg, 1 mg, Oral, At Bedtime PRN, Cassandra Jarrell MD     naloxone (NARCAN) injection 0.1-0.4 mg, 0.1-0.4 mg, Intravenous, Q2 Min PRN, Cassandra Jarrell MD     sodium chloride (PF) 0.9% PF flush 3 mL, 3 mL, Intracatheter, q1 min prn, Cassandra Jarrell MD     sodium chloride (PF) 0.9% PF flush 3 mL, 3 mL, Intracatheter, Q8H, Cassandra Jarrell MD    Social History:  Social History     Tobacco Use     Smoking status: Former Smoker      "Packs/day: 0.00     Smokeless tobacco: Never Used   Substance Use Topics     Alcohol use: Yes     Alcohol/week: 7.0 standard drinks     Types: 7 Shots of liquor per week     Comment: occ       Family History:    Family History   Problem Relation Age of Onset     Asthma Mother      Lupus Sister      Bone Cancer Brother      Kidney Cancer Sister        Physical Examination:   Vitals: BP (!) 156/60 (BP Location: Left arm)   Pulse 81   Temp 98.1  F (36.7  C) (Oral)   Resp 16   Ht 1.702 m (5' 7\")   Wt 88.9 kg (195 lb 15.8 oz)   SpO2 96%   BMI 30.70 kg/m    General: Adult female patient, lying in bed, NAD  HEENT: NC/AT, no icterus, op pink and moist  Cardiac: RRR  Chest: non-labored on RA  Abdomen: S/ND  Extremities: Warm, no edema  Skin: Psoriatic plaque on forehead  Psych: Mood pleasant, affect congruent  Neuro:  Mental status: Awake, alert, attentive, oriented to self, time, place, and circumstance. Language is fluent and coherent with intact comprehension of complex commands  Cranial nerves: Blind in R eye, L eye can see # of fingers, shadows and most colors. PERRL, dysconjugate gaze with exotropia of R eye, EOMI, facial sensation intact, face symmetric, shoulder shrug strong, no dysarthria.   Motor: Normal bulk and tone. No abnormal movements. BUE 5/5 throughout. LLE 5/5 throughout. R hip flexion 2/5, R knee extension/flexion 3/5, dorsiflexion 3-/5, plantarflexion 3+/5  Reflexes: 2+ and symmetric biceps, brachioradialis, patellae. Unable to elicit achilles. Toes down-going bilaterally  Sensory: Intact to light touch/pin in BUE. Decreased sensation at T4 level and below bilaterally (at the torso, R is more numb than L). Decreased sensation throughout BLE (inconsistent with reported R side numbness being worse, now reports L is worse?)  Coordination: FNF intact bilaterally without dysmetria, HS intact on L, unable to perform on R.  Gait: Deferred    Investigations:      CBC RESULTS:   Recent Labs   Lab Test " 10/24/20  0722   WBC 4.5   RBC 3.76*   HGB 11.3*   HCT 37.1   MCV 99   MCH 30.1   MCHC 30.5*   RDW 13.5        Recent Labs   Lab Test 10/24/20  0722 10/23/20  1450    141   POTASSIUM 3.5 3.6   CHLORIDE 110* 107   CO2 28 25   ANIONGAP 4 9   GLC 98 106*   BUN 13 15   CR 0.79 0.96   GANESH 8.1* 8.5     Most Recent Urinalysis:  Recent Labs   Lab Test 10/23/20  1450   COLOR Yellow   APPEARANCE Clear   URINEGLC Negative   URINEBILI Negative   URINEKETONE Negative   SG 1.014   UBLD Negative   URINEPH 5.5   PROTEIN Negative   NITRITE Positive*   LEUKEST Small*   RBCU 0   WBCU 31*       MRI Cervical spine 10/23:  1.  No definite cervical spinal cord signal abnormality and no abnormal enhancement.  2.  Disc osteophyte complex with shallow protrusion and annular fissure/tear and possible tiny component of extrusion at C4-C5 without significant spinal canal stenosis, mild to moderate foraminal narrowing right more than left due to uncinate spurring   and facet arthropathy.  3.  Mild multilevel degenerative spondylosis otherwise, see report for level by level details.    MRI thoracic spine 10/23:  1.  Increased cord parenchyma T2 signal extending from T3-T5/T6, with enhancement centered at the T4 level as well as a small focus of enhancement in the parenchyma at the T5 level and left lateral spinal canal at T5 which may represent a focus of nerve   root enhancement. This is consistent with but not diagnostic of demyelinating disease active/acute plaque formation.  2.  Near complete resolution of the previous cord signal abnormality centered at the T2 level.  3.  Remainder of study without significant MR abnormality.    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 Found to have enhancing lesion extending from T3-T5/T6. Transferred to [facility] for steroid and PLEX treatment.    #AQP4 positive NMO  #RLE weakness/NMO exacerbation  - Methylprednisone 1g for 4 more days to complete 5 day course (started at OSH 10/24)  - GI ppx with famotidine (allergy to PPI)  - Will plan for PLEX once she has line placed  - IR consult ordered for line placement  - Transfusion medicine consult ordered  - Continue PTA Cellcept 500 BID  - PT/OT     #UTI  - Continue Ceftriaxone 1g for 5 day course (started at OSH 10/23)    Chronic issues:  #Bronchospastic disease  - Continue PTA albuterol and Incruse Ellipta     #Hypertension  - Continue PTA amlodipine and metoprolol     #Heart failure  - Continue PTA Lasix and metoprolol     #Hx of TIA  - Continue PTA ASA 325mg   - Continue PTA simvastatin      #Mood d/o  - Continue PTA fluoxetine     #Sleep  - Continue PTA trazodone      FEN: Regular  PPx: SCDs  Code: Full    Patient was discussed with attending, Dr. Spangler.    Maryjane Damon MD  Neurology PGY-3  Pager [phone]      I saw and evaluated the patient on 10/25/20 and agree with the findings and the plan of care as documented in the resident's note.      75-year-old female with neuromyelitis optica with recurrent optic neuritis and a history of transverse myelitis who presented to outside hospital with right leg weakness.   Was found to have new enhancing mid thoracic lesion with cord edema.  She was recently switched to CellCept due to a flare of optic neuritis while on rituximab.   She received 1 dose of IV steroids at outside hospital and was transferred here.    Her exam shows normal mental status.  She is blind in the right eye.  Has right APD.  She can count fingers and has intact color vision in left eye.  She feels vision is stable in her left eye.  Strength in bilateral upper extremities and left lower  extremities is full.  3-5 strength in right hip flexion.  4-5 strength in right knee flexion and extension.  Flexion 3+ out of 5.  Flexion 4 out of 5.  Reflexes are brisk and symmetric in upper extremities.  Right patella is more brisk than left.  Toes are definitively upgoing on the right.  She does have a sensory level around T5-T6 for me.  Reports right side is more numb to light touch.  Finger-nose-finger is intact.    MRI does show a T2 hyperintense lesion with homogenous enhancement.  Is quite similar to her previous transverse myelitis lesion but does appear to extend slightly more rostrally on the enhancement pattern.  There is not as much cord expansion seen.    Overall she is not responded well to steroids in the past.  Given her significant weakness I think it is necessary to proceed with plasma exchange.  I spoken to transfusion medicine who will get her on schedule for tomorrow.  We will try to coordinate with IR to have central venous catheter placed either today or first thing in the morning tomorrow so she can get plasma exchange.  We will continue steroids for at least 5 doses.  She has now failed rituximab and CellCept.  I will discuss with her outpatient autoimmune neurologist about increasing CellCept versus starting newer FDA approved agent for NMO such as eculizumab or Satralizumab.  The newer agents will likely need to be done on outpatient basis  With  insurance approval.  We will treat UTI noted at outside hospital for 5 days.      Rajwinder Spangler DO   of Neurology

## 2020-10-25 NOTE — CONSULTS
INTERVENTIONAL RADIOLOGY CONSULT NOTE    Request, patient data, and imaging are being reviewed. The request has been placed in our procedure request que for review by the staff radiologist(s) to review and appoint to the schedule if approved.    Labs WNL for procedure.  Before procedure NPO  Patient on antibiotics   Medications to be held include: Anticoagulants  Consent will be done prior to procedure.     Platelet Count   Date Value Ref Range Status   10/25/2020 234 150 - 450 10e9/L Final     INR   Date Value Ref Range Status   07/23/2020 1.08 0.86 - 1.14 Final        Please contact the IR charge RN at [phone] for estimated time of procedure.      Dami Hopkins MD  Fellow, Department of Vascular and Interventional Radiology  October 25, 2020  Beeper:  [phone] After Hours

## 2020-10-25 NOTE — PLAN OF CARE
Status: Admitted to 6A for right leg weakness/decreased sensation. Concern for exacerbation of Neuromyelitis Optica  Vitals: VSS ex HTN within parameters on RA.  Neuros: Alert and oriented x4. N/T from rib cage to BLE. RLE with 3/5 strength, able to lift off bed but states difficult to cue when weight-bearing. 5/5 LLE and 5/5 BUE. Blind in the R eye, significant vision loss in L eye; can see shadows and silhouettes but unable to make out details. Left eye deviated compared to R eye. Pupils reactive  IV: PIV SL   Resp/trach: On RA overnight  Diet: Regular diet  Bowel status: BS+ Small BM 10/24  : Voiding at bedside commode  Skin: Blanchable redness to bottom. Dry, red patch on forehead. Some small scab and bruise throughout.   Pain: denies  Activity: A1 pivot to bedside commode, A2 to walk to bathroom  Social: Patient pleasant and cooperative with care  Plan: Continue to monitor and follow POC.

## 2020-10-26 NOTE — PHARMACY-ADMISSION MEDICATION HISTORY
Admission Medication History Completed by Pharmacy    See [facility] Admission Navigator for allergy information, preferred outpatient pharmacy, prior to admission medications and immunization status.     Medication History Sources:     Patient    [employer]    Changes made to PTA medication list (reason):    Added:   o Tylenol PM  o Clopidogrel    Deleted: None    Changed:   o Fluoxetine changed from 20 mg to 40 mg per pt and [employer] dispense records  o Furosemide changed from 20 mg BID to 20 mg every day per pt and [employer] dispense records  o Metoprolol tartrate 50 mg BID changed to metoprolol succinate XR 50 mg BID per pt and [employer] dispense records  o CellCept (Brand) changed to mycophenolate (generic) per pt and [employer] dispense records    Additional Information:    Pt was a fairly knowledgeable medication historian who occasionally needed prompting for doses    Pt reports that while albuterol and Tylenol PM are PRN, she takes them most nights    Pt denies taking other OTC products, herbal supplements, or vitamins    Prior to Admission medications    Medication Sig Last Dose Taking? Auth Provider   albuterol (PROAIR HFA/PROVENTIL HFA/VENTOLIN HFA) 108 (90 Base) MCG/ACT inhaler Inhale 2 puffs into the lungs every 6 hours as needed  10/22/2020 at PM Yes Reported, Patient   amLODIPine (NORVASC) 10 MG tablet Take 10 mg by mouth daily 10/23/2020 at AM Yes Reported, Patient   aspirin (ASA) 325 MG EC tablet Take 325 mg by mouth every evening  10/22/2020 at PM Yes Reported, Patient   B Complex-C (VITAMIN B COMPLEX W/VITAMIN C) TABS tablet Take 1 tablet by mouth daily 10/23/2020 at AM Yes Unknown, Entered By History   Calcium-Vitamin D 600-200 MG-UNIT TABS Take 1 tablet by mouth every evening  10/22/2020 at PM Yes Reported, Patient   clopidogrel (PLAVIX) 75 MG tablet Take 75 mg by mouth daily 10/23/2020 at AM Yes Unknown, Entered By History   diphenhydrAMINE-acetaminophen (TYLENOL PM)  MG tablet Take  1 tablet by mouth nightly as needed for sleep 10/22/2020 at PM Yes Unknown, Entered By History   FLUoxetine (PROZAC) 40 MG capsule Take 40 mg by mouth daily  10/23/2020 at AM Yes Unknown, Entered By History   furosemide (LASIX) 20 MG tablet Take 20 mg by mouth daily  10/23/2020 at AM Yes Unknown, Entered By History   metoprolol succinate ER (TOPROL-XL) 50 MG 24 hr tablet Take 50 mg by mouth 2 times daily 10/23/2020 at AM Yes Unknown, Entered By History   Multiple Vitamins-Minerals (MULTIVITAL PO) Take 1 tablet by mouth every morning  10/23/2020 at AM Yes Reported, Patient   mycophenolate (GENERIC EQUIVALENT) 500 MG tablet Take 500 mg by mouth 2 times daily 10/23/2020 at AM Yes Unknown, Entered By History   simvastatin (ZOCOR) 20 MG tablet Take 20 mg by mouth At Bedtime 10/22/2020 at PM Yes Reported, Patient   traZODone (DESYREL) 100 MG tablet Take 100 mg by mouth At Bedtime 10/22/2020 at PM Yes Unknown, Entered By History   umeclidinium (INCRUSE ELLIPTA) 62.5 MCG/INH inhaler Inhale 1 puff into the lungs daily as needed  Past Month at Unknown time Yes Unknown, Entered By History   vitamin C (ASCORBIC ACID) 500 MG tablet Take 500 mg by mouth daily 10/23/2020 at AM Yes Reported, Patient   trolamine salicylate (ASPERCREME) 10 % external cream Apply topically 2 times daily as needed for moderate pain More than a month at Unknown time  Alee Ndiaye MD     Date completed: 10/26/20    Medication history completed by: Lisa Whitney [facility]

## 2020-10-26 NOTE — PROCEDURES
Laboratory Medicine and Pathology  Transfusion Medicine - Apheresis Procedure    Ladonna Sheriff MRN# [id number]   YOB: 1945 Age: 75 year old   Date of Admission: 10/24/2020     Reason for consult: Therapeutic plasma exchange (TPE) for neuromyelitis optica (NMO) flare           Assessment and Plan:   The patient is a 76 yo woman with a history of NMO, currently with flare.  She does not respond well to steroids.  TPE series requested.  We have performed TPE on her in the past.  Will plan for a series of 5 TPEs, every-other-day schedule.  Will also plan for 5% albumin as exchange fluid.  Will monitor coag labs and adjust with plasma as exchange fluid as needed.     The patient is tolerating TPE #1 this afternoon per apheresis nursing staff.  Continue with plan.  Next TPE scheduled for Wed, 10/28/20.    TREATMENT PLAN:  Series of 5 TPEs, plan for 5% albumin as exchange fluid (3L plasma volume), monitor coags and include plasma as indicated.  Please do not start or continue ACE-inhibitors throughout the duration of a TPE series.  Please notify the apheresis physician of any upcoming procedures, surgeries, or biopsies as TPE with 5% albumin will affect coagulation factors.  Also, as discussed with Neurology team earlier today, please plan Rocephin treatment to not coincide with TPE or change to a different antibiotic. There have been case reports (primariliy in neonates) of calcium and ceftriaxone (Rocephin), when given in combination, leading to increased risk of lethal precipitates forming in the lungs and kidneys.          Chief Complaint:   Right leg weakness, worsening vision, and numbness (band-like at chest)             Past Medical History:     Past Medical History:   Diagnosis Date     Cerebral infarction (H)      CHF (congestive heart failure) (H)      Hypertension      Lupus (H)      Lupus (H)      Optic neuritis      Optic neuritis      Sjogren's syndrome (H)      Sjogren's syndrome (H)       Temporal arteritis (H)      TIA (transient ischemic attack)      Uncomplicated asthma           Past Surgical History:     Past Surgical History:   Procedure Laterality Date     CHOLECYSTECTOMY       GYN SURGERY      hysterectomy     GYN SURGERY      tubal ligation     IR CVC NON TUNNEL PLACEMENT  6/21/2019     IR CVC NON TUNNEL PLACEMENT  7/15/2020     IR CVC NON TUNNEL PLACEMENT  10/26/2020     IR FOLLOW UP VISIT INPATIENT  7/2/2019          Social History:     Social History     Socioeconomic History     Marital status:      Spouse name: Not on file     Number of children: Not on file     Years of education: Not on file     Highest education level: Not on file   Occupational History     Not on file   Social Needs     Financial resource strain: Not on file     Food insecurity     Worry: Not on file     Inability: Not on file     Transportation needs     Medical: Not on file     Non-medical: Not on file   Tobacco Use     Smoking status: Former Smoker     Packs/day: 0.00     Smokeless tobacco: Never Used   Substance and Sexual Activity     Alcohol use: Yes     Alcohol/week: 7.0 standard drinks     Types: 7 Shots of liquor per week     Comment: occ     Drug use: No     Sexual activity: Not on file   Lifestyle     Physical activity     Days per week: Not on file     Minutes per session: Not on file     Stress: Not on file   Relationships     Social connections     Talks on phone: Not on file     Gets together: Not on file     Attends Scientologist service: Not on file     Active member of club or organization: Not on file     Attends meetings of clubs or organizations: Not on file     Relationship status: Not on file     Intimate partner violence     Fear of current or ex partner: Not on file     Emotionally abused: Not on file     Physically abused: Not on file     Forced sexual activity: Not on file   Other Topics Concern     Not on file   Social History Narrative     Not on file          Family History:      Family History   Problem Relation Age of Onset     Asthma Mother      Lupus Sister      Bone Cancer Brother      Kidney Cancer Sister           Immunizations:     Most Recent Immunizations   Administered Date(s) Administered     Influenza (High Dose) 3 valent vaccine 10/21/2015     Influenza Vaccine, 6+MO IM (QUADRIVALENT W/PRESERVATIVES) 10/17/2016     Meningococcal (Bexsero ) 07/24/2020     Meningococcal (Menveo ) 07/24/2020     Pneumo Conj 13-V (2010&after) 07/08/2014     Pneumococcal 23 valent 07/07/2017     TDAP Vaccine (Adacel) 04/25/2013     Zoster vaccine, live 04/25/2013          Allergies:        Allergies   Allergen Reactions     Prevacid [Lansoprazole] Rash     Pravastatin Rash     Patient is still not sure about it due many years ago for reaction.          Medications:     Current Facility-Administered Medications   Medication     albuterol (PROAIR HFA/PROVENTIL HFA/VENTOLIN HFA) 108 (90 Base) MCG/ACT inhaler 2 puff     amLODIPine (NORVASC) tablet 10 mg     aspirin (ASA) EC tablet 325 mg     cefTRIAXone (ROCEPHIN) 1 g vial to attach to  mL bag for ADULTS or NS 50 mL bag for PEDS     famotidine (PEPCID) tablet 20 mg     FLUoxetine (PROzac) capsule 20 mg     furosemide (LASIX) tablet 20 mg     heparin 100 UNIT/ML injection 3 mL     heparin 100 UNIT/ML injection 3 mL     lidocaine (LMX4) cream     lidocaine 1 % 0.1-1 mL     magnesium sulfate 4 g in 100 mL sterile water (premade)     melatonin tablet 1 mg     methylPREDNISolone sodium succinate (solu-MEDROL) 1,000 mg in sodium chloride 0.9 % 250 mL intermittent infusion     metoprolol tartrate (LOPRESSOR) tablet 50 mg     mycophenolate (CELLCEPT BRAND) tablet 500 mg     naloxone (NARCAN) injection 0.1-0.4 mg     potassium chloride (KLOR-CON) Packet 20-40 mEq     potassium chloride 10 mEq in 100 mL intermittent infusion with 10 mg lidocaine     potassium chloride 10 mEq in 100 mL sterile water intermittent infusion (premix)     potassium chloride  20 mEq in 50 mL intermittent infusion     potassium chloride ER (KLOR-CON M) CR tablet 20-40 mEq     potassium phosphate 15 mmol in D5W 250 mL intermittent infusion     potassium phosphate 20 mmol in D5W 250 mL intermittent infusion     potassium phosphate 20 mmol in D5W 500 mL intermittent infusion     potassium phosphate 25 mmol in D5W 500 mL intermittent infusion     simvastatin (ZOCOR) tablet 20 mg     sodium chloride (PF) 0.9% PF flush 3 mL     sodium chloride (PF) 0.9% PF flush 3 mL     traZODone (DESYREL) tablet 100 mg          Review of Systems:   See above.           Data:     BMP  Recent Labs   Lab 10/26/20  1008 10/26/20  0656 10/25/20  1717 10/25/20  0627 10/24/20  0722 10/23/20  1450     --   --  142 142 141   POTASSIUM 3.8 3.7 4.0 3.1* 3.5 3.6   CHLORIDE 113*  --   --  110* 110* 107   GANESH 8.7  --   --  8.4* 8.1* 8.5   CO2 26  --   --  25 28 25   BUN 18  --   --  14 13 15   CR 0.66  --   --  0.66 0.79 0.96   *  --   --  135* 98 106*     CBC  Recent Labs   Lab 10/26/20  1008 10/25/20  0627 10/24/20  0722 10/23/20  1450   WBC 8.6 3.7* 4.5 5.2   RBC 4.05 3.95 3.76* 4.01   HGB 12.2 12.0 11.3* 12.1   HCT 39.2 38.1 37.1 39.3   MCV 97 97 99 98   MCH 30.1 30.4 30.1 30.2   MCHC 31.1* 31.5 30.5* 30.8*   RDW 13.8 13.6 13.5 13.3    234 232 249     Attestation:  I, Dallas Whitney M.D., was immediately available and present on-site during the entirety of the procedure.  I reviewed the patient's chart and was in direct communication with apheresis nursing staff during the collection.  I did not visit in-person to conserve PPE/limit contacts due to covid19.    Dallas Whitney M.D.  Professor, Transfusion Medicine  Laboratory Medicine & Pathology  Pager: [phone]

## 2020-10-26 NOTE — PLAN OF CARE
HTN but within parameters. Denies pain. Neuros unchanged; RLE 3/5 all others 5/5. Blind in right eye. Left eye can see shadows/silhouettes. N/T rib cage down. Pt had catheter for PLEX placed in IR, currently getting first infusion at bedside. Regular diet, good po intake. Voiding spontaneously, had BM today. Pt triggered sepsis while in IR, Lactic Acid 1.3. Up with 1, GB to commode. Up with 2, GB walker to bathroom. Pt is able to make needs known.

## 2020-10-26 NOTE — PROGRESS NOTES
[facility]  Neurology Progress Note    Patient Name:  Ladonna Sheriff    MRN:  [id number]      :  1945  Date of Service:  10/26/2020  Primary care provider:  Joceline Ty      Subjective:  No acute events overnight. Continues to be interactive, conversational and pleasant throughout encounter. NPO this morning for IR placement of HD cath and PLEX to follow.     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  She presented to [facility] on 10/23/2020 with worsening RLE weakness, concerning for NMO exacerbation.  She was found to have enhancing lesions extending from T3 to T5/6.  She was transferred to [facility] for further management and PLEX therapy.        #AQP4-positive NMO  #H/o T4 transverse myelitis with residual thoracic sensation loss  #Poor vision due to NMO lesions  #Acute exacerbation of NMO leading to RLE weakness  - Methylprednisone 1g for 5 total days (started at OSH 10/24)   - Famotidine GI ppx (allergy to PPI)   - IR consult ordered for line placement   - PLEX for x5 doses, likely every other day dosing   - Transfusion medicine consult ordered  - PTA Cellcept 500 BID   - PT/OT     #UTI, resolving   - Continue Ceftriaxone 1g for 5 day course (started at OSH 10/23)     Chronic issues:  #Bronchospastic disease  - Continue PTA albuterol and Incruse Ellipta     #Hypertension, stable   - Continue PTA amlodipine and metoprolol     #H/o HFrEF, currently improved EF  - Continue PTA Lasix and metoprolol     #Hx of TIA  - Continue PTA ASA 325mg   - Continue PTA simvastatin 20mg     #Mood d/o  - Continue PTA fluoxetine 20mg      #Insomnia, stable   - Continue PTA trazodone 100mg qHS      FEN: Intermittent bolus' PRN; electrolyte protocol; NPO for procedure,  "then regular diet  LDA: PIV x2, HD cath today   PPx: Famotidine for GI; DVT chemoppx currently held for procedure  Code: FULL     Dispo: Floor status, receiving PLEX      Patient discussed with Attending Dr. Aníbal Sherwood MD  PGY-2 Neurology Resident  10/26/2020       I saw and evaluated the patient on 10/26/20 and agree with the findings and the plan of care as documented in the resident's note.      Patient feels right leg is stable.  Still with deficits worse than baseline.  Central catheter placed this a.m. without complication.  Plan is for plasma exchange this afternoon.  We will continue steroids for 5 days total.  Still awaiting callback from regular neurologist regarding planning for long-term treatment given failure on this dose of CellCept.    Rajwinder Spangler DO   of Neurology      ______________________________  PHYSICAL EXAMINATION:   BP (!) 141/70   Pulse 71   Temp 97.9  F (36.6  C) (Oral)   Resp 16   Ht 1.702 m (5' 7\")   Wt 88.9 kg (195 lb 15.8 oz)   SpO2 95%   BMI 30.70 kg/m      General: Pleasant, awake and alert, Sitting in bed, NAD, cooperative  HEENT: Normocephalic, atraumatic, no epistaxis, no oral lesions, MMM  Resp: CTAB, no increased work of breathing  Cardio: RRR, S1/S2 appropriate   Abdomen: +BS, Soft, non-distended, non-tender  Extremities: Warm and well perfused, peripheral pulses present, no edema  Skin: Not jaundiced, no rash   Psych: Normal mood and affect    Neuro:  Mental status: Awake, alert, attentive; oriented to P/P/T/M  Speech: Fluent, comprehension and repetition intact; No dysarthria  Cranial nerves: Right eye blind at baseline, diminished peripheral Left field cut with poor visual acuity of central Left eye (all baseline), Eyes mildly dysconjugate, PERRL, EOMI w/ normal and smooth pursuit with some occasional pain of Right eye during EOM, face expression symmetric, facial sensation intact and symmetric, hearing intact to " conversation, shoulder shrug strong, palate rise symmetric, tongue/uvula midline.  Motor: Tone normal. 5/5 strength BUE and LLE, 4-/5 in hip flexor, knee extension/flexion, and dorsiflexion with 5/5 plantar flexion on Right. No atrophy or twitches. Pronator drift present on the RLE. Finger tapping symmetric.  Reflexes: 3+ and symmetric biceps, triceps, brachioradialis, patellar, with positive pectoral reflex bilaterally, No Villasenor's; Crossed adductors present; Achilles 2+;  Toes down-going.  Sensory: LLE without temp or pinprick sensation up to hip; Mild temp change in RLE to hip; Vibration decreased to knee bilaterally; Global reduced sensation to anterior rib cage bilaterally with band-like sensation in T4 distribution. Sensation normal and symmetric in BUEs  Coordination: FNF intact bilaterally, no dysmetria   Gait: Deferred      CURRENT MEDICATIONS:    amLODIPine  10 mg Oral Daily     aspirin  325 mg Oral QPM     cefTRIAXone  1 g Intravenous Q24H     famotidine  20 mg Oral BID     FLUoxetine  20 mg Oral Daily     furosemide  20 mg Oral BID     methylPREDNISolone  1,000 mg Intravenous Q24H     metoprolol tartrate  50 mg Oral BID     mycophenolate  500 mg Oral BID IS     simvastatin  20 mg Oral At Bedtime     sodium chloride (PF)  3 mL Intracatheter Q8H     traZODone  100 mg Oral At Bedtime       PRN MEDICATIONS:  albuterol, lidocaine 4%, lidocaine (buffered or not buffered), magnesium sulfate, melatonin, naloxone, potassium chloride, potassium chloride with lidocaine, potassium chloride, potassium chloride, potassium chloride, potassium phosphate (KPHOS) in D5W IV, potassium phosphate (KPHOS) in D5W IV, potassium phosphate (KPHOS) in D5W IV, potassium phosphate (KPHOS) in D5W IV, sodium chloride (PF)    INFUSIONS:      ALLERGIES:  Allergies   Allergen Reactions     Prevacid [Lansoprazole] Rash     Pravastatin Rash     Patient is still not sure about it due many years ago for reaction.         IMAGING:  MRI  C-spine (10/23)  IMPRESSION:  1.  No definite cervical spinal cord signal abnormality and no abnormal enhancement.  2.  Disc osteophyte complex with shallow protrusion and annular fissure/tear and possible tiny component of extrusion at C4-C5 without significant spinal canal stenosis, mild to moderate foraminal narrowing right more than left due to uncinate spurring   and facet arthropathy.  3.  Mild multilevel degenerative spondylosis otherwise, see report for level by level details.    MRI T-spine (10/23)  IMPRESSION:  1.  Increased cord parenchyma T2 signal extending from T3-T5/T6, with enhancement centered at the T4 level as well as a small focus of enhancement in the parenchyma at the T5 level and left lateral spinal canal at T5 which may represent a focus of nerve   root enhancement. This is consistent with but not diagnostic of demyelinating disease active/acute plaque formation.  2.  Near complete resolution of the previous cord signal abnormality centered at the T2 level.  3.  Remainder of study without significant MR abnormality.

## 2020-10-26 NOTE — PLAN OF CARE
Status: Admitted to 6A for right leg weakness/decreased sensation. Concern for exacerbation of Neuromyelitis Optica  Vitals: VSS ex HTN within parameters on RA.  Neuros: Alert and oriented x4. N/T from rib cage to BLE. RLE with 3/5 strength, able to lift off bed but states difficult to cue when weight-bearing. 5/5 LLE and 5/5 BUE. Blind in the R eye, significant vision loss in L eye; can see shadows and silhouettes but unable to make out details. Left eye slightly deviated. Pupils reactive  IV: PIV SL   Resp/trach: On RA overnight  Diet: NPO for PLEX catheter today  Bowel status: BS+ Small BM 10/24  : Voiding in bedside commode  Skin: Blanchable redness to bottom. Dry, red patch on forehead. Some small scab and bruise throughout.   Pain: denies  Activity: A1 pivot to bedside commode, A2 with walker to bathroom. Repositioning self in bed overnight  Social: Patient pleasant and cooperative with cares  Plan: Continue to monitor. Plan for PLEX catheter and subsequent treatment.

## 2020-10-26 NOTE — PROCEDURES
[facility]     Procedure: IR Procedure Note    Date/Time: 10/26/2020 9:41 AM  Performed by: Tanmay Perry MD  Authorized by: Tanmay Perry MD     UNIVERSAL PROTOCOL   Site Marked: NA  Prior Images Obtained and Reviewed:  Yes  Required items: Required blood products, implants, devices and special equipment available    Patient identity confirmed:  Verbally with patient, arm band, provided demographic data and hospital-assigned identification number  Patient was reevaluated immediately before administering moderate or deep sedation or anesthesia  Confirmation Checklist:  Patient's identity using two indicators, relevant allergies, procedure was appropriate and matched the consent or emergent situation and correct equipment/implants were available  Time out: Immediately prior to the procedure a time out was called    Universal Protocol: the Joint Commission Universal Protocol was followed    Preparation: Patient was prepped and draped in usual sterile fashion           ANESTHESIA    Anesthesia: Local infiltration  Local Anesthetic:  Lidocaine 1% without epinephrine      SEDATION    Patient Sedated: No    See dictated procedure note for full details.  Findings: 20 cm 14 [place] schon DL apharesis catheter placed in L internal jugular with tip in superior cavoatrial junction    Specimens: none    Complications: None    Condition: Stable    Plan: May use line now    PROCEDURE   Patient Tolerance:  Patient tolerated the procedure well with no immediate complications    Length of time physician/provider present for 1:1 monitoring during sedation: 0

## 2020-10-26 NOTE — CONSULTS
Laboratory Medicine and Pathology  Transfusion Medicine - Apheresis Consult    Ladonna Sheriff MRN# [id number]   YOB: 1945 Age: 75 year old   Date of Admission: 10/24/2020     Reason for consult: Therapeutic plasma exchange (TPE) for neuromyelitis optica (NMO) flare           Assessment and Plan:   The patient is a 74 yo woman with a history of NMO, currently with flare.  She does not respond well to steroids.  TPE series requested.  We have performed TPE on her in the past.  Will plan for a series of 5 TPEs, every-other-day schedule.  Will also plan for 5% albumin as exchange fluid.  Will monitor coag labs and adjust with plasma as exchange fluid as needed.  IR to place CVC now.  Will start TPE #1 this afternoon.    I discussed the procedure, including potential need for plasma later in the regimen, with the patient.  I reviewed the risks and benefits of both the TPE and blood.  She understood the plan and agreed to move forward, signing the consent forms.  We will begin later this afternoon.    TREATMENT PLAN:  Series of 5 TPEs, plan for 5% albumin as exchange fluid (3L plasma volume), monitor coags and include plasma as indicated.  Please do not start or continue ACE-inhibitors throughout the duration of a TPE series.  Please notify the apheresis physician of any upcoming procedures, surgeries, or biopsies as TPE with 5% albumin will affect coagulation factors.     Attestation:  I, Dallas Whitney M.D., discussed the procedure and potential need for plasma.  Our discussion included risks, benefits.  The patient agreed to proceed, signing the consents.    Dallas Whitney M.D.  Professor, Transfusion Medicine  Laboratory Medicine & Pathology  Pager: [phone]         Chief Complaint:   Right leg weakness, worsening vision, and numbness (band-like at chest)             Past Medical History:     Past Medical History:   Diagnosis Date     Cerebral infarction (H)      CHF (congestive heart failure)  (H)      Hypertension      Lupus (H)      Lupus (H)      Optic neuritis      Optic neuritis      Sjogren's syndrome (H)      Sjogren's syndrome (H)      Temporal arteritis (H)      TIA (transient ischemic attack)      Uncomplicated asthma           Past Surgical History:     Past Surgical History:   Procedure Laterality Date     CHOLECYSTECTOMY       GYN SURGERY      hysterectomy     GYN SURGERY      tubal ligation     IR CVC NON TUNNEL PLACEMENT  6/21/2019     IR CVC NON TUNNEL PLACEMENT  7/15/2020     IR FOLLOW UP VISIT INPATIENT  7/2/2019          Social History:     Social History     Socioeconomic History     Marital status:      Spouse name: Not on file     Number of children: Not on file     Years of education: Not on file     Highest education level: Not on file   Occupational History     Not on file   Social Needs     Financial resource strain: Not on file     Food insecurity     Worry: Not on file     Inability: Not on file     Transportation needs     Medical: Not on file     Non-medical: Not on file   Tobacco Use     Smoking status: Former Smoker     Packs/day: 0.00     Smokeless tobacco: Never Used   Substance and Sexual Activity     Alcohol use: Yes     Alcohol/week: 7.0 standard drinks     Types: 7 Shots of liquor per week     Comment: occ     Drug use: No     Sexual activity: Not on file   Lifestyle     Physical activity     Days per week: Not on file     Minutes per session: Not on file     Stress: Not on file   Relationships     Social connections     Talks on phone: Not on file     Gets together: Not on file     Attends [employer] service: Not on file     Active member of club or organization: Not on file     Attends meetings of clubs or organizations: Not on file     Relationship status: Not on file     Intimate partner violence     Fear of current or ex partner: Not on file     Emotionally abused: Not on file     Physically abused: Not on file     Forced sexual activity: Not on file    Other Topics Concern     Not on file   Social History Narrative     Not on file          Family History:     Family History   Problem Relation Age of Onset     Asthma Mother      Lupus Sister      Bone Cancer Brother      Kidney Cancer Sister           Immunizations:     Most Recent Immunizations   Administered Date(s) Administered     Influenza (High Dose) 3 valent vaccine 10/21/2015     Influenza Vaccine, 6+MO IM (QUADRIVALENT W/PRESERVATIVES) 10/17/2016     Meningococcal (Bexsero ) 07/24/2020     Meningococcal (Menveo ) 07/24/2020     Pneumo Conj 13-V (2010&after) 07/08/2014     Pneumococcal 23 valent 07/07/2017     TDAP Vaccine (Adacel) 04/25/2013     Zoster vaccine, live 04/25/2013          Allergies:        Allergies   Allergen Reactions     Prevacid [Lansoprazole] Rash     Pravastatin Rash     Patient is still not sure about it due many years ago for reaction.          Medications:     Current Facility-Administered Medications   Medication     albuterol (PROAIR HFA/PROVENTIL HFA/VENTOLIN HFA) 108 (90 Base) MCG/ACT inhaler 2 puff     amLODIPine (NORVASC) tablet 10 mg     aspirin (ASA) EC tablet 325 mg     cefTRIAXone (ROCEPHIN) 1 g vial to attach to  mL bag for ADULTS or NS 50 mL bag for PEDS     famotidine (PEPCID) tablet 20 mg     FLUoxetine (PROzac) capsule 20 mg     furosemide (LASIX) tablet 20 mg     lidocaine (LMX4) cream     lidocaine 1 % 0.1-1 mL     magnesium sulfate 4 g in 100 mL sterile water (premade)     melatonin tablet 1 mg     methylPREDNISolone sodium succinate (solu-MEDROL) 1,000 mg in sodium chloride 0.9 % 250 mL intermittent infusion     metoprolol tartrate (LOPRESSOR) tablet 50 mg     mycophenolate (CELLCEPT BRAND) tablet 500 mg     naloxone (NARCAN) injection 0.1-0.4 mg     potassium chloride (KLOR-CON) Packet 20-40 mEq     potassium chloride 10 mEq in 100 mL intermittent infusion with 10 mg lidocaine     potassium chloride 10 mEq in 100 mL sterile water intermittent infusion  (premix)     potassium chloride 20 mEq in 50 mL intermittent infusion     potassium chloride ER (KLOR-CON M) CR tablet 20-40 mEq     potassium phosphate 15 mmol in D5W 250 mL intermittent infusion     potassium phosphate 20 mmol in D5W 250 mL intermittent infusion     potassium phosphate 20 mmol in D5W 500 mL intermittent infusion     potassium phosphate 25 mmol in D5W 500 mL intermittent infusion     simvastatin (ZOCOR) tablet 20 mg     sodium chloride (PF) 0.9% PF flush 3 mL     sodium chloride (PF) 0.9% PF flush 3 mL     traZODone (DESYREL) tablet 100 mg          Review of Systems:   See above.           Data:     BMP  Recent Labs   Lab 10/26/20  0656 10/25/20  1717 10/25/20  0627 10/24/20  0722 10/23/20  1450   NA  --   --  142 142 141   POTASSIUM 3.7 4.0 3.1* 3.5 3.6   CHLORIDE  --   --  110* 110* 107   GANESH  --   --  8.4* 8.1* 8.5   CO2  --   --  25 28 25   BUN  --   --  14 13 15   CR  --   --  0.66 0.79 0.96   GLC  --   --  135* 98 106*     CBC  Recent Labs   Lab 10/25/20  0627 10/24/20  0722 10/23/20  1450   WBC 3.7* 4.5 5.2   RBC 3.95 3.76* 4.01   HGB 12.0 11.3* 12.1   HCT 38.1 37.1 39.3   MCV 97 99 98   MCH 30.4 30.1 30.2   MCHC 31.5 30.5* 30.8*   RDW 13.6 13.5 13.3    232 249

## 2020-10-26 NOTE — PLAN OF CARE
Status: Admitted to 6A for right leg weakness/decreased sensation. Concern for exacerbation of Neuromyelittis Optica  Vitals: VSS ex HTN within parameters on RA.  Neuros: Alert and oriented x4. N/T from ribcage down to BLE. Decreased sensation to  the RLE with 3/5 strength and neglect, needs a lot of focus to move RLE. 5/5 LLE and 5/5 BUE. Blind in the R eye, significant vision loss in L eye; can see shadows and silhouettes.Left eye deviated compared to R eye. Dysconjugate gaze.   IV: PIV saline locked.   Resp/trach: Lung sounds clear  Diet: NPO at midnight  Bowel status: BS+ Small BM 10/25  : Voiding at bedside commode  Skin: Blanchable redness to bottom. Dry, red patch on forehead. Some small scab and bruise throughout.   Pain: denies  Activity: A1 pivot to bedside commode, A2 to walk to bathroom with walker and gait belt  Social: Son will be visiting.  Plan: IR tomorrow for PLEX catheter. Plan for PLEX treatment. Continue to monitor and follow POC.

## 2020-10-27 NOTE — PLAN OF CARE
Status: Pt admitted 10/23/2020 with worsening RLE weakness, concerning for NMO exacerbation  Vitals: VSS on RA   Neuros: A+O x4. 5/5 BUE, 4/5 LLE, 3/5 RLE. N/T from rib cage to toes, worse on R side. R eye blind, L eye sees shadows. L eye deviated  IV: PIV SL   Resp/trach: No issues   Diet: Regular   Bowel status: No BM overnight   : Voiding   Skin: Bruising throughout   Pain: Denied ovenright   Activity: A 2 with walking to bathroom, A1 when pivoting to commode   Plan: PLEX 2/5 tomorrow, methylpred infusions. Continue to monitor and follow POC

## 2020-10-27 NOTE — PLAN OF CARE
Status: Exacerbation of Neuromyelitis Optica  Vitals: VSS, HTN within parameters   Neuros: A&Ox4 N/T from rib cage down to BLE. RLE 3/5 (gives out at times). RLE 4/5. BUE 5/5. Blind in R eye, L eye can see shadows and Silhouettes. L eye dysconjugate, slightly deviated.  IV: PIV-SL Plex cath-HL  Resp/trach: WNL  Diet: Regular good intake.   Bowel status: Last BM today. BS+  : Voiding w/o difiiculty   Skin: Blanchable redness to bottom, small scrap on nose.  Pain: Tylenol prn ordered for sorness to plex cath site  Activity: A1/GB/ to commode, A2/GB/walker to bathroom.   Social: Updated sons   Plan: Next plex treatment 10/28. Will continue to monitor and follow POC

## 2020-10-27 NOTE — PLAN OF CARE
VSS. Tylenol given x 1 for generalized pain. Neuros unchanged; RLE 3/5 LLE 4/5 BUE 5/5. Blind in right eye. Left eye can see shadows/silhouettes. N/T rib cage down. Regular diet, good po intake. Voiding spontaneously, had BM 10/26. Up with 1, GB to commode. Up with 2, GB walker to bathroom. Pt is able to make needs known. 2nd PLEX tomorrow.

## 2020-10-27 NOTE — PROGRESS NOTES
[facility]  Neurology Progress Note    Patient Name:  Ladonna Sheriff    MRN:  [id number]      :  1945  Date of Service:  10/27/2020  Primary care provider:  Joceline Ty      Subjective:  No acute events overnight.Continues to have RLE weakness. Can maybe lift the leg better in bed, although stills feels quite weak during transfers and ambulation when using RLE. Sensation, coordination and motor otherwise unchanged.     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  She presented to [facility] on 10/23/2020 with worsening RLE weakness, concerning for NMO exacerbation.  She was found to have enhancing lesions extending from T3 to T5/6.  She was transferred to [facility] for further management and PLEX therapy.        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 Left eye has lateral peripheral field cut at baseline with blurry and vision. Sensation decreased from T4 dermatome to toes, with mild L>R predominance for pain/temp and general sensation. New RLE weakness in the setting of new exacerbation. Admits to previous weakness exacerbations involving the RLE, but never the LLE. Each one has resolved with PLEX. Has failed Rituximab therapy due to relapses while therapeutic. Currently taking Mycophenolate for several months, although <6mo, and having current exacerbation. Unsure if currently therapeutic, although may consider newer monoclonal antibodies for NMO exacerbation prevention.   - Methylprednisone 1g for 5 total days (started at OSH  "10/24)   - Famotidine GI ppx (allergy to PPI)   - PLEX for x5 doses, likely every other day dosing   - Transfusion medicine consult ordered  - PTA Mycophenolate 500 BID   - PT/OT     #UTI, resolving   - Continue Ceftriaxone 1g for 5 day course (started at OSH 10/23)     Chronic issues:  #Bronchospastic disease  - Continue PTA albuterol and Incruse Ellipta     #Hypertension, stable   - Continue PTA amlodipine and metoprolol     #H/o HFrEF, currently improved EF  - Continue PTA Lasix and metoprolol     #Hx of TIA  - Continue PTA ASA 325mg   - Continue PTA simvastatin 20mg     #Mood d/o  - Continue PTA fluoxetine 20mg      #Insomnia, stable   - Continue PTA trazodone 100mg qHS      FEN: Intermittent bolus' PRN; electrolyte protocol; Regular diet  LDA: PIV x2, HD cath   PPx: Famotidine for GI; Enoxaparin   Code: FULL     Dispo: Floor status, receiving PLEX      Patient discussed with Attending Dr. Aníbal Sherwood MD  PGY-2 Neurology Resident  10/27/2020         ______________________________  PHYSICAL EXAMINATION:   /43 (BP Location: Left arm)   Pulse 62   Temp 97.5  F (36.4  C) (Oral)   Resp 16   Ht 1.702 m (5' 7\")   Wt 88.9 kg (195 lb 15.8 oz)   SpO2 95%   BMI 30.70 kg/m      General: Pleasant, awake and alert, Sitting in bed, NAD, cooperative  HEENT: Normocephalic, atraumatic, no epistaxis, no oral lesions, MMM  Resp: CTAB, no increased work of breathing  Cardio: RRR, S1/S2 appropriate   Abdomen: +BS, Soft, non-distended, non-tender  Extremities: Warm and well perfused, peripheral pulses present, no edema  Skin: Not jaundiced, no rash   Psych: Normal mood and affect    Neuro:  Mental status: Awake, alert, attentive; oriented to P/P/T/M  Speech: Fluent, comprehension and repetition intact; No dysarthria  Cranial nerves: Right eye blind at baseline, peripheral Left field cut with poor visual acuity of central Left eye (all baseline), Eyes mildly dysconjugate, PERRL, EOMI w/ normal " and smooth pursuit with some occasional pain of Right eye during maximal EOM, face expression symmetric, facial sensation intact and symmetric, hearing intact to conversation, shoulder shrug strong, palate rise symmetric, tongue/uvula midline.  Motor: Tone normal. 5/5 strength BUE and LLE, 4-/5 in hip flexor, knee extension/flexion, and dorsiflexion with 5/5 plantar flexion on Right. No atrophy or twitches. Pronator drift present on the RLE. Finger tapping symmetric.  Reflexes: 3+ and symmetric biceps, triceps, brachioradialis, patellar, with positive pectoral reflex bilaterally, No Villasenor's; Crossed adductors present; Achilles 2+; Toes down-going   Sensory: LLE without temp or pinprick sensation up to hip; Mild temp change in RLE to hip; Vibration decreased to knee bilaterally; Global reduced sensation to anterior rib cage bilaterally with band-like sensation in T4 distribution. Sensation normal and symmetric in BUEs   Coordination: FNF intact bilaterally, no dysmetria   Gait: Deferred       CURRENT MEDICATIONS:    amLODIPine  10 mg Oral Daily     aspirin  325 mg Oral QPM     cefTRIAXone  1 g Intravenous Q24H     clopidogrel  75 mg Oral Daily     famotidine  20 mg Oral BID     FLUoxetine  40 mg Oral Daily     furosemide  20 mg Oral Daily     heparin  3 mL Intracatheter Q24H     heparin  5 mL Intracatheter Q24H     heparin  5 mL Intracatheter Q24H     [START ON 10/28/2020] influenza vac high-dose quad  0.7 mL Intramuscular Prior to discharge     methylPREDNISolone  1,000 mg Intravenous Q24H     metoprolol succinate ER  50 mg Oral BID     mycophenolate  500 mg Oral BID IS     simvastatin  20 mg Oral At Bedtime     sodium chloride (PF)  3 mL Intracatheter Q8H     traZODone  100 mg Oral At Bedtime       PRN MEDICATIONS:  acetaminophen, albuterol, diphenhydrAMINE, heparin, lidocaine 4%, lidocaine (buffered or not buffered), magnesium sulfate, melatonin, naloxone, potassium chloride, potassium chloride with lidocaine,  potassium chloride, potassium chloride, potassium chloride, potassium phosphate (KPHOS) in D5W IV, potassium phosphate (KPHOS) in D5W IV, potassium phosphate (KPHOS) in D5W IV, potassium phosphate (KPHOS) in D5W IV, sodium chloride (PF)    INFUSIONS:      ALLERGIES:  Allergies   Allergen Reactions     Prevacid [Lansoprazole] Rash     Pravastatin Rash     Patient is still not sure about it due many years ago for reaction.         IMAGING:  MRI C-spine (10/23)  IMPRESSION:  1.  No definite cervical spinal cord signal abnormality and no abnormal enhancement.  2.  Disc osteophyte complex with shallow protrusion and annular fissure/tear and possible tiny component of extrusion at C4-C5 without significant spinal canal stenosis, mild to moderate foraminal narrowing right more than left due to uncinate spurring   and facet arthropathy.  3.  Mild multilevel degenerative spondylosis otherwise, see report for level by level details.    MRI T-spine (10/23)  IMPRESSION:  1.  Increased cord parenchyma T2 signal extending from T3-T5/T6, with enhancement centered at the T4 level as well as a small focus of enhancement in the parenchyma at the T5 level and left lateral spinal canal at T5 which may represent a focus of nerve   root enhancement. This is consistent with but not diagnostic of demyelinating disease active/acute plaque formation.  2.  Near complete resolution of the previous cord signal abnormality centered at the T2 level.  3.  Remainder of study without significant MR abnormality.      I saw and evaluated the patient on 10/27/20 and agree with the findings and the plan of care as documented in the resident's note.      Patient feels right lower extremity is stable.  Day 4 out of 5 of methylprednisolone today.  Plasma exchange #2 plan for tomorrow.  Continue mycophenolate for now pending discussion with outpatient neurologist.  Awaiting response on plan of increasing CellCept versus them working on insurance  authorization for eculizumab or Satarilizumab vs other immunosuppressant strategy.  Last day of antibiotics for UTI today.  Patient will be here until at least November 3 for plasma exchange.    Rajwinder Spangler DO   of Neurology

## 2020-10-27 NOTE — PLAN OF CARE
Status: Pt admitted 10/23/2020 with worsening RLE weakness, concerning for NMO exacerbation  Vitals: VSS, HTN within parameters   Neuros: A&Ox4 N/T from rib cage down to BLE. RLE 3/5 (gives out at times). RLE 4/5. BUE 5/5. Blind in R eye, L eye can see shadows and Silhouettes. L eye slight dysconjugate.  IV-PIV-SL Plex cath-HL  Resp/trach: WNL  Diet: Regular good intake.   Bowel status: Last BM 10/26. BS+  : Voiding w/o difiiculty   Skin: Blanchable redness to bottom, small scrap on nose.  Pain: Denied pain   Activity: A1/GB/ to commode, A2/GB/walker to bathroom.   Social: Updated sons   Plan: Next plex treatment tomorrow. Will continue to monitor and follow POC

## 2020-10-28 NOTE — PLAN OF CARE
Status: Pt admitted 10/23/2020 with worsening RLE weakness, concerning for NMO exacerbation  Vitals: VSS on RA   Neuros: A+O x4. 5/5 BUE, 4/5 LLE, 3/5 RLE. N/T from rib cage to toes, worse on R side. R eye blind, L eye sees shadows  IV: PIV SL   Resp/trach: No issues   Diet: Regular   Bowel status: No BM overnight   : Voiding   Skin: Bruising throughout   Pain: Denied ovenright   Activity: A 2 with walking to bathroom, A1 when pivoting to commode, uses GB and walker   Plan: PLEX 2/5 today, methylpred infusions are being done as well. Continue to monitor and follow POC

## 2020-10-28 NOTE — PROCEDURES
Laboratory Medicine and Pathology  Transfusion Medicine - Apheresis Procedure    Ladonna Sheriff MRN# [id number]   YOB: 1945 Age: 75 year old   Date of Admission: 10/24/2020     Reason for consult: Therapeutic plasma exchange (TPE) for neuromyelitis optica (NMO) flare           Assessment and Plan:   The patient is a 74 yo woman with a history of NMO, currently with flare.  She does not respond well to steroids.  TPE series requested.  We have performed TPE on her in the past.  Will plan for a series of 5 TPEs, every-other-day schedule.  Will also plan for 5% albumin as exchange fluid.  Will monitor coag labs and adjust with plasma as exchange fluid as needed.     The patient tolerated TPE #2 today.  No concerns/complaints voiced per nursing.  Continue with plan as outlined below.  Fibrinogen at 129 today prior to TPE #2.  May need to use at least a portion of plasma for an upcoming TPE.  TPE #3 scheduled for Fri., 10/30/20.    TREATMENT PLAN:  Series of 5 TPEs, plan for 5% albumin as exchange fluid (3L plasma volume), monitor coags and include plasma as indicated.  Please do not start or continue ACE-inhibitors throughout the duration of a TPE series.  Please notify the apheresis physician of any upcoming procedures, surgeries, or biopsies as TPE with 5% albumin will affect coagulation factors.            Chief Complaint:   Right leg weakness, worsening vision, and numbness (band-like at chest)             Past Medical History:     Past Medical History:   Diagnosis Date     Cerebral infarction (H)      CHF (congestive heart failure) (H)      Hypertension      Lupus (H)      Lupus (H)      Optic neuritis      Optic neuritis      Sjogren's syndrome (H)      Sjogren's syndrome (H)      Temporal arteritis (H)      TIA (transient ischemic attack)      Uncomplicated asthma           Past Surgical History:     Past Surgical History:   Procedure Laterality Date     CHOLECYSTECTOMY       GYN SURGERY       hysterectomy     GYN SURGERY      tubal ligation     IR CVC NON TUNNEL PLACEMENT  6/21/2019     IR CVC NON TUNNEL PLACEMENT  7/15/2020     IR CVC NON TUNNEL PLACEMENT  10/26/2020     IR FOLLOW UP VISIT INPATIENT  7/2/2019          Social History:     Social History     Socioeconomic History     Marital status:      Spouse name: Not on file     Number of children: Not on file     Years of education: Not on file     Highest education level: Not on file   Occupational History     Not on file   Social Needs     Financial resource strain: Not on file     Food insecurity     Worry: Not on file     Inability: Not on file     Transportation needs     Medical: Not on file     Non-medical: Not on file   Tobacco Use     Smoking status: Former Smoker     Packs/day: 0.00     Smokeless tobacco: Never Used   Substance and Sexual Activity     Alcohol use: Yes     Alcohol/week: 7.0 standard drinks     Types: 7 Shots of liquor per week     Comment: occ     Drug use: No     Sexual activity: Not on file   Lifestyle     Physical activity     Days per week: Not on file     Minutes per session: Not on file     Stress: Not on file   Relationships     Social connections     Talks on phone: Not on file     Gets together: Not on file     Attends Cheondo[employer] service: Not on file     Active member of club or organization: Not on file     Attends meetings of clubs or organizations: Not on file     Relationship status: Not on file     Intimate partner violence     Fear of current or ex partner: Not on file     Emotionally abused: Not on file     Physically abused: Not on file     Forced sexual activity: Not on file   Other Topics Concern     Not on file   Social History Narrative     Not on file          Family History:     Family History   Problem Relation Age of Onset     Asthma Mother      Lupus Sister      Bone Cancer Brother      Kidney Cancer Sister           Immunizations:     Most Recent Immunizations   Administered Date(s)  Administered     Influenza (High Dose) 3 valent vaccine 10/21/2015     Influenza Vaccine, 6+MO IM (QUADRIVALENT W/PRESERVATIVES) 10/17/2016     Meningococcal (Bexsero ) 07/24/2020     Meningococcal (Menveo ) 07/24/2020     Pneumo Conj 13-V (2010&after) 07/08/2014     Pneumococcal 23 valent 07/07/2017     TDAP Vaccine (Adacel) 04/25/2013     Zoster vaccine, live 04/25/2013   Pended Date(s) Pended     Influenza, Quad, High Dose, Pf, 65yr + 10/29/2020          Allergies:     Allergies   Allergen Reactions     Prevacid [Lansoprazole] Rash     Pravastatin Rash     Patient is still not sure about it due many years ago for reaction.          Medications:     Current Facility-Administered Medications   Medication     acetaminophen (TYLENOL) tablet 650 mg     albuterol (PROAIR HFA/PROVENTIL HFA/VENTOLIN HFA) 108 (90 Base) MCG/ACT inhaler 2 puff     amLODIPine (NORVASC) tablet 10 mg     aspirin (ASA) EC tablet 325 mg     clopidogrel (PLAVIX) tablet 75 mg     diphenhydrAMINE (BENADRYL) capsule 25 mg     enoxaparin ANTICOAGULANT (LOVENOX) injection 40 mg     famotidine (PEPCID) tablet 20 mg     FLUoxetine (PROzac) capsule 40 mg     furosemide (LASIX) tablet 20 mg     heparin 100 UNIT/ML injection 3 mL     heparin 100 UNIT/ML injection 3 mL     heparin 100 UNIT/ML injection 5 mL     heparin 100 UNIT/ML injection 5 mL     influenza vac high-dose quad (FLUZONE HD) injection MARTINE 0.7 mL     lidocaine (LMX4) cream     lidocaine 1 % 0.1-1 mL     melatonin tablet 1 mg     metoprolol succinate ER (TOPROL-XL) 24 hr tablet 50 mg     mycophenolate (GENERIC EQUIVALENT) capsule 500 mg     naloxone (NARCAN) injection 0.1-0.4 mg     simvastatin (ZOCOR) tablet 20 mg     sodium chloride (PF) 0.9% PF flush 3 mL     sodium chloride (PF) 0.9% PF flush 3 mL     traZODone (DESYREL) tablet 100 mg          Review of Systems:   See above.           Data:     [facility]  Recent Labs   Lab 10/28/20  0655 10/27/20  1107 10/27/20  0602 10/26/20  1008  10/26/20  0656 10/25/20  0627 10/25/20  0627     --  142 144  --   --  142   POTASSIUM 3.7  --  4.0 3.8 3.7   < > 3.1*   CHLORIDE 112*  --  111* 113*  --   --  110*   GANESH 8.2*  --  8.7 8.7  --   --  8.4*   CO2 27  --  27 26  --   --  25   BUN 23  --  20 18  --   --  14   CR 0.63 0.78 0.68 0.66  --   --  0.66   *  --  146* 117*  --   --  135*    < > = values in this interval not displayed.     CBC  Recent Labs   Lab 10/28/20  0655 10/27/20  0602 10/26/20  1008 10/25/20  0627   WBC 8.8 9.3 8.6 3.7*   RBC 4.01 4.20 4.05 3.95   HGB 12.1 12.6 12.2 12.0   HCT 38.3 40.0 39.2 38.1   MCV 96 95 97 97   MCH 30.2 30.0 30.1 30.4   MCHC 31.6 31.5 31.1* 31.5   RDW 13.3 13.5 13.8 13.6    233 238 234     Fibrinogen = 129 (10/28/20 prior to TPE #2)    Attestation:  I, Dallas Whitney M.D., was immediately available and present on-site during the entirety of the procedure.  I reviewed the patient's chart and was in direct communication with apheresis nursing staff during the collection.  I did not visit in-person to conserve PPE/limit contacts due to covid19.    Dallas Whitney M.D.  Professor, Transfusion Medicine  Laboratory Medicine & Pathology  Pager: [phone]

## 2020-10-28 NOTE — PROGRESS NOTES
[facility]  Neurology Progress Note    Patient Name:  Ladonna Sheriff    MRN:  [id number]      :  1945  Date of Service:  10/28/2020  Primary care provider:  Joceline Ty      Subjective:  No acute events overnight. Subjective improved of RLE weakness. PT/OT to see today. Otherwise no changes. Will receive PLEX 2nd dose today.     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  She presented to [facility] on 10/23/2020 with worsening RLE weakness, concerning for NMO exacerbation.  She was found to have enhancing lesions extending from T3 to T5/6.  She was transferred to [facility] for further management and PLEX therapy.        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 Left eye has lateral peripheral field cut at baseline with blurry and vision. Sensation decreased from T4 dermatome to toes, with mild L>R predominance for pain/temp and general sensation. New RLE weakness in the setting of new exacerbation. Admits to previous weakness exacerbations involving the RLE, but never the LLE. Each one has resolved with PLEX. Has failed Rituximab therapy due to relapses while therapeutic. Currently taking Mycophenolate for several months, although <6mo, and having current exacerbation. Unsure if currently therapeutic, although may consider newer monoclonal antibodies for NMO exacerbation prevention.   - Methylprednisone 1g for 5 total days - final dose today  - Famotidine GI ppx (allergy to PPI) - will stop after steroids  - PLEX for x5 doses, likely  "every other day dosing (first dose 10/26)    - Transfusion medicine consult ordered  - PTA Mycophenolate 500 BID   - PT/OT     #UTI, resolved   - Ceftriaxone 1g for 5 day course (completed 10/27)     Chronic issues:  #Bronchospastic disease  - Continue PTA albuterol and Incruse Ellipta     #Hypertension, stable   - Continue PTA amlodipine and metoprolol     #H/o HFrEF, currently improved EF  - Continue PTA Lasix and metoprolol     #Hx of TIA  - Continue PTA ASA 325mg   - Continue PTA simvastatin 20mg     #Mood d/o  - Continue PTA fluoxetine 20mg      #Insomnia, stable   - Continue PTA trazodone 100mg qHS      FEN: Intermittent bolus' PRN; electrolyte protocol; Regular diet  LDA: PIV x2, HD cath   PPx: Famotidine for GI; Enoxaparin   Code: FULL     Dispo: Floor status, receiving PLEX      Patient discussed with Attending Dr. Aníbal Sherwood MD  PGY-2 Neurology Resident  10/28/2020     I saw and evaluated the patient on 10/28/20 and agree with the findings and the plan of care as documented in the resident's note.      Patient feels may have a little more movement in right lower extremity today.  I did not detect a difference on my bedside exam from admission exam.  Plasma exchange #2 to be completed today.  Last day of IV steroids today.  Antibiotics for UTI completed yesterday.  I have discussed with her outpatient neurologist who is starting process of approving for eculizumab or Satarilzumab.  Patient got meningococcal vaccine in July, so I do not think needs to be repeated.  We will also check complement levels today in preparation for this.    Rajwinder Spangler DO   of Neurology        ______________________________  PHYSICAL EXAMINATION:   BP (!) 145/71 (BP Location: Left arm)   Pulse 63   Temp 97.2  F (36.2  C) (Oral)   Resp 14   Ht 1.702 m (5' 7\")   Wt 88.9 kg (195 lb 15.8 oz)   SpO2 98%   BMI 30.70 kg/m       General: Pleasant, awake and alert, Sitting in bed, " NAD, cooperative  HEENT: Normocephalic, atraumatic, no epistaxis, no oral lesions, MMM  Resp: CTAB, no increased work of breathing  Cardio: RRR, S1/S2 appropriate   Abdomen: +BS, Soft, non-distended, non-tender  Extremities: Warm and well perfused, peripheral pulses present, no edema  Skin: Not jaundiced, no rash   Psych: Normal mood and affect    Neuro:  Mental status: Awake, alert, attentive; oriented to P/P/T/M  Speech: Fluent, comprehension and repetition intact; No dysarthria  Cranial nerves: Right eye blind at baseline, peripheral Left field cut with poor visual acuity of central Left eye (all baseline), Eyes mildly dysconjugate, PERRL, EOMI w/ normal and smooth pursuit with some occasional pain of Right eye during maximal EOM, face expression symmetric, facial sensation intact and symmetric, hearing intact to conversation, shoulder shrug strong, palate rise symmetric, tongue/uvula midline.  Motor: Tone normal. 5/5 strength BUE and LLE, 4-/5 in hip flexor, knee flexion, and dorsiflexion with 5/5 Knee extension, plantar flexion on Right. No atrophy or twitches. Pronator drift present on the RLE. Finger tapping symmetric.  Reflexes: 2+ and symmetric biceps, triceps, brachioradialis with 3+ patellar, with positive pectoral reflex bilaterally, No Villasenor's; Crossed adductors present with R adductor; Achilles 2+; Toes down-going   Sensory: LLE without temp sensation up to hip; Mild temp change in RLE to hip; Vibration decreased to knee bilaterally; Global reduced sensation to anterior rib cage bilaterally with band-like sensation in T4 distribution. Sensation normal and symmetric in BUEs   Coordination: FNF intact bilaterally, no dysmetria   Gait: Deferred       CURRENT MEDICATIONS:    amLODIPine  10 mg Oral Daily     aspirin  325 mg Oral QPM     clopidogrel  75 mg Oral Daily     enoxaparin ANTICOAGULANT  40 mg Subcutaneous Q24H     famotidine  20 mg Oral BID     FLUoxetine  40 mg Oral Daily     furosemide  20 mg  Oral Daily     heparin  3 mL Intracatheter Q24H     heparin  5 mL Intracatheter Q24H     heparin  5 mL Intracatheter Q24H     influenza vac high-dose quad  0.7 mL Intramuscular Prior to discharge     methylPREDNISolone  1,000 mg Intravenous Q24H     metoprolol succinate ER  50 mg Oral BID     mycophenolate  500 mg Oral BID IS     simvastatin  20 mg Oral At Bedtime     sodium chloride (PF)  3 mL Intracatheter Q8H     traZODone  100 mg Oral At Bedtime       PRN MEDICATIONS:  acetaminophen, albuterol, diphenhydrAMINE, heparin, lidocaine 4%, lidocaine (buffered or not buffered), magnesium sulfate, melatonin, naloxone, potassium chloride, potassium chloride with lidocaine, potassium chloride, potassium chloride, potassium chloride, potassium phosphate (KPHOS) variable dose in D5W IV, potassium phosphate (KPHOS) variable dose in D5W IV, potassium phosphate (KPHOS) variable dose in D5W IV, potassium phosphate (KPHOS) variable dose in D5W IV, sodium chloride (PF)    INFUSIONS:      ALLERGIES:  Allergies   Allergen Reactions     Prevacid [Lansoprazole] Rash     Pravastatin Rash     Patient is still not sure about it due many years ago for reaction.         IMAGING:  MRI C-spine (10/23)  IMPRESSION:  1.  No definite cervical spinal cord signal abnormality and no abnormal enhancement.  2.  Disc osteophyte complex with shallow protrusion and annular fissure/tear and possible tiny component of extrusion at C4-C5 without significant spinal canal stenosis, mild to moderate foraminal narrowing right more than left due to uncinate spurring   and facet arthropathy.  3.  Mild multilevel degenerative spondylosis otherwise, see report for level by level details.    MRI T-spine (10/23)  IMPRESSION:  1.  Increased cord parenchyma T2 signal extending from T3-T5/T6, with enhancement centered at the T4 level as well as a small focus of enhancement in the parenchyma at the T5 level and left lateral spinal canal at T5 which may represent a  focus of nerve   root enhancement. This is consistent with but not diagnostic of demyelinating disease active/acute plaque formation.  2.  Near complete resolution of the previous cord signal abnormality centered at the T2 level.  3.  Remainder of study without significant MR abnormality.

## 2020-10-28 NOTE — PROGRESS NOTES
10/28/20 1559   Quick Adds   Type of Visit Initial PT Evaluation   Living Environment   People in home child(mary), adult   Current Living Arrangements condominium   Home Accessibility stairs to enter home   Number of Stairs, Main Entrance other (see comments)  (16)   Stair Railings, Main Entrance railings on both sides of stairs   Transportation Anticipated family or friend will provide   Self-Care   Usual Activity Tolerance good   Current Activity Tolerance fair   Equipment Currently Used at Home cane, straight;grab bar, tub/shower;tub bench;walker, rolling   Disability/Function   Hearing Difficulty or Deaf no   Wear Glasses or Blind yes   Concentrating, Remembering or Making Decisions Difficulty no   Difficulty Communicating no   Difficulty Eating/Swallowing no   Walking or Climbing Stairs Difficulty yes   Walking or Climbing Stairs ambulation difficulty, requires equipment   Dressing/Bathing Difficulty no   Toileting no   Doing Errands Independently Difficulty (such as shopping) no   Errands Management son drives her   Fall history within last six months yes   Number of times patient has fallen within last six months 2   Change in Functional Status Since Onset of Current Illness/Injury yes   General Information   Onset of Illness/Injury or Date of Surgery 10/24/20   Referring Physician Marivel Dyer MD   Patient/Family Therapy Goals Statement (PT) to go home.    Pertinent History of Current Problem (include personal factors and/or comorbidities that impact the POC) APQ4 positive Neuromyelitis Optica with residual R eye vision loss, numbness from the mid-chest down with a band like sensation consistent with a T4 transverse myelitis and on-off episodes of L eye optic neuritis with baseline low vision who presented to [facility] on 10/23/20 with worsening of R leg weakness.   Cognition   Orientation Status (Cognition) oriented x 4   Affect/Mental Status (Cognition) WFL   Follows Commands (Cognition) WFL  "  Pain Assessment   Patient Currently in Pain No   Integumentary/Edema   Integumentary/Edema no deficits were identifed   Posture    Posture Protracted shoulders;Forward head position   Range of Motion (ROM)   ROM Quick Adds ROM WFL   Strength   Strength Comments Weakness evident on RLE; able to lift anti-gravity but poor control   Bed Mobility   Bed Mobility supine-sit;sit-supine   Supine-Sit [place] (Bed Mobility) minimum assist (75% patient effort)   Sit-Supine [place] (Bed Mobility) minimum assist (75% patient effort)   Impairments Contributing to Impaired Bed Mobility impaired motor control   Comment (Bed Mobility) Needs help to lift RLE in and out of bed   Transfers   Transfers sit-stand transfer   Impairments Contributing to Impaired Transfers impaired balance;impaired coordination;impaired motor control   Transfer Safety Comments Needs assistance to manage walker and cues for hand placement.    Sit-Stand Transfer   Sit-Stand [place] (Transfers) minimum assist (75% patient effort)   Assistive Device (Sit-Stand Transfers) walker, standard   Gait/Stairs (Locomotion)   [place] Level (Gait) minimum assist (75% patient effort)   Assistive Device (Gait) walker, standard   Pattern (Gait) step-to   Deviations/Abnormal Patterns (Gait) bilateral deviations   Bilateral Gait Deviations foot drop/toe drag;heel strike decreased   Comment (Gait/Stairs) Needs cues for foot placement and she \"can't feel where it is.\"    Balance   Balance Comments Unsteady and moderately reliant on the walker for stability   Clinical Impression   Criteria for Skilled Therapeutic Intervention yes, treatment indicated   PT Diagnosis (PT) impaired mobility   Influenced by the following impairments weakness   Functional limitations due to impairments bed mobility; transfers and gait; stairs   Clinical Presentation Stable/Uncomplicated   Clinical Presentation Rationale clinical judgement   Clinical Decision Making (Complexity) " "low complexity   Therapy Frequency (PT) Daily   Predicted Duration of Therapy Intervention (days/wks) 1 week   Planned Therapy Interventions (PT) balance training;bed mobility training;gait training;home exercise program;neuromuscular re-education;motor coordination training;stair training;strengthening;transfer training   Risk & Benefits of therapy have been explained risks/benefits reviewed;care plan/treatment goals reviewed;evaluation/treatment results reviewed   Clinical Impression Comments Patient on round 2 of treatment and tolerating well; she notes she has previously seen good improvement with her mobility after the third treatment.    PT Discharge Planning    PT Discharge Recommendation (DC Rec) [facility] patient will benefit from intensive, interdisciplinary therapy.  Anticipate will be able to tolerate 3 hours of therapy per day   PT Rationale for DC Rec Currently, patient needs min-mod A for safe mobility, including transfers, gait and stairs. Anticipate that she will make great progress with therapy as her treatments progress and may potentially be able to discharge home with assist and in-home PT.    PT Brief overview of current status  A x 1 with walker to commode   [facility] [employer] TM \"6 Clicks\"   2016, Trustees of [facility], under license to [employer].  All rights reserved.   6 Clicks Short Forms Basic Mobility Inpatient Short Form   [facility] [employer]  \"6 Clicks\" V.2 Basic Mobility Inpatient Short Form   1. Turning from your back to your side while in a flat bed without using bedrails? 4 - None   2. Moving from lying on your back to sitting on the side of a flat bed without using bedrails? 3 - A Little   3. Moving to and from a bed to a chair (including a wheelchair)? 3 - A Little   4. Standing up from a chair using your arms (e.g., wheelchair, or bedside chair)? 3 - A Little   5. To walk in hospital room? 2 - A Lot   6. Climbing 3-5 steps with a railing? " 2 - A Lot   Basic Mobility Raw Score (Score out of 24.Lower scores equate to lower levels of function) 17   Total Evaluation Time   Total Evaluation Time (Minutes) 15

## 2020-10-29 NOTE — CONSULTS
Care Management Initial Consult    General Information  Assessment completed with:: Patient, (Patient (Ladonna))  Type of CM/SW Visit: Initial Assessment  Primary Care Provider verified and updated as needed?: Yes  Readmission Within the Last 30 Days: (Patient see's JIGNESH Begum at [facility])     Reason for Consult: discharge planning  Advance Care Planning: Advance Care Planning Reviewed: present on chart     Pt's son (Hayden) is pt's health care agent as per HCD present in Epic       Communication Assessment  Patient's communication style: spoken language (English or Bilingual)  Hearing Difficulty or Deaf: no Wear Glasses or Blind: yes    Cognitive  Cognitive/Neuro/Behavioral: WDL  Level of Consciousness: alert  Arousal Level: opens eyes spontaneously  Orientation: oriented x 4  Mood/Behavior: calm, cooperative  Best Language: 0 - No aphasia  Speech: clear    Living Environment:   People in home: alone   Patient lives alone    Current living Arrangements: condominium        Pt lives in a 2nd floor condominium  Family/Social Support:  Care provided by: self  Provides care for: no one     Who is your support system?: Children     Description of Support System: Involved  Description of Support System: Involved  Adequate family and caregiver support        Description of Support System: Involved  Support Assessment: Adequate family and caregiver support    Current Resources:   Skilled Home Care Services:    Community Resources: County Programs, Housekeeping/Chore Agency, Other (see comment)(Grocery delivery, handicapped parking certificate)  Equipment currently used at home: (grab bar in the shower, shower chair, walker, cane and RTS)  Supplies currently used at home:      Employment:  Employment Status: homemaker, retired     Employment/ Comments: (Patient did not serve in the )  Financial/Environmental Concerns: (Patient receives social security benefits)     ( son Bridget) assist pt with  financial mgnt as needed)    Lifestyle & Psychosocial Needs:        Socioeconomic History     Marital status:      Spouse name: Not on file     Number of children: Not on file     Years of education: Not on file     Highest education level: Not on file     Tobacco Use     Smoking status: Former Smoker     Packs/day: 0.00     Smokeless tobacco: Never Used   Substance and Sexual Activity     Alcohol use: Yes     Alcohol/week: 7.0 standard drinks     Types: 7 Shots of liquor per week     Comment: occ     Drug use: No       Functional Status:  Prior to admission patient needed assistance: Medications, Laundry/Housekeeping, Shopping, Handling finances        Mental Health Status:  WDL                            Values/Beliefs:  Spiritual, Cultural Beliefs, Amish Practices, Values that affect care: yes(Gnosticist)               Additional Information:  SW met with pt to complete initial assessment.  Pt lives alone in a 2nd floor condominium.    Pt has 2 cats.  There is one threshold step to enter the building.  Once inside, pt must climb 16 steps (with bilateral rails) to the 2nd floor.  Pt states that one of her son's is always with her when she is using the steps.  Pt's unit includes a washer and dryer.  Pt's master bedroom bath has a step in shower with grab bars and a shower chair.  Prior to hospitalization, pt was indep with all mobility. Pt typically used a cane indoors and a walker outdoors, however, for the past 2 weeks, pt has been using a cane indoors.  Pt has had 1 fall in the past year.  Prior to hospitalization, pt was indep with adl's.  Pt's son's (Hayden and Sridhar) provide pt with transportation.  Hayden sets up pt's medications and pt takes the medications indep..  Pt has auto pay for most of her bills.  Pt was indep with financial mgnt with assistance from Hayden (Hayden is pt's financial power of  and is a signer on pt's accounts) as needed.  Pt has a disability parking certificate.   "Pt receives waivered services through the [facility] Alternative Care Program.  Pt's waiver  is Amparo Boo at [phone].  Amparo is employed by [employer] and [facility] contracts for her services.  Through the waiver program, pt receives weekly housekeeping assistance and grocery deliver.  The waiver program was providing \"Mom's meals\" however, pt chose to discontinue this service.  Pt has a handicapped parking certificate.  Pt owns the following DME:  Grab bar in the shower, shower chair, ww, cane and a RTS.  Pt has a [employer] which magnifies documents for pt.  Pt also has a \"Rochelle\" machine through \"[employer]\" that allows for documents to be scanned and then read out loud to pt (to accommodate pt's vision loss).  Pt's see's a P.A. at [facility] (Joceline Ty).  Pt states that she takes Trazodone for sleep and Prozac for anxiety.  Pt states that she is a social drinker consuming 2-3 whiskey cokes per week.  Pt has 3 adult children whom per pt are actively involved:  1.  Israel, lives in pt's same [facility]  2.  Sridhar, lives in [place]  3.  Ray, lives in [place] but is moving to [place] to live with pt and should arrive on 11/3/2020.  Pt states that in the past, Ray cared for pt and  for 13 years (when they lived in [place]) and per pt was a good caregiver. Pt states that she is looking forward to Ray arrival and to the opportunity for them to assist one another.    Discharge planning was discussed.  Pt is currently receiving PLEX treatments.   Per Dr. Sherwood,  Discharge is not anticipated until late next week.   Currently, Physical Therapy is recommending an ARU stay.  However, Dr. Sherwood is somewhat confident that pt's condition will improve enough to discharge directly home.  Pt's states that her goal would be to discharge directly home however, pt states that she will consider a rehab stay if OT (eval is pending) and " Physical Therapy feel strongly that this is indicated. SW will continue to follow.    CAMELIA Marcial  Social Work, 6A  Phone:  [phone]  Pager:  [phone]  10/29/2020        ANAMARIA Colin

## 2020-10-29 NOTE — PLAN OF CARE
Status: RLE weakness, hx of neuromyelitis optica.  Vitals: VSS on RA   Neuros: LE's 4/5 R leg sl less than L. Feet seemed more equal this afternoon., but R leg still weaker. N/T from rib cage to toes.R eye blind, L eye has cloudy, blurry areas and has no L periferial vision in L eye.  IV: PIV SL and has L neck PLEX catheter.  Diet: Regular   Bowel status: Large BM tonight.   : Voiding   Pain: Denied    Activity: Was able to get to bathroom with 1 assist and walker tonight.   Plan: PLEX 2/5 done today, Had Methylpred after. Continue with cares as needed.

## 2020-10-29 NOTE — PROGRESS NOTES
Time: 3282-0101    Reason for admit: Neuromyelitis optica      Neuro: A&Ox4, able to make her needs known, N/T from rib cage to toes, R eye blind, L eye sees shadow, has no peripheral vision on L side.   Activity: Assist 1-2 d/t R leg weakness   Pain: Denies   Cardiac: WNL, denies chest pain   Respiratory: WNL on RA, denies SOB   GI/: Voiding without difficulty, + BS, LBM 10/28/2020  Diet: Regular   Lines/Drains: L PIV SL, L internal jugular CVC WNL  Skin: Bruising throughout, no new deficits noted.     Events/Plan: Pt resting between cares. Calls appropriately. Per pt request to have all 4 side railings up overnight.      Continue to monitor and follow POC.

## 2020-10-29 NOTE — PLAN OF CARE
Status: RLE weakness, concern for neuromyelitis optica. PLEX treatments underway.   Vitals: Stable on RA.   Neuros: RLE < LLE 4/5. N/T from rib cage to toes.R eye blind, L eye cloudy/blurry and L field cut.   IV: PIV SL,  L neck PLEX catheter; HL.  Diet: Regular diet, good intake.   Bowel status: No BM; +flatus.   : Voiding spontaneously.   Pain: Denies.   Activity: Up with 1 and walker. Pivots to commode.   Social: No visitor today; pt stays in contact with family.  Plan: PLEX 3/5 tomorrow. Continue to monitor and follow current POC.

## 2020-10-30 NOTE — TELEPHONE ENCOUNTER
Ladonna currently inpatient for NMO relapse.  Plan to start Soliris.  Patient received MENVEO and BEXSERO meningitis vaccinations on July 24, 2020.  Contacted Cheyenne Nelson, at [facility], and confirmed that Ladonna does not need booster to start infusions, although it is recommended and ultimately up to the provider when to give boosters.  Start form faxed to  ([phone]) to obtain patient signature.  Physician Quick Reference Form filled out.    Soliris therapy plan entered and routed to Dr. Franklin for review and signature.       Patient will infuse at [facility]. Will submit PA once infusion orders are signed.

## 2020-10-30 NOTE — PROGRESS NOTES
"   10/30/20 5251   Quick Adds   Type of Visit Initial Occupational Therapy Evaluation   Living Environment   People in home alone   Current Living Arrangements condominium   Home Accessibility stairs to enter home   Number of Stairs, Main Entrance other (see comments)  (16)   Stair Railings, Main Entrance railings on both sides of stairs   Transportation Anticipated family or friend will provide   Living Environment Comments Patient lives alone in a second floor condo with 2 cats. Her son from [place] is planning to move back to [place] to stay with patient. Also has a son that lives in the same condo building and third son that lives in [place]. Tub/shower combination present with shower chair. Always has sons assist with navigation of stairs and for transportation.    Self-Care   Usual Activity Tolerance good   Current Activity Tolerance fair   Regular Exercise No   Equipment Currently Used at Home cane, straight;grab bar, tub/shower;shower chair;walker, standard;raised toilet seat   Activity/Exercise/Self-Care Comment No formal exercise program but remains active with daily activities.    Disability/Function   Hearing Difficulty or Deaf no   Wear Glasses or Blind yes   Vision Management Glasses, Low Vision Strategies, \"Rochelle\" Machine, ElasticDot, Worked with OP OT low vision therapies   Concentrating, Remembering or Making Decisions Difficulty no   Difficulty Communicating no   Difficulty Eating/Swallowing no   Walking or Climbing Stairs Difficulty yes   Walking or Climbing Stairs stair climbing difficulty, assistance 1 person;ambulation difficulty, requires equipment   Mobility Management Cane/Walker and Son assist for stairs   Dressing/Bathing Difficulty no   Toileting no   Doing Errands Independently Difficulty (such as shopping) yes   Errands Management Sons assist with errands   Fall history within last six months yes   Number of times patient has fallen within last six months 2   Change in Functional Status " "Since Onset of Current Illness/Injury yes   General Information   Onset of Illness/Injury or Date of Surgery 10/24/20   Referring Physician Zeke Sherwood MD   Patient/Family Therapy Goal Statement (OT) To improve RLE strength.   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  She presented to [facility] on 10/23/2020 with worsening RLE weakness, concerning for NMO exacerbation.  She was found to have enhancing lesions extending from T3 to T5/6.  She was transferred to [facility] for further management and PLEX therapy.\"   Performance Patterns (Routines, Roles, Habits) Patient has 2 cats she cares for at home.    Existing Precautions/Restrictions fall   Limitations/Impairments insensate body part;visual   Left Upper Extremity (Weight-bearing Status) full weight-bearing (FWB)   Right Upper Extremity (Weight-bearing Status) full weight-bearing (FWB)   Left Lower Extremity (Weight-bearing Status) full weight-bearing (FWB)   Right Lower Extremity (Weight-bearing Status) full weight-bearing (FWB)   General Observations and Info Activity orders: Up with assist   Cognitive Status Examination   Orientation Status orientation to person, place and time   Cognitive Status Comments Cognition appears intact. Will monitor.    Visual Perception   Visual Impairment/Limitations legally blind   Visual Acuity No vision in R eye. Peripheral visual field cut in L eye. Also reports impaired central vision. Per patient, able to read top letter of letter chart from 4 feet away but not 6. Able to read second line letters with head turns and scanning. Overall L vision cloudy and dull. Able to identify bright colors but mistakes light yellow mask " for white, etc.     Visual Field Deficit central vision impaired   Impact of Vision Impairment on Function (Vision) Patient reports vision to feel fairly close to baseline. Is frustrated by deficits. Vision impairments impact ability to use compensatory strategies for RLE weakness and bilateral numbness, impacting ease of functional mobility for ADL. Also impacts ability to utilize electronics (assisting with turning off vibration setting on phone).    Sensory   Sensory Comments Band of numbness/tingling from chest distally. Worsened numbness of LLE. Impaired sensation to touch.    Pain Assessment   Patient Currently in Pain Yes, see Vital Sign flowsheet  (Headache)   Integumentary/Edema   Integumentary/Edema Comments None noted.   Range of Motion Comprehensive   Comment, General Range of Motion UE WFL   Strength Comprehensive (MMT)   Comment, General Manual Muscle Testing (MMT) Assessment UE 4+/5. 3/5 RLE.    Bed Mobility   Comment (Bed Mobility) CGA   Transfers   Transfer Comments Min A to complete SPT   Balance   Balance Comments Needs assistive device for transfers   Lower Body Dressing Assessment/Training   Noble Level (Lower Body Dressing) moderate assist (50% patient effort)   Instrumental Activities of Daily Living (IADL)   Previous Responsibilities meal prep   IADL Comments Patient receives assist for medication set-up (takes medications indepednently), groceries, laundry/housekeeping, financial management, and transportation. States son coming to live with patient will be completing majority of cooking.    Clinical Impression   Criteria for Skilled Therapeutic Interventions Met (OT) yes   OT Diagnosis Impaired ADL/IADL independence and safety   OT Problem List-Impairments impacting ADL problems related to;balance;activity tolerance impaired;sensation;strength;vision;other (see comments)  (fatigue)   Assessment of Occupational Performance 5 or more Performance Deficits   Identified Performance  Deficits Dressing, Bathing, Toileting, Functional Mobility, Home Management, Leisure   Planned Therapy Interventions (OT) ADL retraining;IADL retraining;transfer training;visual perception;progressive activity/exercise   Clinical Decision Making Complexity (OT) low complexity   Therapy Frequency (OT) 6x/week   Predicted Duration of Therapy 1-2 weeks   Anticipated Equipment Needs Upon Discharge (OT) other (see comments)  (TBD)   Risks and Benefits of Treatment have been explained. Yes   Patient, Family & other staff in agreement with plan of care Yes   Comment-Clinical Impression Patient would benefit from skilled OT services to facilitate increased ADL independence and safety with new RLE weakness/fatigue and ongoing vision deficits.    OT Discharge Planning    OT Discharge Recommendation (DC Rec) Transitional Care Facility   OT Rationale for DC Rec Patient remains below baseline ADL independence levels. Would benefit from ongoing therapies to promote increased strength/return toward PLOF. States typically starts to notice improvements with PLEX after 3 treatments - will continue to monitor status. If significantly improved, may be able to progress toward home discharge (son will be staying with patient). May not have intensive OT needs for ARU if RLE improving.

## 2020-10-30 NOTE — PROCEDURES
Laboratory Medicine and Pathology  Transfusion Medicine - Apheresis Procedure    Ladonna Sheriff MRN# [id number]   YOB: 1945 Age: 75 year old   Date of Admission: 10/24/2020     Reason for consult: Therapeutic plasma exchange (TPE) for neuromyelitis optica (NMO) flare           Assessment and Plan:   The patient is a 74 yo woman with a history of NMO, currently with flare.  She does not respond well to steroids.  TPE series requested.  We have performed TPE on her in the past.  Will plan for a series of 5 TPEs, every-other-day schedule.  Will also plan for 5% albumin as exchange fluid.  Will monitor coag labs and adjust with plasma as exchange fluid as needed.     The patient tolerated TPE #3 today.  Again, no concerns/complaints voiced per nursing.  Continue with plan as outlined below.  Fibrinogen at 165 today prior to TPE #3.  May need to use at least a portion of plasma for an upcoming TPE.  TPE #4 scheduled for Sun., 11/1/20.    TREATMENT PLAN:  Series of 5 TPEs, plan for 5% albumin as exchange fluid (3L plasma volume), monitor coags and include plasma as indicated.  Please do not start or continue ACE-inhibitors throughout the duration of a TPE series.  Please notify the apheresis physician of any upcoming procedures, surgeries, or biopsies as TPE with 5% albumin will affect coagulation factors.            Chief Complaint:   Right leg weakness, worsening vision, and numbness (band-like at chest)             Past Medical History:     Past Medical History:   Diagnosis Date     Cerebral infarction (H)      CHF (congestive heart failure) (H)      Hypertension      Lupus (H)      Lupus (H)      Optic neuritis      Optic neuritis      Sjogren's syndrome (H)      Sjogren's syndrome (H)      Temporal arteritis (H)      TIA (transient ischemic attack)      Uncomplicated asthma           Past Surgical History:     Past Surgical History:   Procedure Laterality Date     CHOLECYSTECTOMY       GYN SURGERY       hysterectomy     GYN SURGERY      tubal ligation     IR CVC NON TUNNEL PLACEMENT  6/21/2019     IR CVC NON TUNNEL PLACEMENT  7/15/2020     IR CVC NON TUNNEL PLACEMENT  10/26/2020     IR FOLLOW UP VISIT INPATIENT  7/2/2019          Social History:     Social History     Socioeconomic History     Marital status:      Spouse name: Not on file     Number of children: Not on file     Years of education: Not on file     Highest education level: Not on file   Occupational History     Not on file   Social Needs     Financial resource strain: Not on file     Food insecurity     Worry: Not on file     Inability: Not on file     Transportation needs     Medical: Not on file     Non-medical: Not on file   Tobacco Use     Smoking status: Former Smoker     Packs/day: 0.00     Smokeless tobacco: Never Used   Substance and Sexual Activity     Alcohol use: Yes     Alcohol/week: 7.0 standard drinks     Types: 7 Shots of liquor per week     Comment: occ     Drug use: No     Sexual activity: Not on file   Lifestyle     Physical activity     Days per week: Not on file     Minutes per session: Not on file     Stress: Not on file   Relationships     Social connections     Talks on phone: Not on file     Gets together: Not on file     Attends Tenriism service: Not on file     Active member of club or organization: Not on file     Attends meetings of clubs or organizations: Not on file     Relationship status: Not on file     Intimate partner violence     Fear of current or ex partner: Not on file     Emotionally abused: Not on file     Physically abused: Not on file     Forced sexual activity: Not on file   Other Topics Concern     Not on file   Social History Narrative     Not on file          Family History:     Family History   Problem Relation Age of Onset     Asthma Mother      Lupus Sister      Bone Cancer Brother      Kidney Cancer Sister           Immunizations:     Most Recent Immunizations   Administered Date(s)  Administered     Influenza (High Dose) 3 valent vaccine 10/21/2015     Influenza Vaccine, 6+MO IM (QUADRIVALENT W/PRESERVATIVES) 10/17/2016     Meningococcal (Bexsero ) 07/24/2020     Meningococcal (Menveo ) 07/24/2020     Pneumo Conj 13-V (2010&after) 07/08/2014     Pneumococcal 23 valent 07/07/2017     TDAP Vaccine (Adacel) 04/25/2013     Zoster vaccine, live 04/25/2013   Pended Date(s) Pended     Influenza, Quad, High Dose, Pf, 65yr + 10/31/2020          Allergies:     Allergies   Allergen Reactions     Prevacid [Lansoprazole] Rash     Pravastatin Rash     Patient is still not sure about it due many years ago for reaction.          Medications:     Current Facility-Administered Medications   Medication     acetaminophen (TYLENOL) tablet 650 mg     albuterol (PROAIR HFA/PROVENTIL HFA/VENTOLIN HFA) 108 (90 Base) MCG/ACT inhaler 2 puff     amLODIPine (NORVASC) tablet 10 mg     aspirin (ASA) EC tablet 325 mg     clopidogrel (PLAVIX) tablet 75 mg     diphenhydrAMINE (BENADRYL) capsule 25 mg     enoxaparin ANTICOAGULANT (LOVENOX) injection 40 mg     FLUoxetine (PROzac) capsule 40 mg     furosemide (LASIX) tablet 20 mg     heparin 100 UNIT/ML injection 3 mL     heparin 100 UNIT/ML injection 3 mL     heparin 100 UNIT/ML injection 5 mL     heparin 100 UNIT/ML injection 5 mL     hydrocortisone (CORTAID) 1 % cream     influenza vac high-dose quad (FLUZONE HD) injection MARTINE 0.7 mL     lidocaine (LMX4) cream     lidocaine 1 % 0.1-1 mL     melatonin tablet 1 mg     metoprolol succinate ER (TOPROL-XL) 24 hr tablet 50 mg     mycophenolate (GENERIC EQUIVALENT) capsule 500 mg     naloxone (NARCAN) injection 0.1-0.4 mg     simvastatin (ZOCOR) tablet 20 mg     sodium chloride (PF) 0.9% PF flush 3 mL     sodium chloride (PF) 0.9% PF flush 3 mL     traZODone (DESYREL) tablet 100 mg          Review of Systems:   See above.           Data:     [facility]  Recent Labs   Lab 10/30/20  0708 10/29/20  0629 10/28/20  9052 10/28/20  0160  10/27/20  1107 10/27/20  0602   * 147*  --  143  --  142   POTASSIUM 3.5 4.1 3.9 3.7  --  4.0   CHLORIDE 114* 116*  --  112*  --  111*   GANESH 7.7* 8.0*  --  8.2*  --  8.7   CO2 30 25  --  27  --  27   BUN 27 23  --  23  --  20   CR 0.74 0.56  --  0.63 0.78 0.68   GLC 89 147*  --  128*  --  146*     CBC  Recent Labs   Lab 10/30/20  0708 10/29/20  0629 10/28/20  0655 10/27/20  0602   WBC 9.0 7.5 8.8 9.3   RBC 4.00 4.09 4.01 4.20   HGB 11.9 12.0 12.1 12.6   HCT 38.2 38.8 38.3 40.0   MCV 96 95 96 95   MCH 29.8 29.3 30.2 30.0   MCHC 31.2* 30.9* 31.6 31.5   RDW 13.5 13.3 13.3 13.5    211 214 233     Fibrinogen = 129 (10/28/20 prior to TPE #2)  Fibrinogen = 165 (10/30/20 prior to TPE #3)    Attestation:  I, Dallas Whitney M.D., was immediately available and present on-site during the entirety of the procedure.  I reviewed the patient's chart and was in direct communication with apheresis nursing staff during the collection.  I did not visit in-person to conserve PPE/limit contacts due to covid19.    Dallas Whitney M.D.  Professor, Transfusion Medicine  Laboratory Medicine & Pathology  Pager: [phone]

## 2020-10-30 NOTE — PROGRESS NOTES
[facility]  Neurology Progress Note    Patient Name:  Ladonna Sheriff    MRN:  [id number]      :  1945  Date of Service:  10/30/2020  Primary care provider:  Joceline Ty      Subjective:  No acute events overnight. Continues to tolerate PLEX well with Fibrinogen levels low, but sustainable and not incredibly concerning. No subjective changes.      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  She presented to [facility] on 10/23/2020 with worsening RLE weakness, concerning for NMO exacerbation.  She was found to have enhancing lesions extending from T3 to T5/6.  She was transferred to [facility] for further management and PLEX therapy.        #AQP4-positive NMO  #H/o T4 transverse myelitis with residual thoracic sensation loss  #Poor vision due to NMO lesions, stable  #Acute exacerbation of NMO leading to RLE weakness, resolving   Has legal blindness of the Right eye with persistent painful extraocular movements. Left eye has lateral peripheral field cut at baseline with blurry and vision. Sensation decreased from T4 dermatome to toes, with mild L>R predominance for pain/temp and general sensation. New RLE weakness in the setting of new exacerbation. Admits to previous weakness exacerbations involving the RLE, but never the LLE. Each one has resolved with PLEX. Has failed Rituximab therapy due to relapses while therapeutic. Currently taking Mycophenolate for several months, although <6mo, and having current exacerbation. Unsure if currently therapeutic, although may consider newer monoclonal antibodies for NMO exacerbation prevention.   - s/p Methylprednisone 1g for x5 days   - Famotidine stopped, GI ppx no longer indicated  - PLEX for x5 doses,  "likely every other day dosing (first dose 10/26)    - Transfusion medicine consult ordered   - CTM Fibrinogen, will decide Enoxaparin based on this   - PTA Mycophenolate 500 BID   - PT/OT   - Meningococcal vaccine #2 prior to discharge (may wait until finished PLEX prior to administration)    #Hypokalemia, stable  - Electrolyte protocol    #UTI, resolved   - Ceftriaxone 1g for 5 day course (completed 10/27)       Chronic issues:  #Bronchospastic disease  - Continue PTA albuterol and Incruse Ellipta     #Hypertension, stable   - Continue PTA amlodipine and metoprolol     #H/o HFrEF, currently improved EF  - Continue PTA Lasix and metoprolol     #Hx of TIA  - Continue PTA ASA 325mg   - Continue PTA simvastatin 20mg     #Mood d/o  - Continue PTA fluoxetine 20mg      #Insomnia, stable   - Continue PTA trazodone 100mg qHS      FEN: Intermittent bolus' PRN; electrolyte protocol; Regular diet  LDA: PIV x2, HD cath   PPx: Famotidine for GI; Enoxaparin   Code: FULL     Dispo: Floor status, receiving PLEX; Possible ARU on discharge       Patient discussed with Attending Dr. Carmel Sherwood MD  PGY-2 Neurology Resident  10/30/2020     ______________________________  PHYSICAL EXAMINATION:   /71 (BP Location: Left arm)   Pulse 65   Temp 95.6  F (35.3  C) (Oral)   Resp 16   Ht 1.702 m (5' 7\")   Wt 88.9 kg (195 lb 15.8 oz)   SpO2 95%   BMI 30.70 kg/m       General: Pleasant, awake and alert, Sitting in bed, NAD, cooperative  HEENT: Normocephalic, atraumatic, no epistaxis, no oral lesions, MMM  Resp: CTAB, no increased work of breathing  Cardio: RRR, S1/S2 appropriate   Abdomen: +BS, Soft, non-distended, non-tender  Extremities: Warm and well perfused, peripheral pulses present, no edema  Skin: Not jaundiced, no rash   Psych: Normal mood and affect    Neuro:  Mental status: Awake, alert, attentive; oriented to P/P/T/M  Speech: Fluent, comprehension and repetition intact; No dysarthria  Cranial " nerves: Right eye blind at baseline, peripheral Left field cut with poor visual acuity of central Left eye (baseline), Eyes mildly dysconjugate, Left pupil 4 and Right pupil 3 and both brisk, EOMI w/ normal and smooth pursuit with some occasional pain of Right eye during maximal EOM, face expression symmetric, facial sensation intact and symmetric, hearing intact to conversation, shoulder shrug strong, palate rise symmetric, tongue/uvula midline.   Motor: Tone normal. 5/5 strength BUE and LLE, RLE 4/5 in hip flexor, knee flexion, and dorsiflexion with 5/5 Knee extension, plantar flexion. No atrophy or twitches. Pronator drift present on the RLE.   Reflexes: 2+ and symmetric biceps, triceps, brachioradialis, patellar, achilles; No Villasenor's; Toes down-going   Sensory: LLE without temp sensation up to hip; Mild temp change in RLE to hip; Vibration decreased to knee bilaterally; Global reduced sensation to anterior rib cage bilaterally with band-like sensation in T4 distribution. Sensation normal and symmetric in BUEs   Coordination: FNF intact bilaterally, no dysmetria   Gait: Deferred       CURRENT MEDICATIONS:    amLODIPine  10 mg Oral Daily     aspirin  325 mg Oral QPM     clopidogrel  75 mg Oral Daily     enoxaparin ANTICOAGULANT  40 mg Subcutaneous Q24H     famotidine  20 mg Oral BID     FLUoxetine  40 mg Oral Daily     furosemide  20 mg Oral Daily     heparin  3 mL Intracatheter Q24H     heparin  5 mL Intracatheter Q24H     heparin  5 mL Intracatheter Q24H     influenza vac high-dose quad  0.7 mL Intramuscular Prior to discharge     metoprolol succinate ER  50 mg Oral BID     mycophenolate  500 mg Oral BID IS     simvastatin  20 mg Oral At Bedtime     sodium chloride (PF)  3 mL Intracatheter Q8H     traZODone  100 mg Oral At Bedtime       PRN MEDICATIONS:  acetaminophen, albuterol, diphenhydrAMINE, heparin, hydrocortisone, lidocaine 4%, lidocaine (buffered or not buffered), melatonin, naloxone, sodium chloride  (PF)    INFUSIONS:      ALLERGIES:  Allergies   Allergen Reactions     Prevacid [Lansoprazole] Rash     Pravastatin Rash     Patient is still not sure about it due many years ago for reaction.         IMAGING:  MRI C-spine (10/23)  IMPRESSION:  1.  No definite cervical spinal cord signal abnormality and no abnormal enhancement.  2.  Disc osteophyte complex with shallow protrusion and annular fissure/tear and possible tiny component of extrusion at C4-C5 without significant spinal canal stenosis, mild to moderate foraminal narrowing right more than left due to uncinate spurring   and facet arthropathy.  3.  Mild multilevel degenerative spondylosis otherwise, see report for level by level details.    MRI T-spine (10/23)  IMPRESSION:  1.  Increased cord parenchyma T2 signal extending from T3-T5/T6, with enhancement centered at the T4 level as well as a small focus of enhancement in the parenchyma at the T5 level and left lateral spinal canal at T5 which may represent a focus of nerve   root enhancement. This is consistent with but not diagnostic of demyelinating disease active/acute plaque formation.  2.  Near complete resolution of the previous cord signal abnormality centered at the T2 level.  3.  Remainder of study without significant MR abnormality.

## 2020-10-30 NOTE — PLAN OF CARE
Status: Admitted for RLE weakness, concern for neuromyelitis optica. PLEX treatments underway.   Vitals: Stable on RA. Hypertensive within parameters  Neuros: RLE < LLE 4/5. N/T from rib cage to toes. R eye blind, L eye cloudy/blurry and L field cut.   IV: PIV SL,  L neck PLEX catheter; HL.  Diet: Regular diet, good intake.   Bowel status: No BM; +flatus.   : Voiding spontaneously.   Pain: Tylenol given x1 overnight for pain related to PLEX catheter  Activity: Up with 1 and walker to pivots to commode.   Social: No visitor today; pt stays in contact with family.  Plan: PLEX 3/5 today. Continue to monitor and follow current POC.

## 2020-10-30 NOTE — PLAN OF CARE
Care from 7 am-7pm  A&Ox4. AVSS. Pt reports intermittent headache; relief with PRN tylenol 650 mg tab po.No change in neuro status.Up to bed side commode with assist of one and a walker.Pt walked to bathroom slowly with assist of one and a walker this evening. Voided adequate amount. BM x2 today.Good appetite.Pt had aphresis today. Bed alarm is maintained for safety.Continue with POC.

## 2020-10-31 NOTE — PLAN OF CARE
OFFICE VISIT      Patient: Maria Esther Eduardo Date of Service: 10/31/2020   : 1943 MRN: [id number]     SUBJECTIVE:     Chief Complaint   Patient presents with   • Eye Problem     stye on L eye 1 month ago - hasnt gone away per pt - did use erythrocyn and warm compresses from last visit - per pt, today the stye looks worse as it is more red - denies any vision changes, eye discharge, pain        HISTORY OF PRESENT ILLNESS:  Maria Esther Eduardo is a 76 year old female who presents today for c/o tender left upper lateral eyelid x a couple of days.  Pt was seen for a stye last month, but states it didn't go away completely and has now flared up.  Pt states she used erythromycin and since then, finished the course before the symptoms flared up again.  Pt denies visual changes.  Pt denies any other symptoms, alleviating, or aggravating factors.    Patient's medications, allergies, past medical, surgical, social and family histories were reviewed and updated as appropriate.  PAST MEDICAL HISTORY:  Past Medical History:   Diagnosis Date   • Osteoporosis 2020   • Other spondylosis with radiculopathy, lumbar region 2020   • Sciatica 2020   • Thyroid disease        MEDICATIONS:  Current Outpatient Medications   Medication Sig   • metoPROLOL succinate (TOPROL-XL) 25 MG 24 hr tablet Take 1 tablet by mouth daily.   • levothyroxine (SYNTHROID, LEVOTHROID) 50 MCG tablet Take 50 mcg by mouth daily.   • cephalexin (KEFLEX) 500 MG capsule Take one capsule by mouth twice daily x 10 days.   • citalopram (CELEXA) 10 MG tablet TAKE 1 TABLET BY MOUTH EVERY DAY   • LORazepam (ATIVAN) 0.5 MG tablet Take 1 tablet by mouth at bedtime as needed for Anxiety.     No current facility-administered medications for this visit.        ALLERGIES:  ALLERGIES:   Allergen Reactions   • Benadryl Allergy Other (See Comments)     Made her \"buzzed\", hyperactive    • Ciprofloxacin Angioedema       PAST SURGICAL HISTORY:  Past Surgical  Pt had near LOB with R knee buckle while on steps, able to recover with assist. Also had difficulty amb on uneven cobblestone surface on FWW. Pt concerned about obtaining FWW before discharge - will issue from [facility] on Monday. Will continue to focus on R>LLE coordination and strength to improve stability when up amb with FWW.   History:   Procedure Laterality Date   • Total hip replacement  07/2019       FAMILY HISTORY:  Family History   Problem Relation Age of Onset   • Cancer Brother         brain       SOCIAL HISTORY:  Social History     Tobacco Use   • Smoking status: Former Smoker     Packs/day: 0.00   • Smokeless tobacco: Never Used   Substance Use Topics   • Alcohol use: Yes     Frequency: 4 or more times a week     Drinks per session: 1 or 2     Binge frequency: Daily or almost daily   • Drug use: Never       Review of Systems   Constitutional: Negative for chills, fatigue and fever.   HENT: Negative for congestion, ear discharge, ear pain, hearing loss, postnasal drip, rhinorrhea, sinus pressure, sinus pain, sneezing, sore throat, trouble swallowing and voice change.    Eyes: Negative for photophobia, pain, discharge, redness and itching.   Respiratory: Negative for cough, choking, chest tightness, shortness of breath and wheezing.    Cardiovascular: Negative for chest pain and palpitations.   Gastrointestinal: Negative for abdominal pain, blood in stool, constipation, diarrhea, nausea and vomiting.   Genitourinary: Negative for dysuria, flank pain, frequency, hematuria and urgency.   Musculoskeletal: Negative for arthralgias, back pain, joint swelling and myalgias.   Skin: Positive for color change (left upper eyelid redness and swelling). Negative for rash and wound.   Neurological: Negative for dizziness, weakness, light-headedness, numbness and headaches.   Psychiatric/Behavioral: Negative.          OBJECTIVE:     Vitals:    10/31/20 1133 10/31/20 1142   BP: 133/55    Pulse: (!) 41 62   Resp: 18    Temp: 97.6 °F (36.4 °C)    TempSrc: Oral    SpO2: 100%    PainSc: 1-2    PainLoc: Eye        Physical Exam  Vitals signs and nursing note reviewed.   Constitutional:       General: She is not in acute distress.     Appearance: She is well-developed.   HENT:      Head: Normocephalic and atraumatic.      Right Ear: External ear  normal.      Left Ear: External ear normal.      Mouth/Throat:      Pharynx: No oropharyngeal exudate.   Eyes:      Conjunctiva/sclera: Conjunctivae normal.      Pupils: Pupils are equal, round, and reactive to light.   Neck:      Musculoskeletal: Normal range of motion and neck supple.   Cardiovascular:      Rate and Rhythm: Normal rate and regular rhythm.      Heart sounds: Normal heart sounds.   Pulmonary:      Effort: Pulmonary effort is normal.      Breath sounds: Normal breath sounds.   Musculoskeletal: Normal range of motion.   Lymphadenopathy:      Cervical: No cervical adenopathy.   Skin:     General: Skin is warm and dry.      Findings: Lesion (Pt has a diffuse blepharitis to the left upper eyelid.  Mildly edematous and erythematous.  Head is visible.  Unable to r/o growth vs. duct.  Advised pt to f/u with PCP or derm if symptoms do not resolve after oral abx.) present.   Neurological:      Mental Status: She is alert and oriented to person, place, and time.   Psychiatric:         Behavior: Behavior normal.         Thought Content: Thought content normal.         DIAGNOSTIC STUDIES:   LAB RESULTS:    No results found for this visit on 10/31/20.    IMAGING RESULTS:  No xray done at this visit.    ASSESSMENT AND PLAN:     1. Blepharitis of left upper eyelid, unspecified type      Orders Placed This Encounter   • cephalexin (KEFLEX) 500 MG capsule         Take medications as prescribed.  Wash hands frequently.  Drink plenty of water.  You may take Tylenol and Motrin as needed for pain and fever.  You may use a humidifier near your bed.  If symptoms worsen, go to the ER for further evaluation and treatment.  Follow up with primary care physician in 3-5 days or sooner as needed.    The patient indicated understanding of the diagnosis and agreed with the plan of care.      DISCUSSION:       TYREE Crisostomo, FNP-BC

## 2020-10-31 NOTE — PLAN OF CARE
Status: Admitted for progressing RLE weakness, suspect for neuromyelitis optica (NMO) flare   Vitals: Hypertensive within parameters, on RA   Neuros: A+O x4. 5/5 BUE, 3-4/5 RLE, 4/5 LLE. Ataxc on RLE. N/T ribs to toes. Blind in R eye, can see shadows in L eye.   IV: PIV SL. PLEX cath hep locked  Diet: Regular diet, good intake  Bowel status: BM x1 today.   : Voiding to bedside commode  Pain: Denies  Activity: A1-GB+walker when pivoting to commode, A1-2 with walking to bathroom. Very fatigued after activity.   Plan: PLEX 4/5 tomorrow. Received flu shot today.

## 2020-10-31 NOTE — PLAN OF CARE
Status: Admitted for progressing RLE weakness, suspect for neuromyelitis optica (NMO) flare   Vitals: Hypertensive within parameters, on RA   Neuros: A+O x4. 5/5 BUE, 3-4/5 RLE, 4/5 LLE. Ataxc on RLE. N/T ribs to toes. Blind in R eye, can see shadows in L eye. Pt states she thinks there is some improvement in RLE   IV: PIV SL. PLEX cath hep locked on previous shift   Diet: Regular diet   Bowel status: No BM overnight   : Voiding   Pain: Denied   Activity: A1 GB+walker when pivoting to commode, A1-2 with anything more than pivoting   Plan: PLEX 4/5 tomorrow. Pt would like flu shot today if possible. Continue to monitor and follow current POC.

## 2020-10-31 NOTE — PROGRESS NOTES
[facility]  Neurology Progress Note    Patient Name:  Ladonna Sheriff    MRN:  [id number]      :  1945  Date of Service:  10/31/2020  Primary care provider:  Joceline Ty      Subjective:  No acute events overnight. 3rd PLEX session yesterday without incident, she tolerated it well.  Continues to endorse mild subjective improvement of RLE.  No new concerns today    ASSESSMENT/PLAN:  Ladonna Sheriff is a 75 year old h/o AQP4-positive neuromyelitis optica c/b residual right eye visual loss, numbness below mid thorax and bandlike sensation consistent with previous T4 transverse myelitis, and on-off episodes of left eye optic neuritis.  She presented to [facility] on 10/23/2020 with worsening RLE weakness, concerning for NMO exacerbation.  She was found to have enhancing lesions extending from T3 to T5/6.  She was transferred to [facility] for further management and PLEX therapy.        #AQP4-positive NMO  #H/o T4 transverse myelitis with residual thoracic sensation loss  #Poor vision due to NMO lesions, stable  #Acute exacerbation of NMO leading to RLE weakness, resolving   Has legal blindness of the Right eye with persistent painful extraocular movements. Left eye has lateral peripheral field cut at baseline with blurry and vision. Sensation decreased from T4 dermatome to toes, with mild L>R predominance for pain/temp and general sensation. New RLE weakness in the setting of new exacerbation. Admits to previous weakness exacerbations involving the RLE, but never the LLE. Each one has resolved with PLEX. Has failed Rituximab therapy due to relapses while therapeutic. Currently taking Mycophenolate for several months, although <6mo, and having current exacerbation. Unsure if currently therapeutic, although may consider newer monoclonal antibodies for NMO exacerbation prevention.   - s/p Methylprednisone 1g for x5 days   - Famotidine stopped, GI ppx no longer indicated  - PLEX  "for x5 doses, likely every other day dosing (first dose 10/26)    - Transfusion medicine consult ordered   - CTM Fibrinogen, will decide Enoxaparin based on this, currently stable  - PTA Mycophenolate 500 BID   - PT/OT   - Meningococcal vaccine #2 prior to discharge (may wait until finished PLEX prior to administration)    #Hypokalemia, stable  - Electrolyte protocol    #UTI, resolved   - Ceftriaxone 1g for 5 day course (completed 10/27)       Chronic issues:  #Bronchospastic disease  - Continue PTA albuterol and Incruse Ellipta     #Hypertension, stable   - Continue PTA amlodipine and metoprolol     #H/o HFrEF, currently improved EF  - Continue PTA Lasix and metoprolol     #Hx of TIA  - Continue PTA ASA 325mg   - Continue PTA simvastatin 20mg     #Mood d/o  - Continue PTA fluoxetine 20mg      #Insomnia, stable   - Continue PTA trazodone 100mg qHS      FEN: Intermittent bolus' PRN; electrolyte protocol; Regular diet  LDA: PIV x2, HD cath   PPx: Famotidine for GI; Enoxaparin   Code: FULL     Dispo: Floor status, receiving PLEX; Possible ARU on discharge       Patient discussed with Attending Dr. Carmel Sherwood MD  PGY-2 Neurology Resident  10/31/2020     ______________________________  PHYSICAL EXAMINATION:   BP (!) 145/76   Pulse 65   Temp 95.9  F (35.5  C) (Oral)   Resp 16   Ht 1.702 m (5' 7\")   Wt 88.9 kg (195 lb 15.8 oz)   SpO2 95%   BMI 30.70 kg/m       General: Pleasant, awake and alert, Sitting in bed, NAD, cooperative  HEENT: NC, AT, no epistaxis, no oral lesions   Resp: CTAB, no increased work of breathing  Cardio: RRR, S1/S2 appropriate   Abdomen: +BS, Soft, non-distended, non-tender  Extremities: Warm and well perfused, peripheral pulses present, no edema  Skin: Not jaundiced, no rash   Psych: Normal mood and affect     Neuro:  Mental status: Awake, alert, attentive; oriented to P/P/T/M  Speech: Fluent, comprehension and repetition intact; No dysarthria  Cranial nerves: Right " eye blind at baseline, peripheral Left field cut with poor visual acuity of central Left eye (baseline), Eyes mildly dysconjugate, Left pupil 4 and Right pupil 3 and both brisk, EOMI w/ normal and smooth pursuit with some occasional pain of Right eye during maximal EOM, face expression symmetric, facial sensation intact and symmetric, hearing intact to conversation, shoulder shrug strong, palate rise symmetric, tongue/uvula midline.   Motor: Tone normal. 5/5 strength BUE and LLE, RLE 4-/5 in hip flexor, 4/5 knee flexion, and dorsiflexion with 5/5 Knee extension, plantar flexion. No atrophy or twitches. Pronator drift present on the RLE.   Reflexes: 2+ and symmetric biceps, triceps, brachioradialis, patellar, achilles; No Villasenor's; Toes down-going   Sensory: LLE without temp sensation up to hip; Mild temp change in RLE to hip; Vibration decreased to knee bilaterally; Global reduced sensation to anterior rib cage bilaterally with band-like sensation in T4 distribution. Sensation normal and symmetric in BUEs   Coordination: FNF intact bilaterally, no dysmetria   Gait: Deferred       CURRENT MEDICATIONS:    amLODIPine  10 mg Oral Daily     aspirin  325 mg Oral QPM     clopidogrel  75 mg Oral Daily     enoxaparin ANTICOAGULANT  40 mg Subcutaneous Q24H     FLUoxetine  40 mg Oral Daily     furosemide  20 mg Oral Daily     heparin  3 mL Intracatheter Q24H     heparin  5 mL Intracatheter Q24H     heparin  5 mL Intracatheter Q24H     influenza vac high-dose quad  0.7 mL Intramuscular Prior to discharge     metoprolol succinate ER  50 mg Oral BID     mycophenolate  500 mg Oral BID IS     simvastatin  20 mg Oral At Bedtime     sodium chloride (PF)  3 mL Intracatheter Q8H     traZODone  100 mg Oral At Bedtime       PRN MEDICATIONS:  acetaminophen, albuterol, diphenhydrAMINE, heparin, hydrocortisone, lidocaine 4%, lidocaine (buffered or not buffered), melatonin, naloxone, sodium chloride  (PF)    INFUSIONS:      ALLERGIES:  Allergies   Allergen Reactions     Prevacid [Lansoprazole] Rash     Pravastatin Rash     Patient is still not sure about it due many years ago for reaction.         IMAGING:  MRI C-spine (10/23)  IMPRESSION:  1.  No definite cervical spinal cord signal abnormality and no abnormal enhancement.  2.  Disc osteophyte complex with shallow protrusion and annular fissure/tear and possible tiny component of extrusion at C4-C5 without significant spinal canal stenosis, mild to moderate foraminal narrowing right more than left due to uncinate spurring   and facet arthropathy.  3.  Mild multilevel degenerative spondylosis otherwise, see report for level by level details.    MRI T-spine (10/23)  IMPRESSION:  1.  Increased cord parenchyma T2 signal extending from T3-T5/T6, with enhancement centered at the T4 level as well as a small focus of enhancement in the parenchyma at the T5 level and left lateral spinal canal at T5 which may represent a focus of nerve   root enhancement. This is consistent with but not diagnostic of demyelinating disease active/acute plaque formation.  2.  Near complete resolution of the previous cord signal abnormality centered at the T2 level.  3.  Remainder of study without significant MR abnormality.

## 2020-11-01 NOTE — PLAN OF CARE
Status: Admitted for progressing RLE weakness, suspect for neuromyelitis optica (NMO) flare   Vitals: VSS on RA  Neuros: Oriented x4. L eye dysconjugate. R eye blind, L eye field cut and can only see shadows.  BUE 5/5, LLE  4, RLE 3- foot flops more and drags while walking a little more per evening shift report. Numbness/tingling from waist to toes.   IV: IV SL, L CVC HL  Resp/trach: WNL  Diet: Regular, no intake overnight  Bowel status: +bs, no BM this shift  : voids spontaneously  Pain: denied  Activity: A1 w/ GB and walker, not OOB overnight  Plan: 4/5 PLEX treatment today. Continue w/ POC.

## 2020-11-01 NOTE — PLAN OF CARE
Status: Admitted for progressing RLE weakness, suspect for neuromyelitis optica (NMO) flare   Vitals: VSS  Neuros: Oriented x4. L eye dysconjugate. R eye blind, L eye field cut and can only see shadows.  BUE 5/5, LLE  4, RLE 3- foot flops more and drags while walking a little more. Numbness/tingling from waist down to toes.   IV: L CVC HL   Diet: Regular diet   Bowel status: +bs, two large bms this evening, incontinent.   : voiding spontaneously   Pain: denied   Activity: A1 w/ gb + walker  Plan: 4/5 PLEX treatment planned for tomorrow. Continue w/ POC.

## 2020-11-01 NOTE — PLAN OF CARE
Status: Admitted for progressing RLE weakness, suspect for neuromyelitis optica (NMO) flare. PLEX 4/5 complete today.   Vitals: VSS on RA  Neuros: AxOx4. R eye blind. L eye can only see shadows. L eye dysconjugate with field cut. BUE 5/5.LLE4/5.RLE 3/5. RLE weaker and drags when ambulating. N/T from waist done at baseline.   IV: PIV SL. CVC cath HL.   Resp/trach: LS clear.   Diet: regular diet w/ good PO intake.   Bowel status: BS+. Can be incontinent at times per report. No BM this shift.   : voiding spontaneously.   Skin: intact  Pain: denied pain.   Activity: up A1, GB and walker. Pt drags RLE extremity.   Social: pt spoke to family on the phone this shift.   Plan: PLEX 5/5 on 11/3. Possible ARU discharge after PLEX treatment is complete.

## 2020-11-01 NOTE — PROGRESS NOTES
Spoke to Ladonna son Israel who requested a call from the team.  I updated him on current treatment he expects concerned that typically his mom has responded to Plex after 3 rounds of treatment and she has not had a significant improvement since starting Plex this time.  She is just completed her fourth treatment earlier today.  He had questions of what next if we can choose an additional treatment if she still not improved or she go to rehab and try to work on strength there. She he also was curious to know Dr. Caterina Franklin's plan and if she has been updated. I informed him that Dr. Franklin has been informed that Ladonna was admitted to the hospital for NMO exacerbation. He asked to be updated with the treatment changes and plan.  I indicated to him that the attending coming onto service tomorrow is also neuro immunologist like Dr. Franklin and we can further refine treatment plan in the next day or 2.    Lyudmila Mills MD

## 2020-11-01 NOTE — PROCEDURES
Laboratory Medicine and Pathology  Transfusion Medicine - Apheresis Procedure    Ladonna Sheriff MRN# [id number]   YOB: 1945 Age: 75 year old   Date of Admission: 10/24/2020     Reason for consult: Therapeutic plasma exchange (TPE) for neuromyelitis optica (NMO) flare           Assessment and Plan:   The patient is a 74 yo woman with a history of NMO, currently with flare.  She does not respond well to steroids.  TPE series requested.  We have performed TPE on her in the past.  Will plan for a series of 5 TPEs, every-other-day schedule.  Will also plan for 5% albumin as exchange fluid.  Will monitor coag labs and adjust with plasma as exchange fluid as needed.     The patient is tolerating TPE #4 today.  Again, no concerns/complaints voiced per nursing.  Some low BP readings; otherwise stable.  Continue with plan as outlined below.  Fibrinogen at 455 today prior to TPE #4.  Asked lab to repeat and still >400.  Proceeded with using 5% albumin as exchange fluid.  Continue with plan.  Next TPE scheduled for Tues., 11/3/20.    TREATMENT PLAN:  Series of 5 TPEs, plan for 5% albumin as exchange fluid (3L plasma volume), monitor coags and include plasma as indicated.  Please do not start or continue ACE-inhibitors throughout the duration of a TPE series.  Please notify the apheresis physician of any upcoming procedures, surgeries, or biopsies as TPE with 5% albumin will affect coagulation factors.            Chief Complaint:   Right leg weakness, worsening vision, and numbness (band-like at chest)             Past Medical History:     Past Medical History:   Diagnosis Date     Cerebral infarction (H)      CHF (congestive heart failure) (H)      Hypertension      Lupus (H)      Lupus (H)      Optic neuritis      Optic neuritis      Sjogren's syndrome (H)      Sjogren's syndrome (H)      Temporal arteritis (H)      TIA (transient ischemic attack)      Uncomplicated asthma           Past Surgical History:     Past  Surgical History:   Procedure Laterality Date     CHOLECYSTECTOMY       GYN SURGERY      hysterectomy     GYN SURGERY      tubal ligation     IR CVC NON TUNNEL PLACEMENT  6/21/2019     IR CVC NON TUNNEL PLACEMENT  7/15/2020     IR CVC NON TUNNEL PLACEMENT  10/26/2020     IR FOLLOW UP VISIT INPATIENT  7/2/2019          Social History:     Social History     Socioeconomic History     Marital status:      Spouse name: Not on file     Number of children: Not on file     Years of education: Not on file     Highest education level: Not on file   Occupational History     Not on file   Social Needs     Financial resource strain: Not on file     Food insecurity     Worry: Not on file     Inability: Not on file     Transportation needs     Medical: Not on file     Non-medical: Not on file   Tobacco Use     Smoking status: Former Smoker     Packs/day: 0.00     Smokeless tobacco: Never Used   Substance and Sexual Activity     Alcohol use: Yes     Alcohol/week: 7.0 standard drinks     Types: 7 Shots of liquor per week     Comment: occ     Drug use: No     Sexual activity: Not on file   Lifestyle     Physical activity     Days per week: Not on file     Minutes per session: Not on file     Stress: Not on file   Relationships     Social connections     Talks on phone: Not on file     Gets together: Not on file     Attends Nondenominational service: Not on file     Active member of club or organization: Not on file     Attends meetings of clubs or organizations: Not on file     Relationship status: Not on file     Intimate partner violence     Fear of current or ex partner: Not on file     Emotionally abused: Not on file     Physically abused: Not on file     Forced sexual activity: Not on file   Other Topics Concern     Not on file   Social History Narrative     Not on file          Family History:     Family History   Problem Relation Age of Onset     Asthma Mother      Lupus Sister      Bone Cancer Brother      Kidney Cancer  Sister           Immunizations:     Most Recent Immunizations   Administered Date(s) Administered     Influenza (High Dose) 3 valent vaccine 10/21/2015     Influenza Vaccine, 6+MO IM (QUADRIVALENT W/PRESERVATIVES) 10/17/2016     Influenza, Quad, High Dose, Pf, 65yr + 10/31/2020     Meningococcal (Bexsero ) 07/24/2020     Meningococcal (Menveo ) 07/24/2020     Pneumo Conj 13-V (2010&after) 07/08/2014     Pneumococcal 23 valent 07/07/2017     TDAP Vaccine (Adacel) 04/25/2013     Zoster vaccine, live 04/25/2013          Allergies:     Allergies   Allergen Reactions     Prevacid [Lansoprazole] Rash     Pravastatin Rash     Patient is still not sure about it due many years ago for reaction.          Medications:     Current Facility-Administered Medications   Medication     acetaminophen (TYLENOL) tablet 650 mg     albumin human 5 % injection 3,000 mL     albuterol (PROAIR HFA/PROVENTIL HFA/VENTOLIN HFA) 108 (90 Base) MCG/ACT inhaler 2 puff     amLODIPine (NORVASC) tablet 10 mg     Anticoagulant Citrate Dextrose Formula A at ratio of 1:10 with blood ([facility]heres[facility])     aspirin (ASA) EC tablet 325 mg     calcium gluconate with 5% albumin (administered by Apheresis Staff ONLY)     clopidogrel (PLAVIX) tablet 75 mg     diphenhydrAMINE (BENADRYL) capsule 25 mg     enoxaparin ANTICOAGULANT (LOVENOX) injection 40 mg     FLUoxetine (PROzac) capsule 40 mg     furosemide (LASIX) tablet 20 mg     heparin 100 UNIT/ML injection 3 mL     heparin 100 UNIT/ML injection 3 mL     heparin 100 UNIT/ML injection 5 mL     heparin 100 UNIT/ML injection 5 mL     hydrocortisone (CORTAID) 1 % cream     lidocaine (LMX4) cream     lidocaine 1 % 0.1-1 mL     melatonin tablet 1 mg     metoprolol succinate ER (TOPROL-XL) 24 hr tablet 50 mg     mycophenolate (GENERIC EQUIVALENT) capsule 500 mg     naloxone (NARCAN) injection 0.1-0.4 mg     simvastatin (ZOCOR) tablet 20 mg     sodium chloride (PF) 0.9% PF flush 3 mL     sodium chloride (PF) 0.9% PF  flush 3 mL     traZODone (DESYREL) tablet 100 mg          Review of Systems:   See above.           Data:     BMP  Recent Labs   Lab 11/01/20  0701 10/31/20  0709 10/30/20  0708 10/29/20  0629   * 143 147* 147*   POTASSIUM 3.5 3.5 3.5 4.1   CHLORIDE 113* 112* 114* 116*   GANESH 8.0* 8.2* 7.7* 8.0*   CO2 28 28 30 25   BUN 13 14 27 23   CR 0.62 0.60 0.74 0.56   GLC 97 86 89 147*     CBC  Recent Labs   Lab 11/01/20  0701 10/31/20  0709 10/30/20  0708 10/29/20  0629   WBC 8.7 9.1 9.0 7.5   RBC 3.79* 3.92 4.00 4.09   HGB 11.3* 11.5* 11.9 12.0   HCT 36.1 37.0 38.2 38.8   MCV 95 94 96 95   MCH 29.8 29.3 29.8 29.3   MCHC 31.3* 31.1* 31.2* 30.9*   RDW 14.0 13.6 13.5 13.3    187 196 211     Fibrinogen = 129 (10/28/20 prior to TPE #2)  Fibrinogen = 165 (10/30/20 prior to TPE #3)  Fibrinogen = 455 (10/30/20 prior to TPE #4)    Attestation:  I, Dallas Whitney M.D., was immediately available during the entirety of the procedure.  I reviewed the patient's chart and was in direct communication with apheresis nursing staff during the collection.    Dallas Whitney M.D.  Professor, Transfusion Medicine  Laboratory Medicine & Pathology  Pager: [phone]

## 2020-11-01 NOTE — PROGRESS NOTES
[facility]  Neurology Progress Note    Patient Name:  Ladonna Sheriff    MRN:  [id number]      :  1945  Date of Service:  2020  Primary care provider:  Joceline Ty    Subjective:  No acute events overnight. 4th PLEX session pending today. Stable clinically and able to ambulate with PT using walker yesterday.     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  She presented to [facility] on 10/23/2020 with worsening RLE weakness, concerning for NMO exacerbation.  She was found to have enhancing lesions extending from T3 to T5/6.  She was transferred to [facility] for further management and PLEX therapy.     #AQP4-positive NMO  #H/o T4 transverse myelitis with residual thoracic sensation loss  #Poor vision due to NMO lesions, stable  #Acute exacerbation of NMO leading to RLE weakness, resolving   Has legal blindness of the Right eye with persistent painful extraocular movements. Left eye has lateral peripheral field cut at baseline with blurry and vision. Sensation decreased from T4 dermatome to toes, with mild L>R predominance for pain/temp and general sensation. New RLE weakness in the setting of new exacerbation. Admits to previous weakness exacerbations involving the RLE, but never the LLE. Each one has resolved with PLEX. Has failed Rituximab therapy due to relapses while therapeutic. Currently taking Mycophenolate for several months, although <6mo, and having current exacerbation. Unsure if currently therapeutic, although may consider newer monoclonal antibodies for NMO exacerbation prevention.   - s/p Methylprednisone 1g for x5 days   - Famotidine stopped, GI ppx no longer indicated  - PLEX for x5 doses, likely every other day dosing (first  "dose 10/26)    - Transfusion medicine consult ordered   - CTM Fibrinogen, will decide Enoxaparin based on this, currently stable  - PTA Mycophenolate 500 BID   - PT/OT   - Meningococcal vaccine #2 prior to discharge (may wait until finished PLEX prior to administration)    #Hypokalemia, stable  - Electrolyte protocol    #UTI, resolved   - Ceftriaxone 1g for 5 day course (completed 10/27)     Chronic issues:  #Bronchospastic disease  - Continue PTA albuterol and Incruse Ellipta     #Hypertension, stable   - Continue PTA amlodipine and metoprolol     #H/o HFrEF, currently improved EF  - Continue PTA Lasix and metoprolol     #Hx of TIA  - Continue PTA ASA 325mg   - Continue PTA simvastatin 20mg     #Mood d/o  - Continue PTA fluoxetine 20mg      #Insomnia, stable   - Continue PTA trazodone 100mg qHS    FEN: Intermittent bolus' PRN; electrolyte protocol; Regular diet  LDA: PIV x2, HD cath   PPx:  Enoxaparin   Code: FULL     Dispo: Floor status, receiving PLEX; Possible ARU on discharge     Patient discussed with Attending Dr. Carmel Mills MD   PGY-4 Neurology Resident  11/01/2020     ______________________________  PHYSICAL EXAMINATION:   /55 (BP Location: Left arm)   Pulse 82   Temp 97.7  F (36.5  C) (Oral)   Resp 20   Ht 1.702 m (5' 7\")   Wt 88.9 kg (195 lb 15.8 oz)   SpO2 93%   BMI 30.70 kg/m       General: Pleasant, awake and alert, Sitting in bed, NAD, cooperative  HEENT: NC, AT, no epistaxis, no oral lesions   Resp: CTAB, no increased work of breathing  Cardio: RRR, S1/S2 appropriate, crescendo decrescendo murmur loudest in P1   Abdomen: +BS, Soft, non-distended, non-tender  Extremities: Warm and well perfused, peripheral pulses present, no edema  Skin: Not jaundiced, no rash   Psych: Normal mood and affect     Neuro:  Mental status: Awake, alert, attentive; oriented to P/P/T/M  Speech: Fluent, comprehension and repetition intact; No dysarthria  Cranial nerves: Right eye blind at " baseline, peripheral Left field cut with poor visual acuity of central Left eye (baseline) although able to read fingers, light and motion, Eyes mildly dysconjugate, Left pupil 4 and Right pupil 3 and both brisk, EOMI w/ normal and smooth pursuit, face expression symmetric, facial sensation intact and symmetric, hearing intact to conversation, shoulder shrug strong, palate rise symmetric, tongue/uvula midline.   Motor: Tone normal. 5/5 strength BUE and LLE, RLE 3/5 in hip flexor, 4/5 knee flexion, and dorsiflexion with 5/5 Knee extension, plantar flexion. No atrophy or twitches. Pronator drift present on the RLE.   Reflexes: 2+ and symmetric biceps, triceps, brachioradialis, patellar, achilles; No Villasenor's; Right toe upgoing.   Sensory: LLE without temp/pinprick sensation up to hip with intact proprioceptions; Mild temp change in RLE to hip reduced proprioception R toes; Global reduced sensation to anterior rib cagete bilarally with band-like sensation in T4 distribution. Sensation normal and symmetric in BUEs   Coordination: FNF intact bilaterally, no dysmetria. Mild ataxia left HKS, limited in R due to weakness without apparent dysmetria.   Gait: Deferred     CURRENT MEDICATIONS:    amLODIPine  10 mg Oral Daily     aspirin  325 mg Oral QPM     clopidogrel  75 mg Oral Daily     enoxaparin ANTICOAGULANT  40 mg Subcutaneous Q24H     FLUoxetine  40 mg Oral Daily     furosemide  20 mg Oral Daily     heparin  3 mL Intracatheter Q24H     heparin  5 mL Intracatheter Q24H     heparin  5 mL Intracatheter Q24H     metoprolol succinate ER  50 mg Oral BID     mycophenolate  500 mg Oral BID IS     simvastatin  20 mg Oral At Bedtime     sodium chloride (PF)  3 mL Intracatheter Q8H     traZODone  100 mg Oral At Bedtime     PRN MEDICATIONS:  acetaminophen, albuterol, diphenhydrAMINE, heparin, hydrocortisone, lidocaine 4%, lidocaine (buffered or not buffered), melatonin, naloxone, sodium chloride  (PF)    INFUSIONS:    ALLERGIES:  Allergies   Allergen Reactions     Prevacid [Lansoprazole] Rash     Pravastatin Rash     Patient is still not sure about it due many years ago for reaction.     IMAGING:  MRI C-spine (10/23)  IMPRESSION:  1.  No definite cervical spinal cord signal abnormality and no abnormal enhancement.  2.  Disc osteophyte complex with shallow protrusion and annular fissure/tear and possible tiny component of extrusion at C4-C5 without significant spinal canal stenosis, mild to moderate foraminal narrowing right more than left due to uncinate spurring   and facet arthropathy.  3.  Mild multilevel degenerative spondylosis otherwise, see report for level by level details.    MRI T-spine (10/23)  IMPRESSION:  1.  Increased cord parenchyma T2 signal extending from T3-T5/T6, with enhancement centered at the T4 level as well as a small focus of enhancement in the parenchyma at the T5 level and left lateral spinal canal at T5 which may represent a focus of nerve   root enhancement. This is consistent with but not diagnostic of demyelinating disease active/acute plaque formation.  2.  Near complete resolution of the previous cord signal abnormality centered at the T2 level.  3.  Remainder of study without significant MR abnormality.

## 2020-11-02 NOTE — PROGRESS NOTES
Update:     Spoke to apheresis team regarding two topics:   1) Extension of PLEX from 5 to 7 transfusions. Fellow indicated that they could extend for the additional 2 rounds.     2) Regarding, newly identified atrial fibrillation warranting anticoagulation with PLEX considerations. 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     Lyudmila Mills MD  Neurology PGY 4

## 2020-11-02 NOTE — TELEPHONE ENCOUNTER
Signed start form faxed to [facility] (phone [phone], fax [phone])    Melvina James RN

## 2020-11-02 NOTE — PLAN OF CARE
Status: Admitted for progressing RLE weakness, suspect for neuromyelitis optica (NMO) flare   Vitals: soft bps per evening shift, stabilized on night shift  Neuros: Oriented x4. L eye dysconjugate. R eye blind, L eye field cut and can only see shadows.  BUE 5/5, LLE  4, RLE 3- foot flops more and drags while walking per report. Numbness/tingling from waist down to toes.   IV: L CVC HL, IV SL   Diet: Regular diet   Bowel status: +bs, last BM 11/1  : voiding spontaneously   Pain: denied   Activity: A1 w/ gb + walker.   Plan: PLEX 5/5 on 11/3. Possible ARU discharge after PLEX treatment is complete.

## 2020-11-02 NOTE — PROGRESS NOTES
CLINICAL NUTRITION SERVICES    Reviewed nutrition risk factors due to LOS. Pt is tolerating diet, eating well per nursing documentation (100% meals per RN flowsheets). Wt stable this admit. No nutrition issues identified at this time. RD will follow via rounds at this time, unless consulted.    Maya Valdez RD, LD  Pager: [phone]

## 2020-11-02 NOTE — PROGRESS NOTES
[facility]  Neurology Progress Note    Patient Name:  Ladonna Sheriff    MRN:  [id number]      :  1945  Date of Service:  2020  Primary care provider:  Joceline Ty    Subjective: 4th PLEX session completed yesterday with out acute issues. No acute events overnight. Patient was diaphoretic last night, but this is her baseline. Endorses some palpitations recently and occasionally in the past, though none currently. Denies chills, fever, SOB, chest pain, lightheadedness, changes in her urination or stool. Denies history of Afib. She feels her right hip is a little stronger with the PLEX.      Objective:  Temp: 98.4  F (36.9  C) Temp src: Oral BP: 92/54 Pulse: 91   Resp: 16 SpO2: 96 % O2 Device: None (Room air)        Labs:   CBC RESULTS:   Recent Labs   Lab Test 20  0626   WBC 11.3*   RBC 3.80   HGB 11.5*   HCT 36.4   MCV 96   MCH 30.3   MCHC 31.6   RDW 14.3        Last Comprehensive Metabolic Panel:  Sodium   Date Value Ref Range Status   2020 147 (H) 133 - 144 mmol/L Final     Potassium   Date Value Ref Range Status   2020 3.4 3.4 - 5.3 mmol/L Final     Chloride   Date Value Ref Range Status   2020 114 (H) 94 - 109 mmol/L Final     Carbon Dioxide   Date Value Ref Range Status   2020 27 20 - 32 mmol/L Final     Anion Gap   Date Value Ref Range Status   2020 6 3 - 14 mmol/L Final     Glucose   Date Value Ref Range Status   2020 103 (H) 70 - 99 mg/dL Final     Urea Nitrogen   Date Value Ref Range Status   2020 11 7 - 30 mg/dL Final     Creatinine   Date Value Ref Range Status   2020 0.73 0.52 - 1.04 mg/dL Final     GFR Estimate   Date Value Ref Range Status   2020 80 >60 mL/min/[1.73_m2] Final     Comment:     Non  GFR Calc  Starting 2018, serum creatinine based estimated GFR (eGFR) will be   calculated using the Chronic Kidney Disease Epidemiology Collaboration   (CKD-EPI)  equation.       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  She presented to [facility] on 10/23/2020 with worsening RLE weakness, concerning for NMO exacerbation.  She was found to have enhancing lesions extending from T3 to T5/6.  She was transferred to [facility] for further management and PLEX therapy.    #AQP4-positive NMO  #H/o T4 transverse myelitis with residual thoracic sensation loss  #Poor vision due to NMO lesions, stable  #Acute exacerbation of NMO leading to RLE weakness, resolving   Has legal blindness of the Right eye with persistent painful extraocular movements. Left eye has lateral peripheral field cut at baseline with blurry and vision. Sensation decreased from T4 dermatome to toes, with mild L>R predominance for pain/temp and general sensation. New RLE weakness in the setting of new exacerbation. Admits to previous weakness exacerbations involving the RLE, but never the LLE. Each one has resolved with PLEX. Has failed Rituximab therapy due to relapses while therapeutic. Currently taking Mycophenolate for several months, although <6mo, and having current exacerbation. Unsure if currently therapeutic, although may consider newer monoclonal antibodies for NMO exacerbation prevention. Patient has complete 4 PLEX session with some subjective, but not objective improvement in her  exam.    - Discussed with Transfusion medicine, appreciate recs   - Plan for two additional PLEX for a total of x7, continue every other day dosing (first dose 10/26)   - s/p Methylprednisone 1g for x5 days  - Famotidine stopped, GI ppx no longer indicated  - CTM Fibrinogen  - PTA Mycophenolate 500 BID   - PT/OT   - Meningococcal vaccine #2 prior to discharge (may wait until finished PLEX prior to administration)  - Outpatient neurologist Dr. Franklin planning to start solaris     #Paroxysmal Atrial fibrillation   CHADSVASC: 8, HASBLED 3. Unclear onset (patient not on cardiac monitor), potentially evening 11/1 correlating with slighting decreased BP and liable HR on pulse ox. EKG 11/2 am showed Afib not in RVR (), although on exam her actual rate is ~80 BPM. No history of Afib. Endorses palpitations but no chest pain, SOB or lightheadedness.    - Discussed with transfusion medicine, okay to start anticoagulation from apheresis perspective     - Discussed with pharmacy RE apixaban:  will cost 47 dollars a month; patient comfortable with this   - Start apixaban    -Goal Mg >2, K >4  -Follow up with cardiology; sees [facility] Hewitt [facility]    #Hypokalemia, stable  - Electrolyte protocol    #UTI, resolved   - Ceftriaxone 1g for 5 day course (completed 10/27)     Chronic issues:  #Bronchospastic disease  - Continue PTA albuterol and Incruse Ellipta     #Hypertension, stable   - Continue PTA amlodipine and metoprolol     #H/o HFrEF, currently improved EF  - Continue PTA Lasix and metoprolol     #Hx of TIA  - Continue PTA ASA 325mg   - Continue PTA simvastatin 20mg     #Mood d/o  - Continue PTA fluoxetine 20mg      #Insomnia, stable   - Continue PTA trazodone 100mg qHS    FEN: Intermittent bolus PRN; electrolyte protocol; Regular diet  LDA: PIV x2, HD cath   PPx:  Starting apixaban    Code: FULL     Dispo: Floor status, receiving PLEX; Possible ARU on discharge       Filiberto Leo, MS3    Patient discussed with Attending  "Dr. Rome       I, Cassandra Jarrell, was present with the medical student who participated in the service and in the documentation of the note.  I have verified the history and personally performed the physical exam and medical decision making.  I agree with the assessment and plan of care as documented in the note.  I have reviewed this documentation and made corrections where necessary to reflect the assessment and plan made by the neurology service on daily rounds.     In brief:  75yoF with h/o AQP-4 positive NMO and T4 transverse myelitis admitted for NMO exacerbation with acute on chronic RLE weakness, MRI showed enhancement of T3-T5/T6. Has previously been on rituximab and mycophenolate, considered failed therapy. Will require eculizimab. PLEX for additional 2 days for a total of 7 days, scheduled every other day. New atrial fibrillation, start anticoagulation. Remainder of plan as above.    The patient was seen and discussed with the attending neurologist, Dr. Rome.    Cassandra Jarrell MD  PGY- 2 Neurology  Date of Service (when I saw the patient): 11/02/20    ______________________________  PHYSICAL EXAMINATION:   /57 (BP Location: Left arm)   Pulse 58   Temp 95.8  F (35.4  C) (Axillary)   Resp 16   Ht 1.702 m (5' 7\")   Wt 88.9 kg (195 lb 15.8 oz)   SpO2 96%   BMI 30.70 kg/m       General: Pleasant, awake and alert, Sitting in bed, NAD, cooperative  HEENT: NC, AT, no epistaxis, no oral lesions   Resp: CTAB, no increased work of breathing  Cardio: Irregularly irregular, crescendo decrescendo murmur loudest in RUSB  Abdomen: non-distended  Extremities: Warm and well perfused, DP pulses present bilaterally, PT pulses not palpable bilaterally,  no edema  Skin: Not jaundiced, no rash over chest, back, all extremities. Diaper not removed.    Psych: Normal mood and affect     Neuro:  Mental status: Awake, alert, attentive; oriented to P/P/T/M  Speech: Fluent, comprehension and repetition " intact; No dysarthria  Cranial nerves: Right eye blind at baseline, peripheral Left field cut with poor visual acuity of central Left eye (baseline) although able to read fingers, light and motion, Eyes mildly dysconjugate, Right pupil 4 mm and left pupil 3 and both reactive, EOMI intact w/ normal and smooth pursuit, face expression symmetric, facial sensation intact and symmetric, hearing intact to conversation, shoulder shrug strong, palate rise symmetric, tongue/uvula midline. Neck rotation strong.     Motor: Tone normal. 5/5 strength BUE and LLE. LLE hip flexor, knee flexor, ankle plantar- and dorsi-flexion are 5/5. RLE 4-/5 in hip flexor, 4/5 knee flexion and extension, 5/5 foot dorsi and plantar flexion. No atrophy or twitches.   Reflexes: 3+ and symmetric biceps, triceps, brachioradialis, patellar, achilles; No Villasenor's; Right toe upgoing. Crossed adductors positive RLE>LLE.    Sensory: LLE without temp/pinprick sensation up to hip with intact proprioception. Pinprick intact in RLE with reduced proprioception in R toes.  Global reduced sensation to anterior rib cage bilaterally. Sensation normal and symmetric in BUEs   Coordination: FNF intact bilaterally, no dysmetria.   Gait: Deferred.    CURRENT MEDICATIONS:    amLODIPine  10 mg Oral Daily     aspirin  325 mg Oral QPM     clopidogrel  75 mg Oral Daily     enoxaparin ANTICOAGULANT  40 mg Subcutaneous Q24H     FLUoxetine  40 mg Oral Daily     furosemide  20 mg Oral Daily     heparin  3 mL Intracatheter Q24H     heparin  3 mL Intracatheter Q24H     heparin  5 mL Intracatheter Q24H     heparin  5 mL Intracatheter Q24H     metoprolol succinate ER  50 mg Oral BID     mycophenolate  500 mg Oral BID IS     polyethylene glycol  17 g Oral Daily     simvastatin  20 mg Oral At Bedtime     sodium chloride (PF)  10 mL Intracatheter Q1H     sodium chloride (PF)  3 mL Intracatheter Q8H     traZODone  100 mg Oral At Bedtime     PRN MEDICATIONS:  acetaminophen, albuterol,  glucose **OR** dextrose **OR** glucagon, diphenhydrAMINE, heparin, heparin, hydrocortisone, lidocaine 4%, lidocaine (buffered or not buffered), melatonin, naloxone, sodium chloride (PF), sodium chloride (PF)    INFUSIONS:    ALLERGIES:  Allergies   Allergen Reactions     Prevacid [Lansoprazole] Rash     Pravastatin Rash     Patient is still not sure about it due many years ago for reaction.     IMAGING:  MRI C-spine (10/23)  IMPRESSION:  1.  No definite cervical spinal cord signal abnormality and no abnormal enhancement.  2.  Disc osteophyte complex with shallow protrusion and annular fissure/tear and possible tiny component of extrusion at C4-C5 without significant spinal canal stenosis, mild to moderate foraminal narrowing right more than left due to uncinate spurring   and facet arthropathy.  3.  Mild multilevel degenerative spondylosis otherwise, see report for level by level details.    MRI T-spine (10/23)  IMPRESSION:  1.  Increased cord parenchyma T2 signal extending from T3-T5/T6, with enhancement centered at the T4 level as well as a small focus of enhancement in the parenchyma at the T5 level and left lateral spinal canal at T5 which may represent a focus of nerve   root enhancement. This is consistent with but not diagnostic of demyelinating disease active/acute plaque formation.  2.  Near complete resolution of the previous cord signal abnormality centered at the T2 level.  3.  Remainder of study without significant MR abnormality.

## 2020-11-02 NOTE — CONSULTS
[facility]   PM&R CONSULT    Consulting Provider: Wesly  Reason for Consult: Assessment of rehabilitation needs  Location of Patient: 6A  Date of Encounter: 11/2/2020       ASSESSMENT/PLAN:    Ms. 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 Demonstrating slow improvement in RLE strength in the setting of multiple rounds of PLEX treatment, has completed 4/7 so far. She remains below baseline in her strength, coordination, ambulation, overall functional ability. She will need to be able to navigate 16 steps in order to safely return home. Once home, she has an excellent support system with her sons, including one who is set to move in with her Wednesday.  - At this point, patient appears to be an excellent candidate for ARU. However, pending improvement in clinical course with additional PLEX treatment, it is possible patient will recover enough to discharge directly home.   - We will re-evaluate the patient on Friday to assess ongoing rehab needs in the setting of ongoing PLEX treatments and make final recommendations at that time.    HISTORY OF PRESENT ILLNESS:   Ms. Sheriff is a 75-year-old female with PMH significant for AQ P4 positive neuromyelitis optica, blindness in right eye (2/2 optic neuritis in 1995) and limited vision in the left eye, numbness from mid chest down starting at T4 level consistent with transverse myelitis, Sjogren's syndrome, chronic HFpEF, mild to moderate aortic stenosis, HTN, discoid lupus, and TIA who admitted to [facility] on 10/23 with leg weakness.  Symptoms began in her right leg 2 days prior to admit.  Says her leg felt like rubber and she had difficulty controlling it.  Required  advancement from cane to walker for assist device.  This is similar to prior episodes.  An MRI at [facility] demonstrated T2 signal extending from T3-T5/T6 with enhancement centered at the T4 level and a small focus of enhancement in the parenchyma at the T5 level and left lateral spinal canal at T5.  She was started on Solu-Medrol and transferred to [facility] for PLEX.  Notes improvement of prior exacerbations with Plex.  She has completed 4/5 rounds of Plex treatment thus far, with treatment on alternating days, though patient states plan may be to extend to 7 total treatments.    Of note, she was diagnosed with neuromyelitis optica summer 2019.  She received plasmapheresis, high-dose Solu-Medrol, IV rituximab at that time.  She underwent acute rehabilitation afterward.  She had a subsequent hospitalization in July 2020 with concerns of worsening vision, receiving plasmapheresis and 5 days of methylprednisolone at that time.  Switched from rituximab to CellCept at that time.    During this hospitalization she completed a 5-day course of ceftriaxone for UTI. Had new onset Afib noted 11/2.    Today, she notes start of some improvement, 25% overall. Still having difficulty coordinating the leg. Also notes that with chronic decreased sensation she has difficulty sensing foot location in space. No pain. No B/B issues. Her biggest concern in going home is the ability to do stairs.    PREVIOUS LEVEL OF FUNCTION:  Requires assistance of 1 person for stair climbing, uses cane/walker for ambulation.  Sons assist with running errands.    CURRENT FUNCTION:   PT: Transfers with standby to min assist.  Total assist for standing pericares.  Needs cues, assistance for navigation with walker.    OT: Vision similar to baseline.  Contact-guard to min a for transfers.  Set up for pericares.  Completes seated tasks with set up and min assist.    CURRENT RN NEEDS: Monitoring neuro status, line management, bowel/bladder monitoring,  ambulation/activity assist    SOCIAL HISTORY(Home Setting/Support):  Social History     Socioeconomic History     Marital status:      Spouse name: Not on file     Number of children: Not on file     Years of education: Not on file     Highest education level: Not on file   Occupational History     Not on file   Social Needs     Financial resource strain: Not on file     Food insecurity     Worry: Not on file     Inability: Not on file     Transportation needs     Medical: Not on file     Non-medical: Not on file   Tobacco Use     Smoking status: Former Smoker     Packs/day: 0.00     Smokeless tobacco: Never Used   Substance and Sexual Activity     Alcohol use: Yes     Alcohol/week: 7.0 standard drinks     Types: 7 Shots of liquor per week     Comment: occ     Drug use: No     Sexual activity: Not on file   Lifestyle     Physical activity     Days per week: Not on file     Minutes per session: Not on file     Stress: Not on file   Relationships     Social connections     Talks on phone: Not on file     Gets together: Not on file     Attends Tenriism service: Not on file     Active member of club or organization: Not on file     Attends meetings of clubs or organizations: Not on file     Relationship status: Not on file     Intimate partner violence     Fear of current or ex partner: Not on file     Emotionally abused: Not on file     Physically abused: Not on file     Forced sexual activity: Not on file   Other Topics Concern     Not on file   Social History Narrative     Not on file   Patient lives alone in a condominium, 16 steps to enter.  Tub/shower combination present with shower chair.  Has 2 cats.  Son currently lives in [place] but is planning to move back to [place] to stay with her.  A separate son lives in the same [facility] and the third son lives in [place].  Sons assist with stair navigation and transportation.    PAST MEDICAL HISTORY:  Past Medical History:   Diagnosis Date      "Cerebral infarction (H)      CHF (congestive heart failure) (H)      Hypertension      Lupus (H)      Lupus (H)      Optic neuritis      Optic neuritis      Sjogren's syndrome (H)      Sjogren's syndrome (H)      Temporal arteritis (H)      TIA (transient ischemic attack)      Uncomplicated asthma      CURRENT MEDICATIONS:  Current Facility-Administered Medications   Medication     acetaminophen (TYLENOL) tablet 650 mg     albuterol (PROAIR HFA/PROVENTIL HFA/VENTOLIN HFA) 108 (90 Base) MCG/ACT inhaler 2 puff     amLODIPine (NORVASC) tablet 10 mg     aspirin (ASA) EC tablet 325 mg     clopidogrel (PLAVIX) tablet 75 mg     glucose gel 15-30 g    Or     dextrose 50 % injection 25-50 mL    Or     glucagon injection 1 mg     diphenhydrAMINE (BENADRYL) capsule 25 mg     enoxaparin ANTICOAGULANT (LOVENOX) injection 40 mg     FLUoxetine (PROzac) capsule 40 mg     furosemide (LASIX) tablet 20 mg     heparin 100 UNIT/ML injection 3 mL     heparin 100 UNIT/ML injection 3 mL     heparin 100 UNIT/ML injection 3 mL     heparin 100 UNIT/ML injection 3 mL     heparin 100 UNIT/ML injection 5 mL     heparin 100 UNIT/ML injection 5 mL     hydrocortisone (CORTAID) 1 % cream     lidocaine (LMX4) cream     lidocaine 1 % 0.1-1 mL     melatonin tablet 1 mg     metoprolol succinate ER (TOPROL-XL) 24 hr tablet 50 mg     mycophenolate (GENERIC EQUIVALENT) capsule 500 mg     naloxone (NARCAN) injection 0.1-0.4 mg     simvastatin (ZOCOR) tablet 20 mg     sodium chloride (PF) 0.9% PF flush 10 mL     sodium chloride (PF) 0.9% PF flush 10 mL     sodium chloride (PF) 0.9% PF flush 3 mL     sodium chloride (PF) 0.9% PF flush 3 mL     traZODone (DESYREL) tablet 100 mg     REVIEW OF SYSTEMS:  10 point ROS negative except as noted in HPI    PHYSICAL EXAM:   VS: /57 (BP Location: Left arm)   Pulse 58   Temp 95.8  F (35.4  C) (Axillary)   Resp 16   Ht 1.702 m (5' 7\")   Wt 88.9 kg (195 lb 15.8 oz)   SpO2 96%   BMI 30.70 kg/m    GEN: In no " acute distress, lying comfortably in bed, alert, appropriate, cooperative  HEENT: Normocephalic, atraumatic  RESPIRATORY: Nonlabored breathing on RA  CARDIAC: Palpable pulse, normal S1/S2 heart sounds  ABD: soft, non tender, pos BS  NEURO:  Sensation: Sensation to light touch decreased in bilateral lower extremities  Strength: 5/5 to bilateral upper extremities, LLE. RLE with 3/5 HF, 4/5 KF and DF, 5/5 KE, PF   Cognition: fund of knowledge and train of thought appropriate  Speech: fluent  SKIN: no rashes or lesions noted.  Patient has L IJ line.   EXT: No lower extremity edema bilaterally.   PSYCH: Normal affect.    LABS:    Lab Results   Component Value Date    WBC 11.3 (H) 11/02/2020    HGB 11.5 (L) 11/02/2020    HCT 36.4 11/02/2020    MCV 96 11/02/2020     11/02/2020     Lab Results   Component Value Date     (H) 11/02/2020    POTASSIUM 3.4 11/02/2020    CHLORIDE 114 (H) 11/02/2020    CO2 27 11/02/2020     (H) 11/02/2020     Lab Results   Component Value Date    GFRESTIMATED 80 11/02/2020    GFRESTBLACK >90 11/02/2020     Lab Results   Component Value Date    AST 24 10/23/2020    ALT 26 10/23/2020    ALKPHOS 74 10/23/2020    BILITOTAL 0.5 10/23/2020     Lab Results   Component Value Date    INR 1.15 (H) 11/01/2020     Lab Results   Component Value Date    BUN 11 11/02/2020    CR 0.73 11/02/2020     IMAGING:  MR Cervical/Thoracic Spine, 11/2/20:  IMPRESSION:  1.  Increased cord parenchyma T2 signal extending from T3-T5/T6, with enhancement centered at the T4 level as well as a small focus of enhancement in the parenchyma at the T5 level and left lateral spinal canal at T5 which may represent a focus of nerve   root enhancement. This is consistent with but not diagnostic of demyelinating disease active/acute plaque formation.  2.  Near complete resolution of the previous cord signal abnormality centered at the T2 level.  3.  Remainder of study without significant MR abnormality.    Thank you for  this consultation. Please do not hesitate to call with any questions.     Patient staffed with Dr. Dominick Mcneil MD   Rehabilitation Physician  Pager: [phone]    For questions regarding ARU, please contact Candace Younger or Joya   [phone]

## 2020-11-02 NOTE — PROGRESS NOTES
"Care Management Follow Up Note    Length of Stay (days) 9    Patient plan of care discussed at Interdisciplinary Rounds: yes  Expected Discharge Date: 11/04/20  Concerns to be Addressed:  Discharge plan      Anticipated Discharge Disposition:  ARU     Plan:  Covering SW following for discharge planning. SW reviewed chart; pt has her fifth PLEX treatment tomorrow. Current PT/OT recommendations (most recent notes are form 10/31) recommend ARU.     SW called pt and spoke to her on the phone. SW reviewed current recommendation for ARU placement. Pt is in agreement with this. SW offered pt list of ARU choices; pt states \"I'd like something near my home in [place]\". SW provided education re: location of ARU. Pt states she would like a referral sent to [facility] first.     SW contacted [facility] liaison to initiate referral. SW will continue to follow for discharge planning needs.     Iman MANCERA, LICSW  6C Cardiology Unit   [facility]  Phone: [phone]  Pager: [phone]          "

## 2020-11-03 NOTE — PROCEDURES
Laboratory Medicine and Pathology  Transfusion Medicine - Apheresis Procedure    Ladonna Sheriff MRN# [id number]   YOB: 1945 Age: 75 year old   Date of Admission: 10/24/2020     Reason for consult: Therapeutic plasma exchange (TPE) for neuromyelitis optica (NMO) flare           Assessment and Plan:   The patient is a 74 yo woman with a history of NMO, currently with flare.  She does not respond well to steroids.  TPE series requested.  We have performed TPE on her in the past.  Will plan for a series of 5 TPEs, every-other-day schedule.  Will also plan for 5% albumin as exchange fluid.  Will monitor coag labs and adjust with plasma as exchange fluid as needed.     The patient is tolerating her TPE today.  Again, no concerns/complaints voiced per nursing.        TREATMENT PLAN:  Series of 5 TPEs, plan for 5% albumin as exchange fluid (3L plasma volume), monitor coags and include plasma as indicated.  Please do not start or continue ACE-inhibitors throughout the duration of a TPE series.  Please notify the apheresis physician of any upcoming procedures, surgeries, or biopsies as TPE with 5% albumin will affect coagulation factors.            Chief Complaint:   Right leg weakness, worsening vision, and numbness (band-like at chest)             Past Medical History:     Past Medical History:   Diagnosis Date     Cerebral infarction (H)      CHF (congestive heart failure) (H)      Hypertension      Lupus (H)      Lupus (H)      Optic neuritis      Optic neuritis      Sjogren's syndrome (H)      Sjogren's syndrome (H)      Temporal arteritis (H)      TIA (transient ischemic attack)      Uncomplicated asthma           Past Surgical History:     Past Surgical History:   Procedure Laterality Date     CHOLECYSTECTOMY       GYN SURGERY      hysterectomy     GYN SURGERY      tubal ligation     IR CVC NON TUNNEL PLACEMENT  6/21/2019     IR CVC NON TUNNEL PLACEMENT  7/15/2020     IR CVC NON TUNNEL PLACEMENT   10/26/2020     IR FOLLOW UP VISIT INPATIENT  7/2/2019          Social History:     Social History     Socioeconomic History     Marital status:      Spouse name: Not on file     Number of children: Not on file     Years of education: Not on file     Highest education level: Not on file   Occupational History     Not on file   Social Needs     Financial resource strain: Not on file     Food insecurity     Worry: Not on file     Inability: Not on file     Transportation needs     Medical: Not on file     Non-medical: Not on file   Tobacco Use     Smoking status: Former Smoker     Packs/day: 0.00     Smokeless tobacco: Never Used   Substance and Sexual Activity     Alcohol use: Yes     Alcohol/week: 7.0 standard drinks     Types: 7 Shots of liquor per week     Comment: occ     Drug use: No     Sexual activity: Not on file   Lifestyle     Physical activity     Days per week: Not on file     Minutes per session: Not on file     Stress: Not on file   Relationships     Social connections     Talks on phone: Not on file     Gets together: Not on file     Attends [employer] service: Not on file     Active member of club or organization: Not on file     Attends meetings of clubs or organizations: Not on file     Relationship status: Not on file     Intimate partner violence     Fear of current or ex partner: Not on file     Emotionally abused: Not on file     Physically abused: Not on file     Forced sexual activity: Not on file   Other Topics Concern     Not on file   Social History Narrative     Not on file          Family History:     Family History   Problem Relation Age of Onset     Asthma Mother      Lupus Sister      Bone Cancer Brother      Kidney Cancer Sister           Immunizations:     Most Recent Immunizations   Administered Date(s) Administered     Influenza (High Dose) 3 valent vaccine 10/21/2015     Influenza Vaccine, 6+MO IM (QUADRIVALENT W/PRESERVATIVES) 10/17/2016     Influenza, Quad, High Dose, Pf,  65yr + 10/31/2020     Meningococcal (Bexsero ) 07/24/2020     Meningococcal (Menveo ) 07/24/2020     Pneumo Conj 13-V (2010&after) 07/08/2014     Pneumococcal 23 valent 07/07/2017     TDAP Vaccine (Adacel) 04/25/2013     Zoster vaccine, live 04/25/2013          Allergies:     Allergies   Allergen Reactions     Prevacid [Lansoprazole] Rash     Pravastatin Rash     Patient is still not sure about it due many years ago for reaction.          Medications:     Current Facility-Administered Medications   Medication     acetaminophen (TYLENOL) tablet 650 mg     albuterol (PROAIR HFA/PROVENTIL HFA/VENTOLIN HFA) 108 (90 Base) MCG/ACT inhaler 2 puff     amLODIPine (NORVASC) tablet 10 mg     apixaban ANTICOAGULANT (ELIQUIS) tablet 5 mg     [Held by provider] aspirin (ASA) EC tablet 325 mg     [Held by provider] clopidogrel (PLAVIX) tablet 75 mg     glucose gel 15-30 g    Or     dextrose 50 % injection 25-50 mL    Or     glucagon injection 1 mg     diphenhydrAMINE (BENADRYL) capsule 25 mg     FLUoxetine (PROzac) capsule 40 mg     furosemide (LASIX) tablet 20 mg     heparin 100 UNIT/ML injection 3 mL     heparin 100 UNIT/ML injection 3 mL     heparin 100 UNIT/ML injection 3 mL     heparin 100 UNIT/ML injection 3 mL     heparin 100 UNIT/ML injection 5 mL     heparin 100 UNIT/ML injection 5 mL     hydrocortisone (CORTAID) 1 % cream     lidocaine (LMX4) cream     lidocaine 1 % 0.1-1 mL     melatonin tablet 1 mg     metoprolol succinate ER (TOPROL-XL) 24 hr tablet 50 mg     mycophenolate (GENERIC EQUIVALENT) capsule 500 mg     naloxone (NARCAN) injection 0.1-0.4 mg     polyethylene glycol (MIRALAX) Packet 17 g     simvastatin (ZOCOR) tablet 20 mg     sodium chloride (PF) 0.9% PF flush 10 mL     sodium chloride (PF) 0.9% PF flush 3 mL     sodium chloride (PF) 0.9% PF flush 3 mL     traZODone (DESYREL) tablet 100 mg          Review of Systems:   See above.           Data:     [facility]  Recent Labs   Lab 11/03/20  0552 11/02/20  7480  11/01/20  0701 10/31/20  0709   * 147* 146* 143   POTASSIUM 3.5 3.4 3.5 3.5   CHLORIDE 115* 114* 113* 112*   GANESH 8.2* 8.0* 8.0* 8.2*   CO2 25 27 28 28   BUN 10 11 13 14   CR 0.66 0.73 0.62 0.60   GLC 92 103* 97 86     CBC  Recent Labs   Lab 11/03/20  0552 11/02/20  0626 11/01/20  0701 10/31/20  0709   WBC 9.4 11.3* 8.7 9.1   RBC 3.37* 3.80 3.79* 3.92   HGB 9.8* 11.5* 11.3* 11.5*   HCT 31.5* 36.4 36.1 37.0   MCV 94 96 95 94   MCH 29.1 30.3 29.8 29.3   MCHC 31.1* 31.6 31.3* 31.1*   RDW 14.4 14.3 14.0 13.6    211 178 187     ATTESTATION STATEMENT:  I, Michaela Villarreal MD, PhD., was available by pager during the entire procedure.  I have reviewed the chart and discussed the patient and current procedure with the nursing staff.      Michaela Villarreal MD, PhD  Transfusion Medicine Attending  Medical Director, Blood Bank Laboratory  Pager [phone]

## 2020-11-03 NOTE — PLAN OF CARE
Soft BPs and max temp 99.2; EKG confirmed a-fib this am. MDs ok'd to leave pt off cardiac monitoring; rhythm returned to normal later in day; on continuous pulse oximeter. Pt could feel palpitations and said it happens somewhat frequently at home. Neuros unchanged. PLEX 5/7 scheduled for 11/3/20. Large BM today. Up A1+gb+waker; R foot continues to drag while ambulating when pt not focused on it. Voiding spnt. Reg diet. PM&R consulted. Plan to discharge to ARU when PLEX finishes PLEX doses. Continue with POC.

## 2020-11-03 NOTE — PLAN OF CARE
Status:  Pt admitted for progressing RLE weakness, suspect for neuromyelitis optica flare   Vitals: VSS on RA  Neuros: AO4, strength 5/5 BUE and 4/5 BLE. L eye with poor vision (sees shadows and shapes) and R eye blind. Numbness/tingling waist to toes   IV: PIV SL. L CVC for PLEX heparin locked   Resp/trach: LS clear throughout  Diet: Regular diet, tolerating well  Bowel status: BM x 1 this AM  : Voiding spontaneously   Pain: Denies   Activity: Up with assist of 1, GB and walker. Up to chair x 2 today   Social: No visitors this shift  Plan: PLEX 5/7 completed today, plan for ARU when medically ready. Continue to monitor and follow POC

## 2020-11-03 NOTE — PROGRESS NOTES
Patient slept all night.  MD order to not disturb patient from [id number].  Full assessment not done.  Neuro exam done at 0600, when patient called to use the commode.  Alert and oriented x4. Neuros unchanged.  PLEX 5/7 scheduled for today, 11/3/20.  LBM 11/2.  Plan: ARU when PLEX doses completed.

## 2020-11-03 NOTE — PROGRESS NOTES
[facility]  Neurology Progress Note    Patient Name:  Ladonna Sheriff    MRN:  [id number]      :  1945  Date of Service:  2020  Primary care provider:  Joceline Ty    Subjective: Patient endorses left sided, sharp pleuritic chest pain. The pain is present only with deep breathing and releived with leaning forward. She is unsure if she first noticed this yesterday or two days ago.  She denies chills, fever, SOB, other chest pain or lightheadedness. Denies any history of pericarditis. Her strength has been stable since last PLEX.     Objective:  Temp: 96.8  F (36  C) Temp src: Oral BP: 99/64 Pulse: 78   Resp: 18 SpO2: 95 % O2 Device: None (Room air)        Labs:   CBC RESULTS:   Recent Labs   Lab Test 20  0552   WBC 9.4   RBC 3.37*   HGB 9.8*   HCT 31.5*   MCV 94   MCH 29.1   MCHC 31.1*   RDW 14.4            Last Comprehensive Metabolic Panel:  Sodium   Date Value Ref Range Status   2020 147 (H) 133 - 144 mmol/L Final     Potassium   Date Value Ref Range Status   2020 3.5 3.4 - 5.3 mmol/L Final     Chloride   Date Value Ref Range Status   2020 115 (H) 94 - 109 mmol/L Final     Carbon Dioxide   Date Value Ref Range Status   2020 25 20 - 32 mmol/L Final     Anion Gap   Date Value Ref Range Status   2020 6 3 - 14 mmol/L Final     Glucose   Date Value Ref Range Status   2020 92 70 - 99 mg/dL Final     Urea Nitrogen   Date Value Ref Range Status   2020 10 7 - 30 mg/dL Final     Creatinine   Date Value Ref Range Status   2020 0.66 0.52 - 1.04 mg/dL Final     GFR Estimate   Date Value Ref Range Status   2020 86 >60 mL/min/[1.73_m2] Final     Comment:     Non  GFR Calc  Starting 2018, serum creatinine based estimated GFR (eGFR) will be   calculated using the Chronic Kidney Disease Epidemiology Collaboration   (CKD-EPI) equation.       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  She presented to [facility] on 10/23/2020 with worsening RLE weakness, concerning for NMO exacerbation.  She was found to have enhancing lesions extending from T3 to T5/6.  She was transferred to [facility] for PLEX therapy.    #AQP4-positive NMO  #H/o T4 transverse myelitis with residual thoracic sensation loss  #Poor vision due to NMO lesions, stable  #Acute exacerbation of NMO leading to RLE weakness, resolving   Has legal blindness of the R eye with persistent painful extraocular movements. Left eye has lateral peripheral field cut at baseline with blurry and vision. Sensation decreased from T4 dermatome to toes, with mild L>R predominance for pain/temp and general sensation. New RLE weakness in the setting of new exacerbation. Previous weakness exacerbations involving the RLE, but not the LLE. Each one has resolved with PLEX. Has failed Rituximab therapy due to relapses while therapeutic. Currently taking Mycophenolate for several months, although <6mo, and having current exacerbation.  - Discussed with Transfusion medicine, appreciate recs   - Plan for PLEX for 7 doses, continue every other day dosing (first dose 10/26)   - s/p Methylprednisone 1g for x5 days  - Famotidine stopped, GI ppx no longer indicated  - CTM Fibrinogen  - PTA Mycophenolate 500 BID   - PT/OT   - Meningococcal vaccine #2  prior to discharge (may wait until finished PLEX prior to administration)  - Outpatient neurologist Dr. Franklin planning to start eculizumab    #Paroxysmal Atrial fibrillation   CHADSVASC: 8, HASBLED 3. Unclear onset; pt reports history of intermittent palpitations for many years. EKG 11/2 showed Afib not in RVR () and repeat EKG on 11/3 demonstrated sinus rhythm. Discussed with pharmacy RE apixaban: will cost 47 dollars a month; patient comfortable with this.  - Discussed with transfusion medicine, okay to start anticoagulation from apheresis perspective     - Apixaban    - Goal Mg >2, K >4  - Follow up with cardiology; follows with Dr. Hewitt at [facility]    #Pleuritic chest pain  Mild, anterior left side. Reproducible w palpation, but different character. Relieved with leaning forward. Murmur from AS makes auscultation for friction impossible.   - Check troponin I -WNL  - Sed rate- WNL  - EKG    #Hypokalemia, stable  - Electrolyte protocol    #UTI, resolved   - Ceftriaxone 1g for 5 day course (completed 10/27)     Chronic issues:  #Bronchospastic disease  - Continue PTA albuterol and Incruse Ellipta     #Hypertension, stable   - Continue PTA amlodipine and metoprolol     #H/o HFrEF, currently improved EF  - Continue PTA Lasix and metoprolol     #Hx of TIA  - Continue PTA ASA 325mg   - Continue PTA simvastatin 20mg     #Mood d/o  - Continue PTA fluoxetine 20mg      #Insomnia, stable   - Continue PTA trazodone 100mg qHS    FEN: Intermittent bolus PRN; electrolyte protocol; Regular diet  LDA: PIV x2, HD cath   PPx:  Starting apixaban    Code: FULL     Dispo: Receiving PLEX; Possible ARU on discharge, PT/OT and PM&R following      Filiberto Leo, MS3    Patient discussed with Attending Dr. Wild THORNTON, Cassandra Jarrell, was present with the medical student who participated in the service and in the documentation of the note.  I have verified the history and personally performed the physical exam and medical  "decision making.  I agree with the assessment and plan of care as documented in the note.  I have reviewed this documentation and made corrections where necessary to reflect the assessment and plan made by the neurology service on daily rounds.     In brief:  75yoF with h/o AQP-4 positive NMO and T4 transverse myelitis admitted for NMO exacerbation with acute on chronic RLE weakness, MRI showed enhancement of T3-T5/T6. Has previously been on rituximab and mycophenolate, considered failed therapy. Will require eculizimab. PLEX for a total of 7 days, scheduled every other day. New atrial fibrillation, started anticoagulation - will adjust schedule per transfusion medicine recommendation. Remainder of plan as above.    The patient was seen and discussed with the attending neurologist, Dr. Rome.    Cassandra Jarrell MD  PGY- 2 Neurology  Date of Service (when I saw the patient): 11/03/20    Attending physician: I saw and evaluated the patient with the resident team and I agree with the findings and plan of care as documented above. PLEX 5 of a planned 7 treatments today.    Disposition: Likely ARU vs. TCU, less likely to home if patient's status improved substantially. Anticipate she will be hospitalized for at least 4 additional nights to complete planned course of treatment prior to discharge.    Santos Rome MD   of Neurology    ______________________________  PHYSICAL EXAMINATION:   BP 99/64   Pulse 78   Temp 96.8  F (36  C) (Oral)   Resp 18   Ht 1.702 m (5' 7\")   Wt 88.9 kg (195 lb 15.8 oz)   SpO2 95%   BMI 30.70 kg/m       General: Pleasant, awake and alert, Sitting in bed, NAD, cooperative  HEENT: NC, AT, no epistaxis, no oral lesions   Resp: CTAB, no increased work of breathing  Cardio: RRR, crescendo decrescendo murmur hear in all areas loudest in RUSB  CHEST: Mild pain to palpation present over left anterior chest in 2 x 2 cm area over superior left breast. No mass or " changes in the skin.   Abdomen: non-distended  Extremities: Warm and well perfused, no edema  Skin: Not jaundiced, no rash over extremities.    Psych: Normal mood and affect     Neuro:  Mental status: Awake, alert, attentive; oriented to self, location, date, and situation.  Speech: Fluent, comprehension and repetition intact; No dysarthria  Cranial nerves: Right eye blind at baseline, peripheral Left field cut with poor visual acuity of central Left eye (baseline) although able to read fingers, light and motion. Eyes mildly dysconjugate, Right pupil 4 mm and left pupil 3. Relative afferent defect: Left pupil with intact direct response and consensual response on right; Negative direct and consensual response on right. EOMI intact w/ smooth pursuit, face expression symmetric, facial sensation intact and symmetric, hearing intact to conversation, shoulder shrug strong, palate rise symmetric, tongue/uvula midline. Neck rotation strong.     Motor: Tone normal. 5/5 strength BUE. LLE hip flexor, knee flexor, ankle plantar- and dorsi-flexion are 5/5. RLE 4/5 in hip flexor, 4/5 knee flexion and 4+/5 extension, 5/5 foot dorsi and plantar flexion. No atrophy or twitches.   Reflexes: 3+ and symmetric biceps, triceps, brachioradialis, patellar, achilles; No Villasenor's; Right toe upgoing. Crossed adductors RLE>LLE.    Sensory: LLE without temp/pinprick sensation up to hip with intact proprioception. Pinprick intact in RLE with reduced proprioception in R toes. Sensation normal and symmetric in BUEs   Coordination: FNF intact bilaterally, no dysmetria.   Gait: Walked with walker, shuffling and not lifting RLE off the ground. Supports her self with arms when LLE moving.     CURRENT MEDICATIONS:    amLODIPine  10 mg Oral Daily     apixaban ANTICOAGULANT  5 mg Oral BID     [Held by provider] aspirin  325 mg Oral QPM     [Held by provider] clopidogrel  75 mg Oral Daily     FLUoxetine  40 mg Oral Daily     furosemide  20 mg Oral Daily      heparin  3 mL Intracatheter Q24H     heparin  3 mL Intracatheter Q24H     heparin  5 mL Intracatheter Q24H     heparin  5 mL Intracatheter Q24H     metoprolol succinate ER  50 mg Oral BID     mycophenolate  500 mg Oral BID IS     polyethylene glycol  17 g Oral Daily     simvastatin  20 mg Oral At Bedtime     sodium chloride (PF)  3 mL Intracatheter Q8H     traZODone  100 mg Oral At Bedtime     PRN MEDICATIONS:  acetaminophen, albuterol, glucose **OR** dextrose **OR** glucagon, diphenhydrAMINE, heparin, heparin, hydrocortisone, lidocaine 4%, lidocaine (buffered or not buffered), melatonin, naloxone, sodium chloride (PF), sodium chloride (PF)    INFUSIONS:    ALLERGIES:  Allergies   Allergen Reactions     Prevacid [Lansoprazole] Rash     Pravastatin Rash     Patient is still not sure about it due many years ago for reaction.     IMAGING:  MRI C-spine (10/23)  IMPRESSION:  1.  No definite cervical spinal cord signal abnormality and no abnormal enhancement.  2.  Disc osteophyte complex with shallow protrusion and annular fissure/tear and possible tiny component of extrusion at C4-C5 without significant spinal canal stenosis, mild to moderate foraminal narrowing right more than left due to uncinate spurring   and facet arthropathy.  3.  Mild multilevel degenerative spondylosis otherwise, see report for level by level details.    MRI T-spine (10/23)  IMPRESSION:  1.  Increased cord parenchyma T2 signal extending from T3-T5/T6, with enhancement centered at the T4 level as well as a small focus of enhancement in the parenchyma at the T5 level and left lateral spinal canal at T5 which may represent a focus of nerve   root enhancement. This is consistent with but not diagnostic of demyelinating disease active/acute plaque formation.  2.  Near complete resolution of the previous cord signal abnormality centered at the T2 level.  3.  Remainder of study without significant MR abnormality.

## 2020-11-04 NOTE — PLAN OF CARE
Status: Patient admitted on 10/24 with neuromyelitis optica  Vitals: VSS  Neuros: A&Ox4, BLE 4/5 with N&T from waist to toes, left eye with blurred vision with field cut, R eye blind  IV: PIV saline locked, L CVC heparin locked  Resp/trach: Lung sounds clear throughout  Diet: Regular  Bowel status: No BM, Last BM yesterday, BS+  : Voiding spontanoeusly  Skin: Intact ex open merari to bi-lateral coccyx due to moisture WOC consult placed, open area under abdominal fold, barrier cream applied   Pain: Denies  Activity: Up with SBA  Social: Calm and cooperative with cares  Plan: PLEX 6/7 tomorrow, continue to monitor and follow POC

## 2020-11-04 NOTE — PLAN OF CARE
Admitted for suspected neuromyelitis optica flair. Neuros: RLE 3/5, LLE 4/5. N/T waist to toes. Left eye poor vision with left vision field cut. Right eye blind, disconjugate and sluggish right pupil. VSS on RA. PIV SL. CVC HL. Voids. Up 1/gb/walker pivot BSC. Regular diet. 5/7 PLEX completed. Next PLEX thursday. Plan ARU when ready. Continue to monitor.

## 2020-11-04 NOTE — PROGRESS NOTES
[facility]  Neurology Progress Note    Patient Name:  Ladonna Sheriff    MRN:  [id number]      :  1945  Date of Service:  2020  Primary care provider:  Joceline Ty    Subjective: No acute events overnight. Sleeping well.  Pleuritic chest pain resolved today.  Denies chills, fever, SOB or lightheadedness. Feels improvement in RLE strength.      Objective:  Temp: 97.6  F (36.4  C) Temp src: Oral BP: 110/64 Pulse: 78   Resp: 16 SpO2: 94 % O2 Device: None (Room air)        CBC RESULTS:   Recent Labs   Lab Test 20  0637   WBC 6.4   RBC 3.44*   HGB 10.0*   HCT 33.2*   MCV 97   MCH 29.1   MCHC 30.1*   RDW 14.7            Last Comprehensive Metabolic Panel:  Sodium   Date Value Ref Range Status   2020 148 (H) 133 - 144 mmol/L Final     Potassium   Date Value Ref Range Status   2020 3.5 3.4 - 5.3 mmol/L Final     Chloride   Date Value Ref Range Status   2020 117 (H) 94 - 109 mmol/L Final     Carbon Dioxide   Date Value Ref Range Status   2020 26 20 - 32 mmol/L Final     Anion Gap   Date Value Ref Range Status   2020 5 3 - 14 mmol/L Final     Glucose   Date Value Ref Range Status   2020 90 70 - 99 mg/dL Final     Urea Nitrogen   Date Value Ref Range Status   2020 7 7 - 30 mg/dL Final     Creatinine   Date Value Ref Range Status   2020 0.66 0.52 - 1.04 mg/dL Final     GFR Estimate   Date Value Ref Range Status   2020 86 >60 mL/min/[1.73_m2] Final     Comment:     Non  GFR Calc  Starting 2018, serum creatinine based estimated GFR (eGFR) will be   calculated using the Chronic Kidney Disease Epidemiology Collaboration   (CKD-EPI) equation.       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  She presented to [facility] on 10/23/2020 with worsening RLE weakness, concerning for NMO exacerbation.  She was found to have enhancing lesions extending from T3 to T5/6.  She was transferred to [facility] for PLEX therapy.    #AQP4-positive NMO  #H/o T4 transverse myelitis with residual thoracic sensation loss  #Poor vision due to NMO lesions, stable  #Acute exacerbation of NMO leading to RLE weakness, resolving   Has legal blindness of the R eye with persistent painful extraocular movements. Left eye has lateral peripheral field cut at baseline with blurry and vision. Sensation decreased from T4 dermatome to toes, with mild L>R predominance for pain/temp and general sensation. Worsening RLE weakness in the setting of new exacerbation. Previous weakness exacerbations involving the RLE, but not the LLE. Each one has resolved with PLEX. Has failed Rituximab therapy due to relapses while therapeutic. Currently taking Mycophenolate for several months, although <6mo, and having current exacerbation.  - Discussed with Transfusion medicine, appreciate recs   - Plan for PLEX for 7 doses, continue every other day dosing (first dose 10/26)    - Tomorrow 10/5 is dose 6 of 7  - s/p Methylprednisone 1g for x5 days  - Famotidine stopped, GI ppx no longer indicated  - Monitor fibrinogen  - PTA Mycophenolate 500 BID   - PT/OT   - Meningococcal vaccine #2 prior to discharge (may wait until finished PLEX prior to administration)  - Outpatient neurologist Dr. Franklin planning to start eculizumab    #Paroxysmal  Atrial fibrillation   CHADSVASC: 8, HASBLED 3. Unclear onset; pt reports history of intermittent palpitations for many years. EKG 11/2 showed Afib not in RVR () and repeat EKG on 11/3 demonstrated sinus rhythm. Discussed with pharmacy RE apixaban: will cost 47 dollars a month; patient comfortable with this.  - Discussed with transfusion medicine, okay to start anticoagulation from apheresis perspective     - Apixaban BID  - Follow up with cardiology; follows with Dr. Hewitt at [facility]    #Pleuritic chest pain- Resolved   Resolved 11/4. Mild, anterior left side. Reproducible w palpation, but different character. Relieved with leaning forward. Murmur from AS makes auscultation for friction difficult. Troponin normal, ESR WNL, and EKG without ischemic changes.  - Monitor    #Hypokalemia, stable  - Electrolyte protocol    #Acute cystitis, resolved   - Ceftriaxone 1g for 5 day course (completed 10/27)     Chronic issues:  #Bronchospastic disease  - Continue PTA albuterol and Incruse Ellipta     #Hypertension, stable   - Continue PTA amlodipine and metoprolol     #H/o HFrEF, currently improved EF  - Continue PTA Lasix and metoprolol     #Hx of TIA  - Continue PTA ASA 325mg   - Continue PTA simvastatin 20mg     #Mood d/o  - Continue PTA fluoxetine 20mg      #Insomnia, stable   - Continue PTA trazodone 100mg qHS    FEN: Intermittent bolus PRN; electrolyte protocol; Regular diet  LDA: PIV x2, HD cath   PPx:  Starting apixaban    Code: FULL     Dispo: Receiving PLEX; Possible ARU on discharge, PT/OT and PM&R following      Filiberto Leo, MS3    Patient discussed with Attending Dr. Wild THORNTON, Cassandra Jarrell, was present with the medical student who participated in the service and in the documentation of the note.  I have verified the history and personally performed the physical exam and medical decision making.  I agree with the assessment and plan of care as documented in the note.  I have reviewed this  "documentation and made corrections where necessary to reflect the assessment and plan made by the neurology service on daily rounds.     In brief:  75yoF with h/o AQP-4 positive NMO and T4 transverse myelitis admitted for NMO exacerbation with acute on chronic RLE weakness, MRI showed enhancement of T3-T5/T6. Has previously been on rituximab and mycophenolate, considered failed therapy. Will require eculizimab. PLEX for a total of 7 days, scheduled every other day - tomorrow is dose 6 of 7. Subjectively, RLE strength is improving, though still weak. Seen by PM&R for pos sible ARU placement, they are following. New atrial fibrillation, started anticoagulation. Chest pain yesterday - troponin, ESR, EKG were unrevealing - has since resolved. Remainder of plan as above.    The patient was seen and discussed with the attending neurologist, Dr. Rome.    Cassandra Jarrell MD  PGY- 2 Neurology  Date of Service (when I saw the patient): 11/04/20    Attending physician: I saw and evaluated the patient with the resident team and I agree with the findings and plan of care as per Dr. Jarrell above.    Santos Rome MD   of Neurology    ______________________________  PHYSICAL EXAMINATION:   /64 (BP Location: Right arm)   Pulse 78   Temp 97.6  F (36.4  C) (Oral)   Resp 16   Ht 1.702 m (5' 7\")   Wt 88.9 kg (195 lb 15.8 oz)   SpO2 94%   BMI 30.70 kg/m       General: Pleasant, awake and alert, Sitting in bed, NAD, cooperative  HEENT: NC, AT, no epistaxis, no oral lesions   Resp: CTAB, no increased work of breathing, no accessory muscle use  Cardio: RRR, crescendo decrescendo murmur hear in all areas loudest in RUSB  CHEST: No mass or changes in the skin.   Abdomen: non-distended  Extremities: Warm and well perfused, no edema  Skin: Not jaundiced, no rash over extremities.    Psych: Normal mood and affect     Neuro:  Mental status: Awake, alert, attentive; oriented to self, location, date, " and situation.  Speech: Fluent, comprehension and repetition intact; No dysarthria  Cranial nerves: Right eye blind at baseline, peripheral Left field cut with poor visual acuity of central Left eye (baseline) although able to read print when holding object 6-8 inches away from eyes. Eyes mildly dysconjugate, Right pupil 4 mm and left pupil 3. Relative afferent defect: Left pupil with intact direct response and consensual response on right; Negative direct and consensual response on right. EOMI intact w/ smooth pursuit, face expression symmetric, facial sensation intact and symmetric, hearing intact to conversation, shoulder shrug strong, palate rise symmetric, tongue/uvula midline. Neck rotation strong.     Motor: Tone normal. 5/5 strength BUE and digits. LLE hip flexor is 5-/5; L knee flexor, ankle plantar- and dorsi-flexion are 5/5. RLE 4/5 in hip flexor, 4/5 knee flexion.  5/5 foot dorsi and plantar flexion on left, 4+/5 on right. No atrophy or twitches.   Reflexes: 3+ and symmetric biceps, triceps, brachioradialis, patellar. Unable to elicit achilles reflex bilaterally though able to detect a subtle reflex on re-evaluation.  No Villasenor's; Right toe upgoing. Crossed adductors absent today.   Sensory: LLE and left abdomen without pinprick sensation up to level umbilicus; temp absent distal to knee and reduced proximally. LLE with intact proprioception; vibration abent in foot and ankle but present at knee.   RLE: Pinprick/temp intact in with reduced proprioception in R toes and ankle; no vibration in toes but present at ankle and proximally. Sensation normal and symmetric in BUEs   Coordination: FNF intact bilaterally, no dysmetria.   Gait: Deferred    CURRENT MEDICATIONS:    amLODIPine  10 mg Oral Daily     apixaban ANTICOAGULANT  5 mg Oral BID     [Held by provider] aspirin  325 mg Oral QPM     [Held by provider] clopidogrel  75 mg Oral Daily     FLUoxetine  40 mg Oral Daily     furosemide  20 mg Oral Daily      heparin  3 mL Intracatheter Q24H     heparin  3 mL Intracatheter Q24H     heparin  5 mL Intracatheter Q24H     heparin  5 mL Intracatheter Q24H     metoprolol succinate ER  50 mg Oral BID     mycophenolate  500 mg Oral BID IS     polyethylene glycol  17 g Oral Daily     simvastatin  20 mg Oral At Bedtime     sodium chloride (PF)  3 mL Intracatheter Q8H     traZODone  100 mg Oral At Bedtime     PRN MEDICATIONS:  acetaminophen, albuterol, glucose **OR** dextrose **OR** glucagon, diphenhydrAMINE, heparin, heparin, hydrocortisone, lidocaine 4%, lidocaine (buffered or not buffered), melatonin, naloxone, sodium chloride (PF), sodium chloride (PF)    INFUSIONS:    ALLERGIES:  Allergies   Allergen Reactions     Prevacid [Lansoprazole] Rash     Pravastatin Rash     Patient is still not sure about it due many years ago for reaction.     IMAGING:  MRI C-spine (10/23)  IMPRESSION:  1.  No definite cervical spinal cord signal abnormality and no abnormal enhancement.  2.  Disc osteophyte complex with shallow protrusion and annular fissure/tear and possible tiny component of extrusion at C4-C5 without significant spinal canal stenosis, mild to moderate foraminal narrowing right more than left due to uncinate spurring   and facet arthropathy.  3.  Mild multilevel degenerative spondylosis otherwise, see report for level by level details.    MRI T-spine (10/23)  IMPRESSION:  1.  Increased cord parenchyma T2 signal extending from T3-T5/T6, with enhancement centered at the T4 level as well as a small focus of enhancement in the parenchyma at the T5 level and left lateral spinal canal at T5 which may represent a focus of nerve   root enhancement. This is consistent with but not diagnostic of demyelinating disease active/acute plaque formation.  2.  Near complete resolution of the previous cord signal abnormality centered at the T2 level.  3.  Remainder of study without significant MR abnormality.

## 2020-11-05 NOTE — PROGRESS NOTES
Care Management Follow Up    Length of Stay (days): 12    Expected Discharge Date: 11/07/20     Concerns to be Addressed:       Patient plan of care discussed at interdisciplinary rounds: Yes    Anticipated Discharge Disposition:  Rehab placement     Anticipated Discharge Services:  Rehab placement  Anticipated Discharge DME:      Patient/family educated on Medicare website which has current facility and service quality ratings:  yes  Education Provided on the Discharge Plan:  yes  Patient/Family in Agreement with the Plan:  yes    Referrals Placed by CM/SW:  Referred to Admissions ([place]) for [facility] (both ARU and TCU)  Private pay costs discussed: insurance costs    Additional Information:  SW attended am rounds report.  Per Dr. Jarrell, pt will complete her course of plex treatments on 11/7/2020 and will then be ready for discharge.  Per chart review, pt was seen by PMR on 11/2/2020 and at that time, PMR indicated that pt was ARU appropriate.    PMR indicated that they would complete a follow up visit on 11/6/2020.  SW met with pt and updated.  Per discussion, pt is now receptive to a rehab stay stating that she does not want to go home before she is ready to.  Pt also confirms that her son (Ray) has successfully moved from [place] into pt's home and will be available to assist as needed.  SW and pt discussed pt's preferences should ARU continue to be recommended by PMR.  SW reviewed list of 6 local ARU's and pt is clear that she would prefer [facility].  SW spoke with pt about TCU should this be PMR recommendation.   Pt has had previous experience in community SNF's for TCU and is hesitant to go this route.  Pt would like [facility] is she is not appropriate for ARU. [facility] phoned Admissions ([place]) for both [facility] and [facility], referred pt and informed of readiness for discharge anticipated on 11/7/2020.  SW will continue to follow for discharge planning.    CAMELIA Marcial  Social  Work, 6A  Phone:  [phone]  Pager:  [phone]  11/5/2020          SINGH ColinW

## 2020-11-05 NOTE — PLAN OF CARE
"[id number]  /54   Pulse 72   Temp 97.5  F (36.4  C) (Oral)   Resp 14   Ht 1.702 m (5' 7\")   Wt 88.9 kg (195 lb 15.8 oz)   SpO2 94%   BMI 30.70 kg/m      Status: Patient admitted on 10/24 with neuromyelitis optica  Vitals: VSS on RA, heart murmur heard-no new.   Neuros: A/Ox4. BUE-5/5 BLE 4/5 w/ N/T from waist to toe-not new, MD's informed. Left eye, blurred vision w/ field cut. Blind in right eye   IV: PIV-SL. CVC -HL (to be flushed again 11/6/20)  Diet: Regular  Bowel status: 2 soft BM this shift. +BS  : Voiding spontaneously  Skin:  open areas to coccyx and bad fold, barrier cream applied and WOC consult today.   Pain: Denies  Activity: Up w/ SBA with GB/Walker  Labs: K+ 3.1-started K+ replacement BID    Plan:   -PLEX 6/7 today completed. Last PLEX on Sat 11/7  Continue to follow POC  "

## 2020-11-05 NOTE — PROCEDURES
Laboratory Medicine and Pathology  Transfusion Medicine - Apheresis Procedure    Ladonna Sheriff MRN# [id number]   YOB: 1945 Age: 75 year old   Date of Admission: 10/24/2020     Reason for consult: Therapeutic plasma exchange (TPE) for neuromyelitis optica (NMO) flare           Assessment and Plan:   The patient is a 74 yo woman with a history of NMO, currently with flare.  She does not respond well to steroids.  TPE series requested.  We have performed TPE on her in the past.  Will plan for a series of 5 TPEs, every-other-day schedule.  Will also plan for 5% albumin as exchange fluid.  Will monitor coag labs and adjust with plasma as exchange fluid as needed.     The patient is tolerating her TPE today. She was using the commode when I came to visit her room during the procedure, but the apheresis nurse reported no complications.       TREATMENT PLAN:  Series of 5 TPEs, plan for 5% albumin as exchange fluid (3L plasma volume), monitor coags and include plasma as indicated.  Please do not start or continue ACE-inhibitors throughout the duration of a TPE series.  Please notify the apheresis physician of any upcoming procedures, surgeries, or biopsies as TPE with 5% albumin will affect coagulation factors.            Chief Complaint:   Right leg weakness, worsening vision, and numbness (band-like at chest)             Past Medical History:     Past Medical History:   Diagnosis Date     Cerebral infarction (H)      CHF (congestive heart failure) (H)      Hypertension      Lupus (H)      Lupus (H)      Optic neuritis      Optic neuritis      Sjogren's syndrome (H)      Sjogren's syndrome (H)      Temporal arteritis (H)      TIA (transient ischemic attack)      Uncomplicated asthma           Past Surgical History:     Past Surgical History:   Procedure Laterality Date     CHOLECYSTECTOMY       GYN SURGERY      hysterectomy     GYN SURGERY      tubal ligation     IR CVC NON TUNNEL PLACEMENT  6/21/2019     IR  CVC NON TUNNEL PLACEMENT  7/15/2020     IR CVC NON TUNNEL PLACEMENT  10/26/2020     IR FOLLOW UP VISIT INPATIENT  7/2/2019          Social History:     Social History     Socioeconomic History     Marital status:      Spouse name: Not on file     Number of children: Not on file     Years of education: Not on file     Highest education level: Not on file   Occupational History     Not on file   Social Needs     Financial resource strain: Not on file     Food insecurity     Worry: Not on file     Inability: Not on file     Transportation needs     Medical: Not on file     Non-medical: Not on file   Tobacco Use     Smoking status: Former Smoker     Packs/day: 0.00     Smokeless tobacco: Never Used   Substance and Sexual Activity     Alcohol use: Yes     Alcohol/week: 7.0 standard drinks     Types: 7 Shots of liquor per week     Comment: occ     Drug use: No     Sexual activity: Not on file   Lifestyle     Physical activity     Days per week: Not on file     Minutes per session: Not on file     Stress: Not on file   Relationships     Social connections     Talks on phone: Not on file     Gets together: Not on file     Attends Religion service: Not on file     Active member of club or organization: Not on file     Attends meetings of clubs or organizations: Not on file     Relationship status: Not on file     Intimate partner violence     Fear of current or ex partner: Not on file     Emotionally abused: Not on file     Physically abused: Not on file     Forced sexual activity: Not on file   Other Topics Concern     Not on file   Social History Narrative     Not on file          Family History:     Family History   Problem Relation Age of Onset     Asthma Mother      Lupus Sister      Bone Cancer Brother      Kidney Cancer Sister           Immunizations:     Most Recent Immunizations   Administered Date(s) Administered     Influenza (High Dose) 3 valent vaccine 10/21/2015     Influenza Vaccine, 6+MO IM  (QUADRIVALENT W/PRESERVATIVES) 10/17/2016     Influenza, Quad, High Dose, Pf, 65yr + 10/31/2020     Meningococcal (Bexsero ) 07/24/2020     Meningococcal (Menveo ) 07/24/2020     Pneumo Conj 13-V (2010&after) 07/08/2014     Pneumococcal 23 valent 07/07/2017     TDAP Vaccine (Adacel) 04/25/2013     Zoster vaccine, live 04/25/2013          Allergies:     Allergies   Allergen Reactions     Prevacid [Lansoprazole] Rash     Pravastatin Rash     Patient is still not sure about it due many years ago for reaction.          Medications:     Current Facility-Administered Medications   Medication     acetaminophen (TYLENOL) tablet 650 mg     albuterol (PROAIR HFA/PROVENTIL HFA/VENTOLIN HFA) 108 (90 Base) MCG/ACT inhaler 2 puff     amLODIPine (NORVASC) tablet 10 mg     apixaban ANTICOAGULANT (ELIQUIS) tablet 5 mg     [Held by provider] aspirin (ASA) EC tablet 325 mg     [Held by provider] clopidogrel (PLAVIX) tablet 75 mg     glucose gel 15-30 g    Or     dextrose 50 % injection 25-50 mL    Or     glucagon injection 1 mg     diphenhydrAMINE (BENADRYL) capsule 25 mg     FLUoxetine (PROzac) capsule 40 mg     furosemide (LASIX) tablet 20 mg     heparin 100 UNIT/ML injection 3 mL     heparin 100 UNIT/ML injection 3 mL     heparin 100 UNIT/ML injection 3 mL     heparin 100 UNIT/ML injection 3 mL     heparin 100 UNIT/ML injection 5 mL     heparin 100 UNIT/ML injection 5 mL     hydrocortisone (CORTAID) 1 % cream     lidocaine (LMX4) cream     lidocaine 1 % 0.1-1 mL     melatonin tablet 1 mg     metoprolol succinate ER (TOPROL-XL) 24 hr tablet 50 mg     mycophenolate (GENERIC EQUIVALENT) capsule 500 mg     naloxone (NARCAN) injection 0.1-0.4 mg     polyethylene glycol (MIRALAX) Packet 17 g     potassium chloride (KLOR-CON) Packet 40 mEq     simvastatin (ZOCOR) tablet 20 mg     sodium chloride (PF) 0.9% PF flush 10 mL     sodium chloride (PF) 0.9% PF flush 3 mL     sodium chloride (PF) 0.9% PF flush 3 mL     traZODone (DESYREL) tablet  100 mg          Review of Systems:   See above.           Data:     BMP  Recent Labs   Lab 11/05/20  0627 11/04/20  0637 11/03/20  0552 11/02/20  0626   * 148* 147* 147*   POTASSIUM 3.1* 3.5 3.5 3.4   CHLORIDE 115* 117* 115* 114*   GANESH 8.3* 8.1* 8.2* 8.0*   CO2 25 26 25 27   BUN 9 7 10 11   CR 0.72 0.66 0.66 0.73   GLC 85 90 92 103*     CBC  Recent Labs   Lab 11/05/20  0627 11/04/20  0637 11/03/20  0552 11/02/20  0626   WBC 5.3 6.4 9.4 11.3*   RBC 3.33* 3.44* 3.37* 3.80   HGB 9.8* 10.0* 9.8* 11.5*   HCT 32.3* 33.2* 31.5* 36.4   MCV 97 97 94 96   MCH 29.4 29.1 29.1 30.3   MCHC 30.3* 30.1* 31.1* 31.6   RDW 14.7 14.7 14.4 14.3    216 210 211     ATTESTATION STATEMENT:  I, Michaela Villarreal MD, PhD., was available by pager during the entire procedure.  I have reviewed the chart and discussed the patient and current procedure with the nursing staff.      Michaela Villarreal MD, PhD  Transfusion Medicine Attending  Medical Director, Blood Bank Laboratory  Pager [phone]

## 2020-11-05 NOTE — PROGRESS NOTES
[facility]  Neurology Progress Note    Patient Name:  Ladonna Sheriff    MRN:  [id number]      :  1945  Date of Service:  2020  Primary care provider:  Joceline Ty    Subjective:   No acute overnight events. Feeling well overall. No chest pain or SOB. Feels that overall, her RLE weakness as improved. Discussed recommendation to ARU or TCU following completion of PLEX therapy, which she expresses understanding.    Objective:  Temp: 96.8  F (36  C) Temp src: Oral BP: 131/68 Pulse: 100   Resp: 16 SpO2: 94 %(retrieved finger oximeter) O2 Device: None (Room air)        CBC RESULTS:   Recent Labs   Lab Test 20  0637   WBC 6.4   RBC 3.44*   HGB 10.0*   HCT 33.2*   MCV 97   MCH 29.1   MCHC 30.1*   RDW 14.7            Last Comprehensive Metabolic Panel:  Sodium   Date Value Ref Range Status   2020 146 (H) 133 - 144 mmol/L Final     Potassium   Date Value Ref Range Status   2020 3.1 (L) 3.4 - 5.3 mmol/L Final     Chloride   Date Value Ref Range Status   2020 115 (H) 94 - 109 mmol/L Final     Carbon Dioxide   Date Value Ref Range Status   2020 25 20 - 32 mmol/L Final     Anion Gap   Date Value Ref Range Status   2020 6 3 - 14 mmol/L Final     Glucose   Date Value Ref Range Status   2020 85 70 - 99 mg/dL Final     Urea Nitrogen   Date Value Ref Range Status   2020 9 7 - 30 mg/dL Final     Creatinine   Date Value Ref Range Status   2020 0.72 0.52 - 1.04 mg/dL Final     GFR Estimate   Date Value Ref Range Status   2020 81 >60 mL/min/[1.73_m2] Final     Comment:     Non  GFR Calc  Starting 2018, serum creatinine based estimated GFR (eGFR) will be   calculated using the Chronic Kidney Disease Epidemiology Collaboration   (CKD-EPI) equation.       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  She presented to [facility] on 10/23/2020 with worsening RLE weakness, concerning for NMO exacerbation.  She was found to have enhancing lesions extending from T3 to T5/6.  She was transferred to [facility] for PLEX therapy.    #AQP4-positive NMO  #H/o T4 transverse myelitis with residual thoracic sensation loss  #Poor vision due to NMO lesions, stable  #Acute exacerbation of NMO leading to RLE weakness, resolving   Has legal blindness of the R eye with persistent painful extraocular movements. Left eye has lateral peripheral field cut at baseline with blurry and vision. Sensation decreased from T4 dermatome to toes, with mild L>R predominance for pain/temp and general sensation. Worsening RLE weakness in the setting of new exacerbation. Previous weakness exacerbations involving the RLE, but not the LLE. Each one has resolved with PLEX. Has failed Rituximab therapy due to relapses while therapeutic. Currently taking Mycophenolate for several months, although <6mo, and having current exacerbation.  - Discussed with Transfusion medicine, appreciate recs   - Plan for PLEX for 7 doses, continue every other day dosing (first dose 10/26)    - Today is dose 6 of 7  - s/p Methylprednisone 1g for x5 days  - Famotidine stopped, GI ppx no longer indicated  - Monitor fibrinogen  - PTA Mycophenolate 500 BID   - PT/OT   - Meningococcal vaccine #2 prior to discharge (may wait until finished PLEX prior to  administration)  - Outpatient neurologist Dr. Franklin planning to start eculizumab    #Paroxysmal Atrial fibrillation   CHADSVASC: 8, HASBLED 3. Unclear onset; pt reports history of intermittent palpitations for many years. EKG 11/2 showed Afib not in RVR () and repeat EKG on 11/3 demonstrated sinus rhythm. Discussed with pharmacy RE apixaban: will cost 47 dollars a month; patient comfortable with this.  - Discussed with transfusion medicine, okay to start anticoagulation from apheresis perspective   - Apixaban BID  - Follow up with cardiology; follows with Dr. Hewitt at [facility]    #Pleuritic chest pain- Resolved   Resolved 11/4. Mild, anterior left side. Reproducible w palpation, but different character. Relieved with leaning forward. Murmur from AS makes auscultation for friction difficult. Troponin normal, ESR WNL, and EKG without ischemic changes.  - Monitor    #Hypokalemia, stable  - Electrolyte protocol    #Acute cystitis, resolved   - Ceftriaxone 1g for 5 day course (completed 10/27)     Chronic issues:  #Bronchospastic disease  - Continue PTA albuterol and Incruse Ellipta     #Hypertension, stable   - Continue PTA amlodipine and metoprolol     #H/o HFrEF, currently improved EF  - Continue PTA Lasix and metoprolol     #Hx of TIA  - Continue PTA ASA 325mg   - Continue PTA simvastatin 20mg     #Mood d/o  - Continue PTA fluoxetine 20mg      #Insomnia, stable   - Continue PTA trazodone 100mg qHS    FEN: Intermittent bolus PRN; electrolyte protocol; Regular diet  LDA: PIV x2, HD cath   PPx:  Starting apixaban    Code: FULL     Dispo: Receiving PLEX; Possible ARU on discharge, PT/OT and PM&R following    The patient was seen and discussed with the neurology attending, Dr. Rome.    Cassandra Jarrell MD  Neurology PGY-2  11/5/2020 13:35PM    Attending physician: I saw and evaluated the patient with the resident team and I agree with the findings and plan of care as per Dr. Jarrell above. Patient to undergo sixth of  "a planned 7 plasma exchange procedures today.    Disposition: Given limited improvement, anticipate patient will require ARU placement at discharge. She will be hospitalized for at least two additional nights pending completion of last PLEX treatment on Saturday.    Santos Rome MD   of Neurology  ______________________________  PHYSICAL EXAMINATION:   /68 (BP Location: Left arm)   Pulse 100   Temp 96.8  F (36  C) (Oral)   Resp 16   Ht 1.702 m (5' 7\")   Wt 88.9 kg (195 lb 15.8 oz)   SpO2 94%   BMI 30.70 kg/m       General: Pleasant, awake and alert, Sitting in bed, NAD, cooperative  HEENT: NC, AT, no epistaxis, no oral lesions   Resp: CTAB, no increased work of breathing, no accessory muscle use  Cardio: RRR, crescendo decrescendo murmur hear in all areas loudest in RUSB  CHEST: No mass or changes in the skin.   Abdomen: non-distended  Extremities: Warm and well perfused, no edema  Skin: Not jaundiced, no rash over extremities.    Psych: Normal mood and affect     Neuro:  Mental status: Awake, alert, attentive; oriented to self, location, date, and situation.  Speech: Fluent, comprehension and repetition intact; No dysarthria  Cranial nerves: Right eye blind at baseline, peripheral Left field cut with poor visual acuity of central. Eyes mildly dysconjugate, Right pupil 4 mm and left pupil 3. Relative afferent defect: Left pupil with intact direct response and consensual response on right; Negative direct and consensual response on right. EOMI intact w/ smooth pursuit, face expression symmetric, facial sensation intact and symmetric, hearing intact to conversation, shoulder shrug strong, palate rise symmetric, tongue/uvula midline. Neck rotation strong.     Motor: Tone normal. 5/5 strength BUE and digits. LLE hip flexor is 5-/5; L knee flexor, ankle plantar- and dorsi-flexion are 5/5. RLE 4/5 in hip flexor, 4/5 knee flexion.  5/5 foot dorsi and plantar flexion on left, 4+/5 " on right. No atrophy or twitches.   Reflexes: 3+ and symmetric biceps, triceps, brachioradialis, patellar. Unable to elicit achilles reflex bilaterally though able to detect a subtle reflex on re-evaluation.  No Villasenor's; Right toe upgoing. Crossed adductors absent today.   Sensory: LLE and left abdomen without pinprick sensation up to level umbilicus; temp absent distal to knee and reduced proximally. LLE with intact proprioception; vibration abent in foot and ankle but present at knee.   RLE: Pinprick/temp intact in with reduced proprioception in R toes and ankle; no vibration in toes but present at ankle and proximally. Sensation normal and symmetric in BUEs   Coordination: FNF intact bilaterally, no dysmetria.   Gait: Deferred    CURRENT MEDICATIONS:    amLODIPine  10 mg Oral Daily     apixaban ANTICOAGULANT  5 mg Oral BID     [Held by provider] aspirin  325 mg Oral QPM     [Held by provider] clopidogrel  75 mg Oral Daily     FLUoxetine  40 mg Oral Daily     furosemide  20 mg Oral Daily     heparin  3 mL Intracatheter Q24H     heparin  3 mL Intracatheter Q24H     heparin  5 mL Intracatheter Q24H     heparin  5 mL Intracatheter Q24H     metoprolol succinate ER  50 mg Oral BID     mycophenolate  500 mg Oral BID IS     polyethylene glycol  17 g Oral Daily     potassium chloride  40 mEq Oral BID     simvastatin  20 mg Oral At Bedtime     sodium chloride (PF)  3 mL Intracatheter Q8H     traZODone  100 mg Oral At Bedtime     PRN MEDICATIONS:  acetaminophen, albuterol, glucose **OR** dextrose **OR** glucagon, diphenhydrAMINE, heparin, heparin, hydrocortisone, lidocaine 4%, lidocaine (buffered or not buffered), melatonin, naloxone, sodium chloride (PF), sodium chloride (PF)    INFUSIONS:    ALLERGIES:  Allergies   Allergen Reactions     Prevacid [Lansoprazole] Rash     Pravastatin Rash     Patient is still not sure about it due many years ago for reaction.     IMAGING:  MRI C-spine (10/23)  IMPRESSION:  1.  No definite  cervical spinal cord signal abnormality and no abnormal enhancement.  2.  Disc osteophyte complex with shallow protrusion and annular fissure/tear and possible tiny component of extrusion at C4-C5 without significant spinal canal stenosis, mild to moderate foraminal narrowing right more than left due to uncinate spurring   and facet arthropathy.  3.  Mild multilevel degenerative spondylosis otherwise, see report for level by level details.    MRI T-spine (10/23)  IMPRESSION:  1.  Increased cord parenchyma T2 signal extending from T3-T5/T6, with enhancement centered at the T4 level as well as a small focus of enhancement in the parenchyma at the T5 level and left lateral spinal canal at T5 which may represent a focus of nerve   root enhancement. This is consistent with but not diagnostic of demyelinating disease active/acute plaque formation.  2.  Near complete resolution of the previous cord signal abnormality centered at the T2 level.  3.  Remainder of study without significant MR abnormality.

## 2020-11-05 NOTE — PLAN OF CARE
Status: Patient admitted on 10/24 with neuromyelitis optica  Vitals: VSS  Neuros: A/Ox4. BUE-5/5 BLE 4/5 w/ N/T from waist to toe. Left eye, blurred vision w/ field cut. Blind in right eye   IV: PIV-SL. CVC -HL  Diet: Regular  Bowel status: No BM  : Voiding spontaneously  Skin:  open areas to coccyx, barrier cream applied and WOC consult placed. open area under abdominal fold, barrier cream applied   Pain: Denies  Activity: Up w/ SBA  Plan: PLEX 6/7 tomorrow. Continue to follow POC

## 2020-11-05 NOTE — INTERIM SUMMARY
[facility] Pre-Admission Screen    Referral Source:  [facility]   Admit date to referring facility: 10/24/2020    Physical Medicine and Rehab Consult Completed: Yes,  recommending ARC placement on 11/2/2020 and f/u consult on 11/6/2020    Rehab Diagnosis:    Neurologic Conditions 03.8 Neuromuscular Disorders: Neuromyelitis Optica Eexacerbation    Justification for Acute Inpatient Rehabilitation  Ms. Ladonna Sheriff is a 75 year old female who presents with acute flare of RLE weakness in the setting of history of NMO c/b residual right eye visual loss, numbness below mid thorax, and bandlike sensation consistent with previous T4 transverse myelitis and on/off episodes of L eye optic neuritis. She was found to have enhancing lesions extending from T3 to T5/6.  She is demonstrating slow improvement in RLE strength in the setting of 7 rounds of PLEX treatment for NMO acute exacerbation. She remains below baseline in her strength, balance, coordination, sensation, proprioception, functional endurance, ambulation, and overall functional ability. She will need to be able to navigate 16 steps in order to safely return home. Once home, she has an excellent support system with her sons, including one who moved in with her this week. She is now medically stable and ready to discharge to acute inpatient rehab.     Current Active Medical Management Needs/Risks for Clinical Complications  The patient requires the high level of rehabilitation physician supervision that accompanies the provision of intensive rehabilitation therapy.  The patient needs the services of the rehabilitation physician to assess the patient medically and functionally and to modify the course of treatment as needed to maximize the patient's capacity to benefit from the rehabilitation process.  The patient requires medical mgmt and assessment of:     Neurologic status in setting of NMO  exacerbation: assess for neurologic recovery; monitor fibrinogen, on Mycophenolate 500 BID; f/u with OP neurologist Dr. Franlkin (planning to start eculizumab). Also on Simvastatin in setting of prior TIA.    Cardiac status in setting of Atrial fibrillation and HTN, HFrEF: Apixaban BID, Amlodipine, Metoprolol, Lasix; f/u with cardiology ( at [facility]).    Fluid and electrolyte balance in setting of hypokalemia and diuresis: assess potassium daily.    Pulmonary function in setting of Bronchospastic disease: Albuterol and Incruse Ellipta.     Bowel function: Miralax.    Integument in setting of forehead rash: Hydrocortisone cream BID prn.    Sleep in setting of insomnia: Trazodone.    Mental health in setting of mood disorder: Fluoxetine.    The patient is at risk for falls w/ injury and DVT.     Past Medical/Surgical History  Surgery in the past 100 days: No  Additional relevant past medical history: APQ4 positive Neuromyelitis Optica with residual R eye vision loss, numbness from the mid-chest down with a band like sensation consistent with a T4 transverse myelitis and on-off episodes of L eye optic neuritis with baseline low vision, stroke, CHF, HTN, Lupus, optic neuritis, Sjogrene's syndrome, Temporal arteritis, TIA (transient ischemic attack), Asthma, cholecystectomy, hysterectomy, tubal ligation.     Level of Functioning prior to Admission:  Home Environment  Lives with: alone  Living arrangements: condominium  Home accessibility: stairs to enter home  Stairs to enter home: 16  Stairs to negotiate within home:  none  Stair railings at home: B railings  Equipment currently used at home: cane, straight, grab bar, tub/shower, shower chair, walker, standard, raised toilet seat  Activity/exercise/self-care comment: No formal exercise program but remains active with daily activities.   Prior Functional Level Comment:  Sons assist w/ running errands, transportation, and stair navigation. Her son Hayden sets up her  medications and she takes the medications independently. Her son Hayden lives in the same [facility] building as Ladonna.     Level of Function: GG Scale (Section GG Functional Ability and Goals; CMS's SALDANA Version 3.0 Manual effective 10.1.2019):  PT Current Function Goals for Rehab   Bed Rolling 4 Supervision or touching assitance 6 Independent   Supine to Sit 4 Supervision or touching assitance 6 Independent   Sit to Stand 3 Partial/moderate assistance 6 Independent   Transfer 3 Partial/moderate assistance 6 Independent   Ambulation 1 Dependent 6 Independent   Stairs 1 Dependent 4 Supervision or touching assitance     OT Current Function Goals for Rehab   Feeding 6 Independent 6 Independent   Grooming 4 Supervision or touching assitance 6 Independent   Bathing Not completed 6 Independent   Upper Body Dressing 3 Partial/moderate assistance 6 Independent   Lower Body Dressing 3 Partial/moderate assistance 6 Independent   Toileting 6 Independent 6 Independent   Toilet Transfer 4 Supervision or touching assitance 6 Independent   Tub/Shower Transfer Not completed 6 Independent   Cognition Not Impaired Not applicable     SLP Current Function Goals for Rehab   Swallow Not Impaired Not applicable   Communication Not Impaired Not applicable       Current Diet:  Regular diet and Thin liquids    Summary Statement:  She ambulates 125 ft w/ WW w/ min A + w/c follow d/t R knee buckling and R knee pain. She requires min A x1 and heavy use of BUE support to correct knee buckle. She performed 3 stairs w/ min A x2 and B railings w/ R knee buckling noted when descending requiring min A x2 for correction. Pt does require visual cues for proper foot placement on stairs in setting of low vision. She performs bed mobility w/ SBA and extended time, using BUE support to assist RLE out of bed. She performs transfers STS w/ up to min A, pending level of fatigue. She is able to transfer from toilet w/ CGA to WW. She performs standing g/h ADLs w/  close SBA w/ 1 LOB noted, pt self correcting. Pt requiring UE support on counter for support during ADL completion. She will require full assessment of ADLs while admitted to acute inpatient rehab.     Expected Therapies and Services required during Inpatient Rehab Admission  Intensity of Therapy: Patient requires intensive therapies not available in a lesser level of care. Patient is motivated, making gains, and can tolerate 3 hours of therapy a day.  Physical Therapy: 90 minutes per day, at least 5 days a week for 12 days  Occupational Therapy: 90 minutes per day, at least 5 days a week for 12 days  Speech and Language Therapy: no SLP needs at this time.  Rehabilitation Nursing Needs: Patient requires 24 hour Rehab Nursing to manage bowel program, vitals, medication education, carryover of new rehab techniques, care coordination, skin integrity, assess neurologic status and provide safe environment for patient at falls risk.    Precautions/restrictions/special needs:  Precautions: fall precautions  Restrictions: none.   Special Needs: visually impaired: She is legally blind in the right eye, and has visual field defects on the left eye. Designated Visitor: Bandar.    Expected Level of Improvement: Pt will achieve a level of functional IND w/ use of WW for transfers, gait, and mod IND w/ ADLs w/ appropriate adaptive equipment. Pt will require up to CGA for stair negotiation.   Expected Length of time to achieve: 12 days    Anticipated Discharge Needs:  Anticipated Discharge Destination: Home  Anticipated Discharge Support: Family member  24/7 support available : Yes  Identified caregiver(s):  Son Bandar. Other sons Sridhar and Hayden also available for intermittent assist.  Anticipated Discharge Needs: Home with homecare vs OP therapy    Identified challenges/barriers: 16 stairs to enter her home    Rehab Admissions Liaison Signature:    Physician statement of review and agreement:  I have reviewed and am in agreement of  the need for IRF stay to address above functional and medical needs. In addition to above statements address, Patient requires intensive active and ongoing therapeutic intervention and multiple therapies; Patient requires medical supervision; Expected to actively participate in the intensive rehab program; Sufficiently stable to actively participate; Expectation for measurable improvement in functional capacity or adaption to impairments.    Physician Signature/Date/Time:

## 2020-11-05 NOTE — PROGRESS NOTES
[facility] Nurse Inpatient Wound Assessment       S: [facility] consulted for BL coccyx suspected pressure injury    B: Ladonna Sheriff is a 75 year old h/o AQP4-positive neuromyelitis optica c/b residual right eye visual loss, numbness below mid thorax and bandlike sensation consistent with previous T4 transverse myelitis, and on-off episodes of left eye optic neuritis.  She presented to [facility] on 10/23/2020 with worsening RLE weakness, concerning for NMO exacerbation.  She was found to have enhancing lesions extending from T3 to T5/6.  She was transferred to [facility] for PLEX therapy.    A: Pt has been having frequent loose stools with irritation to BL buttock consistent with incontinence associated dermatits and friction. Pt has no open areas or pressure injury at this time.       Ben Risk Assessment    Sensory Perception: 3-->slightly limited    Moisture: 4-->rarely moist   Activity: 3-->walks occasionally     Mobility: 3-->slightly limited   Nutrition: 3-->adequate   Friction and Shear: 3-->no apparent problem  Ben Score: 19       R: After any incontinent episode cleanse with liv cleanse and protect and melani dry wipes/washcloths. Ensure area is completely dry. Apply thin layer of critic aid barrier paste. Remove only soiled paste, then reapply thin layer. If complete removal is needed use baby/mineral oil.   *Avoid pre moisten wipes.   *Avoid use of brief  *Use single covidien pad and limit number of linens underneath patient.     Otto Szymanski RN CWOCN

## 2020-11-06 NOTE — CONSULTS
"    Interventional Radiology Consult Service Note    Patient is on IR schedule 11/7 for a [facility] NTCVC Removal (after PLEX).   Labs WNL for procedure.    No NPO required.    Please contact the IR on-call at [phone] pager when patient is done with PLEX and line is ok to be removed.    Case discussed with Dr. Velasco from IR and Dr. Jarrell. This is a 75 year old with a h/o AQP4-positive neuromyelitis optica c/b residual right eye visual loss, numbness below mid thorax and bandlike sensation consistent with previous T4 transverse myelitis, and on-off episodes of left eye optic neuritis.  She presented to [facility] on 10/23/2020 with worsening RLE weakness, concerning for NMO exacerbation.  She was found to have enhancing lesions extending from T3 to T5/6.  She was transferred to [facility] for PLEX therapy. IR placed LIJ NTCVC on 10/26. We have been consulted for line removal after final PLEX 11/7.    Expected date of discharge: After final PLEX and line removal.    Vitals:   /66 (BP Location: Left arm)   Pulse 72   Temp 96.7  F (35.9  C) (Oral)   Resp 16   Ht 1.702 m (5' 7\")   Wt 88.9 kg (195 lb 15.8 oz)   SpO2 93%   BMI 30.70 kg/m      Pertinent Labs:     Lab Results   Component Value Date    WBC 5.8 11/06/2020    WBC 5.3 11/05/2020    WBC 6.4 11/04/2020       Lab Results   Component Value Date    HGB 10.0 11/06/2020    HGB 9.8 11/05/2020    HGB 10.0 11/04/2020       Lab Results   Component Value Date     11/06/2020     11/05/2020     11/04/2020       Lab Results   Component Value Date    INR 1.27 (H) 11/05/2020    PTT 25 07/14/2020       Lab Results   Component Value Date    POTASSIUM 3.6 11/06/2020        KYLE Thomason CNP  Interventional Radiology  Pager: [phone]    "

## 2020-11-06 NOTE — PROGRESS NOTES
[facility]   PM&R Progress Note         Assessment/Recommendations   Ms. 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 Demonstrating slow improvement in RLE strength in the setting of multiple rounds of PLEX treatment, has completed 6/7 so far with last treatment tomorrow. She remains below baseline in her strength, coordination, ambulation, overall functional ability. She will need to be able to navigate 16 steps in order to safely return home. Once home, she has an excellent support system with her sons, including one who is now moved in with her.    Recommendations:  - The patient is a good candidate for ARU to address the above deficits secondary to her NMO flare  - She requires intensive inpatient rehab to address the above impairments, with the eventual goal of being able to navigate stairs safely to return home  - Frequency of the prescribed therapy is 3 hours of PT/OT, 90 min daily in each discipline during the rehab stay.   - She meets medical complexity and requires daily physician assessment to manage and assess cardiac status/Afib, pain, neurologic status, and other comorbidities mentioned above to maximize the patient's ability to benefit from the rehabilitation process.        Interval history:   Ladonna Sheriff was seen and examined at bedside. Doing well participating in rehab plan of care.      No acute events overnight. Slept OK. Pain is stable/controlled.  Still feels weak, uncoordinated in right leg.  Has been able to walk using front wheeled walker with therapy, however feels uncoordinated.  Performing self cares, but only feels safe doing so when therapist is standing by.  Notes feeling 30% recovered at this  point.  Is still very open to acute rehab at discharge.    With PT is walking 200 ft with CGA-Min A. Doing 3 steps. Still below baseline in endurance. In OT, requiring CGA for stability with performing ADLs.    Medications:    Current Facility-Administered Medications:      acetaminophen (TYLENOL) tablet 650 mg, 650 mg, Oral, Q6H PRN, Zeke Sherwood MD, 650 mg at 10/30/20 2157     albuterol (PROAIR HFA/PROVENTIL HFA/VENTOLIN HFA) 108 (90 Base) MCG/ACT inhaler 2 puff, 2 puff, Inhalation, Q6H PRN, Maryjane Damon MD     amLODIPine (NORVASC) tablet 10 mg, 10 mg, Oral, Daily, Maryjane Damon MD, 10 mg at 11/06/20 0903     apixaban ANTICOAGULANT (ELIQUIS) tablet 5 mg, 5 mg, Oral, BID, Lyudmila Mills MD, 5 mg at 11/06/20 0903     [Held by provider] aspirin (ASA) EC tablet 325 mg, 325 mg, Oral, QPM, Maryjane Damon MD, 325 mg at 11/02/20 1934     calcium carbonate (TUMS) chewable tablet 500 mg, 500 mg, Oral, Daily PRN, Maryjane Damon MD, 500 mg at 11/05/20 2205     [Held by provider] clopidogrel (PLAVIX) tablet 75 mg, 75 mg, Oral, Daily, Zeke Sherwood MD, 75 mg at 11/02/20 0750     glucose gel 15-30 g, 15-30 g, Oral, Q15 Min PRN **OR** dextrose 50 % injection 25-50 mL, 25-50 mL, Intravenous, Q15 Min PRN **OR** glucagon injection 1 mg, 1 mg, Subcutaneous, Q15 Min PRN, Tanmay Perry MD     diphenhydrAMINE (BENADRYL) capsule 25 mg, 25 mg, Oral, At Bedtime PRN, Zeke Sherwood MD     FLUoxetine (PROzac) capsule 40 mg, 40 mg, Oral, Daily, Zeke Sherwood MD, 40 mg at 11/06/20 0903     furosemide (LASIX) tablet 20 mg, 20 mg, Oral, Daily, Zeke Sherwood MD, 20 mg at 11/06/20 0903     heparin 100 UNIT/ML injection 3 mL, 3 mL, Intracatheter, Q24H PRN, Tanmay Perry MD     heparin 100 UNIT/ML injection 3 mL, 3 mL, Intracatheter, Q24H, Tanmay Perry MD, 3 mL at 11/06/20 0904     heparin 100 UNIT/ML injection 3 mL, 3 mL,  "Intracatheter, Q24H PRN, Tanmay Perry MD, 3 mL at 10/26/20 0951     heparin 100 UNIT/ML injection 3 mL, 3 mL, Intracatheter, Q24H, Tanmay Perry MD, 3 mL at 10/27/20 1538     heparin 100 UNIT/ML injection 5 mL, 5 mL, Intracatheter, Q24H, Dallas Whitney MD, 5 mL at 11/04/20 1649     heparin 100 UNIT/ML injection 5 mL, 5 mL, Intracatheter, Q24H, Dallas Whitney MD, 5 mL at 10/31/20 1627     hydrocortisone (CORTAID) 1 % cream, , Topical, BID PRN, Lyudmila Mills MD     lidocaine (LMX4) cream, , Topical, Q1H PRN, Cassandra Jarrell MD     lidocaine 1 % 0.1-1 mL, 0.1-1 mL, Other, Q1H PRN, Cassandra Jarrell MD     melatonin tablet 1 mg, 1 mg, Oral, At Bedtime PRN, Cassandra Jarrell MD     metoprolol succinate ER (TOPROL-XL) 24 hr tablet 50 mg, 50 mg, Oral, BID, Zeke Sherwood MD, 50 mg at 11/06/20 0903     mycophenolate (GENERIC EQUIVALENT) capsule 500 mg, 500 mg, Oral, BID IS, Zeke Sherwood MD, 500 mg at 11/06/20 0903     naloxone (NARCAN) injection 0.1-0.4 mg, 0.1-0.4 mg, Intravenous, Q2 Min PRN, Cassandra Jarrell MD     polyethylene glycol (MIRALAX) Packet 17 g, 17 g, Oral, Daily, Cassandra Jarrell MD, 17 g at 11/02/20 1225     simvastatin (ZOCOR) tablet 20 mg, 20 mg, Oral, At Bedtime, Maryjane Damon MD, 20 mg at 11/05/20 2202     sodium chloride (PF) 0.9% PF flush 10 mL, 10 mL, Intracatheter, Q1H PRN, Tanmay Perry MD     sodium chloride (PF) 0.9% PF flush 3 mL, 3 mL, Intracatheter, q1 min prn, Cassandra Jarrell MD     sodium chloride (PF) 0.9% PF flush 3 mL, 3 mL, Intracatheter, Q8H, Cassandra Jarrell MD, 3 mL at 11/06/20 0000     traZODone (DESYREL) tablet 100 mg, 100 mg, Oral, At Bedtime, Maryjane Damon MD, 100 mg at 11/05/20 2202         Physical Exam:   /66 (BP Location: Left arm)   Pulse 72   Temp 96.7  F (35.9  C) (Oral)   Resp 16   Ht 1.702 m (5' 7\")   Wt 88.9 kg (195 lb 15.8 oz)   SpO2 93%   BMI 30.70 kg/m    Gen: NAD, laying comfortably in " bed  HEENT: normocepalic, without obvious abnormality, atramatic  Cardio: Peripheral pulses intact  Pulm: No respiratory distress breathing on RA  Abd: NTND  Ext: warm to touch  Neuro: Continues with 5/5 bilateral upper and left lower extremity strength. RLE with 3/5 HF, 4/5 KF, KE, DF, inversion. 5/5 PF, eversion. No proprioception.    Labs:  Lab Results   Component Value Date    WBC 5.8 11/06/2020    HGB 10.0 (L) 11/06/2020    HCT 31.6 (L) 11/06/2020    MCV 96 11/06/2020     11/06/2020     Lab Results   Component Value Date     (H) 11/06/2020    POTASSIUM 3.6 11/06/2020    CHLORIDE 116 (H) 11/06/2020    CO2 24 11/06/2020    GLC 92 11/06/2020     Lab Results   Component Value Date    GFRESTIMATED 75 11/06/2020    GFRESTBLACK 87 11/06/2020     Lab Results   Component Value Date    AST 24 10/23/2020    ALT 26 10/23/2020    ALKPHOS 74 10/23/2020    BILITOTAL 0.5 10/23/2020     Lab Results   Component Value Date    INR 1.27 (H) 11/05/2020     Lab Results   Component Value Date    BUN 8 11/06/2020    CR 0.77 11/06/2020     Patient seen and staffed with Dr. Tan.    Bobo Mcneil MD  PGY-3, PM&R

## 2020-11-06 NOTE — PROGRESS NOTES
"CLINICAL NUTRITION SERVICES - ASSESSMENT NOTE     Nutrition Prescription    RECOMMENDATIONS FOR MDs/PROVIDERS TO ORDER:  - Continue to encourage adequate PO    Malnutrition Status:    - Unable to determine due to unable to assess all parameters of NFPE. Malnutrition is not highly suspected.     Recommendations already ordered by Registered Dietitian (RD):  - None currently at this time    Future/Additional Recommendations:  - Monitor ongoing adequacy of intakes.     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 She presented to [facility] on 10/23/2020 with worsening RLE weakness, concerning for NMO exacerbation and was found to have enhancing lesions extending from T3 to T5/6.  She was transferred to [facility] for PLEX therapy. PMH includes HTN, TIA, HF.     NUTRITION HISTORY  Pt tolerating PO/eating well throughout course of stay. No significant weight loss noted per weight history.     CURRENT NUTRITION ORDERS  Diet: Regular  Intake/Tolerance: Good PO tolerance; good appetite. 100% PO per flowsheets.     LABS  Labs reviewed  Na+ 146 (H)  Historical B12, vitamin D WNL    MEDICATIONS  Medications reviewed  Lasix  Miralax  Zocor  KLOR    ANTHROPOMETRICS  Height: 170.2 cm (5' 7\") 67\"   Most Recent Weight: (88.9 kg)    IBW: 61 kg  BMI: Obesity Grade I BMI 30-34.9  Weight History:   Wt Readings from Last 6 Encounters:   10/23/20 90.7 kg (200 lb)   08/12/20 91.3 kg (201 lb 4.8 oz)   07/21/20 90.7 kg (199 lb 15.3 oz)   06/01/20 95.9 kg (211 lb 6.4 oz)   11/15/19 100.5 kg (221 lb 8 oz)   10/30/19 95.3 kg (210 lb)   5% weight loss over ~ 1 year     Dosing Weight: 68 kg (adjusted based on current weight of 89 kg and IBW 61 kg)    ASSESSED NUTRITION NEEDS  Estimated Energy Needs: 8755-2884 kcals/day (25 - 30 " kcals/kg)  Justification: Maintenance  Estimated Protein Needs: 82+  grams protein/day (1.2+  grams of pro/kg)  Justification: Increased needs  Estimated Fluid Needs: 7176-4865 mL/day (1 mL/kcal)   Justification: Maintenance and Per provider pending fluid status    PHYSICAL FINDINGS  See malnutrition section below.   Per WOC assessment: Pt has been having frequent loose stools with irritation to BL buttock consistent with incontinence associated dermatits and friction. Pt has no open areas or pressure injury at this time.     MALNUTRITION  % Intake: No decreased intake noted  % Weight Loss: None noted  Subcutaneous Fat Loss: Unable to assess  Muscle Loss: Unable to assess  Fluid Accumulation/Edema: Unable to assess  Malnutrition Diagnosis: Unable to determine due to unable to assess all parameters of NFPE. Malnutrition is not highly suspected.     NUTRITION DIAGNOSIS  Predicted inadequate nutrient intake (calories and/or protein related to potential for menu fatigue as evidenced by prolonged hospital LOS      INTERVENTIONS  Implementation  Nutrition Education: No education needs assessed at this time   No nutrition interventions at this time. Continue to monitor adequacy of intakes and potential need for nutrition intervention      Goals  Patient to consume % of nutritionally adequate meal trays TID, or the equivalent with supplements/snacks.     Monitoring/Evaluation  Progress toward goals will be monitored and evaluated per protocol.     Estela Pop RD, [place], [facility]  Neuro ICU  Pager: [phone]

## 2020-11-06 NOTE — DISCHARGE SUMMARY
[facility]  NEUROLOGY DISCHARGE SUMMARY    Patient Name:  Ladonna Sheriff  MRN:  [id number]      :  1945      Date of Admission:  10/24/2020  Date of Discharge::  2020  Admitting Physician:  Rajwinder Spangler MD  Discharge Physician:  Chey Burt DO  Primary Care Provider:   Joceline Ty  Discharge Disposition:   Discharge to ARU    Admission Diagnoses  1.  AQP4 positive neuromyelitis optica  2.  Right lower extremity weakness secondary to neuromyelitis optica exacerbation  3.  Urinary tract infection    Discharge Diagnoses  1.  AQP4 positive neuromyelitis optica  2.  Right lower extremity weakness secondary to neuromyelitis optica exacerbation  3.  Acute cystitis, uncomplicated  4.  Paroxysmal atrial fibrillation           Brief History of Present Illness     Ladonna Sheriff is a 75yoF with PMH of AQP4 positive NMO with residual R eye vision loss, T4 transverse myelitis with numbness from the chest down, and episodes of L eye optic neuritis who presented to [facility] with worsening RLE weakness for the past week prior to presentation. She typically ambulates with a cane and had been requiring a walker to ambulate safely. She also reported urinary frequency, but no dysuria. MRI of the C- and T- spine at [facility] demonstrated T2 signal from T3-T5/6 with enhancement centered at the T4 level and T5 level. She was started on solumedrol 500mg BID and transferred to [facility] for PLEX therapy.     Please see H&P dated 10/24/2020 for complete HPI.           Hospital Course   #AQP4-positive NMO  #H/o T4 transverse myelitis with residual thoracic sensation loss  #Poor vision due to NMO lesions, stable  #Acute exacerbation of NMO leading to RLE weakness, improving  Admitted to [facility] for PLEX therapy. Interventional radiology placed a L internal jugular non-tunneled CVC and transfusion medicine was consulted to assist with PLEX  treatment. She completed a 5-day course of methylprednisolone and 7 runs of PLEX (scheduled every other day), which she tolerated appropriately. She was evaluated by physical therapy and occupational therapy. She was also evaluated by  PM&R service and they recommended discharge to ARU, which the patient was agreeable to. Following completion of PLEX therapy, her lower extremity strength was starting to improve and return to baseline. Interventional radiology removed her L internal jugular NTCVC on 11/7. During this hospitalization, she remained on mycophenolate 500mg BID with plan to follow-up with Dr. Franklin in neurology clinic to initiate eculizumab. She was given meningococcal vaccination #2 prior to discharge.  - Completed 7 runs of PLEX  - Completed 5-day course of methylprednisolone  - PTA Mycophenolate 500 BID   - Meningococcal vaccine #2 prior to discharge  - Follow-up with Dr. Franklin in neurology clinic discuss and initiate eculizumab therapy    #Paroxysmal Atrial fibrillation   #Pleuritic chest pain, resolved   CHADSVASC: 8, HASBLED 3. Unclear onset. She reports history of intermittent palpitations for many years. EKG 11/2 showed Afib not in RVR () and repeat EKG on 11/3 demonstrated sinus rhythm. She was having pleuritic chest pain that resolved 11/4. Mild, anterior left side. Troponin normal, ESR WNL, and EKG without ischemic changes.  - Apixaban BID  - Stop aspirin and clopidogrel  - Follow up with cardiology (follows with Dr. Hewitt at [facility])    #Hypokalemia, likely secondary to furosemide.  - Scheduled potassium 40mEq daily     #Acute cystitis, uncomplicated: completed 5-day course of ceftriaxone 1g (completed 10/27)    Follow-up Recommendations  - Follow-up with Dr. Franklin in neurology clinic for discussion of NMO therapy within 2-4 weeks of hospital discharge  - Follow-up with Dr. Hewitt in cardiology clinic to discuss HF and atrial fibrillation within 4-weeks  - Follow-up with PCP for  hospital discharge within 1-2 weeks         Pertinent Investigations     MRI cervical spine with and without contrast 10/23:  IMPRESSION:  1.  No definite cervical spinal cord signal abnormality and no abnormal enhancement.  2.  Disc osteophyte complex with shallow protrusion and annular fissure/tear and possible tiny component of extrusion at C4-C5 without significant spinal canal stenosis, mild to moderate foraminal narrowing right more than left due to uncinate spurring   and facet arthropathy.  3.  Mild multilevel degenerative spondylosis otherwise, see report for level by level details.    MRI thoracic spine with and without contrast 10/23:  IMPRESSION:  1.  Increased cord parenchyma T2 signal extending from T3-T5/T6, with enhancement centered at the T4 level as well as a small focus of enhancement in the parenchyma at the T5 level and left lateral spinal canal at T5 which may represent a focus of nerve   root enhancement. This is consistent with but not diagnostic of demyelinating disease active/acute plaque formation.  2.  Near complete resolution of the previous cord signal abnormality centered at the T2 level.  3.  Remainder of study without significant MR abnormality.           Consultations   1. Transfusion medicine for PLEX therapy  2. Interventional radiology for placement and removal of non-tunneled CVC          Physical Examination     Temp: 97.8  F (36.6  C) Temp src: Oral BP: 118/67 Pulse: 74   Resp: 16 SpO2: 93 % O2 Device: None (Room air)       General: Awake and alert, laying supine in bed, no acute distress.   HEENT: Dry skin mid forehead.  Normocephalic, atraumatic, no epistaxis, no oral lesions   Resp: Clear to auscultation  Cardio: RRR, crescendo decrescendo murmur most prominent in the RUSB.  Abdomen: Non-distended, non-tender.  Extremities: Warm and well perfused, no edema  Skin: Not jaundiced, no rash over extremities.      Neuro:  Mental status: Awake, alert, attentive; oriented to self,  location, date, and situation.  Speech: Fluent, comprehension and repetition intact; No dysarthria.  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 Eyes mildly dysconjugate, Right pupil 4 mm and left pupil 3. Relative afferent defect: Left pupil with intact direct response and consensual response on right; Negative direct and consensual response on right. EOMI intact w/ smooth pursuit, face expression symmetric, facial sensation intact and symmetric, hearing intact to conversation, shoulder shrug strong, palate rise symmetric, tongue/uvula midline. Neck rotation strong.     Motor: Tone normal. Moving lower extremities more readily. 5/5 and symmetric , elbow flexion and extension, shoulder abduction strength. LLE hip flexor is 4+/5; L knee flexor, ankle plantar- and dorsi-flexion are 5/5. 4/5 RLE hip flexor, knee flexion and extension, ankle dorsiflexion; 5/5 RLE ankle plantarflexion. No atrophy or twitches.   Reflexes: Hyperreflexic and symmetric biceps, triceps, brachioradialis, patellar. Unable to elicit achilles reflex. Toes upgoing, more prominent on the RLE.  Sensory: Sensation intact to light touch in UE and LEs. LLE and left abdomen with decreased pinprick sensation up to level umbilicus; temp absent distal to knee and reduced proximally. LLE with intact proprioception; vibration abent in foot and ankle but present at knee. RLE: Pinprick/temp intact; no vibration in toes but present at ankle and proximally. Sensation normal and symmetric in BUEs   Coordination: FNF intact bilaterally, no dysmetria.   Gait: Deferred         Discharge Plan     Discharge Medications  Current Discharge Medication List      START taking these medications    Details   apixaban ANTICOAGULANT (ELIQUIS) 5 MG tablet Take 1 tablet (5 mg) by mouth 2 times daily For atrial fibrillation  Qty:      Associated Diagnoses: Paroxysmal atrial fibrillation (H)      potassium chloride  ER (KLOR-CON M) 20 MEQ CR tablet Take 2 tablets (40 mEq) by mouth daily For hypokalemia  Qty:      Associated Diagnoses: Hypokalemia         CONTINUE these medications which have NOT CHANGED    Details   albuterol (PROAIR HFA/PROVENTIL HFA/VENTOLIN HFA) 108 (90 Base) MCG/ACT inhaler Inhale 2 puffs into the lungs every 6 hours as needed       amLODIPine (NORVASC) 10 MG tablet Take 10 mg by mouth daily      B Complex-C (VITAMIN B COMPLEX W/VITAMIN C) TABS tablet Take 1 tablet by mouth daily      Calcium-Vitamin D 600-200 MG-UNIT TABS Take 1 tablet by mouth every evening       diphenhydrAMINE-acetaminophen (TYLENOL PM)  MG tablet Take 1 tablet by mouth nightly as needed for sleep      FLUoxetine (PROZAC) 40 MG capsule Take 40 mg by mouth daily       furosemide (LASIX) 20 MG tablet Take 20 mg by mouth daily       metoprolol succinate ER (TOPROL-XL) 50 MG 24 hr tablet Take 50 mg by mouth 2 times daily      Multiple Vitamins-Minerals (MULTIVITAL PO) Take 1 tablet by mouth every morning       mycophenolate (GENERIC EQUIVALENT) 500 MG tablet Take 500 mg by mouth 2 times daily      simvastatin (ZOCOR) 20 MG tablet Take 20 mg by mouth At Bedtime      traZODone (DESYREL) 100 MG tablet Take 100 mg by mouth At Bedtime      umeclidinium (INCRUSE ELLIPTA) 62.5 MCG/INH inhaler Inhale 1 puff into the lungs daily as needed       vitamin C (ASCORBIC ACID) 500 MG tablet Take 500 mg by mouth daily      trolamine salicylate (ASPERCREME) 10 % external cream Apply topically 2 times daily as needed for moderate pain    Associated Diagnoses: Neuromyelitis optica (H)         STOP taking these medications       aspirin (ASA) 325 MG EC tablet Comments:   Reason for Stopping:         clopidogrel (PLAVIX) 75 MG tablet Comments:   Reason for Stopping:               Discharge Recommendations     General info for SNF    Length of Stay Estimate: Short Term Care: Estimated # of Days <30  Condition at Discharge: Improving  Level of care:skilled    Rehabilitation Potential: Good  Admission H&P remains valid and up-to-date: Yes  Recent Chemotherapy: N/A  Use Nursing Home Standing Orders: N/A     Mantoux instructions    Give two-step Mantoux (PPD) Per Facility Policy Yes     Reason for your hospital stay    Right lower extremity weakness due to NMO exacerbation     Follow Up and recommended labs and tests    Follow up with primary care provider in 1-2 weeks.  No follow up labs or test are needed.     Activity - Up with nursing assistance     Follow Up ([facility])    Follow up with Dr. Franklin , at (location with clinic name or city) [facility], within 1-2 weeks  for hospital follow- up and to discuss started eculizumab. No follow up labs or test are needed.    Appointments on [place] and/or [facility] (with [facility] or [facility] provider or service). Call [phone] if you haven't heard regarding these appointments within 7 days of discharge.     Follow Up    Follow up with Dr. Hewitt , at (location with clinic name or city) [facility] in [place], within 2-4 weeks  for hospital follow- up and discussion of newly diagnosed paroxysmal atrial fibrilltion. No follow up labs or test are needed.     Full Code     Physical Therapy Adult Consult    Evaluate and treat as clinically indicated.    Reason:  Lower extremity weakness due to NMO exacerbation     Occupational Therapy Adult Consult    Evaluate and treat as clinically indicated.    Reason:  Weakness     Fall precautions     Advance Diet as Tolerated    Follow this diet upon discharge: Orders Placed This Encounter      Regular Diet Adult       The patient was seen and discussed with the attending physician, Dr. Burt.     Cassandra Jarrell MD  Neurology PGY-2  November 9, 2020 10:22AM

## 2020-11-06 NOTE — PLAN OF CARE
Status: Patient admitted on 10/24 with neuromyelitis optica.   Vitals: Stable on RA.   Neuros: A&O x4. BUE 5/5 BLE 4/5 with numbness and tingling from waist to toes.  Left eye with blurred vision and field cut. R eye blind.   IV: PIV SL. CVC HL.   Diet: Regular diet.  Bowel status: No BM; +BS.   : Voiding spontaneously.   Skin: Open areas around coccyx; see WOC orders.   Pain: Denies.   Activity: SBA, GB and walker.   Plan: PLEX 6/7 received yesterday. 7/7 PLEX scheduled for Saturday. Continue to monitor and follow current POC.

## 2020-11-06 NOTE — PROGRESS NOTES
[facility]  Neurology Progress Note    Patient Name:  Ladonna Sheriff    MRN:  [id number]      :  1945  Date of Service:  2020  Primary care provider:  Joceline Ty    Subjective:   No acute overnight events. She slept well overnight and has been eating and drinking appropriately. No shortness of breath or chest pain. Her son recently moved into her home and will be living with her after she is discharged from rehab.    Objective:  Temp: 97.5  F (36.4  C) Temp src: Oral BP: 110/63 Pulse: 69   Resp: 16 SpO2: 93 % O2 Device: None (Room air)      Recent Labs   Lab 20  0630 20  0855 20  0627 20  0637 20  0859 20  0552 20  0701 20  0701   WBC 5.8  --  5.3 6.4  --  9.4   < > 8.7   HGB 10.0*  --  9.8* 10.0*  --  9.8*   < > 11.3*   MCV 96  --  97 97  --  94   < > 95     --  205 216  --  210   < > 178   INR  --  1.27*  --   --   --  1.19*  --  1.15*   *  --  146* 148*  --  147*   < > 146*   POTASSIUM 3.6  --  3.1* 3.5  --  3.5   < > 3.5   CHLORIDE 116*  --  115* 117*  --  115*   < > 113*   CO2 24  --  25 26  --  25   < > 28   BUN 8  --  9 7  --  10   < > 13   CR 0.77  --  0.72 0.66  --  0.66   < > 0.62   ANIONGAP 6  --  6 5  --  6   < > 4   GANESH 8.5  --  8.3* 8.1*  --  8.2*   < > 8.0*   GLC 92  --  85 90  --  92   < > 97   TROPI  --   --   --   --  <0.015  --   --   --     < > = values in this interval not displayed.     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  She presented to [facility] on 10/23/2020 with worsening RLE weakness, concerning for NMO exacerbation.  She was found to have enhancing lesions extending from T3 to T5/6.  She was transferred to [facility] for PLEX  therapy.    #AQP4-positive NMO  #H/o T4 transverse myelitis with residual thoracic sensation loss  #Poor vision due to NMO lesions, stable  #Acute exacerbation of NMO leading to RLE weakness, resolving   Has legal blindness of the R eye with persistent painful extraocular movements. Left eye has lateral peripheral field cut at baseline with blurry and vision. Sensation decreased from T4 dermatome to toes, with mild L>R predominance for pain/temp and general sensation. Worsening RLE weakness in the setting of new exacerbation. Previous weakness exacerbations involving the RLE, but not the LLE. Each one has resolved with PLEX. Has failed Rituximab therapy due to relapses while therapeutic. Currently taking Mycophenolate for several months, although <6mo, and having current exacerbation.  - Discussed with Transfusion medicine, appreciate recs   - Plan for PLEX for 7 doses, continue every other day dosing (first dose 10/26)    - Plan for dose 7 of 7 tomorrow 11/7   - Will be medically ready for discharge to ARU vs TCU tomorrow 11/7  - s/p Methylprednisone 1g for x5 days  - Famotidine stopped, GI ppx no longer indicated  - Monitor fibrinogen  - PTA Mycophenolate 500 BID   - PT/OT   - Meningococcal vaccine #2 prior to discharge (may wait until finished PLEX prior to administration)  - Outpatient neurologist Dr. Franklin planning to start eculizumab    #Paroxysmal Atrial fibrillation   CHADSVASC: 8, HASBLED 3. Unclear onset; pt reports history of intermittent palpitations for many years. EKG 11/2 showed Afib not in RVR () and repeat EKG on 11/3 demonstrated sinus rhythm. Discussed with pharmacy RE apixaban: will cost 47 dollars a month; patient comfortable with this.  - Discussed with transfusion medicine, okay to start anticoagulation from apheresis perspective   - Apixaban BID  - Follow up with cardiology; follows with Dr. Hewitt at [facility]    #Pleuritic chest pain, resolved   Resolved 11/4. Mild, anterior left side.  "Reproducible w palpation, but different character. Relieved with leaning forward. Murmur from AS makes auscultation for friction difficult. Troponin normal, ESR WNL, and EKG without ischemic changes.  - Monitor    #Hypokalemia  - Electrolyte protocol    #Acute cystitis, resolved   - Ceftriaxone 1g for 5 day course (completed 10/27)     Chronic issues:  #Bronchospastic disease  - Continue PTA albuterol and Incruse Ellipta     #Hypertension, stable   - Continue PTA amlodipine and metoprolol     #HFrEF, currently improved EF  - Continue PTA Lasix and metoprolol     #Hx of TIA   - Continue PTA ASA 325mg   - Continue PTA simvastatin 20mg     #Mood disorder  - Continue PTA fluoxetine 20mg      #Insomnia, stable   - Continue PTA trazodone 100mg qHS    FEN: Intermittent bolus PRN; electrolyte protocol; Regular diet  LDA: PIV x2, HD cath   PPx:  Starting apixaban    Code: FULL     Dispo: Receiving PLEX; plan discharge to ARU vs TCU, PT/OT and PM&R following. Medically ready for discharge tomorrow 11/7.    The patient was seen and discussed with the neurology attending, Dr. Rome.    Cassandra Jarrell MD  Neurology PGY-2  11/6/2020 10:36AM    Attending physician: I saw and evaluated the patient with the resident team and I agree with the findings and plan of care as per Dr. Jarrell above.    Patient is stable, last PLEX session (7/7) tomorrow. PM&R to see today regarding rehab unit placement.    Disposition: ARU/TCU pending PM&R evaluation, patient should be medically ready for discharge tomorrow after last PLEX session and removal of central line.    Santos Rome MD   of Neurology  ______________________________  PHYSICAL EXAMINATION:   /63 (BP Location: Left arm)   Pulse 69   Temp 97.5  F (36.4  C) (Oral)   Resp 16   Ht 1.702 m (5' 7\")   Wt 88.9 kg (195 lb 15.8 oz)   SpO2 93%   BMI 30.70 kg/m       General: Awake and alert, laying supine in bed, no acute distress.   HEENT: " Normocephalic, atraumatic, no epistaxis, no oral lesions   Resp: Breathing comfortably on room air. No accessory muscle use.  Cardio: RRR, murmur most prominent in the RUSB.  Abdomen: Non-distended, non-tender.  Extremities: Warm and well perfused, no edema  Skin: Not jaundiced, no rash over extremities.      Neuro:  Mental status: Awake, alert, attentive; oriented to self, location, date, and situation.  Speech: Fluent, comprehension and repetition intact; No dysarthria  Cranial nerves: Right eye blind at baseline, peripheral left field cut with poor visual acuity of central. Eyes mildly dysconjugate, Right pupil 4 mm and left pupil 3. Relative afferent defect: Left pupil with intact direct response and consensual response on right; Negative direct and consensual response on right. EOMI intact w/ smooth pursuit, face expression symmetric, facial sensation intact and symmetric, hearing intact to conversation, shoulder shrug strong, palate rise symmetric, tongue/uvula midline. Neck rotation strong.     Motor: Tone normal. Moving lower extremities more readily. 5/5 strength BUE and digits. LLE hip flexor is 5-/5; L knee flexor, ankle plantar- and dorsi-flexion are 5/5. RLE 4/5 in hip flexor, 4/5 knee flexion.  5/5 foot dorsi and plantar flexion on left, 4+/5 on right. No atrophy or twitches.   Reflexes: 3+ and symmetric biceps, triceps, brachioradialis, patellar. Unable to elicit achilles reflex. Toes upgoing.  Sensory: Sensation intact to light touch in UE and LEs. LLE and left abdomen with decreased pinprick sensation up to level umbilicus; temp absent distal to knee and reduced proximally. LLE with intact proprioception; vibration abent in foot and ankle but present at knee.   RLE: Pinprick/temp intact; no vibration in toes but present at ankle and proximally. Sensation normal and symmetric in BUEs   Coordination: FNF intact bilaterally, no dysmetria.   Gait: Deferred    CURRENT MEDICATIONS:    amLODIPine  10 mg  Oral Daily     apixaban ANTICOAGULANT  5 mg Oral BID     [Held by provider] aspirin  325 mg Oral QPM     [Held by provider] clopidogrel  75 mg Oral Daily     FLUoxetine  40 mg Oral Daily     furosemide  20 mg Oral Daily     heparin  3 mL Intracatheter Q24H     heparin  3 mL Intracatheter Q24H     heparin  5 mL Intracatheter Q24H     heparin  5 mL Intracatheter Q24H     metoprolol succinate ER  50 mg Oral BID     mycophenolate  500 mg Oral BID IS     polyethylene glycol  17 g Oral Daily     potassium chloride  40 mEq Oral BID     simvastatin  20 mg Oral At Bedtime     sodium chloride (PF)  3 mL Intracatheter Q8H     traZODone  100 mg Oral At Bedtime     PRN MEDICATIONS:  acetaminophen, albuterol, calcium carbonate, glucose **OR** dextrose **OR** glucagon, diphenhydrAMINE, heparin, heparin, hydrocortisone, lidocaine 4%, lidocaine (buffered or not buffered), melatonin, naloxone, sodium chloride (PF), sodium chloride (PF)    INFUSIONS:    ALLERGIES:  Allergies   Allergen Reactions     Prevacid [Lansoprazole] Rash     Pravastatin Rash     Patient is still not sure about it due many years ago for reaction.     IMAGING:  MRI C-spine (10/23)  IMPRESSION:  1.  No definite cervical spinal cord signal abnormality and no abnormal enhancement.  2.  Disc osteophyte complex with shallow protrusion and annular fissure/tear and possible tiny component of extrusion at C4-C5 without significant spinal canal stenosis, mild to moderate foraminal narrowing right more than left due to uncinate spurring   and facet arthropathy.  3.  Mild multilevel degenerative spondylosis otherwise, see report for level by level details.    MRI T-spine (10/23)  IMPRESSION:  1.  Increased cord parenchyma T2 signal extending from T3-T5/T6, with enhancement centered at the T4 level as well as a small focus of enhancement in the parenchyma at the T5 level and left lateral spinal canal at T5 which may represent a focus of nerve   root enhancement. This is  consistent with but not diagnostic of demyelinating disease active/acute plaque formation.  2.  Near complete resolution of the previous cord signal abnormality centered at the T2 level.  3.  Remainder of study without significant MR abnormality.

## 2020-11-06 NOTE — PLAN OF CARE
Status: Pt admitted for neuromyelitis optica, receiving PLEX treatment   Vitals: VSS on RA  Neuros: Alert & oriented x4, BUE 5/5, BLE 4/5 w/numbness and tingling from waist to toes, left eye noted with blurry vision, R eye blind, L field cut    IV: PIV SL  Resp/trach: Denies SOB  Diet: Regular diet, adequate intake  Bowel status: BS+, incontinent of bowel this shift  : Voiding without difficulty, urgency at times   Skin: Open coccyx area, see WOC merari   Pain: Declined  Activity: Up SBA/GB/Walker  Plan: Continue to monitor and follow POC, plan for 7/7 PLEX tomorrow

## 2020-11-06 NOTE — PROGRESS NOTES
Care Management Follow Up    Length of Stay (days): 13    Expected Discharge Date: 11/07/20     Concerns to be Addressed:       Patient plan of care discussed at interdisciplinary rounds: Yes    Anticipated Discharge Disposition:  ARU placement at [facility]     Anticipated Discharge Services:  ARU placement at [facility]  Anticipated Discharge DME:  Not applicable    Patient/family educated on Medicare website which has current facility and service quality ratings:  Preferences discussed during previous SW contact, refer to progress notes.  Education Provided on the Discharge Plan:  yes  Patient/Family in Agreement with the Plan:  yes    Referrals Placed by CM/SW:  Referral previously made to [facility] (ARU/TCU) Admissions  Private pay costs discussed:yes  Additional Information:  Pt was re-evaluated by PMR today and ARU is recommended.  Pt will complete Plex treatments tomorrow and per Dr. Jarrell, will be ready for discharge. KOBY phoned [facility] ([facility]) and informed of the above.   [facility] does not anticipate bed availability tomorrow and states that they may or may not have an opening on Sunday.  Bed availability is anticipated on Monday. KOBY updated pt and General Neurology Attending.      CAMELIA Marcial  Social Work, 6A  Phone:  [phone]  Pager:  [phone]  11/6/2020          ANAMARIA Colin

## 2020-11-06 NOTE — TELEPHONE ENCOUNTER
Prior Authorization Infusion/Clinic Administered Request    Location:   Diagnosis and ICD:[facility]  Drug/Therapy: Soliris 900 mg weekly x 4 then on week 5 start 1200 mg every other week going forward    Previously Tried and Failed Therapies: rituximab    Date of provider note with supporting information: 8/12/20    Urgency (When is the patient scheduled?): ASAP     Would you like to include any research articles? Na         If yes please call [phone] for further instructions about sending that information

## 2020-11-07 NOTE — PLAN OF CARE
Status: Admitted for neuromyelitis optica, Received Methylpred and had 7/7 PLEX treatments. IR is coming to remove PLEX catheter now.   Vitals: VSS on RA.   Neuros: A&Ox4. L eye blurry vision, R eye blind and dysconjugate, L field cut. BUE 5/5, BLE 4/5 with RLE slightly weaker,  She feels that this has improved since admission., N/T from waist to toes-unchanged from admission.  IV: PIV SL.    Resp/trach: LS clear.   Diet: Regular diet, but did not eat breakfast because of PLEX an did not feel like eating lunch yet.  Bowel status: BM x 2 this shift, incontinent of 1 of them.  : Voids without difficulty.   Skin: Rash is almost healed-barrier cream applied.   Pain: Denies.   Activity: Standby assist, GB, and walker. Using bedside commode.   Plan: Discharge to [facility] Sunday or Monday

## 2020-11-07 NOTE — PLAN OF CARE
PT / 6A: Cancel. Pt receiving PLEX this AM. Per OT pt declining therapies this PM 2/2 significant fatigue.

## 2020-11-07 NOTE — PLAN OF CARE
Status: Pt on 6A for neuromyelitis optica, currently receiving PLEX tx.   Vitals: VSS on RA, slightly hypotensive within parameters   Neuros: A&Ox4. L eye blurry vision, R eye blind and dysconjugate, L field cut. BUE 5/5, BLE 4/5 with RLE slightly weaker, N/T from waist to toes.  IV: PIV SL. CVC hep locked.   Resp/trach: LS clear. Denies SOB.  Diet: Regular  Bowel status: No BM this shift  : Voids without difficulty.   Skin: Wound on coccyx, see WOC care order.   Pain: Denies.   Activity: Standby assist, GB, and walker. Using bedside commode.   Social: No visitors this shift.   Plan: PLEX 7/7 today. Discharge to ARU Sunday or Monday. Continue to monitor and follow plan of care.

## 2020-11-07 NOTE — PLAN OF CARE
Status: Pt on 6A for neuromyelitis optica, currently receiving PLEX tx.   Vitals: VSS on RA.   Neuros: A&O x4. L eye blurry vision, R eye blind and dysconjugate. L field cut. BUE 5/5, BLE 4/5. N/T from waist to toes.  IV: PIV SL. CVC hep locked.   Resp/trach: LS clear. Denies SOB.  Diet: Regular, good intake.  Bowel status: BS+. Incontinent BM this morning.   : Voids without difficulty.   Skin: Wound on coccyx, see WOC care order.   Pain: Denies.   Activity: Standby assist, GB, and walker. Using bedside commode.   Social: No visitors this shift.   Plan: PLEX 7/7 tomorrow. Discharge to ARU Sunday or Monday. Continue to monitor and follow plan of care.

## 2020-11-07 NOTE — PROGRESS NOTES
[facility]  Neurology Progress Note    Patient Name:  Ladonna Sheriff    MRN:  [id number]      :  1945  Date of Service:  2020  Primary care provider:  Joceline Ty    Subjective:   No acute overnight events.   She is feeling fine this morning. She sleep well last night. She has been eating and drinking without much concern for these. She has been urinating and having bowel movements regularly. No major concerns today. After PLEX this morning, she is agreeable to ARU.    Objective:  Temp: 97.9  F (36.6  C) Temp src: Oral BP: 98/53 Pulse: 70   Resp: 18 SpO2: 95 % O2 Device: None (Room air)      Recent Labs   Lab 20  0630 20  0855 20  0627 20  0637 20  0859 20  0552 20  0701 20  0701   WBC 5.8  --  5.3 6.4  --  9.4   < > 8.7   HGB 10.0*  --  9.8* 10.0*  --  9.8*   < > 11.3*   MCV 96  --  97 97  --  94   < > 95     --  205 216  --  210   < > 178   INR  --  1.27*  --   --   --  1.19*  --  1.15*   *  --  146* 148*  --  147*   < > 146*   POTASSIUM 3.6  --  3.1* 3.5  --  3.5   < > 3.5   CHLORIDE 116*  --  115* 117*  --  115*   < > 113*   CO2 24  --  25 26  --  25   < > 28   BUN 8  --  9 7  --  10   < > 13   CR 0.77  --  0.72 0.66  --  0.66   < > 0.62   ANIONGAP 6  --  6 5  --  6   < > 4   GANESH 8.5  --  8.3* 8.1*  --  8.2*   < > 8.0*   GLC 92  --  85 90  --  92   < > 97   TROPI  --   --   --   --  <0.015  --   --   --     < > = values in this interval not displayed.     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  She presented to [facility] on 10/23/2020 with worsening RLE weakness, concerning for NMO exacerbation.  She was found to have enhancing lesions extending from T3 to T5/6.   She was transferred to [facility] for PLEX therapy.    #AQP4-positive NMO  #H/o T4 transverse myelitis with residual thoracic sensation loss  #Poor vision due to NMO lesions, stable  #Acute exacerbation of NMO leading to RLE weakness, resolving   Has legal blindness of the R eye with persistent painful extraocular movements. Left eye has lateral peripheral field cut at baseline with blurry and vision. Sensation decreased from T4 dermatome to toes, with mild L>R predominance for pain/temp and general sensation. Worsening RLE weakness in the setting of new exacerbation. Previous weakness exacerbations involving the RLE, but not the LLE. Each one has resolved with PLEX. Has failed Rituximab therapy due to relapses while therapeutic. Currently taking Mycophenolate for several months, although <6mo, and having current exacerbation.  - Discussed with Transfusion medicine, appreciate recs   - Plan for PLEX for 7 doses, continue every other day dosing (first dose 10/26)    - Plan for dose 7 of 7 today 11/7  - Page on-call IR for L internal jugular CVC removal, consult has already been placed  - s/p Methylprednisone 1g for x5 days  - Famotidine stopped, GI ppx no longer indicated  - Monitor fibrinogen  - PTA Mycophenolate 500 BID   - PT/OT   - Meningococcal vaccine #2 prior to discharge (may wait until finished PLEX prior to administration)  - Outpatient neurologist Dr. Franklin planning to start eculizumab    #Paroxysmal Atrial fibrillation   #Pleuritic chest pain, resolved   CHADSVASC: 8, HASBLED 3. Unclear onset; pt reports history of intermittent palpitations for many years. EKG 11/2 showed Afib not in RVR () and repeat EKG on 11/3 demonstrated sinus rhythm. Discussed with pharmacy RE apixaban: will cost 47 dollars a month; patient comfortable with this. She was having pleuritic chest pain that resolved 11/4. Mild, anterior left side. Troponin normal, ESR WNL, and EKG without ischemic changes.  - Discussed with transfusion  "medicine, okay to start anticoagulation from apheresis perspective   - Apixaban BID  - Follow up with cardiology; follows with Dr. Hewitt at [facility]    #Hypokalemia  Likely secondary to furosemide.  - Scheduled potassium 40mEq  - Check potassium daily    #Acute cystitis, resolved   - Ceftriaxone 1g for 5 day course (completed 10/27)     Chronic issues:  #Bronchospastic disease  - Continue PTA albuterol and Incruse Ellipta     #Hypertension, stable   - Continue PTA amlodipine and metoprolol     #HFrEF, currently improved EF  - Continue PTA Lasix and metoprolol     #Hx of TIA   - Continue PTA ASA 325mg   - Continue PTA simvastatin 20mg     #Mood disorder  - Continue PTA fluoxetine 20mg      #Insomnia, stable   - Continue PTA trazodone 100mg qHS    FEN: Intermittent bolus PRN; electrolyte protocol; Regular diet  LDA: PIV x2, HD cath (plan removal by IR today following final course of PLEX)   PPx:  On apixaban for afib.   Code: FULL     Dispo: Anticipate ready for discharge to ARU following final course of PLEX today 11/7.    The patient was seen and discussed with the neurology attending, Dr. Rome.    Cassandra Jarrell MD  Neurology PGY-2  11/7/2020 06:57AM    Attending physician: I saw and evaluated the patient with the resident team and I agree with the findings and plan of care as per Dr. Jarrell above. PLEX 7/7 today, central line to be removed in interventional radiology after.    Disposition: PM&R approved patient for ARU; she will be medically ready for discharge later today but unfortunately no ARU beds available currently. Will remain inpatient until ARU opening available.    Santos Rome MD   of Neurology  ______________________________  PHYSICAL EXAMINATION:   BP 98/53   Pulse 70   Temp 97.9  F (36.6  C) (Oral)   Resp 18   Ht 1.702 m (5' 7\")   Wt 88.9 kg (195 lb 15.8 oz)   SpO2 95%   BMI 30.70 kg/m       General: Awake and alert, laying supine in bed, no acute distress. "   HEENT: Normocephalic, atraumatic, no epistaxis, no oral lesions   Resp: Breathing comfortably on room air. No accessory muscle use.  Cardio: RRR, murmur most prominent in the RUSB.  Abdomen: Non-distended, non-tender.  Extremities: Warm and well perfused, no edema  Skin: Not jaundiced, no rash over extremities.      Neuro:  Mental status: Awake, alert, attentive; oriented to self, location, date, and situation.  Speech: Fluent, comprehension and repetition intact; No dysarthria  Cranial nerves: Right eye blind at baseline, peripheral left field cut with poor visual acuity of central. Eyes mildly dysconjugate, Right pupil 4 mm and left pupil 3. Relative afferent defect: Left pupil with intact direct response and consensual response on right; Negative direct and consensual response on right. EOMI intact w/ smooth pursuit, face expression symmetric, facial sensation intact and symmetric, hearing intact to conversation, shoulder shrug strong, palate rise symmetric, tongue/uvula midline. Neck rotation strong.     Motor: Tone normal. Moving lower extremities more readily. 5/5 strength BUE and digits. LLE hip flexor is 5-/5; L knee flexor, ankle plantar- and dorsi-flexion are 5/5. RLE 4/5 in hip flexor, 4/5 knee flexion.  5/5 foot dorsi and plantar flexion on left, 4+/5 on right. No atrophy or twitches.   Reflexes: Hyperreflexic and symmetric biceps, triceps, brachioradialis, patellar. Unable to elicit achilles reflex. Toes upgoing, more prominent on the RLE.  Sensory: Sensation intact to light touch in UE and LEs. LLE and left abdomen with decreased pinprick sensation up to level umbilicus; temp absent distal to knee and reduced proximally. LLE with intact proprioception; vibration abent in foot and ankle but present at knee.   RLE: Pinprick/temp intact; no vibration in toes but present at ankle and proximally. Sensation normal and symmetric in BUEs   Coordination: FNF intact bilaterally, no dysmetria.   Gait:  Deferred    CURRENT MEDICATIONS:    amLODIPine  10 mg Oral Daily     apixaban ANTICOAGULANT  5 mg Oral BID     [Held by provider] aspirin  325 mg Oral QPM     [Held by provider] clopidogrel  75 mg Oral Daily     FLUoxetine  40 mg Oral Daily     furosemide  20 mg Oral Daily     heparin  3 mL Intracatheter Q24H     heparin  3 mL Intracatheter Q24H     heparin  5 mL Intracatheter Q24H     heparin  5 mL Intracatheter Q24H     metoprolol succinate ER  50 mg Oral BID     mycophenolate  500 mg Oral BID IS     polyethylene glycol  17 g Oral Daily     simvastatin  20 mg Oral At Bedtime     sodium chloride (PF)  3 mL Intracatheter Q8H     traZODone  100 mg Oral At Bedtime     PRN MEDICATIONS:  acetaminophen, albuterol, calcium carbonate, glucose **OR** dextrose **OR** glucagon, diphenhydrAMINE, heparin, heparin, hydrocortisone, lidocaine 4%, lidocaine (buffered or not buffered), melatonin, naloxone, sodium chloride (PF), sodium chloride (PF)    INFUSIONS:    ALLERGIES:  Allergies   Allergen Reactions     Prevacid [Lansoprazole] Rash     Pravastatin Rash     Patient is still not sure about it due many years ago for reaction.     IMAGING:  MRI C-spine (10/23)  IMPRESSION:  1.  No definite cervical spinal cord signal abnormality and no abnormal enhancement.  2.  Disc osteophyte complex with shallow protrusion and annular fissure/tear and possible tiny component of extrusion at C4-C5 without significant spinal canal stenosis, mild to moderate foraminal narrowing right more than left due to uncinate spurring   and facet arthropathy.  3.  Mild multilevel degenerative spondylosis otherwise, see report for level by level details.    MRI T-spine (10/23)  IMPRESSION:  1.  Increased cord parenchyma T2 signal extending from T3-T5/T6, with enhancement centered at the T4 level as well as a small focus of enhancement in the parenchyma at the T5 level and left lateral spinal canal at T5 which may represent a focus of nerve   root  enhancement. This is consistent with but not diagnostic of demyelinating disease active/acute plaque formation.  2.  Near complete resolution of the previous cord signal abnormality centered at the T2 level.  3.  Remainder of study without significant MR abnormality.

## 2020-11-07 NOTE — PROGRESS NOTES
Care Management Follow Up    Length of Stay (days): 14    Expected Discharge Date: 11/07/20     Concerns to be Addressed:     Discharge planning  Patient plan of care discussed at interdisciplinary rounds: NA    Anticipated Discharge Disposition:  [facility]     Anticipated Discharge Services:  [facility]  Anticipated Discharge DME:  None    Patient/family educated on Medicare website which has current facility and service quality ratings:  NA  Education Provided on the Discharge Plan:  NA  Patient/Family in Agreement with the Plan:  NA    Referrals Placed by CM/SW:  [facility] - accepted pending a bed available  Private pay costs discussed: Not applicable    Additional Information:  SW continuing to follow this weekend for bed availability and placement.  No beds open on ARU today.  Will follow for tomorrow as well.    ATILIO Johnson, LEANNEW  Weekend Adult Acute Care   11/7/2020    [employer] on [employer] - search SOCIAL WORK - for text paging options    4A, 4C, 4E, 5A and 5B; pager [phone]  6A, 6B, 6C and 6D; pager [phone]  7A, 7B, 7C, 7D and 5C; pager [phone]  [facility] pager: [phone]     RNCC/Care Coordinator; job code [id number]    For hours 1600 - midnight Pager: [phone]

## 2020-11-07 NOTE — PLAN OF CARE
OT/6A: Cancel. Patient receiving PLEX in the AM. Declining OT in PM d/t high levels of fatigue. Will reschedule.

## 2020-11-08 NOTE — PROGRESS NOTES
Care Management Follow Up    Length of Stay (days): 15    Expected Discharge Date: 11/09/20  Expected Time of Departure: (TBD)  Concerns to be Addressed: all concerns addressed in this encounter     Patient plan of care discussed at interdisciplinary rounds: Discussed with MD team    Anticipated Discharge Disposition: Acute Rehab - [facility]  Disposition Comments: SW continuing to follow pt's care this weekend.  Per [place] admissions, there are no open ARU beds for today.  MD team updated.  Will defer to weekday SW to follow tomorrow.  Anticipated Discharge Services: Transportation Services  Anticipated Discharge DME: None    Patient/family educated on Medicare website which has current facility and service quality ratings: other (see comments)(NA)  Education Provided on the Discharge Plan:    Patient/Family in Agreement with the Plan: other (see comments)(Did not discuss with pt for this encounter note.)    Referrals Placed by CM/SW: Post Acute Facilities  Private pay costs discussed: Not applicable    Additional Information:  Unit SW to follow up tomorrow on placement as there are no openings today for discharge.    Philomena Castro, ATILIO, LEANNEW  Weekend Adult Acute Care   11/8/2020    [employer] on [email or web address] - search SOCIAL WORK - for text paging options    4A, 4C, 4E, 5A and 5B; pager [phone]  6A, 6B, 6C and 6D; pager [phone]  7A, 7B, 7C, 7D and 5C; pager [phone]  [facility] pager: [phone]     RNCC/Care Coordinator; job code [id number]    For hours 1600 - midnight Pager: [phone]

## 2020-11-08 NOTE — PROGRESS NOTES
[facility]  Neurology Progress Note    Patient Name:  Ladonna Sheriff    MRN:  [id number]      :  1945  Date of Service:  2020  Primary care provider:  Joceline Ty    Subjective:   No acute events overnight. Completed 7th PLEX treatment yesterday and pending ARU admission when bed becomes available.  Reports sleeping well.  Still far from her baseline strength and ambulation.    Denies headache, chest pain, palpitations, trouble breathing, abdominal pain or changes in bladder and bowel.    Objective:  Temp: 96.9  F (36.1  C) Temp src: Oral BP: 102/55 Pulse: 70   Resp: 16 SpO2: 94 % O2 Device: None (Room air)      Recent Labs   Lab 20  0609 20  0940 20  0703 20  0630 20  0855 20  0859 20  0859 20  0552   WBC 7.5  --  5.6 5.8  --    < >  --  9.4   HGB 10.7*  --  10.0* 10.0*  --    < >  --  9.8*   MCV 95  --  95 96  --    < >  --  94     --  216 217  --    < >  --  210   INR  --  1.28*  --   --  1.27*  --   --  1.19*   *  --  144 146*  --    < >  --  147*   POTASSIUM 3.8  --  3.4 3.6  --    < >  --  3.5   CHLORIDE 114*  --  112* 116*  --    < >  --  115*   CO2 28  --  26 24  --    < >  --  25   BUN 11  --  10 8  --    < >  --  10   CR 0.90  --  0.74 0.77  --    < >  --  0.66   ANIONGAP 6  --  6 6  --    < >  --  6   GANESH 8.7  --  8.5 8.5  --    < >  --  8.2*   GLC 89  --  81 92  --    < >  --  92   TROPI  --   --   --   --   --   --  <0.015  --     < > = values in this interval not displayed.     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  She presented to [facility] on 10/23/2020 with worsening RLE weakness, concerning for NMO exacerbation.  She was found to have enhancing  lesions extending from T3 to T5/6.  She was transferred to [facility] for PLEX therapy.    #AQP4-positive NMO  #H/o T4 transverse myelitis with residual thoracic sensation loss  #Poor vision due to NMO lesions, stable  #Acute exacerbation of NMO leading to RLE weakness, resolving   Has legal blindness of the R eye with persistent painful extraocular movements. Left eye has lateral peripheral field cut at baseline with blurry and vision. Sensation decreased from T4 dermatome to toes, with mild L>R predominance for pain/temp and general sensation. Worsening RLE weakness in the setting of new exacerbation. Previous weakness exacerbations involving the RLE, but not the LLE. Each one has resolved with PLEX although on current admission limited benefit after 5 rounds and treatment extended to 7. Has failed Rituximab therapy due to relapses while therapeutic. Currently taking Mycophenolate for several months, although <6mo, and having current exacerbation.  - Discussed with Transfusion medicine, appreciate recs   - s/p 7 of 7 PLEX last round 11/7/2020  - s/p Methylprednisone 1g for x5 days  - S/P L IJ CVC removed  - Monitor fibrinogen  - Continue PTA Mycophenolate 500 BID   - PT/OT   - Meningococcal vaccine #2 completed 11/7/2020  - Outpatient neurologist Dr. Franklin planning to start eculizumab    #Paroxysmal Atrial fibrillation   #Pleuritic chest pain, resolved   CHADSVASC: 8, HASBLED 3. Unclear onset; pt reports history of intermittent palpitations for many years. EKG 11/2 showed Afib not in RVR () and repeat EKG on 11/3 demonstrated sinus rhythm. Discussed with pharmacy RE apixaban: out of pocket $47 a month; patient comfortable with this. She was having pleuritic chest pain that resolved 11/4. Mild, anterior left side. Troponin normal, ESR WNL, and EKG without ischemic changes.  - Discussed with transfusion medicine, okay to start anticoagulation from apheresis perspective   - Apixaban BID  - Follow up with cardiology;  "follows with Dr. Hewitt at [facility]    #Hypokalemia  Likely secondary to furosemide  - Scheduled potassium 40mEq  - Check potassium daily    #Acute cystitis, resolved   - Ceftriaxone 1g for 5 day course (completed 10/27)     Chronic issues:  #Bronchospastic disease  - Continue PTA albuterol and Incruse Ellipta     #Hypertension, stable   - Continue PTA amlodipine and metoprolol     #HFrEF, currently improved EF  - Continue PTA Lasix and metoprolol     #Hx of TIA   - Discontinued PTA ASA 325mg after initiation of apixaban for atrial fibrillation.   - Continue PTA simvastatin 20mg     #Mood disorder  - Continue PTA fluoxetine 20mg      #Insomnia, stable   - Continue PTA trazodone 100mg qHS    FEN: Intermittent bolus PRN; electrolyte protocol; Regular diet  LDA: PIV x2, HD cath (plan removal by IR today following final course of PLEX)   PPx:  On apixaban for afib.   Code: FULL     Dispo: Ready for discharge to ARU pending bed availability.     The patient was seen and discussed with the neurology attending, Dr. Rome.    Lyudmila Mills MD  Neurology PGY-4    Attending physician: I saw and evaluated the patient with Dr. Mills and I agree with her findings and plan of care as documented. Patient is stable, awaiting ARU transfer.    Disposition: To ARU when bed available, hopefully tomorrow.    Santos Rome MD   of Neurology  ______________________________  PHYSICAL EXAMINATION:   /56 (BP Location: Left arm)   Pulse 82   Temp 96.9  F (36.1  C) (Oral)   Resp 8   Ht 1.702 m (5' 7\")   Wt 88.9 kg (195 lb 15.8 oz)   SpO2 97%   BMI 30.70 kg/m       General: Awake and alert, laying supine in bed, no acute distress.   HEENT: Dry skin mid forehead.  Normocephalic, atraumatic, no epistaxis, no oral lesions   Resp: Clear to auscultation  Cardio: RRR, crescendo decrescendo murmur most prominent in the RUSB.  Abdomen: Non-distended, non-tender.  Extremities: Warm and well perfused, no " edema  Skin: Not jaundiced, no rash over extremities.      Neuro:  Mental status: Awake, alert, attentive; oriented to self, location, date, and situation.  Speech: Fluent, comprehension and repetition intact; No dysarthria  Cranial nerves: Right eye blind at baseline, peripheral left field cut with poor visual acuity of central. Eyes dysconjugate, Right pupil 4 mm and left pupil 3. Relative afferent defect: Left pupil with intact direct response and consensual response on right; Negative direct and consensual response on right. EOMI intact w/ smooth pursuit although unable to bury sclera in right nasal vision, face expression symmetric, facial sensation intact and symmetric, hearing intact to conversation, shoulder shrug strong, palate rise symmetric, tongue/uvula midline.   Motor: No abnormal movements. Tone normal. 5/5 strength BUE and digits. LLE 5/5 in hip flexion, knee extension, knee flexion, dorsiflexion and plantar flexion. RLE 4/5 hip flexor, 5/5 knee extensor, 4/5 knee flexor, 5 -/5 dorsiflexion, 5/5 plantar flexion, 4 -/5 ankle inversion and 4 -/5 ankle eversion.  Reflexes: No clonus.  Right toe prominently upgoing and left equivocal.  Sensory: Sensation intact to light touch in UE and LEs and vibration and pinprick intact in bilateral upper extremities. LLE and left abdomen with decreased pinprick sensation up to level umbilicus, LLE with intact proprioception; vibration abent in foot and ankle but present at knee.  RLE intact pinprick, but absent proprioception in right toes and reduced vibration in toes.  Coordination: FNF intact bilaterally.  Subtle left heel-knee-shin dysmetria and difficulty in right heel-knee-shin due to right knee flexion, no prominent dysmetria.  Gait: Not evaluated today.    CURRENT MEDICATIONS:    amLODIPine  10 mg Oral Daily     apixaban ANTICOAGULANT  5 mg Oral BID     [Held by provider] aspirin  325 mg Oral QPM     [Held by provider] clopidogrel  75 mg Oral Daily      FLUoxetine  40 mg Oral Daily     furosemide  20 mg Oral Daily     heparin  3 mL Intracatheter Q24H     heparin  3 mL Intracatheter Q24H     heparin  5 mL Intracatheter Q24H     heparin  5 mL Intracatheter Q24H     metoprolol succinate ER  50 mg Oral BID     mycophenolate  500 mg Oral BID IS     polyethylene glycol  17 g Oral Daily     potassium chloride  40 mEq Oral Daily     simvastatin  20 mg Oral At Bedtime     sodium chloride (PF)  3 mL Intracatheter Q8H     traZODone  100 mg Oral At Bedtime     PRN MEDICATIONS:  acetaminophen, albuterol, calcium carbonate, glucose **OR** dextrose **OR** glucagon, diphenhydrAMINE, heparin, heparin, hydrocortisone, lidocaine 4%, lidocaine (buffered or not buffered), melatonin, meningococcal oligosaccharide, naloxone, sodium chloride (PF), sodium chloride (PF)    INFUSIONS:    ALLERGIES:  Allergies   Allergen Reactions     Prevacid [Lansoprazole] Rash     Pravastatin Rash     Patient is still not sure about it due many years ago for reaction.     IMAGING:  MRI C-spine (10/23)  IMPRESSION:  1.  No definite cervical spinal cord signal abnormality and no abnormal enhancement.  2.  Disc osteophyte complex with shallow protrusion and annular fissure/tear and possible tiny component of extrusion at C4-C5 without significant spinal canal stenosis, mild to moderate foraminal narrowing right more than left due to uncinate spurring   and facet arthropathy.  3.  Mild multilevel degenerative spondylosis otherwise, see report for level by level details.    MRI T-spine (10/23)  IMPRESSION:  1.  Increased cord parenchyma T2 signal extending from T3-T5/T6, with enhancement centered at the T4 level as well as a small focus of enhancement in the parenchyma at the T5 level and left lateral spinal canal at T5 which may represent a focus of nerve   root enhancement. This is consistent with but not diagnostic of demyelinating disease active/acute plaque formation.  2.  Near complete resolution of the  previous cord signal abnormality centered at the T2 level.  3.  Remainder of study without significant MR abnormality.

## 2020-11-08 NOTE — PLAN OF CARE
Status: Pt on 6A for neuromyelitis optica, currently receiving PLEX tx.   Vitals: VSS on RA  Neuros: A&Ox4. L eye blurry vision, L. Field cut, R eye blind and dysconjugate, BUE 5/5, BLE 4/5 with RLE slightly weaker, N/T from waist to toes.  IV: PIV SL. CVC removed and pressure dressing in place with dried bloody drainage unchanged  Resp/trach: LS clear. Denies SOB.  Diet: Regular  Bowel status: No BM this shift  : Voids without difficulty.   Skin: Wound on coccyx, see WOC care order.   Pain: Denies.   Activity: Standby assist, GB, and walker. Using bedside commode.   Social: No visitors this shift.   Plan: Discharge to ARU today or Monday, needs to get Menveo before DC. Continue to monitor and follow plan of care.

## 2020-11-08 NOTE — PROCEDURES
Laboratory Medicine and Pathology  Transfusion Medicine - Apheresis Procedure    Ladonna Sheriff MRN# [id number]   YOB: 1945 Age: 75 year old   Date of Admission: 10/24/2020     Reason for consult: Therapeutic plasma exchange (TPE) for neuromyelitis optica (NMO) flare           Assessment and Plan:   The patient is a 76 yo woman with a history of NMO, currently with flare.  She does not respond well to steroids.  TPE series requested.  We have performed TPE on her in the past.  Will plan for a series of 5 TPEs, every-other-day schedule.  Will also plan for 5% albumin as exchange fluid.  Will monitor coag labs and adjust with plasma as exchange fluid as needed.     The patient tolerated her final TPE today. She had just finished TPE when I stopped by and she was feeling well and in good spirits.    TREATMENT PLAN:  Series of 5 TPEs, plan for 5% albumin as exchange fluid (3L plasma volume), monitor coags and include plasma as indicated.  Please do not start or continue ACE-inhibitors throughout the duration of a TPE series.  Please notify the apheresis physician of any upcoming procedures, surgeries, or biopsies as TPE with 5% albumin will affect coagulation factors.            Chief Complaint:   Right leg weakness, worsening vision, and numbness (band-like at chest)             Past Medical History:     Past Medical History:   Diagnosis Date     Cerebral infarction (H)      CHF (congestive heart failure) (H)      Hypertension      Lupus (H)      Lupus (H)      Optic neuritis      Optic neuritis      Sjogren's syndrome (H)      Sjogren's syndrome (H)      Temporal arteritis (H)      TIA (transient ischemic attack)      Uncomplicated asthma           Past Surgical History:     Past Surgical History:   Procedure Laterality Date     CHOLECYSTECTOMY       GYN SURGERY      hysterectomy     GYN SURGERY      tubal ligation     IR CVC NON TUNNEL PLACEMENT  6/21/2019     IR CVC NON TUNNEL PLACEMENT  7/15/2020      IR CVC NON TUNNEL PLACEMENT  10/26/2020     IR FOLLOW UP VISIT INPATIENT  7/2/2019          Social History:     Social History     Socioeconomic History     Marital status:      Spouse name: Not on file     Number of children: Not on file     Years of education: Not on file     Highest education level: Not on file   Occupational History     Not on file   Social Needs     Financial resource strain: Not on file     Food insecurity     Worry: Not on file     Inability: Not on file     Transportation needs     Medical: Not on file     Non-medical: Not on file   Tobacco Use     Smoking status: Former Smoker     Packs/day: 0.00     Smokeless tobacco: Never Used   Substance and Sexual Activity     Alcohol use: Yes     Alcohol/week: 7.0 standard drinks     Types: 7 Shots of liquor per week     Comment: occ     Drug use: No     Sexual activity: Not on file   Lifestyle     Physical activity     Days per week: Not on file     Minutes per session: Not on file     Stress: Not on file   Relationships     Social connections     Talks on phone: Not on file     Gets together: Not on file     Attends Cheondo[employer] service: Not on file     Active member of club or organization: Not on file     Attends meetings of clubs or organizations: Not on file     Relationship status: Not on file     Intimate partner violence     Fear of current or ex partner: Not on file     Emotionally abused: Not on file     Physically abused: Not on file     Forced sexual activity: Not on file   Other Topics Concern     Not on file   Social History Narrative     Not on file          Family History:     Family History   Problem Relation Age of Onset     Asthma Mother      Lupus Sister      Bone Cancer Brother      Kidney Cancer Sister           Immunizations:     Most Recent Immunizations   Administered Date(s) Administered     Influenza (High Dose) 3 valent vaccine 10/21/2015     Influenza Vaccine, 6+MO IM (QUADRIVALENT W/PRESERVATIVES) 10/17/2016      Influenza, Quad, High Dose, Pf, 65yr + 10/31/2020     Meningococcal (Bexsero ) 11/07/2020     Meningococcal (Menveo ) 07/24/2020     Pneumo Conj 13-V (2010&after) 07/08/2014     Pneumococcal 23 valent 07/07/2017     TDAP Vaccine (Adacel) 04/25/2013     Zoster vaccine, live 04/25/2013   Pended Date(s) Pended     Meningococcal (Menveo ) 11/07/2020          Allergies:     Allergies   Allergen Reactions     Prevacid [Lansoprazole] Rash     Pravastatin Rash     Patient is still not sure about it due many years ago for reaction.          Medications:     Current Facility-Administered Medications   Medication     acetaminophen (TYLENOL) tablet 650 mg     albuterol (PROAIR HFA/PROVENTIL HFA/VENTOLIN HFA) 108 (90 Base) MCG/ACT inhaler 2 puff     amLODIPine (NORVASC) tablet 10 mg     apixaban ANTICOAGULANT (ELIQUIS) tablet 5 mg     [Held by provider] aspirin (ASA) EC tablet 325 mg     calcium carbonate (TUMS) chewable tablet 500 mg     [Held by provider] clopidogrel (PLAVIX) tablet 75 mg     glucose gel 15-30 g    Or     dextrose 50 % injection 25-50 mL    Or     glucagon injection 1 mg     diphenhydrAMINE (BENADRYL) capsule 25 mg     FLUoxetine (PROzac) capsule 40 mg     furosemide (LASIX) tablet 20 mg     heparin 100 UNIT/ML injection 3 mL     heparin 100 UNIT/ML injection 3 mL     heparin 100 UNIT/ML injection 3 mL     heparin 100 UNIT/ML injection 3 mL     heparin 100 UNIT/ML injection 5 mL     heparin 100 UNIT/ML injection 5 mL     hydrocortisone (CORTAID) 1 % cream     lidocaine (LMX4) cream     lidocaine 1 % 0.1-1 mL     melatonin tablet 1 mg     meningococcal oligosaccharide (MENVEO) injection 0.5 mL     metoprolol succinate ER (TOPROL-XL) 24 hr tablet 50 mg     mycophenolate (GENERIC EQUIVALENT) capsule 500 mg     naloxone (NARCAN) injection 0.1-0.4 mg     polyethylene glycol (MIRALAX) Packet 17 g     [START ON 11/8/2020] potassium chloride (KLOR-CON) Packet 40 mEq     simvastatin (ZOCOR) tablet 20 mg     sodium  chloride (PF) 0.9% PF flush 10 mL     sodium chloride (PF) 0.9% PF flush 3 mL     sodium chloride (PF) 0.9% PF flush 3 mL     traZODone (DESYREL) tablet 100 mg          Review of Systems:   See above.           Data:     BMP  Recent Labs   Lab 11/07/20 0703 11/06/20 0630 11/05/20 0627 11/04/20  0637    146* 146* 148*   POTASSIUM 3.4 3.6 3.1* 3.5   CHLORIDE 112* 116* 115* 117*   GANESH 8.5 8.5 8.3* 8.1*   CO2 26 24 25 26   BUN 10 8 9 7   CR 0.74 0.77 0.72 0.66   GLC 81 92 85 90     CBC  Recent Labs   Lab 11/07/20 0703 11/06/20 0630 11/05/20 0627 11/04/20 0637   WBC 5.6 5.8 5.3 6.4   RBC 3.42* 3.30* 3.33* 3.44*   HGB 10.0* 10.0* 9.8* 10.0*   HCT 32.5* 31.6* 32.3* 33.2*   MCV 95 96 97 97   MCH 29.2 30.3 29.4 29.1   MCHC 30.8* 31.6 30.3* 30.1*   RDW 14.8 14.7 14.7 14.7    217 205 216     ATTESTATION STATEMENT:   During the procedure this patient was directly seen and evaluated by me , Michaela Villarreal MD, PhD.        Michaela Villarreal MD, PhD  Transfusion Medicine Attending  Medical Director, Blood Bank Laboratory  Pager [phone]

## 2020-11-08 NOTE — PLAN OF CARE
Status: Pt on 6A for neuromyelitis optica.   Vitals: Stables   Neuros: A&Ox4. L eye blurry vision, L. Field cut, R eye blind and dysconjugate, BUE 5/5, BLE 4/5 with RLE slightly weaker, N/T from sternum to toes.   IV:CVC removed yesterday and pressure dressing in place with bloody drainage unchanged  Resp/trach: LS clear. Denies SOB.  Diet: Regular  Bowel status: No BM this shift  : Voids without difficulty.   Skin: dry skin on forehead,  Red skin coccyx   Pain: Denies.   Activity: Standby assist, GB, and walker. Using bedside commode.   Social: No visitors this shift.   Plan:Continue to monitor and follow plan of care.

## 2020-11-08 NOTE — PLAN OF CARE
Status: Pt on 6A for neuromyelitis optica, finished PLEX tx today.   Vitals: VSS on RA.   Neuros: A&O x4. L eye blurry vision, R eye blind and dysconjugate. L field cut. BUE 5/5, BLE 4/5. N/T from waist to toes.  IV: PIV SL. CVC removed today.  Resp/trach: LS clear. SOB towards start of shift, resolved with deep breathing and IS.  Diet: Regular, fair intake.  Bowel status: BS+. Incontinent BM this morning.   : Voids without difficulty.   Skin: Wound on coccyx, see WOC care order, almost healed.  Pain: Denies.   Activity: Standby assist, GB, and walker. Using bedside commode.   Social: No visitors this shift.   Plan: Discharge to ARU tomorrow or Monday. Continue to monitor and follow plan of care.

## 2020-11-09 PROBLEM — G98.8 NEUROLOGICAL DISORDER: Status: ACTIVE | Noted: 2020-01-01

## 2020-11-09 NOTE — H&P
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     She typically ambulates with a cane and had been requiring a walker to ambulate safely. She also reported urinary frequency, but no dysuria.     MRI of the C- and T- spine at [facility] demonstrated T2 signal from T3-T5/6 with enhancement centered at the T4 level and T5 level.     She was started on solumedrol 500mg BID and transferred to [facility] for PLEX therapy. She completed a 5-day course of methylprednisolone and 7 runs of PLEX (scheduled every other day), which she tolerated appropriately. We saw her in consult and made the [facility] recommendations.     She reports history of intermittent palpitations for many years. EKG 11/2 showed Afib not in RVR () and repeat EKG on 11/3 demonstrated sinus rhythm. She was having pleuritic chest pain that resolved 11/4. Mild, anterior left side. Troponin normal, ESR WNL, and EKG without ischemic changes. aspirin and clopidogrel was stopped.     Noted to have Hypokalemia, likely secondary to furosemide, and is on scheduled potassium 40mEq daily     She completed 5-day course of ceftriaxone 1g for Acute cystitis, uncomplicated           MRI cervical spine with and without contrast  10/23:  IMPRESSION:  1.  No definite cervical spinal cord signal abnormality and no abnormal enhancement.  2.  Disc osteophyte complex with shallow protrusion and annular fissure/tear and possible tiny component of extrusion at C4-C5 without significant spinal canal stenosis, mild to moderate foraminal narrowing right more than left due to uncinate spurring   and facet arthropathy.  3.  Mild multilevel degenerative spondylosis otherwise, see report for level by level details.     MRI thoracic spine with and without contrast 10/23:  IMPRESSION:  1.  Increased cord parenchyma T2 signal extending from T3-T5/T6, with enhancement centered at the T4 level as well as a small focus of enhancement in the parenchyma at the T5 level and left lateral spinal canal at T5 which may represent a focus of nerve   root enhancement. This is consistent with but not diagnostic of demyelinating disease active/acute plaque formation.  2.  Near complete resolution of the previous cord signal abnormality centered at the T2 level.  3.  Remainder of study without significant MR abnormality.      Functionally, the patient was independent with basic mobility and basic ADL's prior to admit.     Currently, the patient is medically appropriate and is assessed to have needs and will benefit from an inpatient acute rehabilitation comprehensive program working with Physical and Occupational Therapist and will benefit from supervision and management of Rehab Nursing and Rehab MD.         Allergies  Allergies   Allergen Reactions     Prevacid [Lansoprazole] Rash     Pravastatin Rash     Patient is still not sure about it due many years ago for reaction.       Past Medical and Surgical History  Past Medical History:   Diagnosis Date     Cerebral infarction (H)      CHF (congestive heart failure) (H)      Hypertension      Lupus (H)      Lupus (H)      Optic neuritis      Optic neuritis      Sjogren's syndrome (H)      Sjogren's syndrome (H)      Temporal  "arteritis (H)      TIA (transient ischemic attack)      Uncomplicated asthma      Past Surgical History:   Procedure Laterality Date     CHOLECYSTECTOMY       GYN SURGERY      hysterectomy     GYN SURGERY      tubal ligation     IR CVC NON TUNNEL PLACEMENT  6/21/2019     IR CVC NON TUNNEL PLACEMENT  7/15/2020     IR CVC NON TUNNEL PLACEMENT  10/26/2020     IR FOLLOW UP VISIT INPATIENT  7/2/2019         Social History  The patient lives alone   Her son has moved from [place] and is available for support as needed   She lives in a house with 16 steps that she will need to navigate     The patient was previously independent with ambulation without use of cane or walker, previously independent with upper and lower body self care, ADLs, and IADLs.         Review of Systems  Total of ten systems reviewed, pertinent positives and negatives as follows  Denies chest pain fever chills rigors  Denies any shortness of breath   Denies any nausea or vomiting   Appetite good  Denies any lightheadedness or dizziness   Reports right LE sided weakness unchanged since last encounter   On regular diet with thins  Denies any pain   Denies any new rashes   Mood euphoric   Voiding without any problems, no incontinence   Slept well last night   Remainder of the review of the systems was negative      Physical Examination  /54 (BP Location: Right arm)   Pulse 74   Temp 96.7  F (35.9  C) (Oral)   Resp 20   Ht 1.702 m (5' 7\")   Wt 86.9 kg (191 lb 9.6 oz)   SpO2 94%   BMI 30.01 kg/m    General Awake, alert, not in distress  HEENT EOMs intact  Cardiac Regular  Respiratory Clear breath sounds  Abdomen Soft, nontender  Skin  Intact    Neurologic   Alert and oriented x 3  Speech is clear  comprehension is remarkable  Insight is excellent     Cranial Nerves:  Her visual fields intact bilaterally  EOMI. Smooth pursuit intact, no nystagmus.  sensation intact and symmetric over face, cheeks, and chin  Eyebrow raise, eye scrunch, and smile " symmetric  Not formally tested, but hearing intact to conversation.  Her palate raise symmetric, uvula midline. No speech changes/hoarseness.  She could turn head against resistance and shrug shoulders.  Tongue protrudes midline.  Muscle Strength:   5/5 in upper extremities  LLE HF 2/5, QUADS 3/5  DF 3-/5   Unstable ankle and weak invertors and evertors   Impaired joint position and proprioception          Labs  Lab Results   Component Value Date    WBC 6.7 11/09/2020         Lab Results   Component Value Date    RBC 3.29 11/09/2020     Lab Results   Component Value Date    HGB 10.0 11/09/2020     Lab Results   Component Value Date    HCT 31.7 11/09/2020     No components found for: MCT  Lab Results   Component Value Date    MCV 96 11/09/2020     Lab Results   Component Value Date    MCH 30.4 11/09/2020     Lab Results   Component Value Date    MCHC 31.5 11/09/2020     Lab Results   Component Value Date    RDW 14.6 11/09/2020     Lab Results   Component Value Date     11/09/2020       Lab Results   Component Value Date     11/09/2020      Lab Results   Component Value Date    POTASSIUM 3.6 11/09/2020     Lab Results   Component Value Date    CHLORIDE 114 11/09/2020     Lab Results   Component Value Date    GANESH 8.5 11/09/2020     Lab Results   Component Value Date    CO2 24 11/09/2020     Lab Results   Component Value Date    BUN 16 11/09/2020     Lab Results   Component Value Date    CR 0.84 11/09/2020     Lab Results   Component Value Date    GLC 94 11/09/2020     No components found for: MRI      Medications  Current Facility-Administered Medications   Medication     albuterol (PROAIR HFA/PROVENTIL HFA/VENTOLIN HFA) 108 (90 Base) MCG/ACT inhaler 2 puff     [START ON 11/10/2020] amLODIPine (NORVASC) tablet 10 mg     apixaban ANTICOAGULANT (ELIQUIS) tablet 5 mg     calcium carbonate 600 mg-vitamin D 400 units (CALTRATE) per tablet 1 tablet     [START ON 11/10/2020] FLUoxetine (PROzac) capsule 40 mg      [START ON 11/10/2020] furosemide (LASIX) tablet 20 mg     metoprolol succinate ER (TOPROL-XL) 24 hr tablet 50 mg     mycophenolate (GENERIC EQUIVALENT) tablet 500 mg     Patient is already receiving anticoagulation with heparin, enoxaparin (LOVENOX), warfarin (COUMADIN)  or other anticoagulant medication     [START ON 11/10/2020] potassium chloride ER (KLOR-CON M) CR tablet 40 mEq     simvastatin (ZOCOR) tablet 20 mg     traZODone (DESYREL) tablet 100 mg     trolamine salicylate (ASPERCREME) 10 % cream     umeclidinium (INCRUSE ELLIPTA) 62.5 MCG/INH inhaler 1 puff     [START ON 11/10/2020] vitamin B complex with vitamin C (STRESS TAB) tablet 1 tablet     [START ON 11/10/2020] vitamin C (ASCORBIC ACID) tablet 500 mg         ASSESSMENT / MANAGEMENT AND INITIATION OF PLAN OF CARE:      POST-ADMISSION EVALUATION:  I have evaluated the patient on admission to the Acute Rehab Center and compared with the preadmission screen, no significant differences are identified and remains appropriate for acute rehabilitation. See below for more details and specifics regarding this admission that supports requiring medical and therapy intensity in the acute rehab setting.      Patient will work with PT for 90 min daily to work on gait exercises, strengthening, endurance buildup, transfers with use of walker as needed.   Patient will work with OT for 90 min daily to work on upper and lower body self care, dressing, toileting, bathing, energy conservation techniques with use of ADs as needed.   Rehab RN to administer medication, patient education on medication taking, VS monitoring, and surgical wound dressing changes and monitoring.     Medical Management:  #AQP4-positive NMO  #H/o T4 transverse myelitis with residual thoracic sensation loss  #Poor vision due to NMO lesions, stable  #Acute exacerbation of NMO leading to RLE weakness, improving  Admitted to [facility] for PLEX therapy. Interventional radiology placed a L internal jugular  non-tunneled CVC and transfusion medicine was consulted to assist with PLEX treatment. She completed a 5-day course of methylprednisolone and 7 runs of PLEX (scheduled every other day), which she tolerated appropriately.  Following completion of PLEX therapy, her lower extremity strength was starting to improve. Not much improvement noted since PMR last encounter.   - Completed 7 runs of PLEX  - Completed 5-day course of methylprednisolone  - PTA Mycophenolate 500 BID   - Meningococcal vaccine #2 prior to discharge  - Follow-up with Dr. Franklin in neurology clinic discuss and initiate eculizumab therapy     #Paroxysmal Atrial fibrillation   #Pleuritic chest pain, resolved   CHADSVASC: 8, HASBLED 3. Unclear onset. She reports history of intermittent palpitations for many years. EKG 11/2 showed Afib not in RVR () and repeat EKG on 11/3 demonstrated sinus rhythm. She was having pleuritic chest pain that resolved 11/4. Mild, anterior left side. Troponin normal, ESR WNL, and EKG without ischemic changes.  - Apixaban BID  - Stopped aspirin and clopidogrel  - Follow up with cardiology (follows with Dr. Hewitt at [facility])     #Hypokalemia, likely secondary to furosemide.  - Scheduled potassium 40mEq daily  - K 3.6 today.   - check BMP tomorrow.      #Acute cystitis, uncomplicated:   - completed 5-day course of ceftriaxone 1g (completed 10/27)  - asymptomatic      Bladder, Bowel, GI and DVT ppx   Bladder ;check PVRs ×3 straight cath for volumes more than 350 cc.  Monitor for symptoms of urinary tract infection, rehab nursing to send for a UA as needed  Bowels; place the patient on a bowel program and titrate medications as needed.  Hold the bowel program in case of loose stools.  Educated patient on Impact with fiber and fluid intake on a bowel function  DVT prophylaxis with mechanical means    Code Status FULL     Prognosis for medical improvement and stabilization of the medical issues for discharge to home is good.      Estimated Length of Stay: 12 days     Follow up Appointments on Discharge  - Follow-up with Dr. Franklin in neurology clinic for discussion of NMO therapy within 2-4 weeks of hospital discharge  - Follow-up with Dr. Hewitt in cardiology clinic to discuss HF and atrial fibrillation within 4-weeks  - Follow-up with PCP for hospital discharge within 1-2 weeks      Total time spent: 70 minutes with more than half the time was spent counseling and / or coordination of care   Includes counseling / education / discussion   Antonia Tan MD, MHA   Department of Physical Medicine and Rehabilitation  [facility]

## 2020-11-09 NOTE — PLAN OF CARE
Status: Pt on 6A for neuromyelitis optica, finished PLEX tx yesterday.   Vitals: VSS on RA.   Neuros: A&O x4. L eye blurry vision, R eye blind and dysconjugate. L field cut. BUE 5/5, BLE 4/5. N/T from waist to toes.  IV: PIV SL.   Resp/trach: LS clear. Denies SOB.  Diet: Regular, fair intake.  Bowel status: BS+. Last BM this shift.  : Voids without difficulty.   Skin: Rash on forehead, hydrocortisone applied x1. Blanchable redness on coccyx, improving. Old CVC site dressing changed this shift.  Pain: Denies.   Activity: Standby assist, GB, and walker. Using bedside commode.   Social: No visitors this shift.   Plan: Discharge to ARU tomorrow. Continue to monitor and follow plan of care.

## 2020-11-09 NOTE — PLAN OF CARE
Occupational Therapy Discharge Summary    Reason for therapy discharge:    Discharged to acute rehabilitation facility.    Progress towards therapy goal(s). See goals on Care Plan in [facility] electronic health record for goal details.  Goals partially met.  Barriers to achieving goals:   discharge from facility.    Therapy recommendation(s):    Continued therapy is recommended.  Rationale/Recommendations:  To progress safety and IND with ADLs.

## 2020-11-09 NOTE — PLAN OF CARE
Status: Admitted for neuromyelitis optica, Received Methylpred and  7 PLEX treatments.    Vitals: VSS on RA.   Neuros: A&Ox4. L eye blurry vision, R eye blind and dysconjugate, L field cut. BUE 5/5,  R SLR less than L, DP and DF equal. N/T from sternum to toes-unchanged from admission.  Diet: Regular diet, PO'ing well  Bowel status: BM yesterday.  : Voiding spont.   Skin: L neck dressing changed this AM. It was old CVC catheter site.  Pain: Denies.   Activity: Standby assist, GB, and walker. Using bedside commode.   Plan: Leaving for ARU at 12:15. Report given to Mercedes.

## 2020-11-09 NOTE — PROGRESS NOTES
Patient arrived to unit around 1245. A&Ox4, able to make needs known. CGA with walker and gait belt- RLE has limited ROM/ moderate impairment. 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 Eye. Uses magnifying glass, and would prefer PT schedule written on paper. Per report, voids spontaneously, and having BM every day. Denies pain. VSS. Continue with POC.

## 2020-11-09 NOTE — PLAN OF CARE
Physical Therapy Discharge Summary    Reason for therapy discharge:    Discharged to acute rehabilitation facility.    Progress towards therapy goal(s). See goals on Care Plan in [facility] electronic health record for goal details.  Goals partially met.  Barriers to achieving goals:   discharge from facility.    Therapy recommendation(s):    Continued therapy is recommended.  Rationale/Recommendations:  to progress functional mobility and ADLs.

## 2020-11-09 NOTE — PROGRESS NOTES
Rehab Admissions:  I spoke w/ Ladonna this morning to discuss [facility]. I discussed setup and structure of [facility] level of care including daily PMR oversight of her medical and rehab needs, skilled PT/OT services for 90 minutes each daily, and skilled rehab nursing cares. Discussed ELOS of 10-12 days prior to disch home at [facility] level w/ use of WW and adaptive equipment. Pt may require ongoing OP or home therapy pending progress. Pt states her goal is to return home by 11/18/2020. Pt eager to transfer to [facility] and participate in acute rehab.     Thank you for the referral, we will continue to follow this patient for post acute placement.     Determination of admission is based upon the patient's need for an intensive, interdisciplinary approach to rehabilitation, their ability to progress, their ability to tolerate intensive therapies, their need for daily physician supervision, their need for twenty four hour nursing assistance, and their ability and willingness to participate in such a program.    Mckenzie Gomez CM  Rehab Liaison/  [facility] [place] [facility]  11/9/2020    9:52 AM

## 2020-11-09 NOTE — PROGRESS NOTES
Care Management Discharge Note    Discharge Date: 11/09/20    Discharge Disposition: [facility] ([phone])    Discharge Services: Transportation Services  [facility] ([place] [phone]), w/c transport, 12:15pm     Discharge DME: None    Discharge Transportation: agency([facility] wheelchair)     [facility] ([place] [phone]), w/c transport, 12:15pm     Private pay costs discussed:   There are no private pay transportation costs associated with this discharge.    PAS Confirmation Code:  Not required as pt is discharging to acute rehab setting.  Patient/family educated on Medicare website which has current facility and service quality ratings: other (see comments)(NA)  Completed prior to this discharge    Education Provided on the Discharge Plan:  yes    Persons Notified of Discharge Plans:   Pt, 6A nursing and Dr. Jarrell.  Pt declined SW offer to notify family stating that she has done so independently.    Patient/Family in Agreement with the Plan: other (see comments)(Did not discuss with pt for this encounter note.)    Handoff Referral Completed: Yes    Additional Information:  Dr. Cassandra Jarrell has confirmed readiness for discharge. Admissions (Mckenzie) at [facility] has confirmed bed availability and acceptance for admit.  IMM completed.  Pt voices understanding of the discharge plan and agreement with the discharge plan.      CAMELIA Marcial  Social Work, 6A  Phone:  [phone]  Pager:  [phone]  11/9/2020            ANAMARIA Colin

## 2020-11-09 NOTE — PLAN OF CARE
Status: Pt on 6A for neuromyelitis optica, currently receiving PLEX tx.   Vitals: VSS on RA, slightly hypotensive within sofia  Neuros: A&Ox4. L eye blurry vision, L. Field cut, R eye blind and dysconjugate, BUE 5/5, BLE 4/5 with RLE slightly weaker, N/T from waist to toes.  IV: PIV SL.   Resp/trach: LS clear. Denies SOB.  Diet: Regular  Bowel status: No BM this shift  : Voids without difficulty.   Skin: Wound on coccyx, see WOC care order. CVC removed and pressure dressing in place to L. Neck. Rash to forehead, hydrocortisone cream PRN  Pain: Denies.   Activity: Standby assist, GB, and walker. Using bedside commode.   Social: No visitors this shift.   Plan: Discharge to ARU today pending bed availability. Continue to monitor and follow plan of care.

## 2020-11-10 NOTE — PLAN OF CARE
Patient is A&OX4, able to make needs known. Denies pain. CGA with walker and gait belt. Voiding spontaneously without issue. LBM today. Held amlodipine, metoprolol ER, and laxis this AM due to soft BP. MD updated- medication adjustments were made. Approved by therapies to be without alarms on bed/ chair. Calls appropraitely. Continue with POC.

## 2020-11-10 NOTE — CONSULTS
WOC Consult    S: WOC Consult to evaluate and treat redness found between patient's buttocks.     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 She typically ambulates with a cane and had been requiring a walker to ambulate safely. She also reported urinary frequency, but no dysuria. MRI of the C- and T- spine at [facility] demonstrated T2 signal from T3-T5/6 with enhancement centered at the T4 level and T5 level. She was started on solumedrol 500mg BID and transferred to [facility] for PLEX therapy.    A: Patient able to roll on her side for skin assessment. Patient is wearing a brief and admits to occasional urinary incontinence due to not always being able to get to the bathroom on time due to decrease mobility.     Patient's skin on buttocks, coccyx and sacrum assessment. Mildly denuded, blanchable intact skin in the  cleft, most likely due to moisture from sweat and occasional urine leakage. No pressure injury noted on assessment today.    R: Plan to please follow the Incontinence Protocol: Wash with Jocelyn Cleanse and Protect and a soft washcloth. Apply a thin layer of Critiaide Paste in the  cleft (not over entire buttocks, please). Do not scrub paste with each episode of incontinence, just wash away soiled paste and reapply as needed.     WOC will sign off. Please reconsult with further questions or concerns.    Ailyn Wild RN, CWOCN

## 2020-11-10 NOTE — PROGRESS NOTES
11/10/20 1247   Quick Adds   Type of Visit Initial PT Evaluation   Living Environment   People in home child(mary), adult  (son temporarily living w/ her)   Current Living Arrangements apartment   Home Accessibility not wheelchair accessible;stairs to enter home   Number of Stairs, Main Entrance other (see comments)  (16)   Stair Railings, Main Entrance railings on both sides of stairs   Transportation Anticipated family or friend will provide   Living Environment Comments Patient lives alone in a second floor condo with 2 cats. Her son from [place] is planning to move back to [place] to stay with patient. Also has a son that lives in the same condo building and third son that lives in [place]. Tub/shower combination present with shower chair. Always has sons assist with navigation of stairs and for transportation.    Self-Care   Usual Activity Tolerance good   Current Activity Tolerance fair   Regular Exercise No   Equipment Currently Used at Home walker, rolling;cane, straight   Activity/Exercise/Self-Care Comment Mostly watches television; does not walk outdoors except to get to the car   Disability/Function   Hearing Difficulty or Deaf no   Wear Glasses or Blind yes   Vision Management glasses, low vision strategies   Concentrating, Remembering or Making Decisions Difficulty no   Difficulty Communicating no   Difficulty Eating/Swallowing no   Walking or Climbing Stairs stair climbing difficulty, requires equipment   Dressing/Bathing Difficulty no   Toileting no   Fall history within last six months yes   Number of times patient has fallen within last six months 2   Change in Functional Status Since Onset of Current Illness/Injury yes   General Information   Onset of Illness/Injury or Date of Surgery 10/23/20   Referring Physician Dominick Wan   Patient/Family Therapy Goals Statement (PT) Return home w/ assist from her son   Cognition   Orientation Status (Cognition) oriented x 4    Affect/Mental Status (Cognition) WNL   Follows Commands (Cognition) WNL   Pain Assessment   Patient Currently in Pain No   Integumentary/Edema   Integumentary/Edema no deficits were identifed   Posture    Posture Not impaired   Range of Motion (ROM)   ROM Quick Adds ROM WFL   Strength Comprehensive (MMT)   Comment, General Manual Muscle Testing (MMT) Assessment assymetrical LE weakness   MMT: Hip, Rehab Eval   Hip Flexion - Left Side (4-/5) good minus, left   Hip Extension - Left Side (4-/5) good minus, left   Hip ABduction - Left Side (3+/5) fair plus, left   Hip Flexion - Right Side (3+/5) fair plus, right   Hip Extension - Right Side (2+/5) poor plus, right   Hip ABduction - Right Side (2+/5) poor plus, right   MMT: Knee, Rehab Eval   Knee Flexion - Left Side (4-/5) good minus, left   Knee Extension - Left Side (4-/5) good minus, left   Knee Flexion - Right Side (3/5) fair, right   Knee Extension - Right Side (3/5) fair, right   MMT: Ankle, Rehab Eval   Ankle Dorsiflexion - Left Side (4-/5) good minus, left   Ankle Plantarflexion - Left Side (4-/5) good minus, left   Ankle Inversion - Left Side (3+/5) fair plus, left   Ankle Eversion - Left Side (3+/5) fair plus, left   Ankle Dorsiflexion - Right Side (4-/5) good minus, right   Ankle Plantarflexion - Right Side (4-/5) good minus, right   Ankle Inversion - Right Side (2+/5) poor plus, right   Ankle Eversion - Right Side (2+/5) poor plus, right   ARC Assessment Only   Acute Rehab Functional Assessment See IP Rehab Daily Documentation Flowsheet for Functional Mobility/ADL Assessment   Balance   Balance other (describe)   Sitting Balance: Static   (sits EOB w/o support)   Sitting Balance: Dynamic   (trunk/pelvis dissociation in sitting)   Sit-to-Stand Balance   (needs UE support to rise)   Standing Balance: Static   (able to stand w/o UE support, assymetrical)   Standing Balance: Dynamic   (UE support, poor trunk/pelvis dissociation)   Systems Impairment  Contributing to Balance Disturbance visual;somatosensory;neuromuscular;musculoskeletal   Identified Impairments Contributing to Balance Disturbance decreased sensation;impaired sensory feedback;decreased strength   Sensory Examination   Sensory Perception Comments monofilament absent (R), ++impaired (L); great toe proprioception absent (R), 6/6 (L); vibration absent (R), impaired (L); joint position sense LE's impaired 3/3   Muscle Tone   Muscle Tone Comments reflexes absent   Clinical Impression   Criteria for Skilled Therapeutic Intervention yes, treatment indicated   PT Diagnosis (PT) deficit force production, deficit sensory detection   Influenced by the following impairments LE strength (R)<(L), sensory impairment light touch, vibration, joint position sense, proprioception; balance   Functional limitations due to impairments assist w/ gait, stairs   Clinical Presentation Unstable/Unpredictable   Clinical Decision Making (Complexity) high complexity   Therapy Frequency (PT) 2x/day   Predicted Duration of Therapy Intervention (days/wks) 10 days   Planned Therapy Interventions (PT) balance training;E-stim;gait training;neuromuscular re-education;orthotic fitting/training;stair training;strengthening;TENS;transfer training   Anticipated Equipment Needs at Discharge (PT)   (has cane and walker, may need w/c for community mobility)   Risk & Benefits of therapy have been explained evaluation/treatment results reviewed;care plan/treatment goals reviewed;risks/benefits reviewed;current/potential barriers reviewed;participants voiced agreement with care plan;patient   Total Evaluation Time   Total Evaluation Time (Minutes) 35

## 2020-11-10 NOTE — PLAN OF CARE
Discharge Planner Post-Acute Rehab PT:     Discharge Plan: home to apt w/ 16 stairs to enter, son currently living w/ her    Precautions: falls; visually impaired    Current Status:  Transfer: walker and assist of one  Gait: ww. 30', gait pattern w/ poor wt shift onto (R)  Stairs: 4 stairs, step to pattern, (B) rails, contact assist  Balance: need to formally assess    Assessment:  Significant sensory deficit parallel to muscle weakness (R) >(L), unable to compensate w/ vision; anticipate 10 days to meet goals    Other Barriers to Discharge (DME, Family Training, etc): stairs to enter, visual impairment affects mobility skills

## 2020-11-10 NOTE — PROGRESS NOTES
Spoke with pt Alternative Care Waiver --Amparo Boo PH: [phone] and will continue to work together and coordinate pt discharge needs. Amparo will follow up with pt after discharge as well.     ATILIO Varela, [facility] Re[facility] Unit   Phone: [phone]  I   Pager: [phone]

## 2020-11-10 NOTE — PROGRESS NOTES
[facility]   Acute Rehabilitation Unit  Daily progress note  CC:     Neurologic Conditions 03.8 Neuromuscular Disorders: Neuromyelitis Optica Eexacerbation  Impaired mobility and function.    She ambulates 125 ft w/ WW w/ min A + w/c follow d/t R knee buckling and R knee pain. She requires min A x1 and heavy use of BUE support to correct knee buckle. She performed 3 stairs w/ min A x2 and B railings w/ R knee buckling noted when descending requiring min A x2 for correction. Pt does require visual cues for proper foot placement on stairs in setting of low vision. She performs bed mobility w/ SBA and extended time, using BUE support to assist RLE out of bed. She performs transfers STS w/ up to min A, pending level of fatigue. She is able to transfer from toilet w/ CGA to WW. She performs standing g/h ADLs w/ close SBA w/ 1 LOB noted, pt self correcting. Pt requiring UE support on counter for support during ADL completion. She will require full assessment of ADLs while admitted to acute inpatient rehab.     S: In good spirits.  Ros: Impaired vision in both eyes, no nausea, headache, urgency or sob.    [unfilled]   Scheduled meds    [START ON 11/11/2020] amLODIPine  5 mg Oral Daily     apixaban ANTICOAGULANT  5 mg Oral BID     calcium carbonate 600 mg-vitamin D 400 units  1 tablet Oral QPM     FLUoxetine  40 mg Oral Daily     furosemide  20 mg Oral Daily     metoprolol succinate ER  25 mg Oral BID     mycophenolate  500 mg Oral BID     [START ON 11/11/2020] potassium chloride ER  20 mEq Oral Daily     simvastatin  20 mg Oral At Bedtime     traZODone  100 mg Oral At Bedtime     vitamin B complex with vitamin C  1 tablet Oral Daily     vitamin C  500 mg Oral Daily       PRN meds:  albuterol, - MEDICATION INSTRUCTIONS -, trolamine salicylate, umeclidinium      PHYSICAL EXAM  /52 (BP Location: Right arm)   Pulse 73   Temp 97.1  F (36.2  C) (Oral)   Resp 16   Ht 1.702 m  "(5' 7\")   Wt 86.9 kg (191 lb 9.6 oz)   SpO2 93%   BMI 30.01 kg/m    Gen: Alert and cooperative  HEENT: Vision impairment + Face symmetric.  CV: S1, S2 RRR  Pulm: Clear to auscultation  Abd: Soft, non tender  Ext: Calves non tender  Neuro/MSK: Moves all four, has proximal weakness in the RLE.  Responds to touch.    LABS  Last Comprehensive Metabolic Panel:  Sodium   Date Value Ref Range Status   11/10/2020 143 133 - 144 mmol/L Final     Potassium   Date Value Ref Range Status   11/10/2020 4.2 3.4 - 5.3 mmol/L Final     Chloride   Date Value Ref Range Status   11/10/2020 113 (H) 94 - 109 mmol/L Final     Carbon Dioxide   Date Value Ref Range Status   11/10/2020 26 20 - 32 mmol/L Final     Anion Gap   Date Value Ref Range Status   11/10/2020 4 3 - 14 mmol/L Final     Glucose   Date Value Ref Range Status   11/10/2020 81 70 - 99 mg/dL Final     Urea Nitrogen   Date Value Ref Range Status   11/10/2020 13 7 - 30 mg/dL Final     Creatinine   Date Value Ref Range Status   11/10/2020 0.81 0.52 - 1.04 mg/dL Final     GFR Estimate   Date Value Ref Range Status   11/10/2020 70 >60 mL/min/[1.73_m2] Final     Comment:     Non  GFR Calc  Starting 12/18/2018, serum creatinine based estimated GFR (eGFR) will be   calculated using the Chronic Kidney Disease Epidemiology Collaboration   (CKD-EPI) equation.       Calcium   Date Value Ref Range Status   11/10/2020 8.6 8.5 - 10.1 mg/dL Final        CBC RESULTS:   Recent Labs   Lab Test 11/10/20  0716   WBC 5.7   RBC 3.50*   HGB 10.5*   HCT 33.9*   MCV 97   MCH 30.0   MCHC 31.0*   RDW 14.6           ASSESSMENT AND PLAN    Ms. Ladonna Sheriff is a 75 year old female who presents with acute flare of RLE weakness in the setting of history of NMO c/b residual right eye visual loss, numbness below mid thorax, and bandlike sensation consistent with previous T4 transverse myelitis and on/off episodes of L eye optic neuritis. She was found to have enhancing lesions " extending from T3 to T5/6.  She is demonstrating slow improvement in RLE strength in the setting of 7 rounds of PLEX treatment for NMO acute exacerbation. She remains below baseline in her strength, balance, coordination, sensation, proprioception, functional endurance, ambulation, and overall functional ability. She will need to be able to navigate 16 steps in order to safely return home. Once home, she has an excellent support system with her sons, including one who moved in with her this week.    1. Impairment of ADL's: Patient will work with OT for 90 min daily to work on upper and lower body self care, dressing, toileting, bathing, energy conservation techniques with use of ADs as needed.     2. Impairment of mobility:  Patient will work with PT for 90 min daily to work on gait exercises, strengthening, endurance buildup, transfers with use of walker as needed.     3. Rehab RN to administer medication, patient education on medication taking, VS monitoring, and surgical wound dressing changes and monitoring.    4. Medical Conditions  #AQP4-positive NMO  #H/o T4 transverse myelitis with residual thoracic sensation loss  #Poor vision due to NMO lesions, stable  #Acute exacerbation of NMO leading to RLE weakness, improving  Admitted to [facility] for PLEX therapy. Interventional radiology placed a L internal jugular non-tunneled CVC and transfusion medicine was consulted to assist with PLEX treatment. She completed a 5-day course of methylprednisolone and 7 runs of PLEX (scheduled every other day), which she tolerated appropriately.  Following completion of PLEX therapy, her lower extremity strength was starting to improve. Not much improvement noted since PMR last encounter.   - Completed 7 runs of PLEX  - Completed 5-day course of methylprednisolone  - PTA Mycophenolate 500 BID   - Meningococcal vaccine #2 prior to discharge  - Follow-up with Dr. Franklin in neurology clinic discuss and initiate eculizumab  therapy     #Paroxysmal Atrial fibrillation   #Pleuritic chest pain, resolved   CHADSVASC: 8, HASBLED 3. Unclear onset. She reports history of intermittent palpitations for many years. EKG 11/2 showed Afib not in RVR () and repeat EKG on 11/3 demonstrated sinus rhythm. She was having pleuritic chest pain that resolved 11/4. Mild, anterior left side. Troponin normal, ESR WNL, and EKG without ischemic changes.  - Apixaban BID  - Stopped aspirin and clopidogrel  - Follow up with cardiology (follows with [facility]. Hewitt at [facility])     #Hypokalemia, likely secondary to furosemide.  - Scheduled potassium 40mEq daily  - K 3.6 today.   - check BMP tomorrow.      #Acute cystitis, uncomplicated:   - completed 5-day course of ceftriaxone 1g (completed 10/27)  - asymptomatic      Bladder, Bowel, GI and DVT ppx   Bladder ;check PVRs ×3 straight cath for volumes more than 350 cc.  Monitor for symptoms of urinary tract infection, rehab nursing to send for a UA as needed  Bowels; place the patient on a bowel program and titrate medications as needed.  Hold the bowel program in case of loose stools.  Educated patient on Impact with fiber and fluid intake on a bowel function  DVT prophylaxis with mechanical means     Code Status FULL      Prognosis for medical improvement and stabilization of the medical issues for discharge to home is good.      Estimated Length of Stay: 12 days      Follow up Appointments on Discharge  - Follow-up with Dr. Franklin in neurology clinic for discussion of NMO therapy within 2-4 weeks of hospital discharge  - Follow-up with Dr. Hewitt in cardiology clinic to discuss HF and atrial fibrillation within 4-weeks  - Follow-up with PCP for hospital discharge within 1-2 weeks    Ramses Wan MD   Physical Medicine & Rehabilitation

## 2020-11-10 NOTE — PLAN OF CARE
FOCUS/GOAL  Medical management    ASSESSMENT, INTERVENTIONS AND CONTINUING PLAN FOR GOAL:  Patient selpt good. Prima port applied per her request on PM shift. She was ambulated to the bathroom around 0500 and voided. Denied pain. Continue with POC.

## 2020-11-10 NOTE — CONSULTS
11/10/20 0836   Living Arrangements   People in home child(mary), adult   Able to Return to Prior Arrangements yes   Living Arrangement Comments 2nd level condo with 16 STI    Home Safety   Patient Feels Safe Living in Home? yes   CM/SW Discharge Planning   Patient/Family Anticipates Transition to home with family;home with help/services   Concerns to be Addressed denies needs/concerns at this time;no discharge needs identified   Concerns Comments HHC vs OP pending progress   Patient/family educated on Medicare website which has current facility and service quality ratings no   Transportation Anticipated family or friend will provide   Anticipated Discharge Disposition (CM/SW) Home;Home Care;Outpatient Rehab (PT, OT, SLP, Cardiac or Pulmonary)   Anticipated Discharge Services (CM/SW) County Worker  (Will update as needed on D/C, with pt permission )   Patient/Family in Agreement with Plan yes     Social Work: Initial Assessment with Discharge Plan    Patient Name: Ladonna Sheriff  : 1945  Age: 75 year old  MRN: [id number]  Completed assessment with: Patient   Admitted to ARU: 2020    Presenting Information   Date of SW assessment: November 10, 2020  Health Care Directive: Copy in Chart  Primary Health Care Agent: Patient/self   Secondary Health Care Agent: Abe (two sons)   Living Situation: Pt lives alone in a 2nd floor condominium. Pt has 2 cats.  There is one threshold step to enter the building. Once inside, pt must climb 16 steps (with bilateral rails) to the 2nd floor. Pt states that one of her son's is always with her when she is using the steps. Pt's unit includes a washer and dryer. Pt's master bedroom bath has a step in shower with grab bars and a shower chair. Since her admission her adult son moved in with her (moved from [place]) and can assist upon discharge.  Previous Functional Status: Indep with ADLs PTA. Used a cane indoors and a walker in the community. Sons assist w/  running errands, transportation, and stair navigation. Her son Hayden sets up her medications and she takes the medications independently. Her son Hayden lives in the same condo building as Ladonna. No formal exercise program but remains active with daily activities. 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 Has grocery delivery set-up (or son assists) and a handicapped parking certificate. 1 fall in the last year reported by patient.   DME available: cane, straight, grab bar, tub/shower, shower chair, walker, standard, raised toilet seat  Patient and family understanding of hospitalization: Appropriate   Cultural/Language/Spiritual Considerations: English-speaking,  woman, Gnosticist-poppy.       Physical Health  Reason for admission: Neurologic Conditions 03.8 Neuromuscular Disorders: Neuromyelitis Optica Eexacerbation    Provider Information   Primary Care Physician:Joceline Ty   [facility] [address] [facility]  PH: [phone]  : Pt's waiver  is Amparo Boo at [phone].  Amparo is employed by [facility] contracts for her services.   &  CAMELIA Cadet (last connected Oct 2019)  Social Work Care Coordinator  PH: [phone]    Mental Health/Chemical Dependency:   Diagnosis: Trazodone for sleep and Prozac for anxiety  Alcohol/Tobacco/Narcotis: social drinker consuming 2-3 whiskey cokes per week. No tobacco use reported.   Support/Services in Place: None reported   Services Needed/Recommended: Supportive services available by consult (Health Psychology and Dipti services)   Sexuality/Intimacy: Not discussed     Support System  Marital Status: .   Family support: 3 adult sons who are involved in pt care and supportive.   1.  [facility]  2.  Sridhar, lives in [place]  3.  Ray, lived in [place] but moved in [place] to live with pt  since admissions. Arrived 11/03/2020. Ray was also a caregiver for pt  and pt feels that she will have the support she needs at discharge from her sons.   Other support available: Not discussed     Community Resources  Current in home services: Pt receives waivered services through the [facility] Program.  Pt's waiver  is Amparo Boo at [phone].  Amparo is employed by [employer] and [facility] contracts for her services.  Through the waiver program, pt receives weekly housekeeping assistance and grocery deliver.   Previous services: July 2020--[facility] ([phone]). [facility] RN, PT, OT and HHA back in July 2019. [facility] July 2019.     Financial/Employment/Education  Employment Status: Retired, homemaker   Income Source: Social security income (pt son Hayden assists with managing finances and has financial POA). Bills are on auto-pay.   Education: High School   Financial Concerns:  None reported   Insurance: Medicare, Commercial AARP and [place] Medicaid (limited)       Discharge Plan   Patient and family discharge goal: Home with A from family and HHC vs OP pending progress   Provided Education on discharge plan: Yes  Patient agreeable to discharge plan:  Yes  Provided education and attained signature for Medicare IM and IRF Patient Rights and Privacy Information provided to patient : YES  Provided patient with [facility] Resources: N/A   Barriers to discharge: None identified, good support     Discharge Recommendations   Disposition: See above   Transportation Needs: Family assistance   Name of Transportation Company and Phone: N/A     Additional comments   ELOS 10-12 days. Pt goal to discharge home by 11/18 per admissions note. Good support reported. Denied immediate needs at this time. Has had [facility] in the past. Will continue to follow and remain available as needs arise.     Please invite to Care  Conference:  ATILIO Ramos, [place]  [place] [facility]   Phone: [phone]  I   Pager: [phone]

## 2020-11-10 NOTE — PLAN OF CARE
Discharge Planner Post-Acute Rehab OT:     Discharge Plan: home with son moving here to live with pt. And A prn     Precautions: fall, low vision    Current Status:  ADLs: CGA with standing at sink for g/h, SBA with UB/LB dressing after setup.  IADLs: n/t  Vision/Cognition: bilat vision deficits; R eye blind, L eye field cut/peripheral vision loss    Assessment: Pt. Min-SBAwith BADLs and functional mobility, ambulating throughout room/bathroom with FWW and CGA.    Other Barriers to Discharge (DME, Family Training, etc): TBD; has shower chair, grab bars, walker and cane at home.

## 2020-11-10 NOTE — PLAN OF CARE
FOCUS/GOAL  Bowel management, Bladder management, Medication management, Psychosocial needs, and Safety management    ASSESSMENT, INTERVENTIONS AND CONTINUING PLAN FOR GOAL:    Pt is alert and oriented by 4. No complaints of pain. Admitted to rehab for increased weakness of RLE. An assist of one with a walker and gait belt for all tranfers. Was continent of bladder, but incontinent of bowel this evening due to urgency. Pts schedule was given on piece of paper due to pts poor vision and not having the ability to read white board. Skin check completed, has redness between buttocks-- WOC consult placed. Continue with plan of care.

## 2020-11-10 NOTE — PHARMACY-MEDICATION REGIMEN REVIEW
Pharmacy Medication Regimen Review  Ladonna Sheriff is a 75 year old female who is currently in the Acute Rehab Unit.    Assessment: Upon review of the medications and patient chart the following irregularities were found: Medications that require additional monitoring include:   1. Amlodipine, Metoprolol, and furosemide orders need hold parameters ordered.  2. Patient is on Potassium 40 Meq daily scheduled with no lab levels monitoring    Plan:   1. Please add hold parameters to Amlodipine, Furosemide and Metoprolol orders  2. Order BMP daily-patient is on furosemide daily    Attending provider will be sent this note for review.  If there are any emergent issues noted above, pharmacist will contact provider directly by phone.      Pharmacy will periodically review the resident's medication regimen for any PRN medications not administered in > 72 hours and discontinue them. The pharmacist will discuss gradual dose reductions of psychopharmacologic medications with interdisciplinary team on a regular basis.    Please contact pharmacy if the above does not answer specific medication questions/concerns.    Background:  A pharmacist has reviewed all medications and pertinent medical history today.  Medications were reviewed for appropriate use and any irregularities found are listed with recommendations.      Current Facility-Administered Medications:      albuterol (PROAIR HFA/PROVENTIL HFA/VENTOLIN HFA) 108 (90 Base) MCG/ACT inhaler 2 puff, 2 puff, Inhalation, Q6H PRN, Antonia Tan MD     amLODIPine (NORVASC) tablet 10 mg, 10 mg, Oral, Daily, Antonia Tan MD     apixaban ANTICOAGULANT (ELIQUIS) tablet 5 mg, 5 mg, Oral, BID, Antonia Tan MD, 5 mg at 11/10/20 0751     calcium carbonate 600 mg-vitamin D 400 units (CALTRATE) per tablet 1 tablet, 1 tablet, Oral, QPM, Antonia Tan MD, 1 tablet at 11/09/20 4550     FLUoxetine (PROzac) capsule 40 mg, 40 mg, Oral, Daily,  Antonia Tan MD, 40 mg at 11/10/20 0751     furosemide (LASIX) tablet 20 mg, 20 mg, Oral, Daily, Antonia Tan MD     metoprolol succinate ER (TOPROL-XL) 24 hr tablet 50 mg, 50 mg, Oral, BID, Antonia Tan MD, 50 mg at 11/09/20 2138     mycophenolate (GENERIC EQUIVALENT) tablet 500 mg, 500 mg, Oral, BID, Antonia Tan MD, 500 mg at 11/10/20 0751     Patient is already receiving anticoagulation with heparin, enoxaparin (LOVENOX), warfarin (COUMADIN)  or other anticoagulant medication, , Does not apply, Continuous PRN, Antonia Tan MD     potassium chloride ER (KLOR-CON M) CR tablet 40 mEq, 40 mEq, Oral, Daily, Antonia Tan MD, 40 mEq at 11/10/20 0751     simvastatin (ZOCOR) tablet 20 mg, 20 mg, Oral, At Bedtime, Antonia Tan MD, 20 mg at 11/09/20 2138     traZODone (DESYREL) tablet 100 mg, 100 mg, Oral, At Bedtime, Antonia Tan MD, 100 mg at 11/09/20 2138     trolamine salicylate (ASPERCREME) 10 % cream, , Topical, BID PRN, Antonia Tan MD     umeclidinium (INCRUSE ELLIPTA) 62.5 MCG/INH inhaler 1 puff, 1 puff, Inhalation, Daily PRN, Antonia Tan MD     vitamin B complex with vitamin C (STRESS TAB) tablet 1 tablet, 1 tablet, Oral, Daily, Antonia Tan MD, 1 tablet at 11/10/20 0751     vitamin C (ASCORBIC ACID) tablet 500 mg, 500 mg, Oral, Daily, Antonia Tan MD, 500 mg at 11/10/20 0751  No current outpatient prescriptions on file.   PMH:   Chronic HFpEF  HTN  Sjogren syndrome  Lupus  Remote hx of TIA    Padmini Patton, PharmD, BCPS

## 2020-11-11 NOTE — CARE CONFERENCE
Acute Rehab Care Conference/Team Rounds      Type: Team Rounds    Present: Dr Ramses Wan, Mercedes Muhammad RN, Simona Cabral PT, Kimi Merritt OT, Dia Salcedo SW, Mercedes Eugene Mgr, Karol Armstrong Dietician, Ken-O Christ Gastelum         Discharge Barriers/Treatment/Education    Rehab Diagnosis: Neurologic Conditions 03.8 Neuromuscular Disorders: Neuromyelitis Optica Eexacerbation    Active Medical Co-morbidities/Prognosis:   Patient Active Problem List   Diagnosis     Optic neuritis     Acute respiratory failure with hypoxia (H)     Pulmonary embolism (H)     Transverse myelitis (H)     Neuromyelitis optica (H)     Neuromyelitis optica (devic) (H)     HTN (hypertension)     Sleep disorder     Left bundle branch block     Chronic systolic heart failure (H)     Weakness     Neurological disorder        Safety: Pt is alert and oriented. Uses the call light appropriately. Right eye loss of vision and with left eye able to see shapes only. A minimal assist of one in transfers with the gait belt and walker. Evident RLE weakness with mobility.     Pain: Denies.    Medications, Skin, Tubes/Lines: Takes pills whole with water. No skin issues, no lines/ tubes.     Swallowing/Nutrition:    Bowel/Bladder: Continent in bowel. LBM 11/10. Can be both incontinent and continent in bladder. Ambulates to the bathroom for toileting.    Psychosocial: Pt lives alone in a 2nd floor condominium. Pt has 2 cats.  There is one threshold step to enter the building. Once inside, pt must climb 16 steps (with bilateral rails) to the 2nd floor. Pt states that one of her son's is always with her when she is using the steps. Pt's unit includes a washer and dryer. Pt's master bedroom bath has a step in shower with grab bars and a shower chair. Since her admission her adult son moved in with her (moved from [place]) and can assist upon discharge. Indep with ADLs PTA. Used a cane indoors and a walker in the community. Sons  assist w/ running errands, transportation, and stair navigation. Her son Hayden sets up her medications and she takes the medications independently. Her son Hayden lives in the [facility] building as Ladonna. No formal exercise program but remains active with daily activities. 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 Has grocery delivery set-up (or son assists) and a handicapped parking certificate. 1 fall in the last year reported by patient.     ADLs/IADLs:sba standing at sink oral cares, sba sitting on extended shower g/h bathing u/b and l/b and sba full body dressing sitting    Mobility: Up in room w/c based and walker w/ assist; lacks sensation in multiple dimensions (R) LE which affects functional wt bearing safety.  Anticipate 14 days to progress to safe household mobility.     Cognition/Language:    Community Re-Entry: limited ambulation at baseline, primarily homebound because of vision; does not have w/c at home    Transportation: Not a  at baseline, car transfer not a barrier    Decision maker: self    Plan of Care and goals reviewed and updated.    Discharge Plan/Recommendations    Fall Precautions: continue    Patient/Family input to goals: Yes    Anticipated rehab needs following discharge: Home with home care    Anticipated care giver support after discharge: Family    Estimated length of stay: 14 days    Overall plan for the patient: Continue IP Rehabilitation.      Utilization Review and Continued Stay Justification    Medical Necessity Criteria:    For any criteria that is not met, please document reason and plan for discharge, transfer, or modification of plan of care to address.    Requires intensive rehabilitation program to treat functional deficits?: Yes    Requires 3x per week or greater involvement of rehabilitation physician to oversee rehabilitation program?: Yes    Requires rehabilitation nursing interventions?: Yes    Patient is making  functional progress?: Yes    There is a potential for additional functional progress? Yes    Patient is participating in therapy 3 hours per day a minimum of 5 days per week or 15 hours per week in 7 day period?:Yes    Has discharge needs that require coordinated discharge planning approach?:Yes          Final Physician Sign off    Statement of Approval: I approve  eplan of care.    Patient Goals  Social Work Goals: Confirm discharge recommendations with therapy, coordinate safe discharge plan and remain available to support and assist as needed.         OT Frequency: Daily 60-90 min.  OT goal: hygiene/grooming: independent, modified independent, while standing  OT goal: upper body dressing: Independent, Modified independent, including set-up/clothing retrieval  OT goal: lower body dressing: Independent, Modified independent, including set-up/clothing retrieval  OT goal: upper body bathing: Independent  OT goal: lower body bathing: Independent, Modified independent        OT goal: toilet transfer/toileting: Independent, Modified independent, cleaning and garment management, toilet transfer  OT goal: meal preparation: Independent, Modified independent, ambulatory level, with simple meal preparation  OT goal: home management: Independent, Modified independent, ambulatory level, with light demand household tasks  OT goal: cognitive: Patient/caregiver will verbalize understanding of cognitive assessment results/recommendations as needed for safe discharge planning        OT goal 1: SBA with tub/shower transf. utilizing A.E./DME prn.                   PT Frequency: daily x 90 minutes  PT goal: bed mobility: Independent  PT goal: transfers: Modified independent  PT goal: gait: Modified independent, 100 feet  PT goal: stairs: Supervision/stand-by assist, Greater than 10 stairs  PT goal: perform aerobic activity with stable cardiovascular response: continuous activity, 15 minutes, NuStep     PT goal 1: car transfer Mod  I  PT Goal 2: floor transfer w/ furniture assist  PT Goal 3: ind w/ HEP for standing dynamic balance                                                                            RN Goal- Patient/Family Goal: Bowel: Patient will be continent of bowel, by utilizing timed toileting    RN Goal: Skin Integrity: Patient will demonstrate two interventions to promote healthy skin so that they are free from skin breakdown during stay at ARU.     RN Goal: Carryover of Rehab Techniques: Patient will demonstrate safety techinques like proper use of walker, and safe tranfers while at ARU.

## 2020-11-11 NOTE — PLAN OF CARE
Discharge Planner Post-Acute Rehab OT:      Discharge Plan: home with son moving here to live with pt second son lives upstaisrs in same apt bulding. And A prn      Precautions: fall, low vision r blind L low vision with cut/peripheral vision loss     Current Status:  ADLs: CGA with standing at sink for g/h, SBA with UB/LB dressing after setup.sba full body shower sitting on extended tub bench  IADLs: n/t  Vision/Cognition: bilat vision deficits; R eye blind, L eye field cut/peripheral vision loss     Assessment: Pt. Min-SBAwith BADLs and functional mobility, ambulating throughout room/bathroom with FWW and CGA.     Other Barriers to Discharge (DME, Family Training, etc): TBD; has extended tub bench,rts, grab bars, walker and cane at home.

## 2020-11-11 NOTE — PROGRESS NOTES
Individualized Overall Plan Of Care (IOPOC)      Rehab diagnosis/Impairment Group Code: Neurologic conditions 03.8 neuromuscular disorders: neuromyelitis optica exacerbation  Neurological disorder     Expected functional outcome: Home with home care     Clinical Impression Comments:    Mobility:    ADL:      Communication/Cognition/Swallow:        Intensity of therapy:   PT 90 minutes 5 days per week 14 days  OT 90 minutes 5 days per week 14 days    Orthotics Ankle brace  Education manage bowel program, vitals, medication education, carryover of new rehab techniques, care coordination, skin integrity, assess neurologic status and provide safe environment for patient at falls risk.  Neuropsychology Testing: No  Other:  TBD      Medical Prognosis: Fair      Physician summary statement: In addition to above statements address, Patient requires intensive active and ongoing therapeutic intervention and multiple therapies; Patient requires medical supervision; Expected to actively participate in the intensive rehab program; Sufficiently stable to actively participate; Expectation for measurable improvement in functional capacity or adaption to impairments.    Discharge destination: prior home and family  Discharge rehabilitation needs: home care      Estimated length of stay: 14 days      Rehabilitation Physician Ramses Wan MD

## 2020-11-11 NOTE — PLAN OF CARE
FOCUS/GOAL  Psychosocial needs and Safety management    ASSESSMENT, INTERVENTIONS AND CONTINUING PLAN FOR GOAL:    Pt is alert and oriented by 4. No complaints of pain. Remains an assist of one with walker and gait belt. Continent of urine. No BM this shift. Able to make needs known. Continue with plan of care.

## 2020-11-11 NOTE — PROGRESS NOTES
"  [facility]   Acute Rehabilitation Unit  Daily progress note  CC:     Neurologic Conditions 03.8 Neuromuscular Disorders: Neuromyelitis Optica Eexacerbation  Impaired mobility and function.    TR held 11/11/2020  ADLs/IADLs:sba standing at sink oral cares, sba sitting on extended shower g/h bathing u/b and l/b and sba full body dressing sitting     Mobility: Up in room w/c based and walker w/ assist; lacks sensation in multiple dimensions (R) LE which affects functional wt bearing safety.  Anticipate 14 days to progress to safe household mobility.      Cognition/Language:     Community Re-Entry: limited ambulation at baseline, primarily homebound because of vision; does not have w/c at home     Transportation: Not a  at baseline, car transfer not a barrier     S: In good spirits.  Ros: Impaired vision in both eyes, no nausea, headache, urgency or sob.    [unfilled]   Scheduled meds    amLODIPine  5 mg Oral Daily     apixaban ANTICOAGULANT  5 mg Oral BID     calcium carbonate 600 mg-vitamin D 400 units  1 tablet Oral QPM     FLUoxetine  40 mg Oral Daily     furosemide  20 mg Oral Daily     metoprolol succinate ER  25 mg Oral BID     mycophenolate  500 mg Oral BID     potassium chloride ER  20 mEq Oral Daily     simvastatin  20 mg Oral At Bedtime     traZODone  100 mg Oral At Bedtime     vitamin B complex with vitamin C  1 tablet Oral Daily     vitamin C  500 mg Oral Daily       PRN meds:  albuterol, - MEDICATION INSTRUCTIONS -, trolamine salicylate, umeclidinium      PHYSICAL EXAM  /60 (BP Location: Left arm)   Pulse 84   Temp 96.7  F (35.9  C) (Oral)   Resp 16   Ht 1.702 m (5' 7\")   Wt 86.9 kg (191 lb 9.6 oz)   SpO2 94%   BMI 30.01 kg/m    Gen: Alert and cooperative  HEENT: Vision impairment + Face symmetric.  CV: S1, S2 RRR  Pulm: Clear to auscultation  Abd: Soft, non tender  Ext: Calves non tender  Neuro/MSK: Moves all four, has proximal weakness in the " RLE. Has proprioceptive deficits during gait. Brace helping.  Responds to touch.    LABS  Last Comprehensive Metabolic Panel:  Sodium   Date Value Ref Range Status   11/10/2020 143 133 - 144 mmol/L Final     Potassium   Date Value Ref Range Status   11/10/2020 4.2 3.4 - 5.3 mmol/L Final     Chloride   Date Value Ref Range Status   11/10/2020 113 (H) 94 - 109 mmol/L Final     Carbon Dioxide   Date Value Ref Range Status   11/10/2020 26 20 - 32 mmol/L Final     Anion Gap   Date Value Ref Range Status   11/10/2020 4 3 - 14 mmol/L Final     Glucose   Date Value Ref Range Status   11/10/2020 81 70 - 99 mg/dL Final     Urea Nitrogen   Date Value Ref Range Status   11/10/2020 13 7 - 30 mg/dL Final     Creatinine   Date Value Ref Range Status   11/10/2020 0.81 0.52 - 1.04 mg/dL Final     GFR Estimate   Date Value Ref Range Status   11/10/2020 70 >60 mL/min/[1.73_m2] Final     Comment:     Non  GFR Calc  Starting 12/18/2018, serum creatinine based estimated GFR (eGFR) will be   calculated using the Chronic Kidney Disease Epidemiology Collaboration   (CKD-EPI) equation.       Calcium   Date Value Ref Range Status   11/10/2020 8.6 8.5 - 10.1 mg/dL Final        CBC RESULTS:   Recent Labs   Lab Test 11/10/20  0716   WBC 5.7   RBC 3.50*   HGB 10.5*   HCT 33.9*   MCV 97   MCH 30.0   MCHC 31.0*   RDW 14.6           ASSESSMENT AND PLAN    Ms. Ladonna Sheriff is a 75 year old female who presents with acute flare of RLE weakness in the setting of history of NMO c/b residual right eye visual loss, numbness below mid thorax, and bandlike sensation consistent with previous T4 transverse myelitis and on/off episodes of L eye optic neuritis. She was found to have enhancing lesions extending from T3 to T5/6.  She is demonstrating slow improvement in RLE strength in the setting of 7 rounds of PLEX treatment for NMO acute exacerbation. She remains below baseline in her strength, balance, coordination, sensation,  proprioception, functional endurance, ambulation, and overall functional ability. She will need to be able to navigate 16 steps in order to safely return home. Once home, she has an excellent support system with her sons, including one who moved in with her this week.    1. Impairment of ADL's: Patient will work with OT for 90 min daily to work on upper and lower body self care, dressing, toileting, bathing, energy conservation techniques with use of ADs as needed.     2. Impairment of mobility:  Patient will work with PT for 90 min daily to work on gait exercises, strengthening, endurance buildup, transfers with use of walker as needed.     3. Rehab RN to administer medication, patient education on medication taking, VS monitoring, and surgical wound dressing changes and monitoring.    4. Medical Conditions  #AQP4-positive NMO  #H/o T4 transverse myelitis with residual thoracic sensation loss  #Poor vision due to NMO lesions, stable  #Acute exacerbation of NMO leading to RLE weakness, improving  Admitted to [facility] for PLEX therapy. Interventional radiology placed a L internal jugular non-tunneled CVC and transfusion medicine was consulted to assist with PLEX treatment. She completed a 5-day course of methylprednisolone and 7 runs of PLEX (scheduled every other day), which she tolerated appropriately.  Following completion of PLEX therapy, her lower extremity strength was starting to improve. Not much improvement noted since PMR last encounter.   - Completed 7 runs of PLEX  - Completed 5-day course of methylprednisolone  - PTA Mycophenolate 500 BID   - Meningococcal vaccine #2 prior to discharge  - Follow-up with Dr. Franklin in neurology clinic discuss and initiate eculizumab therapy     #Paroxysmal Atrial fibrillation   #Pleuritic chest pain, resolved   CHADSVASC: 8, HASBLED 3. Unclear onset. She reports history of intermittent palpitations for many years. EKG 11/2 showed Afib not in RVR () and repeat EKG on  11/3 demonstrated sinus rhythm. She was having pleuritic chest pain that resolved 11/4. Mild, anterior left side. Troponin normal, ESR WNL, and EKG without ischemic changes.  - Apixaban BID  - Stopped aspirin and clopidogrel  - Follow up with cardiology (follows with Dr. Hewitt at [facility])     #Hypokalemia, likely secondary to furosemide.  - Scheduled potassium 40mEq daily  - K 3.6 today.   - check BMP tomorrow.      #Acute cystitis, uncomplicated:   - completed 5-day course of ceftriaxone 1g (completed 10/27)  - asymptomatic      Bladder, Bowel, GI and DVT ppx   Bladder ;check PVRs ×3 straight cath for volumes more than 350 cc.  Monitor for symptoms of urinary tract infection, rehab nursing to send for a UA as needed  Bowels; place the patient on a bowel program and titrate medications as needed.  Hold the bowel program in case of loose stools.  Educated patient on Impact with fiber and fluid intake on a bowel function  DVT prophylaxis with mechanical means     Code Status FULL      Prognosis for medical improvement and stabilization of the medical issues for discharge to home is good.      Estimated Length of Stay: 14 days      Follow up Appointments on Discharge  - Follow-up with Dr. Franklin in neurology clinic for discussion of NMO therapy within 2-4 weeks of hospital discharge  - Follow-up with Dr. Hewitt in cardiology clinic to discuss HF and atrial fibrillation within 4-weeks  - Follow-up with PCP for hospital discharge within 1-2 weeks    Ramses Wan MD   Physical Medicine & Rehabilitation

## 2020-11-12 NOTE — PROGRESS NOTES
"  [facility]   Acute Rehabilitation Unit  Daily progress note  CC:     Neurologic Conditions 03.8 Neuromuscular Disorders: Neuromyelitis Optica Eexacerbation  Impaired mobility and function.    TR held 11/11/2020  ADLs/IADLs:sba standing at sink oral cares, sba sitting on extended shower g/h bathing u/b and l/b and sba full body dressing sitting     Mobility: Up in room w/c based and walker w/ assist; lacks sensation in multiple dimensions (R) LE which affects functional wt bearing safety.  Anticipate 14 days to progress to safe household mobility.      Cognition/Language:     Community Re-Entry: limited ambulation at baseline, primarily homebound because of vision; does not have w/c at home     Transportation: Not a  at baseline, car transfer not a barrier     S: In good spirits. Making progress. Finds stickers on her shoes help with her walk.  Ros: Impaired vision in both eyes, no nausea, headache, urgency or sob.    [unfilled]   Scheduled meds    amLODIPine  5 mg Oral Daily     apixaban ANTICOAGULANT  5 mg Oral BID     calcium carbonate 600 mg-vitamin D 400 units  1 tablet Oral QPM     FLUoxetine  40 mg Oral Daily     furosemide  20 mg Oral Daily     metoprolol succinate ER  25 mg Oral BID     mycophenolate  500 mg Oral BID     potassium chloride ER  20 mEq Oral Daily     simvastatin  20 mg Oral At Bedtime     traZODone  100 mg Oral At Bedtime     vitamin B complex with vitamin C  1 tablet Oral Daily     vitamin C  500 mg Oral Daily       PRN meds:  albuterol, - MEDICATION INSTRUCTIONS -, trolamine salicylate, umeclidinium      PHYSICAL EXAM  /64   Pulse 69   Temp 96.7  F (35.9  C) (Oral)   Resp 16   Ht 1.702 m (5' 7\")   Wt 86.9 kg (191 lb 9.6 oz)   SpO2 95%   BMI 30.01 kg/m    Gen: Alert and cooperative  HEENT: Vision impairment + Face symmetric.  CV: S1, S2 RRR  Pulm: Clear to auscultation  Abd: Soft, non tender  Ext: Calves non tender  Neuro/MSK: " Moves all four, has proximal weakness in the RLE. Has proprioceptive deficits during gait. Brace helping.  Responds to touch.    LABS  Last Comprehensive Metabolic Panel:  Sodium   Date Value Ref Range Status   11/10/2020 143 133 - 144 mmol/L Final     Potassium   Date Value Ref Range Status   11/10/2020 4.2 3.4 - 5.3 mmol/L Final     Chloride   Date Value Ref Range Status   11/10/2020 113 (H) 94 - 109 mmol/L Final     Carbon Dioxide   Date Value Ref Range Status   11/10/2020 26 20 - 32 mmol/L Final     Anion Gap   Date Value Ref Range Status   11/10/2020 4 3 - 14 mmol/L Final     Glucose   Date Value Ref Range Status   11/10/2020 81 70 - 99 mg/dL Final     Urea Nitrogen   Date Value Ref Range Status   11/10/2020 13 7 - 30 mg/dL Final     Creatinine   Date Value Ref Range Status   11/10/2020 0.81 0.52 - 1.04 mg/dL Final     GFR Estimate   Date Value Ref Range Status   11/10/2020 70 >60 mL/min/[1.73_m2] Final     Comment:     Non  GFR Calc  Starting 12/18/2018, serum creatinine based estimated GFR (eGFR) will be   calculated using the Chronic Kidney Disease Epidemiology Collaboration   (CKD-EPI) equation.       Calcium   Date Value Ref Range Status   11/10/2020 8.6 8.5 - 10.1 mg/dL Final        CBC RESULTS:   Recent Labs   Lab Test 11/10/20  0716   WBC 5.7   RBC 3.50*   HGB 10.5*   HCT 33.9*   MCV 97   MCH 30.0   MCHC 31.0*   RDW 14.6           ASSESSMENT AND PLAN    Ms. Ladonna Sheriff is a 75 year old female who presents with acute flare of RLE weakness in the setting of history of NMO c/b residual right eye visual loss, numbness below mid thorax, and bandlike sensation consistent with previous T4 transverse myelitis and on/off episodes of L eye optic neuritis. She was found to have enhancing lesions extending from T3 to T5/6.  She is demonstrating slow improvement in RLE strength in the setting of 7 rounds of PLEX treatment for NMO acute exacerbation. She remains below baseline in her strength,  balance, coordination, sensation, proprioception, functional endurance, ambulation, and overall functional ability. She will need to be able to navigate 16 steps in order to safely return home. Once home, she has an excellent support system with her sons, including one who moved in with her this week.    1. Impairment of ADL's: Patient will work with OT for 90 min daily to work on upper and lower body self care, dressing, toileting, bathing, energy conservation techniques with use of ADs as needed.     2. Impairment of mobility:  Patient will work with PT for 90 min daily to work on gait exercises, strengthening, endurance buildup, transfers with use of walker as needed.     3. Rehab RN to administer medication, patient education on medication taking, VS monitoring, and surgical wound dressing changes and monitoring.    4. Medical Conditions  #AQP4-positive NMO  #H/o T4 transverse myelitis with residual thoracic sensation loss  #Poor vision due to NMO lesions, stable  #Acute exacerbation of NMO leading to RLE weakness, improving  Admitted to [facility] for PLEX therapy. Interventional radiology placed a L internal jugular non-tunneled CVC and transfusion medicine was consulted to assist with PLEX treatment. She completed a 5-day course of methylprednisolone and 7 runs of PLEX (scheduled every other day), which she tolerated appropriately.  Following completion of PLEX therapy, her lower extremity strength was starting to improve. Not much improvement noted since PMR last encounter.   - Completed 7 runs of PLEX  - Completed 5-day course of methylprednisolone  - PTA Mycophenolate 500 BID   - Meningococcal vaccine #2 prior to discharge  - Follow-up with Dr. Franklin in neurology clinic discuss and initiate eculizumab therapy     #Paroxysmal Atrial fibrillation   #Pleuritic chest pain, resolved   CHADSVASC: 8, HASBLED 3. Unclear onset. She reports history of intermittent palpitations for many years. EKG 11/2 showed Afib not in  RVR () and repeat EKG on 11/3 demonstrated sinus rhythm. She was having pleuritic chest pain that resolved 11/4. Mild, anterior left side. Troponin normal, ESR WNL, and EKG without ischemic changes.  - Apixaban BID  - Stopped aspirin and clopidogrel  - Follow up with cardiology (follows with Dr. Hewitt at [facility])     #Hypokalemia, likely secondary to furosemide.  - Scheduled potassium 40mEq daily  - K 3.6 today.   - check BMP tomorrow.      #Acute cystitis, uncomplicated:   - completed 5-day course of ceftriaxone 1g (completed 10/27)  - asymptomatic      Bladder, Bowel, GI and DVT ppx   Bladder ;check PVRs ×3 straight cath for volumes more than 350 cc.  Monitor for symptoms of urinary tract infection, rehab nursing to send for a UA as needed  Bowels; place the patient on a bowel program and titrate medications as needed.  Hold the bowel program in case of loose stools.  Educated patient on Impact with fiber and fluid intake on a bowel function  DVT prophylaxis with mechanical means     Code Status FULL      Prognosis for medical improvement and stabilization of the medical issues for discharge to home is good.      Estimated Length of Stay: 14 days      Follow up Appointments on Discharge  - Follow-up with Dr. Franklin in neurology clinic for discussion of NMO therapy within 2-4 weeks of hospital discharge  - Follow-up with Dr. Hewitt in cardiology clinic to discuss HF and atrial fibrillation within 4-weeks  - Follow-up with PCP for hospital discharge within 1-2 weeks    Ramses Wan MD   Physical Medicine & Rehabilitation

## 2020-11-12 NOTE — PLAN OF CARE
FOCUS/GOAL  Bladder management, Medication management, Psychosocial needs, and Safety management    ASSESSMENT, INTERVENTIONS AND CONTINUING PLAN FOR GOAL:    Pt is alert and oriented by 4. No complaints of pain. Remains an assist of one with a walker and gait belt. Continent of urine, no BM this shift. Appetite good, ate 100% of meal. Continue with plan of care.

## 2020-11-12 NOTE — PLAN OF CARE
Discharge Planner Post-Acute Rehab OT:      Discharge Plan: home with son moving here to live with pt second son lives upstaisrs in same apt bulding. And A prn      Precautions: fall, low vision r blind L low vision with cut/peripheral vision loss     Current Status:  ADLs: CGA with standing at sink for g/h, SBA with UB/LB dressing after setup.sba full body shower sitting on extended tub bench. Standard bed transfer no rail cues for log rolling  IADLs: n/t  Vision/Cognition: bilat vision deficits; R eye blind, L eye field cut/peripheral vision loss     Assessment: Pt. SBAwith BADLs and functional mobility, ambulating throughout room/bathroom with FWW and CGA-sba  Increase foot placement with 4 orange stickers on toes to increase awareness of where feet are with decrease sensation R L/E and decrease vision     Other Barriers to Discharge (DME, Family Training, etc): TBD; has extended tub bench,rts, grab bars, walker and cane at home.

## 2020-11-12 NOTE — PLAN OF CARE
FOCUS/GOAL  Medical management    ASSESSMENT, INTERVENTIONS AND CONTINUING PLAN FOR GOAL    Pt is alert and oriented. Uses the call light appropriately. Denies any pain. Continent in bladder and bowel. A minimal assist of 1 with the walker and gait belt in transfers. No concerns this shift. Sleeping well overnight. Will continue POC.

## 2020-11-12 NOTE — PLAN OF CARE
Discharge Planner Post-Acute Rehab PT:      Discharge Plan: home to apt w/ 16 stairs to enter, son currently living w/ her     Precautions: falls; visually impaired     Current Status:  Transfer: walker and assist of one  Gait: ww. 30', gait pattern improving but assymetrical  Stairs: 4 stairs, step to pattern, (B) rails, contact assist  Balance: need to formally assess     Assessment:  Significant sensory deficit parallel to muscle weakness (R) >(L), unable to compensate w/ vision; anticipate 10 days to meet goals     Other Barriers to Discharge (DME, Family Training, etc): stairs to enter, visual impairment affects mobility skills

## 2020-11-13 NOTE — PROGRESS NOTES
"  [facility]   Acute Rehabilitation Unit  Daily progress note  CC:     Neurologic Conditions 03.8 Neuromuscular Disorders: Neuromyelitis Optica Eexacerbation  Impaired mobility and function.    TR held 11/11/2020  ADLs/IADLs:sba standing at sink oral cares, sba sitting on extended shower g/h bathing u/b and l/b and sba full body dressing sitting     Mobility: Up in room w/c based and walker w/ assist; lacks sensation in multiple dimensions (R) LE which affects functional wt bearing safety.  Anticipate 14 days to progress to safe household mobility.      Cognition/Language:     Community Re-Entry: limited ambulation at baseline, primarily homebound because of vision; does not have w/c at home     Transportation: Not a  at baseline, car transfer not a barrier     S: In good spirits. Making progress. Finds stickers on her shoes help with her walk. No new issues.  Ros: Impaired vision in both eyes, no nausea, headache, urgency or sob.    [unfilled]   Scheduled meds    amLODIPine  5 mg Oral Daily     apixaban ANTICOAGULANT  5 mg Oral BID     calcium carbonate 600 mg-vitamin D 400 units  1 tablet Oral QPM     FLUoxetine  40 mg Oral Daily     furosemide  20 mg Oral Daily     metoprolol succinate ER  25 mg Oral BID     mycophenolate  500 mg Oral BID     potassium chloride ER  20 mEq Oral Daily     simvastatin  20 mg Oral At Bedtime     traZODone  100 mg Oral At Bedtime     vitamin B complex with vitamin C  1 tablet Oral Daily     vitamin C  500 mg Oral Daily       PRN meds:  albuterol, - MEDICATION INSTRUCTIONS -, trolamine salicylate, umeclidinium      PHYSICAL EXAM  /67 (BP Location: Right arm)   Pulse 69   Temp 97.7  F (36.5  C) (Oral)   Resp 16   Ht 1.702 m (5' 7\")   Wt 86.5 kg (190 lb 9.6 oz)   SpO2 92%   BMI 29.85 kg/m    Gen: Alert and cooperative  HEENT: Vision impairment + Face symmetric.  CV: S1, S2 RRR  Pulm: Clear to auscultation  Abd: Soft, non " tender  Ext: Calves non tender  Neuro/MSK: Moves all four, has proximal weakness in the RLE. Has proprioceptive deficits during gait. Brace helping.  Responds to touch.    LABS  Last Comprehensive Metabolic Panel:  Sodium   Date Value Ref Range Status   11/10/2020 143 133 - 144 mmol/L Final     Potassium   Date Value Ref Range Status   11/10/2020 4.2 3.4 - 5.3 mmol/L Final     Chloride   Date Value Ref Range Status   11/10/2020 113 (H) 94 - 109 mmol/L Final     Carbon Dioxide   Date Value Ref Range Status   11/10/2020 26 20 - 32 mmol/L Final     Anion Gap   Date Value Ref Range Status   11/10/2020 4 3 - 14 mmol/L Final     Glucose   Date Value Ref Range Status   11/10/2020 81 70 - 99 mg/dL Final     Urea Nitrogen   Date Value Ref Range Status   11/10/2020 13 7 - 30 mg/dL Final     Creatinine   Date Value Ref Range Status   11/10/2020 0.81 0.52 - 1.04 mg/dL Final     GFR Estimate   Date Value Ref Range Status   11/10/2020 70 >60 mL/min/[1.73_m2] Final     Comment:     Non  GFR Calc  Starting 12/18/2018, serum creatinine based estimated GFR (eGFR) will be   calculated using the Chronic Kidney Disease Epidemiology Collaboration   (CKD-EPI) equation.       Calcium   Date Value Ref Range Status   11/10/2020 8.6 8.5 - 10.1 mg/dL Final        CBC RESULTS:   Recent Labs   Lab Test 11/10/20  0716   WBC 5.7   RBC 3.50*   HGB 10.5*   HCT 33.9*   MCV 97   MCH 30.0   MCHC 31.0*   RDW 14.6           ASSESSMENT AND PLAN    Ms. Ladonna Sheriff is a 75 year old female who presents with acute flare of RLE weakness in the setting of history of NMO c/b residual right eye visual loss, numbness below mid thorax, and bandlike sensation consistent with previous T4 transverse myelitis and on/off episodes of L eye optic neuritis. She was found to have enhancing lesions extending from T3 to T5/6.  She is demonstrating slow improvement in RLE strength in the setting of 7 rounds of PLEX treatment for NMO acute exacerbation.  She remains below baseline in her strength, balance, coordination, sensation, proprioception, functional endurance, ambulation, and overall functional ability. She will need to be able to navigate 16 steps in order to safely return home. Once home, she has an excellent support system with her sons, including one who moved in with her this week.    1. Impairment of ADL's: Patient will work with OT for 90 min daily to work on upper and lower body self care, dressing, toileting, bathing, energy conservation techniques with use of ADs as needed.     2. Impairment of mobility:  Patient will work with PT for 90 min daily to work on gait exercises, strengthening, endurance buildup, transfers with use of walker as needed.     3. Rehab RN to administer medication, patient education on medication taking, VS monitoring, and surgical wound dressing changes and monitoring.    4. Medical Conditions  #AQP4-positive NMO  #H/o T4 transverse myelitis with residual thoracic sensation loss  #Poor vision due to NMO lesions, stable  #Acute exacerbation of NMO leading to RLE weakness, improving  Admitted to [facility] for PLEX therapy. Interventional radiology placed a L internal jugular non-tunneled CVC and transfusion medicine was consulted to assist with PLEX treatment. She completed a 5-day course of methylprednisolone and 7 runs of PLEX (scheduled every other day), which she tolerated appropriately.  Following completion of PLEX therapy, her lower extremity strength was starting to improve. Not much improvement noted since PMR last encounter.   - Completed 7 runs of PLEX  - Completed 5-day course of methylprednisolone  - PTA Mycophenolate 500 BID   - Meningococcal vaccine #2 prior to discharge  - Follow-up with Dr. Franklin in neurology clinic discuss and initiate eculizumab therapy     #Paroxysmal Atrial fibrillation   #Pleuritic chest pain, resolved   CHADSVASC: 8, HASBLED 3. Unclear onset. She reports history of intermittent palpitations  for many years. EKG 11/2 showed Afib not in RVR () and repeat EKG on 11/3 demonstrated sinus rhythm. She was having pleuritic chest pain that resolved 11/4. Mild, anterior left side. Troponin normal, ESR WNL, and EKG without ischemic changes.  - Apixaban BID  - Stopped aspirin and clopidogrel  - Follow up with cardiology (follows with Dr. Hewitt at [facility])     #Hypokalemia, likely secondary to furosemide.  - Scheduled potassium 20mEq daily  - K 4.2  - Check BMP       #Acute cystitis, uncomplicated:   - completed 5-day course of ceftriaxone 1g (completed 10/27)  - asymptomatic      Bladder, Bowel, GI and DVT ppx   Bladder ;check PVRs ×3 straight cath for volumes more than 350 cc.  Monitor for symptoms of urinary tract infection, rehab nursing to send for a UA as needed  Bowels; place the patient on a bowel program and titrate medications as needed.  Hold the bowel program in case of loose stools.  Educated patient on Impact with fiber and fluid intake on a bowel function  DVT prophylaxis with mechanical means     Code Status FULL      Prognosis for medical improvement and stabilization of the medical issues for discharge to home is good.      Estimated Length of Stay: 14 days      Follow up Appointments on Discharge  - Follow-up with Dr. Franklin in neurology clinic for discussion of NMO therapy within 2-4 weeks of hospital discharge  - Follow-up with Dr. Hewitt in cardiology clinic to discuss HF and atrial fibrillation within 4-weeks  - Follow-up with PCP for hospital discharge within 1-2 weeks    Ramses Wan MD   Physical Medicine & Rehabilitation

## 2020-11-13 NOTE — PLAN OF CARE
FOCUS/GOAL  Bowel management, Bladder management, and Nutrition/Feeding/Swallowing precautions    ASSESSMENT, INTERVENTIONS AND CONTINUING PLAN FOR GOAL:  Writer cared for pt from [id number]. Alert and oriented x4, cont B/B, LBM 11/11. Pt has good appetite, ate % of both meals. Good oral hydration. Pt denies pain, SOB and N/T. AM amlodipine held this AM, parameters not met. Pt had shower on evening shift with staff, dressing covering scab on neck replaced. No other concerns, pt is able to makes needs known.

## 2020-11-13 NOTE — PLAN OF CARE
Discharge Planner Post-Acute Rehab OT:      Discharge Plan: home with son moving here to live with pt second son lives upstaisrs in same apt bulding. And A prn      Precautions: fall, low vision r blind L low vision with cut/peripheral vision loss     Current Status:  ADLs: CGA with standing at sink for g/h, SBA with UB/LB dressing after setup.sba full body shower sitting on extended tub bench. Standard bed transfer no rail cues for log rolling  IADLs: n/t  Vision/Cognition: bilat vision deficits; R eye blind, L eye field cut/peripheral vision loss     Assessment: pt continues to increase adls sitting at sink for g/h and highstool and sba with full body dressing cont ot per poc     Other Barriers to Discharge (DME, Family Training, etc): TBD; has extended tub bench,rts, grab bars, walker and cane at home.

## 2020-11-13 NOTE — PLAN OF CARE
FOCUS/GOAL  Bowel management, Bladder management, Medical management, and Mobility    ASSESSMENT, INTERVENTIONS AND CONTINUING PLAN FOR GOAL:    Pt is alert and oriented. Uses the call light appropriately. Denies any pain, sob, and new weakness. Is continent in bowel and bladder with known LBM 11/11. Ambulated to the bathroom for toileting on SBA with the cane and gait belt. No concerns as of this time. Will continue POC.

## 2020-11-13 NOTE — PLAN OF CARE
Discharge Planner Post-Acute Rehab PT:      Discharge Plan: home to apt w/ 16 stairs to enter, son currently living w/ her     Precautions: falls; visually impaired     Current Status:  Transfer: walker and assist of one  Gait: ww. 50', gait pattern improving w/ wt acceptance (R) through stance phase  Stairs: 8 stairs, step to pattern, (B) rails, contact assist  Balance: need to formally assess     Assessment:  Significant sensory deficit parallel to muscle weakness (R) >(L), unable to compensate w/ vision; anticipate 10 days to meet goals     Other Barriers to Discharge (DME, Family Training, etc): stairs to enter, visual impairment affects mobility skills

## 2020-11-14 NOTE — PLAN OF CARE
FOCUS/GOAL  Bladder management, Pain management, Mobility, Cognition/Memory/Judgment/Problem solving, and Safety management    ASSESSMENT, INTERVENTIONS AND CONTINUING PLAN FOR GOAL:  Pt is alert and oriented. Continent of bladder, voiding spontaneously using toilet. Up CGA with walker to the bathroom. No complaints of pain or discomfort this shift. Appeared to be sleeping well during rounds. Bed alarm on for safety. Uses call light appropriately, able to make needs known. Will continue with POC.

## 2020-11-14 NOTE — PLAN OF CARE
Discharge Planner Post-Acute Rehab OT:      Discharge Plan: home with son moving here to live with pt second son lives upstaisrs in same apt bulding. And A prn      Precautions: fall, low vision r blind L low vision with cut/peripheral vision loss     Current Status:  ADLs: CGA with standing at sink for g/h, SBA with UB/LB dressing after setup. SBA full body shower sitting on extended tub bench. Standard bed transfer no rail cues for log rolling    IADLs: CGA-min A simple meal/snack prep(making a toast using a toaster). Max cues for walker safety. Min LOB when attempting to reach for a plate. Educated pt regarding safe item retrieval. Needs further training and education.     Vision/Cognition: bilat vision deficits; R eye blind, L eye field cut/peripheral vision loss     Assessment: Pt  not feeling well this AM with increased fatigue, headache and orthostatic BP. Pt feeling better for PM OT session and was able to participate in kitchen activity. Also attempted getting in and out of lowe recliner with CGA x2. Pt. SBAwith BADLs and functional mobility, ambulating throughout room/bathroom with FWW and CGA-sba  Increase foot placement with 4 orange stickers on toes to increase awareness of where feet are with decrease sensation R L/E and decrease vision     Other Barriers to Discharge (DME, Family Training, etc): TBD; has extended tub bench,rts, grab bars, walker and cane at home.

## 2020-11-14 NOTE — PLAN OF CARE
Patient A&O x4 and able to make her needs known. Lung sounds clear and denied SOB/DAMON and pain. Bowel sounds active in all 4Qs and no BM as of yet. Denied CP, dizziness, numbness and tingling. Pt is drinking well and voiding spontaneously without difficulties. Pt is independent with turning and repositioning SBA with walker. Had low BP with orthostatic and headache. Fluids encouraged,  Tylenol PRN given and provider notified. Abdominal binder and STACEY stockings applied as ordered. Interventions were effective and will continue with POC.

## 2020-11-14 NOTE — PROGRESS NOTES
[facility], [place]   Physical Medicine and Rehabilitation Note  Weekend/Holiday coverage           Assessment and Plan of Care:     Ms. Ladonna Sheriff is a 75 year old female who presents with acute flare of RLE weakness in the setting of history of NMO c/b residual right eye visual loss, numbness below mid thorax, and bandlike sensation consistent with previous T4 transverse myelitis and on/off episodes of L eye optic neuritis. She was found to have enhancing lesions extending from T3 to T5/6.  She is demonstrating slow improvement in RLE strength in the setting of 7 rounds of PLEX treatment for NMO acute exacerbation. She remains below baseline in her strength, balance, coordination, sensation, proprioception, functional endurance, ambulation, and overall functional ability    --Vitals stable. Labs reviewed  -- Encourage fluid intake and abdomen binder, TEDs stocking   --Continue ongoing medical management.  --Continue therapies and plan of care.           Interval history:     Called to bedside as patient had orthostatic hypotension this morning. She was dizzy and tired this morning.  Fluid intake did not change. Denies fever, chills, CP, SOB, N/V, abdominal pain, new pain or weakness/numbness/tingling.   Patient feels better with rest.           Physical Exam:     Vitals:    11/14/20 0950 11/14/20 1008 11/14/20 1124 11/14/20 1500   BP: 108/56 113/59 (!) 141/54    BP Location: Right arm Left arm Right arm    Pulse: 87 72 78 73   Resp:    12   Temp:    97.5  F (36.4  C)   TempSrc:    Oral   SpO2:    94%   Weight:       Height:         Gen: NAD, resting in bed  Heart: RRR  Lungs: clear breath sounds b/l  Abd: soft and non-tender  Ext: wwp, no edema in BLE, no tenderness in calves  MSK/neuro: alert and oriented. speech fluent. moves BUE and BLE volitionally.          Data:     Last Comprehensive Metabolic Panel:  Sodium   Date Value Ref Range Status   11/14/2020 143 133 - 144 mmol/L Final      Potassium   Date Value Ref Range Status   11/14/2020 4.2 3.4 - 5.3 mmol/L Final     Chloride   Date Value Ref Range Status   11/14/2020 111 (H) 94 - 109 mmol/L Final     Carbon Dioxide   Date Value Ref Range Status   11/14/2020 29 20 - 32 mmol/L Final     Anion Gap   Date Value Ref Range Status   11/14/2020 3 3 - 14 mmol/L Final     Glucose   Date Value Ref Range Status   11/14/2020 89 70 - 99 mg/dL Final     Urea Nitrogen   Date Value Ref Range Status   11/14/2020 16 7 - 30 mg/dL Final     Creatinine   Date Value Ref Range Status   11/14/2020 0.87 0.52 - 1.04 mg/dL Final     GFR Estimate   Date Value Ref Range Status   11/14/2020 65 >60 mL/min/[1.73_m2] Final     Comment:     Non  GFR Calc  Starting 12/18/2018, serum creatinine based estimated GFR (eGFR) will be   calculated using the Chronic Kidney Disease Epidemiology Collaboration   (CKD-EPI) equation.       Calcium   Date Value Ref Range Status   11/14/2020 8.4 (L) 8.5 - 10.1 mg/dL Final       Scheduled meds    amLODIPine  5 mg Oral Daily     apixaban ANTICOAGULANT  5 mg Oral BID     calcium carbonate 600 mg-vitamin D 400 units  1 tablet Oral QPM     FLUoxetine  40 mg Oral Daily     furosemide  20 mg Oral Daily     metoprolol succinate ER  25 mg Oral BID     mycophenolate  500 mg Oral BID     potassium chloride ER  20 mEq Oral Daily     simvastatin  20 mg Oral At Bedtime     traZODone  100 mg Oral At Bedtime     vitamin B complex with vitamin C  1 tablet Oral Daily     vitamin C  500 mg Oral Daily       PRN meds:  acetaminophen, albuterol, mineral oil-hydrophilic petrolatum, - MEDICATION INSTRUCTIONS -, trolamine salicylate, umeclidinium    Patient discussed with PM&R staff Dr.Ikramuddin Arlyn Payton  PGY 2  Physical Medicine and Rehabilitation  Pager: [phone]

## 2020-11-15 NOTE — PLAN OF CARE
Discharge Planner Post-Acute Rehab OT:      Discharge Plan: home with son moving here to live with pt second son lives upstaisrs in same apt bulding. And A prn      Precautions: fall, low vision r blind L low vision with cut/peripheral vision loss, orthostatic BP, TEDs and abdominal binder when OOB     Current Status:  ADLs: CGA with standing at sink for g/h, SBA with UB/LB dressing after setup. SBA full body shower sitting on extended tub bench. Standard bed transfer no rail cues for log rolling     IADLs: CGA-min A simple meal/snack prep(making a toast using a toaster). Max cues for walker safety. Min LOB when attempting to reach for a plate. Educated pt regarding safe item retrieval. Needs further training and education.      Vision/Cognition: bilat vision deficits; R eye blind, L eye field cut/peripheral vision loss     Assessment: Pt reports R knee wanting to buckle at times during ambulating.  Requires repetitive training for walker safety for functional reaching. Pt. CGA with BADLs and functional mobility, ambulating throughout room/bathroom with FWW and CGA-SBA , Increase foot placement with orange stickers on toes to increase awareness of where feet are with decrease sensation R L/E and decrease vision     Other Barriers to Discharge (DME, Family Training, etc): TBD; has extended tub bench,rts, grab bars, walker and cane at home.

## 2020-11-15 NOTE — PLAN OF CARE
Discharge Planner Post-Acute Rehab PT:      Discharge Plan: home to apt w/ 16 stairs to enter, son currently living w/ her     Precautions: falls; visually impaired; episode of orthostasis 11/14     Current Status:  Transfer: walker and assist of one  Gait: ww. 50', gait pattern improving w/ wt acceptance (R) through stance phase  Stairs: 8 stairs, step to pattern, (B) rails, contact assist  Balance: need to formally assess     Assessment:  Significant sensory deficit parallel to muscle weakness (R) >(L), unable to compensate w/ vision; anticipate 10 days to meet goals     Other Barriers to Discharge (DME, Family Training, etc): stairs to enter, visual impairment affects mobility skills

## 2020-11-15 NOTE — PROGRESS NOTES
Pt A+O x4, calls light for assistance and continent of B/B.  Pt has no complaints. Pt slept this shift.

## 2020-11-15 NOTE — PLAN OF CARE
Patient A&O x4 and able to make her needs known. Lung sounds clear and denied SOB/DAMON and pain. Bowel sounds active in all 4Qs. Denied CP, dizziness, numbness and tingling. Pt is drinking well and voiding spontaneously without difficulties. Pt is independent with turning and repositioning SBA with walker. Will continue with POC.

## 2020-11-16 NOTE — PROGRESS NOTES
"  [facility]   Acute Rehabilitation Unit  Daily progress note  CC:     Neurologic Conditions 03.8 Neuromuscular Disorders: Neuromyelitis Optica Eexacerbation  Impaired mobility and function.    TR held 11/11/2020  ADLs/IADLs:sba standing at sink oral cares, sba sitting on extended shower g/h bathing u/b and l/b and sba full body dressing sitting     Mobility: Up in room w/c based and walker w/ assist; lacks sensation in multiple dimensions (R) LE which affects functional wt bearing safety.  Anticipate 14 days to progress to safe household mobility.      Cognition/Language:     Community Re-Entry: limited ambulation at baseline, primarily homebound because of vision; does not have w/c at home     Transportation: Not a  at baseline, car transfer not a barrier     S: In good spirits. Making progress. Finds stickers on her shoes help with her walk. No new issues.    BP is labile. Orthostatic. Got additional medication.   Ros: Impaired vision in both eyes, no nausea, headache, urgency or sob.    [unfilled]   Scheduled meds    amLODIPine  5 mg Oral Daily     apixaban ANTICOAGULANT  5 mg Oral BID     calcium carbonate 600 mg-vitamin D 400 units  1 tablet Oral QPM     FLUoxetine  40 mg Oral Daily     furosemide  20 mg Oral Daily     metoprolol succinate ER  25 mg Oral BID     mycophenolate  500 mg Oral BID     potassium chloride ER  20 mEq Oral Daily     simvastatin  20 mg Oral At Bedtime     traZODone  100 mg Oral At Bedtime     vitamin B complex with vitamin C  1 tablet Oral Daily     vitamin C  500 mg Oral Daily       PRN meds:  acetaminophen, albuterol, mineral oil-hydrophilic petrolatum, - MEDICATION INSTRUCTIONS -, trolamine salicylate, umeclidinium      PHYSICAL EXAM  /68 (BP Location: Right arm)   Pulse 73   Temp 96.3  F (35.7  C) (Oral)   Resp 16   Ht 1.702 m (5' 7\")   Wt 86.5 kg (190 lb 9.6 oz)   SpO2 92%   BMI 29.85 kg/m    Gen: Alert and " cooperative  HEENT: Vision impairment + Face symmetric.  CV: S1, S2 RRR  Pulm: Clear to auscultation  Abd: Soft, non tender  Ext: Calves non tender  Neuro/MSK: Moves all four, has proximal weakness in the RLE. Has proprioceptive deficits during gait. Brace helping.  Responds to touch.    LABS  Last Comprehensive Metabolic Panel:  Sodium   Date Value Ref Range Status   11/14/2020 143 133 - 144 mmol/L Final     Potassium   Date Value Ref Range Status   11/14/2020 4.2 3.4 - 5.3 mmol/L Final     Chloride   Date Value Ref Range Status   11/14/2020 111 (H) 94 - 109 mmol/L Final     Carbon Dioxide   Date Value Ref Range Status   11/14/2020 29 20 - 32 mmol/L Final     Anion Gap   Date Value Ref Range Status   11/14/2020 3 3 - 14 mmol/L Final     Glucose   Date Value Ref Range Status   11/14/2020 89 70 - 99 mg/dL Final     Urea Nitrogen   Date Value Ref Range Status   11/14/2020 16 7 - 30 mg/dL Final     Creatinine   Date Value Ref Range Status   11/14/2020 0.87 0.52 - 1.04 mg/dL Final     GFR Estimate   Date Value Ref Range Status   11/14/2020 65 >60 mL/min/[1.73_m2] Final     Comment:     Non  GFR Calc  Starting 12/18/2018, serum creatinine based estimated GFR (eGFR) will be   calculated using the Chronic Kidney Disease Epidemiology Collaboration   (CKD-EPI) equation.       Calcium   Date Value Ref Range Status   11/14/2020 8.4 (L) 8.5 - 10.1 mg/dL Final        CBC RESULTS:   Recent Labs   Lab Test 11/10/20  0716   WBC 5.7   RBC 3.50*   HGB 10.5*   HCT 33.9*   MCV 97   MCH 30.0   MCHC 31.0*   RDW 14.6           ASSESSMENT AND PLAN    Ms. Ladonna Sheriff is a 75 year old female who presents with acute flare of RLE weakness in the setting of history of NMO c/b residual right eye visual loss, numbness below mid thorax, and bandlike sensation consistent with previous T4 transverse myelitis and on/off episodes of L eye optic neuritis. She was found to have enhancing lesions extending from T3 to T5/6.  She is  demonstrating slow improvement in RLE strength in the setting of 7 rounds of PLEX treatment for NMO acute exacerbation. She remains below baseline in her strength, balance, coordination, sensation, proprioception, functional endurance, ambulation, and overall functional ability. She will need to be able to navigate 16 steps in order to safely return home. Once home, she has an excellent support system with her sons, including one who moved in with her this week.    1. Impairment of ADL's: Patient will work with OT for 90 min daily to work on upper and lower body self care, dressing, toileting, bathing, energy conservation techniques with use of ADs as needed.     2. Impairment of mobility:  Patient will work with PT for 90 min daily to work on gait exercises, strengthening, endurance buildup, transfers with use of walker as needed.     3. Rehab RN to administer medication, patient education on medication taking, VS monitoring, and surgical wound dressing changes and monitoring.    4. Medical Conditions  #AQP4-positive NMO  #H/o T4 transverse myelitis with residual thoracic sensation loss  #Poor vision due to NMO lesions, stable  #Acute exacerbation of NMO leading to RLE weakness, improving  Admitted to [facility] for PLEX therapy. Interventional radiology placed a L internal jugular non-tunneled CVC and transfusion medicine was consulted to assist with PLEX treatment. She completed a 5-day course of methylprednisolone and 7 runs of PLEX (scheduled every other day), which she tolerated appropriately.  Following completion of PLEX therapy, her lower extremity strength was starting to improve. Not much improvement noted since PMR last encounter.   - Completed 7 runs of PLEX  - Completed 5-day course of methylprednisolone  - PTA Mycophenolate 500 BID   - Meningococcal vaccine #2 prior to discharge  - Follow-up with Dr. Franklin in neurology clinic discuss and initiate eculizumab therapy     #Paroxysmal Atrial fibrillation    #Pleuritic chest pain, resolved   CHADSVASC: 8, HASBLED 3. Unclear onset. She reports history of intermittent palpitations for many years. EKG 11/2 showed Afib not in RVR () and repeat EKG on 11/3 demonstrated sinus rhythm. She was having pleuritic chest pain that resolved 11/4. Mild, anterior left side. Troponin normal, ESR WNL, and EKG without ischemic changes.  - Apixaban BID  - Stopped aspirin and clopidogrel  - Follow up with cardiology (follows with Dr. Hewitt at [facility])     #Hypokalemia, likely secondary to furosemide.  - Scheduled potassium 20mEq daily  - K 4.2. stable.    #Acute cystitis, uncomplicated:   - completed 5-day course of ceftriaxone 1g (completed 10/27)  - asymptomatic      Hypotension: New parameters added for her medications. Monitor.    Bladder, Bowel, GI and DVT ppx   Bladder ;check PVRs ×3 straight cath for volumes more than 350 cc.  Monitor for symptoms of urinary tract infection, rehab nursing to send for a UA as needed  Bowels; place the patient on a bowel program and titrate medications as needed.  Hold the bowel program in case of loose stools.  Educated patient on Impact with fiber and fluid intake on a bowel function  DVT prophylaxis with mechanical means     Code Status FULL      Prognosis for medical improvement and stabilization of the medical issues for discharge to home is good.      Estimated Length of Stay: 14 days      Follow up Appointments on Discharge  - Follow-up with Dr. Franklin in neurology clinic for discussion of NMO therapy within 2-4 weeks of hospital discharge  - Follow-up with Dr. Hewitt in cardiology clinic to discuss HF and atrial fibrillation within 4-weeks  - Follow-up with PCP for hospital discharge within 1-2 weeks    Ramses Wan MD   Physical Medicine & Rehabilitation

## 2020-11-16 NOTE — PLAN OF CARE
FOCUS/GOAL  Medical management and Mobility    ASSESSMENT, INTERVENTIONS AND CONTINUING PLAN FOR GOAL:  [id number]: CGA with walker/gait belt for transfers. Sitting /59, standing BP 98/53; pt asymptomatic with STACEY hose on, declined to apply abdominal binder until after using restroom and getting dressed. Dr Wan notified. Report/handoff given to Camille LOPEZ at 1000, will continue with POC.

## 2020-11-16 NOTE — PLAN OF CARE
Discharge Planner Post-Acute Rehab OT:      Discharge Plan: home with son moving here to live with pt second son lives upstaisrs in same apt bulding. And A prn      Precautions: fall, low vision r blind L low vision with cut/peripheral vision loss, orthostatic BP, TEDs and abdominal binder when OOB     Current Status:  ADLs: CGA with standing at sink for g/h, SBA with UB/LB dressing after setup. SBA full body shower sitting on extended tub bench. Standard bed transfer no rail cues for log rolling- completed 11/17     IADLs: CGA for kitchen mobility with high/low reaching tasks into fridge and in cupboards. Assessed safety with rolling office chair transfer as pt uses at kitchen table, CGA with FWW and demos good insight with safety.      Vision/Cognition: bilat vision deficits; R eye blind, L eye field cut/peripheral vision loss     Assessment:  Pt demos good insight into safety with functional transfer training this date. Increase foot placement with orange stickers on toes to increase awareness of where feet are with decrease sensation RLE and decrease vision     Other Barriers to Discharge (DME, Family Training, etc): TBD; has extended tub bench,rts, grab bars, walker and cane at home.

## 2020-11-16 NOTE — PLAN OF CARE
EXAM DATE: 18



PATIENT'S AGE: 38



Patient: JENSEN HOBBS



Facility: [place], [place]

Patient ID: [id number]

Site Patient ID: [id number].

Site Accession #: [id number].

: 1979

Study: XRay Chest [id number]/8/2018 5:28:14 AM

Ordering Physician: Maurice Ferreira



Final Report: 

Indication:

Chest pain



Technique:

Chest 1 view



Comparison:

2018.



Findings/Impression:

Cardiovascular and mediastinum: Stable cardiomediastinal silhouette. 

Lungs and pleural space: Expiratory study. Interval resolution of the 
previously seen left basilar pleural and parenchymal disease. No new 
consolidation or pleural effusions. 

Bones and soft tissues: A small density in the left axilla, not seen on the 
prior, possibly overlying the patient.



Dictated by Adan Argueta MD @ 2018 6:24:40 AM



Dictated by: Adan Argueta MD @ 2018 06:24:46

(Electronic Signature)





Report Signed by Proxy.
KAZ Pt is alert and oriented x4. Able to communicate needs. Uses call light appropriately. VSS. Denied pain, denied sob, denied CP, denied new numbness or tingling. A of 1 with walker and gait belt. Good appetite and oral hydration. continent of bowel and bladder on toilet this shift. alarm on for safety. Call light with in reach. Continue with POC.

## 2020-11-16 NOTE — PLAN OF CARE
Discharge Planner Post-Acute Rehab PT:      Discharge Plan: home to apt w/ 16 stairs to enter, son currently living w/ her     Precautions: falls; visually impaired; episode of orthostasis 11/14     Current Status:  Transfer: SBA/CGA with FWW  Gait: Up to 50' with FWW, CGA and w/c follow  Stairs: 8 stairs, step to pattern, (B) rails, contact assist  Balance: need to formally assess     Assessment:  Patient is motivated and making progress towards goals.  Using visual and tactile cues to improve R foot placement with gait and transfers. Continue to focus on coordination and balance for improved safety and increased independence with functional mobility.     Other Barriers to Discharge (DME, Family Training, etc): stairs to enter, visual impairment affects mobility skills

## 2020-11-17 NOTE — PROGRESS NOTES
"  [facility]   Acute Rehabilitation Unit  Daily progress note  CC:     Neurologic Conditions 03.8 Neuromuscular Disorders: Neuromyelitis Optica Eexacerbation  Impaired mobility and function.    TR held 11/11/2020  ADLs/IADLs:sba standing at sink oral cares, sba sitting on extended shower g/h bathing u/b and l/b and sba full body dressing sitting     Mobility: Up in room w/c based and walker w/ assist; lacks sensation in multiple dimensions (R) LE which affects functional wt bearing safety.  Anticipate 14 days to progress to safe household mobility.      Cognition/Language:     Community Re-Entry: limited ambulation at baseline, primarily homebound because of vision; does not have w/c at home     Transportation: Not a  at baseline, car transfer not a barrier     S: In good spirits. Making progress. Finds stickers on her shoes help with her walk. No new issues. Room changed. No new issues.    BP is labile. Orthostatic. Got additional medication.   Ros: Impaired vision in both eyes, no nausea, headache, urgency or sob.    [unfilled]   Scheduled meds    amLODIPine  5 mg Oral Daily     apixaban ANTICOAGULANT  5 mg Oral BID     calcium carbonate 600 mg-vitamin D 400 units  1 tablet Oral QPM     FLUoxetine  40 mg Oral Daily     furosemide  20 mg Oral Daily     metoprolol succinate ER  25 mg Oral BID     mycophenolate  500 mg Oral BID     potassium chloride ER  20 mEq Oral Daily     simvastatin  20 mg Oral At Bedtime     traZODone  100 mg Oral At Bedtime     vitamin B complex with vitamin C  1 tablet Oral Daily     vitamin C  500 mg Oral Daily       PRN meds:  acetaminophen, albuterol, mineral oil-hydrophilic petrolatum, - MEDICATION INSTRUCTIONS -, trolamine salicylate, umeclidinium      PHYSICAL EXAM  /56 (BP Location: Right arm)   Pulse 72   Temp 98.1  F (36.7  C) (Oral)   Resp 16   Ht 1.702 m (5' 7\")   Wt 86.5 kg (190 lb 9.6 oz)   SpO2 95%   BMI 29.85 kg/m  "   Gen: Alert and cooperative  HEENT: Vision impairment + Face symmetric.  CV: S1, S2 RRR  Pulm: Clear to auscultation  Abd: Soft, non tender  Ext: Calves non tender  Neuro/MSK: Moves all four, has proximal weakness in the RLE. Has proprioceptive deficits during gait. Brace helping.  Responds to touch.    LABS  Last Comprehensive Metabolic Panel:  Sodium   Date Value Ref Range Status   11/14/2020 143 133 - 144 mmol/L Final     Potassium   Date Value Ref Range Status   11/14/2020 4.2 3.4 - 5.3 mmol/L Final     Chloride   Date Value Ref Range Status   11/14/2020 111 (H) 94 - 109 mmol/L Final     Carbon Dioxide   Date Value Ref Range Status   11/14/2020 29 20 - 32 mmol/L Final     Anion Gap   Date Value Ref Range Status   11/14/2020 3 3 - 14 mmol/L Final     Glucose   Date Value Ref Range Status   11/14/2020 89 70 - 99 mg/dL Final     Urea Nitrogen   Date Value Ref Range Status   11/14/2020 16 7 - 30 mg/dL Final     Creatinine   Date Value Ref Range Status   11/14/2020 0.87 0.52 - 1.04 mg/dL Final     GFR Estimate   Date Value Ref Range Status   11/14/2020 65 >60 mL/min/[1.73_m2] Final     Comment:     Non  GFR Calc  Starting 12/18/2018, serum creatinine based estimated GFR (eGFR) will be   calculated using the Chronic Kidney Disease Epidemiology Collaboration   (CKD-EPI) equation.       Calcium   Date Value Ref Range Status   11/14/2020 8.4 (L) 8.5 - 10.1 mg/dL Final        CBC RESULTS:   Recent Labs   Lab Test 11/10/20  0716   WBC 5.7   RBC 3.50*   HGB 10.5*   HCT 33.9*   MCV 97   MCH 30.0   MCHC 31.0*   RDW 14.6           ASSESSMENT AND PLAN    Ms. Ladonna Sheriff is a 75 year old female who presents with acute flare of RLE weakness in the setting of history of NMO c/b residual right eye visual loss, numbness below mid thorax, and bandlike sensation consistent with previous T4 transverse myelitis and on/off episodes of L eye optic neuritis. She was found to have enhancing lesions extending from T3  to T5/6.  She is demonstrating slow improvement in RLE strength in the setting of 7 rounds of PLEX treatment for NMO acute exacerbation. She remains below baseline in her strength, balance, coordination, sensation, proprioception, functional endurance, ambulation, and overall functional ability. She will need to be able to navigate 16 steps in order to safely return home. Once home, she has an excellent support system with her sons, including one who moved in with her this week.    1. Impairment of ADL's: Patient will work with OT for 90 min daily to work on upper and lower body self care, dressing, toileting, bathing, energy conservation techniques with use of ADs as needed.     2. Impairment of mobility:  Patient will work with PT for 90 min daily to work on gait exercises, strengthening, endurance buildup, transfers with use of walker as needed.     3. Rehab RN to administer medication, patient education on medication taking, VS monitoring, and surgical wound dressing changes and monitoring.    4. Medical Conditions  #AQP4-positive NMO  #H/o T4 transverse myelitis with residual thoracic sensation loss  #Poor vision due to NMO lesions, stable  #Acute exacerbation of NMO leading to RLE weakness, improving  Admitted to [facility] for PLEX therapy. Interventional radiology placed a L internal jugular non-tunneled CVC and transfusion medicine was consulted to assist with PLEX treatment. She completed a 5-day course of methylprednisolone and 7 runs of PLEX (scheduled every other day), which she tolerated appropriately.  Following completion of PLEX therapy, her lower extremity strength was starting to improve. Not much improvement noted since PMR last encounter.   - Completed 7 runs of PLEX  - Completed 5-day course of methylprednisolone  - PTA Mycophenolate 500 BID   - Meningococcal vaccine #2 prior to discharge  - Follow-up with Dr. Franklin in neurology clinic discuss and initiate eculizumab therapy     #Paroxysmal Atrial  fibrillation   #Pleuritic chest pain, resolved   CHADSVASC: 8, HASBLED 3. Unclear onset. She reports history of intermittent palpitations for many years. EKG 11/2 showed Afib not in RVR () and repeat EKG on 11/3 demonstrated sinus rhythm. She was having pleuritic chest pain that resolved 11/4. Mild, anterior left side. Troponin normal, ESR WNL, and EKG without ischemic changes.  - Apixaban BID  - Stopped aspirin and clopidogrel  - Follow up with cardiology (follows with Dr. Hewitt at [facility])     #Hypokalemia, likely secondary to furosemide.  - Scheduled potassium 20mEq daily  - K 4.2. stable.    #Acute cystitis, uncomplicated:   - completed 5-day course of ceftriaxone 1g (completed 10/27)  - asymptomatic      Hypotension: New parameters added for her medications. Monitor.    Bladder, Bowel, GI and DVT ppx   Bladder ;check PVRs ×3 straight cath for volumes more than 350 cc.  Monitor for symptoms of urinary tract infection, rehab nursing to send for a UA as needed  Bowels; place the patient on a bowel program and titrate medications as needed.  Hold the bowel program in case of loose stools.  Educated patient on Impact with fiber and fluid intake on a bowel function  DVT prophylaxis with mechanical means     Code Status FULL      Prognosis for medical improvement and stabilization of the medical issues for discharge to home is good.      Estimated Length of Stay: 14 days      Follow up Appointments on Discharge  - Follow-up with Dr. Franklin in neurology clinic for discussion of NMO therapy within 2-4 weeks of hospital discharge  - Follow-up with Dr. Hewitt in cardiology clinic to discuss HF and atrial fibrillation within 4-weeks  - Follow-up with PCP for hospital discharge within 1-2 weeks    Discussed with patient and answered questions.    Ramses Wan MD   Physical Medicine & Rehabilitation

## 2020-11-17 NOTE — PLAN OF CARE
Discharge Planner Post-Acute Rehab OT:      Discharge Plan: home with son moving here to live with pt second son lives upstaisrs in same apt bulding. And A prn      Precautions: fall, low vision r blind L low vision with cut/peripheral vision loss, orthostatic BP, TEDs and abdominal binder when OOB     Current Status:  ADLs: Set- up A only for UE dressing and g/h seated in w/c at sink, set- up for TB bathing seated on shower bench, CGA for LE dressing when standing. CGA for shower transfer.      IADLs: CGA for kitchen mobility with high/low reaching tasks into fridge and in cupboards. Assessed safety with rolling office chair transfer as pt uses at kitchen table, CGA with FWW and demos good insight with safety. Completed on 11/16     Vision/Cognition: bilat vision deficits; R eye blind, L eye field cut/peripheral vision loss     Assessment:  Pt demos progress towards OT goals this date and is progressing towards IND. Good insight into deficits with dec vision and sensation. Increase foot placement with orange stickers on toes to increase awareness of where feet are with decrease sensation RLE and decrease vision     Other Barriers to Discharge (DME, Family Training, etc): TBD; has extended tub bench,rts, grab bars, walker and cane at home.

## 2020-11-17 NOTE — PROGRESS NOTES
FOCUS/GOAL  Medication management, Medical management, Mobility and Safety management    ASSESSMENT, INTERVENTIONS AND CONTINUING PLAN FOR GOAL:  Patient is A&O x4, VSS. Denied CP, pain, lightheadedness, dizziness, numbness, tingling and SOB. AS1 with walker and gait belt. Continent of bowel and bladder, voiding spontaneously without difficulties, LBM 11/16 per pt report. Pt is drinking well, appetite is good, denied N/V. Self repositioned and turned in bed. Pt is able to use call light appropriately and make needs known, bed alarm ON for safety, will continue to monitor patient as POC.

## 2020-11-17 NOTE — PLAN OF CARE
Pt alert and oriented x4. (R) LE weak. VSS, orthostatic BP low. Pt requested to lay down and get some rest before afternoon therapy session. Blind in (R) eye and (L) eye field cut. No complaints of shortness of breath or chest pain. Did have complaints of a headache after therapy in AM, prn tylenol given with some relief. Up with one assist, gait belt and walker. Regular diet, thin liquids. Takes pills whole and one at a time. Continent of bowel and bladder, LBM: 11/16. Skin intact. Enjoys water without ice.

## 2020-11-17 NOTE — PLAN OF CARE
Discharge Planner Post-Acute Rehab PT:      Discharge Plan: home to apt w/ 16 stairs to enter, son currently living w/ her     Precautions: falls; visually impaired; episode of orthostasis 11/14     Current Status:  Transfer: SBA/CGA with FWW  Gait: Up to 50' with FWW, CGA and w/c follow  Stairs: 8 stairs, step to pattern, (B) rails, contact assist  Balance: need to formally assess     Assessment:  Patient is motivated and making progress towards goals.  Using visual and tactile cues to improve R foot placement with gait and transfers. Continue to focus on coordination and balance for improved safety and increased independence with functional mobility.     Other Barriers to Discharge (DME, Family Training, etc): stairs to enter, visual impairment affects mobility skills     Missed 60 minutes of PT today, entire pm appointment, due to not feeling well; orthostatics measured in am, was symptomatic and unable to participate in OOB activity.

## 2020-11-17 NOTE — PLAN OF CARE
FOCUS/GOAL  Medical management    ASSESSMENT, INTERVENTIONS AND CONTINUING PLAN FOR GOAL:    Pt is alert and oriented. Uses the call light appropriately. Sleeping well tonight. Denies any pain, sob or any new weakness. Continent in bowel and bladder with LBM 11/16. Ambulates to the bathroom for toileting. A minimal assist of one in transfers with the gait belt and walker. No concerns at this time.

## 2020-11-17 NOTE — PROGRESS NOTES
Updated on pt discharge--AC Waiver --Amparoandreea Boo PH: [phone]. Amparo plans to call SW tomorrow to follow up after team rounds and to see if any discharge planning has changed.     ATILIO Varela, [facility] [facility] Acute Rehab Unit   Phone: [phone]  I   Pager: [phone]

## 2020-11-18 NOTE — PROGRESS NOTES
"CLINICAL NUTRITION SERVICES - ASSESSMENT NOTE     Nutrition Prescription    RECOMMENDATIONS FOR MDs/PROVIDERS TO ORDER:  None at this time.     Malnutrition Status:    Malnutrition Diagnosis: Patient does not meet two of the established criteria necessary for diagnosing malnutrition    Recommendations already ordered by Registered Dietitian (RD):  None at this time.     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 Presented to [facility] on 10/23 for worsening RLE weakness. Pt was transferred to [facility] on 10/24 for PLEX therapy. Pt received 7 treatments on an every other day schedule. Admitted to ARU on 11/9 for ongoing OT and PT therapy.   - Per review of RD note on 11/6, pt had been eating well PTA and throughout LOS. Good appetite with 100% intake of meals. No nutrition dx identified.   - Per visit with pt at bedside: Reports eating meals BID PTA with a good appetite. States she snacks on \"junk food\" throughout the day, most recently candy corn and peanuts.     CURRENT NUTRITION ORDERS  Diet: Regular    Intake/Tolerance: Ordering meals BID during LOS (breakfast and dinner). Eating % of meals per flowsheets. Breakfast this morning consisted of scrambled eggs, hashbrowns, WW toast, flores, and coffee (427 kcals, 24.5 g protein). Average intake x 3 days (11/15-11/17) = 1369 kcals (82% minimum assessed needs) and 52 g protein (78% minimum assessed needs) if 100% consumed. Pt reports her " "son brought her some peanuts and candy corn that she snacks on at bedside if she is hungry.     LABS  Labs reviewed    MEDICATIONS  Medications reviewed  - Caltrate (calcium trending low during hospital admit)  - Lasix   - Vitamin B complex with vitamin C   - Ascorbic acid 500 mg daily    ANTHROPOMETRICS  Height: 170.2 cm (5' 7\")  Most Recent Weight: 86.5 kg (190 lb 9.6 oz)    IBW: 61 kg  BMI: Overweight BMI 25-29.9  Weight History: Pt reports usual dry wt of 190 lb. Noted pt lost 10 lbs since admit on 10/23, likely r/t fluid shifts with lasix use and recent PLEX therapy.   Wt Readings from Last 10 Encounters:   20 86.5 kg (190 lb 9.6 oz)   10/23/20 90.7 kg (200 lb)   20 91.3 kg (201 lb 4.8 oz)   20 90.7 kg (199 lb 15.3 oz)   20 95.9 kg (211 lb 6.4 oz)   11/15/19 100.5 kg (221 lb 8 oz)   10/30/19 95.3 kg (210 lb)   19 90.7 kg (200 lb)   19 96.3 kg (212 lb 6.4 oz)   19 97.6 kg (215 lb 3.2 oz)     Dosing Weight: 67 kg (adjusted) - based on lowest documented wt so far this adm (86.5 kg on 20) and IBW of 61 kg     ASSESSED NUTRITION NEEDS  Estimated Energy Needs: 0486-7689 kcals/day (25 - 30 kcals/kg)  Justification: Maintenance  Estimated Protein Needs: 67-80 grams protein/day (1 - 1.2 grams of pro/kg)  Justification: Maintenance and preservation of LBM   Estimated Fluid Needs: 2087-0632 mL/day (1 mL/kcal)   Justification: Maintenance or Per provider pending fluid status    PHYSICAL FINDINGS  - See malnutrition section below.  - Per WOCN note on 11/10, blanchable intact skin in  cleft likely d/t moisture from sweat and occasional urine leakage. Pt wears a brief d/t occasional urinary incontinence with decreased mobility.     MALNUTRITION  % Intake: No decreased intake noted  % Weight Loss: Weight loss does not meet criteria - likely r/t fluid   Subcutaneous Fat Loss: None observed  Muscle Loss: None observed  Fluid Accumulation/Edema: None noted  Malnutrition " Diagnosis: Patient does not meet two of the established criteria necessary for diagnosing malnutrition    NUTRITION DIAGNOSIS  Predicted inadequate nutrient intake (protein-energy) related to potential for menu fatigue as evidenced by prolonged hospitalization.       INTERVENTIONS  Implementation  Nutrition Education: Discussed current intake and encouraged 1-2 snacks throughout the day to help meet assessed needs with only ordering meals BID. Offered snacks/supplements, but pt declined at this time as her son brought some in for her.      Goals  Patient to consume % of nutritionally adequate meal trays TID, or the equivalent with supplements/snacks.     Monitoring/Evaluation  Progress toward goals will be monitored and evaluated per protocol.    Davina Ye  Dietetic Intern

## 2020-11-18 NOTE — PLAN OF CARE
Discharge Planner Post-Acute Rehab OT:      Discharge Plan: home with son moving here to live with pt second son lives upstaisrs in same apt bulding. And A prn      Precautions: fall, low vision r blind L low vision with cut/peripheral vision loss, orthostatic BP, TEDs and abdominal binder when OOB     Current Status:  ADLs: Set- up A only for UE dressing and g/h seated in w/c at sink, set- up for TB bathing seated on shower bench, CGA for LE dressing when standing to pull over hips, pt able to doff/don michelle hose and socks IND seated EOB. CGA for shower transfer.      IADLs: CGA for kitchen mobility with high/low reaching tasks into fridge and in cupboards. Assessed safety with rolling office chair transfer as pt uses at kitchen table, CGA with FWW and demos good insight with safety. Completed on 11/16 and 11/18     Vision/Cognition: bilat vision deficits; R eye blind, L eye field cut/peripheral vision loss     Assessment:  Pt demos progress towards OT goals this date and is progressing towards IND. Good insight into deficits with dec vision and sensation. Increase foot placement with orange stickers on toes to increase awareness of where feet are with decrease sensation RLE and decrease vision     Other Barriers to Discharge (DME, Family Training, etc): TBD; has extended tub bench,rts, grab bars, walker and cane at home.

## 2020-11-18 NOTE — PROGRESS NOTES
Met with pt at bedside after team rounds. On track to discharge home on Monday 11/23. HHC recommended. Discussed with pt upon admission, pt has had [facility] in the past and would want to resume. SW discussed and confirmed this with pt. With permission, referral for RN, PT, OT and HHA sent to [facility]. Will confirm acceptance and add to pt AVS once confirmed. Pt son will pick pt up on Monday morning, discussed transport time around 10:30am. Pt denied any immediate needs. Will need IMM prior to discharge.     Updated AC Waiver --Ampar[facility] Boo PH: [phone] today on final discharge plans. Pt plans to call Amparo this afternoon as well.     ADDENDUM: Confirmed the HHC and added to AVS.     Home Health Care:   -[facility] (Previously Known As: [facility]) PH: [phone]  -Nurse, physical therapy, occupational therapy, home health aide    Dia Salcedo, LICKOBY, CAD[facility]   Phone: [phone]  I   Pager: [phone]

## 2020-11-18 NOTE — PROGRESS NOTES
[facility]   Acute Rehabilitation Unit  Daily progress note  CC:     Neurologic Conditions 03.8 Neuromuscular Disorders: Neuromyelitis Optica Eexacerbation  Impaired mobility and function.    TR held 11/18/2020  ADLs/IADLs: Limited by decreased vision and low BPs but progressing well towards OT goals. Currently requires set- up A only for UE dressing and g/h seated in w/c at sink, set- up for TB bathing seated on shower bench, CGA for LE dressing when standing but needs assist to don STACEY hose. CGA with meal prep using FWW. has extended tub bench,rts, grab bars, walker and cane at home, may need sock aid to don STACEY hose. Recommend HH OT and ~5 more days to progress to mod I with ADLs, son will live with pt and can assist with IADLs     Mobility: Up in room w/ walker and assist, limiting factors low BP's and (R) LE weakness; able to don abdominal binder w/o assist; stairs: has 16 at home to access her apartment, currently able to manage 8 but progressing. Recommend ongoing IP care and home care f/u.        Community Re-Entry: limited ambulation at baseline, primarily homebound because of vision; does not have w/c at home     Transportation: Not a  at baseline, car transfer not a barrier     S: In good spirits. Making progress. Finds stickers on her shoes help with her walk. No new issues. Room changed. No new issues.    BP is labile. Orthostatic. Got additional medication.   Ros: Impaired vision in both eyes, no nausea, headache, urgency or sob.    [unfilled]   Scheduled meds    amLODIPine  5 mg Oral Daily     apixaban ANTICOAGULANT  5 mg Oral BID     calcium carbonate 600 mg-vitamin D 400 units  1 tablet Oral QPM     FLUoxetine  40 mg Oral Daily     furosemide  20 mg Oral Daily     metoprolol succinate ER  25 mg Oral BID     mycophenolate  500 mg Oral BID     potassium chloride ER  20 mEq Oral Daily     simvastatin  20 mg Oral At Bedtime     traZODone  100 mg Oral At  "Bedtime     vitamin B complex with vitamin C  1 tablet Oral Daily     vitamin C  500 mg Oral Daily       PRN meds:  acetaminophen, albuterol, mineral oil-hydrophilic petrolatum, - MEDICATION INSTRUCTIONS -, trolamine salicylate, umeclidinium      PHYSICAL EXAM  /70   Pulse 75   Temp 98.2  F (36.8  C) (Oral)   Resp 18   Ht 1.702 m (5' 7\")   Wt 86.5 kg (190 lb 9.6 oz)   SpO2 93%   BMI 29.85 kg/m    Gen: Alert and cooperative  HEENT: Vision impairment + Face symmetric.  CV: S1, S2 RRR  Pulm: Clear to auscultation  Abd: Soft, non tender  Ext: Calves non tender  Neuro/MSK: Moves all four, has proximal weakness in the RLE. Has proprioceptive deficits during gait. Brace helping.  Responds to touch.    LABS  Last Comprehensive Metabolic Panel:  Sodium   Date Value Ref Range Status   11/14/2020 143 133 - 144 mmol/L Final     Potassium   Date Value Ref Range Status   11/14/2020 4.2 3.4 - 5.3 mmol/L Final     Chloride   Date Value Ref Range Status   11/14/2020 111 (H) 94 - 109 mmol/L Final     Carbon Dioxide   Date Value Ref Range Status   11/14/2020 29 20 - 32 mmol/L Final     Anion Gap   Date Value Ref Range Status   11/14/2020 3 3 - 14 mmol/L Final     Glucose   Date Value Ref Range Status   11/14/2020 89 70 - 99 mg/dL Final     Urea Nitrogen   Date Value Ref Range Status   11/14/2020 16 7 - 30 mg/dL Final     Creatinine   Date Value Ref Range Status   11/14/2020 0.87 0.52 - 1.04 mg/dL Final     GFR Estimate   Date Value Ref Range Status   11/14/2020 65 >60 mL/min/[1.73_m2] Final     Comment:     Non  GFR Calc  Starting 12/18/2018, serum creatinine based estimated GFR (eGFR) will be   calculated using the Chronic Kidney Disease Epidemiology Collaboration   (CKD-EPI) equation.       Calcium   Date Value Ref Range Status   11/14/2020 8.4 (L) 8.5 - 10.1 mg/dL Final        CBC RESULTS:   Recent Labs   Lab Test 11/10/20  0716   WBC 5.7   RBC 3.50*   HGB 10.5*   HCT 33.9*   MCV 97   MCH 30.0   MCHC " 31.0*   RDW 14.6           ASSESSMENT AND PLAN    Ms. Ladonna Sheriff is a 75 year old female who presents with acute flare of RLE weakness in the setting of history of NMO c/b residual right eye visual loss, numbness below mid thorax, and bandlike sensation consistent with previous T4 transverse myelitis and on/off episodes of L eye optic neuritis. She was found to have enhancing lesions extending from T3 to T5/6.  She is demonstrating slow improvement in RLE strength in the setting of 7 rounds of PLEX treatment for NMO acute exacerbation. She remains below baseline in her strength, balance, coordination, sensation, proprioception, functional endurance, ambulation, and overall functional ability. She will need to be able to navigate 16 steps in order to safely return home. Once home, she has an excellent support system with her sons, including one who moved in with her this week.    1. Impairment of ADL's: Patient will work with OT for 90 min daily to work on upper and lower body self care, dressing, toileting, bathing, energy conservation techniques with use of ADs as needed.     2. Impairment of mobility:  Patient will work with PT for 90 min daily to work on gait exercises, strengthening, endurance buildup, transfers with use of walker as needed.     3. Rehab RN to administer medication, patient education on medication taking, VS monitoring, and surgical wound dressing changes and monitoring.    4. Medical Conditions  #AQP4-positive NMO  #H/o T4 transverse myelitis with residual thoracic sensation loss  #Poor vision due to NMO lesions, stable  #Acute exacerbation of NMO leading to RLE weakness, improving  Admitted to [facility] for PLEX therapy. Interventional radiology placed a L internal jugular non-tunneled CVC and transfusion medicine was consulted to assist with PLEX treatment. She completed a 5-day course of methylprednisolone and 7 runs of PLEX (scheduled every other day), which she tolerated  appropriately.  Following completion of PLEX therapy, her lower extremity strength was starting to improve. Not much improvement noted since PMR last encounter.   - Completed 7 runs of PLEX  - Completed 5-day course of methylprednisolone  - PTA Mycophenolate 500 BID   - Meningococcal vaccine #2 prior to discharge  - Follow-up with Dr. Franklin in neurology clinic discuss and initiate eculizumab therapy     #Paroxysmal Atrial fibrillation   #Pleuritic chest pain, resolved   CHADSVASC: 8, HASBLED 3. Unclear onset. She reports history of intermittent palpitations for many years. EKG 11/2 showed Afib not in RVR () and repeat EKG on 11/3 demonstrated sinus rhythm. She was having pleuritic chest pain that resolved 11/4. Mild, anterior left side. Troponin normal, ESR WNL, and EKG without ischemic changes.  - Apixaban BID  - Stopped aspirin and clopidogrel  - Follow up with cardiology (follows with Dr. Hewitt at [facility])     #Hypokalemia, likely secondary to furosemide.  - Scheduled potassium 20mEq daily  - K 4.2. stable.    #Acute cystitis, uncomplicated:   - completed 5-day course of ceftriaxone 1g (completed 10/27)  - asymptomatic      Hypotension: New parameters added for her medications. Monitor.    Bladder, Bowel, GI and DVT ppx   Bladder ;check PVRs ×3 straight cath for volumes more than 350 cc.  Monitor for symptoms of urinary tract infection, rehab nursing to send for a UA as needed  Bowels; place the patient on a bowel program and titrate medications as needed.  Hold the bowel program in case of loose stools.  Educated patient on Impact with fiber and fluid intake on a bowel function  DVT prophylaxis with mechanical means     Code Status FULL      Prognosis for medical improvement and stabilization of the medical issues for discharge to home is good.      Estimated Length of Stay: 14 days, 11/23     Follow up Appointments on Discharge  - Follow-up with Dr. Franklin in neurology clinic for discussion of NMO therapy  within 2-4 weeks of hospital discharge  - Follow-up with Dr. Hewitt in cardiology clinic to discuss HF and atrial fibrillation within 4-weeks  - Follow-up with PCP for hospital discharge within 1-2 weeks    Discussed with patient and answered questions.    Ramses Wan MD   Physical Medicine & Rehabilitation

## 2020-11-18 NOTE — PLAN OF CARE
FOCUS/GOAL  Bowel management, Bladder management, Pain management, Skin integrity, and Safety management    ASSESSMENT, INTERVENTIONS AND CONTINUING PLAN FOR GOAL:  A/O, VSS on RA.  Regular diet, thin liquids. Right eye vision blind and left eye vision has a field cut.  Right leg weakness continues.   Up w/ contact guard, gait belt and walker to the bathroom.  Continent of bowel and bladder, last BM 11/16.  No c/o pain, no prn meds.  No c/o dizziness or light headedness this shift when up.  Cont w/ POC,

## 2020-11-18 NOTE — CARE CONFERENCE
Acute Rehab Care Conference/Team Rounds      Type: Team Rounds    Present: Dr. Ramses Wan, Sonya Humes RN, Simona Cabral PT, Ashia Lovelace OT, Dia Salcedo SW, Mercedes Eugene Mgr, Karol Golden Dietician, Ken-O Christ Gastelum       Discharge Barriers/Treatment/Education    Rehab Diagnosis: Neurologic Conditions 03.8 Neuromuscular Disorders: Neuromyelitis Optica Eexacerbation    Active Medical Co-morbidities/Prognosis:   Patient Active Problem List   Diagnosis     Optic neuritis     Acute respiratory failure with hypoxia (H)     Pulmonary embolism (H)     Transverse myelitis (H)     Neuromyelitis optica (H)     Neuromyelitis optica (devic) (H)     HTN (hypertension)     Sleep disorder     Left bundle branch block     Chronic systolic heart failure (H)     Weakness     Neurological disorder        Safety: Pt is alert and oriented. Able to make needs known. Pt is blind on the right eye and has left visual field cut.     Pain: Denies     Medications, Skin, Tubes/Lines: May need assistance with medication management due to visual difficulties. No lines, drain or tubes.     Swallowing/Nutrition:    Bowel/Bladder: Continent in bowel and bladder. SBA with pericares. LBM 11/16. Ambulates to the bathroom for toileting.     Psychosocial: Pt lives alone in a 2nd floor condominium. Pt has 2 cats.  There is one threshold step to enter the building. Once inside, pt must climb 16 steps (with bilateral rails) to the 2nd floor. Pt states that one of her son's is always with her when she is using the steps. Pt's unit includes a washer and dryer. Pt's master bedroom bath has a step in shower with grab bars and a shower chair. Since her admission her adult son moved in with her (moved from [place]) and can assist upon discharge. Indep with ADLs PTA. Used a cane indoors and a walker in the community. Sons assist w/ running errands, transportation, and stair navigation. Her son Hayden sets up her medications and  she takes the medications independently. Her son Hayden lives in the same [facility] building as Ladonna. No formal exercise program but remains active with daily activities. 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 Has grocery delivery set-up (or son assists) and a handicapped parking certificate. 1 fall in the last year reported by patient.     ADLs/IADLs: Limited by decreased vision and low BPs but progressing well towards OT goals. Currently requires set- up A only for UE dressing and g/h seated in w/c at sink, set- up for TB bathing seated on shower bench, CGA for LE dressing when standing but needs assist to don STACEY hose. CGA with meal prep using FWW. has extended tub bench,rts, grab bars, walker and cane at home, may need sock aid to don STACEY hose. Recommend HH OT and ~5 more days to progress to mod I with ADLs, son will live with pt and can assist with IADLs    Mobility: Up in room w/ walker and assist, limiting factors low BP's and (R) LE weakness; able to don abdominal binder w/o assist; stairs: has 16 at home to access her apartment, currently able to manage 8 but progressing. Recommend ongoing IP care and home care f/u.     Cognition/Language:    Community Re-Entry: walker and assist, short distances    Transportation: does not drive at baseline, car transfer not a barrier    Decision maker: self    Plan of Care and goals reviewed and updated.    Discharge Plan/Recommendations    Fall Precautions: discontinue    Patient/Family input to goals: Yes    Anticipated rehab needs following discharge: Home with home care    Anticipated care giver support after discharge: Family    Estimated length of stay: 14 days    Overall plan for the patient: Continue Ip Rehabilitation      Utilization Review and Continued Stay Justification    Medical Necessity Criteria:    For any criteria that is not met, please document reason and plan for discharge, transfer, or modification of plan of  care to address.    Requires intensive rehabilitation program to treat functional deficits?: Yes    Requires 3x per week or greater involvement of rehabilitation physician to oversee rehabilitation program?: Yes    Requires rehabilitation nursing interventions?: Yes    Patient is making functional progress?: Yes    There is a potential for additional functional progress? Yes    Patient is participating in therapy 3 hours per day a minimum of 5 days per week or 15 hours per week in 7 day period?:Yes    Has discharge needs that require coordinated discharge planning approach?:Yes          Final Physician Sign off    Statement of Approval: I concur with plan of care    Patient Goals  Social Work Goals:Confirm discharge recommendations with therapy, coordinate safe discharge plan and remain available to support and assist as needed.      OT Frequency: Daily 60-90 min.  OT goal: hygiene/grooming: independent, modified independent, while standing  OT goal: upper body dressing: Independent, Modified independent, including set-up/clothing retrieval  OT goal: lower body dressing: Independent, Modified independent, including set-up/clothing retrieval  OT goal: upper body bathing: Independent  OT goal: lower body bathing: Independent, Modified independent  OT goal: toilet transfer/toileting: Independent, Modified independent, cleaning and garment management, toilet transfer  OT goal: meal preparation: Independent, Modified independent, ambulatory level, with simple meal preparation  OT goal: home management: Independent, Modified independent, ambulatory level, with light demand household tasks  OT goal: cognitive: Patient/caregiver will verbalize understanding of cognitive assessment results/recommendations as needed for safe discharge planning  OT goal 1: SBA with tub/shower transf. utilizing A.E./DME prn.            PT Frequency: daily x 90 minutes  PT goal: bed mobility: (met)  PT goal: transfers: Modified independent  PT  goal: gait: Modified independent, 100 feet  PT goal: stairs: Supervision/stand-by assist, Greater than 10 stairs  PT goal: perform aerobic activity with stable cardiovascular response: (met)     PT goal 1: car transfer Mod I  PT Goal 2: floor transfer w/ furniture assist  PT Goal 3: ind w/ HEP for standing dynamic balance                                                                            Patient/Family Goal: Bowel: Patient will be continent of bowel, by utilizing timed toileting                 Goal: Skin Integrity: Patient will demonstrate two interventions to promote healthy skin so that they are free from skin breakdown during stay at ARU.                 Goal: Carryover of Rehab Techniques: Patient will demonstrate safety techinques like proper use of walker, and safe tranfers while at ARU.

## 2020-11-19 NOTE — PLAN OF CARE
Discharge Planner Post-Acute Rehab PT:      Discharge Plan: home to apt w/ 16 stairs to enter, son currently living w/ her     Precautions: falls; visually impaired; episode of orthostasis 11/14     Current Status:  Transfer: SBA with FWW  Gait: Up to 65' with FWW SBA  Stairs: 16 stairs, CGA with one instance of min-A due to knee buckle with fatigue. B rail  Balance: need to formally assess    Assessment:  Decreasing assist for transfers and gait, although pt needs to progress comfortability with decreased assist. Needs to continue to perform stairs daily.     Other Barriers to Discharge (DME, Family Training, etc): stairs to enter, visual impairment affects mobility skills     Minus 35 minutes due to optometrist visit

## 2020-11-19 NOTE — PLAN OF CARE
[id number]  VS: A&Ox4. VSS. Denied chest pain. Denied SOB. No c/o pain.   O2: >95% on RA.   Output: Up to bathroom voiding spontaneously w/o difficulty. Last BM 11/16/2020.    Activity: Ax1 via walker and gait belt   Skin: Intact    CMS: Denied numbness or tingling   Dressing: N/A   Diet: Regular   LDA: N/A   Additional Info/Plan: Pt is able to make her needs known. Call light within reach, called appropriately this shift. Continue POC.

## 2020-11-19 NOTE — PLAN OF CARE
Pt stating no pain/ discomfort this shift. Calls appropriately for needs and assistance. Fair appetite. Continent of B & B. Abdominal binder when OOB- vitals stable this shift, denies dizziness/ light headedness when OOB. Will continue to monitor.

## 2020-11-19 NOTE — PLAN OF CARE
Discharge Planner Post-Acute Rehab OT:      Discharge Plan: home with son moving here to live with pt second son lives upstaisrs in same apt bulding. And A prn      Precautions: fall, low vision r blind L low vision with cut/peripheral vision loss, orthostatic BP, TEDs and abdominal binder when OOB     Current Status:  ADLs: Set- up A only for UE dressing and g/h seated in w/c at sink, set- up for TB bathing seated on shower bench, CGA for LE dressing when standing to pull over hips, pt able to doff/don michelle hose and socks IND seated EOB. CGA for shower transfer. SBA for toilet transfer and for pericares.      IADLs: CGA for kitchen mobility with high/low reaching tasks into fridge and in cupboards. Assessed safety with rolling office chair transfer as pt uses at kitchen table, CGA with FWW and demos good insight with safety. Completed on 11/16 and 11/18. Pt completed laundry task with CGA and use of FWW on 11/19.      Vision/Cognition: bilat vision deficits; R eye blind, L eye field cut/peripheral vision loss     Assessment:  Pt demos progress towards OT goals this date and is progressing towards IND. Good insight into deficits with dec vision and sensation. Increase foot placement with orange stickers on toes to increase awareness of where feet are with decrease sensation RLE and decrease vision     Other Barriers to Discharge (DME, Family Training, etc): TBD; has extended tub bench,rts, grab bars, walker and cane at home.

## 2020-11-19 NOTE — PROGRESS NOTES
[facility]   Acute Rehabilitation Unit  Daily progress note  CC:     Neurologic Conditions 03.8 Neuromuscular Disorders: Neuromyelitis Optica Eexacerbation  Impaired mobility and function.    TR held 11/18/2020  ADLs/IADLs: Limited by decreased vision and low BPs but progressing well towards OT goals. Currently requires set- up A only for UE dressing and g/h seated in w/c at sink, set- up for TB bathing seated on shower bench, CGA for LE dressing when standing but needs assist to don STACEY hose. CGA with meal prep using FWW. has extended tub bench,rts, grab bars, walker and cane at home, may need sock aid to don STACEY hose. Recommend HH OT and ~5 more days to progress to mod I with ADLs, son will live with pt and can assist with IADLs     Mobility: Up in room w/ walker and assist, limiting factors low BP's and (R) LE weakness; able to don abdominal binder w/o assist; stairs: has 16 at home to access her apartment, currently able to manage 8 but progressing. Recommend ongoing IP care and home care f/u.        Community Re-Entry: limited ambulation at baseline, primarily homebound because of vision; does not have w/c at home     Transportation: Not a  at baseline, car transfer not a barrier     S: In good spirits. Making progress. Finds stickers on her shoes help with her walk. /O some pain in her right eye in the morning, some worsening of vision in left Room changed. No new issues.    BP is labile. Orthostatic. Got additional medication.   Ros: Impaired vision in both eyes, no nausea, headache, urgency or sob.    [unfilled]   Scheduled meds    amLODIPine  5 mg Oral Daily     apixaban ANTICOAGULANT  5 mg Oral BID     calcium carbonate 600 mg-vitamin D 400 units  1 tablet Oral QPM     FLUoxetine  40 mg Oral Daily     furosemide  20 mg Oral Daily     metoprolol succinate ER  25 mg Oral BID     mycophenolate  500 mg Oral BID     potassium chloride ER  20 mEq Oral Daily      "simvastatin  20 mg Oral At Bedtime     traZODone  100 mg Oral At Bedtime     vitamin B complex with vitamin C  1 tablet Oral Daily     vitamin C  500 mg Oral Daily       PRN meds:  acetaminophen, albuterol, mineral oil-hydrophilic petrolatum, - MEDICATION INSTRUCTIONS -, trolamine salicylate, umeclidinium      PHYSICAL EXAM  /64   Pulse 78   Temp 98.5  F (36.9  C) (Oral)   Resp 16   Ht 1.702 m (5' 7\")   Wt 86.5 kg (190 lb 9.6 oz)   SpO2 96%   BMI 29.85 kg/m    Gen: Alert and cooperative  HEENT: Vision impairment + Face symmetric.  CV: S1, S2 RRR  Pulm: Clear to auscultation  Abd: Soft, non tender  Ext: Calves non tender  Neuro/MSK: Moves all four, has proximal weakness in the RLE. Has proprioceptive deficits during gait. Brace helping.  Responds to touch.    LABS  Last Comprehensive Metabolic Panel:  Sodium   Date Value Ref Range Status   11/14/2020 143 133 - 144 mmol/L Final     Potassium   Date Value Ref Range Status   11/14/2020 4.2 3.4 - 5.3 mmol/L Final     Chloride   Date Value Ref Range Status   11/14/2020 111 (H) 94 - 109 mmol/L Final     Carbon Dioxide   Date Value Ref Range Status   11/14/2020 29 20 - 32 mmol/L Final     Anion Gap   Date Value Ref Range Status   11/14/2020 3 3 - 14 mmol/L Final     Glucose   Date Value Ref Range Status   11/14/2020 89 70 - 99 mg/dL Final     Urea Nitrogen   Date Value Ref Range Status   11/14/2020 16 7 - 30 mg/dL Final     Creatinine   Date Value Ref Range Status   11/14/2020 0.87 0.52 - 1.04 mg/dL Final     GFR Estimate   Date Value Ref Range Status   11/14/2020 65 >60 mL/min/[1.73_m2] Final     Comment:     Non  GFR Calc  Starting 12/18/2018, serum creatinine based estimated GFR (eGFR) will be   calculated using the Chronic Kidney Disease Epidemiology Collaboration   (CKD-EPI) equation.       Calcium   Date Value Ref Range Status   11/14/2020 8.4 (L) 8.5 - 10.1 mg/dL Final        CBC RESULTS:   Recent Labs   Lab Test 11/10/20  0716   WBC 5.7 "   RBC 3.50*   HGB 10.5*   HCT 33.9*   MCV 97   MCH 30.0   MCHC 31.0*   RDW 14.6           ASSESSMENT AND PLAN    Ms. Ladonna Sheriff is a 75 year old female who presents with acute flare of RLE weakness in the setting of history of NMO c/b residual right eye visual loss, numbness below mid thorax, and bandlike sensation consistent with previous T4 transverse myelitis and on/off episodes of L eye optic neuritis. She was found to have enhancing lesions extending from T3 to T5/6.  She is demonstrating slow improvement in RLE strength in the setting of 7 rounds of PLEX treatment for NMO acute exacerbation. She remains below baseline in her strength, balance, coordination, sensation, proprioception, functional endurance, ambulation, and overall functional ability. She will need to be able to navigate 16 steps in order to safely return home. Once home, she has an excellent support system with her sons, including one who moved in with her this week.    1. Impairment of ADL's: Patient will work with OT for 90 min daily to work on upper and lower body self care, dressing, toileting, bathing, energy conservation techniques with use of ADs as needed.     2. Impairment of mobility:  Patient will work with PT for 90 min daily to work on gait exercises, strengthening, endurance buildup, transfers with use of walker as needed.     3. Rehab RN to administer medication, patient education on medication taking, VS monitoring, and surgical wound dressing changes and monitoring.    4. Medical Conditions  #AQP4-positive NMO  #H/o T4 transverse myelitis with residual thoracic sensation loss  #Poor vision due to NMO lesions, stable  #Acute exacerbation of NMO leading to RLE weakness, improving  Admitted to [facility] for PLEX therapy. Interventional radiology placed a L internal jugular non-tunneled CVC and transfusion medicine was consulted to assist with PLEX treatment. She completed a 5-day course of methylprednisolone and 7 runs of PLEX  (scheduled every other day), which she tolerated appropriately.  Following completion of PLEX therapy, her lower extremity strength was starting to improve. Not much improvement noted since PMR last encounter.   - Completed 7 runs of PLEX  - Completed 5-day course of methylprednisolone  - PTA Mycophenolate 500 BID   - Meningococcal vaccine #2 prior to discharge  - Follow-up with Dr. Franklin in neurology clinic discuss and initiate eculizumab therapy  Consult Ophthalmology     #Paroxysmal Atrial fibrillation   #Pleuritic chest pain, resolved   CHADSVASC: 8, HASBLED 3. Unclear onset. She reports history of intermittent palpitations for many years. EKG 11/2 showed Afib not in RVR () and repeat EKG on 11/3 demonstrated sinus rhythm. She was having pleuritic chest pain that resolved 11/4. Mild, anterior left side. Troponin normal, ESR WNL, and EKG without ischemic changes.  - Apixaban BID  - Stopped aspirin and clopidogrel  - Follow up with cardiology (follows with Dr. Hewitt at [facility])     #Hypokalemia, likely secondary to furosemide.  - Scheduled potassium 20mEq daily  - K 4.2. stable.    #Acute cystitis, uncomplicated:   - completed 5-day course of ceftriaxone 1g (completed 10/27)  - asymptomatic      Hypotension: New parameters added for her medications. Monitor.    Bladder, Bowel, GI and DVT ppx   Bladder ;check PVRs ×3 straight cath for volumes more than 350 cc.  Monitor for symptoms of urinary tract infection, rehab nursing to send for a UA as needed  Bowels; place the patient on a bowel program and titrate medications as needed.  Hold the bowel program in case of loose stools.  Educated patient on Impact with fiber and fluid intake on a bowel function  DVT prophylaxis with mechanical means     Code Status FULL      Prognosis for medical improvement and stabilization of the medical issues for discharge to home is good.      Estimated Length of Stay: 14 days, 11/23     Follow up Appointments on Discharge  -  Follow-up with Dr. Franklin in neurology clinic for discussion of NMO therapy within 2-4 weeks of hospital discharge  - Follow-up with Dr. Hewitt in cardiology clinic to discuss HF and atrial fibrillation within 4-weeks  - Follow-up with PCP for hospital discharge within 1-2 weeks    Discussed with patient and answered questions.    Ramses Wan MD   Physical Medicine & Rehabilitation

## 2020-11-19 NOTE — PLAN OF CARE
FOCUS/GOAL  Medical management    ASSESSMENT, INTERVENTIONS AND CONTINUING PLAN FOR GOAL:  Patient slept well, no c/o pain. She transferred and ambulated to the bathroom with a walker and assistance of 1.

## 2020-11-20 NOTE — PROGRESS NOTES
"FOCUS/GOAL  Bowel management, Bladder management, Pain management and Mobility    ASSESSMENT, INTERVENTIONS AND CONTINUING PLAN FOR GOAL:  Patient is alert and oriented x 4 and is able to make need known by using call light and verbalizing needs.  Is continent of bowel and bladder using toilet in bathroom, LB 11/19/20 AM shift.  Patient had complaints of left eye being \"dimmer\" than yesterday and that right eye was almost completely dark.  PMR MD was updated and new consult for opthalmology was placed and on unit to assess, see their separate note.  Is CGA with walker.  Had PRN Tylenol this AM due to complaints of right orbital pain, results were helpful.          "

## 2020-11-20 NOTE — PLAN OF CARE
FOCUS/GOAL  Pain management and Medical management    ASSESSMENT, INTERVENTIONS AND CONTINUING PLAN FOR GOAL:  Patient A&O x4. Assist of 1 w/ walker. No complaints of pain. Pt had a mild bloody nose during shift. MD aware, asked for vaseline/Aquaphor to be applied. Continent of bowel and bladder. Continue w/ plan of care.

## 2020-11-20 NOTE — PROGRESS NOTES
[facility]   Acute Rehabilitation Unit  Daily progress note  CC:     Neurologic Conditions 03.8 Neuromuscular Disorders: Neuromyelitis Optica Eexacerbation  Impaired mobility and function.    TR held 11/18/2020  ADLs/IADLs: Limited by decreased vision and low BPs but progressing well towards OT goals. Currently requires set- up A only for UE dressing and g/h seated in w/c at sink, set- up for TB bathing seated on shower bench, CGA for LE dressing when standing but needs assist to don STACEY hose. CGA with meal prep using FWW. has extended tub bench,rts, grab bars, walker and cane at home, may need sock aid to don STACEY hose. Recommend HH OT and ~5 more days to progress to mod I with ADLs, son will live with pt and can assist with IADLs     Mobility: Up in room w/ walker and assist, limiting factors low BP's and (R) LE weakness; able to don abdominal binder w/o assist; stairs: has 16 at home to access her apartment, currently able to manage 8 but progressing. Recommend ongoing IP care and home care f/u.     Her discharge is Monday.    Community Re-Entry: limited ambulation at baseline, primarily homebound because of vision; does not have w/c at home     Transportation: Not a  at baseline, car transfer not a barrier     S: In good spirits. Making progress. Finds stickers on her shoes help with her walk. /O some pain in her right eye in the morning, some worsening of vision in left Room changed. No new issues. Dry eyes. On Tears. Epistaxis left nostril, likely from dry air/fan!    BP is labile.   Ros: Impaired vision in both eyes, no nausea, headache, urgency or sob.    [unfilled]   Scheduled meds    amLODIPine  5 mg Oral Daily     apixaban ANTICOAGULANT  5 mg Oral BID     calcium carbonate 600 mg-vitamin D 400 units  1 tablet Oral QPM     FLUoxetine  40 mg Oral Daily     furosemide  20 mg Oral Daily     metoprolol succinate ER  25 mg Oral BID     mycophenolate  500 mg Oral  "BID     potassium chloride ER  20 mEq Oral Daily     simvastatin  20 mg Oral At Bedtime     traZODone  100 mg Oral At Bedtime     vitamin B complex with vitamin C  1 tablet Oral Daily     vitamin C  500 mg Oral Daily       PRN meds:  acetaminophen, albuterol, artificial tears ophthalmic solution, mineral oil-hydrophilic petrolatum, - MEDICATION INSTRUCTIONS -, trolamine salicylate, umeclidinium      PHYSICAL EXAM  /51 (BP Location: Right arm)   Pulse 75   Temp 97.5  F (36.4  C) (Oral)   Resp 18   Ht 1.702 m (5' 7\")   Wt 86.5 kg (190 lb 9.6 oz)   SpO2 96%   BMI 29.85 kg/m    Gen: Alert and cooperative  HEENT: Vision impairment + Face symmetric.  CV: S1, S2 RRR  Pulm: Clear to auscultation  Abd: Soft, non tender  Ext: Calves non tender  Neuro/MSK: Moves all four, has proximal weakness in the RLE. Has proprioceptive deficits during gait. Brace helping.  Responds to touch.    LABS  Last Comprehensive Metabolic Panel:  Sodium   Date Value Ref Range Status   11/14/2020 143 133 - 144 mmol/L Final     Potassium   Date Value Ref Range Status   11/14/2020 4.2 3.4 - 5.3 mmol/L Final     Chloride   Date Value Ref Range Status   11/14/2020 111 (H) 94 - 109 mmol/L Final     Carbon Dioxide   Date Value Ref Range Status   11/14/2020 29 20 - 32 mmol/L Final     Anion Gap   Date Value Ref Range Status   11/14/2020 3 3 - 14 mmol/L Final     Glucose   Date Value Ref Range Status   11/14/2020 89 70 - 99 mg/dL Final     Urea Nitrogen   Date Value Ref Range Status   11/14/2020 16 7 - 30 mg/dL Final     Creatinine   Date Value Ref Range Status   11/14/2020 0.87 0.52 - 1.04 mg/dL Final     GFR Estimate   Date Value Ref Range Status   11/14/2020 65 >60 mL/min/[1.73_m2] Final     Comment:     Non  GFR Calc  Starting 12/18/2018, serum creatinine based estimated GFR (eGFR) will be   calculated using the Chronic Kidney Disease Epidemiology Collaboration   (CKD-EPI) equation.       Calcium   Date Value Ref Range " Status   11/14/2020 8.4 (L) 8.5 - 10.1 mg/dL Final        CBC RESULTS:   Recent Labs   Lab Test 11/10/20  0716   WBC 5.7   RBC 3.50*   HGB 10.5*   HCT 33.9*   MCV 97   MCH 30.0   MCHC 31.0*   RDW 14.6           ASSESSMENT AND PLAN    Ms. Ladonna Sheriff is a 75 year old female who presents with acute flare of RLE weakness in the setting of history of NMO c/b residual right eye visual loss, numbness below mid thorax, and bandlike sensation consistent with previous T4 transverse myelitis and on/off episodes of L eye optic neuritis. She was found to have enhancing lesions extending from T3 to T5/6.  She is demonstrating slow improvement in RLE strength in the setting of 7 rounds of PLEX treatment for NMO acute exacerbation. She remains below baseline in her strength, balance, coordination, sensation, proprioception, functional endurance, ambulation, and overall functional ability. She will need to be able to navigate 16 steps in order to safely return home. Once home, she has an excellent support system with her sons, including one who moved in with her this week.    1. Impairment of ADL's: Patient will work with OT for 90 min daily to work on upper and lower body self care, dressing, toileting, bathing, energy conservation techniques with use of ADs as needed.     2. Impairment of mobility:  Patient will work with PT for 90 min daily to work on gait exercises, strengthening, endurance buildup, transfers with use of walker as needed.     3. Rehab RN to administer medication, patient education on medication taking, VS monitoring, and surgical wound dressing changes and monitoring.    4. Medical Conditions  #AQP4-positive NMO  #H/o T4 transverse myelitis with residual thoracic sensation loss  #Poor vision due to NMO lesions, stable  #Acute exacerbation of NMO leading to RLE weakness, improving  Admitted to [facility] for PLEX therapy. Interventional radiology placed a L internal jugular non-tunneled CVC and transfusion  medicine was consulted to assist with PLEX treatment. She completed a 5-day course of methylprednisolone and 7 runs of PLEX (scheduled every other day), which she tolerated appropriately.  Following completion of PLEX therapy, her lower extremity strength was starting to improve. Not much improvement noted since PMR last encounter.   - Completed 7 runs of PLEX  - Completed 5-day course of methylprednisolone  - PTA Mycophenolate 500 BID   - Meningococcal vaccine #2 prior to discharge  - Follow-up with Dr. Franklin in neurology clinic discuss and initiate eculizumab therapy  Consult Ophthalmology     #Paroxysmal Atrial fibrillation   #Pleuritic chest pain, resolved   CHADSVASC: 8, HASBLED 3. Unclear onset. She reports history of intermittent palpitations for many years. EKG 11/2 showed Afib not in RVR () and repeat EKG on 11/3 demonstrated sinus rhythm. She was having pleuritic chest pain that resolved 11/4. Mild, anterior left side. Troponin normal, ESR WNL, and EKG without ischemic changes.  - Apixaban BID  - Stopped aspirin and clopidogrel  - Follow up with cardiology (follows with Dr. Hewitt at [facility])     #Hypokalemia, likely secondary to furosemide.  - Scheduled potassium 20mEq daily  - K 4.2. stable.    #Acute cystitis, uncomplicated:   - completed 5-day course of ceftriaxone 1g (completed 10/27)  - asymptomatic      Hypotension: New parameters added for her medications. Monitor.    Bladder, Bowel, GI and DVT ppx   Bladder ;check PVRs ×3 straight cath for volumes more than 350 cc.  Monitor for symptoms of urinary tract infection, rehab nursing to send for a UA as needed  Bowels; place the patient on a bowel program and titrate medications as needed.  Hold the bowel program in case of loose stools.  Educated patient on Impact with fiber and fluid intake on a bowel function  DVT prophylaxis with mechanical means     Code Status FULL      Prognosis for medical improvement and stabilization of the medical issues  for discharge to home is good.      Estimated Length of Stay: 14 days, 11/23     Follow up Appointments on Discharge  - Follow-up with Dr. Franklin in neurology clinic for discussion of NMO therapy within 2-4 weeks of hospital discharge  - Follow-up with Dr. Hewitt in cardiology clinic to discuss HF and atrial fibrillation within 4-weeks  - Follow-up with PCP for hospital discharge within 1-2 weeks    Discussed with patient and answered questions.    Ramses Wan MD   Physical Medicine & Rehabilitation

## 2020-11-20 NOTE — PLAN OF CARE
Discharge Planner Post-Acute Rehab PT:     Discharge Plan: home to apt w/ 16 stairs to enter, son currently living w/ her     Precautions: falls; visually impaired; episode of orthostasis 11/14    Current Status:  Transfer: CGA   Gait: CGA with 2WW and 4WW   Stairs: CGA for step to patten   Balance: CGA     Assessment:  pt willing to work with PT today, pt demo seated TE and target training in seated and standing with CGA. Pt demo short amb with 2WW and 4ww. Pt liking 4WW. Pt demo up and down 4x4 steps with step too patten needing CGA.  Pt not ready for mod I in room  as of today      Other Barriers to Discharge (DME, Family Training, etc): stairs to enter, visual impairment affects mobility

## 2020-11-20 NOTE — PROGRESS NOTES
Patient is alert/oriented x4, calls appropriately for care needs.  Patient denies pain on this shift, VSS, has good appetite and orders meals ind.  Patient is able to ambulate to the BR with CGAo1 and walker, still some weakness reported to lower extremities and decreased sensation.  Patient has visual difficulty but states will be having assist from son at home upon discharge.  No new care concerns at this time, continue with POC.

## 2020-11-20 NOTE — PLAN OF CARE
FOCUS/GOAL  Medical management    ASSESSMENT, INTERVENTIONS AND CONTINUING PLAN FOR GOAL:  Patient slept well, no c/o pain. She called for assistance once to the bathroom, she transferred and ambulated with a walker and CGA. Plan to discharge on 11/23.

## 2020-11-20 NOTE — PLAN OF CARE
Discharge Planner Post-Acute Rehab OT:      Discharge Plan: home with son moving here to live with pt second son lives upstaisrs in same apt bulding. And A prn      Precautions: fall, low vision r blind L low vision with cut/peripheral vision loss, orthostatic BP, TEDs and abdominal binder when OOB     Current Status:  ADLs: Set- up A only for UE dressing and g/h seated in w/c at sink, set- up for TB bathing seated on shower bench, sba-mod I for LE dressing when standing to pull over hips, pt able to doff/don michelle hose and socks IND seated EOB. CGA for shower transfer. SBA for toilet transfer and for pericares.      IADLs: CGA for kitchen mobility with high/low reaching tasks into fridge and in cupboards. Assessed safety with rolling office chair transfer as pt uses at kitchen table, CGA with FWW and demos good insight with safety. Completed on 11/16 and 11/18. Pt completed laundry task with CGA and use of FWW on 11/19.      Vision/Cognition: bilat vision deficits; R eye blind, L eye field cut/peripheral vision loss     Assessment:  Pt demos progress towards OT goals this date and is progressing towards IND. Good insight into deficits with dec vision and sensation.pt able to see items in way  Of path to bathroom and able to get around items safely pt close to mod I in room pt at time decrease ambulation with r leg crossing midline will continue to monitor for mod I in room   Other Barriers to Discharge (DME, Family Training, etc): TBD; has extended tub bench,rts, grab bars, walker and cane at home.

## 2020-11-21 NOTE — PLAN OF CARE
FOCUS/GOAL  Bowel management, Bladder management, and Pain management    ASSESSMENT, INTERVENTIONS AND CONTINUING PLAN FOR GOAL:    Pt slept well during the overnight, denies pain. Pt able to communicate needs. CGA with walker for transfers. Continue with plan of care.

## 2020-11-21 NOTE — PLAN OF CARE
Patient is alert and oriented x 4. Able to make needs known. Uses call light appropriately. VSS. No dizziness reported or noted.Teds stocking on to BLE. Abd binder when OOB.   Denied pain, denied sob, denied CP, no new numbness or tingling reported. CGA with walker and gait belt. Pt is independent with in bed mobility. Good appetite and oral hydration noted. Encouraged fluid. Continent of BB on toilet. LBM on 11/20/20. Skin is intact. Alarm on for safety. Call light with in reach. Continue with POC.

## 2020-11-21 NOTE — PLAN OF CARE
Discharge Planner Post-Acute Rehab PT:      Discharge Plan: home to [facility] w/ 16 stairs to enter, son currently living w/ her     Precautions: falls; visually impaired (R eye loss and loss of left peripheral field); episode of orthostasis 11/14     Current Status:  Transfer: SBA  Gait: SBA with 2WW and 4WW   Stairs: CGA for step to patten (backwards when descending)  Balance: CGA      Assessment:  Pt continues to remain highly motivated and hard working in therapy. Limited by R>L LE weakness, requiring frequent seated rest breaks t/o sessions, but at this time is appearing safe for discharge home to apartment with help from sons. She has been able to safely perform stairs.        Other Barriers to Discharge (DME, Family Training, etc): stairs to enter, visual impairment affects mobility

## 2020-11-21 NOTE — PLAN OF CARE
FOCUS/GOAL  Bowel management, Skin integrity, and Carryover of rehab techniques    ASSESSMENT, INTERVENTIONS AND CONTINUING PLAN FOR GOAL:  Pt was A/O, able to use call light and make needs known to staff. Denies chest pain, SOB and dizziness. Complained of headache, PRN tylenol was given and was effective per pt report. Cont of BL, no BM this shift, LBM-11/20/20. CGA with walker for transfers. No skin issues/concerns seen. Will continue with current POC.

## 2020-11-21 NOTE — PLAN OF CARE
Discharge Planner Post-Acute Rehab OT:      Discharge Plan: home with son moving here to live with pt second son lives upstaisrs in same apt bulding. And A prn      Precautions: fall, low vision r blind L low vision with cut/peripheral vision loss, orthostatic BP, TEDs and abdominal binder when OOB     Current Status:  ADLs: Pt completed full shower task, full body dressing, and grooming with overall supervision/SBA and setup provided.       IADLs: CGA for kitchen mobility with high/low reaching tasks into fridge and in cupboards. Assessed safety with rolling office chair transfer as pt uses at kitchen table, CGA with FWW and demos good insight with safety. Completed on 11/16 and 11/18. Pt completed laundry task with CGA and use of FWW on 11/19.      Vision/Cognition: bilat vision deficits; R eye blind, L eye field cut/peripheral vision loss     Assessment:  Pt demos progress towards OT goals this date and is progressing towards IND. Good insight into deficits with decreased vision and sensation. After discussion with PT, decided to continue SBA in room d/t visual deficits. Pt will be mod I once returns home d/t familiarity with environment.     Other Barriers to Discharge (DME, Family Training, etc): TBD; has extended tub bench,rts, grab bars, walker and cane at home.

## 2020-11-22 NOTE — PLAN OF CARE
FOCUS/GOAL  Bowel management, Bladder management, and Pain management    ASSESSMENT, INTERVENTIONS AND CONTINUING PLAN FOR GOAL:      Pt slept well during the overnight, denies pain. Pt able to communicate needs. SBA with walker for transfers. Continue with plan of care.

## 2020-11-22 NOTE — PLAN OF CARE
Occupational Therapy Discharge Summary    Reason for therapy discharge:    Discharged to home with home therapy.  All goals and outcomes met, no further needs identified.    Progress towards therapy goal(s). See goals on Care Plan in [facility] electronic health record for goal details.  Goals met    Therapy recommendation(s):    Continued therapy is recommended.  Rationale/Recommendations:  to maximize safety and I with ADL/IADL in home environment.

## 2020-11-22 NOTE — PLAN OF CARE
Physical Therapy Discharge Summary    Reason for therapy discharge:    Discharged to home with home therapy. Home 11/23/2020    Progress towards therapy goal(s). See goals on Care Plan in [facility] electronic health record for goal details.  Goals met    Therapy recommendation(s):    Continued therapy is recommended.  Rationale/Recommendations:  home PT for progressing strength, coordination, ambulation, home safety eval. Pt's son will be staying with her.

## 2020-11-22 NOTE — PLAN OF CARE
FOCUS/GOAL  Bowel management, Bladder management, Mobility, and Safety management    ASSESSMENT, INTERVENTIONS AND CONTINUING PLAN FOR GOAL:  Pt alert and oriented x4. Pt does use call light approp; education on fall prevention provided after pt self transferred/ambulated to bathroom. Pt verbalized understanding of requesting assistance prior in future. RLE weak and pt uses a walker and gait belt for transfers/ambulation with one assist. Pt occasional incont of B/B. Last BM 11/21/20. Denies pain this shift.

## 2020-11-23 NOTE — PLAN OF CARE
FOCUS/GOAL  Bowel management, Bladder management, Nutrition/Feeding/Swallowing precautions, and Discharge planning    ASSESSMENT, INTERVENTIONS AND CONTINUING PLAN FOR GOAL:  Writer cared for pt from 8250-1800, pt is alert and oriented x4, cont of B/B LBM 11/22. Pt has good appetite and oral hydration. Plan to discharge tomorrow. No other concerns, pt able to make needs known.

## 2020-11-23 NOTE — DISCHARGE INSTRUCTIONS
Follow up Appointments    - Follow up with Dr. Caterina Franklin in neurology clinic for discussion of NMO therapy within 2-4 weeks of hospital discharge  The clinic will contact you to schedule your telephone appointment with Dr. Franklin. If you do not hear from them in two days, please call [phone].                         Address [facility] - Neurology  Phone  [phone]    - Follow up with primary care provider for hospital discharge within 1-2 weeks  You are scheduled to see Akash Romero PA-C on Friday, November 27th at 9:20 AM.    Address  [facility]                          [address] [place] [address]    Phone   [phone]   Fax                  [phone]    - Follow up with cardiology to discuss HF and atrial fibrillation, Dr. Thai Hewitt  You are scheduled for a telephone visit with Dr. Hewitt on Tuesday, January 12th at 9:30 AM.    Address  [facility]  Phone   [phone]      Home Health Care:   -[facility] (Previously Known As: [facility]) PH: [phone]  -Nurse, physical therapy, occupational therapy, home health aide  -You will get a call after you have discharged from Acute Rehab Unit to schedule the first home care visit. The home health nurse should come out within the first 24-48 hours.

## 2020-11-23 NOTE — DISCHARGE SUMMARY
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     #Acute cystitis, uncomplicated:      Hypotension       brief summary  Ladonna Sheriff is a 75 year old female who is being admitted to the ARU on 11/09/20     Ladonna Sheriff is a 76 yo RH Female with PMH of AQP4 positive NMO with residual R eye vision loss, T4 transverse myelitis with numbness from the chest down, and episodes of L eye optic neuritis who presented to [facility] with worsening RLE weakness for the past week prior to presentation.      She typically ambulates with a cane and had been requiring a walker to ambulate safely. She also reported urinary frequency, but no dysuria.      MRI of the C- and T- spine at [facility] demonstrated T2 signal from T3-T5/6 with enhancement centered at the T4 level and T5 level.      She was started on solumedrol 500mg BID and transferred to [facility] for PLEX therapy. She completed a 5-day course of methylprednisolone and 7 runs of PLEX (scheduled every other day), which she tolerated appropriately. We saw her in consult and made the AR recommendations.      She reports history of intermittent palpitations for many years. EKG 11/2 showed Afib not in RVR () and repeat EKG on 11/3  demonstrated sinus rhythm. She was having pleuritic chest pain that resolved 11/4. Mild, anterior left side. Troponin normal, ESR WNL, and EKG without ischemic changes. aspirin and clopidogrel was stopped.      Noted to have Hypokalemia, likely secondary to furosemide, and is on scheduled potassium 40mEq daily     She completed 5-day course of ceftriaxone 1g for Acute cystitis, uncomplicated           MRI cervical spine with and without contrast 10/23:  IMPRESSION:  1.  No definite cervical spinal cord signal abnormality and no abnormal enhancement.  2.  Disc osteophyte complex with shallow protrusion and annular fissure/tear and possible tiny component of extrusion at C4-C5 without significant spinal canal stenosis, mild to moderate foraminal narrowing right more than left due to uncinate spurring   and facet arthropathy.  3.  Mild multilevel degenerative spondylosis otherwise, see report for level by level details.     MRI thoracic spine with and without contrast 10/23:  IMPRESSION:  1.  Increased cord parenchyma T2 signal extending from T3-T5/T6, with enhancement centered at the T4 level as well as a small focus of enhancement in the parenchyma at the T5 level and left lateral spinal canal at T5 which may represent a focus of nerve   root enhancement. This is consistent with but not diagnostic of demyelinating disease active/acute plaque formation.  2.  Near complete resolution of the previous cord signal abnormality centered at the T2 level.  3.  Remainder of study without significant MR abnormality.    rehabilitaiton course  Discharged to home with home therapy. Home 11/23/2020     Progress towards therapy goal(s). See goals on Care Plan in [facility] electronic health record for goal details.  Goals met     Therapy recommendation(s):    Continued therapy is recommended.  Rationale/Recommendations:  home PT for progressing strength, coordination, ambulation, home safety eval. Pt's son will be staying with  her.    Discharged to home with home therapy.  All goals and outcomes met, no further needs identified.     Progress towards therapy goal(s). See goals on Care Plan in Epic electronic health record for goal details.  Goals met     Therapy recommendation(s):    Continued therapy is recommended.  Rationale/Recommendations:  to maximize safety and I with ADL/IADL in home environment.    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 Interventional radiology placed a L internal jugular non-tunneled CVC and transfusion medicine was consulted to assist with PLEX treatment. She completed a 5-day course of methylprednisolone and 7 runs of PLEX (scheduled every other day), which she tolerated appropriately.  Following completion of PLEX therapy, her lower extremity strength was starting to improve. Not much improvement noted since PMR last encounter.   - Completed 7 runs of PLEX  - Completed 5-day course of methylprednisolone  - PTA Mycophenolate 500 BID   - Meningococcal vaccine #2 prior to discharge  - Follow-up with Dr. Franklin in neurology clinic discuss and initiate eculizumab therapy  Consult Ophthalmology     #Paroxysmal Atrial fibrillation   #Pleuritic chest pain, resolved   CHADSVASC: 8, HASBLED 3. Unclear onset. She reports history of intermittent palpitations for many years. EKG 11/2 showed Afib not in RVR () and repeat EKG on 11/3 demonstrated sinus rhythm. She was having pleuritic chest pain that resolved 11/4. Mild, anterior left side. Troponin normal, ESR WNL, and EKG without ischemic changes.  - Apixaban BID  - Stopped aspirin and clopidogrel  - Follow up with cardiology (follows with Dr. Hewitt at [facility])     #Hypokalemia, likely secondary to furosemide.  - Scheduled potassium 20mEq daily  - K 4.2.  stable.     #Acute cystitis, uncomplicated:   - completed 5-day course of ceftriaxone 1g (completed 10/27)  - asymptomatic      Hypotension: New parameters added for her medications. Monitor.     Code Status FULL   dISCHARGE MEDICATIONS  Current Discharge Medication List      START taking these medications    Details   acetaminophen (TYLENOL) 325 MG tablet Take 3 tablets (975 mg) by mouth every 4 hours as needed for mild pain or headaches  Qty:      Associated Diagnoses: Neuromyelitis optica (H)         CONTINUE these medications which have CHANGED    Details   amLODIPine (NORVASC) 5 MG tablet Take 1 tablet (5 mg) by mouth daily  Qty:        apixaban ANTICOAGULANT (ELIQUIS) 5 MG tablet Take 1 tablet (5 mg) by mouth 2 times daily For atrial fibrillation  Qty: 60 tablet, Refills: 0    Associated Diagnoses: Paroxysmal atrial fibrillation (H)      metoprolol succinate ER (TOPROL-XL) 25 MG 24 hr tablet Take 1 tablet (25 mg) by mouth 2 times daily  Qty: 60 tablet, Refills: 0    Associated Diagnoses: Essential hypertension      potassium chloride ER (KLOR-CON M) 20 MEQ CR tablet Take 1 tablet (20 mEq) by mouth daily  Qty:           CONTINUE these medications which have NOT CHANGED    Details   albuterol (PROAIR HFA/PROVENTIL HFA/VENTOLIN HFA) 108 (90 Base) MCG/ACT inhaler Inhale 2 puffs into the lungs every 6 hours as needed       B Complex-C (VITAMIN B COMPLEX W/VITAMIN C) TABS tablet Take 1 tablet by mouth daily      Calcium-Vitamin D 600-200 MG-UNIT TABS Take 1 tablet by mouth every evening       FLUoxetine (PROZAC) 40 MG capsule Take 40 mg by mouth daily       furosemide (LASIX) 20 MG tablet Take 20 mg by mouth daily       Multiple Vitamins-Minerals (MULTIVITAL PO) Take 1 tablet by mouth every morning       mycophenolate (GENERIC EQUIVALENT) 500 MG tablet Take 500 mg by mouth 2 times daily      simvastatin (ZOCOR) 20 MG tablet Take 20 mg by mouth At Bedtime      traZODone (DESYREL) 100 MG tablet Take 100 mg by mouth At  Bedtime      trolamine salicylate (ASPERCREME) 10 % external cream Apply topically 2 times daily as needed for moderate pain    Associated Diagnoses: Neuromyelitis optica (H)      umeclidinium (INCRUSE ELLIPTA) 62.5 MCG/INH inhaler Inhale 1 puff into the lungs daily as needed       vitamin C (ASCORBIC ACID) 500 MG tablet Take 500 mg by mouth daily         STOP taking these medications       diphenhydrAMINE-acetaminophen (TYLENOL PM)  MG tablet Comments:   Reason for Stopping:                 DISCHARGE INSTRUCTIONS AND FOLLOW UP  Discharge Procedure Orders   Home care nursing referral   Referral Priority: Routine Referral Type: Home Health Therapies & Aides   Number of Visits Requested: 1     Home Care PT Referral for Hospital Discharge   Referral Priority: Routine Referral Type: Home Health Therapies & Aides   Number of Visits Requested: 1     Home Care OT Referral for Hospital Discharge   Referral Priority: Routine   Number of Visits Requested: 1     Patient care order   Order Comments: When resuming pills upon discharge home please double check doses as some medications (Metoprolol, amlodipine, potassium) doses have been titrated during hospitalization.     Reason for your hospital stay   Order Comments: Admitted for rehabilitation following hospitalization for neuromyelitis optica.     Activity   Order Comments: Your activity upon discharge: activity as tolerated, continue therapy with home care, family to assist as advised per therapy     Order Specific Question Answer Comments   Is discharge order? Yes      Monitor and record   Order Comments: blood pressure daily     MD face to face encounter   Order Comments: Documentation of Face to Face and Certification for Home Health Services    I certify that patient: Ladonna Sheriff is under my care and that I, or a nurse practitioner or physician's assistant working with me, had a face-to-face encounter that meets the physician face-to-face encounter requirements  with this patient on: 11/22/2020.    This encounter with the patient was in whole, or in part, for the following medical condition, which is the primary reason for home health care: Impaired strength and activity tolerance.    I certify that, based on my findings, the following services are medically necessary home health services: Nursing, Occupational Therapy and Physical Therapy.    My clinical findings support the need for the above services because: Nurse is needed: assess home safety., Occupational Therapy Services are needed to assess and treat cognitive ability and address ADL safety due to impairment in self cares3. and Physical Therapy Services are needed to assess and treat the following functional impairments: mobility.    Further, I certify that my clinical findings support that this patient is homebound (i.e. absences from home require considerable and taxing effort and are for medical reasons or Scientologist services or infrequently or of short duration when for other reasons) because: Requires assistance of another person or specialized equipment to access medical services because patient: Is unable to exit home safely on own due to: impaired vision. and Requires supervision of another for safe transfer...    Based on the above findings. I certify that this patient is confined to the home and needs intermittent skilled nursing care, physical therapy and/or speech therapy.  The patient is under my care, and I have initiated the establishment of the plan of care.  This patient will be followed by a physician who will periodically review the plan of care.  Physician/Provider to provide follow up care: Joceline Ty    Attending hospital physician (the Medicare certified [place] provider): Ramses Wan MD  Physician Signature: See electronic signature associated with these discharge orders.  Date: 11/22/2020     Adult [facility] Follow-up and recommended labs and tests   Order Comments: Follow up  neurology, cardiology,  primary care as scheduled.     Appointments on [place] and/or [place] campus (with [facility] or [facility] provider or service). Call [phone] if you haven't heard regarding these appointments within 7 days of discharge.     Full Code     Order Specific Question Answer Comments   Code status determined by: Discussion with patient/ legal decision maker      Diet   Order Comments: Follow this diet upon discharge:  Regular Diet Adult     Order Specific Question Answer Comments   Is discharge order? Yes           physical examination      Most recent Vital Signs:   Vitals:    11/22/20 0836 11/22/20 1534 11/22/20 2030 11/23/20 0700   BP: 138/60 (!) 140/64 138/70 139/75   BP Location: Right arm Right arm Right arm Right arm   Pulse: 71 65 67 64   Resp: 18 18  16   Temp: 97  F (36.1  C) 97.9  F (36.6  C)  97.7  F (36.5  C)   TempSrc: Oral Oral  Oral   SpO2:  94%  92%   Weight:    86.1 kg (189 lb 14.4 oz)   Height:           Gen: Alert and cooperative  HEENT: Vision impairment + Face symmetric.  CV: S1, S2 RRR  Pulm: Clear to auscultation  Abd: Soft, non tender  Ext: Calves non tender  Neuro/MSK: Moves all four, has proximal weakness in the RLE. Has proprioceptive deficits during gait. Brace helping.  Responds to touch.       Discharge summary was forwarded to Joceline Ty (PCP) at the time of discharge, so as to bridge from hospital to home care    It was our pleasure to care for Ladonna Sheriff during this hospitalization. Please do not hesitate to contact me should there be questions regarding the hospital course or discharge plan.        Ramses Wan MD   Physical Medicine & Rehabilitation      I, Ramses Wan MD, saw and evaluated this patient prior to discharge. 35 minutes spent in discharge, including >50% in counseling and coordination of care, medication review and plan of care recommended on follow up.

## 2020-11-23 NOTE — PROGRESS NOTES
Verbal consent for IMM while pt in the bathroom. Getting picked up by family at 10:30am. No additional SW needs.     ATILIO Varela, [facility] Acute Rehab Unit   Phone: [phone]  I   Pager: [phone]     Infectious disease

## 2020-11-23 NOTE — PROGRESS NOTES
[facility] Care  Spoke with patient to discuss plans for HC. Patient to be discharged home today and has agreed to have [facility] follow with RN PT OT [facility]. Patient care support center processing referral. Patient verbalized understanding that initial visit is scheduled for 11/24 or 11/25. Patient has 24 hour phone number for [facility] for any questions or concerns.    Connie Hawley RN   [facility] Care Liaison   [phone]

## 2020-11-23 NOTE — PLAN OF CARE
FOCUS/GOAL  Medication management and Medical management    ASSESSMENT, INTERVENTIONS AND CONTINUING PLAN FOR GOAL:  A&O, SBA walker. Makes needs known, no alarms on.  Pt denied pain during night.  Continent of B/B, LBM 11/21.  Discharging today.

## 2020-11-25 NOTE — TELEPHONE ENCOUNTER
Called Ladonna and left a VMM that she can can [facility] at this time to schedule her Soliris infusion.  Ladonna also needs a follow-up with Dr. Franklin which will be scheduled.    Melvina James RN

## 2020-11-30 NOTE — TELEPHONE ENCOUNTER
Refill requested for: Mycophenolate 500 mg    Last ordered: None by current provider    Last Filled:     Last Clinic Visit: 8/12/20    Next Clinic Visit: 12/3/20    Labs: 11/14/20 BMP all within norm except Chloride= 111    From last visit note:  - Start Mycophenolate 250mg BID x1 month and then increase to 500mg BID  - Taper off gabapentin (patient not reporting any pain) to 300mg HS x1 month then stop  - Encourage daily walking for 5-10 minutes twice per day  - Neuropsychiatric testing  - Referral for psychotherapy   - Vitamin D, CBC and CMP in 3 months  - Follow up in 3 months  Action taken:

## 2020-11-30 NOTE — TELEPHONE ENCOUNTER
Called patient and offered video visit. Patient says she can barely see and not really want to do video if possible and she is not good with texting or computers devices. Patient scheduled for telephone visit on 12/3/2020 at 2pm  Rosalie Christian MA

## 2020-12-01 NOTE — TELEPHONE ENCOUNTER
Received the following information from infusion finance team: This patient meets the criteria for treatment.   Ok to schedule.     Melvina James RN

## 2020-12-03 NOTE — PROGRESS NOTES
"Ladonna Sheriff is a 75 year old female who is being evaluated via a billable telephone visit.      The patient has been notified of following:     \"This telephone visit will be conducted via a call between you and your physician/provider. We have found that certain health care needs can be provided without the need for a physical exam.  This service lets us provide the care you need with a short phone conversation.  If a prescription is necessary we can send it directly to your pharmacy.  If lab work is needed we can place an order for that and you can then stop by our lab to have the test done at a later time.    Telephone visits are billed at different rates depending on your insurance coverage. During this emergency period, for some insurers they may be billed the same as an in-person visit.  Please reach out to your insurance provider with any questions.    If during the course of the call the physician/provider feels a telephone visit is not appropriate, you will not be charged for this service.\"    Patient has given verbal consent for Telephone visit?  Yes    What phone number would you like to be contacted at? [phone]    How would you like to obtain your AVS? 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  She was doing well until October 23 when she presented to [facility] with complaints of right lower extremity weakness as well as an uncomplicated UTI.  She was transferred to [facility] for past exchange treatment.    Baseline she has blindness of the right eye and limited vision of the left eye with numbness from the mid chest down from T4 level and uses a walker.  She also has a history of Sjogren's syndrome aortic stenosis hypertension lupus and questionable TIA.  She was originally " diagnosed with minimal and 2019 and underwent rituximab infusions.  She was started on CellCept after an exacerbation in summer 2020.    After admission to the hospital, an MRI of the C spine showed improvement in the previous T2 signal abnormality but increase in enhancement at the T4-T5 level.  He was treated with ceftriaxone IV for her urinary tract infection and started on IV Solu-Medrol 500 mg twice daily and transferred to the [facility] for plasmapheresis.  She was transferred to [facility] is back neurology on 1024 and discharge on November 9, 2020 to a rehab facility.  While in the hospital she underwent plasma exchange, and was treated for a urinary tract infection as well as paroxysmal atrial fibrillation.  She tolerated Plex well and completed 7 rounds as well as a 5-day course of methylprednisolone high-dose. She underwent meningococcal vaccine x2 prior to discharge preparations to start eculizumab therapy.  Her eculizumab infusions have been approved by insurance and we are currently waiting for the infusion to be scheduled.    Today she reports to be doing well and is in good spirits.  She is walking with a walker and feels like her right lower extremity is still somewhat weak and not back to previous baseline, this is after being in inpatient rehabilitation for most of November.    She spent Thanksgiving with her son's in her apartment and one of her sons recently moved from [place] to [place] and is living with her, so she is not alone and seems very happy to have some company.  She is very aware of next course of action in terms of undergoing Soliris infusion and does not recall any missed calls by the infusion facility which according to or MS nurse has been trying to get a hold of her to schedule the infusion.    Today we discussed the risk for meningococcal meningitis and I reminded her that she did undergo 2 vaccines prior to discharge from the  hospital.  I do agree that that is a concern but now after the vaccination it is minimal.    She reports to still be taking mycophenolate 500 mg twice a day and took a dose this morning so she was encouraged to stop it as of today.    No other issues or concerns.  Impression    Impression: Minimal exacerbation despite rituximab and mycophenolate therapy concomitant.  Improving but not back to previous baseline mostly from right lower extremity weakness which is a little better.    Plan: Schedule Soliris infusion for next week with follow-up visit via telephone next Thursday to monitor response to infusion.    The patient understands and agrees with the plan.    Caterina Franklin MD

## 2020-12-03 NOTE — LETTER
"12/3/2020       RE: Ladonna Sheriff  [address]  [place] [address]     Dear Colleague,    Thank you for referring your patient, Ladonna Sheriff, to the [facility] [place] at [facility]. Please see a copy of my visit note below.    Ladonna Sheriff is a 75 year old female who is being evaluated via a billable telephone visit.      The patient has been notified of following:     \"This telephone visit will be conducted via a call between you and your physician/provider. We have found that certain health care needs can be provided without the need for a physical exam.  This service lets us provide the care you need with a short phone conversation.  If a prescription is necessary we can send it directly to your pharmacy.  If lab work is needed we can place an order for that and you can then stop by our lab to have the test done at a later time.    Telephone visits are billed at different rates depending on your insurance coverage. During this emergency period, for some insurers they may be billed the same as an in-person visit.  Please reach out to your insurance provider with any questions.    If during the course of the call the physician/provider feels a telephone visit is not appropriate, you will not be charged for this service.\"    Patient has given verbal consent for Telephone visit?  Yes    What phone number would you like to be contacted at? [phone]    How would you like to obtain your AVS? 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  She was doing well until October 23 when she presented to [facility] with complaints of right lower extremity weakness as well as an uncomplicated UTI.  She was " transferred to [facility] for past exchange treatment.    Baseline she has blindness of the right eye and limited vision of the left eye with numbness from the mid chest down from T4 level and uses a walker.  She also has a history of Sjogren's syndrome aortic stenosis hypertension lupus and questionable TIA.  She was originally diagnosed with minimal and 2019 and underwent rituximab infusions.  She was started on CellCept after an exacerbation in summer 2020.    After admission to the hospital, an MRI of the C spine showed improvement in the previous T2 signal abnormality but increase in enhancement at the T4-T5 level.  He was treated with ceftriaxone IV for her urinary tract infection and started on IV Solu-Medrol 500 mg twice daily and transferred to the [facility] for plasmapheresis.  She was transferred to [facility] is back neurology on 1024 and discharge on November 9, 2020 to a rehab facility.  While in the hospital she underwent plasma exchange, and was treated for a urinary tract infection as well as paroxysmal atrial fibrillation.  She tolerated Plex well and completed 7 rounds as well as a 5-day course of methylprednisolone high-dose. She underwent meningococcal vaccine x2 prior to discharge preparations to start eculizumab therapy.  Her eculizumab infusions have been approved by insurance and we are currently waiting for the infusion to be scheduled.    Today she reports to be doing well and is in good spirits.  She is walking with a walker and feels like her right lower extremity is still somewhat weak and not back to previous baseline, this is after being in inpatient rehabilitation for most of November.    She spent Thanksgiving with her son's in her apartment and one of her sons recently moved from [place] to [place] and is living with her, so she is not alone and seems very happy to have some company.  She is very aware of next course of action in terms of  undergoing Soliris infusion and does not recall any missed calls by the infusion facility which according to or MS nurse has been trying to get a hold of her to schedule the infusion.    Today we discussed the risk for meningococcal meningitis and I reminded her that she did undergo 2 vaccines prior to discharge from the hospital.  I do agree that that is a concern but now after the vaccination it is minimal.    She reports to still be taking mycophenolate 500 mg twice a day and took a dose this morning so she was encouraged to stop it as of today.    No other issues or concerns.  Impression    Impression: Minimal exacerbation despite rituximab and mycophenolate therapy concomitant.  Improving but not back to previous baseline mostly from right lower extremity weakness which is a little better.    Plan: Schedule Soliris infusion for next week with follow-up visit via telephone next Thursday to monitor response to infusion.    The patient understands and agrees with the plan.    Caterina Franklin MD

## 2020-12-21 NOTE — PROGRESS NOTES
Infusion Nursing Note:  Ladonna Sheriff presents today for Soliris.    Patient seen by provider today: No   present during visit today: Not Applicable.    Note: Reviewed drug info with patient, given written handout. Patient receiving drug today for neuromyelitis optica. Blind in R eye, left eye vision cloudy and dim. Has numbness/tingling  from top of ribcage to feet.     Intravenous Access:  Peripheral IV placed.    Treatment Conditions:  Biological Infusion Checklist:  ~~~ NOTE: If the patient answers yes to any of the questions below, hold the infusion and contact ordering provider or on-call provider.    1. Have you recently had an elevated temperature, fever, chills, productive cough, coughing for 3 weeks or longer or hemoptysis, abnormal vital signs, night sweats,  chest pain or have you noticed a decrease in your appetite, unexplained weight loss or fatigue? No  2. Do you have any open wounds or new incisions? No  3. Do you have any recent or upcoming hospitalizations, surgeries or dental procedures? No  4. Do you currently have or recently have had any signs of illness or infection or are you on any antibiotics? No  5. Have you had any new, sudden or worsening abdominal pain? No  6. Have you or anyone in your household received a live vaccination in the past 4 weeks? Please note:  No live vaccines while on biologic/chemotherapy until 6 months after the last treatment.  Patient can receive the flu vaccine (shot only) and the pneumovax.  It is optimal for the patient to get these vaccines mid cycle, but they can be given at any time as long as it is not on the day of the infusion. No  7. Have you recently been diagnosed with any new nervous system diseases (ie. Multiple sclerosis, Guillain [place], seizures, neurological changes) or cancer diagnosis? No  8. Are you on any form of radiation or chemotherapy? No  9. Are you pregnant or breast feeding or do you have plans of pregnancy in the future?  No  10. Have you been having any signs of worsening depression or suicidal ideations?  (benlysta only) No  11. Have there been any other new onset medical symptoms? No        Post Infusion Assessment:  Patient tolerated infusion without incident.  Patient observed for 60 minutes post Soliris per protocol.  Site patent and intact, free from redness, edema or discomfort.  No evidence of extravasations.  Access discontinued per protocol.       Discharge Plan:   Copy of AVS reviewed with patient and/or family.  Patient will return 12/28 for next appointment.  Patient discharged in stable condition accompanied by: self.  Departure Mode: Wheelchair.    Chica Velasquez RN

## 2020-12-22 NOTE — PROGRESS NOTES
OCCUPATIONAL THERAPY VISUAL REHABILITATION DISCHARGE SUMMARY     Patient: Ladonna Licea  : 1945  Insurance:   Payor/Plan Subscriber Name Rel Member # Group #   MEDICARE - MEDICARE LADONNA LICEA [id number]       ATT[employer] CLAIMS, [address]   COMMERCIAL - AARP LADONNA LICEA [id number]       [facility] CLAIM DIV, [address]       Beginning/End Dates of Reporting Period:  20 to 2020 (last seen on 20)    Therapy Diagnosis:  Optic neuritis, left H46.9  - Primary; Neuromyelitis optica (H) G36.0      Client Self Report: Son, Sridhar with patient today. Brings her cell phone and Electronic Tablet.    GOALS     Goal 1 - Near vision        Near Vision Goal Comment: Patient will demonstrate 3 pieces of adaptive equipment and/or adaptive techniques in regards to magnification, lighting, contrast for increased ADL/IADL independence (reading, meal prep, medication management, writing).   Target Date: 20        Goal 2 - Visual field        Visual Field Goal Comment: Patient will verbalize awareness of central visual field Loss and demonstrate use of 2 visual skills/adaptive equipment for increased independence in reading-based activities of daily living, written communication and detail ADL tasks.    Target Date: 20        Goal 3 - Environmental modification        Environmental Modification Goal Comment: Patient will demonstrate and/or verbalize 3 environmentally-based ADL modifications to improve visual-based ADL/IADL activities.   Target Date: 20        Goal 4 - Resource education    Goal Description: Resource education: Patient will verbalize awareness of community resources for the following:, Audio access to print materials, Access to low vision devices       Target Date: 20        Goal 5 - Distance viewing    Goal Description: Distance viewing: Patient will demonstrate proficient use of a distance device in conjunction with appropriate visual skills techniques to  correctly survey objects in the distance       Target Date: 11/13/20        Goal 6 - Cognition                     Goal 7                 Goal 8                   Progress Toward Goals  Goal(s) 1, 3 met  Goal(s) 2, 4, 5 not met due to unexpected discharge/limited number of sessions  Progress: Patient seen for 3 sessions total including the initial evaluation to address visual deficits.    Reason for Discharge  Patient chooses to discontinue therapy.    Adaptive Equipment/Optical Devices Recommended:  Lifeline vs. third Google home in bathroom versus carry cell phone in jasmin pack; before taking a step, get an habit of looking for cats versus put bells on the cats; practice using Google home to call her son to get in the habit if needed in the emergency; 5x handheld best; prismatics and aspheric didn't work well; Deanne - too much $ but worked fairly well; stand magnifier not as clear, doesn't work well; phone: plain wallpaper, zoom and reverse contrast both worked well; electronic tablet enlarging text size, easy contrast of wallpaper (solid color in background/wallpaper of screen), reduce pattern (minimize icons on home screen), voice activated features, reverse contrast, zoom/magnify).  Nabor carl on patient's electronic tablet for optimal viewing: Color: she likes black/reverse contrast, Font: liked [employer] Ember Bold, Margins/Line Spacing: Narrow.  consider restarting audiobooks through [facility] services for the blind. lap desk/tray with pillows under    Discharge Plan  Patient to continue home program/techniques

## 2020-12-28 NOTE — PROGRESS NOTES
Infusion Nursing Note:  Ladonna Sheriff presents today for Soliris.    Patient seen by provider today: No   present during visit today: Not Applicable.    Note: Tolerated first dose of Soliris well; no complaints.    COVID test not done in the past 6 days, and no upcoming test scheduled. Discussed new protocol for weekly COVID testing of asymptomatic patients; patient agreed to test today. NP swab done R nare.    Intravenous Access:  Peripheral IV placed.    Treatment Conditions:  Biological Infusion Checklist:  ~~~ NOTE: If the patient answers yes to any of the questions below, hold the infusion and contact ordering provider or on-call provider.    1. Have you recently had an elevated temperature, fever, chills, productive cough, coughing for 3 weeks or longer or hemoptysis, abnormal vital signs, night sweats,  chest pain or have you noticed a decrease in your appetite, unexplained weight loss or fatigue? No  2. Do you have any open wounds or new incisions? No  3. Do you have any recent or upcoming hospitalizations, surgeries or dental procedures? No  4. Do you currently have or recently have had any signs of illness or infection or are you on any antibiotics? No  5. Have you had any new, sudden or worsening abdominal pain? No  6. Have you or anyone in your household received a live vaccination in the past 4 weeks? Please note:  No live vaccines while on biologic/chemotherapy until 6 months after the last treatment.  Patient can receive the flu vaccine (shot only) and the pneumovax.  It is optimal for the patient to get these vaccines mid cycle, but they can be given at any time as long as it is not on the day of the infusion. No  7. Have you recently been diagnosed with any new nervous system diseases (ie. Multiple sclerosis, Guillain [place], seizures, neurological changes) or cancer diagnosis? No  8. Are you on any form of radiation or chemotherapy? No  9. Are you pregnant or breast feeding or do you have  plans of pregnancy in the future? No  10. Have you been having any signs of worsening depression or suicidal ideations?  (benlysta only) No  11. Have there been any other new onset medical symptoms? No    Post Infusion Assessment:  Patient tolerated infusion without incident.  Patient observed for 60 minutes post Soliris per protocol.  Site patent and intact, free from redness, edema or discomfort.  No evidence of extravasations.  Access discontinued per protocol.     Discharge Plan:   Discharge instructions reviewed with: Patient.  Patient and/or family verbalized understanding of discharge instructions and all questions answered.  AVS to patient via [employer].  Patient will return 1/4 for next appointment.   Patient discharged in stable condition accompanied by: self, son will drive her home.  Departure Mode: Wheelchair.    Karmen Alford RN

## 2021-01-01 ENCOUNTER — HEALTH MAINTENANCE LETTER (OUTPATIENT)
Age: 76
End: 2021-01-01

## 2021-01-01 ENCOUNTER — OFFICE VISIT (OUTPATIENT)
Dept: DERMATOLOGY | Facility: CLINIC | Age: 76
End: 2021-01-01
Attending: PSYCHIATRY & NEUROLOGY
Payer: MEDICARE

## 2021-01-01 ENCOUNTER — IMMUNIZATION (OUTPATIENT)
Dept: NURSING | Facility: CLINIC | Age: 76
End: 2021-01-01
Payer: MEDICARE

## 2021-01-01 ENCOUNTER — INFUSION THERAPY VISIT (OUTPATIENT)
Dept: INFUSION THERAPY | Facility: CLINIC | Age: 76
End: 2021-01-01
Attending: PSYCHIATRY & NEUROLOGY
Payer: MEDICARE

## 2021-01-01 ENCOUNTER — HOSPITAL ENCOUNTER (INPATIENT)
Facility: CLINIC | Age: 76
LOS: 1 days | DRG: 065 | End: 2021-06-22
Attending: EMERGENCY MEDICINE | Admitting: HOSPITALIST
Payer: MEDICARE

## 2021-01-01 ENCOUNTER — OFFICE VISIT (OUTPATIENT)
Dept: DERMATOLOGY | Facility: CLINIC | Age: 76
End: 2021-01-01
Payer: MEDICARE

## 2021-01-01 ENCOUNTER — OFFICE VISIT (OUTPATIENT)
Dept: NEUROLOGY | Facility: CLINIC | Age: 76
End: 2021-01-01
Attending: PSYCHIATRY & NEUROLOGY
Payer: MEDICARE

## 2021-01-01 ENCOUNTER — HOSPITAL ENCOUNTER (OUTPATIENT)
Facility: CLINIC | Age: 76
Setting detail: SPECIMEN
Discharge: HOME | End: 2021-01-04
Attending: PSYCHIATRY & NEUROLOGY | Admitting: PSYCHIATRY & NEUROLOGY
Payer: MEDICARE

## 2021-01-01 ENCOUNTER — TRANSFERRED RECORDS (OUTPATIENT)
Dept: HEALTH INFORMATION MANAGEMENT | Facility: CLINIC | Age: 76
End: 2021-01-01

## 2021-01-01 ENCOUNTER — APPOINTMENT (OUTPATIENT)
Dept: CT IMAGING | Facility: CLINIC | Age: 76
DRG: 065 | End: 2021-01-01
Attending: EMERGENCY MEDICINE
Payer: MEDICARE

## 2021-01-01 ENCOUNTER — IMMUNIZATION (OUTPATIENT)
Dept: NURSING | Facility: CLINIC | Age: 76
End: 2021-01-01
Attending: INTERNAL MEDICINE
Payer: MEDICARE

## 2021-01-01 ENCOUNTER — TELEPHONE (OUTPATIENT)
Dept: NEUROLOGY | Facility: CLINIC | Age: 76
End: 2021-01-01

## 2021-01-01 ENCOUNTER — OFFICE VISIT (OUTPATIENT)
Dept: OPHTHALMOLOGY | Facility: CLINIC | Age: 76
End: 2021-01-01
Attending: OPHTHALMOLOGY
Payer: MEDICARE

## 2021-01-01 ENCOUNTER — ANCILLARY PROCEDURE (OUTPATIENT)
Dept: MRI IMAGING | Facility: CLINIC | Age: 76
End: 2021-01-01
Attending: PSYCHIATRY & NEUROLOGY
Payer: MEDICARE

## 2021-01-01 ENCOUNTER — DOCUMENTATION ONLY (OUTPATIENT)
Dept: CARE COORDINATION | Facility: CLINIC | Age: 76
End: 2021-01-01

## 2021-01-01 VITALS
WEIGHT: 190.3 LBS | OXYGEN SATURATION: 96 % | DIASTOLIC BLOOD PRESSURE: 84 MMHG | TEMPERATURE: 98.4 F | SYSTOLIC BLOOD PRESSURE: 141 MMHG | HEART RATE: 70 BPM | RESPIRATION RATE: 16 BRPM | HEIGHT: 67 IN | BODY MASS INDEX: 29.87 KG/M2

## 2021-01-01 VITALS
HEART RATE: 76 BPM | DIASTOLIC BLOOD PRESSURE: 73 MMHG | SYSTOLIC BLOOD PRESSURE: 135 MMHG | TEMPERATURE: 97.1 F | RESPIRATION RATE: 18 BRPM | OXYGEN SATURATION: 96 %

## 2021-01-01 VITALS
DIASTOLIC BLOOD PRESSURE: 79 MMHG | OXYGEN SATURATION: 95 % | HEART RATE: 78 BPM | BODY MASS INDEX: 29.81 KG/M2 | HEIGHT: 67 IN | SYSTOLIC BLOOD PRESSURE: 125 MMHG

## 2021-01-01 VITALS
SYSTOLIC BLOOD PRESSURE: 145 MMHG | DIASTOLIC BLOOD PRESSURE: 76 MMHG | HEART RATE: 52 BPM | OXYGEN SATURATION: 100 % | WEIGHT: 216.05 LBS | RESPIRATION RATE: 24 BRPM | BODY MASS INDEX: 33.84 KG/M2

## 2021-01-01 DIAGNOSIS — G36.0 NEUROMYELITIS OPTICA (H): Primary | ICD-10-CM

## 2021-01-01 DIAGNOSIS — J96.01 ACUTE RESPIRATORY FAILURE WITH HYPOXIA (H): ICD-10-CM

## 2021-01-01 DIAGNOSIS — Z74.09 MOBILITY IMPAIRED: ICD-10-CM

## 2021-01-01 DIAGNOSIS — G36.0 NEUROMYELITIS OPTICA (DEVIC) (H): ICD-10-CM

## 2021-01-01 DIAGNOSIS — H54.8 LEGAL BLINDNESS: ICD-10-CM

## 2021-01-01 DIAGNOSIS — G37.3 TRANSVERSE MYELITIS (H): ICD-10-CM

## 2021-01-01 DIAGNOSIS — H53.40 VISUAL FIELD DEFECT: ICD-10-CM

## 2021-01-01 DIAGNOSIS — H47.20 OPTIC NERVE ATROPHY, BILATERAL: Primary | ICD-10-CM

## 2021-01-01 DIAGNOSIS — G36.0 NEUROMYELITIS OPTICA (DEVIC) (H): Primary | ICD-10-CM

## 2021-01-01 DIAGNOSIS — I48.0 PAROXYSMAL ATRIAL FIBRILLATION (H): ICD-10-CM

## 2021-01-01 DIAGNOSIS — Z79.52 LONG TERM (CURRENT) USE OF SYSTEMIC STEROIDS: ICD-10-CM

## 2021-01-01 DIAGNOSIS — L30.9 DERMATITIS: Primary | ICD-10-CM

## 2021-01-01 DIAGNOSIS — I50.22 CHRONIC SYSTOLIC HEART FAILURE (H): ICD-10-CM

## 2021-01-01 DIAGNOSIS — R21 RASH: Primary | ICD-10-CM

## 2021-01-01 DIAGNOSIS — N18.31 STAGE 3A CHRONIC KIDNEY DISEASE (H): ICD-10-CM

## 2021-01-01 DIAGNOSIS — R21 RASH AND NONSPECIFIC SKIN ERUPTION: ICD-10-CM

## 2021-01-01 DIAGNOSIS — L93.0 DISCOID LUPUS ERYTHEMATOSUS: ICD-10-CM

## 2021-01-01 DIAGNOSIS — L93.0 DISCOID LUPUS ERYTHEMATOSUS: Primary | ICD-10-CM

## 2021-01-01 DIAGNOSIS — G36.0 NEUROMYELITIS OPTICA (H): ICD-10-CM

## 2021-01-01 DIAGNOSIS — L93.0 DISCOID LUPUS: ICD-10-CM

## 2021-01-01 DIAGNOSIS — I26.99 PULMONARY EMBOLISM WITHOUT ACUTE COR PULMONALE, UNSPECIFIED CHRONICITY, UNSPECIFIED PULMONARY EMBOLISM TYPE (H): ICD-10-CM

## 2021-01-01 DIAGNOSIS — I63.9 CEREBROVASCULAR ACCIDENT (CVA), UNSPECIFIED MECHANISM (H): ICD-10-CM

## 2021-01-01 DIAGNOSIS — G35 MULTIPLE SCLEROSIS (H): ICD-10-CM

## 2021-01-01 DIAGNOSIS — L93.1 SUBACUTE CUTANEOUS LUPUS ERYTHEMATOSUS: ICD-10-CM

## 2021-01-01 DIAGNOSIS — L98.9 SKIN EROSION: ICD-10-CM

## 2021-01-01 DIAGNOSIS — L93.0 DISCOID LUPUS: Primary | ICD-10-CM

## 2021-01-01 DIAGNOSIS — H53.40 VISUAL FIELD DEFECT: Primary | ICD-10-CM

## 2021-01-01 LAB
ALBUMIN SERPL-MCNC: 3.4 G/DL (ref 3.4–5)
ALBUMIN SERPL-MCNC: 3.7 G/DL (ref 3.4–5)
ALP SERPL-CCNC: 64 U/L (ref 40–150)
ALP SERPL-CCNC: 84 U/L (ref 40–150)
ALT SERPL W P-5'-P-CCNC: 30 U/L (ref 0–50)
ALT SERPL W P-5'-P-CCNC: 50 U/L (ref 0–50)
ANA SER QL IF: NEGATIVE
ANION GAP SERPL CALCULATED.3IONS-SCNC: 3 MMOL/L (ref 3–14)
ANION GAP SERPL CALCULATED.3IONS-SCNC: 4 MMOL/L (ref 3–14)
ANION GAP SERPL CALCULATED.3IONS-SCNC: 8 MMOL/L (ref 3–14)
APTT PPP: 28 SEC (ref 22–37)
AQP4 H2O CHANNEL IGG TITR SERPL IF: DETECTED {TITER}
AST SERPL W P-5'-P-CCNC: 23 U/L (ref 0–45)
AST SERPL W P-5'-P-CCNC: 44 U/L (ref 0–45)
BASOPHILS # BLD AUTO: 0 10E9/L (ref 0–0.2)
BASOPHILS NFR BLD AUTO: 0.1 %
BASOPHILS NFR BLD AUTO: 0.2 %
BASOPHILS NFR BLD AUTO: 0.4 %
BILIRUB SERPL-MCNC: 0.5 MG/DL (ref 0.2–1.3)
BILIRUB SERPL-MCNC: 0.8 MG/DL (ref 0.2–1.3)
BUN SERPL-MCNC: 10 MG/DL (ref 7–30)
BUN SERPL-MCNC: 13 MG/DL (ref 7–30)
BUN SERPL-MCNC: 14 MG/DL (ref 7–30)
CALCIUM SERPL-MCNC: 8.5 MG/DL (ref 8.5–10.1)
CALCIUM SERPL-MCNC: 9.2 MG/DL (ref 8.5–10.1)
CALCIUM SERPL-MCNC: 9.3 MG/DL (ref 8.5–10.1)
CD19 CELLS # BLD: 111 CELLS/UL (ref 107–698)
CD19 CELLS NFR BLD: 10 % (ref 6–27)
CH50 SERPL-ACNC: 78.8 U/ML (ref 38.7–89.9)
CHLORIDE SERPL-SCNC: 103 MMOL/L (ref 94–109)
CHLORIDE SERPL-SCNC: 103 MMOL/L (ref 94–109)
CHLORIDE SERPL-SCNC: 105 MMOL/L (ref 94–109)
CO2 SERPL-SCNC: 29 MMOL/L (ref 20–32)
CO2 SERPL-SCNC: 32 MMOL/L (ref 20–32)
CO2 SERPL-SCNC: 32 MMOL/L (ref 20–32)
COPATH REPORT: NORMAL
CREAT SERPL-MCNC: 0.9 MG/DL (ref 0.52–1.04)
CREAT SERPL-MCNC: 0.93 MG/DL (ref 0.52–1.04)
CREAT SERPL-MCNC: 1.02 MG/DL (ref 0.52–1.04)
DEPRECATED CALCIDIOL+CALCIFEROL SERPL-MC: 38 UG/L (ref 20–75)
DEPRECATED CALCIDIOL+CALCIFEROL SERPL-MC: 38 UG/L (ref 20–75)
DIFFERENTIAL METHOD BLD: ABNORMAL
DSDNA AB SER-ACNC: <1 IU/ML
ENA RNP IGG SER IA-ACNC: <0.2 AI (ref 0–0.9)
ENA SM IGG SER-ACNC: <0.2 AI (ref 0–0.9)
ENA SS-A IGG SER IA-ACNC: >8 AI (ref 0–0.9)
ENA SS-B IGG SER IA-ACNC: >8 AI (ref 0–0.9)
EOSINOPHIL # BLD AUTO: 0 10E9/L (ref 0–0.7)
EOSINOPHIL # BLD AUTO: 0.1 10E9/L (ref 0–0.7)
EOSINOPHIL # BLD AUTO: 0.1 10E9/L (ref 0–0.7)
EOSINOPHIL NFR BLD AUTO: 0.3 %
EOSINOPHIL NFR BLD AUTO: 0.8 %
EOSINOPHIL NFR BLD AUTO: 1.8 %
ERYTHROCYTE [DISTWIDTH] IN BLOOD BY AUTOMATED COUNT: 14.6 % (ref 10–15)
ERYTHROCYTE [DISTWIDTH] IN BLOOD BY AUTOMATED COUNT: 16.1 % (ref 10–15)
ERYTHROCYTE [DISTWIDTH] IN BLOOD BY AUTOMATED COUNT: 16.7 % (ref 10–15)
GFR SERPL CREATININE-BSD FRML MDRD: 53 ML/MIN/{1.73_M2}
GFR SERPL CREATININE-BSD FRML MDRD: 59 ML/MIN/{1.73_M2}
GFR SERPL CREATININE-BSD FRML MDRD: 62 ML/MIN/{1.73_M2}
GLUCOSE SERPL-MCNC: 102 MG/DL (ref 70–99)
GLUCOSE SERPL-MCNC: 93 MG/DL (ref 70–99)
GLUCOSE SERPL-MCNC: 97 MG/DL (ref 70–99)
HCT VFR BLD AUTO: 39.5 % (ref 35–47)
HCT VFR BLD AUTO: 42.5 % (ref 35–47)
HCT VFR BLD AUTO: 43.2 % (ref 35–47)
HGB BLD-MCNC: 11.8 G/DL (ref 11.7–15.7)
HGB BLD-MCNC: 13.2 G/DL (ref 11.7–15.7)
HGB BLD-MCNC: 13.3 G/DL (ref 11.7–15.7)
HISTONE IGG SER IA-ACNC: 0.2 UNITS (ref 0–0.9)
IMM GRANULOCYTES # BLD: 0 10E9/L (ref 0–0.4)
IMM GRANULOCYTES NFR BLD: 0.1 %
IMM GRANULOCYTES NFR BLD: 0.2 %
IMM GRANULOCYTES NFR BLD: 0.2 %
INR PPP: 1.01 (ref 0.86–1.14)
LABORATORY COMMENT REPORT: NORMAL
LABORATORY COMMENT REPORT: NORMAL
LYMPHOCYTES # BLD AUTO: 0.9 10E9/L (ref 0.8–5.3)
LYMPHOCYTES # BLD AUTO: 1 10E9/L (ref 0.8–5.3)
LYMPHOCYTES # BLD AUTO: 1.9 10E9/L (ref 0.8–5.3)
LYMPHOCYTES NFR BLD AUTO: 13.1 %
LYMPHOCYTES NFR BLD AUTO: 22 %
LYMPHOCYTES NFR BLD AUTO: 22.2 %
Lab: ABNORMAL
MCH RBC QN AUTO: 26.8 PG (ref 26.5–33)
MCH RBC QN AUTO: 29 PG (ref 26.5–33)
MCH RBC QN AUTO: 29.5 PG (ref 26.5–33)
MCHC RBC AUTO-ENTMCNC: 29.9 G/DL (ref 31.5–36.5)
MCHC RBC AUTO-ENTMCNC: 30.6 G/DL (ref 31.5–36.5)
MCHC RBC AUTO-ENTMCNC: 31.3 G/DL (ref 31.5–36.5)
MCV RBC AUTO: 90 FL (ref 78–100)
MCV RBC AUTO: 94 FL (ref 78–100)
MCV RBC AUTO: 95 FL (ref 78–100)
MONOCYTES # BLD AUTO: 0.3 10E9/L (ref 0–1.3)
MONOCYTES # BLD AUTO: 0.4 10E9/L (ref 0–1.3)
MONOCYTES # BLD AUTO: 0.8 10E9/L (ref 0–1.3)
MONOCYTES NFR BLD AUTO: 4.9 %
MONOCYTES NFR BLD AUTO: 9 %
MONOCYTES NFR BLD AUTO: 9.4 %
NEUTROPHILS # BLD AUTO: 3 10E9/L (ref 1.6–8.3)
NEUTROPHILS # BLD AUTO: 5.6 10E9/L (ref 1.6–8.3)
NEUTROPHILS # BLD AUTO: 5.8 10E9/L (ref 1.6–8.3)
NEUTROPHILS NFR BLD AUTO: 66.4 %
NEUTROPHILS NFR BLD AUTO: 67.4 %
NEUTROPHILS NFR BLD AUTO: 81.5 %
NRBC # BLD AUTO: 0 10*3/UL
NRBC BLD AUTO-RTO: 0 /100
PLATELET # BLD AUTO: 227 10E9/L (ref 150–450)
PLATELET # BLD AUTO: 232 10E9/L (ref 150–450)
PLATELET # BLD AUTO: 254 10E9/L (ref 150–450)
POTASSIUM SERPL-SCNC: 3.6 MMOL/L (ref 3.4–5.3)
POTASSIUM SERPL-SCNC: 3.8 MMOL/L (ref 3.4–5.3)
POTASSIUM SERPL-SCNC: 4.5 MMOL/L (ref 3.4–5.3)
PROT SERPL-MCNC: 8.2 G/DL (ref 6.8–8.8)
PROT SERPL-MCNC: 8.4 G/DL (ref 6.8–8.8)
RBC # BLD AUTO: 4.41 10E12/L (ref 3.8–5.2)
RBC # BLD AUTO: 4.51 10E12/L (ref 3.8–5.2)
RBC # BLD AUTO: 4.55 10E12/L (ref 3.8–5.2)
SARS-COV-2 RNA RESP QL NAA+PROBE: NEGATIVE
SARS-COV-2 RNA SPEC QL NAA+PROBE: NEGATIVE
SARS-COV-2 RNA SPEC QL NAA+PROBE: NORMAL
SODIUM SERPL-SCNC: 138 MMOL/L (ref 133–144)
SODIUM SERPL-SCNC: 140 MMOL/L (ref 133–144)
SODIUM SERPL-SCNC: 141 MMOL/L (ref 133–144)
SPECIMEN SOURCE: NORMAL
WBC # BLD AUTO: 4.5 10E9/L (ref 4–11)
WBC # BLD AUTO: 6.9 10E9/L (ref 4–11)
WBC # BLD AUTO: 8.6 10E9/L (ref 4–11)

## 2021-01-01 PROCEDURE — 91301 PR COVID VAC MODERNA 100 MCG/0.5 ML IM: CPT

## 2021-01-01 PROCEDURE — 999N000185 HC STATISTIC TRANSPORT TIME EA 15 MIN

## 2021-01-01 PROCEDURE — 250N000009 HC RX 250: Performed by: HOSPITALIST

## 2021-01-01 PROCEDURE — 0011A PR COVID VAC MODERNA 100 MCG/0.5 ML IM: CPT

## 2021-01-01 PROCEDURE — 99215 OFFICE O/P EST HI 40 MIN: CPT | Mod: 95 | Performed by: PSYCHIATRY & NEUROLOGY

## 2021-01-01 PROCEDURE — 96365 THER/PROPH/DIAG IV INF INIT: CPT

## 2021-01-01 PROCEDURE — 99213 OFFICE O/P EST LOW 20 MIN: CPT | Mod: GC | Performed by: DERMATOLOGY

## 2021-01-01 PROCEDURE — 250N000009 HC RX 250: Performed by: EMERGENCY MEDICINE

## 2021-01-01 PROCEDURE — 258N000003 HC RX IP 258 OP 636: Performed by: HOSPITALIST

## 2021-01-01 PROCEDURE — 80048 BASIC METABOLIC PNL TOTAL CA: CPT | Performed by: EMERGENCY MEDICINE

## 2021-01-01 PROCEDURE — 99202 OFFICE O/P NEW SF 15 MIN: CPT | Mod: 25 | Performed by: DERMATOLOGY

## 2021-01-01 PROCEDURE — 85025 COMPLETE CBC W/AUTO DIFF WBC: CPT | Performed by: PATHOLOGY

## 2021-01-01 PROCEDURE — 86162 COMPLEMENT TOTAL (CH50): CPT | Performed by: PATHOLOGY

## 2021-01-01 PROCEDURE — 999N000157 HC STATISTIC RCP TIME EA 10 MIN

## 2021-01-01 PROCEDURE — 5A1935Z RESPIRATORY VENTILATION, LESS THAN 24 CONSECUTIVE HOURS: ICD-10-PCS | Performed by: EMERGENCY MEDICINE

## 2021-01-01 PROCEDURE — 86235 NUCLEAR ANTIGEN ANTIBODY: CPT | Performed by: DERMATOLOGY

## 2021-01-01 PROCEDURE — 88305 TISSUE EXAM BY PATHOLOGIST: CPT | Performed by: DERMATOLOGY

## 2021-01-01 PROCEDURE — 36415 COLL VENOUS BLD VENIPUNCTURE: CPT | Performed by: PATHOLOGY

## 2021-01-01 PROCEDURE — 70450 CT HEAD/BRAIN W/O DYE: CPT | Mod: MG

## 2021-01-01 PROCEDURE — C1769 GUIDE WIRE: HCPCS

## 2021-01-01 PROCEDURE — 99292 CRITICAL CARE ADDL 30 MIN: CPT

## 2021-01-01 PROCEDURE — 86355 B CELLS TOTAL COUNT: CPT | Performed by: PSYCHIATRY & NEUROLOGY

## 2021-01-01 PROCEDURE — 85730 THROMBOPLASTIN TIME PARTIAL: CPT | Performed by: EMERGENCY MEDICINE

## 2021-01-01 PROCEDURE — 99214 OFFICE O/P EST MOD 30 MIN: CPT | Performed by: PSYCHIATRY & NEUROLOGY

## 2021-01-01 PROCEDURE — 250N000011 HC RX IP 250 OP 636: Performed by: PSYCHIATRY & NEUROLOGY

## 2021-01-01 PROCEDURE — 96413 CHEMO IV INFUSION 1 HR: CPT

## 2021-01-01 PROCEDURE — C9803 HOPD COVID-19 SPEC COLLECT: HCPCS

## 2021-01-01 PROCEDURE — 96375 TX/PRO/DX INJ NEW DRUG ADDON: CPT

## 2021-01-01 PROCEDURE — 86038 ANTINUCLEAR ANTIBODIES: CPT | Performed by: DERMATOLOGY

## 2021-01-01 PROCEDURE — 31500 INSERT EMERGENCY AIRWAY: CPT

## 2021-01-01 PROCEDURE — 99238 HOSP IP/OBS DSCHRG MGMT 30/<: CPT | Performed by: NURSE PRACTITIONER

## 2021-01-01 PROCEDURE — G0463 HOSPITAL OUTPT CLINIC VISIT: HCPCS

## 2021-01-01 PROCEDURE — 94002 VENT MGMT INPAT INIT DAY: CPT

## 2021-01-01 PROCEDURE — U0005 INFEC AGEN DETEC AMPLI PROBE: HCPCS | Performed by: PSYCHIATRY & NEUROLOGY

## 2021-01-01 PROCEDURE — 85610 PROTHROMBIN TIME: CPT | Performed by: EMERGENCY MEDICINE

## 2021-01-01 PROCEDURE — 80053 COMPREHEN METABOLIC PANEL: CPT | Performed by: PATHOLOGY

## 2021-01-01 PROCEDURE — 82306 VITAMIN D 25 HYDROXY: CPT | Performed by: PSYCHIATRY & NEUROLOGY

## 2021-01-01 PROCEDURE — 99214 OFFICE O/P EST MOD 30 MIN: CPT | Mod: 95 | Performed by: PSYCHIATRY & NEUROLOGY

## 2021-01-01 PROCEDURE — 96366 THER/PROPH/DIAG IV INF ADDON: CPT

## 2021-01-01 PROCEDURE — 258N000003 HC RX IP 258 OP 636: Performed by: PSYCHIATRY & NEUROLOGY

## 2021-01-01 PROCEDURE — 85025 COMPLETE CBC W/AUTO DIFF WBC: CPT | Performed by: EMERGENCY MEDICINE

## 2021-01-01 PROCEDURE — 96415 CHEMO IV INFUSION ADDL HR: CPT

## 2021-01-01 PROCEDURE — 99222 1ST HOSP IP/OBS MODERATE 55: CPT | Mod: AI | Performed by: HOSPITALIST

## 2021-01-01 PROCEDURE — 99207 PR APP CREDIT; MD BILLING SHARED VISIT: CPT | Performed by: HOSPITALIST

## 2021-01-01 PROCEDURE — 120N000001 HC R&B MED SURG/OB

## 2021-01-01 PROCEDURE — 70496 CT ANGIOGRAPHY HEAD: CPT | Mod: MG

## 2021-01-01 PROCEDURE — 92133 CPTRZD OPH DX IMG PST SGM ON: CPT | Performed by: OPHTHALMOLOGY

## 2021-01-01 PROCEDURE — 82306 VITAMIN D 25 HYDROXY: CPT | Performed by: PATHOLOGY

## 2021-01-01 PROCEDURE — 86255 FLUORESCENT ANTIBODY SCREEN: CPT | Mod: 90 | Performed by: PATHOLOGY

## 2021-01-01 PROCEDURE — 11102 TANGNTL BX SKIN SINGLE LES: CPT | Mod: GC | Performed by: DERMATOLOGY

## 2021-01-01 PROCEDURE — 99214 OFFICE O/P EST MOD 30 MIN: CPT | Mod: GC | Performed by: DERMATOLOGY

## 2021-01-01 PROCEDURE — 0012A PR COVID VAC MODERNA 100 MCG/0.5 ML IM: CPT

## 2021-01-01 PROCEDURE — 99000 SPECIMEN HANDLING OFFICE-LAB: CPT | Performed by: PATHOLOGY

## 2021-01-01 PROCEDURE — 250N000011 HC RX IP 250 OP 636: Performed by: EMERGENCY MEDICINE

## 2021-01-01 PROCEDURE — 250N000011 HC RX IP 250 OP 636: Performed by: HOSPITALIST

## 2021-01-01 PROCEDURE — U0003 INFECTIOUS AGENT DETECTION BY NUCLEIC ACID (DNA OR RNA); SEVERE ACUTE RESPIRATORY SYNDROME CORONAVIRUS 2 (SARS-COV-2) (CORONAVIRUS DISEASE [COVID-19]), AMPLIFIED PROBE TECHNIQUE, MAKING USE OF HIGH THROUGHPUT TECHNOLOGIES AS DESCRIBED BY CMS-2020-01-R: HCPCS | Performed by: PSYCHIATRY & NEUROLOGY

## 2021-01-01 PROCEDURE — 92083 EXTENDED VISUAL FIELD XM: CPT | Performed by: OPHTHALMOLOGY

## 2021-01-01 PROCEDURE — 99291 CRITICAL CARE FIRST HOUR: CPT | Mod: 25

## 2021-01-01 PROCEDURE — 250N000011 HC RX IP 250 OP 636

## 2021-01-01 PROCEDURE — 92014 COMPRE OPH EXAM EST PT 1/>: CPT | Mod: GC | Performed by: OPHTHALMOLOGY

## 2021-01-01 PROCEDURE — 86225 DNA ANTIBODY NATIVE: CPT | Performed by: DERMATOLOGY

## 2021-01-01 PROCEDURE — 87635 SARS-COV-2 COVID-19 AMP PRB: CPT | Performed by: EMERGENCY MEDICINE

## 2021-01-01 PROCEDURE — 96361 HYDRATE IV INFUSION ADD-ON: CPT

## 2021-01-01 PROCEDURE — 99417 PROLNG OP E/M EACH 15 MIN: CPT | Mod: 95 | Performed by: PSYCHIATRY & NEUROLOGY

## 2021-01-01 RX ORDER — MYCOPHENOLATE MOFETIL 250 MG/1
CAPSULE ORAL
Qty: 120 CAPSULE | Refills: 3 | Status: SHIPPED | OUTPATIENT
Start: 2021-01-01

## 2021-01-01 RX ORDER — CETIRIZINE HYDROCHLORIDE 10 MG/1
10 TABLET ORAL
COMMUNITY
Start: 2021-01-01

## 2021-01-01 RX ORDER — PROCHLORPERAZINE MALEATE 5 MG
5 TABLET ORAL EVERY 6 HOURS PRN
Status: DISCONTINUED | OUTPATIENT
Start: 2021-01-01 | End: 2021-01-01 | Stop reason: HOSPADM

## 2021-01-01 RX ORDER — PROCHLORPERAZINE 25 MG
12.5 SUPPOSITORY, RECTAL RECTAL EVERY 12 HOURS PRN
Status: DISCONTINUED | OUTPATIENT
Start: 2021-01-01 | End: 2021-01-01 | Stop reason: HOSPADM

## 2021-01-01 RX ORDER — TRIAMCINOLONE ACETONIDE 1 MG/G
CREAM TOPICAL 2 TIMES DAILY
Qty: 80 G | Refills: 3 | Status: SHIPPED | OUTPATIENT
Start: 2021-01-01

## 2021-01-01 RX ORDER — HYDROXYZINE HYDROCHLORIDE 25 MG/1
25 TABLET, FILM COATED ORAL DAILY PRN
COMMUNITY

## 2021-01-01 RX ORDER — SODIUM CHLORIDE 9 MG/ML
INJECTION, SOLUTION INTRAVENOUS CONTINUOUS
Status: DISCONTINUED | OUTPATIENT
Start: 2021-01-01 | End: 2021-01-01 | Stop reason: HOSPADM

## 2021-01-01 RX ORDER — BISACODYL 10 MG
10 SUPPOSITORY, RECTAL RECTAL
Status: DISCONTINUED | OUTPATIENT
Start: 2021-06-24 | End: 2021-01-01 | Stop reason: HOSPADM

## 2021-01-01 RX ORDER — TRIAMCINOLONE ACETONIDE 1 MG/G
OINTMENT TOPICAL 2 TIMES DAILY
Qty: 453 G | Refills: 5 | Status: SHIPPED | OUTPATIENT
Start: 2021-01-01 | End: 2021-01-01 | Stop reason: ALTCHOICE

## 2021-01-01 RX ORDER — LORAZEPAM 2 MG/ML
1 INJECTION INTRAMUSCULAR
Status: DISCONTINUED | OUTPATIENT
Start: 2021-01-01 | End: 2021-01-01 | Stop reason: HOSPADM

## 2021-01-01 RX ORDER — HEPARIN SODIUM,PORCINE 10 UNIT/ML
5 VIAL (ML) INTRAVENOUS
Status: CANCELLED | OUTPATIENT
Start: 2021-01-01

## 2021-01-01 RX ORDER — HEPARIN SODIUM (PORCINE) LOCK FLUSH IV SOLN 100 UNIT/ML 100 UNIT/ML
5 SOLUTION INTRAVENOUS
Status: CANCELLED | OUTPATIENT
Start: 2021-01-01

## 2021-01-01 RX ORDER — MORPHINE SULFATE 4 MG/ML
4 INJECTION, SOLUTION INTRAMUSCULAR; INTRAVENOUS ONCE
Status: COMPLETED | OUTPATIENT
Start: 2021-01-01 | End: 2021-01-01

## 2021-01-01 RX ORDER — GADOBUTROL 604.72 MG/ML
10 INJECTION INTRAVENOUS ONCE
Status: COMPLETED | OUTPATIENT
Start: 2021-01-01 | End: 2021-01-01

## 2021-01-01 RX ORDER — PREDNISONE 20 MG/1
20 TABLET ORAL DAILY
Qty: 50 TABLET | Refills: 1 | Status: SHIPPED | OUTPATIENT
Start: 2021-01-01 | End: 2021-01-01 | Stop reason: ALTCHOICE

## 2021-01-01 RX ORDER — GLYCOPYRROLATE 0.2 MG/ML
0.1 INJECTION, SOLUTION INTRAMUSCULAR; INTRAVENOUS EVERY 4 HOURS PRN
Status: DISCONTINUED | OUTPATIENT
Start: 2021-01-01 | End: 2021-01-01 | Stop reason: HOSPADM

## 2021-01-01 RX ORDER — CLOBETASOL PROPIONATE 0.5 MG/G
CREAM TOPICAL
COMMUNITY
Start: 2021-01-01

## 2021-01-01 RX ORDER — PREDNISONE 10 MG/1
10 TABLET ORAL DAILY
Qty: 30 TABLET | Refills: 3 | Status: SHIPPED | OUTPATIENT
Start: 2021-01-01

## 2021-01-01 RX ORDER — CARBOXYMETHYLCELLULOSE SODIUM 5 MG/ML
1-2 SOLUTION/ DROPS OPHTHALMIC EVERY 8 HOURS PRN
Status: DISCONTINUED | OUTPATIENT
Start: 2021-01-01 | End: 2021-01-01 | Stop reason: HOSPADM

## 2021-01-01 RX ORDER — IOPAMIDOL 755 MG/ML
120 INJECTION, SOLUTION INTRAVASCULAR ONCE
Status: COMPLETED | OUTPATIENT
Start: 2021-01-01 | End: 2021-01-01

## 2021-01-01 RX ORDER — LIDOCAINE HYDROCHLORIDE AND EPINEPHRINE 10; 10 MG/ML; UG/ML
3 INJECTION, SOLUTION INFILTRATION; PERINEURAL ONCE
Status: DISCONTINUED | OUTPATIENT
Start: 2021-01-01 | End: 2021-01-01

## 2021-01-01 RX ORDER — FLUOCINONIDE 0.5 MG/G
CREAM TOPICAL
COMMUNITY
Start: 2021-01-01

## 2021-01-01 RX ORDER — MINERAL OIL/HYDROPHIL PETROLAT
OINTMENT (GRAM) TOPICAL EVERY 8 HOURS PRN
Status: DISCONTINUED | OUTPATIENT
Start: 2021-01-01 | End: 2021-01-01 | Stop reason: HOSPADM

## 2021-01-01 RX ORDER — HYDROMORPHONE HYDROCHLORIDE 1 MG/ML
1-2 SOLUTION ORAL
Status: DISCONTINUED | OUTPATIENT
Start: 2021-01-01 | End: 2021-01-01 | Stop reason: HOSPADM

## 2021-01-01 RX ORDER — HYDROMORPHONE HYDROCHLORIDE 1 MG/ML
.3-.5 INJECTION, SOLUTION INTRAMUSCULAR; INTRAVENOUS; SUBCUTANEOUS
Status: DISCONTINUED | OUTPATIENT
Start: 2021-01-01 | End: 2021-01-01 | Stop reason: HOSPADM

## 2021-01-01 RX ORDER — ONDANSETRON 2 MG/ML
4 INJECTION INTRAMUSCULAR; INTRAVENOUS
Status: CANCELLED | OUTPATIENT
Start: 2021-01-01

## 2021-01-01 RX ORDER — PROPOFOL 10 MG/ML
5-75 INJECTION, EMULSION INTRAVENOUS CONTINUOUS
Status: DISCONTINUED | OUTPATIENT
Start: 2021-01-01 | End: 2021-01-01

## 2021-01-01 RX ORDER — ATROPINE SULFATE 10 MG/ML
1-2 SOLUTION/ DROPS OPHTHALMIC
Status: DISCONTINUED | OUTPATIENT
Start: 2021-01-01 | End: 2021-01-01 | Stop reason: ALTCHOICE

## 2021-01-01 RX ORDER — SALIVA STIMULANT COMB. NO.3
2 SPRAY, NON-AEROSOL (ML) MUCOUS MEMBRANE
Status: DISCONTINUED | OUTPATIENT
Start: 2021-01-01 | End: 2021-01-01 | Stop reason: HOSPADM

## 2021-01-01 RX ORDER — ONDANSETRON 4 MG/1
4 TABLET, ORALLY DISINTEGRATING ORAL EVERY 6 HOURS PRN
Status: DISCONTINUED | OUTPATIENT
Start: 2021-01-01 | End: 2021-01-01 | Stop reason: HOSPADM

## 2021-01-01 RX ORDER — HALOPERIDOL 5 MG/ML
.5-1 INJECTION INTRAMUSCULAR
Status: DISCONTINUED | OUTPATIENT
Start: 2021-01-01 | End: 2021-01-01 | Stop reason: HOSPADM

## 2021-01-01 RX ORDER — NALOXONE HYDROCHLORIDE 0.4 MG/ML
0.4 INJECTION, SOLUTION INTRAMUSCULAR; INTRAVENOUS; SUBCUTANEOUS
Status: DISCONTINUED | OUTPATIENT
Start: 2021-01-01 | End: 2021-01-01

## 2021-01-01 RX ORDER — SCOLOPAMINE TRANSDERMAL SYSTEM 1 MG/1
1 PATCH, EXTENDED RELEASE TRANSDERMAL
Status: DISCONTINUED | OUTPATIENT
Start: 2021-01-01 | End: 2021-01-01 | Stop reason: HOSPADM

## 2021-01-01 RX ORDER — ACETAMINOPHEN 650 MG/1
650 SUPPOSITORY RECTAL EVERY 6 HOURS PRN
Status: DISCONTINUED | OUTPATIENT
Start: 2021-01-01 | End: 2021-01-01 | Stop reason: HOSPADM

## 2021-01-01 RX ORDER — MYCOPHENOLATE MOFETIL 250 MG/1
CAPSULE ORAL
Qty: 120 CAPSULE | OUTPATIENT
Start: 2021-01-01

## 2021-01-01 RX ORDER — FENTANYL CITRATE 50 UG/ML
25-50 INJECTION, SOLUTION INTRAMUSCULAR; INTRAVENOUS EVERY 5 MIN PRN
Status: DISCONTINUED | OUTPATIENT
Start: 2021-01-01 | End: 2021-01-01

## 2021-01-01 RX ORDER — LORAZEPAM 0.5 MG/1
1 TABLET ORAL
Status: DISCONTINUED | OUTPATIENT
Start: 2021-01-01 | End: 2021-01-01 | Stop reason: HOSPADM

## 2021-01-01 RX ORDER — NALOXONE HYDROCHLORIDE 0.4 MG/ML
.1-.4 INJECTION, SOLUTION INTRAMUSCULAR; INTRAVENOUS; SUBCUTANEOUS
Status: DISCONTINUED | OUTPATIENT
Start: 2021-01-01 | End: 2021-01-01 | Stop reason: HOSPADM

## 2021-01-01 RX ORDER — MYCOPHENOLATE MOFETIL 250 MG/1
250 CAPSULE ORAL 2 TIMES DAILY
Qty: 60 CAPSULE | Refills: 1 | Status: SHIPPED | OUTPATIENT
Start: 2021-01-01 | End: 2021-01-01

## 2021-01-01 RX ORDER — NALOXONE HYDROCHLORIDE 0.4 MG/ML
0.2 INJECTION, SOLUTION INTRAMUSCULAR; INTRAVENOUS; SUBCUTANEOUS
Status: DISCONTINUED | OUTPATIENT
Start: 2021-01-01 | End: 2021-01-01

## 2021-01-01 RX ORDER — ONDANSETRON 2 MG/ML
4 INJECTION INTRAMUSCULAR; INTRAVENOUS EVERY 6 HOURS PRN
Status: DISCONTINUED | OUTPATIENT
Start: 2021-01-01 | End: 2021-01-01 | Stop reason: HOSPADM

## 2021-01-01 RX ORDER — METOPROLOL TARTRATE 25 MG/1
25 TABLET, FILM COATED ORAL 2 TIMES DAILY
COMMUNITY
Start: 2021-01-01

## 2021-01-01 RX ORDER — FLUMAZENIL 0.1 MG/ML
0.2 INJECTION, SOLUTION INTRAVENOUS
Status: DISCONTINUED | OUTPATIENT
Start: 2021-01-01 | End: 2021-01-01

## 2021-01-01 RX ORDER — GLYCOPYRROLATE 0.2 MG/ML
0.2 INJECTION, SOLUTION INTRAMUSCULAR; INTRAVENOUS ONCE
Status: COMPLETED | OUTPATIENT
Start: 2021-01-01 | End: 2021-01-01

## 2021-01-01 RX ORDER — HYDROCORTISONE 25 MG/G
OINTMENT TOPICAL 2 TIMES DAILY
Qty: 30 G | Refills: 3 | Status: SHIPPED | OUTPATIENT
Start: 2021-01-01

## 2021-01-01 RX ORDER — MORPHINE SULFATE 4 MG/ML
INJECTION, SOLUTION INTRAMUSCULAR; INTRAVENOUS
Status: COMPLETED
Start: 2021-01-01 | End: 2021-01-01

## 2021-01-01 RX ORDER — HEPARIN SODIUM 200 [USP'U]/100ML
1 INJECTION, SOLUTION INTRAVENOUS CONTINUOUS PRN
Status: DISCONTINUED | OUTPATIENT
Start: 2021-01-01 | End: 2021-01-01

## 2021-01-01 RX ORDER — MYCOPHENOLATE MOFETIL 250 MG/1
CAPSULE ORAL
Qty: 120 CAPSULE | Refills: 3 | Status: SHIPPED | OUTPATIENT
Start: 2021-01-01 | End: 2021-01-01

## 2021-01-01 RX ORDER — ETOMIDATE 2 MG/ML
30 INJECTION INTRAVENOUS ONCE
Status: COMPLETED | OUTPATIENT
Start: 2021-01-01 | End: 2021-01-01

## 2021-01-01 RX ORDER — LIDOCAINE 40 MG/G
CREAM TOPICAL
Status: DISCONTINUED | OUTPATIENT
Start: 2021-01-01 | End: 2021-01-01 | Stop reason: HOSPADM

## 2021-01-01 RX ADMIN — GLYCOPYRROLATE 0.2 MG: 0.2 INJECTION, SOLUTION INTRAMUSCULAR; INTRAVENOUS at 01:30

## 2021-01-01 RX ADMIN — GLYCOPYRROLATE 0.1 MG: 0.2 INJECTION, SOLUTION INTRAMUSCULAR; INTRAVENOUS at 02:22

## 2021-01-01 RX ADMIN — IOPAMIDOL 120 ML: 755 INJECTION, SOLUTION INTRAVENOUS at 22:44

## 2021-01-01 RX ADMIN — ETOMIDATE 30 MG: 2 INJECTION INTRAVENOUS at 22:30

## 2021-01-01 RX ADMIN — LORAZEPAM 1 MG: 2 INJECTION INTRAMUSCULAR; INTRAVENOUS at 02:52

## 2021-01-01 RX ADMIN — PROPOFOL 20 MCG/KG/MIN: 10 INJECTION, EMULSION INTRAVENOUS at 22:35

## 2021-01-01 RX ADMIN — SODIUM CHLORIDE 250 ML: 9 INJECTION, SOLUTION INTRAVENOUS at 13:37

## 2021-01-01 RX ADMIN — GLYCOPYRROLATE 0.1 MG: 0.2 INJECTION, SOLUTION INTRAMUSCULAR; INTRAVENOUS at 06:18

## 2021-01-01 RX ADMIN — HYDROMORPHONE HYDROCHLORIDE 0.5 MG: 1 INJECTION, SOLUTION INTRAMUSCULAR; INTRAVENOUS; SUBCUTANEOUS at 03:33

## 2021-01-01 RX ADMIN — ATROPINE SULFATE 2 DROP: 10 SOLUTION OPHTHALMIC at 09:34

## 2021-01-01 RX ADMIN — ECULIZUMAB 900 MG: 300 INJECTION, SOLUTION, CONCENTRATE INTRAVENOUS at 13:37

## 2021-01-01 RX ADMIN — HYDROMORPHONE HYDROCHLORIDE 0.5 MG: 1 INJECTION, SOLUTION INTRAMUSCULAR; INTRAVENOUS; SUBCUTANEOUS at 02:30

## 2021-01-01 RX ADMIN — GLYCOPYRROLATE 0.1 MG: 0.2 INJECTION, SOLUTION INTRAMUSCULAR; INTRAVENOUS at 18:03

## 2021-01-01 RX ADMIN — GADOBUTROL 9 ML: 604.72 INJECTION INTRAVENOUS at 09:40

## 2021-01-01 RX ADMIN — HYDROMORPHONE HYDROCHLORIDE 0.5 MG: 1 INJECTION, SOLUTION INTRAMUSCULAR; INTRAVENOUS; SUBCUTANEOUS at 10:24

## 2021-01-01 RX ADMIN — GLYCOPYRROLATE 0.1 MG: 0.2 INJECTION, SOLUTION INTRAMUSCULAR; INTRAVENOUS at 14:03

## 2021-01-01 RX ADMIN — Medication 100 MG: at 22:30

## 2021-01-01 RX ADMIN — HYDROMORPHONE HYDROCHLORIDE 0.5 MG: 1 INJECTION, SOLUTION INTRAMUSCULAR; INTRAVENOUS; SUBCUTANEOUS at 18:09

## 2021-01-01 RX ADMIN — ECULIZUMAB 900 MG: 300 INJECTION, SOLUTION, CONCENTRATE INTRAVENOUS at 14:31

## 2021-01-01 RX ADMIN — MORPHINE SULFATE 4 MG: 4 INJECTION, SOLUTION INTRAMUSCULAR; INTRAVENOUS at 01:28

## 2021-01-01 RX ADMIN — GLYCOPYRROLATE 0.1 MG: 0.2 INJECTION, SOLUTION INTRAMUSCULAR; INTRAVENOUS at 21:59

## 2021-01-01 RX ADMIN — SODIUM CHLORIDE 250 ML: 9 INJECTION, SOLUTION INTRAVENOUS at 14:31

## 2021-01-01 RX ADMIN — SCOPOLAMINE 1 PATCH: 1 PATCH TRANSDERMAL at 11:29

## 2021-01-01 RX ADMIN — SODIUM CHLORIDE 100 ML: 9 INJECTION, SOLUTION INTRAVENOUS at 22:45

## 2021-01-01 RX ADMIN — SODIUM CHLORIDE: 9 INJECTION, SOLUTION INTRAVENOUS at 02:52

## 2021-01-01 RX ADMIN — LORAZEPAM 1 MG: 2 INJECTION INTRAMUSCULAR; INTRAVENOUS at 11:26

## 2021-01-01 RX ADMIN — SODIUM CHLORIDE: 9 INJECTION, SOLUTION INTRAVENOUS at 02:33

## 2021-01-01 ASSESSMENT — REFRACTION_WEARINGRX
OD_AXIS: 044
OD_ADD: +2.25
OS_CYLINDER: +2.00
OD_CYLINDER: +0.50
OD_SPHERE: +0.25
OS_AXIS: 135
OS_ADD: +2.25
OS_SPHERE: -0.25

## 2021-01-01 ASSESSMENT — PAIN SCALES - GENERAL
PAINLEVEL: NO PAIN (0)

## 2021-01-01 ASSESSMENT — SLIT LAMP EXAM - LIDS
COMMENTS: MGD
COMMENTS: MGD

## 2021-01-01 ASSESSMENT — CONF VISUAL FIELD
OS_INFERIOR_TEMPORAL_RESTRICTION: 1
OS_SUPERIOR_TEMPORAL_RESTRICTION: 3
OD_INFERIOR_NASAL_RESTRICTION: 1
OS_INFERIOR_NASAL_RESTRICTION: 3
OD_SUPERIOR_TEMPORAL_RESTRICTION: 1
OD_SUPERIOR_NASAL_RESTRICTION: 1
OS_SUPERIOR_NASAL_RESTRICTION: 3
OD_INFERIOR_TEMPORAL_RESTRICTION: 1

## 2021-01-01 ASSESSMENT — ACTIVITIES OF DAILY LIVING (ADL)
DEPENDENT_IADLS:: MEDICATION MANAGEMENT
ADLS_ACUITY_SCORE: 15

## 2021-01-01 ASSESSMENT — TONOMETRY
IOP_METHOD: ICARE
OS_IOP_MMHG: 11
OD_IOP_MMHG: 11

## 2021-01-01 ASSESSMENT — CUP TO DISC RATIO
OD_RATIO: 0.8
OS_RATIO: 0.7

## 2021-01-01 ASSESSMENT — VISUAL ACUITY
OS_CC: 20/200
METHOD: SNELLEN - LINEAR
OD_CC: NLP

## 2021-01-01 ASSESSMENT — EXTERNAL EXAM - LEFT EYE: OS_EXAM: NORMAL

## 2021-01-01 ASSESSMENT — MIFFLIN-ST. JEOR: SCORE: 1390.83

## 2021-01-04 NOTE — PROGRESS NOTES
Infusion Nursing Note:  Ladonna Sheriff presents today for Soliris.    Patient seen by provider today: No   present during visit today: Not Applicable.    Note: Date of last know COVID-19 test was 12/28/2020  Patient met parameters for COVID testing today: Yes  Patient received COVID-19 testing today: Yes   Asymptomatic    Sample obtained from nare: right side  Patient tolerated testing without complication: Yes.    Note: Loose stools x2 during today's visit. Will monitor and contact her physician if this continues.    Intravenous Access:  Peripheral IV placed.    Treatment Conditions:  Biological Infusion Checklist:  ~~~ NOTE: If the patient answers yes to any of the questions below, hold the infusion and contact ordering provider or on-call provider.    1. Have you recently had an elevated temperature, fever, chills, productive cough, coughing for 3 weeks or longer or hemoptysis, abnormal vital signs, night sweats,  chest pain or have you noticed a decrease in your appetite, unexplained weight loss or fatigue? No  2. Do you have any open wounds or new incisions? No  3. Do you have any recent or upcoming hospitalizations, surgeries or dental procedures? No  4. Do you currently have or recently have had any signs of illness or infection or are you on any antibiotics? No  5. Have you had any new, sudden or worsening abdominal pain? No  6. Have you or anyone in your household received a live vaccination in the past 4 weeks? Please note:  No live vaccines while on biologic/chemotherapy until 6 months after the last treatment.  Patient can receive the flu vaccine (shot only) and the pneumovax.  It is optimal for the patient to get these vaccines mid cycle, but they can be given at any time as long as it is not on the day of the infusion. No  7. Have you recently been diagnosed with any new nervous system diseases (ie. Multiple sclerosis, Guillain [place], seizures, neurological changes) or cancer diagnosis?  No  8. Are you on any form of radiation or chemotherapy? No  9. Are you pregnant or breast feeding or do you have plans of pregnancy in the future? No  10. Have you been having any signs of worsening depression or suicidal ideations?  (benlysta only) No  11. Have there been any other new onset medical symptoms? No    Post Infusion Assessment:  Patient tolerated infusion without incident.  Blood return noted pre and post infusion.  Site patent and intact, free from redness, edema or discomfort.  No evidence of extravasations.  Access discontinued per protocol.  Biologic Infusion Post Education: Call the triage nurse at your clinic or seek medical attention if you have chills and/or temperature greater than or equal to 100.5, uncontrolled nausea/vomiting, diarrhea, constipation, dizziness, shortness of breath, chest pain, heart palpitations, weakness or any other new or concerning symptoms, questions or concerns.  You cannot have any live virus vaccines prior to or during treatment or up to 6 months post infusion.  If you have an upcoming surgery, medical procedure or dental procedure during treatment, this should be discussed with your ordering physician and your surgeon/dentist.  If you are having any concerning symptom, if you are unsure if you should get your next infusion or wish to speak to a provider before your next infusion, please call your care coordinator or triage nurse at your clinic to notify them so we can adequately serve you.     Discharge Plan:   Discharge instructions reviewed with: Patient.  Patient and/or family verbalized understanding of discharge instructions and all questions answered.  AVS to patient via [employer].  Patient will return 1/11 for next appointment.   Patient discharged in stable condition accompanied by: self, son will drive her home.  Departure Mode: Wheelchair.    Karmen Alford RN

## 2021-01-11 NOTE — PROGRESS NOTES
Infusion Nursing Note:  Ladonna Sheriff presents today for Solaris.    Patient seen by provider today: No   present during visit today: Not Applicable.    Note: Patient has pea size red bumps that are itchy on her BUE, BLE, and back. Some are crusted over.  She reports they have been present for approximately one month.  Message sent to Dr Franklin to see if this is related to her Solaris infusions.  If it is not, then patient will be directed to contact a dermatologist.    Intravenous Access:  Peripheral IV placed.    Treatment Conditions:  Biological Infusion Checklist:  ~~~ NOTE: If the patient answers yes to any of the questions below, hold the infusion and contact ordering provider or on-call provider.    1. Have you recently had an elevated temperature, fever, chills, productive cough, coughing for 3 weeks or longer or hemoptysis, abnormal vital signs, night sweats,  chest pain or have you noticed a decrease in your appetite, unexplained weight loss or fatigue? No  2. Do you have any open wounds or new incisions? No  3. Do you have any recent or upcoming hospitalizations, surgeries or dental procedures? No  4. Do you currently have or recently have had any signs of illness or infection or are you on any antibiotics? No  5. Have you had any new, sudden or worsening abdominal pain? No  6. Have you or anyone in your household received a live vaccination in the past 4 weeks? Please note:  No live vaccines while on biologic/chemotherapy until 6 months after the last treatment.  Patient can receive the flu vaccine (shot only) and the pneumovax.  It is optimal for the patient to get these vaccines mid cycle, but they can be given at any time as long as it is not on the day of the infusion. No  7. Have you recently been diagnosed with any new nervous system diseases (ie. Multiple sclerosis, Guillain [place], seizures, neurological changes) or cancer diagnosis? No  8. Are you on any form of radiation or  chemotherapy? No  9. Are you pregnant or breast feeding or do you have plans of pregnancy in the future? No  10. Have you been having any signs of worsening depression or suicidal ideations?  (benlysta only) No  11. Have there been any other new onset medical symptoms? No   12.   Post Infusion Assessment:  Patient tolerated infusion without incident.  Patient observed for 60 minutes post Solaris per protocol.  Site patent and intact, free from redness, edema or discomfort.  No evidence of extravasations.  Access discontinued per protocol.       Discharge Plan:   AVS to patient via MYCHART.  Patient will return 1/18 for next appointment.   Patient discharged in stable condition accompanied by: self.  Departure Mode: Ambulatory.    Yudith Chaney RN

## 2021-01-14 NOTE — PROGRESS NOTES
HISTORY OF ILLNESS:    This is a follow visit for this 75 year old right handed genetic female  With a history of NMO. Who was last seen on 12-3-2020.     At that time the patient was recommended to:     Schedule Soliris infusion for next week with follow-up visit via telephone next Thursday to monitor response to infusion.    Since last visit she had her first Soliris infusion on 12-21, no symptoms and side effects. She had a good [place] and was doing well until 10 days ago when she developed a rash, with pustules, with crusting and no scabs, very itching. It is mostly on legs and arms not on the torso but not getting better. She tried benadryl cream and an antihistamine q 12. New lesions are showing up. No other symptoms.     She is walking with a walker and her son thinks she is walking better, her vision is very poor.     Current Symptoms:  1. Rash  2. Vision loss  3. Difficulty walking  4. Bladder incontinence      Current Outpatient Prescriptions:  Current Outpatient Medications   Medication     acetaminophen (TYLENOL) 325 MG tablet     albuterol (PROAIR HFA/PROVENTIL HFA/VENTOLIN HFA) 108 (90 Base) MCG/ACT inhaler     amLODIPine (NORVASC) 5 MG tablet     apixaban ANTICOAGULANT (ELIQUIS) 5 MG tablet     B Complex-C (VITAMIN B COMPLEX W/VITAMIN C) TABS tablet     Calcium-Vitamin D 600-200 MG-UNIT TABS     FLUoxetine (PROZAC) 40 MG capsule     furosemide (LASIX) 20 MG tablet     metoprolol succinate ER (TOPROL-XL) 25 MG 24 hr tablet     Multiple Vitamins-Minerals (MULTIVITAL PO)     potassium chloride ER (KLOR-CON M) 20 MEQ CR tablet     simvastatin (ZOCOR) 20 MG tablet     traZODone (DESYREL) 100 MG tablet     trolamine salicylate (ASPERCREME) 10 % external cream     umeclidinium (INCRUSE ELLIPTA) 62.5 MCG/INH inhaler     vitamin C (ASCORBIC ACID) 500 MG tablet     No current facility-administered medications for this visit.           ALLERGIES       Allergies   Allergen Reactions     Prevacid  [Lansoprazole] Rash     Pravastatin Rash     Patient is still not sure about it due many years ago for reaction.           REVIEW OF SYSTEMS:    Comprehensive review of systems otherwise was negative, including constitutional, head and neck, cardiovascular, pulmonary, gastrointestinal, endocrine, urologic, reproductive, rheumatic, hematologic, immunologic, dermatologic, and psychiatric.    Nutritional concerns: None  Driving issues: None   Safety concerns regarding living situations and safety at home: None  Risk of falls: None  Pain: None    ASSESSMENT:    Aggressive neuromyelitis optica, status post first infusion of Soliris (Eculizumab) on December 21.  Currently experiencing severe rash that is not improving after 10 days of an antihistaminic and Benadryl cream.  No other issues or concerns aside from the already established visual disability and walking difficulty.    PLAN:    The patient will be referred for an urgent dermatological evaluation, rash is no side effect of Soliris, I will defer the management to the experts, in the meantime the patient has been advised to consider eating using Aveeno solution.    Finally I will follow the patient up in 1 week(s) via telephone call as long as the patient is doing well. I instructed the patient to call or mychart my office with any concerns or questions.    I spent 30 minutes in this visit, with >50% direct patient time spent counseling about prognosis, treatment options, and coordination of care.  We also attempted to do Doximity video with the patient's vision is poor to see the telephone icons in clinic on the link.  She has been advised to have her son with her during the dermatology visit if he must be done via video.    My recommendations will be communicated back to the patient's physician(s) by mail.  Follow-up is expected to be with me.      Caterina Franklin MD  Chief, Multiple Sclerosis Division  Department of Neurology  [facility]

## 2021-01-14 NOTE — PROGRESS NOTES
Ladonna is a 75 year old who is being evaluated via a billable telephone visit.      What phone number would you like to be contacted at?   How would you like to obtain your AVS? Elancelisa  Phone call duration: 30 minutes

## 2021-01-14 NOTE — LETTER
1/14/2021       RE: Ladonna Sheriff  [id number]  [place] [facility] [address]     Dear Colleague,    Thank you for referring your patient, Ladonna Sheriff, to the [facility] MULTIPLE SCLEROSIS CLINIC [place] at [facility]. Please see a copy of my visit note below.         HISTORY OF ILLNESS:    This is a follow visit for this 75 year old right handed genetic female  With a history of NMO. Who was last seen on 12-3-2020.     At that time the patient was recommended to:     Schedule Soliris infusion for next week with follow-up visit via telephone next Thursday to monitor response to infusion.    Since last visit she had her first Soliris infusion on 12-21, no symptoms and side effects. She had a good [place] and was doing well until 10 days ago when she developed a rash, with pustules, with crusting and no scabs, very itching. It is mostly on legs and arms not on the torso but not getting better. She tried benadryl cream and an antihistamine q 12. New lesions are showing up. No other symptoms.     She is walking with a walker and her son thinks she is walking better, her vision is very poor.     Current Symptoms:  1. Rash  2. Vision loss  3. Difficulty walking  4. Bladder incontinence      Current Outpatient Prescriptions:  Current Outpatient Medications   Medication     acetaminophen (TYLENOL) 325 MG tablet     albuterol (PROAIR HFA/PROVENTIL HFA/VENTOLIN HFA) 108 (90 Base) MCG/ACT inhaler     amLODIPine (NORVASC) 5 MG tablet     apixaban ANTICOAGULANT (ELIQUIS) 5 MG tablet     B Complex-C (VITAMIN B COMPLEX W/VITAMIN C) TABS tablet     Calcium-Vitamin D 600-200 MG-UNIT TABS     FLUoxetine (PROZAC) 40 MG capsule     furosemide (LASIX) 20 MG tablet     metoprolol succinate ER (TOPROL-XL) 25 MG 24 hr tablet     Multiple Vitamins-Minerals (MULTIVITAL PO)     potassium chloride ER (KLOR-CON M) 20 MEQ CR tablet     simvastatin (ZOCOR) 20 MG tablet     traZODone  (DESYREL) 100 MG tablet     trolamine salicylate (ASPERCREME) 10 % external cream     umeclidinium (INCRUSE ELLIPTA) 62.5 MCG/INH inhaler     vitamin C (ASCORBIC ACID) 500 MG tablet     No current facility-administered medications for this visit.           ALLERGIES       Allergies   Allergen Reactions     Prevacid [Lansoprazole] Rash     Pravastatin Rash     Patient is still not sure about it due many years ago for reaction.           REVIEW OF SYSTEMS:    Comprehensive review of systems otherwise was negative, including constitutional, head and neck, cardiovascular, pulmonary, gastrointestinal, endocrine, urologic, reproductive, rheumatic, hematologic, immunologic, dermatologic, and psychiatric.    Nutritional concerns: None  Driving issues: None   Safety concerns regarding living situations and safety at home: None  Risk of falls: None  Pain: None    ASSESSMENT:    Aggressive neuromyelitis optica, status post first infusion of Soliris (Eculizumab) on December 21.  Currently experiencing severe rash that is not improving after 10 days of an antihistaminic and Benadryl cream.  No other issues or concerns aside from the already established visual disability and walking difficulty.    PLAN:    The patient will be referred for an urgent dermatological evaluation, rash is no side effect of Soliris, I will defer the management to the experts, in the meantime the patient has been advised to consider eating using Aveeno solution.    Finally I will follow the patient up in 1 week(s) via telephone call as long as the patient is doing well. I instructed the patient to call or mychart my office with any concerns or questions.    I spent 30 minutes in this visit, with >50% direct patient time spent counseling about prognosis, treatment options, and coordination of care.  We also attempted to do Doximity video with the patient's vision is poor to see the telephone icons in clinic on the link.  She has been advised to have her  son with her during the dermatology visit if he must be done via video.    My recommendations will be communicated back to the patient's physician(s) by mail.  Follow-up is expected to be with me.      Caterina Franklin MD  Chief, Multiple Sclerosis Division  Department of Neurology  [facility]           Ladonna is a 75 year old who is being evaluated via a billable telephone visit.      What phone number would you like to be contacted at?   How would you like to obtain your AVS? MyChart  Phone call duration: 30 minutes        Again, thank you for allowing me to participate in the care of your patient.      Sincerely,    Caterina Franklin MD

## 2021-01-27 NOTE — LETTER
1/27/2021       RE: Ladonna Sheriff  [address]  [place] [address]     Dear Colleague,    Thank you for referring your patient, Ladonna Sheriff, to the [facility] MULTIPLE SCLEROSIS CLINIC [place] at [facility]. Please see a copy of my visit note below.    [facility] MULTIPLE SCLEROSIS CLINIC  FOLLOW UP VISIT           PRINCIPAL NEUROLOGIC DIAGNOSIS: Neuromyelitis Optica          HISTORY OF ILLNESS:    This is a follow visit for this 75 year old right handed genetic female  With a history of NMO. Who was last seen on  1-14 via telephone call.   At that time the patient reported a rash after her second Soliris infusion, the rash has not improved since January 14 despite the use of oral Benadryl and been, at the time patient  was recommended to see dermatologist and was referred, unfortunately she was not given an appointment until the middle of February therefore I had her come to the clinic today so we could evaluate the rash. Since last visit she denies any new neurological symptoms or anything that could be explained by reactivation of her neuromyelitis optica and reports to be walking better than prior to starting Soliris.  Her son Nicho who is here with her today also reports noticing an improvement in her ability to ambulate although she still uses a walker.  Her numbness from the chest down has not improved since her original attack at diagnosis.    I asked her to cancel her next infusion of Soliris until we can clarify if this is related to the infusion or not.  Rash is one of the most common side effects from Soliris infusion, but the fact that is not improving after 3 weeks seems concerning.  Today we have asked the dermatology department to please see her as soon as possible given that the referral was done as urgent and she has been given an appointment for tomorrow afternoon.    No other issues or concerns.   Fortunately her other son Bandar has moved in with her so she will be able to have closer supervision.    Current Symptoms:  1.  Mobility impairment  2.  Upper and lower extremity rash   3.  Numbness from the chest down  4.  Legally blind in the right eye legally blind in the right eye legally blind in the right eye right eye blindnessLegally blind in the r eye              Current Outpatient Prescriptions:  Current Outpatient Medications   Medication     acetaminophen (TYLENOL) 325 MG tablet     albuterol (PROAIR HFA/PROVENTIL HFA/VENTOLIN HFA) 108 (90 Base) MCG/ACT inhaler     amLODIPine (NORVASC) 5 MG tablet     apixaban ANTICOAGULANT (ELIQUIS) 5 MG tablet     B Complex-C (VITAMIN B COMPLEX W/VITAMIN C) TABS tablet     Calcium-Vitamin D 600-200 MG-UNIT TABS     FLUoxetine (PROZAC) 40 MG capsule     furosemide (LASIX) 20 MG tablet     metoprolol succinate ER (TOPROL-XL) 25 MG 24 hr tablet     Multiple Vitamins-Minerals (MULTIVITAL PO)     potassium chloride ER (KLOR-CON M) 20 MEQ CR tablet     simvastatin (ZOCOR) 20 MG tablet     traZODone (DESYREL) 100 MG tablet     trolamine salicylate (ASPERCREME) 10 % external cream     umeclidinium (INCRUSE ELLIPTA) 62.5 MCG/INH inhaler     vitamin C (ASCORBIC ACID) 500 MG tablet     hydrocortisone 2.5 % ointment     triamcinolone (KENALOG) 0.1 % external cream     Current Facility-Administered Medications   Medication     lidocaine 1% with EPINEPHrine 1:100,000 injection 3 mL          ALLERGIES       Allergies   Allergen Reactions     Prevacid [Lansoprazole] Rash     Pravastatin Rash     Patient is still not sure about it due many years ago for reaction.           REVIEW OF SYSTEMS:    Comprehensive review of systems otherwise was negative, including constitutional, head and neck, cardiovascular, pulmonary, gastrointestinal, endocrine, urologic, reproductive, rheumatic, hematologic, immunologic, dermatologic, and psychiatric.    Nutritional concerns: None  Driving issues: None    Safety concerns regarding living situations and safety at home: None  Risk of falls: None  Pain: None    PHYSICAL EXAM:    Hair, skin, nails, and joints were normal. Neck was supple without Lhermitte's phenomenon.  There was no percussion tenderness over the spine.     The patient was alert and oriented to person, place, and time with normal language, attention and concentration, recent and remote memory, praxis, and intellectual function. Affect was normal. The patient did not appear depressed.    Visual acuity:  right eye light perception  OS 20/    Correction: with    Visual fields were full to confrontation.   Pupils were 3 mm and briskly reactive OU without a relative afferent pupillary defect.  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 Normal strength of the muscles of mastication:   Muscles of facial expression were normal  Hearing was normal. Gag reflex and palatal movements were normal. Sternocleidomastoid and trapezius power were normal. Tongue movements were normal. There was no dysarthria.    Motor Examination:   There was no pronator drift.       Motor    Upper      Right Left   Shoulder Abduction 5 5   Elbow Flexion 5 5   Elbow Extension 5 5   Wrist Extension 5 5   Digit Extension 5 5   Digit Flexion 5 5   APB 5 5   Tone 0 0   Lower       Right Left   Hip Flexion 5 5   Knee Extension 5 5   Knee Flexion 5 5   Foot Dorsiflexion 5 5   Foot Plantar Flexion 5 5   EH 5 5   Toe Flexion 5 5   Tone 0 0               Reflexes:     Reflexes       Right  Left   Biceps 1  1   Triceps 1  1   Brachioradialis 1  1   Patellar  1  1   Achilles 1  1   Babinski down  down         Coordination:     Right Left   RRM Normal Normal   SHAYAN Normal Normal   FTN Normal Normal   RRM Normal Normal   HKS Normal Normal         Sensory examination:    Light touch:  Intact in all extremities and Absent in bilateral lower extremities, Pin Prick:  Intact in all  extremities and Absent in bilateral lower extremities, Vibration:   Intact in all extremities and Absent in bilateral lower extremities, Prioperception:  Intact in all extremities and Absent in bilateral lower extremities and Temperature:  Intact in all extremities and Absent in bilateral lower extremities      Coordination and Gait        Gait Normal   Right Left   Romberg Normal  Heel Normal Normal   Tandem {Normal  Toe Normal Normal                   QUANTITATIVE SCORES:    Visual: 3<-5-Worse eye with maximal visual acuity (corrected) less than 20/200; grade 4 plus maximal acuity of better eye of 20/60 or less  Brainstem: 3-Severe nystagmus, marked extraocular weakness, or moderate disability of other CNs  Pyramidal: 2-minimal disability: patient complains of motor-fatigability or reduced performance in strenuous motor tasks (motor performance grade 1) and/or BMRC grade 4 in one or two muscle groups  Cerebellar: 1-abnormal signs without disability  Sensory: 4-marked decrease in touch or pain in one or two limbs; and/or moderate decrease in touch or pain and/or marked reduction of proprioception in more than two limbs  Bladder/Bowel: 1<-1-mild urinary hesitancy, urgency and / or constipation  Cerebral: 0-Normal  Ambulatory: 7- bilateral assistance, >/= 120 meters (EDSS 6.0)    EDSS: 6.5- constant bilateral assistance (canes or crutches) required to walk at least 20 meters without resting (see chapter 8, Ambulation)            ASSESSMENT:    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  Her next dose was supposed to be 1200 mg but has been postponed until further notice.  It is unclear if this rash is an allergic reaction to Soliris versus a side effect but we will not proceed with the next infusion until the rash resolves.    PLAN:    The patient will restart Soliris after her rash is completely resolved prior to her next infusion she will undergo 1 g of IV  steroids the day before as well as IV Benadryl 25 to 50 mg.  We will also decrease the dose to 300 mg and evaluate response specifically recurrence of the rash.  If she does not develop it we will consider increasing the dose infusion after the next 1 or alternatively we will do the infusion every 4 weeks.    She understands and agrees with the plan and I will follow up via telephone call once a week until she is tolerating the medication without any significant side effects and we find the optimal dose.   I instructed the patient to call or mychart my office with any concerns or questions.    I spent 35 minutes in this visit, with >50% direct patient time spent counseling about prognosis, treatment options, and coordination of care.     My recommendations will be communicated back to the patient's physician(s) by mail.  Follow-up is expected to be with me.      Caterina Franklin MD  Chief, Multiple Sclerosis Division  Department of Neurology  [facility]

## 2021-01-27 NOTE — TELEPHONE ENCOUNTER
Ladonna had an appointment with Dr. Franklin today and it was decided to hold off on any more Soliris treatments until Ladonna meets with dermatology.  Ladonna has an appointment with dermatology tomorrow due to rash.  Will follow up on plan with Soliris after dermatology consult.    Melvina James RN

## 2021-01-28 PROBLEM — N18.30 CHRONIC KIDNEY DISEASE, STAGE 3 (H): Status: ACTIVE | Noted: 2021-01-01

## 2021-01-28 NOTE — PATIENT INSTRUCTIONS
Use the hydrocortisone 2.5% ointment on the face    Use the triamcinolone 0.1% cream on arms and legs     We will call you with the results of the biopsy and determine follow up based on that

## 2021-01-28 NOTE — NURSING NOTE
Dermatology Rooming Note    Ladonna Sheriff's goals for this visit include:   Chief Complaint   Patient presents with     Skin Check     Ladonna is here today for a skin check.      WALTER Drummond

## 2021-01-28 NOTE — PROGRESS NOTES
I talked with and examined Ladonna Sheriff and I agree with the assessment and the plan. I performed the biopsy  Procedure. Scattered red papular lesions some with interesting collarette scale.  RINA Eagle MD.

## 2021-01-28 NOTE — PROGRESS NOTES
[facility] Dermatology Note  Encounter Date: Jan 28, 2021  Office Visit     Dermatology Problem List:  1. Eczematous rash, shave biopsy 1/28/2021, results pending   - tx: triamcinolone 0.1% PRN  2. Annular lesion on forehead  - tx: hydrocortisone 2.5% ointment, could consider biopsy     ____________________________________________    Assessment & Plan:     # eczematous rash on arms and legs  Differential diagnosis includes erythema multiforme vs drug eruption vs urticaria. Likely erythema multiforme based on the lesion appearance and association with the new Soliris medication.   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 Less likely atopic dermatitis based on appearance. Less likely tinea corporis based on appearance and symptoms  - hydrocortisone 2.5% ointment, follow up when following up for biopsy result above       Procedures Performed:   - Shave biopsy procedure note, location(s): left thigh. After discussion of benefits and risks including but not limited to bleeding, infection, scar, incomplete removal, recurrence, and non-diagnostic biopsy, written consent and photographs were obtained. The area was cleaned with isopropyl alcohol. 0.5mL of 1% lidocaine with epinephrine was injected to obtain adequate anesthesia of lesion(s). Shave biopsy at site(s) performed. Hemostasis was achieved with aluminium chloride. Petrolatum ointment and a sterile dressing were applied. The patient tolerated the procedure and no complications were noted. The patient was provided with verbal and written post care instructions.     Follow-up: pending path results    Staff and Resident:     Ananda Hui MD PGY3, FM resident     Staffed with: Dr. Eagle  ____________________________________________    CC: Skin Check (Ladonna is here today for a skin check. )    HPI:  Ms. Ladonna Sheriff is a(n) 75 year old female who  presents today as a new patient for rash.    Per chart review, patient was seen in her neurology clinic and noted to have a rash so was referred to dermatology.     12/21 had new injection of Soliris (Ecluizumab). After the second infusion started to notice a rash about three weeks ago . Had two more injetions after the rash started. Her neurologist told her unsure if the rash is due to Soliris so was referred to dermatology. Rash on arms and legs.  Starts out as a hard bump, sometimes fluid filled. Once fluid goes away, left with crusty red rash. Spares the chest, abdomen, and back. Itchyy, has tried benadryl cream, just started oral benadryl which both help with itching but no other change to the rash. Solaris was the only new medication or life change at that time.     Also has a forehead rash. Has had for 2 years, slowly getting bigger. Comes and goes. She describes it as a red/pink itchy rash. If uses a face scrub it goes away and comes back within hours. Has tried topical hydrocortisone 1% cream without relief.     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 Diagnosed with with lupus after a lesion that she thought was reaction to the skin was biopsied.     Patient is otherwise feeling well, without additional concerns.    Labs:  NA    Physical Exam:  Vitals: There were no vitals taken for this visit.  SKIN: Focused examination of face, arms, and legs was performed.  - circular, erythematous, indurated, plaques ranging in size from 0.5 cm to 1 cm with central areas of white scaling in collarette appearance scattered across bilateral arms and legs   - pink, shiny, patch on upper midline forehead.   - No other lesions of concern on areas examined.     Medications:  Current Outpatient Medications   Medication     acetaminophen (TYLENOL) 325 MG tablet     albuterol (PROAIR HFA/PROVENTIL HFA/VENTOLIN HFA) 108 (90 Base) MCG/ACT inhaler     amLODIPine (NORVASC) 5 MG  tablet     apixaban ANTICOAGULANT (ELIQUIS) 5 MG tablet     B Complex-C (VITAMIN B COMPLEX W/VITAMIN C) TABS tablet     Calcium-Vitamin D 600-200 MG-UNIT TABS     FLUoxetine (PROZAC) 40 MG capsule     furosemide (LASIX) 20 MG tablet     hydrocortisone 2.5 % ointment     metoprolol succinate ER (TOPROL-XL) 25 MG 24 hr tablet     Multiple Vitamins-Minerals (MULTIVITAL PO)     potassium chloride ER (KLOR-CON M) 20 MEQ CR tablet     simvastatin (ZOCOR) 20 MG tablet     traZODone (DESYREL) 100 MG tablet     triamcinolone (KENALOG) 0.1 % external cream     trolamine salicylate (ASPERCREME) 10 % external cream     umeclidinium (INCRUSE ELLIPTA) 62.5 MCG/INH inhaler     vitamin C (ASCORBIC ACID) 500 MG tablet     Current Facility-Administered Medications   Medication     lidocaine 1% with EPINEPHrine 1:100,000 injection 3 mL      Past Medical History:   Patient Active Problem List   Diagnosis     Optic neuritis     Acute respiratory failure with hypoxia (H)     Pulmonary embolism (H)     Transverse myelitis (H)     Neuromyelitis optica (H)     Neuromyelitis optica (devic) (H)     HTN (hypertension)     Sleep disorder     Left bundle branch block     Chronic systolic heart failure (H)     Weakness     Neurological disorder     Past Medical History:   Diagnosis Date     Cerebral infarction (H)      CHF (congestive heart failure) (H)      Hypertension      Lupus (H)      Lupus (H)      Optic neuritis      Optic neuritis      Sjogren's syndrome (H)      Sjogren's syndrome (H)      Temporal arteritis (H)      TIA (transient ischemic attack)      Uncomplicated asthma        CC Caterina Franklin MD  [address] [place] [phone] on close of this encounter.

## 2021-01-29 NOTE — PROGRESS NOTES
THE [facility] [place]note 2[place] MULTIPLE SCLEROSIS CLINIC  FOLLOW UP VISIT           PRINCIPAL NEUROLOGIC DIAGNOSIS: Neuromyelitis Optica          HISTORY OF ILLNESS:    This is a follow visit for this 75 year old right handed genetic female  With a history of NMO. Who was last seen on  1-14 via telephone call.   At that time the patient reported a rash after her second Soliris infusion, the rash has not improved since January 14 despite the use of oral Benadryl and been, at the time patient  was recommended to see dermatologist and was referred, unfortunately she was not given an appointment until the middle of February therefore I had her come to the clinic today so we could evaluate the rash. Since last visit she denies any new neurological symptoms or anything that could be explained by reactivation of her neuromyelitis optica and reports to be walking better than prior to starting Soliris.  Her son Nicho who is here with her today also reports noticing an improvement in her ability to ambulate although she still uses a walker.  Her numbness from the chest down has not improved since her original attack at diagnosis.    I asked her to cancel her next infusion of Soliris until we can clarify if this is related to the infusion or not.  Rash is one of the most common side effects from Soliris infusion, but the fact that is not improving after 3 weeks seems concerning.  Today we have asked the dermatology department to please see her as soon as possible given that the referral was done as urgent and she has been given an appointment for tomorrow afternoon.    No other issues or concerns.  Fortunately her other son Bandar has moved in with her so she will be able to have closer supervision.    Current Symptoms:  1.  Mobility impairment  2.  Upper and lower extremity rash   3.  Numbness from the chest down  4.  Legally blind in the right eye legally blind in the right eye legally blind in the right eye right eye  blindnessLegally blind in the r eye              Current Outpatient Prescriptions:  Current Outpatient Medications   Medication     acetaminophen (TYLENOL) 325 MG tablet     albuterol (PROAIR HFA/PROVENTIL HFA/VENTOLIN HFA) 108 (90 Base) MCG/ACT inhaler     amLODIPine (NORVASC) 5 MG tablet     apixaban ANTICOAGULANT (ELIQUIS) 5 MG tablet     B Complex-C (VITAMIN B COMPLEX W/VITAMIN C) TABS tablet     Calcium-Vitamin D 600-200 MG-UNIT TABS     FLUoxetine (PROZAC) 40 MG capsule     furosemide (LASIX) 20 MG tablet     metoprolol succinate ER (TOPROL-XL) 25 MG 24 hr tablet     Multiple Vitamins-Minerals (MULTIVITAL PO)     potassium chloride ER (KLOR-CON M) 20 MEQ CR tablet     simvastatin (ZOCOR) 20 MG tablet     traZODone (DESYREL) 100 MG tablet     trolamine salicylate (ASPERCREME) 10 % external cream     umeclidinium (INCRUSE ELLIPTA) 62.5 MCG/INH inhaler     vitamin C (ASCORBIC ACID) 500 MG tablet     hydrocortisone 2.5 % ointment     triamcinolone (KENALOG) 0.1 % external cream     Current Facility-Administered Medications   Medication     lidocaine 1% with EPINEPHrine 1:100,000 injection 3 mL          ALLERGIES       Allergies   Allergen Reactions     Prevacid [Lansoprazole] Rash     Pravastatin Rash     Patient is still not sure about it due many years ago for reaction.           REVIEW OF SYSTEMS:    Comprehensive review of systems otherwise was negative, including constitutional, head and neck, cardiovascular, pulmonary, gastrointestinal, endocrine, urologic, reproductive, rheumatic, hematologic, immunologic, dermatologic, and psychiatric.    Nutritional concerns: None  Driving issues: None   Safety concerns regarding living situations and safety at home: None  Risk of falls: None  Pain: None    PHYSICAL EXAM:    Hair, skin, nails, and joints were normal. Neck was supple without Lhermitte's phenomenon.  There was no percussion tenderness over the spine.     The patient was alert and oriented to person, place,  and time with normal language, attention and concentration, recent and remote memory, praxis, and intellectual function. Affect was normal. The patient did not appear depressed.    Visual acuity:  right eye light perception  OS 20/    Correction: with    Visual fields were full to confrontation.   Pupils were 3 mm and briskly reactive OU without a relative afferent pupillary defect.  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 Normal strength of the muscles of mastication:   Muscles of facial expression were normal  Hearing was normal. Gag reflex and palatal movements were normal. Sternocleidomastoid and trapezius power were normal. Tongue movements were normal. There was no dysarthria.    Motor Examination:   There was no pronator drift.       Motor    Upper      Right Left   Shoulder Abduction 5 5   Elbow Flexion 5 5   Elbow Extension 5 5   Wrist Extension 5 5   Digit Extension 5 5   Digit Flexion 5 5   APB 5 5   Tone 0 0   Lower       Right Left   Hip Flexion 5 5   Knee Extension 5 5   Knee Flexion 5 5   Foot Dorsiflexion 5 5   Foot Plantar Flexion 5 5   EH 5 5   Toe Flexion 5 5   Tone 0 0               Reflexes:     Reflexes       Right  Left   Biceps 1  1   Triceps 1  1   Brachioradialis 1  1   Patellar  1  1   Achilles 1  1   Babinski down  down         Coordination:     Right Left   RRM Normal Normal   SHAYAN Normal Normal   FTN Normal Normal   RRM Normal Normal   HKS Normal Normal         Sensory examination:    Light touch:  Intact in all extremities and Absent in bilateral lower extremities, Pin Prick:  Intact in all extremities and Absent in bilateral lower extremities, Vibration:   Intact in all extremities and Absent in bilateral lower extremities, Prioperception:  Intact in all extremities and Absent in bilateral lower extremities and Temperature:  Intact in all extremities and Absent in bilateral lower extremities      Coordination and  Gait        Gait Normal   Right Left   Romberg Normal  Heel Normal Normal   Tandem {Normal  Toe Normal Normal                   QUANTITATIVE SCORES:    Visual: 3<-5-Worse eye with maximal visual acuity (corrected) less than 20/200; grade 4 plus maximal acuity of better eye of 20/60 or less  Brainstem: 3-Severe nystagmus, marked extraocular weakness, or moderate disability of other CNs  Pyramidal: 2-minimal disability: patient complains of motor-fatigability or reduced performance in strenuous motor tasks (motor performance grade 1) and/or BMRC grade 4 in one or two muscle groups  Cerebellar: 1-abnormal signs without disability  Sensory: 4-marked decrease in touch or pain in one or two limbs; and/or moderate decrease in touch or pain and/or marked reduction of proprioception in more than two limbs  Bladder/Bowel: 1<-1-mild urinary hesitancy, urgency and / or constipation  Cerebral: 0-Normal  Ambulatory: 7- bilateral assistance, >/= 120 meters (EDSS 6.0)    EDSS: 6.5- constant bilateral assistance (canes or crutches) required to walk at least 20 meters without resting (see chapter 8, Ambulation)            ASSESSMENT:    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  Her next dose was supposed to be 1200 mg but has been postponed until further notice.  It is unclear if this rash is an allergic reaction to Soliris versus a side effect but we will not proceed with the next infusion until the rash resolves.    PLAN:    The patient will restart Soliris after her rash is completely resolved prior to her next infusion she will undergo 1 g of IV steroids the day before as well as IV Benadryl 25 to 50 mg.  We will also decrease the dose to 300 mg and evaluate response specifically recurrence of the rash.  If she does not develop it we will consider increasing the dose infusion after the next 1 or alternatively we will do the infusion every 4 weeks.    She understands and  agrees with the plan and I will follow up via telephone call once a week until she is tolerating the medication without any significant side effects and we find the optimal dose.   I instructed the patient to call or mychart my office with any concerns or questions.    I spent 35 minutes in this visit, with >50% direct patient time spent counseling about prognosis, treatment options, and coordination of care.     My recommendations will be communicated back to the patient's physician(s) by mail.  Follow-up is expected to be with me.      Caterina Franklin MD  Chief, Multiple Sclerosis Division  Department of Neurology  [facility] [place]

## 2021-02-04 NOTE — PROGRESS NOTES
Ladonna is a 75 year old who is being evaluated via a billable video visit.      How would you like to obtain your AVS? Farhanharnilesh  If the video visit is dropped, the invitation should be resent by: Other e-mail: [email or web address]  Will anyone else be joining your video visit? No      Video Start Time: 1:43 PM  Video-Visit Details    Type of service:  Video Visit    Video End Time:1:55    Originating Location (pt. Location): Home    Distant Location (provider location):  [facility] MULTIPLE SCLEROSIS CLINIC [place]     Platform used for Video Visit: [employer]     [facility] MULTIPLE SCLEROSIS CLINIC  FOLLOW UP VISIT           PRINCIPAL NEUROLOGIC DIAGNOSIS: Neuromyelitis Optica    Date of Onset: 6/3/19  Date of Diagnosis: 2019  Initial Clinical Course: Relapsing Remitting  Current Clinical Course: Relapsing Remitting  Past Disease Modifying Therapy(ies): Rituxamab, Ocrevus  Current Disease Modifying Therapy(ies):Soliris  Most Recent MRI of the Brain:   Most Recent MRI of the Cervical Cord   Most Recent MRI of the Thoracic Cord:   Most Recent Lumbar Puncture: 19   Most Recent NMO: 19 positive  Most Recent Remote Hepatitis Panel: 19 negative  Most Recent VZV Ig19 positive  Most Recent TB Quant: 19 negative        HISTORY OF ILLNESS:    This is a follow visit for this 75 year old right handed genetic female  With a history of NMO. Who was last seen on  21.   At that time the patient was recommended to see dermatology for Soliris related rash.    Since last visit she was seen by dermatology on 2021 and a skin biopsy was obtained, she was recommended to start a topical steroid.  Today she reports that although she is not developing any new lesions that was present last week have not significantly improved with the topical steroid and they continue to each.  Her son Bandar is with her today and agrees with the assessment.  This morning the dermatologist  reported the results of the biopsy via epic messaging directly to me and confirms this is Soliris related, his recommendation was to switch medications.  Unfortunately we are out of options in terms of treatments for NMO, at least for the FDA approved ones.  Ladonna has failed rituximab and ocrelizumab so I do not think she would respond to alternatives that have the same mechanism of action, Soliris has completely different mechanism of action it took to complete cascade and be potentially the only one that she responds to, she was given 4 doses of 900 mg each 1 week apart and was about to increase the dose to not quite 4 weeks yet mg 2 weeks apart when we put the infusions on hold.  Her last infusion was January 11, not quite 4 weeks ag    Current Outpatient Prescriptions:  Current Outpatient Medications   Medication     acetaminophen (TYLENOL) 325 MG tablet     albuterol (PROAIR HFA/PROVENTIL HFA/VENTOLIN HFA) 108 (90 Base) MCG/ACT inhaler     amLODIPine (NORVASC) 5 MG tablet     apixaban ANTICOAGULANT (ELIQUIS) 5 MG tablet     B Complex-C (VITAMIN B COMPLEX W/VITAMIN C) TABS tablet     Calcium-Vitamin D 600-200 MG-UNIT TABS     FLUoxetine (PROZAC) 40 MG capsule     furosemide (LASIX) 20 MG tablet     hydrocortisone 2.5 % ointment     metoprolol succinate ER (TOPROL-XL) 25 MG 24 hr tablet     Multiple Vitamins-Minerals (MULTIVITAL PO)     potassium chloride ER (KLOR-CON M) 20 MEQ CR tablet     simvastatin (ZOCOR) 20 MG tablet     traZODone (DESYREL) 100 MG tablet     triamcinolone (KENALOG) 0.1 % external cream     trolamine salicylate (ASPERCREME) 10 % external cream     umeclidinium (INCRUSE ELLIPTA) 62.5 MCG/INH inhaler     vitamin C (ASCORBIC ACID) 500 MG tablet     Current Facility-Administered Medications   Medication     lidocaine 1% with EPINEPHrine 1:100,000 injection 3 mL          ALLERGIES       Allergies   Allergen Reactions     Prevacid [Lansoprazole] Rash     Pravastatin Rash     Patient is still not  sure about it due many years ago for reaction.           REVIEW OF SYSTEMS:    Comprehensive review of systems otherwise was negative, including constitutional, head and neck, cardiovascular, pulmonary, gastrointestinal, endocrine, urologic, reproductive, rheumatic, hematologic, immunologic, dermatologic, and psychiatric.    Nutritional concerns: None  Driving issues: None   Safety concerns regarding living situations and safety at home: None  Risk of falls: None  Pain: None          REVIEW OF IMAGING STUDIES:    I personally reviewed the following images:        ASSESSMENT/  Today we discussed waiting for a dermatologist recommendation on whether we can try a low-dose preceded by a 1 g IV infusion of Solu-Medrol, if he recommends against that then we will consider methotrexate subcutaneously.  In the meantime I will report to dermatology that she is not improving to see if they have any further recommendations.  She has been advised to stay vigilant about any new neurological symptoms that could potentially be explained by an NMO exacerbation and continue the topical steroids until further notice.    Today we discussed difficulties with response to previous therapies and I encouraged her to look into stem cell transplant and an MRI which was found completed at [facility] in the recent past.  It is very unlikely that the trial is still ongoing or that they are providing the treatment in her age category but is was inquiring about.    I will see Ladonna again in 1 week to follow-up on the rash and have a decision on whether we will continue with Soliris or switch to methotrexate, CellCept + IVIG or consider sartralizumab.    She and her son Bandar understand and agree with the plan        Finally I will follow the patient up in 1 week(s) as long as the patient is doing well. I instructed the patient to call or mychart my office with any concerns or questions.    I spent 60 minutes in this visit, with >50% direct patient time  spent counseling about prognosis, treatment options, and coordination of care.     My recommendations will be communicated back to the patient's physician(s) by mail.  Follow-up is expected to be with me.      Caterina Franklin MD  Chief, Multiple Sclerosis Division  Department of Neurology  [facility] [place]

## 2021-02-04 NOTE — LETTER
2021       RE: Ladonna Sheriff  [address]  [facility] [address]     Dear Colleague,    Thank you for referring your patient, Ladonna Sheriff, to the [facility] MULTIPLE SCLEROSIS CLINIC [place] at [facility]. Please see a copy of my visit note below.    Ladonna is a 75 year old who is being evaluated via a billable video visit.      How would you like to obtain your AVS? MyChart  If the video visit is dropped, the invitation should be resent by: Other e-mail: [employer]  Will anyone else be joining your video visit? No      Video-Visit Details    Type of service:  Video Visit    Video Start Time: 1:43 PM  Video End Time:1:55    Originating Location (pt. Location): Home    Distant Location (provider location):  [facility] MULTIPLE SCL[facility] CLINIC [place]     Platform used for Video Visit: [employer]       THE [facility] MULTIPLE SCLEROSIS CLINIC  FOLLOW UP VISIT       PRINCIPAL NEUROLOGIC DIAGNOSIS: Neuromyelitis Optica    Date of Onset: 6/3/19  Date of Diagnosis: 2019  Initial Clinical Course: Relapsing Remitting  Current Clinical Course: Relapsing Remitting  Past Disease Modifying Therapy(ies): Rituxamab, Ocrevus  Current Disease Modifying Therapy(ies):Soliris  Most Recent MRI of the Brain:   Most Recent MRI of the Cervical Cord   Most Recent MRI of the Thoracic Cord:   Most Recent Lumbar Puncture: 19   Most Recent NMO: 19 positive  Most Recent Remote Hepatitis Panel: 19 negative  Most Recent VZV Ig19 positive  Most Recent TB Quant: 19 negative      HISTORY OF ILLNESS:    This is a follow visit for this 75 year old right handed genetic female  With a history of NMO. Who was last seen on  21.   At that time the patient was recommended to see dermatology for Soliris related rash.    Since last visit she was seen by dermatology on 2021 and a skin biopsy was obtained,  she was recommended to start a topical steroid.  Today she reports that although she is not developing any new lesions that was present last week have not significantly improved with the topical steroid and they continue to each.  Her son Bandar is with her today and agrees with the assessment.  This morning the dermatologist reported the results of the biopsy via epic messaging directly to me and confirms this is Soliris related, his recommendation was to switch medications.  Unfortunately we are out of options in terms of treatments for NMO, at least for the FDA approved ones.  Ladonna has failed rituximab and ocrelizumab so I do not think she would respond to alternatives that have the same mechanism of action, Soliris has completely different mechanism of action it took to complete cascade and be potentially the only one that she responds to, she was given 4 doses of 900 mg each 1 week apart and was about to increase the dose to not quite 4 weeks yet mg 2 weeks apart when we put the infusions on hold.  Her last infusion was January 11, not quite 4 weeks ag    Current Outpatient Prescriptions:  Current Outpatient Medications   Medication     acetaminophen (TYLENOL) 325 MG tablet     albuterol (PROAIR HFA/PROVENTIL HFA/VENTOLIN HFA) 108 (90 Base) MCG/ACT inhaler     amLODIPine (NORVASC) 5 MG tablet     apixaban ANTICOAGULANT (ELIQUIS) 5 MG tablet     B Complex-C (VITAMIN B COMPLEX W/VITAMIN C) TABS tablet     Calcium-Vitamin D 600-200 MG-UNIT TABS     FLUoxetine (PROZAC) 40 MG capsule     furosemide (LASIX) 20 MG tablet     hydrocortisone 2.5 % ointment     metoprolol succinate ER (TOPROL-XL) 25 MG 24 hr tablet     Multiple Vitamins-Minerals (MULTIVITAL PO)     potassium chloride ER (KLOR-CON M) 20 MEQ CR tablet     simvastatin (ZOCOR) 20 MG tablet     traZODone (DESYREL) 100 MG tablet     triamcinolone (KENALOG) 0.1 % external cream     trolamine salicylate (ASPERCREME) 10 % external cream     umeclidinium (INCRUSE  ELLIPTA) 62.5 MCG/INH inhaler     vitamin C (ASCORBIC ACID) 500 MG tablet     Current Facility-Administered Medications   Medication     lidocaine 1% with EPINEPHrine 1:100,000 injection 3 mL        ALLERGIES    Allergies   Allergen Reactions     Prevacid [Lansoprazole] Rash     Pravastatin Rash     Patient is still not sure about it due many years ago for reaction.       REVIEW OF SYSTEMS:    Comprehensive review of systems otherwise was negative, including constitutional, head and neck, cardiovascular, pulmonary, gastrointestinal, endocrine, urologic, reproductive, rheumatic, hematologic, immunologic, dermatologic, and psychiatric.    Nutritional concerns: None  Driving issues: None   Safety concerns regarding living situations and safety at home: None  Risk of falls: None  Pain: None    REVIEW OF IMAGING STUDIES:    I personally reviewed the following images:      ASSESSMENT/  Today we discussed waiting for a dermatologist recommendation on whether we can try a low-dose preceded by a 1 g IV infusion of Solu-Medrol, if he recommends against that then we will consider methotrexate subcutaneously.  In the meantime I will report to dermatology that she is not improving to see if they have any further recommendations.  She has been advised to stay vigilant about any new neurological symptoms that could potentially be explained by an NMO exacerbation and continue the topical steroids until further notice.    Today we discussed difficulties with response to previous therapies and I encouraged her to look into stem cell transplant and an MRI which was found completed at [facility] in the recent past.  It is very unlikely that the trial is still ongoing or that they are providing the treatment in her age category but is was inquiring about.    I will see Ladonna again in 1 week to follow-up on the rash and have a decision on whether we will continue with Soliris or switch to methotrexate, CellCept + IVIG or consider  sartralizumab.    She and her son Bandar understand and agree with the plan      Finally I will follow the patient up in 1 week(s) as long as the patient is doing well. I instructed the patient to call or mychart my office with any concerns or questions.    I spent 60 minutes in this visit, with >50% direct patient time spent counseling about prognosis, treatment options, and coordination of care.     My recommendations will be communicated back to the patient's physician(s) by mail.  Follow-up is expected to be with me.    Caterina Franklin MD  Chief, Multiple Sclerosis Division  Department of Neurology  [facility] [place]

## 2021-02-11 NOTE — LETTER
2/11/2021       RE: Ladonna Sheriff  [address]  [facility] [address]     Dear Colleague,    Thank you for referring your patient, Ladonna Sheriff, to the [facility] MULTIPLE SCLEROSIS CLINIC [place] at [facility]. Please see a copy of my visit note below.    Ladonna is a 75 year old who is being evaluated via a billable video visit.      How would you like to obtain your AVS? MyChart  If the video visit is dropped, the invitation should be resent by: Other e-mail: [employer]  Will anyone else be joining your video visit? No      Video Start Time: 2:38 PM  Video-Visit Details    Type of service:  Video Visit    Video End Time:3:56 PM    Originating Location (pt. Location): Home    Distant Location (provider location):  [facility] MULT[facility]LE [facility]IS [facility]     Platform used for Video Visit: [employer], phone.    This is a follow-up visit for this 75-year-old female with a history of neuromyelitis optica, and recent start of Soliris infusion, after her fourth infusion the patient developed a rash, this was approximately first week in January, after 2 weeks of the rash not improving despite the infusion being on hold she was seen by dermatology at the [facility] who recommended a topical steroid.  This was 2 weeks ago.  Last week I evaluated her via video and she reported for the lesions to be not getting better nor worse but noticed that they were not new ones coming up.  Since last visit her rash has actually gotten worse, the lesions have become confluent, larger in size and more extensive.  They seem different than the original rash but continue to be itchy, they are now scaling and peeling.  She saw her primary care physician yesterday who is with the [facility] her name is Dr. Sandra Holden in [facility] phone .   Saw has an appointment tomorrow morning at 9  she referred her to a  dermatologist in the morning.    No other issues or concerns.    I contacted Dr. Eagle from dermatology and sent her some pictures from Ladonna's rash they were sent to me by her son Bandar.  He he discussed this with a colleague of his and they think this is a discoid lupus exacerbation, and recommended oral prednisone 40 mg a day for a week and then 20 mg a day.  He will see her on February 25 to see if she is doing better.    In the meantime she will continue with the cream and is up to her if she wants to see another dermatologist tomorrow.    Regarding the Soliris infusion I will continue to hold it until her rash is completely gone.  Once this happens I will consider a low-dose of 300 mg with 1 g Solu-Medrol prior to the infusion.  Alternatively I can consider Satralizumab and IL 6 antagonist.  The patient will be seen again in 1 week and understands and agrees with the plan.    A total of 45 minutes were spent with the patient, reviewing pictures, contacting the dermatologist and awaiting for response as well as ordering prednisone and coordinating care.      Again, thank you for allowing me to participate in the care of your patient.      Sincerely,    Caterina Franklin MD

## 2021-02-14 NOTE — PROGRESS NOTES
Dr. Franklin, Mrs Sheriff's neurologist  called me Thursday PM ( Feb 11) advising that Mrs. Sheriff's rash had extended and worsened. New images taken by Dr Franklin confirmed the worsening situation.  I then showed the images to a derm colleague who felt that subacute LE or drug induced LE was suggested. . I felt that because of this marked worsening of the eruption was occurring  that institution of oral prednisone was in order and Dr. Franklin was in agreement with this suggestion. I suggested that Mrs Sheriff take 40 mg of prednisone for week one then reduce to 20 mg for week 2. I will be able to see her in the office on Feb 25 to reevaluate the problem. I will review her labs and order appropriate labs to confirm  or rule out lupus variant. I asked Dr Franklin to give my cell phone number to Mrs Sheriff's son should the problem change or new symptoms arise.

## 2021-02-18 NOTE — LETTER
2/18/2021       RE: Ladonna Sheriff  [address]  [place] [address]     Dear Colleague,    Thank you for referring your patient, Ladonna Sheriff, to the [facility] MULTIPLE SCLEROSIS CLINIC [place] at [facility]. Please see a copy of my visit note below.    Ladonna is a 76 year old who is being evaluated via a billable video visit.      How would you like to obtain your AVS? MyChart  If the video visit is dropped, the invitation should be resent by: Other e-mail: [employer]  Will anyone else be joining your video visit? No      Video Start Time: 3:40 PM  Video-Visit Details    Type of service:  Video Visit    Video End Time:4:03 PM    Originating Location (pt. Location): Home    Distant Location (provider location):  [facility] MULTIPLE SCLEROSIS CLINIC [place]     Platform used for Video Visit: "[employer]"    THE [facility] MULTIPLE SCLEROSIS CLINIC  FOLLOW UP VISIT           PRINCIPAL NEUROLOGIC DIAGNOSIS: Neuromyelitis Optica        HISTORY OF ILLNESS:    This is a follow visit for this 76 year old right handed genetic female  With a history of NMO. Who was last seen on  2-11-21.     At that time the patient was recommended to:    I contacted Dr. Eagle from dermatology and sent him pictures of Ladonna's rash.  He think this is a discoid lupus exacerbation, and recommended oral prednisone 40 mg a day for a week and then 20 mg a day.  He will see her on February 25 to see if she is doing better.     In the meantime she will continue with the cream and is up to her if she wants to see another dermatologist tomorrow.     Regarding the Soliris infusion I will continue to hold it until her rash is completely gone.  Once this happens I will consider a low-dose of 300 mg with 1 g Solu-Medrol prior to the infusion.  Alternatively I can consider Satralizumab and IL 6 antagonist.     Since last visit she denies any new neurological  symptoms or anything that could be explained by an exacerbation of her NM0.  In terms of her rash her son Bandar reports that is better, I saw the lesions over the phone and her legs look like the lesions are more confluent but less red and also show evidence of peeling.  Her arms look like they have less lesions than before, but the itching continues.  They did see a second dermatologist on February 12 who recommended for her to take 60 mg of prednisone for 4 days then 60 for 4 days then 50, 40, 30, 20 for 4 days etc., they will follow up with her tomorrow.  They will also see Dr. Eagle on February 25.    Current Outpatient Prescriptions:  Current Outpatient Medications   Medication     acetaminophen (TYLENOL) 325 MG tablet     albuterol (PROAIR HFA/PROVENTIL HFA/VENTOLIN HFA) 108 (90 Base) MCG/ACT inhaler     amLODIPine (NORVASC) 5 MG tablet     apixaban ANTICOAGULANT (ELIQUIS) 5 MG tablet     B Complex-C (VITAMIN B COMPLEX W/VITAMIN C) TABS tablet     Calcium-Vitamin D 600-200 MG-UNIT TABS     FLUoxetine (PROZAC) 40 MG capsule     furosemide (LASIX) 20 MG tablet     hydrocortisone 2.5 % ointment     metoprolol succinate ER (TOPROL-XL) 25 MG 24 hr tablet     Multiple Vitamins-Minerals (MULTIVITAL PO)     potassium chloride ER (KLOR-CON M) 20 MEQ CR tablet     predniSONE (DELTASONE) 20 MG tablet     simvastatin (ZOCOR) 20 MG tablet     traZODone (DESYREL) 100 MG tablet     triamcinolone (KENALOG) 0.1 % external cream     trolamine salicylate (ASPERCREME) 10 % external cream     umeclidinium (INCRUSE ELLIPTA) 62.5 MCG/INH inhaler     vitamin C (ASCORBIC ACID) 500 MG tablet     Current Facility-Administered Medications   Medication     lidocaine 1% with EPINEPHrine 1:100,000 injection 3 mL          ALLERGIES       Allergies   Allergen Reactions     Prevacid [Lansoprazole] Rash     Pravastatin Rash     Patient is still not sure about it due many years ago for reaction.           REVIEW OF SYSTEMS:    Comprehensive  review of systems otherwise was negative, including constitutional, head and neck, cardiovascular, pulmonary, gastrointestinal, endocrine, urologic, reproductive, rheumatic, hematologic, immunologic, dermatologic, and psychiatric.    Nutritional concerns: None  Driving issues: None   Safety concerns regarding living situations and safety at home: None  Risk of falls: None  Pain: None        ASSESSMENT:    Neuromyelitis optica, status post 4 infusions of Soliris with significant skin reaction complicated by discoid lupus exacerbation severe.  Currently improving on prednisone 50 mg daily 4:03 days of 60 mg a day x4 days.    PLAN:    Agree with dermatology follow-up tomorrow and with Dr. Eagle on the 25th I will also see her on the 25th later that afternoon to find out why Dr. Eagle recommended.  Considering the severity of the rash and how long it is taking for her to recover I do not think it is wise to return to using Soliris.    I have also advised her not to undergo the vaccination for COVID-19 at this point.  Once she has fully recovered and she is able to undergo the vaccination we will consider a different approach for her neuromyelitis optica such as CellCept.    Finally I will follow the patient up in 1 week(s) as long as the patient is doing well. I instructed the patient to call or mychart my office with any concerns or questions.    I spent  minutes in this visit, with >50% direct patient time spent counseling about prognosis, treatment options, and coordination of care.     My recommendations will be communicated back to the patient's physician(s) by mail.  Follow-up is expected to be with me.      Caterina Franklin MD  Chief, Multiple Sclerosis Division  Department of Neurology  [facility]

## 2021-02-18 NOTE — PROGRESS NOTES
Ladonna is a 76 year old who is being evaluated via a billable video visit.      How would you like to obtain your AVS? [employer]  If the video visit is dropped, the invitation should be resent by: Other e-mail: [email or web address]  Will anyone else be joining your video visit? No      Video Start Time: 3:40 PM  Video-Visit Details    Type of service:  Video Visit    Video End Time:4:03 PM    Originating Location (pt. Location): Home    Distant Location (provider location):  [facility] MULTIPLE SCLEROSIS CLINIC [place]     Platform used for Video Visit: [employer]    THE [facility] MULTIPLE SCLEROSIS CLINIC  FOLLOW UP VISIT           PRINCIPAL NEUROLOGIC DIAGNOSIS: Neuromyelitis Optica        HISTORY OF ILLNESS:    This is a follow visit for this 76 year old right handed genetic female  With a history of NMO. Who was last seen on  2-11-21.     At that time the patient was recommended to:    I contacted Dr. Eagle from dermatology and sent him pictures of Ladonna's rash.  He think this is a discoid lupus exacerbation, and recommended oral prednisone 40 mg a day for a week and then 20 mg a day.  He will see her on February 25 to see if she is doing better.     In the meantime she will continue with the cream and is up to her if she wants to see another dermatologist tomorrow.     Regarding the Soliris infusion I will continue to hold it until her rash is completely gone.  Once this happens I will consider a low-dose of 300 mg with 1 g Solu-Medrol prior to the infusion.  Alternatively I can consider Satralizumab and IL 6 antagonist.     Since last visit she denies any new neurological symptoms or anything that could be explained by an exacerbation of her NM0.  In terms of her rash her son Bandar reports that is better, I saw the lesions over the phone and her legs look like the lesions are more confluent but less red and also show evidence of peeling.  Her arms look like they have less lesions than before, but the  itching continues.  They did see a second dermatologist on February 12 who recommended for her to take 60 mg of prednisone for 4 days then 60 for 4 days then 50, 40, 30, 20 for 4 days etc., they will follow up with her tomorrow.  They will also see Dr. Eagle on February 25.    Current Outpatient Prescriptions:  Current Outpatient Medications   Medication     acetaminophen (TYLENOL) 325 MG tablet     albuterol (PROAIR HFA/PROVENTIL HFA/VENTOLIN HFA) 108 (90 Base) MCG/ACT inhaler     amLODIPine (NORVASC) 5 MG tablet     apixaban ANTICOAGULANT (ELIQUIS) 5 MG tablet     B Complex-C (VITAMIN B COMPLEX W/VITAMIN C) TABS tablet     Calcium-Vitamin D 600-200 MG-UNIT TABS     FLUoxetine (PROZAC) 40 MG capsule     furosemide (LASIX) 20 MG tablet     hydrocortisone 2.5 % ointment     metoprolol succinate ER (TOPROL-XL) 25 MG 24 hr tablet     Multiple Vitamins-Minerals (MULTIVITAL PO)     potassium chloride ER (KLOR-CON M) 20 MEQ CR tablet     predniSONE (DELTASONE) 20 MG tablet     simvastatin (ZOCOR) 20 MG tablet     traZODone (DESYREL) 100 MG tablet     triamcinolone (KENALOG) 0.1 % external cream     trolamine salicylate (ASPERCREME) 10 % external cream     umeclidinium (INCRUSE ELLIPTA) 62.5 MCG/INH inhaler     vitamin C (ASCORBIC ACID) 500 MG tablet     Current Facility-Administered Medications   Medication     lidocaine 1% with EPINEPHrine 1:100,000 injection 3 mL          ALLERGIES       Allergies   Allergen Reactions     Prevacid [Lansoprazole] Rash     Pravastatin Rash     Patient is still not sure about it due many years ago for reaction.           REVIEW OF SYSTEMS:    Comprehensive review of systems otherwise was negative, including constitutional, head and neck, cardiovascular, pulmonary, gastrointestinal, endocrine, urologic, reproductive, rheumatic, hematologic, immunologic, dermatologic, and psychiatric.    Nutritional concerns: None  Driving issues: None   Safety concerns regarding living situations and  safety at home: None  Risk of falls: None  Pain: None        ASSESSMENT:    Neuromyelitis optica, status post 4 infusions of Soliris with significant skin reaction complicated by discoid lupus exacerbation severe.  Currently improving on prednisone 50 mg daily 4:03 days of 60 mg a day x4 days.    PLAN:    Agree with dermatology follow-up tomorrow and with Dr. Eagle on the 25th I will also see her on the 25th later that afternoon to find out why Dr. Eagle recommended.  Considering the severity of the rash and how long it is taking for her to recover I do not think it is wise to return to using Soliris.    I have also advised her not to undergo the vaccination for COVID-19 at this point.  Once she has fully recovered and she is able to undergo the vaccination we will consider a different approach for her neuromyelitis optica such as CellCept.    Finally I will follow the patient up in 1 week(s) as long as the patient is doing well. I instructed the patient to call or mychart my office with any concerns or questions.    I spent  minutes in this visit, with >50% direct patient time spent counseling about prognosis, treatment options, and coordination of care.     My recommendations will be communicated back to the patient's physician(s) by mail.  Follow-up is expected to be with me.      Caterina Franklin MD  Chief, Multiple Sclerosis Division  Department of Neurology  [facility] [place]

## 2021-02-25 NOTE — NURSING NOTE
Chief Complaint   Patient presents with     Derm Problem     Patient is here for a follow up rash on legs and arms.      Tennille REYNOLDS CMA

## 2021-02-25 NOTE — PATIENT INSTRUCTIONS
Impression:    NMO currently asymptomatic after Soliris infusion (n 4) which was placed on hold due to side effects severe rash, diagnosed as discoid lupus exacerbation. Currently improving on oral prednisone taper. Lympocyte count 0.7 on 2-10-21.    Plan:    Obtain COVID vaccination when possible if OK with Dermatology.    Contninue prednisone 10 mg/day after completing tapering dose.    Start Mycophenolate 250 mg po every day 2 weeks AFTER the second vaccine, continue every day  x 1 month then BID.    RTC in 1 month.

## 2021-02-25 NOTE — PATIENT INSTRUCTIONS
Please use triamcinolone ointment in the morning on all active spots on the body  Use triamcinolone cream at night  Please stop antibiotic ointments  Please obtain lab tests for lupus  Follow up in 4-6 weeks

## 2021-02-25 NOTE — LETTER
2/25/2021       RE: Ladonna Sheriff  [address]  [facility] [address]     Dear Colleague,    Thank you for referring your patient, Ladonna Sheriff, to the [facility] DERMATOLOGY CLINIC [place] at [facility]. Please see a copy of my visit note below.    [facility] Dermatology Note   Encounter Date: Feb 25, 2021  Office Visit     Dermatology Problem List:  # Subacute cutaneous lupus erythematosus (SCLE), flaring  - potentially drug-induced due to eculizumab  - previously dx 1980s, previous bx on arm   - bx 1/28/21: vacuolar interface dermatitis  - prednisone tapering  # Neuromyelitis optica  - s/p eculizumab (Sirlolius)  - previous tx: CellCept, rituximab  ___________________________________________    Assessment & Plan:    # Generalized subacute cutaneous lupus erythematosus (SCLE)  * acute, active, flaring, worsening and complicated  * reviewed the result(s) of each unique test: dermatopathology, CBC, SUSAN, C3/C4   - complicated by history of neuromyelitis optica  - systemic treatments for SCLE include immunosuppressive such as CellCept and methotrexate or systemic retinoids, will defer to neurology to determine future treatment of neuromyelitis optica  - hopeful that severe flare will eventually resolve with time as it may have been drug-induced from eculizumab which is an antibody against complements; otherwise lacks systemic symptoms, counseled the SCLE even drug-induced SCLE rarely causes systemic lupus, re-assuring that she has no systemic symptoms suggesting SLE, will obtain rheumatologic panel to assess for auto-antibodies  - labs: SUSAN, dsDNA, LINWOOD panel, anti-histone antibody, total complement  - triamcinolone 0.1% ointment in the morning, cream at night  - gentle skin care reviewed  - future consideration: [facility]-I with caution     Procedures Performed:  None    Follow-up: 4-6 weeks    Faculty: Fco Baker  Involved:  Resident/Staff    Rosa Chandler MD  Dermatology Resident  [facility]        ___________________________________________      CC: Derm Problem (Patient is here for a follow up rash on legs and arms. )      HPI:  Ms. Ladonna Sheriff is a(n) 76 year old female who presents to clinic today as a return patient for discoid lupus  - has been seeing Dr. Verdin in [place], placed on prednisone taper  - skin has acutely worsened since last visit, very itchy, using antibiotic ointment and then triamcinolone cream, did not like the ointment as it was greasy  - no worsening joint pain, is short of breath, now with very minimal vision in left eye, only blurry vision in the left eye; worried about worsening vision  - has been of Sirlolius for a while now, Dr. Franklin no longer considering further treatment of it  - otherwise feeling well in usual state of health    Labs/Imaging:  CBC, ESR    Physical exam:  General: In no acute distress, well-developed, well-nourished  Eyes: Conjunctivae clear  Pulmonary: Breathing comfortably in no distress  CV: Well-perfused, no cyanosis  Extremities: No deformity, no edema  Lymphatic: No lymphadenopathy    Skin: Sun-exposed skin, which includes the head/face, neck, both arms, digits, and/or nails was examined.   - skin type: fair   - scattered pink inflamed disciform ovoid annular and polycyclic plaques with trailing scale: arms, legs, upper back                  Medications:  Current Outpatient Medications   Medication     acetaminophen (TYLENOL) 325 MG tablet     albuterol (PROAIR HFA/PROVENTIL HFA/VENTOLIN HFA) 108 (90 Base) MCG/ACT inhaler     amLODIPine (NORVASC) 5 MG tablet     apixaban ANTICOAGULANT (ELIQUIS) 5 MG tablet     B Complex-C (VITAMIN B COMPLEX W/VITAMIN C) TABS tablet     Calcium-Vitamin D 600-200 MG-UNIT TABS     FLUoxetine (PROZAC) 40 MG capsule     furosemide (LASIX) 20 MG tablet     hydrocortisone 2.5 % ointment     metoprolol succinate ER (TOPROL-XL) 25 MG 24 hr  tablet     Multiple Vitamins-Minerals (MULTIVITAL PO)     potassium chloride ER (KLOR-CON M) 20 MEQ CR tablet     predniSONE (DELTASONE) 20 MG tablet     simvastatin (ZOCOR) 20 MG tablet     traZODone (DESYREL) 100 MG tablet     triamcinolone (KENALOG) 0.1 % external cream     trolamine salicylate (ASPERCREME) 10 % external cream     umeclidinium (INCRUSE ELLIPTA) 62.5 MCG/INH inhaler     vitamin C (ASCORBIC ACID) 500 MG tablet     Current Facility-Administered Medications   Medication     lidocaine 1% with EPINEPHrine 1:100,000 injection 3 mL      Past Medical History:   Patient Active Problem List   Diagnosis     Optic neuritis     Acute respiratory failure with hypoxia (H)     Pulmonary embolism (H)     Transverse myelitis (H)     Neuromyelitis optica (H)     Neuromyelitis optica (devic) (H)     HTN (hypertension)     Sleep disorder     Left bundle branch block     Chronic systolic heart failure (H)     Weakness     Neurological disorder     Chronic kidney disease, stage 3     Past Medical History:   Diagnosis Date     Cerebral infarction (H)      CHF (congestive heart failure) (H)      Hypertension      Lupus (H)      Lupus (H)      Optic neuritis      Optic neuritis      Sjogren's syndrome (H)      Sjogren's syndrome (H)      Temporal arteritis (H)      TIA (transient ischemic attack)      Uncomplicated asthma        CC No referring provider defined for this encounter. on close of this encounter.      I talked with and examined Ladonna Sheriff and talked with her younger son who accompanied her today. Her eruption persists as diffuse, large scaly erythematous patches involving her lower legs and arms predominantly (see images). She describes their having tiny blister like components initially then becoming very itchy. No ruth bullae have been noted by her sons or by us.  She states that she has no vision in her right eye and very limited fuzzy vision in her left eye at the moment. The skin lesions are not painful  but quite pruritic.     After she saw us at the last visit she consulted on her own  Dr Mary Brizuela who has a solo practice in [place] and Dr Brizuela altered the prednisone regimen slightly. We did not contact Dr Brizuela nor did she contact us. Mrs Sheriff states that she was diagnosed as having discoid lupus many years ago and was on hydroxychloroquine for years. Apparently the diagnosis was based on a skin biopsy of a lesion on her arm. She denies involvement of the face or scalp and there have been no characteristic scars associated with the diagnosis of discoid LE. She has also been diagnosed with Sjogrens but we have no documentation of this diagnosis. She currently does on have facial or scalp lesions.     Her current skin lesions do not resemble discoid lupus in my opinion. They are more suggestive of subacute lupus which however rarely affects the lower extremities. The lesions are unusual in having central collarette scales. No atrophic features are seen. The lesions do not appear infectious. If this were a version of subacute LE I would expect a faster response to the prednisone.    It is likely that the eruption is related to Soliris (eculizumab) but why the lesions persist weeks after her last injection is difficult to explain. We should be able to reduce the itch safely with other topical and perhaps oral antihistamines. Her central needs remain her vision problems and hopefully alternate therapies for his difficult problem will be available.     VIOLETTE Eagle MD

## 2021-02-25 NOTE — PROGRESS NOTES
[facility] Dermatology Note   Encounter Date: Feb 25, 2021  Office Visit     Dermatology Problem List:  # Subacute cutaneous lupus erythematosus (SCLE), flaring  - potentially drug-induced due to eculizumab  - previously dx 1980s, previous bx on arm   - bx 1/28/21: vacuolar interface dermatitis  - prednisone tapering  # Neuromyelitis optica  - s/p eculizumab (Sirlolius)  - previous tx: CellCept, rituximab  ___________________________________________    Assessment & Plan:    # Generalized subacute cutaneous lupus erythematosus (SCLE)  * acute, active, flaring, worsening and complicated  * reviewed the result(s) of each unique test: dermatopathology, CBC, SUSAN, C3/C4   - complicated by history of neuromyelitis optica  - systemic treatments for SCLE include immunosuppressive such as CellCept and methotrexate or systemic retinoids, will defer to neurology to determine future treatment of neuromyelitis optica  - hopeful that severe flare will eventually resolve with time as it may have been drug-induced from eculizumab which is an antibody against complements; otherwise lacks systemic symptoms, counseled the SCLE even drug-induced SCLE rarely causes systemic lupus, re-assuring that she has no systemic symptoms suggesting SLE, will obtain rheumatologic panel to assess for auto-antibodies  - labs: SUSAN, dsDNA, LINWOOD panel, anti-histone antibody, total complement  - triamcinolone 0.1% ointment in the morning, cream at night  - gentle skin care reviewed  - future consideration: UVA-I with caution     Procedures Performed:  None    Follow-up: 4-6 weeks    Faculty: Fco    Staff Involved:  Resident/Staff    Rosa Chandler MD  Dermatology Resident  [facility]        ___________________________________________      CC: Derm Problem (Patient is here for a follow up rash on legs and arms. )      HPI:  Ms. Ladonna Sheriff is a(n) 76 year old female who presents to clinic today as a return patient for discoid  lupus  - has been seeing Dr. Verdin in [place], placed on prednisone taper  - skin has acutely worsened since last visit, very itchy, using antibiotic ointment and then triamcinolone cream, did not like the ointment as it was greasy  - no worsening joint pain, is short of breath, now with very minimal vision in left eye, only blurry vision in the left eye; worried about worsening vision  - has been of Sirlolius for a while now, Dr. Franklin no longer considering further treatment of it  - otherwise feeling well in usual state of health    Labs/Imaging:  CBC, ESR    Physical exam:  General: In no acute distress, well-developed, well-nourished  Eyes: Conjunctivae clear  Pulmonary: Breathing comfortably in no distress  CV: Well-perfused, no cyanosis  Extremities: No deformity, no edema  Lymphatic: No lymphadenopathy    Skin: Sun-exposed skin, which includes the head/face, neck, both arms, digits, and/or nails was examined.   - skin type: fair   - scattered pink inflamed disciform ovoid annular and polycyclic plaques with trailing scale: arms, legs, upper back                  Medications:  Current Outpatient Medications   Medication     acetaminophen (TYLENOL) 325 MG tablet     albuterol (PROAIR HFA/PROVENTIL HFA/VENTOLIN HFA) 108 (90 Base) MCG/ACT inhaler     amLODIPine (NORVASC) 5 MG tablet     apixaban ANTICOAGULANT (ELIQUIS) 5 MG tablet     B Complex-C (VITAMIN B COMPLEX W/VITAMIN C) TABS tablet     Calcium-Vitamin D 600-200 MG-UNIT TABS     FLUoxetine (PROZAC) 40 MG capsule     furosemide (LASIX) 20 MG tablet     hydrocortisone 2.5 % ointment     metoprolol succinate ER (TOPROL-XL) 25 MG 24 hr tablet     Multiple Vitamins-Minerals (MULTIVITAL PO)     potassium chloride ER (KLOR-CON M) 20 MEQ CR tablet     predniSONE (DELTASONE) 20 MG tablet     simvastatin (ZOCOR) 20 MG tablet     traZODone (DESYREL) 100 MG tablet     triamcinolone (KENALOG) 0.1 % external cream     trolamine salicylate (ASPERCREME) 10 % external cream      umeclidinium (INCRUSE ELLIPTA) 62.5 MCG/INH inhaler     vitamin C (ASCORBIC ACID) 500 MG tablet     Current Facility-Administered Medications   Medication     lidocaine 1% with EPINEPHrine 1:100,000 injection 3 mL      Past Medical History:   Patient Active Problem List   Diagnosis     Optic neuritis     Acute respiratory failure with hypoxia (H)     Pulmonary embolism (H)     Transverse myelitis (H)     Neuromyelitis optica (H)     Neuromyelitis optica (devic) (H)     HTN (hypertension)     Sleep disorder     Left bundle branch block     Chronic systolic heart failure (H)     Weakness     Neurological disorder     Chronic kidney disease, stage 3     Past Medical History:   Diagnosis Date     Cerebral infarction (H)      CHF (congestive heart failure) (H)      Hypertension      Lupus (H)      Lupus (H)      Optic neuritis      Optic neuritis      Sjogren's syndrome (H)      Sjogren's syndrome (H)      Temporal arteritis (H)      TIA (transient ischemic attack)      Uncomplicated asthma        CC No referring provider defined for this encounter. on close of this encounter.

## 2021-02-25 NOTE — LETTER
2/25/2021       RE: Ladonna Sheriff  [address]  [facility] [address]     Dear Colleague,    Thank you for referring your patient, Ladonna Sheriff, to the [facility] MULTIPLE SCLEROSIS CLINIC [place] at [facility]. Please see a copy of my visit note below.    Ladonna is a 76 year old who is being evaluated via a billable video visit.      How would you like to obtain your AVS? MyChart  If the video visit is dropped, the invitation should be resent by: Other e-mail: [employer]  Will anyone else be joining your video visit? No    Video-Visit Details    Type of service:  Video Visit    Video Start Time: 3:41 PM  Video End Time:4:01 PM    Originating Location (pt. Location): Home    Distant Location (provider location):  [facility] MULTIPLE SCLEROSIS CLINIC [place]     Platform used for Video Visit: [employer] [place]      THE [facility] MULTIPLE SCLEROSIS CLINIC  FOLLOW UP VISIT     PRINCIPAL NEUROLOGIC DIAGNOSIS: Multiple Sclerosis      HISTORY OF ILLNESS:    This is a follow visit for this 76 year old right handed genetic female  With a history of MS. Who was last seen on 2-.   At that time the patient was recommended to:    Agree with dermatology follow-up with Dermatology Dr. Eagle on the 25 th, I will also see her on the 25th later that afternoon to find out why Dr. Eagle recommended. Considering the severity of the rash and how long it's taking for her to recover I do not think it is wise to return to using Soliris.     I have also advised her not to undergo the vaccination for COVID-19 at this point.  Once she has fully recovered and she is able to undergo the vaccination we will consider a different approach for her neuromyelitis optica such as CellCept.    Since last visit her lesions are improving, she is still on a tapering dose of prednisone scheduled to end on 3-10 after a week of 10 mg/day.    Current  Symptoms:  1. Skin rask  2. Numbness from the chest down  3. Legally blind    Current Outpatient Prescriptions:  Current Outpatient Medications   Medication     acetaminophen (TYLENOL) 325 MG tablet     albuterol (PROAIR HFA/PROVENTIL HFA/VENTOLIN HFA) 108 (90 Base) MCG/ACT inhaler     amLODIPine (NORVASC) 5 MG tablet     apixaban ANTICOAGULANT (ELIQUIS) 5 MG tablet     B Complex-C (VITAMIN B COMPLEX W/VITAMIN C) TABS tablet     Calcium-Vitamin D 600-200 MG-UNIT TABS     FLUoxetine (PROZAC) 40 MG capsule     furosemide (LASIX) 20 MG tablet     hydrocortisone 2.5 % ointment     metoprolol succinate ER (TOPROL-XL) 25 MG 24 hr tablet     Multiple Vitamins-Minerals (MULTIVITAL PO)     potassium chloride ER (KLOR-CON M) 20 MEQ CR tablet     predniSONE (DELTASONE) 20 MG tablet     simvastatin (ZOCOR) 20 MG tablet     traZODone (DESYREL) 100 MG tablet     triamcinolone (KENALOG) 0.1 % external cream     triamcinolone (KENALOG) 0.1 % external ointment     trolamine salicylate (ASPERCREME) 10 % external cream     umeclidinium (INCRUSE ELLIPTA) 62.5 MCG/INH inhaler     vitamin C (ASCORBIC ACID) 500 MG tablet     Current Facility-Administered Medications   Medication     lidocaine 1% with EPINEPHrine 1:100,000 injection 3 mL       ALLERGIES       Allergies   Allergen Reactions     Prevacid [Lansoprazole] Rash     Pravastatin Rash     Patient is still not sure about it due many years ago for reaction.       REVIEW OF SYSTEMS:    Comprehensive review of systems otherwise was negative, including constitutional, head and neck, cardiovascular, pulmonary, gastrointestinal, endocrine, urologic, reproductive, rheumatic, hematologic, immunologic, dermatologic, and psychiatric.    Nutritional concerns: None  Driving issues: None   Safety concerns regarding living situations and safety at home: None  Risk of falls: None  Pain: None      Impression:    NMO currently asymptomatic after Soliris infusion (n 4) which was placed on hold due to  side effects severe rash, diagnosed as discoid lupus exacerbation. Currently improving on oral prednisone taper. Lympocyte count 0.7 on 2-10-21.    Plan:    Obtain COVID vaccination when possible if OK with Dermatology.    Contninue prednisone 10 mg/day after completing tapering dose.    Start Mycophenolate 250 mg po every day 2 weeks AFTER the second vaccine, continue every day  x 1 month then BID.    RTC in 1 month.     I instructed the patient to call or mychart my office with any concerns or questions.    I spent 20 minutes in this visit, with >50% direct patient time spent counseling about prognosis, treatment options, and coordination of care.     My recommendations will be communicated back to the patient's physician(s) by mail.  Follow-up is expected to be with me.    Caterina Franklin MD  Chief, Multiple Sclerosis Division  Department of Neurology  [facility] [place]

## 2021-02-25 NOTE — PROGRESS NOTES
Ladonna is a 76 year old who is being evaluated via a billable video visit.      How would you like to obtain your AVS? [employer]  If the video visit is dropped, the invitation should be resent by: Other e-mail: [email or web address]  Will anyone else be joining your video visit? No      Video Start Time: 3:41 PM  Video-Visit Details    Type of service:  Video Visit    Video End Time:4:01 PM    Originating Location (pt. Location): Home    Distant Location (provider location):  [facility] MULTIPLE SCLEROSIS CLINIC [place]     Platform used for Video Visit: [employer]    THE [facility] MULTIPLE SCLEROSIS CLINIC  FOLLOW UP VISIT           PRINCIPAL NEUROLOGIC DIAGNOSIS: Multiple Sclerosis          HISTORY OF ILLNESS:    This is a follow visit for this 76 year old right handed genetic female  With a history of MS. Who was last seen on 2-.   At that time the patient was recommended to:    Agree with dermatology follow-up with Dermatology Dr. Eagle on the 25 th, I will also see her on the 25th later that afternoon to find out why Dr. Eagle recommended. Considering the severity of the rash and how long it's taking for her to recover I do not think it is wise to return to using Soliris.     I have also advised her not to undergo the vaccination for COVID-19 at this point.  Once she has fully recovered and she is able to undergo the vaccination we will consider a different approach for her neuromyelitis optica such as CellCept.    Since last visit her lesions are improving, she is still on a tapering dose of prednisone scheduled to end on 3-10 after a week of 10 mg/day.    Current Symptoms:  1. Skin rask  2. Numbness from the chest down  3. Legally blind    Current Outpatient Prescriptions:  Current Outpatient Medications   Medication     acetaminophen (TYLENOL) 325 MG tablet     albuterol (PROAIR HFA/PROVENTIL HFA/VENTOLIN HFA) 108 (90 Base) MCG/ACT inhaler     amLODIPine (NORVASC) 5 MG tablet     apixaban  ANTICOAGULANT (ELIQUIS) 5 MG tablet     B Complex-C (VITAMIN B COMPLEX W/VITAMIN C) TABS tablet     Calcium-Vitamin D 600-200 MG-UNIT TABS     FLUoxetine (PROZAC) 40 MG capsule     furosemide (LASIX) 20 MG tablet     hydrocortisone 2.5 % ointment     metoprolol succinate ER (TOPROL-XL) 25 MG 24 hr tablet     Multiple Vitamins-Minerals (MULTIVITAL PO)     potassium chloride ER (KLOR-CON M) 20 MEQ CR tablet     predniSONE (DELTASONE) 20 MG tablet     simvastatin (ZOCOR) 20 MG tablet     traZODone (DESYREL) 100 MG tablet     triamcinolone (KENALOG) 0.1 % external cream     triamcinolone (KENALOG) 0.1 % external ointment     trolamine salicylate (ASPERCREME) 10 % external cream     umeclidinium (INCRUSE ELLIPTA) 62.5 MCG/INH inhaler     vitamin C (ASCORBIC ACID) 500 MG tablet     Current Facility-Administered Medications   Medication     lidocaine 1% with EPINEPHrine 1:100,000 injection 3 mL          ALLERGIES       Allergies   Allergen Reactions     Prevacid [Lansoprazole] Rash     Pravastatin Rash     Patient is still not sure about it due many years ago for reaction.           REVIEW OF SYSTEMS:    Comprehensive review of systems otherwise was negative, including constitutional, head and neck, cardiovascular, pulmonary, gastrointestinal, endocrine, urologic, reproductive, rheumatic, hematologic, immunologic, dermatologic, and psychiatric.    Nutritional concerns: None  Driving issues: None   Safety concerns regarding living situations and safety at home: None  Risk of falls: None  Pain: None      Impression:    NMO currently asymptomatic after Soliris infusion (n 4) which was placed on hold due to side effects severe rash, diagnosed as discoid lupus exacerbation. Currently improving on oral prednisone taper. Lympocyte count 0.7 on 2-10-21.    Plan:    Obtain COVID vaccination when possible if OK with Dermatology.    Contninue prednisone 10 mg/day after completing tapering dose.    Start Mycophenolate 250 mg po every  day 2 weeks AFTER the second vaccine, continue every day  x 1 month then BID.    RTC in 1 month.     I instructed the patient to call or mychart my office with any concerns or questions.    I spent 20 minutes in this visit, with >50% direct patient time spent counseling about prognosis, treatment options, and coordination of care.     My recommendations will be communicated back to the patient's physician(s) by mail.  Follow-up is expected to be with me.      Caternia Franklin MD  Chief, Multiple Sclerosis Division  Department of Neurology  [facility] [place]

## 2021-02-27 NOTE — PROGRESS NOTES
I talked with and examined Ladonna Sheriff and talked with her younger son who accompanied her today. Her eruption persists as diffuse, large scaly erythematous patches involving her lower legs and arms predominantly (see images). She describes their having tiny blister like components initially then becoming very itchy. No ruth bullae have been noted by her sons or by us.  She states that she has no vision in her right eye and very limited fuzzy vision in her left eye at the moment. The skin lesions are not painful but quite pruritic.     After she saw us at the last visit she consulted on her own  Dr Mary Brizuela who has a solo practice in [place] and Dr Brizuela altered the prednisone regimen slightly. We did not contact Dr Brizuela nor did she contact us. Mrs Sheriff states that she was diagnosed as having discoid lupus many years ago and was on hydroxychloroquine for years. Apparently the diagnosis was based on a skin biopsy of a lesion on her arm. She denies involvement of the face or scalp and there have been no characteristic scars associated with the diagnosis of discoid LE. She has also been diagnosed with Sjogrens but we have no documentation of this diagnosis. She currently does on have facial or scalp lesions.     Her current skin lesions do not resemble discoid lupus in my opinion. They are more suggestive of subacute lupus which however rarely affects the lower extremities. The lesions are unusual in having central collarette scales. No atrophic features are seen. The lesions do not appear infectious. If this were a version of subacute LE I would expect a faster response to the prednisone.    It is likely that the eruption is related to Soliris (eculizumab) but why the lesions persist weeks after her last injection is difficult to explain. We should be able to reduce the itch safely with other topical and perhaps oral antihistamines. Her central needs remain her vision problems and hopefully alternate therapies for  his difficult problem will be available.

## 2021-03-03 NOTE — RESULT ENCOUNTER NOTE
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

## 2021-03-25 NOTE — NURSING NOTE
Chief Complaint   Patient presents with     Derm Problem     Patient is here today for a follow up.     Tennille REYNOLDS CMA

## 2021-03-25 NOTE — LETTER
3/25/2021       RE: Ladonna Sheriff  [address]  [facility] [id number]     Dear Colleague,    Thank you for referring your patient, Ladonna Sheriff, to the [facility] DERMATOLOG[facility]IC [place] [facility]. Please see a copy of my visit note below.    [facility] Dermatology Note   Encounter Date: Mar 25, 2021  Office Visit     Dermatology Problem List:  # Subacute cutaneous lupus erythematosus (SCLE)  - potentially drug-induced due to eculizumab  - previously dx 1980s, previous bx on arm   - bx 1/28/21: vacuolar interface dermatitis  - prednisone tapering  - CellCept after second COVID vaccine  # Neuromyelitis optica  - s/p eculizumab (Sirlolius) x 4  - current tx: CellCept  - previous tx: CellCept, rituximab  ___________________________________________    Assessment & Plan:    # Generalized subacute cutaneous lupus erythematosus (SCLE)  * acute, active, complicated, slowly improving  - complicated by history of neuromyelitis optica  - systemic treatments for SCLE include immunosuppressive such as CellCept and methotrexate or systemic retinoids, will defer to neurology to determine future treatment of neuromyelitis optica  - hopeful that severe flare will eventually resolve with time as it may have been drug-induced from eculizumab which is an antibody against complements; otherwise lacks systemic symptoms, counseled the SCLE even drug-induced SCLE rarely causes systemic lupus, re-assuring that she has no systemic symptoms suggesting SLE, will obtain rheumatologic panel to assess for auto-antibodies  - clobetasol ointment BID PRN; counseled on Saran wrap and occlusion to improve absorption  - gentle skin care reviewed  - future consideration: UVA-I with caution; counseled on mechanism of UVA-I and relative safety even with photosensitive disorders such as SCLE     Procedures Performed:  None    Follow-up: 4-6  weeks    Faculty: Fco    Staff Involved:  Resident/Staff    Rosa Chandler MD  Dermatology Resident  [facility]        ___________________________________________      CC: skin eruption     HPI:  Ms. Ladonna Sheriff is a(n) 76 year old female who presents to clinic today as a return patient for discoid lupus  - per neurology, will hold off on systemic treatment until patient fully vaccinated for COVID  - on tapering dose of prednisone  - saw Dr. Verdin 2/11/21  - has gotten more lesions on the face, hydrocortisone prescribed  - using clobetasol twice weekly  - overall feeling like skin is improving, less itchy  - completed first COVID vaccine 3/1/21, planning to re-start CellCept  - otherwise feeling well in usual state of health    Labs/Imaging:  CBC, ESR    Physical exam:  General: In no acute distress, well-developed, well-nourished  Eyes: Conjunctivae clear  Pulmonary: Breathing comfortably in no distress  CV: Well-perfused, no cyanosis  Extremities: No deformity, no edema    Skin: arms, legs chest, face, back  - skin type: fair   - scattered pink inflamed disciform ovoid annular and polycyclic plaques with trailing scale: arms, legs, upper back              Medications:  Current Outpatient Medications   Medication     acetaminophen (TYLENOL) 325 MG tablet     albuterol (PROAIR HFA/PROVENTIL HFA/VENTOLIN HFA) 108 (90 Base) MCG/ACT inhaler     amLODIPine (NORVASC) 5 MG tablet     apixaban ANTICOAGULANT (ELIQUIS) 5 MG tablet     B Complex-C (VITAMIN B COMPLEX W/VITAMIN C) TABS tablet     Calcium-Vitamin D 600-200 MG-UNIT TABS     FLUoxetine (PROZAC) 40 MG capsule     furosemide (LASIX) 20 MG tablet     hydrocortisone 2.5 % ointment     metoprolol succinate ER (TOPROL-XL) 25 MG 24 hr tablet     Multiple Vitamins-Minerals (MULTIVITAL PO)     mycophenolate (GENERIC EQUIVALENT) 250 MG capsule     potassium chloride ER (KLOR-CON M) 20 MEQ CR tablet     predniSONE (DELTASONE) 10 MG tablet     predniSONE (DELTASONE)  20 MG tablet     simvastatin (ZOCOR) 20 MG tablet     traZODone (DESYREL) 100 MG tablet     triamcinolone (KENALOG) 0.1 % external cream     triamcinolone (KENALOG) 0.1 % external ointment     trolamine salicylate (ASPERCREME) 10 % external cream     umeclidinium (INCRUSE ELLIPTA) 62.5 MCG/INH inhaler     vitamin C (ASCORBIC ACID) 500 MG tablet     Current Facility-Administered Medications   Medication     lidocaine 1% with EPINEPHrine 1:100,000 injection 3 mL      Past Medical History:   Patient Active Problem List   Diagnosis     Optic neuritis     Acute respiratory failure with hypoxia (H)     Pulmonary embolism (H)     Transverse myelitis (H)     Neuromyelitis optica (H)     Neuromyelitis optica (devic) (H)     HTN (hypertension)     Sleep disorder     Left bundle branch block     Chronic systolic heart failure (H)     Weakness     Neurological disorder     Chronic kidney disease, stage 3     Past Medical History:   Diagnosis Date     Cerebral infarction (H)      CHF (congestive heart failure) (H)      Hypertension      Lupus (H)      Lupus (H)      Optic neuritis      Optic neuritis      Sjogren's syndrome (H)      Sjogren's syndrome (H)      Temporal arteritis (H)      TIA (transient ischemic attack)      Uncomplicated asthma        CC No referring provider defined for this encounter. on close of this encounter.    Sincerely,    VIOLETTE Eagle MD

## 2021-03-25 NOTE — PROGRESS NOTES
[facility] Dermatology Note   Encounter Date: Mar 25, 2021  Office Visit     Dermatology Problem List:  # Subacute cutaneous lupus erythematosus (SCLE)  - potentially drug-induced due to eculizumab  - previously dx 1980s, previous bx on arm   - bx 1/28/21: vacuolar interface dermatitis  - prednisone tapering  - CellCept after second COVID vaccine  # Neuromyelitis optica  - s/p eculizumab (Sirlolius) x 4  - current tx: CellCept  - previous tx: CellCept, rituximab  ___________________________________________    Assessment & Plan:    # Generalized subacute cutaneous lupus erythematosus (SCLE)  * acute, active, complicated, slowly improving  - complicated by history of neuromyelitis optica  - systemic treatments for SCLE include immunosuppressive such as CellCept and methotrexate or systemic retinoids, will defer to neurology to determine future treatment of neuromyelitis optica  - hopeful that severe flare will eventually resolve with time as it may have been drug-induced from eculizumab which is an antibody against complements; otherwise lacks systemic symptoms, counseled the SCLE even drug-induced SCLE rarely causes systemic lupus, re-assuring that she has no systemic symptoms suggesting SLE, will obtain rheumatologic panel to assess for auto-antibodies  - clobetasol ointment BID PRN; counseled on Saran wrap and occlusion to improve absorption  - gentle skin care reviewed  - future consideration: UVA-I with caution; counseled on mechanism of UVA-I and relative safety even with photosensitive disorders such as SCLE     Procedures Performed:  None    Follow-up: 4-6 weeks    Faculty: Fco    Staff Involved:  Resident/Staff    Rosa Chandler MD  Dermatology Resident  [facility]        ___________________________________________      CC: skin eruption     HPI:  Ms. Ladonna Sheriff is a(n) 76 year old female who presents to clinic today as a return patient for discoid lupus  - per neurology, will  hold off on systemic treatment until patient fully vaccinated for COVID  - on tapering dose of prednisone  - saw Dr. Verdin 2/11/21  - has gotten more lesions on the face, hydrocortisone prescribed  - using clobetasol twice weekly  - overall feeling like skin is improving, less itchy  - completed first COVID vaccine 3/1/21, planning to re-start CellCept  - otherwise feeling well in usual state of health    Labs/Imaging:  CBC, ESR    Physical exam:  General: In no acute distress, well-developed, well-nourished  Eyes: Conjunctivae clear  Pulmonary: Breathing comfortably in no distress  CV: Well-perfused, no cyanosis  Extremities: No deformity, no edema    Skin: arms, legs chest, face, back  - skin type: fair   - scattered pink inflamed disciform ovoid annular and polycyclic plaques with trailing scale: arms, legs, upper back              Medications:  Current Outpatient Medications   Medication     acetaminophen (TYLENOL) 325 MG tablet     albuterol (PROAIR HFA/PROVENTIL HFA/VENTOLIN HFA) 108 (90 Base) MCG/ACT inhaler     amLODIPine (NORVASC) 5 MG tablet     apixaban ANTICOAGULANT (ELIQUIS) 5 MG tablet     B Complex-C (VITAMIN B COMPLEX W/VITAMIN C) TABS tablet     Calcium-Vitamin D 600-200 MG-UNIT TABS     FLUoxetine (PROZAC) 40 MG capsule     furosemide (LASIX) 20 MG tablet     hydrocortisone 2.5 % ointment     metoprolol succinate ER (TOPROL-XL) 25 MG 24 hr tablet     Multiple Vitamins-Minerals (MULTIVITAL PO)     mycophenolate (GENERIC EQUIVALENT) 250 MG capsule     potassium chloride ER (KLOR-CON M) 20 MEQ CR tablet     predniSONE (DELTASONE) 10 MG tablet     predniSONE (DELTASONE) 20 MG tablet     simvastatin (ZOCOR) 20 MG tablet     traZODone (DESYREL) 100 MG tablet     triamcinolone (KENALOG) 0.1 % external cream     triamcinolone (KENALOG) 0.1 % external ointment     trolamine salicylate (ASPERCREME) 10 % external cream     umeclidinium (INCRUSE ELLIPTA) 62.5 MCG/INH inhaler     vitamin C (ASCORBIC ACID) 500 MG  tablet     Current Facility-Administered Medications   Medication     lidocaine 1% with EPINEPHrine 1:100,000 injection 3 mL      Past Medical History:   Patient Active Problem List   Diagnosis     Optic neuritis     Acute respiratory failure with hypoxia (H)     Pulmonary embolism (H)     Transverse myelitis (H)     Neuromyelitis optica (H)     Neuromyelitis optica (devic) (H)     HTN (hypertension)     Sleep disorder     Left bundle branch block     Chronic systolic heart failure (H)     Weakness     Neurological disorder     Chronic kidney disease, stage 3     Past Medical History:   Diagnosis Date     Cerebral infarction (H)      CHF (congestive heart failure) (H)      Hypertension      Lupus (H)      Lupus (H)      Optic neuritis      Optic neuritis      Sjogren's syndrome (H)      Sjogren's syndrome (H)      Temporal arteritis (H)      TIA (transient ischemic attack)      Uncomplicated asthma        CC No referring provider defined for this encounter. on close of this encounter.

## 2021-04-09 NOTE — TELEPHONE ENCOUNTER
Patient requesting refill of their mycophenolate; Patient was last seen in February and has no follow up appointment scheduled. Pended rx to Dr. Franklin for signature and will send electronically to the pharmacy once signed.    Melvina James RN

## 2021-04-09 NOTE — PROGRESS NOTES
I talked with and examined Ladonna Sheriff and I agree with the assessment and the plan. RINA Eagle MD.

## 2021-04-22 PROBLEM — I48.0 PAROXYSMAL ATRIAL FIBRILLATION (H): Status: ACTIVE | Noted: 2021-01-01

## 2021-05-13 NOTE — NURSING NOTE
Dermatology Rooming Note    Ladonna Sheriff's goals for this visit include:   Chief Complaint   Patient presents with     Derm Problem     ladonna is coming in today for a follow up on the dermatitis     Cheyanne Jacobsen CMA on 5/13/2021 at 3:04 PM

## 2021-05-13 NOTE — PROGRESS NOTES
I talked with and examined Ladonna Sheriff and talked to her son and I agree with the assessment and the plan. RINA Eagle MD.

## 2021-05-13 NOTE — PROGRESS NOTES
[facility] Dermatology Note  Encounter Date: May 13, 2021  Office Visit     Dermatology Problem List:    # Subacute cutaneous lupus erythematosus (SCLE)   - diagnosed ~1984 w/ biopsy  - potentially current flare 2/2 drug-induced due to eculizumab (last infusion 1/2021)  - bx 1/28/21: vacuolar interface dermatitis  - prednisone tapering (currently on 10 mg prednisone every day)  - clobetasol to face, hydrocortisone OTC to nose and face  - CellCept BID    # Neuromyelitis optica  - s/p eculizumab (Sirlolius) x 4  - current tx: CellCept  - previous tx: CellCept, rituximab    ____________________________________________    Assessment & Plan:     # Generalized subacute cutaneous lupus erythematosus (SCLE)  Diagnosed with lupus on 1980s. Currently improving from a recent flare 2/2 eculizumab infusion for NMO (last 1/2021) and is using clobetasol and prednisone.  Course also complicated by history of neuromyelitis optica (NMO). ON Cellcept for NMO. Neurology managing NMO. Hopeful that severe flare will eventually resolve with time as it may have been drug-induced from eculizumab which is an antibody against complements; otherwise lacks systemic symptoms, counseled the SCLE even drug-induced SCLE rarely causes systemic lupus, re-assuring that she has no systemic symptoms suggesting SLE.  - clobetasol ointment BID PRN to extremities; counseled on Saran wrap and occlusion to improve absorption  - on the forehead plaques, use over the counter hydrocortisone once a day as needed  - gentle skin cares  - sunscreen SPF 30 or higher  - future consideration: UVA-I with caution; counseled on mechanism of UVA-I and relative safety even with photosensitive disorders such as SCLE    * acute, active, complicated, slowly improving    #  R nasal tip erosion  Patient has been picking this spot which started as a pimple. Area is traumatized on exam. Cannot r/o skin cancer. Patient will stop picking and if lesions is not  healing, will reassess at next visit for skin cancer. Of note, patient has a history of oral HSV, but denies hx of HSV lesion in this location.   - stop picking   -  Start over the counter hydrocortisone once a day. Until it heals  - if not picking and it does not healing despite this, assess for skin cancer at next visit    Procedures Performed:   none    Follow-up: 3 month(s) in-person, or earlier for new or changing lesions    Staff and Resident:     Dr Fco Servin MD PGY-2  Medicine-Dermatology  ____________________________________________    CC: Derm Problem (ladonna is coming in today for a follow up on the dermatitis)    HPI:  Ms. Ladonna Sheriff is a(n) 76 year old female who presents today as a return patient for lupus.   Since  3/2021 skin is improving. She is wondering about patches on face. She rubs these until the bumpy feeling disappears. They are very itchy.   Has also been seeing Dr Franklin (derm) because she was very itchy and couldn't get an appointment at the .  Patient is otherwise feeling well, without additional skin concerns.    Labs Reviewed:  N/A    Physical Exam:  Vitals: There were no vitals taken for this visit.  SKIN: Focused examination of arms, legs, upper back, scalp, face was performed.  - pink eroded papule on the R nasal tip.  - There are pink minimally scaly patches and plaques on the arms, dorsal hands, forearms, shoulders, upper back, forehead, anterior thighs.   - No other lesions of concern on areas examined.     Medications:  Current Outpatient Medications   Medication     acetaminophen (TYLENOL) 325 MG tablet     albuterol (PROAIR HFA/PROVENTIL HFA/VENTOLIN HFA) 108 (90 Base) MCG/ACT inhaler     amLODIPine (NORVASC) 5 MG tablet     apixaban ANTICOAGULANT (ELIQUIS) 5 MG tablet     B Complex-C (VITAMIN B COMPLEX W/VITAMIN C) TABS tablet     Calcium-Vitamin D 600-200 MG-UNIT TABS     FLUoxetine (PROZAC) 40 MG capsule     furosemide (LASIX) 20 MG tablet      hydrocortisone 2.5 % ointment     metoprolol succinate ER (TOPROL-XL) 25 MG 24 hr tablet     Multiple Vitamins-Minerals (MULTIVITAL PO)     mycophenolate (GENERIC EQUIVALENT) 250 MG capsule     potassium chloride ER (KLOR-CON M) 20 MEQ CR tablet     predniSONE (DELTASONE) 10 MG tablet     predniSONE (DELTASONE) 20 MG tablet     simvastatin (ZOCOR) 20 MG tablet     traZODone (DESYREL) 100 MG tablet     triamcinolone (KENALOG) 0.1 % external cream     triamcinolone (KENALOG) 0.1 % external ointment     trolamine salicylate (ASPERCREME) 10 % external cream     umeclidinium (INCRUSE ELLIPTA) 62.5 MCG/INH inhaler     vitamin C (ASCORBIC ACID) 500 MG tablet     Current Facility-Administered Medications   Medication     lidocaine 1% with EPINEPHrine 1:100,000 injection 3 mL      Past Medical History:   Patient Active Problem List   Diagnosis     Optic neuritis     Acute respiratory failure with hypoxia (H)     Pulmonary embolism (H)     Transverse myelitis (H)     Neuromyelitis optica (H)     Neuromyelitis optica (devic) (H)     HTN (hypertension)     Sleep disorder     Left bundle branch block     Chronic systolic heart failure (H)     Weakness     Neurological disorder     Chronic kidney disease, stage 3     Paroxysmal atrial fibrillation (H)     Past Medical History:   Diagnosis Date     Cerebral infarction (H)      CHF (congestive heart failure) (H)      Hypertension      Lupus (H)      Lupus (H)      Optic neuritis      Optic neuritis      Sjogren's syndrome (H)      Sjogren's syndrome (H)      Temporal arteritis (H)      TIA (transient ischemic attack)      Uncomplicated asthma        CC No referring provider defined for this encounter. on close of this encounter.

## 2021-05-13 NOTE — PATIENT INSTRUCTIONS
1. Lupus flare  Slowly improving.   - on the forehead, use over the counter hydrocortisone 1%.  - on the arms and thighs, continue to use clobetasol  - sunscreen every day    2. R nasal tip erosion  -  on the nose, use over the counter hydrocortisone 1% once a day.  - stop picking it  - if you are not picking and it does not healing despite this, let Dr Eagle know at your next appointment.     Return in 3 months or sooner if any issues.

## 2021-05-13 NOTE — LETTER
5/13/2021       RE: Ladonna Sheriff  [address]  [facility] [address]     Dear Colleague,    Thank you for referring your patient, Ladonna Sheriff, to the [facility] [place] at [facility]. Please see a copy of my visit note below.    [facility] Dermatology Note  Encounter Date: May 13, 2021  Office Visit     Dermatology Problem List:    # Subacute cutaneous lupus erythematosus (SCLE)   - diagnosed ~1984 w/ biopsy  - potentially current flare 2/2 drug-induced due to eculizumab (last infusion 1/2021)  - bx 1/28/21: vacuolar interface dermatitis  - prednisone tapering (currently on 10 mg prednisone every day)  - clobetasol to face, hydrocortisone OTC to nose and face  - CellCept BID    # Neuromyelitis optica  - s/p eculizumab (Sirlolius) x 4  - current tx: CellCept  - previous tx: CellCept, rituximab    ____________________________________________    Assessment & Plan:     # Generalized subacute cutaneous lupus erythematosus (SCLE)  Diagnosed with lupus on 1980s. Currently improving from a recent flare 2/2 eculizumab infusion for NMO (last 1/2021) and is using clobetasol and prednisone.  Course also complicated by history of neuromyelitis optica (NMO). ON Cellcept for NMO. Neurology managing NMO. Hopeful that severe flare will eventually resolve with time as it may have been drug-induced from eculizumab which is an antibody against complements; otherwise lacks systemic symptoms, counseled the SCLE even drug-induced SCLE rarely causes systemic lupus, re-assuring that she has no systemic symptoms suggesting SLE.  - clobetasol ointment BID PRN to extremities; counseled on Saran wrap and occlusion to improve absorption  - on the forehead plaques, use over the counter hydrocortisone once a day as needed  - gentle skin cares  - sunscreen SPF 30 or higher  - future consideration: UVA-I with caution; counseled on  mechanism of UVA-I and relative safety even with photosensitive disorders such as SCLE    * acute, active, complicated, slowly improving    #  R nasal tip erosion  Patient has been picking this spot which started as a pimple. Area is traumatized on exam. Cannot r/o skin cancer. Patient will stop picking and if lesions is not healing, will reassess at next visit for skin cancer. Of note, patient has a history of oral HSV, but denies hx of HSV lesion in this location.   - stop picking   -  Start over the counter hydrocortisone once a day. Until it heals  - if not picking and it does not healing despite this, assess for skin cancer at next visit    Procedures Performed:   none    Follow-up: 3 month(s) in-person, or earlier for new or changing lesions    Staff and Resident:     Dr Fco Servin MD PGY-2  Medicine-Dermatology  ____________________________________________    CC: Derm Problem (ladonna is coming in today for a follow up on the dermatitis)    HPI:  Ms. Ladonna Sheriff is a(n) 76 year old female who presents today as a return patient for lupus.   Since  3/2021 skin is improving. She is wondering about patches on face. She rubs these until the bumpy feeling disappears. They are very itchy.   Has also been seeing Dr Franklin (derm) because she was very itchy and couldn't get an appointment at the .  Patient is otherwise feeling well, without additional skin concerns.    Labs Reviewed:  N/A    Physical Exam:  Vitals: There were no vitals taken for this visit.  SKIN: Focused examination of arms, legs, upper back, scalp, face was performed.  - pink eroded papule on the R nasal tip.  - There are pink minimally scaly patches and plaques on the arms, dorsal hands, forearms, shoulders, upper back, forehead, anterior thighs.   - No other lesions of concern on areas examined.     Medications:  Current Outpatient Medications   Medication     acetaminophen (TYLENOL) 325 MG tablet     albuterol (PROAIR HFA/PROVENTIL  HFA/VENTOLIN HFA) 108 (90 Base) MCG/ACT inhaler     amLODIPine (NORVASC) 5 MG tablet     apixaban ANTICOAGULANT (ELIQUIS) 5 MG tablet     B Complex-C (VITAMIN B COMPLEX W/VITAMIN C) TABS tablet     Calcium-Vitamin D 600-200 MG-UNIT TABS     FLUoxetine (PROZAC) 40 MG capsule     furosemide (LASIX) 20 MG tablet     hydrocortisone 2.5 % ointment     metoprolol succinate ER (TOPROL-XL) 25 MG 24 hr tablet     Multiple Vitamins-Minerals (MULTIVITAL PO)     mycophenolate (GENERIC EQUIVALENT) 250 MG capsule     potassium chloride ER (KLOR-CON M) 20 MEQ CR tablet     predniSONE (DELTASONE) 10 MG tablet     predniSONE (DELTASONE) 20 MG tablet     simvastatin (ZOCOR) 20 MG tablet     traZODone (DESYREL) 100 MG tablet     triamcinolone (KENALOG) 0.1 % external cream     triamcinolone (KENALOG) 0.1 % external ointment     trolamine salicylate (ASPERCREME) 10 % external cream     umeclidinium (INCRUSE ELLIPTA) 62.5 MCG/INH inhaler     vitamin C (ASCORBIC ACID) 500 MG tablet     Current Facility-Administered Medications   Medication     lidocaine 1% with EPINEPHrine 1:100,000 injection 3 mL      Past Medical History:   Patient Active Problem List   Diagnosis     Optic neuritis     Acute respiratory failure with hypoxia (H)     Pulmonary embolism (H)     Transverse myelitis (H)     Neuromyelitis optica (H)     Neuromyelitis optica (devic) (H)     HTN (hypertension)     Sleep disorder     Left bundle branch block     Chronic systolic heart failure (H)     Weakness     Neurological disorder     Chronic kidney disease, stage 3     Paroxysmal atrial fibrillation (H)     Past Medical History:   Diagnosis Date     Cerebral infarction (H)      CHF (congestive heart failure) (H)      Hypertension      Lupus (H)      Lupus (H)      Optic neuritis      Optic neuritis      Sjogren's syndrome (H)      Sjogren's syndrome (H)      Temporal arteritis (H)      TIA (transient ischemic attack)      Uncomplicated asthma        CC No referring  provider defined for this encounter. on close of this encounter.      I talked with and examined Ladonna Sheriff and talked to her son and I agree with the assessment and the plan. RINA Eagle MD.

## 2021-05-17 NOTE — DISCHARGE INSTRUCTIONS
MRI Contrast Discharge Instructions    The IV contrast you received today will pass out of your body in your  urine. This will happen in the next 24 hours. You will not feel this process.  Your urine will not change color.    Drink at least 4 extra glasses of water or juice today (unless your doctor  has restricted your fluids). This reduces the stress on your kidneys.  You may take your regular medicines.    If you are on dialysis: It is best to have dialysis today.    If you have a reaction: Most reactions happen right away. If you have  any new symptoms after leaving the hospital (such as hives or swelling),  call your hospital at the correct number below. Or call your family doctor.  If you have breathing distress or wheezing, call [phone].    Special instructions: ***    I have read and understand the above information.    Signature:______________________________________ Date:___________    Staff:__________________________________________ Date:___________     Time:__________    [place] [facility] Departments:    ___[place]: [phone]  ___[facility]: [phone]  ___[place]: [phone] ___[facility]: [phone]  ___[facility]: [phone]  ___[facility]: [phone]  ___[phone]  ___[facility]: [phone]  ___[phone]

## 2021-05-17 NOTE — DISCHARGE INSTRUCTIONS
MRI Contrast Discharge Instructions    The IV contrast you received today will pass out of your body in your  urine. This will happen in the next 24 hours. You will not feel this process.  Your urine will not change color.    Drink at least 4 extra glasses of water or juice today (unless your doctor  has restricted your fluids). This reduces the stress on your kidneys.  You may take your regular medicines.    If you are on dialysis: It is best to have dialysis today.    If you have a reaction: Most reactions happen right away. If you have  any new symptoms after leaving the hospital (such as hives or swelling),  call your hospital at the correct number below. Or call your family doctor.  If you have breathing distress or wheezing, call [phone].    Special instructions: ***    I have read and understand the above information.    Signature:______________________________________ Date:___________    Staff:__________________________________________ Date:___________     Time:__________    [place] Departments:    ___[place]: [phone]  ___[facility]: [phone]  ___[place]: [phone] ___[facility]: [phone]  ___[facility]: [phone]  ___[facility]: [phone]  ___[phone]  ___[facility]: [phone]  ___[phone]

## 2021-05-17 NOTE — DISCHARGE INSTRUCTIONS
MRI Contrast Discharge Instructions    The IV contrast you received today will pass out of your body in your  urine. This will happen in the next 24 hours. You will not feel this process.  Your urine will not change color.    Drink at least 4 extra glasses of water or juice today (unless your doctor  has restricted your fluids). This reduces the stress on your kidneys.  You may take your regular medicines.    If you are on dialysis: It is best to have dialysis today.    If you have a reaction: Most reactions happen right away. If you have  any new symptoms after leaving the hospital (such as hives or swelling),  call your hospital at the correct number below. Or call your family doctor.  If you have breathing distress or wheezing, call 911.    Special instructions: ***    I have read and understand the above information.    Signature:______________________________________ Date:___________    Staff:__________________________________________ Date:___________     Time:__________    [facility] Departments:    ___[place]: [phone]  ___[facility]: [phone]  ___[place]: [phone] ___[facility]: [phone]  ___[facility]: [phone]  ___[facility]: [phone]  ___[phone]  ___[facility]: [phone]  ___[phone]

## 2021-06-17 NOTE — PROGRESS NOTES
Assessment & Plan     Ladonna Sheriff is a 76 year old female with the following diagnoses:   1. 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 Last activeleft optic neurits in 7/14/2020 for which is received received intravenous steroids. She presented today for a regular . Last seen in 9/2020. She reports slow decline in vision in the left eye. She does have intermittent sharp pain in the right eye but denies eye aches or pain with eye movements. She has numbness from the waist down. She has a virtual visit with Dr Franklin on 6/24/2021. She is currently on prednisone 10mg a day and mycophenolate 250mg twice a day    .history of MS?    Dr. Franklin 2/25/2021  Contninue prednisone 10 mg/day after completing tapering dose.   Start Mycophenolate 250 mg po every day 2 weeks AFTER the second vaccine, continue every day  x 1 month then BID    MRI brain with and without contrast 5/17/2021  Impression:   1. Greater than 20 foci of T2-hyperintensity within the cerebral white  matter consistent with the clinical suspicion of demyelinating  disease, mildly increased since 7/13/2020, most notably in the left  parieto-occipital region.   2. No definite foci of abnormal enhancement, though mildly limited due  to motion artifact    MR CERVICAL SPINE W/O & W CONTRAST, MR THORACIC SPINE W/O  & W CONTRAST 5/17/2021   Impression:   1. Cervical spine: Probable focus of T2 hyperintensity in the right  aspect of the cord at the level of C6 which may have been present on  the prior study which was degraded by motion.  2. Thoracic spine: Abnormal T2 signal seen from about the level of T3  extending to at least the level of T5-6 which correlates with that  previously was enhancing on the prior study. 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 She also has left epiretinal membrane that is tugging on the retina that might be causing decline in visual acuity. Will send her to low vision occupational therapy, if she is not happy with the refraction might consider sending to retina specialist for surgical evaluation but she is not very enthusiastic about surgery               Attending Physician Attestation:  Complete documentation of historical and exam elements from today's encounter can be found in the full encounter summary report (not reduplicated in this progress note).  I personally obtained the chief complaint(s) and history of present illness.  I confirmed and edited as necessary the review of systems, past medical/surgical history, family history, social history, and examination findings as documented by others; and I examined the patient myself.  I personally reviewed the relevant tests, images, and reports as documented above.  I formulated and edited as necessary the assessment and plan and discussed the findings and management plan with the patient and family. I personally reviewed the ophthalmic test(s) associated with this encounter, agree with the interpretation(s) as documented by the resident/fellow, and have edited the corresponding report(s) as necessary.  - Kaz Alarcon MD  Neuro-ophthalmology fellow  [facility]

## 2021-06-17 NOTE — NURSING NOTE
"Chief Complaints and History of Present Illnesses   Patient presents with     Blurred Vision Follow-Up     Chief Complaint(s) and History of Present Illness(es)     Blurred Vision Follow-Up               Comments     Ladonna Sheriff is a 76 year old female with the following diagnoses:   1. Visual field defect     FEELS VISION IS MUCH WORSE SINCE THE LAST VISIT.   \"NOTHING BUT MOSTLY CLOUDS\".     Genie Dalemamarychuy CO 2:02 PM June 17, 2021                        "

## 2021-06-21 PROBLEM — I63.9 CEREBROVASCULAR ACCIDENT (CVA), UNSPECIFIED MECHANISM (H): Status: ACTIVE | Noted: 2021-01-01

## 2021-06-21 NOTE — CONSULTS
"[facility]    Stroke Telephone Note    I was called by Ray Liao on 06/20/21 regarding patient Ladonna Sheriff. The patient is a 76 year old female with history of stroke on apixaban, a fib, and lupus who presents as a stroke code for sudden onset witnessed headache followed by progressive encephalopathy culminating in intubation for airway protection. She is reportedly moving all limbs, but perhaps there is a subtle asymmetry with less robust movement on the right.    Stroke Code Data (for stroke code without tele)  Stroke code activated 06/20/21 2235   Stroke provider first response  06/20/21 2237            Last known normal 06/20/21 2129        Time of discovery   (or onset of symptoms) 06/20/21 2130   Head CT read by Stroke Neuro Dr/Provider 06/20/21   2300   Was stroke code de-escalated? Yes 06/20/21 2315  other (see comments)(top of the basilar occlusion not seen on source images, detected by radiology)       Thrombolytic Treatment   Not given due to DOAC dose within 48 hours or INR > 1.7.    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    Recommendations   - Use orderset: \"Ischemic Stroke Post-Thrombolytics/Thrombectomy ICU Admission\"  - Neurochecks and vital signs per post-thrombectomy orders and monitor closely for any evidence of CNS hemorrhage  - Goal BP PENDING NEURO IR PROCEDURE OUTCOME  - Statin:  atorvastatin 40 mg daily, adjustment pending LDL, goal 40-70  - Repeat Head CT 24 hrs post-thrombolytic  - TTE (with Bubble Study if age 60 yrs or less)  - Telemetry, EKG  - Bedside Glucose Monitoring  - Nutrition: NPO  - A1c, Lipid Panel, Troponin x 3  - PT/OT/SLP  - Stroke Education  - Euthermia, Euglycemia    My recommendations are based on the information provided over the phone by Ladonna Sheriff's in-person providers. They " "are not intended to replace the clinical judgment of her in-person providers. I was not requested to personally see or examine the patient at this time.    The overnight Stroke Staff is Dr. Early. The patient will be rounded on in the am by Navdeep Perdomo and Marilynn.    Liss Vaughn MD  Vascular Neurology Fellow  To page me or covering stroke neurology team member, click here: AMCOM   Choose \"On Call\" tab at top, then search dropdown box for \"Neurology Adult\", select location, press Enter, then look for stroke/neuro ICU/telestroke.    "

## 2021-06-21 NOTE — PROGRESS NOTES
Care Management Follow Up    Length of Stay (days): 0    Expected Discharge Date: 06/22/21(Hospice)     Concerns to be Addressed:     Discharge plans and hospice  Patient plan of care discussed at interdisciplinary rounds: Yes    Anticipated Discharge Disposition:  SNF with hospice care   Anticipated Discharge Services:  hospice  Anticipated Discharge DME:  N/A    Patient/family educated on Medicare website which has current facility and service quality ratings:  N/A  Education Provided on the Discharge Plan:  yes  Patient/Family in Agreement with the Plan:  yes    Referrals Placed by CM/SW:  SNF referrals, will make a hospice agency referral once the facility is secured and we know the facility preferred providers  Private pay costs discussed: Not applicable, patient has MA therefore room & board should be covered    Additional Information:  Call placed to patient's son Hayden, who is patient's health care agent, to discuss discharge plans. Patient's son states that they have made the decision to transition patient to hospice care.  Hayden states that patient has very few finances and they are looking at having patient go to a SNF.  Patient has MA therefore the SNF should be covered under patient's insurance.  Hayden states that she would like for patient to be as close to home as possible.  Explained that we will do our best to get her as close to home as possible, but when we are looking for a hospice bed we are looking for a long term care bed so that sometimes means that the beds are harder to find.  [place]  Patient's son states he understands.  Also explained that we need to have him choose a hospice agency.  Patient's son states that he has no preference as to hospice agencies.  Explained that we will secure a bed first and then we will inquire as to what the facilities preferred hospice agencies are.  Patient's son states he understands and he is in agreement.   Referrals sent, via discharge on the double, to check the bed availability at [facility], [facility], [facility], and [facility].  Patient's son is also asking if we have a life expectancy.  Explained that I can ask the doctor this question and see if they know.  Patient's son states that they are hoping not to prolong patient's life to much.    Will continue to follow.      CALDERON Villanueva, [facility]    [phone]  [facility]

## 2021-06-21 NOTE — ED PROVIDER NOTES
History   Chief Complaint:  Altered mental status      HPI   Ladonna Sheriff is a 76 year old female with history of cerebral infarction on Eliquis, CHF, hypertension, lupus, TIA and atrial fibrillation who presents with altered mental status. EMS reports that at 2120 tonight the patient was sitting in her chair at home with she suddenly grabbed her head. She then began slurring her words and acting altered, causing her son to call EMS. EMS reports that when they arrived the patient was reaching out for things that were not there and was minimally responsive. She was able to follow simple commands, but did not respond to much else. Additionally, the patient began vomiting while with EMS and was given 4 mg of Zofran. Her blood sugar was 92 and her blood pressure was 166/96. She is noted to on Eliquis for a prior stroke, though there are no known residual deficits. The patient is unable to provide any history. The son of the patient later arrived and stated that her symptoms began suddenly shortly after 2100 tonight. He reports that she had a stroke over 10 years ago and has a history of TIA, but has been doing well otherwise.     Review of Systems   Unable to perform ROS: Mental status change       Allergies:  Prevacid [Lansoprazole]  Pravastatin    Medications:  Albuterol  Norvasc  Eliquis  Zyrtec  Prozac  Toprol  Lopressor  Mycophenolate   Klor-Con  Deltasone  Zocor  Deysrel  Incrues ellipta    Past Medical History:    Cerebral infarction  Congestive heart failure  Hypertension  Lupus  Optic neuritis  Sjogren's syndrome  Temporal arteritis  Transient ischemic attack   Asthma   Paroxysmal atrial fibrillation  Chronic kidney disease   Respiratory failure with hypoxia  Transverse myelitis  Diverticulosis   Right eye blindness    Past Surgical History:    Cholecystectomy  Hysterectomy  Tubal ligation  CVC placement x3  CVC removal   Hernia repair     Family History:    Mother: asthma  Sister: lupus  Brother: bone  cancer  Sister: kidney cancer     Social History:  The patient presents alone via EMS, though her son arrives later.     Physical Exam     Patient Vitals for the past 24 hrs:   BP Pulse Resp SpO2 Weight   06/21/21 0010 135/74 56 20 99 % --   06/21/21 0000 124/63 55 20 99 % --   06/20/21 2350 123/63 58 20 99 % --   06/20/21 2340 121/64 57 19 99 % --   06/20/21 2330 121/66 57 20 99 % --   06/20/21 2320 123/69 61 20 99 % --   06/20/21 2310 (!) 175/86 68 19 100 % --   06/20/21 2239 -- -- -- -- 98 kg (216 lb 0.8 oz)   06/20/21 2230 (!) 178/81 63 30 97 % --   06/20/21 2220 (!) 163/104 80 28 (!) 78 % --       Physical Exam  SKIN:  Warm, dry.  Atraumatic.  No rash.  No jaundice.  HEMATOLOGIC/IMMUNOLOGIC/LYMPHATIC:  No pallor.  No limb edema.  HENT: Vomit noted on oropharyngeal exam.  No stridor.    EYES:  Conjunctivae normal.  Pupils equal round and reactive to light.  Cannot assess visual fields given the patient's condition on arrival.  No forced deviation.  CARDIOVASCULAR:  Regular rate and rhythm.  No murmur.  RESPIRATORY:  No respiratory distress, breath sounds equal and normal.  GASTROINTESTINAL:  Soft abdomen.  No distention.  MUSCULOSKELETAL: Overweight body habitus.  NEUROLOGIC: Obtunded, withdraws all extremities to painful stimuli but left greater than right.  Extremities are not hypertonic.  PSYCHIATRIC: Unable to assess in current condition.    Emergency Department Course   Imaging:  CT Head WO Contrast  1.  No acute intracranial hemorrhage.   2.  Basilar tip is hyperdense concerning for acute clot.  3.  Otherwise, no CT evidence of acute infarct. Aspect score 10.  4.  Chronic intracranial changes described above similar in appearance compared to prior MRI brain 06/20/2019.  Reading per radiology.    CTA Head Neck W Contrast  HEAD CTA:   1.  Tip basilar artery demonstrates acute clot extending into the left superior cerebellar artery and bilateral proximal P1 segments.  2.  Left proximal V4 segment moderate  stenosis secondary to calcified plaque. Left distal V4 segment demonstrates mixing artifact limiting assessment.      NECK CTA:  1.  Cervical arteries demonstrate no significant stenosis/occlusion or dissection.  2.  Heterogeneous thyroid gland likely with adjacent fat stranding could suggest thyroiditis.    3.  Esophageal fluid column extends to the hypopharynx may reflect reflux and/or dysmotility.  Reading per radiology.    Laboratory:  CBC: WBC 8.6, HGB 13.3,    BMP: Glucose 102 (H), GFR 59 (L) o/w WNL (Creatinine 0.93)     PTT: 28  INR: 1.01     Asymptomatic COVID19 Virus PCR by nasopharyngeal swab: Negative      [facility]    -Intubation    Date/Time: 6/20/2021 10:21 PM  Performed by: Ray Liao MD  Authorized by: Ray iLao MD     ED EVALUATION:      Assessment Time: 6/20/2021 10:21 PM      I have performed an Emergency Department Evaluation including taking a history and physical examination, this evaluation will be documented in the electronic medical record for this ED encounter.      NPO Status: not NPO, emergent situation  UNIVERSAL PROTOCOL   Site Marked: NA  Prior Images Obtained and Reviewed:  NA  Required items: Required blood products, implants, devices and special equipment available    Patient identity confirmed:  Anonymous protocol, patient vented/unresponsive  Patient was reevaluated immediately before administering moderate or deep sedation or anesthesia  Confirmation Checklist:  Patient's identity using two indicators, procedure was appropriate and matched the consent or emergent situation, correct equipment/implants were available and relevant allergies  Universal Protocol: the Joint Commission Universal Protocol was followed          PRE-PROCEDURE DETAILS     Patient status:  Altered mental status    Paralytics:  Succinylcholine        SEDATION    Patient Sedated: Yes    Sedation Type:  Deep  Sedation:  Etomidate and propofol  Vital signs:  Vital signs monitored during sedation    PROCEDURE DETAILS     Preoxygenation:  Bag valve mask    Intubation method:  Oral    Tube size (mm):  7.5    Tube type:  Cuffed    Number of attempts:  1    Cricoid pressure: yes      Tube visualized through cords: yes      PLACEMENT ASSESSMENT     Tube secured with:  ETT vera    Breath sounds:  Equal and absent over the epigastrium    Placement verification: chest rise, condensation, direct visualization, equal breath sounds, ETCO2 detector and tube exhalation      CXR findings:  ETT in proper place  PROCEDURE   Patient Tolerance:  Patient tolerated the procedure well with no immediate complications          Interventions:  2230 Etomidate 30 mg IV   2230 Succinylcholine 100 mg IV   2235 Propofol 20 mcg IV     Emergency Department Course:  2214 The patient arrived with EMS and I was in the room to receive report.     2215 I recieved report from EMS and performed a physical exam of the patient.     2217 IV inserted and blood drawn. This was sent to the lab for further testing, results above.     2226 The son of the patient arrived and was able to provide further history. We also discussed code status at this time.     2228 Dr. Anglin, who stopped by to assist, spoke further with the son regarding the plan for the patient's care.     2230 I attempted to intubate the patient, details documented above.     2234 I paged out a code stroke, tier 1.      2237 The patient left the emergency department for imaging.     2238 I spoke with Dr. Liss Vaughn, stroke neurology, regarding the patient.     2316 I spoke with Dr. Vaughn again. She did not see any bleed on initial imaging and is recommending an MRI.     2318 I spoke further with the son's of the patient regarding her presentation tonight and the plan for her care.     2326 I spoke with Dr. Edgar of radiology regarding the CT results. He notes seeing an occlusion.    2329 I rechecked on the patient and updated her family. I also asked  for Dr. Vaughn to be paged. The interventionalist team in en route.     0000 I checked in on the family and they are uncertain whether to proceed with an interventional thrombectomy.     0008 I spoke with Dr. Vaughn again and we discussed the patient.     0021 I checked on the family again and they are now undecided.     0025 I spoke with Dr. Gary Rendon, inverventionalist, who agreed to admit the patient.     0030 I checked in with the family and the interventionalist is now at the bedside discussing options with the family.     0053 I checked in with the patient's family again and they are now discussing possibly placing her on comfort care.     0109 I spoke with Dr. Gary Rendon, interventionalist and updated him regarding the family's decision.     0117 I spoke with a hospitalist, Dr. Pierre who agreed to accept the patient for admission.     The patient was extubated prior to being transferred to the floor.     Disposition:  The patient was admitted to the hospital under the care of Dr. Pierre.       Impression & Plan   CMS Diagnoses: The patient has stroke symptoms:         ED Stroke specific documentation           NIHSS PDF     Patient last known well time: 2125  ED Provider first to bedside at: 2214  CT Results received at: 2326    Thrombolytics:   Not given due to DOAC dose within 48 hours or INR > 1.7.    If treating with thrombolytics: Ensure SBP<180 and DBP<105 prior to treatment with thrombolytics.  Administering thrombolytics after treatment with IV labetalol, hydralazine, or nicardipine is reasonable once BP control is established.    Endovascular Retrieval:  Proximal vessel occlusion present, but endovascular treatment not initiated due to Family/power of  declined the procedure    National Institutes of Health Stroke Scale (Baseline)  Time Performed: On arrival     Score    Level of consciousness: (3)   Responds w/ reflex motor/autonomic effect or no response    LOC questions: (2)    Answers neither question correctly    LOC commands: (2)   Performs neither task correctly    Best gaze: (0)   Normal    Visual: (0)   No visual loss    Facial palsy: (0)   Normal symmetrical movements    Motor arm (left): (3)   No effort against gravity    Motor arm (right): (3)   No effort against gravity    Motor leg (left): (3)   No effort against gravity    Motor leg (right): (3)   No effort against gravity    Limb ataxia: (0)   Absent    Sensory: (0)   Normal- no sensory loss    Best language: (3)   Mute- global aphasia    Dysarthria: UN Intubated or other physical barrier:     Extinction and inattention: (0)   No abnormality        Total Score:  22        Stroke Mimics were considered (including migraine headache, seizure disorder, hypoglycemia (or hyperglycemia), head or spinal trauma, CNS infection, Toxin ingestion and shock state (e.g. sepsis) .    Medical Decision Making:  Ladonna Sheriff is a 76 year old female with altered mentation and sudden onset headache. 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 This would correlate and explain her presentation. She was quite altered on arrival and not protecting her airways so was intubated as soon as she arrived. Hemodynamically stable. After consultation with neurology and further family discussion ultimately it was decided not to go forward with thrombectomy for the stroke. The patient's family had frequent discussion with their mother that her quality of life was poor and she would not want to survive a debilitating stroke in even worse condition than she had already been in with her other health issues.  The patient will be admitted to a medical floor for comfort care.    Critical Care Time: was 75 minutes for this patient excluding procedures    Covid-19  Ladonna Sheriff was evaluated during a global COVID-19 pandemic, which necessitated consideration that the patient might be at risk for infection with the SARS-CoV-2 virus that causes COVID-19.   Applicable  protocols for evaluation were followed during the patient's care.   COVID-19 was considered as part of the patient's evaluation. The plan for testing is:  a test was obtained during this visit.    Diagnosis:    ICD-10-CM    1. Cerebrovascular accident (CVA), unspecified mechanism (H)  I63.9          Scribe Disclosure:  Lazaro THORNTON, am serving as a scribe on 6/20/2021 at 10:26 PM to personally document services performed by Ray Liao MD based on my observations and the provider's statements to me.          Ray Liao MD  06/21/21 0137

## 2021-06-21 NOTE — PROGRESS NOTES
RECEIVING UNIT ED HANDOFF REVIEW    ED Nurse Handoff Report was reviewed by: Gary Walsh RN on June 21, 2021 at 1:54 AM

## 2021-06-21 NOTE — PROGRESS NOTES
Extubation Note      Extubation time: 0115    Patient assessment:  Lung sounds:Clear  Stridor Present (Yes or No): No  Patient tolerance: Good    Oxygen device:  Liter flow: 6L Oximask  SpO2: 100    Plan: Extubated patient for comfort care.    6/21/2021  Sagrario Paul

## 2021-06-21 NOTE — PROGRESS NOTES
Care Management Follow Up    Length of Stay (days): 0    Expected Discharge Date: 06/22/21(Hospice facility)     Concerns to be Addressed:     Discharge planning  Patient plan of care discussed at interdisciplinary rounds: Yes    Anticipated Discharge Disposition:  SNF placement with hospice     Anticipated Discharge Services:  hospice  Anticipated Discharge DME:  N/A    Patient/family educated on Medicare website which has current facility and service quality ratings:  N/A  Education Provided on the Discharge Plan:  N/A  Patient/Family in Agreement with the Plan:  yes    Referrals Placed by CM/SW:  SNF referrals  Private pay costs discussed: Not applicable    Additional Information:  Received a call back from [facility].  Per [place]  They can clinically accept patient, however her MA is a limited MA plan which is an ACG plan and doesn't have SNF coverage therefore patient will need a long term care MA application completed.  Cheyenne states they will have a bed available pending the MA application, but she anticipates that it will be approved since patient has the ACG waiver.  Call placed to update patient's son Hayden.  He will be the main contact for the financial office.  Hayden also states that patient has a [facility]  and his brother Bandar has this information.  Call placed and message left for Bandar, [phone] to request this information.  Cheyenne states that they have contracts with [facility], [facility], [facility], [facility], [facility], and [facility].      Will continue to follow.      CALDERON Villanueva, [facility]    [phone]  [facility]

## 2021-06-21 NOTE — PROGRESS NOTES
[facility]    Hospitalist Interim Progress Note      Please see admission H&P from Dr. Pierre from earlier this morning.    In the interim, patient remains unresponsive, on AM encounter and nursing input patient appears comfortable however continued excess secretions requiring suctioning.  PRN SL atropine changed to scopolamine patch with PRN robinol added.   SW to assist with disposition and hospice coordination.  Continue comfort care measures.    MD Shirlene  Internal Medicine  [facility]

## 2021-06-21 NOTE — SIGNIFICANT EVENT
Vascular neurology chart update note    The patient's goals of care are noted to have been changed to comfort measures only.  The stroke team will sign off.  Please call with questions.    Christiano Perdomo  Stroke Fellow

## 2021-06-21 NOTE — ED NOTES
[facility]  ED Nurse Handoff Report    ED Chief complaint: Stroke Symptoms and Altered Mental Status      ED Diagnosis:   Final diagnoses:   Cerebrovascular accident (CVA), unspecified mechanism (H)       Code Status: Comfort Care    Allergies:   Allergies   Allergen Reactions     Prevacid [Lansoprazole] Rash     Pravastatin Rash     Patient is still not sure about it due many years ago for reaction.       Patient Story: altered mentation  Focused Assessment:  Pt had sudden headache at 2120 tonight. CT shows basilar occulsion that sons have decided to not attempt to evacuate. Pt is put on comfort measures per family request     Treatments and/or interventions provided: see chart  Patient's response to treatments and/or interventions: good    To be done/followed up on inpatient unit:  comfort meds    Does this patient have any cognitive concerns?: Disoriented to situation    Activity level - Baseline/Home:  Ax1  Activity Level - Current:   Total Care    Patient's Preferred language: English   Needed?: No    Isolation: None  Infection: Not Applicable  Patient tested for COVID 19 prior to admission: YES  Bariatric?: No    Vital Signs:   Vitals:    06/21/21 0020 06/21/21 0030 06/21/21 0050 06/21/21 0100   BP: (!) 141/69 (!) 140/75 (!) 142/73 (!) 145/76   Pulse: 55 54 53 52   Resp: 20 19 20 26   SpO2: 100% 100% 100% 100%   Weight:           Cardiac Rhythm:     Was the PSS-3 completed:   Yes  What interventions are required if any?               Family Comments: sons here  OBS brochure/video discussed/provided to patient/family: N/A              Name of person given brochure if not patient: NA              Relationship to patient: NA    For the majority of the shift this patient's behavior was Green.   Behavioral interventions performed were none.    ED NURSE PHONE NUMBER: *[id number]

## 2021-06-21 NOTE — PLAN OF CARE
Arrived to unit from ED at 0200. SERGIO orientation. Groaning and restless, prn comfort medications dilaudid and ativan given. Palmer in place. Family at bedside. VS and assessment deferred d/t comfort care status.

## 2021-06-21 NOTE — CONSULTS
"Care Management Initial Consult    General Information  Assessment completed with: Herlinda, yon Portillo  Type of CM/SW Visit: Initial Assessment    Primary Care Provider verified and updated as needed: Yes   Readmission within the last 30 days: no previous admission in last 30 days         Advance Care Planning: Advance Care Planning Reviewed: present on chart, (verified with yon Portillo)          Communication Assessment  Patient's communication style: spoken language (English or Bilingual)         Cognitive  Cognitive/Neuro/Behavioral: .WDL except  Level of Consciousness: unresponsive  Arousal Level: unresponsive  Orientation: other (see comments)(SERGIO)  Mood/Behavior: other (see comments)(SERGIO)     Speech: SERGIO (unable to assess)    Living Environment:   People in home: child(mary), adult     Current living Arrangements:    Yon Moreno lives w/ her- is her \"PCA\"    Able to return to prior arrangements:         Family/Social Support:  Care provided by:  jennie  Provides care for:       Description of Support System: Supportive     Current Resources:   Patient receiving home care services: No     Community Resources:  none  Equipment currently used at home:  walker  Supplies currently used at home:      Employment/Financial:  Employment Status:        Financial Concerns:   none          Lifestyle & Psychosocial Needs:        Socioeconomic History     Marital status:      Spouse name: Not on file     Number of children: Not on file     Years of education: Not on file     Highest education level: Not on file     Tobacco Use     Smoking status: Former Smoker     Packs/day: 0.00     Smokeless tobacco: Never Used   Substance and Sexual Activity     Alcohol use: Yes     Alcohol/week: 7.0 standard drinks     Types: 7 Shots of liquor per week     Comment: occ     Drug use: No       Functional Status:  Prior to admission patient needed assistance:   Dependent ADLs:: Ambulation-walker  Dependent IADLs:: Medication Management   "     Mental Health Status:      Chemical Dependency Status:  Values/Beliefs:  Spiritual, Cultural Beliefs, Baptism Practices, Values that affect care:                 Additional Information:    Met with son Bandar at bedside. He moved in with her 4 months ago and assists her w/ cares and med management. She is almost blind and has minimal feeling below the waist- from a progressive neuromuscular disease. Son Hayden is HCA - per Bandar he will be in later today.  Brief conversation regarding Hospice. He feels a Hospice facility would be the best option.  SW updated

## 2021-06-21 NOTE — PLAN OF CARE
Continue comfort cares, VS & full assessment deferred. Mostly somnolent, groaning & restless at times - improvement after IV dilaudid & ativan. Copious secretions despite scopolamine patch (in place behind R ear), MD notified & IV robinul ordered, given x1. Oral suction at bedside to help with secretions. Palmer in place. T/R q2 hours. Family at bedside.

## 2021-06-21 NOTE — PROVIDER NOTIFICATION
MD Notification    Notified Person: MD    Notified Person Name: Dr. Hunt     Notification Date/Time: 13:11, 6/21/21    Notification Interaction: text page     Purpose of Notification: Pt foaming at mouth, could we get atropine reordered? Thank you!     Orders Received:    PRN robinul ordered

## 2021-06-21 NOTE — PROGRESS NOTES
Intubation Note    Date of intubation: 06/20/21  Time of intubation: 2221  Location of intubation: oral  Intubated by: MD  Size of ETT: 7.5  Location (cm) at teeth: 25    ETT placement confirmed by: MD  6/21/2021  Sagrario Paul

## 2021-06-21 NOTE — ED NOTES
Bed: ST02  Expected date:   Expected time:   Means of arrival:   Comments:  596 76f AMS stroke eta 10

## 2021-06-21 NOTE — H&P
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     PLAN    Suspected acute basilar artery ischemic stroke: She arrived obtunded and was initially intubated. Her sons have arrived and explain that at baseline, due to optic neuritis she is legally blind and due to transverse myelitis she is very weak, and has often complained of low quality of life and that she is ready to die and join her previously  . She has been extubated to comfort cares in the ED and awaits private room for hospitalization overnight.    -- Inpatient. Comfort care order set including IV and SL Dilaudid, Ativan, and Zyprexa ordered as well as Atropine drops for secretions. Social work consulted in AM for disposition planning and hospice coordination at family request.    Chronic medical conditions include:    SLE    Sjogrens Syndrome    PE on Eliquis    MS    Chronic diastolic CHF with mild to moderate Aortic Stenosis seen on TTE in 2019    Rule Out COVID-19 infection  This patient was evaluated during a global COVID-19 pandemic, which necessitated consideration that the patient might be at risk for infection with the SARS-CoV-2 virus that causes COVID-19. Applicable protocols for evaluation were followed during the patient's care. Low suspicion for infection.   -negative COVID-19 PCR test result  -no current indication for precautions    Chief Complaint   Headache    Unable to obtain a history from the patient due to confusion; history obtained from her sons and the ED physician whom I have spoken  "with    History of Present Illness   Ladonna Sheriff is a 76 year old woman who presents with sharp, severe headache; it seems while at home sitting in a chair this evening she suddenly grabbed her head, and then developed associated dysphasia and confusion as well as nausea and vomiting. EMS was called and by the time of arrival to the ED she was obtunded. The obtundation persists on my assessment now, several hours later. No further history obtainable at the present time.    In the ED, Blood pressure (!) 145/76, pulse 52, resp. rate 26, weight 98 kg (216 lb 0.8 oz), SpO2 100 %.    Code stroke was called in the ED and she was intubated. Plans were made for IR guided procedure but her sons on arrival and after a care conference she has now been extubated to comfort care. She has received Robinul and Morphine thus far in the ED.    CBC and BMP were within normal reference range. COVID was negative.     CT Head showed: \"IMPRESSION:  1.  No acute intracranial hemorrhage.   2.  Basilar tip is hyperdense concerning for acute clot.  3.  Otherwise, no CT evidence of acute infarct. Aspect score 10.  4.  Chronic intracranial changes described above similar in appearance compared to prior MRI brain 06/20/2019.\"    CTA Head and Neck showed: \"IMPRESSION:   HEAD CTA:   1.  Tip basilar artery demonstrates acute clot extending into the left superior cerebellar artery and bilateral proximal P1 segments.  2.  Left proximal V4 segment moderate stenosis secondary to calcified plaque. Left distal V4 segment demonstrates mixing artifact limiting assessment.    NECK CTA:  1.  Cervical arteries demonstrate no significant stenosis/occlusion or dissection.  2.  Heterogeneous thyroid gland likely with adjacent fat stranding could suggest thyroiditis.    3.  Esophageal fluid column extends to the hypopharynx may reflect reflux and/or dysmotility.\"    PHYSICAL EXAM  Blood pressure (!) 145/76, pulse 52, resp. rate 26, weight 98 kg (216 lb 0.8 oz), " SpO2 100 %.  Constitutional: Obtunded; no apparent distress  HEENT: normocephalic moist mucus membranes  Neck: Supple, no masses  Respiratory: lungs clear to auscultation bilaterally  Cardiovascular: Irregular S1 S2 tachycardic  GI: abdomen soft non tender non distended bowel sounds positive  Lymph/Hematologic: no pallor, no cervical lymphadenopathy  Skin: no rash, good turgor  Musculoskeletal: no clubbing, cyanosis or edema  Neurologic: GCS 7; aphasic     DVT Prophylaxis: None  Code Status: DNR / DNI as per family    Disposition: Expected discharge in 2-3 days likely to hospice facility    Hayden Pierre MD    Past Medical History    I have reviewed this patient's medical history and updated it with pertinent information if needed.   Past Medical History:   Diagnosis Date     Cerebral infarction (H)      CHF (congestive heart failure) (H)      Hypertension      LBBB (left bundle branch block)      Lupus (H)      Multiple sclerosis (H)      Optic neuritis      Pulmonary embolism (H)      Sjogren's syndrome (H)      Temporal arteritis (H)      TIA (transient ischemic attack)      Transverse myelitis (H)      Uncomplicated asthma        Past Surgical History   I have reviewed this patient's surgical history and updated it with pertinent information if needed.  Past Surgical History:   Procedure Laterality Date     CHOLECYSTECTOMY       GYN SURGERY      hysterectomy     GYN SURGERY      tubal ligation     IR CVC NON TUNNEL LINE REMOVAL  11/7/2020     IR CVC NON TUNNEL PLACEMENT  6/21/2019     IR CVC NON TUNNEL PLACEMENT  7/15/2020     IR CVC NON TUNNEL PLACEMENT  10/26/2020     IR FOLLOW UP VISIT INPATIENT  7/2/2019       Prior to Admission Medications   Prior to Admission Medications   Prescriptions Last Dose Informant Patient Reported? Taking?   B Complex-C (VITAMIN B COMPLEX W/VITAMIN C) TABS tablet   Yes No   Sig: Take 1 tablet by mouth daily   Calcium-Vitamin D 600-200 MG-UNIT TABS   Yes No   Sig: Take 1  tablet by mouth every evening    FLUoxetine (PROZAC) 40 MG capsule   Yes Yes   Sig: Take 40 mg by mouth daily    Multiple Vitamins-Minerals (MULTIVITAL PO)   Yes No   Sig: Take 1 tablet by mouth every morning    acetaminophen (TYLENOL) 325 MG tablet   No No   Sig: Take 3 tablets (975 mg) by mouth every 4 hours as needed for mild pain or headaches   albuterol (PROAIR HFA/PROVENTIL HFA/VENTOLIN HFA) 108 (90 Base) MCG/ACT inhaler   Yes No   Sig: Inhale 2 puffs into the lungs every 6 hours as needed    amLODIPine (NORVASC) 5 MG tablet   Yes Yes   Sig: Take 1 tablet (5 mg) by mouth daily   apixaban ANTICOAGULANT (ELIQUIS) 5 MG tablet   No Yes   Sig: Take 1 tablet (5 mg) by mouth 2 times daily For atrial fibrillation   cetirizine (ZYRTEC) 10 MG tablet   Yes No   Sig: Take 10 mg by mouth   clobetasol (TEMOVATE) 0.05 % external cream   Yes No   Sig: APPLY TO SEVERELY ICHY RASH AREAS TWICE DAILY   fluocinonide (LIDEX) 0.05 % external cream   Yes No   Sig: APPLY TO RASH ON ARMS AND LEGS TWICE DAILY AS NEEDED   furosemide (LASIX) 20 MG tablet   Yes Yes   Sig: Take 20 mg by mouth daily    hydrOXYzine (ATARAX) 25 MG tablet   Yes Yes   Sig: Take 25 mg by mouth daily as needed   hydrocortisone 2.5 % ointment   No No   Sig: Apply topically 2 times daily   metoprolol tartrate (LOPRESSOR) 25 MG tablet   Yes Yes   Sig: Take 25 mg by mouth 2 times daily    mycophenolate (GENERIC EQUIVALENT) 250 MG capsule   No Yes   Sig: TAKE ONE CAPSULE IN THE MORNING FOR ONE MONTH AND THEN TWICE DAILY   potassium chloride ER (KLOR-CON M) 20 MEQ CR tablet   Yes No   Sig: Take 1 tablet (20 mEq) by mouth daily   predniSONE (DELTASONE) 10 MG tablet   No No   Sig: Take 1 tablet (10 mg) by mouth daily Starting on March 7   simvastatin (ZOCOR) 20 MG tablet   Yes Yes   Sig: Take 20 mg by mouth At Bedtime   traZODone (DESYREL) 100 MG tablet   Yes No   Sig: Take 100 mg by mouth At Bedtime   triamcinolone (KENALOG) 0.1 % external cream   No Yes   Sig: Apply  topically 2 times daily   trolamine salicylate (ASPERCREME) 10 % external cream   No No   Sig: Apply topically 2 times daily as needed for moderate pain   umeclidinium (INCRUSE ELLIPTA) 62.5 MCG/INH inhaler   Yes No   Sig: Inhale 1 puff into the lungs daily as needed    vitamin C (ASCORBIC ACID) 500 MG tablet   Yes No   Sig: Take 500 mg by mouth daily      Facility-Administered Medications: None     Allergies   Allergies   Allergen Reactions     Prevacid [Lansoprazole] Rash     Pravastatin Rash     Patient is still not sure about it due many years ago for reaction.       Social History   I have reviewed this patient's social history and updated it with pertinent information if needed. Ladonna Sheriff  reports that she has quit smoking. She smoked 0.00 packs per day. She has never used smokeless tobacco. She reports current alcohol use of about 7.0 standard drinks of alcohol per week. She reports that she does not use drugs.    Family History   Family history assessed and, except as above, is non-contributory.    Family History   Problem Relation Age of Onset     Asthma Mother      Lupus Sister      Bone Cancer Brother      Kidney Cancer Sister        Review of Systems   The 10 point Review of Systems is negative other than noted in the HPI or here.     Primary Care Physician   Joceline Ty    Data   Labs Ordered and Resulted from Time of ED Arrival Up to the Time of Departure from the ED   CBC WITH PLATELETS DIFFERENTIAL - Abnormal; Notable for the following components:       Result Value    MCHC 31.3 (*)     All other components within normal limits   BASIC METABOLIC PANEL - Abnormal; Notable for the following components:    Glucose 102 (*)     GFR Estimate 59 (*)     All other components within normal limits   SARS-COV-2 (COVID-19) VIRUS RT-PCR   INR   PARTIAL THROMBOPLASTIN TIME   NASOGASTRIC TUBE DECOMPRESSION       Data reviewed today:  I personally reviewed the head CT image(s) showing basilar tip  hyperdensity.    Recent Results (from the past 24 hour(s))   CT Head w/o Contrast    Narrative    EXAM: CT HEAD W/O CONTRAST  LOCATION: [facility]  DATE/TIME: 6/20/2021 10:43 PM    INDICATION: Headache. Altered mental status. Confusion. Loss of consciousness. Stroke/cerebral infarct. History of demyelinating disease, neuromyelitis optica, lupus, temporal arthritis, Sjogren's syndrome, and optic neuritis  COMPARISON: MRI brain 06/20/2019  TECHNIQUE: Routine CT Head without IV contrast. Multiplanar reformats. Dose reduction techniques were used.    FINDINGS:  INTRACRANIAL CONTENTS: No intracranial hemorrhage, extraaxial collection, or mass effect.  Basilar tip is hyperdense concerning for acute clot. Otherwise, no CT evidence of acute infarct. Mild-to-moderate patchy white matter changes, nonspecific likely   related to a combination of chronic microvascular disease, demyelination, and/or known underlying inflammatory diseases. Mild to moderate generalized volume loss. No hydrocephalus. Right thalamus punctate chronic infarct. Right frontal lobe MCA territory   superior division small area tissue loss probably related to chronic infarct.    VISUALIZED ORBITS/SINUSES/MASTOIDS: No intraorbital abnormality. No paranasal sinus mucosal disease. No middle ear or mastoid effusion.    BONES/SOFT TISSUES: No acute abnormality. Vascular calcifications.       Impression    IMPRESSION:  1.  No acute intracranial hemorrhage.   2.  Basilar tip is hyperdense concerning for acute clot.  3.  Otherwise, no CT evidence of acute infarct. Aspect score 10.  4.  Chronic intracranial changes described above similar in appearance compared to prior MRI brain 06/20/2019.      Dr Ray Liao  was notified by Dr Olvin Edgar at  11:25 PM 06/20/2021.   CTA Head Neck with Contrast    Narrative    EXAM: CTA  HEAD NECK WITH CONTRAST  LOCATION: [facility]  DATE/TIME: 6/20/2021 10:43 PM    INDICATION: Headache. Altered mental  status. Confusion. Loss of consciousness. Stroke/cerebral infarct. History of demyelinating disease, neuromyelitis optica, lupus, temporal arthritis, Sjogren's syndrome, and optic neuritis  COMPARISON: None.  CONTRAST: 70mL Isovue-370  TECHNIQUE: Head and neck CT angiogram with IV contrast. Axial helical CT images of the head and neck vessels obtained during the arterial phase of intravenous contrast administration. Axial 2D reconstructed images and multiplanar 3D MIP reconstructed   images of the head and neck vessels were performed by the technologist. Dose reduction techniques were used. All stenosis measurements made according to NASCET criteria unless otherwise specified.    FINDINGS:   HEAD CTA:  ANTERIOR CIRCULATION: No stenosis/occlusion, aneurysm, or high flow vascular malformation. Standard [place] anatomy.    POSTERIOR CIRCULATION: Left proximal V4 segment moderate stenosis secondary to calcified plaque. Left distal V4 segment demonstrates mixing artifact limiting assessment. Tip basilar artery demonstrates acute clot extending into the left superior   cerebellar artery and bilateral proximal P1 segments. Otherwise, no significant stenosis/occlusion. No paranasal aneurysm. No AVM/AVF. Balanced vertebral arteries supply a normal basilar artery.     DURAL VENOUS SINUSES: Not well evaluated on a technical basis.      NECK CTA:  RIGHT CAROTID: No measurable stenosis or dissection.    LEFT CAROTID: No measurable stenosis or dissection.    VERTEBRAL ARTERIES: No focal stenosis or dissection. Balanced vertebral arteries.    AORTIC ARCH: Classic aortic arch anatomy with no significant stenosis at the origin of the great vessels.    ARTERIAL PLAQUE: Predominantly calcified plaques within aorta, left subclavian artery, right splenic artery, bilateral carotid bifurcations/bulbs, bilateral carotid siphons. Left V4 segment calcified/noncalcified irregular plaque.    NONVASCULAR STRUCTURES:   Bilateral parotid  glands and bilateral submandibular glands demonstrate a nodular appearance likely due to a chronic inflammatory process likely Sjogren's syndrome given clinical history. Heterogeneous thyroid gland likely with adjacent fat stranding   could suggest thyroiditis. Dental amalgam resulting in streak artifact. Endotracheal tube. Esophageal fluid column extends to the hypopharynx may reflect reflux and/or dysmotility. Emphysema. Degenerative changes noted within the spine.        Impression    IMPRESSION:   HEAD CTA:   1.  Tip basilar artery demonstrates acute clot extending into the left superior cerebellar artery and bilateral proximal P1 segments.  2.  Left proximal V4 segment moderate stenosis secondary to calcified plaque. Left distal V4 segment demonstrates mixing artifact limiting assessment.     NECK CTA:  1.  Cervical arteries demonstrate no significant stenosis/occlusion or dissection.  2.  Heterogeneous thyroid gland likely with adjacent fat stranding could suggest thyroiditis.    3.  Esophageal fluid column extends to the hypopharynx may reflect reflux and/or dysmotility.      Dr Ray Liao was notified by Dr Olvin Edgar at  11:40 PM 06/20/2021.

## 2021-06-21 NOTE — ED TRIAGE NOTES
Pt was at home with her son when at 2120, pt grabbed both sides of her head complaining of a severe headache. Pt also became slurry with her words at this time. Son called 911. Pt is on eliquis for previous strokes. Pt did vomit on-scene and in the ambulance. EMS gave 4mg zofran. Pt is neglecting right side.

## 2021-06-22 NOTE — PROGRESS NOTES
Patient left unit with security to carole. All drains removed & belongings sent home with son, Bandar.

## 2021-06-22 NOTE — PLAN OF CARE
Comfort cares continued. VS and assessment deferred. Unresponsive. PRN IV robinul given and suctioning done for oral secretions. PIV infusing at TKO. Palmer with minimal out put. Discharge pending.

## 2021-06-22 NOTE — DEATH PRONOUNCEMENT
MD DEATH PRONOUNCEMENT    Called to pronounce Ladonna Sheriff dead.    Physical Exam: Spontaneous respirations absent, Breath sounds absent, Carotid pulse absent and Heart sounds absent    Patient was pronounced dead at 10:00 AM, 2021.    Preliminary Cause of Death: Suspected acute basilar artery ischemic stroke in the setting of sjogrens syndrome, SLE, PE, MS and Chronic diastolic CHF with mild to moderate Aortic Stenosis     Active Problems:    Cerebrovascular accident (CVA), unspecified mechanism (H)       Infectious disease present?: NO    Communicable disease present? (examples: HIV, chicken pox, TB, Ebola, CJD) :  NO    Multi-drug resistant organism present? (example: MRSA): NO    Please consider an autopsy if any of the following exist:  NO Unexpected or unexplained death during or following any dental, medical, or surgical diagnostic treatment procedures.   NO Death of mother at or up to seven days after delivery.     NO All  and pediatric deaths.     NO Death where the cause is sufficiently obscure to delay completion of the death certificate.   NO Deaths in which autopsy would confirm a suspected illness/condition that would affect surviving family members or recipients of transplanted organs.     The following deaths must be reported to the 's Office:  NO A death that may be due entirely or in part to any factors other than natural disease (recent surgery, recent trauma, suspected abuse/neglect).   NO A death that may be an accident, suicide, or homicide.     NO Any sudden, unexpected death in which there is no prior history of significant heart disease or any other condition associated with sudden death.   NO A death under suspicious, unusual, or unexpected circumstances.    NO Any death which is apparently due to natural causes but in which the  does not have a personal physician familiar with the patient s medical history, social, or environmental situation or the  circumstances of the terminal event.   NO Any death apparently due to Sudden Infant Death Syndrome.     NO Deaths that occur during, in association with, or as consequences of a diagnostic, therapeutic, or anesthetic procedure.   NO Any death in which a fracture of a major bone has occurred within the past (6) six months.   NO A death of persons note seen by their physician within 120 days of demise.     NO Any death in which the  was an inmate of a public institution or was in the custody of Law Enforcement personnel.   NO  All unexpected deaths of children   NO Solid organ donors   NO Unidentified bodies   NO Deaths of persons whose bodies are to be cremated or otherwise disposed of so that the bodies will later be unavailable for examination;   NO Deaths unattended by a physician outside of a licensed healthcare facility or licensed residential hospice program   NO Deaths occurring within 24 hours of arrival to a health care facility if death is unexpected.    NO Deaths associated with the decedent s employment.   NO Deaths attributed to acts of terrorism.   NO Any death in which there is uncertainty as to whether it is a medical examiner s care should be discussed with the medical investigator.        Body disposition: Autopsy was discussed with family member:  Son in person.  Permission for autopsy was declined.      KYLE [facility]  Hospitalist Service  House Officer  Pager: [phone] (2v - 6p)

## 2021-06-22 NOTE — DISCHARGE SUMMARY
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     Hospital Course   Ladonna Sheriff was admitted on 2021.  The following problems were addressed during her hospitalization:       Acute basilar artery ischemic stroke: Patient arrived obtunded and was initially intubated. Her sons had arrived and explain that at baseline, due to optic neuritis she is legally blind and due to transverse myelitis she is very weak, and has often complained of low quality of life and that she is ready to die and join her previously  . She has been extubated to comfort cares in the ED.    --Comfort care care per Family wishes:  IV and SL Dilaudid, Ativan, and Zyprexa ordered as well as Atropine drops for secretions  -initiated SW consult for discharge planning to LTC with Hospice. Discussed with Care Manager today; coordinating with Family.  -please see YAIMA Note today on death pronouncement; expected death due to acute stroke on comfort cares; no diagnostic or therapeutic interventions since admission due to Family wishes.  Cares directed at comfort.        Chronic medical conditions include:    SLE    Sjogrens Syndrome    PE on Eliquis    MS    Chronic diastolic CHF with mild to moderate Aortic Stenosis seen on TTE in 2019     Rule Out COVID-19 infection:  NEG      Code Status   DNR / DNI       Primary Care Physician   Physician No Ref-Primary    Physical Exam            Resp: 24        Vitals:    21 2239   Weight: 98 kg  (216 lb 0.8 oz)     Vital Signs with Ranges  Resp:  [24] 24  I/O last 3 completed shifts:  In: -   Out: 475 [Urine:475]    On Encounter:   General/Constitutional: non responsive to voice or touch.  Appears comfortable.   Chest/Respiratory:  Respiration nonlabored on RA, shallow; oral secretions.  Gastrointestinal/Abdomen:  Soft,  Musculoskeletal:  extremities warm, dry, noncyanotic;   Neurologic:  No active or reflex motor movement   Psychiatric:  Mental status; nonresponsive.     Discharge Disposition   discussed with Care Manager with plans to discharge to LTC with Hospice. Since then, Patient , as above.     Consultations This Hospital Stay   SOCIAL WORK IP CONSULT    Time Spent on this Encounter   I, Estella Hunt MD, personally saw the patient today and spent greater than 30 minutes including discussions above regarding disposition/Hospice.

## 2021-06-22 NOTE — PROGRESS NOTES
Care Management Follow Up    Length of Stay (days): 1    Expected Discharge Date: 06/23/21(LTC/Hospice)     Concerns to be Addressed:   Discharge planning    Patient plan of care discussed at interdisciplinary rounds: Yes    Anticipated Discharge Disposition:  SNF with hospice     Anticipated Discharge Services:  hospice  Anticipated Discharge DME:  N/A    Patient/family educated on Medicare website which has current facility and service quality ratings:  N/A  Education Provided on the Discharge Plan:  yes  Patient/Family in Agreement with the Plan:  yes    Referrals Placed by CM/SW:  SNF   Private pay costs discussed: Not applicable    Additional Information:  Reviewed chart.  Call placed and message left for patient's AC waiver  Amparo Boo, [phone], to inquire as to whether she can assist in switching patient's MA to LTC MA.  Awaiting a return call.    Will continue to follow.      CALDERON Villanueva, [facility]    [phone]  [facility]

## 2021-06-22 NOTE — PLAN OF CARE
Comfort cares continued, VS and assessment deferred. Somnolent. PRN dilaudid given for comfort. Copious secretions with frequent suctioning, scopalamine patch in place, IV robinul given 2x. PIV infusing TKO. Palmer with minimal UOP. T/R q 2 hrs. Discharge penidng.

## 2022-01-10 NOTE — PLAN OF CARE
Patient is A&OX4, able to make needs known. Denies pain. CGA with walker and gait belt. Voiding spontaneously without issue. LBM today. Held amlodipine AM per parameters. Team rounds done today (see note).  Calls appropraitely. Continue with POC.    Pt. admitted with DVT of LLE Pt. admitted with DVT of LLE

## 2022-01-14 NOTE — CONSULTS
"  OPHTHALMOLOGY CONSULT NOTE  11/19/20    Patient: Ladonna Sheriff      HISTORY OF PRESENTING ILLNESS:     Ladonna Sheriff is a 75 year old female with a history of NMO, NLP vision OS, and ~20/125 OS secondary to recurrent bouts of optic neuritis. Most recently, she suffered left eye vision loss in 7/2020 and was treated with steroids and several rounds of PLEX. For her NMO, she is on mycophenolate, previously on rituximab but failed, and her neurologist, Dr. Franklin, is considering starting eciluzimab soon. She is currently admitted to Acute Rehab due to right leg weakness secondary to NMO flare.     Patient tells me she has a central island of vision in her left eye that has gradually been becoming more cloudy and dim (she compares the speed of progression to \"watching my kids grow\"). There has been no acute change, but she is bothered by this constant, slow progression with the concern that she may lose all of her vision. She denies having any pain with eye movements. No sudden loss of vision. No flashes or floaters. No new focal weakness, numbness, or tingling. No headaches.    Additionally, she endorses bilateral dry eyes but is not currently using any eye drops.      10+ review of systems were otherwise negative except for that which has been stated above.      OCULAR/MEDICAL/SURGICAL HISTORIES:     Past Ocular History:  Refractive error  NMO  Recurrent optic neuritis  NLP vision OD    Past Medical History:   Diagnosis Date     Cerebral infarction (H)      CHF (congestive heart failure) (H)      Hypertension      Lupus (H)      Lupus (H)      Optic neuritis      Optic neuritis      Sjogren's syndrome (H)      Sjogren's syndrome (H)      Temporal arteritis (H)      TIA (transient ischemic attack)      Uncomplicated asthma        Past Surgical History:   Procedure Laterality Date     CHOLECYSTECTOMY       GYN SURGERY      hysterectomy     GYN SURGERY      tubal ligation     IR CVC NON TUNNEL LINE REMOVAL  11/7/2020     " Echo showed normal pumping heart strength  He has mild aortic stenosis, which is a narrowing of his aortic valve.   This is mild right now, not of big concern but we will repeat echo in 1-2 years for surveillance, sooner if he has any cp sob or lightheadedness IR CVC NON TUNNEL PLACEMENT  6/21/2019     IR CVC NON TUNNEL PLACEMENT  7/15/2020     IR CVC NON TUNNEL PLACEMENT  10/26/2020     IR FOLLOW UP VISIT INPATIENT  7/2/2019       EXAMINATION:     Base Eye Exam     Visual Acuity (Snellen - Linear)       Right Left    Near cc NLP 20/200    Correction: Glasses          Tonometry (Tonopen, 3:07 PM)       Right Left    Pressure 12 12          Pupils       Pupils Shape React APD    Right PERRL Round Minimal 3+    Left PERRL Round Slow None          Visual Fields       Left Right    Restrictions Total superior temporal, inferior temporal deficiencies; Partial outer superior nasal, inferior nasal deficiencies Total superior temporal, inferior temporal, superior nasal, inferior nasal deficiencies          Extraocular Movement       Right Left     XT      -- -- --   --  -tr   -- -- --    -- -- --   -tr  --   -- -- --             Neuro/Psych     Oriented x3: Yes    Mood/Affect: Normal          Dilation     Both eyes: 1.0% Mydriacyl, 2.5% Jairo Synephrine @ 3:05 PM            Slit Lamp and Fundus Exam     External Exam       Right Left    External exotropic Normal          Slit Lamp Exam       Right Left    Lids/Lashes MGD MGD    Conjunctiva/Sclera trace injection trace injection    Cornea Clear, rapid TBUT Clear, rapid TBUT    Anterior Chamber Slightly narrow Deep and quiet    Iris Round and reactive Round and reactive    Lens 2+ NS  PCIOL    Vitreous Syneresis Syneresis          Fundus Exam       Right Left    Disc 3-4+ pallor  3+ pallor     C/D Ratio 0.8 0.6    Macula RPE changes  ERM     Vessels Normal Normal    Periphery Inferonasal hypopigmented CR lesion, o/w normal Normal                  ASSESSMENT/PLAN:     Ladonna Sheriff is a 75 year old female who presents with the following:    # Neuromyelitis optica  # H/o recurrent optic neuritis   - Vision stable. 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 Lazo has discussed with Dr. Franklin consideration for Uplizna or Soliris; appears Dr. Franklin is planning to start Soliris.     - No urgent change to management from an ophthalmology standpoint given reassuring exam today   - Reschedule appt w/ Dr. Franklin (was scheduled for yesterday, 11/18/20)   - Follow up with Dr. Lazo, Neuro-Ophthalmology, in 3 months; sooner any new concerns    # Bilateral dry eyes   - Start preservative free artificial tears 1-2 drops Q1H prn (ordered for you)   - Warm compresses 1-2x/day    - If eye itchiness persists despite above, could start Zaditor (ketotifen) 1 drop BID OU     # Pseudophakia, LEFT eye   - Visual axis appears clear    # Age-related nuclear cataract, RIGHT eye   - NLP eye.    - Monitor    # Sensory exotropia    Reviewed with neuro-ophthalmology team  It is our pleasure to participate in this patient's care and treatment. Please contact us with any further questions or concerns.      Ray Hilliard MD  Ophthalmology PGY3  [facility]  Pager: [phone]    Teaching Statement  I did not examine the patient, but was available to see the patient if needed. I have discussed the case with the resident and the assessment and plan as documented seems reasonable to me.    Geni Coleman MD  Comprehensive Ophthalmology & Ocular Pathology  Department of Ophthalmology and Visual Neurosciences  [email or web address]  Pager [phone]

## 2023-09-20 NOTE — PROGRESS NOTES
[facility]  Neurology Progress Note    Patient Name:  Ladonna Sheriff    MRN:  [id number]      :  1945  Date of Service:  10/29/2020  Primary care provider:  Joceline Ty      Subjective:  No acute events overnight. Received PLEX second dose yesterday and tolerated well. Continues to have subjective improvement of RLE weakness. No new concerns today.     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  She presented to [facility] on 10/23/2020 with worsening RLE weakness, concerning for NMO exacerbation.  She was found to have enhancing lesions extending from T3 to T5/6.  She was transferred to [facility] for further management and PLEX therapy.        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 Left eye has lateral peripheral field cut at baseline with blurry and vision. Sensation decreased from T4 dermatome to toes, with mild L>R predominance for pain/temp and general sensation. New RLE weakness in the setting of new exacerbation. Admits to previous weakness exacerbations involving the RLE, but never the LLE. Each one has resolved with PLEX. Has failed Rituximab therapy due to relapses while therapeutic. Currently taking Mycophenolate for several months, although <6mo, and having current exacerbation. Unsure if currently therapeutic, although may consider newer monoclonal antibodies for NMO exacerbation prevention.   - s/p Methylprednisone 1g for x5 days  - Famotidine   - PLEX for x5 doses, likely every other day dosing (first dose 10/26)    -  Portal outreach un-read by patient.  Outreach mailed 09/20/2023   Transfusion medicine consult ordered   - CTM Fibrinogen, will decide Enoxaparin based on this   - PTA Mycophenolate 500 BID   - PT/OT   - Meningococcal vaccine #2 prior to discharge (may wait until finished PLEX prior to administration)    #Hypokalemia, stable  - Electrolyte protocol    #UTI, resolved   - Ceftriaxone 1g for 5 day course (completed 10/27)       Chronic issues:  #Bronchospastic disease  - Continue PTA albuterol and Incruse Ellipta     #Hypertension, stable   - Continue PTA amlodipine and metoprolol     #H/o HFrEF, currently improved EF  - Continue PTA Lasix and metoprolol     #Hx of TIA  - Continue PTA ASA 325mg   - Continue PTA simvastatin 20mg     #Mood d/o  - Continue PTA fluoxetine 20mg      #Insomnia, stable   - Continue PTA trazodone 100mg qHS      FEN: Intermittent bolus' PRN; electrolyte protocol; Regular diet  LDA: PIV x2, HD cath   PPx: Famotidine for GI; Enoxaparin   Code: FULL     Dispo: Floor status, receiving PLEX; Possible ARU on discharge       Patient discussed with Attending Dr. Aníbal Sherwood MD  PGY-2 Neurology Resident  10/29/2020       I saw and evaluated the patient on 10/29/20 and agree with the findings and the plan of care as documented in the resident's note.      Patient feels her right lower extremity is improving.  On my exam she is definitely improved in dorsiflexion of her right lower extremity.  Right hip flexion knee extension still quite weak and not clearly different on my exam although patient feels more mobile.  Plasma exchange #3 plan for tomorrow.  She has completed high-dose steroids and IV antibiotics for UTI.  I have spoken to her outpatient autoimmune neurologist and the plan is to start on Solaris after discharge.  They are arranging paperwork currently.  Patient has received meningococcal vaccines.  Complement levels pending.   Awaiting response on timeframe for which they suspect Solaris to be approved.  If going to be delayed,  "will discuss with her neurologist if daily prednisone indicated as bridge until approval.    Rajwinder Spangler DO   of Neurology        ______________________________  PHYSICAL EXAMINATION:   /70 (BP Location: Left arm)   Pulse 61   Temp 97.5  F (36.4  C) (Oral)   Resp 16   Ht 1.702 m (5' 7\")   Wt 88.9 kg (195 lb 15.8 oz)   SpO2 97%   BMI 30.70 kg/m       General: Pleasant, awake and alert, Sitting in bed, NAD, cooperative  HEENT: Normocephalic, atraumatic, no epistaxis, no oral lesions, MMM  Resp: CTAB, no increased work of breathing  Cardio: RRR, S1/S2 appropriate   Abdomen: +BS, Soft, non-distended, non-tender  Extremities: Warm and well perfused, peripheral pulses present, no edema  Skin: Not jaundiced, no rash   Psych: Normal mood and affect    Neuro:  Mental status: Awake, alert, attentive; oriented to P/P/T/M  Speech: Fluent, comprehension and repetition intact; No dysarthria  Cranial nerves: Right eye blind at baseline, peripheral Left field cut with poor visual acuity of central Left eye (baseline), Eyes mildly dysconjugate, PERRL, EOMI w/ normal and smooth pursuit with some occasional pain of Right eye during maximal EOM, face expression symmetric, facial sensation intact and symmetric, hearing intact to conversation, shoulder shrug strong, palate rise symmetric, tongue/uvula midline.  Motor: Tone normal. 5/5 strength BUE and LLE, 4/5 in hip flexor, knee flexion, and dorsiflexion with 5/5 Knee extension, plantar flexion on Right. No atrophy or twitches. Pronator drift present on the RLE.   Reflexes: 2+ and symmetric biceps, triceps, brachioradialis, patellar, achilles; No Villasenor's; Toes down-going   Sensory: LLE without temp sensation up to hip; Mild temp change in RLE to hip; Vibration decreased to knee bilaterally; Global reduced sensation to anterior rib cage bilaterally with band-like sensation in T4 distribution. Sensation normal and symmetric in BUEs   Coordination: " FNF intact bilaterally, no dysmetria   Gait: Deferred       CURRENT MEDICATIONS:    amLODIPine  10 mg Oral Daily     aspirin  325 mg Oral QPM     clopidogrel  75 mg Oral Daily     enoxaparin ANTICOAGULANT  40 mg Subcutaneous Q24H     famotidine  20 mg Oral BID     FLUoxetine  40 mg Oral Daily     furosemide  20 mg Oral Daily     heparin  3 mL Intracatheter Q24H     heparin  5 mL Intracatheter Q24H     heparin  5 mL Intracatheter Q24H     influenza vac high-dose quad  0.7 mL Intramuscular Prior to discharge     metoprolol succinate ER  50 mg Oral BID     mycophenolate  500 mg Oral BID IS     simvastatin  20 mg Oral At Bedtime     sodium chloride (PF)  3 mL Intracatheter Q8H     traZODone  100 mg Oral At Bedtime       PRN MEDICATIONS:  acetaminophen, albuterol, diphenhydrAMINE, heparin, hydrocortisone, lidocaine 4%, lidocaine (buffered or not buffered), melatonin, naloxone, sodium chloride (PF)    INFUSIONS:      ALLERGIES:  Allergies   Allergen Reactions     Prevacid [Lansoprazole] Rash     Pravastatin Rash     Patient is still not sure about it due many years ago for reaction.         IMAGING:  MRI C-spine (10/23)  IMPRESSION:  1.  No definite cervical spinal cord signal abnormality and no abnormal enhancement.  2.  Disc osteophyte complex with shallow protrusion and annular fissure/tear and possible tiny component of extrusion at C4-C5 without significant spinal canal stenosis, mild to moderate foraminal narrowing right more than left due to uncinate spurring   and facet arthropathy.  3.  Mild multilevel degenerative spondylosis otherwise, see report for level by level details.    MRI T-spine (10/23)  IMPRESSION:  1.  Increased cord parenchyma T2 signal extending from T3-T5/T6, with enhancement centered at the T4 level as well as a small focus of enhancement in the parenchyma at the T5 level and left lateral spinal canal at T5 which may represent a focus of nerve   root enhancement. This is consistent with but not  diagnostic of demyelinating disease active/acute plaque formation.  2.  Near complete resolution of the previous cord signal abnormality centered at the T2 level.  3.  Remainder of study without significant MR abnormality.

## (undated) RX ORDER — LIDOCAINE HYDROCHLORIDE 10 MG/ML
INJECTION, SOLUTION EPIDURAL; INFILTRATION; INTRACAUDAL; PERINEURAL
Status: DISPENSED
Start: 2020-01-01

## (undated) RX ORDER — LIDOCAINE HYDROCHLORIDE 10 MG/ML
INJECTION, SOLUTION EPIDURAL; INFILTRATION; INTRACAUDAL; PERINEURAL
Status: DISPENSED
Start: 2019-06-21

## (undated) RX ORDER — HEPARIN SODIUM (PORCINE) LOCK FLUSH IV SOLN 100 UNIT/ML 100 UNIT/ML
SOLUTION INTRAVENOUS
Status: DISPENSED
Start: 2020-01-01

## (undated) RX ORDER — LIDOCAINE HYDROCHLORIDE 10 MG/ML
INJECTION, SOLUTION INFILTRATION; PERINEURAL
Status: DISPENSED
Start: 2021-01-01

## (undated) RX ORDER — HEPARIN SODIUM (PORCINE) LOCK FLUSH IV SOLN 100 UNIT/ML 100 UNIT/ML
SOLUTION INTRAVENOUS
Status: DISPENSED
Start: 2019-06-21

## (undated) RX ORDER — HEPARIN SODIUM 200 [USP'U]/100ML
INJECTION, SOLUTION INTRAVENOUS
Status: DISPENSED
Start: 2021-01-01